# Patient Record
Sex: MALE | Race: BLACK OR AFRICAN AMERICAN | NOT HISPANIC OR LATINO | Employment: FULL TIME | URBAN - METROPOLITAN AREA
[De-identification: names, ages, dates, MRNs, and addresses within clinical notes are randomized per-mention and may not be internally consistent; named-entity substitution may affect disease eponyms.]

---

## 2017-02-09 ENCOUNTER — ALLSCRIPTS OFFICE VISIT (OUTPATIENT)
Dept: OTHER | Facility: OTHER | Age: 52
End: 2017-02-09

## 2017-02-09 DIAGNOSIS — E11.65 TYPE 2 DIABETES MELLITUS WITH HYPERGLYCEMIA (HCC): ICD-10-CM

## 2017-02-09 DIAGNOSIS — G56.03 BILATERAL CARPAL TUNNEL SYNDROME: ICD-10-CM

## 2017-02-09 DIAGNOSIS — I10 ESSENTIAL (PRIMARY) HYPERTENSION: ICD-10-CM

## 2017-02-09 DIAGNOSIS — F17.200 NICOTINE DEPENDENCE, UNCOMPLICATED: ICD-10-CM

## 2017-02-09 DIAGNOSIS — E11.42 TYPE 2 DIABETES MELLITUS WITH DIABETIC POLYNEUROPATHY (HCC): ICD-10-CM

## 2017-02-09 DIAGNOSIS — R10.11 RIGHT UPPER QUADRANT PAIN: ICD-10-CM

## 2017-02-09 DIAGNOSIS — E78.89 OTHER LIPOPROTEIN METABOLISM DISORDERS: ICD-10-CM

## 2017-02-09 DIAGNOSIS — Z11.3 ENCOUNTER FOR SCREENING FOR INFECTIONS WITH PREDOMINANTLY SEXUAL MODE OF TRANSMISSION: ICD-10-CM

## 2017-02-09 DIAGNOSIS — R10.30 LOWER ABDOMINAL PAIN: ICD-10-CM

## 2017-02-09 DIAGNOSIS — E66.9 OBESITY: ICD-10-CM

## 2017-02-09 DIAGNOSIS — Z11.4 ENCOUNTER FOR SCREENING FOR HIV: ICD-10-CM

## 2017-02-09 DIAGNOSIS — R80.9 PROTEINURIA: ICD-10-CM

## 2017-02-10 ENCOUNTER — GENERIC CONVERSION - ENCOUNTER (OUTPATIENT)
Dept: OTHER | Facility: OTHER | Age: 52
End: 2017-02-10

## 2017-02-13 ENCOUNTER — LAB CONVERSION - ENCOUNTER (OUTPATIENT)
Dept: OTHER | Facility: OTHER | Age: 52
End: 2017-02-13

## 2017-02-13 LAB
A/G RATIO (HISTORICAL): 1.2 (CALC) (ref 1–2.5)
ALBUMIN SERPL BCP-MCNC: 4 G/DL (ref 3.6–5.1)
ALP SERPL-CCNC: 85 U/L (ref 40–115)
ALT SERPL W P-5'-P-CCNC: 22 U/L (ref 9–46)
AST SERPL W P-5'-P-CCNC: 41 U/L (ref 10–35)
BASOPHILS # BLD AUTO: 0.4 %
BASOPHILS # BLD AUTO: 37 CELLS/UL (ref 0–200)
BILIRUB SERPL-MCNC: 2 MG/DL (ref 0.2–1.2)
BUN SERPL-MCNC: 8 MG/DL (ref 7–25)
BUN/CREA RATIO (HISTORICAL): ABNORMAL (CALC) (ref 6–22)
CALCIUM SERPL-MCNC: 9 MG/DL (ref 8.6–10.3)
CHLORIDE SERPL-SCNC: 96 MMOL/L (ref 98–110)
CHOLEST SERPL-MCNC: 169 MG/DL (ref 125–200)
CHOLEST/HDLC SERPL: 3.7 (CALC)
CO2 SERPL-SCNC: 31 MMOL/L (ref 20–31)
CREAT SERPL-MCNC: 1.07 MG/DL (ref 0.7–1.33)
DEPRECATED RDW RBC AUTO: 12.6 % (ref 11–15)
EGFR AFRICAN AMERICAN (HISTORICAL): 92 ML/MIN/1.73M2
EGFR-AMERICAN CALC (HISTORICAL): 79 ML/MIN/1.73M2
EOSINOPHIL # BLD AUTO: 2.6 %
EOSINOPHIL # BLD AUTO: 242 CELLS/UL (ref 15–500)
GAMMA GLOBULIN (HISTORICAL): 3.3 G/DL (CALC) (ref 1.9–3.7)
GLUCOSE (HISTORICAL): 184 MG/DL (ref 65–99)
HBA1C MFR BLD HPLC: 9.3 % OF TOTAL HGB
HCT VFR BLD AUTO: 46.6 % (ref 38.5–50)
HDLC SERPL-MCNC: 46 MG/DL
HGB BLD-MCNC: 14.9 G/DL (ref 13.2–17.1)
HIV AG/AB, 4TH GEN (HISTORICAL): NORMAL
LDL CHOLESTEROL (HISTORICAL): 107 MG/DL (CALC)
LYMPHOCYTES # BLD AUTO: 3413 CELLS/UL (ref 850–3900)
LYMPHOCYTES # BLD AUTO: 36.7 %
MCH RBC QN AUTO: 31.2 PG (ref 27–33)
MCHC RBC AUTO-ENTMCNC: 32 G/DL (ref 32–36)
MCV RBC AUTO: 97.7 FL (ref 80–100)
MONOCYTES # BLD AUTO: 642 CELLS/UL (ref 200–950)
MONOCYTES (HISTORICAL): 6.9 %
NEUTROPHILS # BLD AUTO: 4966 CELLS/UL (ref 1500–7800)
NEUTROPHILS # BLD AUTO: 53.4 %
NON-HDL-CHOL (CHOL-HDL) (HISTORICAL): 123 MG/DL (CALC)
PLATELET # BLD AUTO: 213 THOUSAND/UL (ref 140–400)
PMV BLD AUTO: 9.9 FL (ref 7.5–12.5)
POTASSIUM SERPL-SCNC: 3.6 MMOL/L (ref 3.5–5.3)
RBC # BLD AUTO: 4.76 MILLION/UL (ref 4.2–5.8)
SODIUM SERPL-SCNC: 137 MMOL/L (ref 135–146)
TOTAL PROTEIN (HISTORICAL): 7.3 G/DL (ref 6.1–8.1)
TRIGL SERPL-MCNC: 82 MG/DL
TSH SERPL DL<=0.05 MIU/L-ACNC: 2.27 MIU/L (ref 0.4–4.5)
WBC # BLD AUTO: 9.3 THOUSAND/UL (ref 3.8–10.8)

## 2017-02-16 ENCOUNTER — GENERIC CONVERSION - ENCOUNTER (OUTPATIENT)
Dept: OTHER | Facility: OTHER | Age: 52
End: 2017-02-16

## 2017-02-22 ENCOUNTER — HOSPITAL ENCOUNTER (OUTPATIENT)
Dept: ULTRASOUND IMAGING | Facility: HOSPITAL | Age: 52
Discharge: HOME | End: 2017-02-22
Payer: COMMERCIAL

## 2017-02-22 ENCOUNTER — ALLSCRIPTS OFFICE VISIT (OUTPATIENT)
Dept: OTHER | Facility: OTHER | Age: 52
End: 2017-02-22

## 2017-02-22 ENCOUNTER — TRANSCRIBE ORDERS (OUTPATIENT)
Dept: ADMINISTRATIVE | Facility: HOSPITAL | Age: 52
End: 2017-02-22

## 2017-02-22 DIAGNOSIS — R10.11 RIGHT UPPER QUADRANT PAIN: ICD-10-CM

## 2017-02-22 PROCEDURE — 76705 ECHO EXAM OF ABDOMEN: CPT

## 2017-02-23 ENCOUNTER — GENERIC CONVERSION - ENCOUNTER (OUTPATIENT)
Dept: OTHER | Facility: OTHER | Age: 52
End: 2017-02-23

## 2017-04-11 ENCOUNTER — ALLSCRIPTS OFFICE VISIT (OUTPATIENT)
Dept: OTHER | Facility: OTHER | Age: 52
End: 2017-04-11

## 2017-05-16 DIAGNOSIS — I10 ESSENTIAL (PRIMARY) HYPERTENSION: ICD-10-CM

## 2017-05-16 DIAGNOSIS — E11.65 TYPE 2 DIABETES MELLITUS WITH HYPERGLYCEMIA (HCC): ICD-10-CM

## 2017-05-16 DIAGNOSIS — E11.42 TYPE 2 DIABETES MELLITUS WITH DIABETIC POLYNEUROPATHY (HCC): ICD-10-CM

## 2017-05-16 DIAGNOSIS — R80.9 PROTEINURIA: ICD-10-CM

## 2017-05-16 DIAGNOSIS — E66.9 OBESITY: ICD-10-CM

## 2017-05-18 ENCOUNTER — LAB CONVERSION - ENCOUNTER (OUTPATIENT)
Dept: OTHER | Facility: OTHER | Age: 52
End: 2017-05-18

## 2017-05-18 ENCOUNTER — GENERIC CONVERSION - ENCOUNTER (OUTPATIENT)
Dept: OTHER | Facility: OTHER | Age: 52
End: 2017-05-18

## 2017-05-18 LAB
CLINICAL COMMENT (HISTORICAL): NORMAL
HEPATITIS A IGM ANTIBODY (HISTORICAL): NORMAL
HEPATITIS B CORE TOTAL ANTIBODY (HISTORICAL): NORMAL
HEPATITIS B SURFACE ANTIGEN (HISTORICAL): NORMAL
HEPATITIS C ANTIBODY (HISTORICAL): NORMAL
HERPES SIMPLEX VIRUS 1 IGG (HISTORICAL): 29.9 INDEX
HERPES SIMPLEX VIRUS 2 IGG (HISTORICAL): 1.6 INDEX
RPR SCREEN (HISTORICAL): NORMAL
SIGNAL TO CUT-OFF (HISTORICAL): 0.04

## 2017-05-24 ENCOUNTER — ALLSCRIPTS OFFICE VISIT (OUTPATIENT)
Dept: OTHER | Facility: OTHER | Age: 52
End: 2017-05-24

## 2017-06-06 ENCOUNTER — ALLSCRIPTS OFFICE VISIT (OUTPATIENT)
Dept: OTHER | Facility: OTHER | Age: 52
End: 2017-06-06

## 2017-06-06 ENCOUNTER — GENERIC CONVERSION - ENCOUNTER (OUTPATIENT)
Dept: OTHER | Facility: OTHER | Age: 52
End: 2017-06-06

## 2017-06-06 ENCOUNTER — LAB CONVERSION - ENCOUNTER (OUTPATIENT)
Dept: OTHER | Facility: OTHER | Age: 52
End: 2017-06-06

## 2017-06-06 LAB
A/G RATIO (HISTORICAL): 1.3 (CALC) (ref 1–2.5)
ALBUMIN SERPL BCP-MCNC: 3.9 G/DL (ref 3.6–5.1)
ALP SERPL-CCNC: 59 U/L (ref 40–115)
ALT SERPL W P-5'-P-CCNC: 9 U/L (ref 9–46)
AST SERPL W P-5'-P-CCNC: 18 U/L (ref 10–35)
BILIRUB SERPL-MCNC: 0.7 MG/DL (ref 0.2–1.2)
BUN SERPL-MCNC: 11 MG/DL (ref 7–25)
BUN/CREA RATIO (HISTORICAL): ABNORMAL (CALC) (ref 6–22)
CALCIUM SERPL-MCNC: 9.4 MG/DL (ref 8.6–10.3)
CHLORIDE SERPL-SCNC: 102 MMOL/L (ref 98–110)
CHOLEST SERPL-MCNC: 151 MG/DL (ref 125–200)
CHOLEST/HDLC SERPL: 3 (CALC)
CO2 SERPL-SCNC: 32 MMOL/L (ref 20–31)
CREAT SERPL-MCNC: 0.97 MG/DL (ref 0.7–1.33)
CREATININE, RANDOM URINE (HISTORICAL): 231 MG/DL (ref 20–370)
EGFR AFRICAN AMERICAN (HISTORICAL): 103 ML/MIN/1.73M2
EGFR-AMERICAN CALC (HISTORICAL): 89 ML/MIN/1.73M2
GAMMA GLOBULIN (HISTORICAL): 3 G/DL (CALC) (ref 1.9–3.7)
GLUCOSE (HISTORICAL): 82 MG/DL (ref 65–99)
HBA1C MFR BLD HPLC: 5.9 % OF TOTAL HGB
HDLC SERPL-MCNC: 50 MG/DL
LDL CHOLESTEROL (HISTORICAL): 86 MG/DL (CALC)
MAGNESIUM, UR (HISTORICAL): 5.6 MG/DL
MICROALBUMIN/CREATININE RATIO (HISTORICAL): 24 MCG/MG CREAT
NON-HDL-CHOL (CHOL-HDL) (HISTORICAL): 101 MG/DL (CALC)
POTASSIUM SERPL-SCNC: 3.7 MMOL/L (ref 3.5–5.3)
SODIUM SERPL-SCNC: 141 MMOL/L (ref 135–146)
TOTAL PROTEIN (HISTORICAL): 6.9 G/DL (ref 6.1–8.1)
TRIGL SERPL-MCNC: 75 MG/DL

## 2017-07-28 ENCOUNTER — HOSPITAL ENCOUNTER (EMERGENCY)
Facility: HOSPITAL | Age: 52
Discharge: HOME | End: 2017-07-28
Attending: EMERGENCY MEDICINE | Admitting: EMERGENCY MEDICINE
Payer: COMMERCIAL

## 2017-07-28 ENCOUNTER — APPOINTMENT (EMERGENCY)
Dept: RADIOLOGY | Facility: HOSPITAL | Age: 52
End: 2017-07-28
Payer: COMMERCIAL

## 2017-07-28 ENCOUNTER — APPOINTMENT (EMERGENCY)
Dept: CT IMAGING | Facility: HOSPITAL | Age: 52
End: 2017-07-28
Payer: COMMERCIAL

## 2017-07-28 VITALS
TEMPERATURE: 98.3 F | SYSTOLIC BLOOD PRESSURE: 192 MMHG | RESPIRATION RATE: 16 BRPM | HEART RATE: 93 BPM | DIASTOLIC BLOOD PRESSURE: 100 MMHG | HEIGHT: 69 IN | BODY MASS INDEX: 40.57 KG/M2 | WEIGHT: 273.9 LBS | OXYGEN SATURATION: 94 %

## 2017-07-28 DIAGNOSIS — J18.9 RIGHT LOWER LOBE PNEUMONIA: Primary | ICD-10-CM

## 2017-07-28 DIAGNOSIS — R05.9 COUGH: ICD-10-CM

## 2017-07-28 LAB
ALBUMIN SERPL BCP-MCNC: 3 G/DL (ref 3.5–5)
ALP SERPL-CCNC: 70 U/L (ref 46–116)
ALT SERPL W P-5'-P-CCNC: 19 U/L (ref 12–78)
ANION GAP SERPL CALCULATED.3IONS-SCNC: 10 MMOL/L (ref 4–13)
AST SERPL W P-5'-P-CCNC: 21 U/L (ref 5–45)
ATRIAL RATE: 101 BPM
BASOPHILS # BLD AUTO: 0.04 THOUSANDS/ΜL (ref 0–0.1)
BASOPHILS NFR BLD AUTO: 1 % (ref 0–1)
BILIRUB SERPL-MCNC: 0.4 MG/DL (ref 0.2–1)
BUN SERPL-MCNC: 7 MG/DL (ref 5–25)
CALCIUM SERPL-MCNC: 8.7 MG/DL (ref 8.3–10.1)
CHLORIDE SERPL-SCNC: 102 MMOL/L (ref 100–108)
CO2 SERPL-SCNC: 30 MMOL/L (ref 21–32)
CREAT SERPL-MCNC: 0.89 MG/DL (ref 0.6–1.3)
DEPRECATED D DIMER PPP: 533 NG/ML (FEU) (ref 0–424)
EOSINOPHIL # BLD AUTO: 0.43 THOUSAND/ΜL (ref 0–0.61)
EOSINOPHIL NFR BLD AUTO: 6 % (ref 0–6)
ERYTHROCYTE [DISTWIDTH] IN BLOOD BY AUTOMATED COUNT: 12.3 % (ref 11.6–15.1)
GFR SERPL CREATININE-BSD FRML MDRD: 113 ML/MIN/1.73SQ M
GLUCOSE SERPL-MCNC: 134 MG/DL (ref 65–140)
HCT VFR BLD AUTO: 44.2 % (ref 36.5–49.3)
HGB BLD-MCNC: 15.2 G/DL (ref 12–17)
LYMPHOCYTES # BLD AUTO: 2.64 THOUSANDS/ΜL (ref 0.6–4.47)
LYMPHOCYTES NFR BLD AUTO: 34 % (ref 14–44)
MCH RBC QN AUTO: 30 PG (ref 26.8–34.3)
MCHC RBC AUTO-ENTMCNC: 34.4 G/DL (ref 31.4–37.4)
MCV RBC AUTO: 87 FL (ref 82–98)
MONOCYTES # BLD AUTO: 0.65 THOUSAND/ΜL (ref 0.17–1.22)
MONOCYTES NFR BLD AUTO: 9 % (ref 4–12)
NEUTROPHILS # BLD AUTO: 3.92 THOUSANDS/ΜL (ref 1.85–7.62)
NEUTS SEG NFR BLD AUTO: 50 % (ref 43–75)
NT-PROBNP SERPL-MCNC: 156 PG/ML
P AXIS: 47 DEGREES
PLATELET # BLD AUTO: 314 THOUSANDS/UL (ref 149–390)
PMV BLD AUTO: 10.1 FL (ref 8.9–12.7)
POTASSIUM SERPL-SCNC: 3.2 MMOL/L (ref 3.5–5.3)
PR INTERVAL: 128 MS
PROT SERPL-MCNC: 7 G/DL (ref 6.4–8.2)
QRS AXIS: 42 DEGREES
QRSD INTERVAL: 80 MS
QT INTERVAL: 354 MS
QTC INTERVAL: 450 MS
RBC # BLD AUTO: 5.06 MILLION/UL (ref 3.88–5.62)
SODIUM SERPL-SCNC: 142 MMOL/L (ref 136–145)
T WAVE AXIS: 26 DEGREES
VENTRICULAR RATE: 97 BPM
WBC # BLD AUTO: 7.68 THOUSAND/UL (ref 4.31–10.16)

## 2017-07-28 PROCEDURE — 99284 EMERGENCY DEPT VISIT MOD MDM: CPT

## 2017-07-28 PROCEDURE — 83880 ASSAY OF NATRIURETIC PEPTIDE: CPT | Performed by: PHYSICIAN ASSISTANT

## 2017-07-28 PROCEDURE — 85379 FIBRIN DEGRADATION QUANT: CPT | Performed by: PHYSICIAN ASSISTANT

## 2017-07-28 PROCEDURE — 93005 ELECTROCARDIOGRAM TRACING: CPT

## 2017-07-28 PROCEDURE — 36415 COLL VENOUS BLD VENIPUNCTURE: CPT | Performed by: PHYSICIAN ASSISTANT

## 2017-07-28 PROCEDURE — 71275 CT ANGIOGRAPHY CHEST: CPT

## 2017-07-28 PROCEDURE — 85025 COMPLETE CBC W/AUTO DIFF WBC: CPT | Performed by: PHYSICIAN ASSISTANT

## 2017-07-28 PROCEDURE — 80053 COMPREHEN METABOLIC PANEL: CPT | Performed by: PHYSICIAN ASSISTANT

## 2017-07-28 PROCEDURE — 71020 HB CHEST X-RAY 2VW FRONTAL&LATL: CPT

## 2017-07-28 RX ORDER — GLIMEPIRIDE 4 MG/1
4 TABLET ORAL
COMMUNITY
End: 2019-10-28

## 2017-07-28 RX ORDER — METOPROLOL SUCCINATE 100 MG/1
100 TABLET, EXTENDED RELEASE ORAL DAILY
COMMUNITY
End: 2019-10-21 | Stop reason: SDUPTHER

## 2017-07-28 RX ORDER — FENOFIBRATE 145 MG/1
145 TABLET, COATED ORAL DAILY
COMMUNITY
End: 2019-10-21 | Stop reason: SDUPTHER

## 2017-07-28 RX ORDER — OLMESARTAN MEDOXOMIL, AMLODIPINE AND HYDROCHLOROTHIAZIDE TABLET 40/10/25 MG 40; 10; 25 MG/1; MG/1; MG/1
1 TABLET ORAL DAILY
COMMUNITY
End: 2019-10-21 | Stop reason: SDUPTHER

## 2017-07-28 RX ORDER — LEVOFLOXACIN 750 MG/1
750 TABLET ORAL EVERY 24 HOURS
Qty: 10 TABLET | Refills: 0 | Status: SHIPPED | OUTPATIENT
Start: 2017-07-28 | End: 2017-08-07

## 2017-07-28 RX ADMIN — IOHEXOL 85 ML: 350 INJECTION, SOLUTION INTRAVENOUS at 14:56

## 2017-10-06 DIAGNOSIS — E66.9 OBESITY: ICD-10-CM

## 2017-10-06 DIAGNOSIS — E11.65 TYPE 2 DIABETES MELLITUS WITH HYPERGLYCEMIA (HCC): ICD-10-CM

## 2017-10-06 DIAGNOSIS — R80.9 PROTEINURIA: ICD-10-CM

## 2017-10-06 DIAGNOSIS — I10 ESSENTIAL (PRIMARY) HYPERTENSION: ICD-10-CM

## 2017-10-06 DIAGNOSIS — E78.89 OTHER LIPOPROTEIN METABOLISM DISORDERS: ICD-10-CM

## 2017-12-22 ENCOUNTER — LAB CONVERSION - ENCOUNTER (OUTPATIENT)
Dept: OTHER | Facility: OTHER | Age: 52
End: 2017-12-22

## 2017-12-22 ENCOUNTER — GENERIC CONVERSION - ENCOUNTER (OUTPATIENT)
Dept: OTHER | Facility: OTHER | Age: 52
End: 2017-12-22

## 2017-12-22 LAB
A/G RATIO (HISTORICAL): 1.3 (CALC) (ref 1–2.5)
ALBUMIN SERPL BCP-MCNC: 3.8 G/DL (ref 3.6–5.1)
ALP SERPL-CCNC: 80 U/L (ref 40–115)
ALT SERPL W P-5'-P-CCNC: 13 U/L (ref 9–46)
AST SERPL W P-5'-P-CCNC: 22 U/L (ref 10–35)
BASOPHILS # BLD AUTO: 0.5 %
BASOPHILS # BLD AUTO: 37 CELLS/UL (ref 0–200)
BILIRUB SERPL-MCNC: 1.3 MG/DL (ref 0.2–1.2)
BUN SERPL-MCNC: 10 MG/DL (ref 7–25)
BUN/CREA RATIO (HISTORICAL): ABNORMAL (CALC) (ref 6–22)
CALCIUM SERPL-MCNC: 8.8 MG/DL (ref 8.6–10.3)
CHLORIDE SERPL-SCNC: 101 MMOL/L (ref 98–110)
CHOLEST SERPL-MCNC: 153 MG/DL
CHOLEST/HDLC SERPL: 3.6 (CALC)
CO2 SERPL-SCNC: 30 MMOL/L (ref 20–31)
CREAT SERPL-MCNC: 1.03 MG/DL (ref 0.7–1.33)
CREATININE, RANDOM URINE (HISTORICAL): 376 MG/DL (ref 20–370)
DEPRECATED RDW RBC AUTO: 11.7 % (ref 11–15)
EGFR AFRICAN AMERICAN (HISTORICAL): 96 ML/MIN/1.73M2
EGFR-AMERICAN CALC (HISTORICAL): 83 ML/MIN/1.73M2
EOSINOPHIL # BLD AUTO: 429 CELLS/UL (ref 15–500)
EOSINOPHIL # BLD AUTO: 5.8 %
GAMMA GLOBULIN (HISTORICAL): 3 G/DL (CALC) (ref 1.9–3.7)
GLUCOSE (HISTORICAL): 169 MG/DL (ref 65–99)
HBA1C MFR BLD HPLC: 8.7 % OF TOTAL HGB
HCT VFR BLD AUTO: 47 % (ref 38.5–50)
HDLC SERPL-MCNC: 43 MG/DL
HGB BLD-MCNC: 16 G/DL (ref 13.2–17.1)
LDL CHOLESTEROL (HISTORICAL): 92 MG/DL (CALC)
LYMPHOCYTES # BLD AUTO: 3138 CELLS/UL (ref 850–3900)
LYMPHOCYTES # BLD AUTO: 42.4 %
MAGNESIUM, UR (HISTORICAL): 29.1 MG/DL
MCH RBC QN AUTO: 30.7 PG (ref 27–33)
MCHC RBC AUTO-ENTMCNC: 34 G/DL (ref 32–36)
MCV RBC AUTO: 90.2 FL (ref 80–100)
MICROALBUMIN/CREATININE RATIO (HISTORICAL): 77 MCG/MG CREAT
MONOCYTES # BLD AUTO: 762 CELLS/UL (ref 200–950)
MONOCYTES (HISTORICAL): 10.3 %
NEUTROPHILS # BLD AUTO: 3034 CELLS/UL (ref 1500–7800)
NEUTROPHILS # BLD AUTO: 41 %
NON-HDL-CHOL (CHOL-HDL) (HISTORICAL): 110 MG/DL (CALC)
PLATELET # BLD AUTO: 176 THOUSAND/UL (ref 140–400)
PMV BLD AUTO: 11.7 FL (ref 7.5–12.5)
POTASSIUM SERPL-SCNC: 3.5 MMOL/L (ref 3.5–5.3)
RBC # BLD AUTO: 5.21 MILLION/UL (ref 4.2–5.8)
SODIUM SERPL-SCNC: 138 MMOL/L (ref 135–146)
TOTAL PROTEIN (HISTORICAL): 6.8 G/DL (ref 6.1–8.1)
TRIGL SERPL-MCNC: 88 MG/DL
TSH SERPL DL<=0.05 MIU/L-ACNC: 1.9 MIU/L (ref 0.4–4.5)
WBC # BLD AUTO: 7.4 THOUSAND/UL (ref 3.8–10.8)

## 2017-12-26 ENCOUNTER — ALLSCRIPTS OFFICE VISIT (OUTPATIENT)
Dept: OTHER | Facility: OTHER | Age: 52
End: 2017-12-26

## 2017-12-27 NOTE — PROGRESS NOTES
Assessment   1  Marital problems (V61 10) (Z63 0)   2  Diabetes mellitus type 2, uncontrolled (250 02) (E11 65)   3  Elevated lipoprotein(a) (272 8) (E78 89)   4  Microalbuminuria (791 0) (R80 9)   5  Benign essential hypertension (401 1) (I10)   6  Acute bronchitis, unspecified organism (466 0) (J20 9)    Plan   Acute bronchitis, unspecified organism    · Azithromycin 250 MG Oral Tablet; TAKE 2 TABLETS ON DAY 1 THEN TAKE 1    TABLET A DAY FOR 4 DAYS  Benign essential hypertension    · Metoprolol Succinate  MG Oral Tablet Extended Release 24 Hour; TAKE 1    TABLET BY MOUTH DAILY   · Olmesartan-Amlodipine-HCTZ 40-10-25 MG Oral Tablet (Tribenzor); Take 1    tablet daily  Benign essential hypertension, Diabetes mellitus type 2, uncontrolled, Elevated    lipoprotein(a), Microalbuminuria    · (1) CBC/PLT/DIFF; Status:Active; Requested for:26Mar2018;    · (1) COMPREHENSIVE METABOLIC PANEL; Status:Active; Requested for:26Mar2018;    · (1) HEMOGLOBIN A1C; Status:Active; Requested for:26Mar2018;    · (1) LIPID PANEL, FASTING; Status:Active; Requested for:26Mar2018;    · (1) MICROALBUMIN CREATININE RATIO, RANDOM URINE; Status:Active; Requested    for:26Mar2018;   Diabetes mellitus type 2, uncontrolled    · Glimepiride 4 MG Oral Tablet; 1 tab PO daily   · Janumet XR  MG Oral Tablet Extended Release 24 Hour; take 2 tablet    daily   · Jardiance 10 MG Oral Tablet; TAKE 1 TABLET DAILY IN THE MORNING   · Trulicity 4 14 RP/7 6ZO Subcutaneous Solution Pen-injector; INJECT 0 75 MG    SQ EVERY 7 DAYS  Elevated lipoprotein(a)    · Fenofibrate 145 MG Oral Tablet (Tricor); TAKE 1 TABLET DAILY  SocHx: Marital problems    · Psychology Referral Other Co-Management  *  Status: Hold For - Scheduling  Requested    for: [id number]  are Referring to a non- Preferred Provider : Scheduling access issues  Care Summary provided  : Yes    Discussion/Summary      - patient's weight has increased significantl   Since he is run out of medications is diabetic parameters are significantly worse  Hemoglobin A1c up to 8 7  Patient needs to be back on medications and following a diabetic diet which he had done in the past  does seem to be quite frustrated with his current marital in living situation  There does not send to be a good resolution to this  Patient would like to find a different counselor  Given recommendations for alternate psychologist for counseling services note does sound that the need to try to once again go through marital counseling  will continue on fenofibrate for hyperlipidemia which is almost at goal for diabetic with an LDL of 86 will continue on glyburide 4 mg daily, Janumet XR 50/1000mg 2 tablets daily, jardiance 10 mg daily and Trulicity for treatment of diabetes mellitus  Repeat diabetic parameters in 3 months  Patient will continue on Tribenzor, and metoprolol  Blood pressure is not at all controlled Long discussion with patient about dietary modifications and lifestyle modifications  Follow-up in 3 months with labs given a prescription for Zithromax for treatment of acute bronchitis  The patient was counseled regarding instructions for management,-- risk factor reductions,-- prognosis,-- impressions  total time of encounter was 46 minutes-- and-- 36 minutes was spent counseling  Possible side effects of new medications were reviewed with the patient/guardian today  The treatment plan was reviewed with the patient/guardian  The patient/guardian understands and agrees with the treatment plan      Chief Complaint   Med refills    Patient is here today for follow up of chronic conditions described in HPI  History of Present Illness   Patient presents for follow-up of chronic conditions including diabetes mellitus type 2, hypertension, hyperlipidemia, obesity  Patient has been very frustrated  He and his wife continue to have arguments   Multiple step children are living in his house, using his car, his wife is accusing him of cheating which she did previously  He feels that he is being taken advantage of and that he is working only to support everybody else  Nobody helps out around the house  He states that the therapist that he is going to, Dr Obinna Ramachandran, does not seem to be helping at all  He would like to find a new therapist  His hemoglobin A1c increased from 5 9% to 8 7%  He is run out of all of his medications  Patient also complains of a productive cough for the past 3-4 weeks which is not improving  Review of Systems        Constitutional: recent 18 lb weight gain, but-- as noted in HPI,-- not feeling poorly-- and-- not feeling tired  Eyes: No complaints of eye pain, no red eyes, no discharge from eyes, no itchy eyes-- and-- no eyesight problems  ENT: no complaints of earache, no hearing loss, no nosebleeds, no nasal discharge, no sore throat, no hoarseness  Cardiovascular: as noted in HPI-- and-- no extremity edema  Respiratory: No complaints of shortness of breath, no wheezing, no cough, no SOB on exertion, no orthopnea or PND  Gastrointestinal: No complaints of abdominal pain, no constipation, no nausea or vomiting, no diarrhea or bloody stools  Genitourinary: No complaints of dysuria, no incontinence, no hesitancy, no nocturia, no genital lesion, no testicular pain  Musculoskeletal: No complaints of arthralgia, no myalgias, no joint swelling or stiffness, no limb pain or swelling,-- no arthralgias,-- no joint swelling,-- no myalgias-- and-- no joint stiffness  Integumentary: No complaints of skin rash or skin lesions, no itching, no skin wound, no dry skin  Neurological: No compliants of headache, no confusion, no convulsions, no numbness or tingling, no dizziness or fainting, no limb weakness, no difficulty walking,-- no numbness,-- no tingling-- and-- no limb weakness        Psychiatric: anxiety, but-- as noted in HPI-- and-- not suicidal       Endocrine: No complaints of proptosis, no hot flashes, no muscle weakness, no erectile dysfunction, no deepening of the voice, no feelings of weakness  Hematologic/Lymphatic: No complaints of swollen glands, no swollen glands in the neck, does not bleed easily, no easy bruising  Active Problems   1  Abnormal EKG (794 31) (R94 31)   2  Benign essential hypertension (401 1) (I10)   3  Bilateral carpal tunnel syndrome (354 0) (G56 03)   4  Carpal tunnel syndrome of left wrist (354 0) (G56 02)   5  Carpal tunnel syndrome of right wrist (354 0) (G56 01)   6  Chest tightness or pressure (786 59) (R07 89)   7  Diabetes mellitus type 2, uncontrolled (250 02) (E11 65)   8  Elevated lipoprotein(a) (272 8) (E78 89)   9  Entrapment of right ulnar nerve (354 2) (G56 21)   10  History of nausea (V12 79) (Z87 898)   11  Influenza vaccination administered at current visit (V04 81) (Z23)   12  Microalbuminuria (791 0) (R80 9)   13  Obesity (278 00) (E66 9)   14  Onychomycosis (110 1) (B35 1)   15  RUQ discomfort (789 01) (R10 11)   16  Screening for HIV (human immunodeficiency virus) (V73 89) (Z11 4)   17  Sleep apnea (780 57) (G47 30)   18  Ulnar neuropathy at elbow of right upper extremity (354 2) (G56 21)    Past Medical History   1  History of _   2  History of Abnormal electrocardiogram (794 31) (R94 31)   3  History of Acute upper respiratory infection (465 9) (J06 9)   4  History of Alcohol dependence (303 90) (F10 20)   5  History of Concussion (V15 52)   6  History of Cough (786 2) (R05)   7  History of abdominal pain (V13 89) (Z87 898)   8  History of acute bronchitis (V12 69) (Z87 09)   9  History of headache (V13 89) (Z87 898)   10  History of nausea (V12 79) (Z87 898)   11  History of nausea and vomiting (V12 79) (Z87 898)   12  History of nicotine dependence (V15 82) (Z87 891)   13  History of shortness of breath (V13 89) (Z87 898)   14  History of type 2 diabetes mellitus (V12 29) (Z86 39)   15   History of Inguinal hernia (550 90) (K40 90)   16  History of Left knee pain (719 46) (M25 562)   17  Onychomycosis (110 1) (B35 1)   18  History of Pain in both hands (729 5) (M79 641,M79 642)   19  History of Screen for STD (sexually transmitted disease) (V74 5) (Z11 3)   20  History of Tingling (782 0) (R20 2)   21  History of Weak urinary stream (788 62) (R39 12)     The active problems and past medical history were reviewed and updated today  Surgical History   1  History of Knee Arthroscopy With Medial And Lateral Meniscus Repair     The surgical history was reviewed and updated today  Family History   Mother    1  Family history of Essential Hypertension  Father    2  Family history of Chronic Bronchitis   3  Family history of Prostate Cancer (V16 42)  Sister    4  Family history of Breast Cancer (V16 3)     The family history was reviewed and updated today  Social History    · Denied: History of Alcohol Use (History)   · Current Every Day Smoker (305 1)   · Denied: History of Drug Use   · Marital problems (V61 10) (Z63 0)   ·    · Occupation  The social history was reviewed and updated today  Current Meds    1  Fenofibrate 145 MG Oral Tablet; TAKE 1 TABLET DAILY; Therapy: 61CHL8576 to (03 17 74 30 53)  Requested for: 11Apr2017; Last     Rx:11Apr2017 Ordered   2  Glimepiride 4 MG Oral Tablet; 1 tab PO daily; Therapy: 05DBP9897 to (Last Rx:11Apr2017)  Requested for: 11Apr2017 Ordered   3  Janumet XR  MG Oral Tablet Extended Release 24 Hour; take 2 tablet daily; Therapy: 08PFD3405 to (Evaluate:08Oct2017)  Requested for: 11Apr2017; Last     Rx:[id number] Ordered   4  Jardiance 10 MG Oral Tablet; TAKE 1 TABLET DAILY IN THE MORNING; Therapy: 69QMV8730 to (Last Rx:11Apr2017)  Requested for: 11Apr2017 Ordered   5  Metoprolol Succinate  MG Oral Tablet Extended Release 24 Hour; TAKE 1 TABLET     BY MOUTH DAILY;      Therapy: 06HYZ4657 to (Evaluate:10Jun2017)  Requested for: 63PZR3680; Last     Rx:11Apr2017 Ordered   6  Olmesartan-Amlodipine-HCTZ 40-10-25 MG Oral Tablet; Take 1 tablet daily; Therapy: 37IML3660 to (Evaluate:08Oct2017)  Requested for: 37Pqu6562; Last     Rx:11Apr2017 Ordered   7  OneTouch Delica Lancets 12F Miscellaneous; test fasting glucose once daily; Therapy: 09Apr2015 to (Last Rx:09Apr2015)  Requested for: 09Apr2015 Ordered   8  OneTouch Ultra Blue In Vitro Strip; TEST ONCE DAILY; Therapy: 10Apr2015 to (Evaluate:27Oct2015)  Requested for: 05Rlb6265; Last     Rx:82Nxt7189 Ordered   9  OneTouch Ultra System w/Device Kit; Accucheck once daily; Therapy: 10Apr2015 to (Last Rx:10Apr2015)  Requested for: 10Apr2015 Ordered   10  OneTouch UltraSoft Lancets Miscellaneous; use daily as directed; Therapy: 10Apr2015 to (Last Rx:10Apr2015)  Requested for: 10Apr2015 Ordered   11  Trulicity 5 49 RA/1 1LC Subcutaneous Solution Pen-injector; INJECT 0 75 MG SQ EVERY      7 DAYS; Therapy: 46QSV1185 to (Last Rx:11Apr2017)  Requested for: 11Apr2017 Ordered     The medication list was reviewed and updated today  Allergies   1  No Known Drug Allergies    Vitals   Vital Signs    Recorded: 30XXJ3355 11:10AM   Heart Rate 84   Respiration 18   Systolic 783   Diastolic 84   Height 5 ft 9 in   Weight 283 lb    BMI Calculated 41 79   BSA Calculated 2 39     Physical Exam        Constitutional      General appearance: Abnormal   well developed,-- well nourished,-- obese,-- appears tired,-- not exhausted-- and-- well hydrated  Eyes      Conjunctiva and lids: No swelling, erythema, or discharge  Pupils and irises: Equal, round and reactive to light  Ears, Nose, Mouth, and Throat      External inspection of ears and nose: Normal        Otoscopic examination: Tympanic membrance translucent with normal light reflex  Canals patent without erythema  Nasal mucosa, septum, and turbinates: Normal without edema or erythema         Oropharynx: Normal with no erythema, edema, exudate or lesions  Pulmonary      Respiratory effort: No increased work of breathing or signs of respiratory distress  Auscultation of lungs: Clear to auscultation, equal breath sounds bilaterally, no wheezes, no rales, no rhonci  Cardiovascular      Palpation of heart: Normal PMI, no thrills  Auscultation of heart: Normal rate and rhythm, normal S1 and S2, without murmurs  Examination of extremities for edema and/or varicosities: Normal        Abdomen      Abdomen: Non-tender, no masses  Liver and spleen: No hepatomegaly or splenomegaly  Lymphatic      Palpation of lymph nodes in neck: No lymphadenopathy  Musculoskeletal      Gait and station: Normal        Digits and nails: Normal without clubbing or cyanosis  Neurologic      Sensation: No sensory loss  Psychiatric      Orientation to person, place and time: Normal        Mood and affect: Abnormal   Mood and Affect: agitated,-- angry,-- not anxious,-- not bewildered,-- not bizarre,-- concerned-- and-- frustrated        Diabetic Foot Screen: Normal        Signatures    Electronically signed by : Apolinar Haile DO; Dec 26 2017 12:28PM EST                       (Author)

## 2017-12-28 ENCOUNTER — HOSPITAL ENCOUNTER (EMERGENCY)
Facility: HOSPITAL | Age: 52
Discharge: HOME | End: 2017-12-28
Admitting: EMERGENCY MEDICINE
Payer: COMMERCIAL

## 2017-12-28 ENCOUNTER — APPOINTMENT (EMERGENCY)
Dept: RADIOLOGY | Facility: HOSPITAL | Age: 52
End: 2017-12-28
Payer: COMMERCIAL

## 2017-12-28 VITALS
DIASTOLIC BLOOD PRESSURE: 84 MMHG | HEART RATE: 90 BPM | WEIGHT: 283.6 LBS | BODY MASS INDEX: 41.88 KG/M2 | TEMPERATURE: 98.2 F | SYSTOLIC BLOOD PRESSURE: 164 MMHG | RESPIRATION RATE: 19 BRPM | OXYGEN SATURATION: 96 %

## 2017-12-28 DIAGNOSIS — I10 HYPERTENSION: ICD-10-CM

## 2017-12-28 DIAGNOSIS — E83.42 HYPOMAGNESEMIA: ICD-10-CM

## 2017-12-28 DIAGNOSIS — J20.9 ACUTE BRONCHITIS: Primary | ICD-10-CM

## 2017-12-28 DIAGNOSIS — E11.9 DIABETES MELLITUS (HCC): ICD-10-CM

## 2017-12-28 LAB
ALBUMIN SERPL BCP-MCNC: 3.8 G/DL (ref 3.5–5)
ALP SERPL-CCNC: 93 U/L (ref 46–116)
ALT SERPL W P-5'-P-CCNC: 22 U/L (ref 12–78)
ANION GAP SERPL CALCULATED.3IONS-SCNC: 8 MMOL/L (ref 4–13)
APTT PPP: 30 SECONDS (ref 23–35)
AST SERPL W P-5'-P-CCNC: 32 U/L (ref 5–45)
BASOPHILS # BLD AUTO: 0.03 THOUSANDS/ΜL (ref 0–0.1)
BASOPHILS NFR BLD AUTO: 0 % (ref 0–1)
BILIRUB SERPL-MCNC: 1.1 MG/DL (ref 0.2–1)
BUN SERPL-MCNC: 9 MG/DL (ref 5–25)
CALCIUM SERPL-MCNC: 8.9 MG/DL (ref 8.3–10.1)
CHLORIDE SERPL-SCNC: 100 MMOL/L (ref 100–108)
CO2 SERPL-SCNC: 30 MMOL/L (ref 21–32)
CREAT SERPL-MCNC: 1.03 MG/DL (ref 0.6–1.3)
DEPRECATED D DIMER PPP: <270 NG/ML (FEU) (ref 0–424)
EOSINOPHIL # BLD AUTO: 0.77 THOUSAND/ΜL (ref 0–0.61)
EOSINOPHIL NFR BLD AUTO: 9 % (ref 0–6)
ERYTHROCYTE [DISTWIDTH] IN BLOOD BY AUTOMATED COUNT: 11.8 % (ref 11.6–15.1)
GFR SERPL CREATININE-BSD FRML MDRD: 95 ML/MIN/1.73SQ M
GLUCOSE SERPL-MCNC: 221 MG/DL (ref 65–140)
HCT VFR BLD AUTO: 46.5 % (ref 36.5–49.3)
HGB BLD-MCNC: 15.8 G/DL (ref 12–17)
INR PPP: 0.98 (ref 0.86–1.16)
LACTATE SERPL-SCNC: 1.7 MMOL/L (ref 0.5–2)
LYMPHOCYTES # BLD AUTO: 2.87 THOUSANDS/ΜL (ref 0.6–4.47)
LYMPHOCYTES NFR BLD AUTO: 35 % (ref 14–44)
MAGNESIUM SERPL-MCNC: 1.4 MG/DL (ref 1.6–2.6)
MCH RBC QN AUTO: 30.7 PG (ref 26.8–34.3)
MCHC RBC AUTO-ENTMCNC: 34 G/DL (ref 31.4–37.4)
MCV RBC AUTO: 90 FL (ref 82–98)
MONOCYTES # BLD AUTO: 0.85 THOUSAND/ΜL (ref 0.17–1.22)
MONOCYTES NFR BLD AUTO: 10 % (ref 4–12)
NEUTROPHILS # BLD AUTO: 3.76 THOUSANDS/ΜL (ref 1.85–7.62)
NEUTS SEG NFR BLD AUTO: 46 % (ref 43–75)
NT-PROBNP SERPL-MCNC: 118 PG/ML
PLATELET # BLD AUTO: 194 THOUSANDS/UL (ref 149–390)
PMV BLD AUTO: 11 FL (ref 8.9–12.7)
POTASSIUM SERPL-SCNC: 3.5 MMOL/L (ref 3.5–5.3)
PROT SERPL-MCNC: 8 G/DL (ref 6.4–8.2)
PROTHROMBIN TIME: 13.3 SECONDS (ref 12.1–14.4)
RBC # BLD AUTO: 5.15 MILLION/UL (ref 3.88–5.62)
SODIUM SERPL-SCNC: 138 MMOL/L (ref 136–145)
TROPONIN I SERPL-MCNC: <0.02 NG/ML
TSH SERPL DL<=0.05 MIU/L-ACNC: 2.4 UIU/ML (ref 0.36–3.74)
WBC # BLD AUTO: 8.28 THOUSAND/UL (ref 4.31–10.16)

## 2017-12-28 PROCEDURE — 36415 COLL VENOUS BLD VENIPUNCTURE: CPT | Performed by: PHYSICIAN ASSISTANT

## 2017-12-28 PROCEDURE — 96361 HYDRATE IV INFUSION ADD-ON: CPT

## 2017-12-28 PROCEDURE — 85730 THROMBOPLASTIN TIME PARTIAL: CPT | Performed by: PHYSICIAN ASSISTANT

## 2017-12-28 PROCEDURE — 84484 ASSAY OF TROPONIN QUANT: CPT | Performed by: PHYSICIAN ASSISTANT

## 2017-12-28 PROCEDURE — 87040 BLOOD CULTURE FOR BACTERIA: CPT | Performed by: PHYSICIAN ASSISTANT

## 2017-12-28 PROCEDURE — 83880 ASSAY OF NATRIURETIC PEPTIDE: CPT | Performed by: PHYSICIAN ASSISTANT

## 2017-12-28 PROCEDURE — 93005 ELECTROCARDIOGRAM TRACING: CPT | Performed by: PHYSICIAN ASSISTANT

## 2017-12-28 PROCEDURE — 96365 THER/PROPH/DIAG IV INF INIT: CPT

## 2017-12-28 PROCEDURE — 85610 PROTHROMBIN TIME: CPT | Performed by: PHYSICIAN ASSISTANT

## 2017-12-28 PROCEDURE — 83605 ASSAY OF LACTIC ACID: CPT | Performed by: PHYSICIAN ASSISTANT

## 2017-12-28 PROCEDURE — 85379 FIBRIN DEGRADATION QUANT: CPT | Performed by: PHYSICIAN ASSISTANT

## 2017-12-28 PROCEDURE — 85025 COMPLETE CBC W/AUTO DIFF WBC: CPT | Performed by: PHYSICIAN ASSISTANT

## 2017-12-28 PROCEDURE — 83735 ASSAY OF MAGNESIUM: CPT | Performed by: PHYSICIAN ASSISTANT

## 2017-12-28 PROCEDURE — 80053 COMPREHEN METABOLIC PANEL: CPT | Performed by: PHYSICIAN ASSISTANT

## 2017-12-28 PROCEDURE — 71020 HB CHEST X-RAY 2VW FRONTAL&LATL: CPT

## 2017-12-28 PROCEDURE — 84443 ASSAY THYROID STIM HORMONE: CPT | Performed by: PHYSICIAN ASSISTANT

## 2017-12-28 PROCEDURE — 99284 EMERGENCY DEPT VISIT MOD MDM: CPT

## 2017-12-28 RX ORDER — ALBUTEROL SULFATE 90 UG/1
2 AEROSOL, METERED RESPIRATORY (INHALATION) ONCE
Status: COMPLETED | OUTPATIENT
Start: 2017-12-28 | End: 2017-12-28

## 2017-12-28 RX ORDER — AMLODIPINE BESYLATE 5 MG/1
10 TABLET ORAL ONCE
Status: COMPLETED | OUTPATIENT
Start: 2017-12-28 | End: 2017-12-28

## 2017-12-28 RX ORDER — MAGNESIUM SULFATE 1 G/100ML
1 INJECTION INTRAVENOUS ONCE
Status: COMPLETED | OUTPATIENT
Start: 2017-12-28 | End: 2017-12-28

## 2017-12-28 RX ORDER — OLMESARTAN MEDOXOMIL 20 MG/1
40 TABLET ORAL DAILY
Status: DISCONTINUED | OUTPATIENT
Start: 2017-12-28 | End: 2017-12-28 | Stop reason: HOSPADM

## 2017-12-28 RX ORDER — METOPROLOL SUCCINATE 100 MG/1
100 TABLET, EXTENDED RELEASE ORAL DAILY
Status: DISCONTINUED | OUTPATIENT
Start: 2017-12-28 | End: 2017-12-28 | Stop reason: HOSPADM

## 2017-12-28 RX ORDER — HYDROCHLOROTHIAZIDE 25 MG/1
25 TABLET ORAL DAILY
Status: DISCONTINUED | OUTPATIENT
Start: 2017-12-28 | End: 2017-12-28 | Stop reason: HOSPADM

## 2017-12-28 RX ADMIN — ALBUTEROL SULFATE 2 PUFF: 90 AEROSOL, METERED RESPIRATORY (INHALATION) at 12:37

## 2017-12-28 RX ADMIN — MAGNESIUM SULFATE HEPTAHYDRATE 1 G: 1 INJECTION, SOLUTION INTRAVENOUS at 13:32

## 2017-12-28 RX ADMIN — METOPROLOL SUCCINATE 100 MG: 100 TABLET, EXTENDED RELEASE ORAL at 13:35

## 2017-12-28 RX ADMIN — HYDROCHLOROTHIAZIDE 25 MG: 25 TABLET ORAL at 13:36

## 2017-12-28 RX ADMIN — OLMESARTAN MEDOXOMIL 40 MG: 20 TABLET, COATED ORAL at 13:36

## 2017-12-28 RX ADMIN — SODIUM CHLORIDE 1000 ML: 0.9 INJECTION, SOLUTION INTRAVENOUS at 12:34

## 2017-12-28 RX ADMIN — AMLODIPINE BESYLATE 10 MG: 5 TABLET ORAL at 12:34

## 2017-12-28 NOTE — SEPSIS NOTE
Sepsis Note   Tera Heimlich 47 y o  male MRN: [id number]  Unit/Bed#: ED 18 Encounter: [id number]            Initial Sepsis Screening     Row Name 12/28/17 1300                Is the patient's history suggestive of a new or worsening infection? (!)  Yes (Proceed)  -JG        Suspected source of infection pneumonia  -JG        Are two or more of the following signs & symptoms of infection both present and new to the patient? (!)  Yes (Proceed)  -JG        Indicate SIRS criteria Tachycardia > 90 bpm;Tachypnea > 20 resp per min  -JG        If the answer is yes to both questions, suspicion of sepsis is present  --        If severe sepsis is present AND tissue hypoperfusion perists in the hour after fluid resuscitation or lactate > 4, the patient meets criteria for SEPTIC SHOCK  --        Are any of the following organ dysfunction criteria present within 6 hours of suspected infection and SIRS criteria that are NOT considered to be chronic conditions?  No  -JG        Organ dysfunction  --        Date of presentation of severe sepsis  --        Time of presentation of severe sepsis  --        Tissue hypoperfusion persists in the hour after crystalloid fluid administration, evidenced, by either:  --        Was hypotension present within one hour of the conclusion of crystalloid fluid administration?  --        Date of presentation of septic shock  --        Time of presentation of septic shock  --          User Key  (r) = Recorded By, (t) = Taken By, (c) = Cosigned By    [address] Name Provider Type    [address] PA-C Physician Assistant

## 2017-12-28 NOTE — DISCHARGE INSTRUCTIONS
Acute Bronchitis   WHAT YOU SHOULD KNOW:   Acute bronchitis is swelling and irritation in the air passages of your lungs  This irritation may cause you to cough or have other breathing problems  Acute bronchitis often starts because of another viral illness, such as a cold or the flu  The illness spreads from your nose and throat to your windpipe and airways  Bronchitis is often called a chest cold  Acute bronchitis lasts about 2 weeks and is usually not a serious illness  AFTER YOU LEAVE:   Medicines:   · Ibuprofen or acetaminophen:  These medicines help lower a fever  They are available without a doctor's order  Ask your healthcare provider which medicine is right for you  Ask how much to take and how often to take it  Follow directions  These medicines can cause stomach bleeding if not taken correctly  Ibuprofen can cause kidney damage  Do not take ibuprofen if you have kidney disease, an ulcer, or allergies to aspirin  Acetaminophen can cause liver damage  Do not drink alcohol if you take acetaminophen  · Cough medicine: This medicine helps loosen mucus in your lungs and make it easier to cough up  This can help you breathe easier  · Inhalers: You may need one or more inhalers to help you breathe easier and cough less  An inhaler gives your medicine in a mist form so that you can breathe it into your lungs  Ask your healthcare provider to show you how to use your inhaler correctly  · Steroid medicine:  Steroid medicine helps open your air passages so you can breathe easier  · Take your medicine as directed  Call your healthcare provider if you think your medicine is not helping or if you have side effects  Tell him if you are allergic to any medicine  Keep a list of the medicines, vitamins, and herbs you take  Include the amounts, and when and why you take them  Bring the list or the pill bottles to follow-up visits  Carry your medicine list with you in case of an emergency    How to use an inhaler:   · Shake the inhaler well to make sure you get the correct amount of medicine per puff  Remove the cover from your inhaler's mouthpiece  If you are using a spacer, connect your inhaler to the flat end of the spacer  · Exhale as much air from your lungs as you can  Put the mouthpiece in your mouth past your front teeth and rest it on the top of your tongue  Do not block the mouthpiece opening with your tongue  · Breathe in through your mouth at a slow and steady rate  As you do this, press the inhaler to release the puff of medicine  Finish breathing in slowly and deeply as you inhale the medicine  When your lungs are full, hold your breath for 10 seconds  Then breathe out slowly through puckered lips or through your nose  · If you need to take more puffs, wait at least 1 minute between each puff  · Rinse your mouth with water after you use the inhaler  This may keep you from getting a mouth infection or irritation  · Follow the instructions that come with your inhaler to clean it  You should clean your inhaler at least once a week  Ways to care for yourself:   · Avoid alcohol:  Alcohol dulls your urge to cough and sneeze  When you have bronchitis, you need to be able to cough and sneeze to clear your air passages  Alcohol also causes your body to lose fluid  This can make the mucus in your lungs thicker and harder to cough up  · Avoid irritants in the air:  Do not smoke or allow others to smoke around you  Avoid chemicals, fumes, and dust  Wear a face mask if you must work around dust or fumes  Stay inside on days when air pollution levels are high  If you have allergies, stay inside when pollen counts are high  Avoid aerosol products  This includes spray-on deodorant, bug spray, and hair spray  · Drink more liquids:  Most people should drink at least 8 eight-ounce cups of water a day  You may need to drink more liquids when you have acute bronchitis   Liquids help keep your air passages moist and help you cough up mucus  · Get more rest:  You may feel like resting more  Slowly start to do more each day  Rest when you feel it is needed  · Eat healthy foods:  Eat a variety healthy foods every day  Your diet should include fruits, vegetables, breads, and protein (such as chicken, fish, and beans)  Dairy products (such as milk, cheese, and ice cream) can sometimes increase the amount of mucus your body makes  Ask if you should decrease your intake of dairy products  · Use a humidifier:  Use a cool mist humidifier to increase air moisture in your home  This may make it easier for you to breathe and help decrease your cough  Decrease your risk of acute bronchitis:   · Get the vaccinations you need:  Ask your healthcare provider if you should get vaccinated against the flu or pneumonia  · Avoid things that may irritate your lungs:  Stay inside or cover your mouth and nose with a scarf when you are outside during cold weather  You should also stay inside on days when air pollution levels are high  If you have allergies, stay inside when pollen counts are high  Avoid using aerosol products in your home  This includes spray-on deodorant, bug spray, and hair spray  · Avoid the spread of germs:        [facility] your hands often with soap and water  Carry germ-killing gel with you  You can use the gel to clean your hands when there is no soap and water available  ¨ Do not touch your eyes, nose, or mouth unless you have washed your hands first     ¨ Always cover your mouth when you cough  Cough into a tissue or your shirtsleeve so you do not spread germs from your hands  ¨ Try to avoid people who have a cold or the flu  If you are sick, stay away from others as much as possible  Follow up with your healthcare provider as directed:  Write down questions you have so you will remember to ask them during your follow-up visits    Contact your healthcare provider if:   · You have a fever     · Your skin becomes itchy or you have a rash after you take your medicine  · Your breathing problems do not go away or get worse  · Your cough does not get better with treatment  · You cough up blood  · You have questions or concerns about your condition or care  Seek care immediately or call 911 if:   · You faint  · Your lips or fingernails turn blue  · You feel like you are not getting enough air when you breathe  · You have swelling of your lips, tongue, or throat that makes it hard to breathe or swallow  © 2014 [employer] is for End User's use only and may not be sold, redistributed or otherwise used for commercial purposes  All illustrations and images included in CareNotes® are the copyrighted property of [employer]  or Matt [employer]  The above information is an  only  It is not intended as medical advice for individual conditions or treatments  Talk to your doctor, nurse or pharmacist before following any medical regimen to see if it is safe and effective for you

## 2017-12-28 NOTE — ED PROVIDER NOTES
History  Chief Complaint   Patient presents with    Cough     CHILLS AT TIMES, COUGH X 1 MONTH SAW PMD WAS PLACED ON ANTIBIOTIC WHICH HE COMPLETED  C/O SOB     Patient presents emergency room with a 1 month history of a dry hacking cough  For the past 2 days it has become productive with yellow sputum  He denies any nasal congestion or postnasal drip  He does complain of shortness of breath with exertion  He was seen by his primary care provider who started him on Zithromax on Monday  He has been compliant with his medication  He has not been taking his blood pressure medications  He denies any fever or chills  He complains some generalized weakness  His past medical history that is positive for non-insulin-dependent diabetes mellitus, hypertension  His differential diagnosis includes but is not limited to acute bronchitis, pneumonia, viral syndrome, influenza, CHF, acute coronary syndrome, pulmonary embolism, pericarditis, myocarditis  History provided by:  Patient  Cough   Cough characteristics:  Productive  Sputum characteristics:  Yellow  Severity:  Moderate  Onset quality:  Gradual  Duration:  4 weeks (productive 2 days )  Timing:  Intermittent  Progression:  Worsening  Smoker: no    Context: not animal exposure, not exposure to allergens, not fumes, not occupational exposure, not sick contacts, not smoke exposure, not upper respiratory infection, not weather changes and not with activity    Relieved by:  Nothing  Worsened by:   Activity, deep breathing and exposure to cold air  Ineffective treatments: Zithromax-day 5   Associated symptoms: chills and wheezing    Associated symptoms: no chest pain, no diaphoresis, no ear fullness, no ear pain, no eye discharge, no fever, no headaches, no myalgias, no rash, no rhinorrhea, no shortness of breath, no sinus congestion, no sore throat and no weight loss    Associated symptoms comment:  Shortness of breath that is worse with exertion  Risk factors: no chemical exposure, no recent infection and no recent travel        Prior to Admission Medications   Prescriptions Last Dose Informant Patient Reported? Taking? Dulaglutide (TRULICITY) 4 64 KN/6 7ZC SOPN   Yes No   Sig: Inject 0 75 mg under the skin once a week   Empagliflozin (JARDIANCE) 10 MG TABS   Yes No   Sig: Take 10 mg by mouth every morning   Olmesartan-Amlodipine-HCTZ (TRIBENZOR) 40-10-25 MG TABS   Yes No   Sig: Take 1 tablet by mouth daily   fenofibrate (TRICOR) 145 mg tablet   Yes No   Sig: Take 145 mg by mouth daily   glimepiride (AMARYL) 4 mg tablet   Yes No   Sig: Take 4 mg by mouth daily with breakfast   metoprolol succinate (TOPROL-XL) 100 mg 24 hr tablet   Yes No   Sig: Take 100 mg by mouth daily   sitaGLIPtin-metFORMIN (JANUMET)  MG per tablet   Yes No   Sig: Take 1 tablet by mouth 2 (two) times a day with meals      Facility-Administered Medications: None       Past Medical History:   Diagnosis Date    Diabetes mellitus ([address] )     Hypertension        Past Surgical History:   Procedure Laterality Date    HERNIA REPAIR      umbilical and inguinal hernia repairs (left)       History reviewed  No pertinent family history  I have reviewed and agree with the history as documented  Social History   Substance Use Topics    Smoking status: Current Some Day Smoker     Types: Cigars    Smokeless tobacco: Never Used    Alcohol use 2 4 oz/week     4 Cans of beer per week      Comment: weekend: 1 pint of christian or 2 beers        Review of Systems   Constitutional: Positive for chills and fatigue  Negative for appetite change, diaphoresis, fever and weight loss  HENT: Negative for congestion, dental problem, ear pain, mouth sores, postnasal drip, rhinorrhea and sore throat  Eyes: Negative for pain, discharge, redness and itching  Respiratory: Positive for cough and wheezing  Negative for chest tightness and shortness of breath      Cardiovascular: Negative for chest pain, palpitations and leg swelling  Gastrointestinal: Negative for abdominal pain, diarrhea, nausea and vomiting  Endocrine: Negative for cold intolerance, heat intolerance, polydipsia, polyphagia and polyuria  Genitourinary: Negative for difficulty urinating, dysuria, frequency, hematuria and urgency  Musculoskeletal: Negative for back pain, myalgias, neck pain and neck stiffness  Skin: Negative for rash  Neurological: Negative for headaches  Psychiatric/Behavioral: Negative for confusion  All other systems reviewed and are negative  Physical Exam  ED Triage Vitals [12/28/17 1143]   Temperature Pulse Respirations Blood Pressure SpO2   98 2 °F (36 8 °C) (!) 106 (!) 24 (!) 194/105 97 %      Temp Source Heart Rate Source Patient Position - Orthostatic VS BP Location FiO2 (%)   Oral Monitor Lying Right arm --      Pain Score       No Pain           Orthostatic Vital Signs  Vitals:    12/28/17 1245 12/28/17 1315 12/28/17 1400 12/28/17 1430   BP: (!) 185/84 (!) 171/83 170/98 164/84   Pulse: 102 102 100 90   Patient Position - Orthostatic VS:           Physical Exam   Constitutional: He is oriented to person, place, and time  He appears well-developed and well-nourished  No distress  HENT:   Head: Normocephalic  Right Ear: External ear normal    Left Ear: External ear normal    Nose: Nose normal    Mouth/Throat: Oropharynx is clear and moist  No oropharyngeal exudate  Eyes: Conjunctivae are normal  Right eye exhibits no discharge  Left eye exhibits no discharge  No scleral icterus  Neck: Neck supple  No JVD present  No tracheal deviation present  No thyromegaly present  Cardiovascular: Normal rate, regular rhythm and normal heart sounds  No murmur heard  Pulmonary/Chest: Effort normal  No stridor  No respiratory distress  He has wheezes  He has no rales  He exhibits no tenderness  Abdominal: Soft  There is no tenderness  There is no rebound and no guarding  Musculoskeletal: He exhibits no edema  Lymphadenopathy:     He has no cervical adenopathy  Neurological: He is alert and oriented to person, place, and time  Skin: Skin is warm  Capillary refill takes less than 2 seconds  He is not diaphoretic  Psychiatric: He has a normal mood and affect  His behavior is normal  Judgment and thought content normal    Nursing note and vitals reviewed  ED Medications  Medications   metoprolol succinate (TOPROL-XL) 24 hr tablet 100 mg (100 mg Oral Given 12/28/17 1335)   olmesartan (BENICAR) tablet 40 mg (40 mg Oral Given 12/28/17 1336)   hydrochlorothiazide (HYDRODIURIL) tablet 25 mg (25 mg Oral Given 12/28/17 1336)   sodium chloride 0 9 % bolus 1,000 mL (0 mL Intravenous Stopped 12/28/17 1400)   albuterol (PROVENTIL HFA,VENTOLIN HFA) inhaler 2 puff (2 puffs Inhalation Given 12/28/17 1237)   amLODIPine (NORVASC) tablet 10 mg (10 mg Oral Given 12/28/17 1234)   magnesium sulfate IVPB (premix) SOLN 1 g (0 g Intravenous Stopped 12/28/17 1437)       Diagnostic Studies  Results Reviewed     Procedure Component Value Units Date/Time    Comprehensive metabolic panel [[id number]]  (Abnormal) Collected:  12/28/17 1211    Lab Status:  Final result Specimen:  Blood from Arm, Right Updated:  12/28/17 1256     Sodium 138 mmol/L      Potassium 3 5 mmol/L      Chloride 100 mmol/L      CO2 30 mmol/L      Anion Gap 8 mmol/L      BUN 9 mg/dL      Creatinine 1 03 mg/dL      Glucose 221 (H) mg/dL      Calcium 8 9 mg/dL      AST 32 U/L      ALT 22 U/L      Alkaline Phosphatase 93 U/L      Total Protein 8 0 g/dL      Albumin 3 8 g/dL      Total Bilirubin 1 10 (H) mg/dL      eGFR 95 ml/min/1 73sq m     Narrative:         National Kidney Disease Education Program recommendations are as follows:  GFR calculation is accurate only with a steady state creatinine  Chronic Kidney disease less than 60 ml/min/1 73 sq  meters  Kidney failure less than 15 ml/min/1 73 sq  meters      TSH [[id number]]  (Normal) Collected:  12/28/17 1211    Lab Status: Final result Specimen:  Blood from Arm, Right Updated:  12/28/17 1256     TSH 3RD GENERATON 2 395 uIU/mL     Narrative:         Patients undergoing fluorescein dye angiography may retain small amounts of fluorescein in the body for 48-72 hours post procedure  Samples containing fluorescein can produce falsely depressed TSH values  If the patient had this procedure,a specimen should be resubmitted post fluorescein clearance  Magnesium [[id number]]  (Abnormal) Collected:  12/28/17 1211    Lab Status:  Final result Specimen:  Blood from Arm, Right Updated:  12/28/17 1256     Magnesium 1 4 (L) mg/dL     BNP [[id number]]  (Normal) Collected:  12/28/17 1211    Lab Status:  Final result Specimen:  Blood from Arm, Right Updated:  12/28/17 1251     NT-proBNP 118 pg/mL     Troponin I [[id number]]  (Normal) Collected:  12/28/17 1211    Lab Status:  Final result Specimen:  Blood from Arm, Right Updated:  12/28/17 1245     Troponin I <0 02 ng/mL     Narrative:         [employer] Chemistry analyzer 99% cutoff is > 0 04 ng/mL in network labs    o cTnI 99% cutoff is useful only when applied to patients in the clinical setting of myocardial ischemia  o cTnI 99% cutoff should be interpreted in the context of clinical history, ECG findings and possibly cardiac imaging to establish correct diagnosis  o cTnI 99% cutoff may be suggestive but clearly not indicative of a coronary event without the clinical setting of myocardial ischemia  Lactic acid, plasma [[id number]]  (Normal) Collected:  12/28/17 1211    Lab Status:  Final result Specimen:  Blood from Arm, Right Updated:  12/28/17 1244     LACTIC ACID 1 7 mmol/L     Narrative:         Result may be elevated if tourniquet was used during collection      D-Dimer [[id number]]  (Normal) Collected:  12/28/17 1211    Lab Status:  Final result Specimen:  Blood from Arm, Right Updated:  12/28/17 1240     D-Dimer, Quant <270 ng/ml (FEU)     Blood culture #1 [[id number]] Collected:  12/28/17 1233    Lab Status: In process Specimen:  Blood from Arm, Left Updated:  12/28/17 [facility] [[id number]]  (Normal) Collected:  12/28/17 1211    Lab Status:  Final result Specimen:  Blood from Arm, Right Updated:  12/28/17 1236     Protime 13 3 seconds      INR 0 98    APTT [[id number]]  (Normal) Collected:  12/28/17 1211    Lab Status:  Final result Specimen:  Blood from Arm, Right Updated:  12/28/17 1236     PTT 30 seconds     Narrative: Therapeutic Heparin Range = 60-90 seconds    CBC and differential [[id number]]  (Abnormal) Collected:  12/28/17 1211    Lab Status:  Final result Specimen:  Blood from Arm, Right Updated:  12/28/17 1222     WBC 8 28 Thousand/uL      RBC 5 15 Million/uL      Hemoglobin 15 8 g/dL      Hematocrit 46 5 %      MCV 90 fL      MCH 30 7 pg      MCHC 34 0 g/dL      RDW 11 8 %      MPV 11 0 fL      Platelets 490 Thousands/uL      Neutrophils Relative 46 %      Lymphocytes Relative 35 %      Monocytes Relative 10 %      Eosinophils Relative 9 (H) %      Basophils Relative 0 %      Neutrophils Absolute 3 76 Thousands/µL      Lymphocytes Absolute 2 87 Thousands/µL      Monocytes Absolute 0 85 Thousand/µL      Eosinophils Absolute 0 77 (H) Thousand/µL      Basophils Absolute 0 03 Thousands/µL     Blood culture #2 [[id number]] Collected:  12/28/17 1211    Lab Status: In process Specimen:  Blood from Arm, Right Updated:  12/28/17 1218                 XR chest 2 views   ED Interpretation by Martínez Cervantes PA-C (12/28 1239)   Cardiomegaly, Mild vascular congestion, no infiltrate      Final Result by Thomas Aguayo DO (12/28 1303)      No active pulmonary disease  Workstation performed: [id number]                    Procedures  Procedures       Phone Contacts  ED Phone Contact    ED Course  ED Course                          Initial Sepsis Screening     [address] Name 12/28/17 1300                Is the patient's history suggestive of a new or worsening infection?  (!)  Yes (Proceed)  -Tiff Contreras Suspected source of infection pneumonia  -JG        Are two or more of the following signs & symptoms of infection both present and new to the patient? (!)  Yes (Proceed)  -JG        Indicate SIRS criteria Tachycardia > 90 bpm;Tachypnea > 20 resp per min  -JG        If the answer is yes to both questions, suspicion of sepsis is present  --        If severe sepsis is present AND tissue hypoperfusion perists in the hour after fluid resuscitation or lactate > 4, the patient meets criteria for SEPTIC SHOCK  --        Are any of the following organ dysfunction criteria present within 6 hours of suspected infection and SIRS criteria that are NOT considered to be chronic conditions?  No  -JG        Organ dysfunction  --        Date of presentation of severe sepsis  --        Time of presentation of severe sepsis  --        Tissue hypoperfusion persists in the hour after crystalloid fluid administration, evidenced, by either:  --        Was hypotension present within one hour of the conclusion of crystalloid fluid administration?  --        Date of presentation of septic shock  --        Time of presentation of septic shock  --          User Key  (r) = Recorded By, (t) = Taken By, (c) = Cosigned By    [facility] [address] Name Provider Type    Kendal Bishop PA-C Physician Assistant                  MDM  Number of Diagnoses or Management Options  Acute bronchitis: new and requires workup  Diabetes mellitus ([facility] [address] ): new and requires workup  Hypertension: new and requires workup  Hypomagnesemia: new and requires workup     Amount and/or Complexity of Data Reviewed  Clinical lab tests: ordered and reviewed  Tests in the radiology section of CPT®: ordered and reviewed  Tests in the medicine section of CPT®: ordered and reviewed    Risk of Complications, Morbidity, and/or Mortality  Presenting problems: high  Diagnostic procedures: high  Management options: high  General comments: Patient presented to the emergency room with complaints of persistent cough on and off for the past month  He states it has been getting worse over the past week  He was seen by his physician and started on Zithromax with no improvement  He presented to the emergency room continue did complain of some shortness of breath with activity  He was seen and evaluated  He was oxygenating 96% on room air was not tachypneic  He was worked for pneumonia  Laboratory studies and chest x-ray were within normal limits  Patient is noncompliant with his hypertension medications he states that he has prescriptions that he needs to pick about the pharmacy  His hypertension was treated in the emergency room today  Upon discharge his blood pressure was down to 164/84  He was instructed to follow up with his regular doctor for further evaluation and recheck of his pressure  He was given an albuterol inhaler due to bronchospasm on exam   He was instructed to follow up with his primary care provider in the next 2-3 days  Patient Progress  Patient progress: stable    CritCare Time    Disposition  Final diagnoses:   Acute bronchitis - With bronchospasm   Hypomagnesemia   Hypertension   Diabetes mellitus ([facility] [address] )     Time reflects when diagnosis was documented in both MDM as applicable and the Disposition within this note     Time User Action Codes Description Comment    12/28/2017  1:02 PM Shelda Peaches Add [J20 9] Acute bronchitis     12/28/2017  1:02 PM Shelda Peaches Modify [J20 9] Acute bronchitis With bronchospasm    12/28/2017  1:12 PM Shelda Peaches Add [E83 42] Hypomagnesemia     12/28/2017  1:12 PM Shelda Peaches Add [I10] Hypertension     12/28/2017  1:13 PM Shelda Peaches Add [E11 9] Diabetes mellitus [facility])       ED Disposition     ED Disposition Condition Comment    Discharge  Sushant Mccormick discharge to home/self care      Condition at discharge: Good        Follow-up Information     Follow up With Specialties Details Why [address] Daren Craig, DO Family Medicine In 1 week  [address] [facility]  [phone]          Discharge Medication List as of 12/28/2017  1:15 PM      CONTINUE these medications which have NOT CHANGED    Details   Dulaglutide (TRULICITY) 1 34 XJ/9 2TW SOPN Inject 0 75 mg under the skin once a week, Historical Med      Empagliflozin (JARDIANCE) 10 MG TABS Take 10 mg by mouth every morning, Historical Med      fenofibrate (TRICOR) 145 mg tablet Take 145 mg by mouth daily, Historical Med      glimepiride (AMARYL) 4 mg tablet Take 4 mg by mouth daily with breakfast, Historical Med      metoprolol succinate (TOPROL-XL) 100 mg 24 hr tablet Take 100 mg by mouth daily, Historical Med      Olmesartan-Amlodipine-HCTZ (TRIBENZOR) 40-10-25 MG TABS Take 1 tablet by mouth daily, Historical Med      sitaGLIPtin-metFORMIN (JANUMET)  MG per tablet Take 1 tablet by mouth 2 (two) times a day with meals, Historical Med           No discharge procedures on file      ED Provider  Electronically Signed by           Suleiman Wall PA-C  12/28/17 5795

## 2017-12-29 LAB
ATRIAL RATE: 101 BPM
P AXIS: 79 DEGREES
PR INTERVAL: 148 MS
QRS AXIS: 45 DEGREES
QRSD INTERVAL: 86 MS
QT INTERVAL: 334 MS
QTC INTERVAL: 433 MS
T WAVE AXIS: 34 DEGREES
VENTRICULAR RATE: 101 BPM

## 2018-01-02 ENCOUNTER — GENERIC CONVERSION - ENCOUNTER (OUTPATIENT)
Dept: OTHER | Facility: OTHER | Age: 53
End: 2018-01-02

## 2018-01-02 LAB
BACTERIA BLD CULT: NORMAL
BACTERIA BLD CULT: NORMAL

## 2018-01-09 NOTE — RESULT NOTES
Verified Results  (1) CBC/PLT/DIFF [id number] 09:14AM [facility]     Test Name Result Flag Reference   WHITE BLOOD CELL COUNT 9 3 Thousand/uL  3 8-10 8   RED BLOOD CELL COUNT 4 76 Million/uL  4 20-5 80   HEMOGLOBIN 14 9 g/dL  13 2-17 1   HEMATOCRIT 46 6 %  38 5-50 0   MCV 97 7 fL  80 0-100 0   MCH 31 2 pg  27 0-33 0   EOSINOPHILS 2 6 %     BASOPHILS 0 4 %     ABSOLUTE MONOCYTES 642 cells/uL  200-950   ABSOLUTE EOSINOPHILS 242 cells/uL     ABSOLUTE BASOPHILS 37 cells/uL  0-200   NEUTROPHILS 53 4 %     LYMPHOCYTES 36 7 %     MONOCYTES 6 9 %     MCHC 32 0 g/dL  32 0-36 0   RDW 12 6 %  11 0-15 0   PLATELET COUNT 623 Thousand/uL  140-400   MPV 9 9 fL  7 5-12 5   ABSOLUTE NEUTROPHILS 4966 cells/uL  2450-5000   ABSOLUTE LYMPHOCYTES 3413 cells/uL  850-3900     (1) COMPREHENSIVE METABOLIC PANEL [id number] 17:06SH [facility]     Test Name Result Flag Reference   GLUCOSE 184 mg/dL H 65-99   Fasting reference interval   UREA NITROGEN (BUN) 8 mg/dL  7-25   CREATININE 1 07 mg/dL  0 70-1 33   For patients >52years of age, the reference limit  for Creatinine is approximately 13% higher for people  identified as -American  eGFR NON-AFR   AMERICAN 79 mL/min/1 73m2  > OR = 60   eGFR AFRICAN AMERICAN 92 mL/min/1 73m2  > OR = 60   BUN/CREATININE RATIO   9-74   NOT APPLICABLE (calc)   ALT 22 U/L  9-46   ALBUMIN 4 0 g/dL  3 6-5 1   GLOBULIN 3 3 g/dL (calc)  1 9-3 7   ALBUMIN/GLOBULIN RATIO 1 2 (calc)  1 0-2 5   BILIRUBIN, TOTAL 2 0 mg/dL H 0 2-1 2   ALKALINE PHOSPHATASE 85 U/L     AST 41 U/L H 10-35   SODIUM 137 mmol/L  135-146   POTASSIUM 3 6 mmol/L  3 5-5 3   CHLORIDE 96 mmol/L L    CARBON DIOXIDE 31 mmol/L  20-31   CALCIUM 9 0 mg/dL  8 6-10 3   PROTEIN, TOTAL 7 3 g/dL  6 1-8 1     (1) LIPID PANEL, FASTING [id number] 09:14AM [facility]     Test Name Result Flag Reference   CHOLESTEROL, TOTAL 169 mg/dL  125-200   HDL CHOLESTEROL 46 mg/dL  > OR = 40   TRIGLICERIDES 82 mg/dL  <356   LDL-CHOLESTEROL 107 mg/dL (calc)  <130   Desirable range <100 mg/dL for patients with CHD or  diabetes and <70 mg/dL for diabetic patients with  known heart disease  CHOL/HDLC RATIO 3 7 (calc)  < OR = 5 0   NON HDL CHOLESTEROL 123 mg/dL (calc)     Target for non-HDL cholesterol is 30 mg/dL higher than   LDL cholesterol target  (1) HIV AG/AB Ted Cunningham, 4TH GEN [id number] 09:14AM [facility]     Test Name Result Flag Reference   HIV AG/AB, 4TH GEN NON-REACTIVE  NON-REACTIVE   HIV-1 antigen and HIV-1/HIV-2 antibodies were not  detected  There is no laboratory evidence of HIV  infection  PLEASE NOTE: This information has been disclosed to  you from records whose confidentiality may be  protected by state law  If your state requires such  protection, then the state law prohibits you from  making any further disclosure of the information  without the specific written consent of the person  to whom it pertains, or as otherwise permitted by law  A general authorization for the release of medical or  other information is NOT sufficient for this purpose  For additional information please refer to  http://education  Clever/faq/TBQ253  (This link is being provided for informational/  educational purposes only )        The performance of this assay has not been clinically  validated in patients less than 3years old  (Q) MICROALBUMIN, RANDOM URINE (W/CREATININE) [id number] 09:14AM [facility]     Test Name Result Flag Reference   CREATININE, RANDOM URINE 695 mg/dL H    Verified by repeat analysis     MICROALBUMIN 14 6 mg/dL     Reference Range  Not established   MICROALBUMIN/CREATININE$RATIO, RANDOM URINE 21 mcg/mg creat  <30   The ADA defines abnormalities in albumin  excretion as follows:     Category         Result (mcg/mg creatinine)     Normal                    <30  Microalbuminuria            Clinical albuminuria   > OR = 300     The ADA recommends that at least two of three  specimens collected within a 3-6 month period be  abnormal before considering a patient to be  within a diagnostic category  See Below     We received an order for microalbumin and collected  and performed a random urine microalbumin with  creatinine assay  If this is not what you intended  to order, please contact your local client service  representative immediately so that we can adjust  our billing appropriately  You may also inquire about  alternative or additional testing      (Q) TSH, 3RD GENERATION W/REFLEX TO FT4 [id number] 09:14AM [facility]     Test Name Result Flag Reference   TSH W/REFLEX TO FT4 2 27 mIU/L  0 40-4 50     (Q) HEMOGLOBIN A1c [id number] 09:14AM [facility]   REPORT COMMENT:  FASTING:YES  AN UPDATE OR CORRECTION HAS BEEN MADE TO NAME     Test Name Result Flag Reference   HEMOGLOBIN A1c 9 3 % of total Hgb H <5 7   According to ADA guidelines, hemoglobin A1c <7 0%  represents optimal control in non-pregnant diabetic  patients  Different metrics may apply to specific  patient populations  Standards of Medical Care in    Diabetes Care  2013;36:s11-s66     For the purpose of screening for the presence of  diabetes  <5 7%       Consistent with the absence of diabetes  5 7-6 4%    Consistent with increased risk for diabetes              (prediabetes)  >or=6 5%    Consistent with diabetes     This assay result is consistent with diabetes  mellitus  Currently, no consensus exists for use of hemoglobin  A1c for diagnosis of diabetes for children  Plan  Benign essential hypertension, Diabetes mellitus type 2, uncontrolled, Diabetic  peripheral neuropathy, Microalbuminuria, Obesity    · (1) COMPREHENSIVE METABOLIC PANEL; Status:Active; Requested for:16May2017;    · (1) HEMOGLOBIN A1C; Status:Active; Requested for:16May2017;    · (1) LIPID PANEL, FASTING; Status:Active; Requested for:16May2017;    · (1) MICROALBUMIN CREATININE RATIO, RANDOM URINE; Status:Active;  Requested  for:16May2017; Discussion/Summary   HIV testing negative  Diabetes is poorly controlled  Hemoglobin A1c is significant elevated at 9 3  Hopefully you are actually taking her medication  Please repeat labs as ordered in 3 months while taking your medications

## 2018-01-10 NOTE — RESULT NOTES
Message   Schedule follow-up appointment to review labs     Verified Results  (1) LIPID PANEL, FASTING [id number] 09:25AM [employer]     Test Name Result Flag Reference   CHOLESTEROL, TOTAL 178 mg/dL  125-200   HDL CHOLESTEROL 46 mg/dL  > OR = 40   TRIGLICERIDES 103 mg/dL  <150   LDL-CHOLESTEROL 109 mg/dL (calc)  <130   Desirable range <100 mg/dL for patients with CHD or  diabetes and <70 mg/dL for diabetic patients with  known heart disease  CHOL/HDLC RATIO 3 9 (calc)  < OR = 5 0   NON HDL CHOLESTEROL 132 mg/dL (calc)     Target for non-HDL cholesterol is 30 mg/dL higher than   LDL cholesterol target  (1) COMPREHENSIVE METABOLIC PANEL [id number] 06:67VU expressor software     Test Name Result Flag Reference   GLUCOSE 202 mg/dL H 65-99   Fasting reference interval   UREA NITROGEN (BUN) 5 mg/dL L 7-25   CREATININE 0 87 mg/dL  0 70-1 33   For patients >52years of age, the reference limit  for Creatinine is approximately 13% higher for people  identified as -American  eGFR NON-AFR   AMERICAN 99 mL/min/1 73m2  > OR = 60   eGFR AFRICAN AMERICAN 115 mL/min/1 73m2  > OR = 60   BUN/CREATININE RATIO 6 (calc)  6-22   SODIUM 137 mmol/L  135-146   POTASSIUM 3 7 mmol/L  3 5-5 3   CHLORIDE 99 mmol/L     CARBON DIOXIDE 26 mmol/L  19-30   CALCIUM 8 7 mg/dL  8 6-10 3   PROTEIN, TOTAL 7 1 g/dL  6 1-8 1   ALBUMIN 3 9 g/dL  3 6-5 1   GLOBULIN 3 2 g/dL (calc)  1 9-3 7   ALBUMIN/GLOBULIN RATIO 1 2 (calc)  1 0-2 5   BILIRUBIN, TOTAL 0 8 mg/dL  0 2-1 2   ALKALINE PHOSPHATASE 70 U/L     AST 51 U/L H 10-35   ALT 24 U/L  9-46     (1) CBC/PLT/DIFF [id number] 09:25AM [employer] software     Test Name Result Flag Reference   WHITE BLOOD CELL COUNT 7 1 Thousand/uL  3 8-10 8   RED BLOOD CELL COUNT 4 74 Million/uL  4 20-5 80   HEMOGLOBIN 14 6 g/dL  13 2-17 1   HEMATOCRIT 45 7 %  38 5-50 0   MCV 96 6 fL  80 0-100 0   MCH 30 9 pg  27 0-33 0   MCHC 32 0 g/dL  32 0-36 0   RDW 14 1 %  11 0-15 0   PLATELET COUNT 881 Thousand/uL  140-400 MPV 9 1 fL  7 5-11 5   ABSOLUTE NEUTROPHILS 3202 cells/uL  3209-2660   ABSOLUTE LYMPHOCYTES 3067 cells/uL  850-3900   ABSOLUTE MONOCYTES 561 cells/uL  200-950   ABSOLUTE EOSINOPHILS 234 cells/uL     ABSOLUTE BASOPHILS 36 cells/uL  0-200   NEUTROPHILS 45 1 %     LYMPHOCYTES 43 2 %     MONOCYTES 7 9 %     EOSINOPHILS 3 3 %     BASOPHILS 0 5 %       (Q) MICROALBUMIN, RANDOM URINE (W/CREATININE) [id number] 09:25AM [facility]     Test Name Result Flag Reference   CREATININE, RANDOM URINE 136 mg/dL     MICROALBUMIN 10 7 mg/dL     Reference Range  Not established   MICROALBUMIN/CREATININE$RATIO, RANDOM URINE 79 mcg/mg creat H <30   The ADA defines abnormalities in albumin  excretion as follows:     Category         Result (mcg/mg creatinine)     Normal                    <30  Microalbuminuria            Clinical albuminuria   > OR = 300     The ADA recommends that at least two of three  specimens collected within a 3-6 month period be  abnormal before considering a patient to be  within a diagnostic category  (Q) HEMOGLOBIN A1c [id number] 09:25AM [facility]   REPORT COMMENT:  FASTING:YES     Test Name Result Flag Reference   HEMOGLOBIN A1c 9 1 % of total Hgb H <5 7   According to ADA guidelines, hemoglobin A1c <7 0%  represents optimal control in non-pregnant diabetic  patients  Different metrics may apply to specific  patient populations  Standards of Medical Care in    Diabetes Care  2013;36:s11-s66     For the purpose of screening for the presence of  diabetes  <5 7%       Consistent with the absence of diabetes  5 7-6 4%    Consistent with increased risk for diabetes              (prediabetes)  >or=6 5%    Consistent with diabetes     This assay result is consistent with diabetes  mellitus  Currently, no consensus exists for use of hemoglobin  A1c for diagnosis of diabetes for children

## 2018-01-10 NOTE — RESULT NOTES
Verified Results  (1) COMPREHENSIVE METABOLIC PANEL [id number] 67:65IO [facility]yl[facility]     Test Name Result Flag Reference   GLUCOSE 82 mg/dL  65-99   Fasting reference interval   UREA NITROGEN (BUN) 11 mg/dL  7-25   CREATININE 0 97 mg/dL  0 70-1 33   For patients >52years of age, the reference limit  for Creatinine is approximately 13% higher for people  identified as -American  eGFR NON-AFR  AMERICAN 89 mL/min/1 73m2  > OR = 60   eGFR AFRICAN AMERICAN 103 mL/min/1 73m2  > OR = 60   BUN/CREATININE RATIO   6-00   NOT APPLICABLE (calc)   SODIUM 141 mmol/L  135-146   POTASSIUM 3 7 mmol/L  3 5-5 3   CHLORIDE 102 mmol/L     CARBON DIOXIDE 32 mmol/L H 20-31   CALCIUM 9 4 mg/dL  8 6-10 3   PROTEIN, TOTAL 6 9 g/dL  6 1-8 1   ALBUMIN 3 9 g/dL  3 6-5 1   GLOBULIN 3 0 g/dL (calc)  1 9-3 7   ALBUMIN/GLOBULIN RATIO 1 3 (calc)  1 0-2 5   BILIRUBIN, TOTAL 0 7 mg/dL  0 2-1 2   ALKALINE PHOSPHATASE 59 U/L     AST 18 U/L  10-35   ALT 9 U/L  9-46     (1) LIPID PANEL, FASTING [id number] 06:51AM [facility]     Test Name Result Flag Reference   CHOLESTEROL, TOTAL 151 mg/dL  125-200   HDL CHOLESTEROL 50 mg/dL  > OR = 40   TRIGLICERIDES 75 mg/dL  <386   LDL-CHOLESTEROL 86 mg/dL (calc)  <130   Desirable range <100 mg/dL for patients with CHD or  diabetes and <70 mg/dL for diabetic patients with  known heart disease  CHOL/HDLC RATIO 3 0 (calc)  < OR = 5 0   NON HDL CHOLESTEROL 101 mg/dL (calc)     Target for non-HDL cholesterol is 30 mg/dL higher than   LDL cholesterol target       (Q) MICROALBUMIN, RANDOM URINE (W/CREATININE) [id number] 06:51AM [facility]     Test Name Result Flag Reference   CREATININE, RANDOM URINE 231 mg/dL     MICROALBUMIN 5 6 mg/dL     Reference Range  Not established   MICROALBUMIN/CREATININE$RATIO, RANDOM URINE 24 mcg/mg creat  <30   The ADA defines abnormalities in albumin  excretion as follows:     Category         Result (mcg/mg creatinine)     Normal <30  Microalbuminuria            Clinical albuminuria   > OR = 300     The ADA recommends that at least two of three  specimens collected within a 3-6 month period be  abnormal before considering a patient to be  within a diagnostic category  (Q) HEMOGLOBIN A1c [id number] 06:51AM [facility]   REPORT COMMENT:  FASTING:YES     Test Name Result Flag Reference   HEMOGLOBIN A1c 5 9 % of total Hgb H <5 7   For someone without known diabetes, a hemoglobin   A1c value between 5 7% and 6 4% is consistent with  prediabetes and should be confirmed with a   follow-up test      For someone with known diabetes, a value <7%  indicates that their diabetes is well controlled  A1c  targets should be individualized based on duration of  diabetes, age, comorbid conditions, and other  considerations  This assay result is consistent with an increased risk  of diabetes  Currently, no consensus exists regarding use of  hemoglobin A1c for diagnosis of diabetes for children

## 2018-01-12 ENCOUNTER — GENERIC CONVERSION - ENCOUNTER (OUTPATIENT)
Dept: OTHER | Facility: OTHER | Age: 53
End: 2018-01-12

## 2018-01-12 VITALS
RESPIRATION RATE: 16 BRPM | DIASTOLIC BLOOD PRESSURE: 80 MMHG | HEIGHT: 69 IN | SYSTOLIC BLOOD PRESSURE: 134 MMHG | WEIGHT: 259 LBS | BODY MASS INDEX: 38.36 KG/M2 | HEART RATE: 103 BPM

## 2018-01-12 NOTE — RESULT NOTES
Verified Results  (Q) HSV 1/2 IGG, HERPESELECT TYPE SPECIFIC AB [id number] 09:25AM [facility]     Test Name Result Flag Reference   HSV 1 IGG TYPE SPECIFIC$AB 29 90 index H    HSV 2 IGG TYPE SPECIFIC$AB 1 60 index H    Index          Interpretation                            -----          --------------                            <0 90          Negative                            0  90-1 09      Equivocal                            >1 09          Positive     This assay utilizes recombinant type-specific antigens  to differentiate HSV-1 from HSV-2 infections  A  positive result cannot distinguish between recent and  past infection  If recent HSV infection is suspected  but the results are negative or equivocal, the assay  should be repeated in 4-6 weeks  The performance  characteristics of the assay have not been established  for pediatric populations, immunocompromised patients,  or  screening      (Q) HEPATITIS PANEL, ACUTE W/REFLEX TO CONFIRMATION [id number] 09:25AM [facility]     Test Name Result Flag Reference   HEPATITIS A IGM NON-REACTIVE  NON-REACTIVE   HEPATITIS B SURFACE$ANTIGEN NON-REACTIVE  NON-REACTIVE   HEPATITIS B CORE$ANTIBODY (IGM) NON-REACTIVE  NON-REACTIVE   HEPATITIS C ANTIBODY NON-REACTIVE  NON-REACTIVE   SIGNAL TO CUT-OFF 0 04  <1 00     SURESWAB(R) CHLAMYDIA/ N  GONORRHOEAE RNA, TMA [id number] 09:25AM [facility]     Test Name Result Flag Reference   CHLAMYDIA TRACHOMATIS$RNA, TMA NOT DETECTED  NOT DETECTED   NEISSERIA [address], TMA NOT DETECTED  NOT DETECTED   This test was performed using the [address] [place] [address]  ([place] [address] )  The analytical performance characteristics of this   assay, when used to test SurePath specimens have  been determined by [facility]             (Q) RPR (DX) W/REFL TITER AND CONFIRMATORY TESTING [id number] 09:25AM [facility]   REPORT COMMENT:  ON HOLD-3 REQS  FASTING:YES     Test Name Result Flag Reference   RPR (DX) W/REFL TITER AND$CONFIRMATORY TESTING NON-REACTIVE  NON-REACTIVE

## 2018-01-13 VITALS
HEIGHT: 69 IN | SYSTOLIC BLOOD PRESSURE: 164 MMHG | BODY MASS INDEX: 40.43 KG/M2 | HEART RATE: 92 BPM | DIASTOLIC BLOOD PRESSURE: 88 MMHG | WEIGHT: 273 LBS | RESPIRATION RATE: 16 BRPM

## 2018-01-14 VITALS
SYSTOLIC BLOOD PRESSURE: 162 MMHG | HEIGHT: 69 IN | BODY MASS INDEX: 38.95 KG/M2 | HEART RATE: 109 BPM | WEIGHT: 263 LBS | DIASTOLIC BLOOD PRESSURE: 91 MMHG

## 2018-01-14 VITALS
SYSTOLIC BLOOD PRESSURE: 126 MMHG | HEART RATE: 84 BPM | DIASTOLIC BLOOD PRESSURE: 88 MMHG | HEIGHT: 69 IN | WEIGHT: 256 LBS | RESPIRATION RATE: 16 BRPM | BODY MASS INDEX: 37.92 KG/M2

## 2018-01-14 VITALS
WEIGHT: 265 LBS | HEART RATE: 82 BPM | RESPIRATION RATE: 18 BRPM | SYSTOLIC BLOOD PRESSURE: 132 MMHG | HEIGHT: 69 IN | DIASTOLIC BLOOD PRESSURE: 74 MMHG | BODY MASS INDEX: 39.25 KG/M2

## 2018-01-14 NOTE — MISCELLANEOUS
Message  Return to work or school:        Please excuse patient from work on the following days: 12/16/16, 12/18/16, 12/29/16, 1/4/17, 1/17/17 and 1/23/17  Lucas Owens DO/elina om        Signatures   Electronically signed by : Hazel Salamanca OM; Feb 16 2017  4:11PM EST                       (Author)

## 2018-01-15 NOTE — RESULT NOTES
Verified Results  US GALLBLADDER [id number] 10:03AM [facility] Order Number: [id number]    - Patient Instructions: To schedule this appointment, please contact Central Scheduling at [phone]  Test Name Result Flag Reference   US GALLBLADDER (Report)     RIGHT UPPER QUADRANT ULTRASOUND     INDICATION: Intermittent right upper quadrant pain for 2 weeks     COMPARISON: None  TECHNIQUE:  Real-time ultrasound of the right upper quadrant was performed with a curvilinear transducer with both volumetric sweeps and still imaging techniques  FINDINGS:     PANCREAS: Portions of the pancreas are obscured by bowel gas  Visualized portions of the pancreas are unremarkable  AORTA AND IVC: Visualized portions are normal for patient age  LIVER:   Size: Mildly enlarged  The liver measures 19 6 cm in the midclavicular line  Contour: Surface contour is smooth  Parenchyma: There is marked diffuse increased echogenicity with smooth echotexture and significant beam attenuation with loss of periportal echogenicity  Most consistent with severe hepatic steatosis  No evidence of suspicious mass though evaluation is limited in the setting of severely increased echotexture  Limited imaging of the main portal vein shows it to be patent and hepatopedal       BILIARY:   The gallbladder is normal in caliber  No wall thickening or pericholecystic fluid  No stones or sludge identified  No sonographic Robbins's sign  No intrahepatic biliary dilatation  CBD measures 3 mm  No choledocholithiasis  KIDNEY:    Right kidney measures 10 3 x 6 3 cm  Within normal limits  ASCITES:  None  IMPRESSION:     Hepatomegaly with severely increased echotexture, likely hepatic steatosis  Workstation performed: [id number]     Signed by:   Toby Henriquez MD   2/23/17       Discussion/Summary   No evidence of gallbladder disease  Significantly fatty liver

## 2018-01-19 ENCOUNTER — ALLSCRIPTS OFFICE VISIT (OUTPATIENT)
Dept: OTHER | Facility: OTHER | Age: 53
End: 2018-01-19

## 2018-01-22 VITALS
BODY MASS INDEX: 41.92 KG/M2 | SYSTOLIC BLOOD PRESSURE: 162 MMHG | HEIGHT: 69 IN | DIASTOLIC BLOOD PRESSURE: 84 MMHG | RESPIRATION RATE: 18 BRPM | HEART RATE: 84 BPM | WEIGHT: 283 LBS

## 2018-01-22 VITALS
HEIGHT: 69 IN | SYSTOLIC BLOOD PRESSURE: 144 MMHG | DIASTOLIC BLOOD PRESSURE: 84 MMHG | HEART RATE: 78 BPM | RESPIRATION RATE: 18 BRPM | WEIGHT: 288 LBS | BODY MASS INDEX: 42.65 KG/M2

## 2018-01-23 NOTE — MISCELLANEOUS
Message  Return to work or school:   Kurt Gambino is under my professional care  He was seen in my office on 01/19/2018       PT IS CLEARED TO RETURN TO WORK 1/19/2018     DR Merle Pickett DO/ [place]       Signatures   Electronically signed by : Mikhail Hdz, ; Jan 19 2018 10:56AM EST                       (Author)

## 2018-01-23 NOTE — RESULT NOTES
Verified Results  (1) CBC/PLT/DIFF [id number] 09:02AM [facility]     Test Name Result Flag Reference   WHITE BLOOD CELL COUNT 7 4 Thousand/uL  3 8-10 8   RED BLOOD CELL COUNT 5 21 Million/uL  4 20-5 80   HEMOGLOBIN 16 0 g/dL  13 2-17 1   HEMATOCRIT 47 0 %  38 5-50 0   MCV 90 2 fL  80 0-100 0   MCH 30 7 pg  27 0-33 0   MCHC 34 0 g/dL  32 0-36 0   RDW 11 7 %  11 0-15 0   PLATELET COUNT 254 Thousand/uL  140-400   ABSOLUTE NEUTROPHILS 3034 cells/uL  8974-2083   ABSOLUTE LYMPHOCYTES 3138 cells/uL  850-3900   ABSOLUTE MONOCYTES 762 cells/uL  200-950   ABSOLUTE EOSINOPHILS 429 cells/uL     ABSOLUTE BASOPHILS 37 cells/uL  0-200   NEUTROPHILS 41 %     LYMPHOCYTES 42 4 %     MONOCYTES 10 3 %     EOSINOPHILS 5 8 %     BASOPHILS 0 5 %     MPV 11 7 fL  7 5-12 5     (1) COMPREHENSIVE METABOLIC PANEL [id number] 31:57PW [facility]     Test Name Result Flag Reference   GLUCOSE 169 mg/dL H 65-99   Fasting reference interval     For someone without known diabetes, a glucose  value >125 mg/dL indicates that they may have  diabetes and this should be confirmed with a  follow-up test    UREA NITROGEN (BUN) 10 mg/dL  7-25   CREATININE 1 03 mg/dL  0 70-1 33   For patients >52years of age, the reference limit  for Creatinine is approximately 13% higher for people  identified as -American  eGFR NON-AFR   AMERICAN 83 mL/min/1 73m2  > OR = 60   eGFR AFRICAN AMERICAN 96 mL/min/1 73m2  > OR = 60   BUN/CREATININE RATIO   9-44   NOT APPLICABLE (calc)   SODIUM 138 mmol/L  135-146   POTASSIUM 3 5 mmol/L  3 5-5 3   CHLORIDE 101 mmol/L     CARBON DIOXIDE 30 mmol/L  20-31   CALCIUM 8 8 mg/dL  8 6-10 3   PROTEIN, TOTAL 6 8 g/dL  6 1-8 1   ALBUMIN 3 8 g/dL  3 6-5 1   GLOBULIN 3 0 g/dL (calc)  1 9-3 7   ALBUMIN/GLOBULIN RATIO 1 3 (calc)  1 0-2 5   BILIRUBIN, TOTAL 1 3 mg/dL H 0 2-1 2   ALKALINE PHOSPHATASE 80 U/L     AST 22 U/L  10-35   ALT 13 U/L  9-46     (1) LIPID PANEL, FASTING [id number] 09:02AM [facility]     Test Name Result Flag Reference   CHOLESTEROL, TOTAL 153 mg/dL  <200   HDL CHOLESTEROL 43 mg/dL  >84   TRIGLICERIDES 88 mg/dL  <512   LDL-CHOLESTEROL 92 mg/dL (calc)     Reference range: <100     Desirable range <100 mg/dL for patients with CHD or  diabetes and <70 mg/dL for diabetic patients with  known heart disease  LDL-C is now calculated using the Fausto-Rowland   calculation, which is a validated novel method providing   better accuracy than the Friedewald equation in the   estimation of LDL-C  Kristal Slaughter  [facility]  3697;756(81): 8590-3983   (http://[employer]inger/faq/QBW021)   CHOL/HDLC RATIO 3 6 (calc)  <5 0   NON HDL CHOLESTEROL 110 mg/dL (calc)  <130   For patients with diabetes plus 1 major ASCVD risk   factor, treating to a non-HDL-C goal of <100 mg/dL   (LDL-C of <70 mg/dL) is considered a therapeutic   option  (Q) MICROALBUMIN, RANDOM URINE (W/CREATININE) [id number] 09:02AM [facility]     Test Name Result Flag Reference   CREATININE, RANDOM URINE 376 mg/dL H    Verified by repeat analysis  MICROALBUMIN 29 1 mg/dL     Verified by repeat analysis  Reference Range  Not established   MICROALBUMIN/CREATININE$RATIO, RANDOM URINE 77 mcg/mg creat H <30   The ADA defines abnormalities in albumin  excretion as follows:     Category         Result (mcg/mg creatinine)     Normal                    <30  Microalbuminuria            Clinical albuminuria   > OR = 300     The ADA recommends that at least two of three  specimens collected within a 3-6 month period be  abnormal before considering a patient to be  within a diagnostic category       (Q) TSH, 3RD GENERATION W/REFLEX TO FT4 [id number] 09:02AM [facility]     Test Name Result Flag Reference   TSH W/REFLEX TO FT4 1 90 mIU/L  0 40-4 50     (Q) HEMOGLOBIN A1c [id number] 09:02AM [facility]   REPORT COMMENT:  FASTING:YES     Test Name Result Flag Reference   HEMOGLOBIN A1c 8 7 % of total Hgb H <5 7   For someone without known diabetes, a hemoglobin A1c  value of 6 5% or greater indicates that they may have   diabetes and this should be confirmed with a follow-up   test      For someone with known diabetes, a value <7% indicates   that their diabetes is well controlled and a value   greater than or equal to 7% indicates suboptimal   control  A1c targets should be individualized based on   duration of diabetes, age, comorbid conditions, and   other considerations  Currently, no consensus exists regarding use of  hemoglobin A1c for diagnosis of diabetes for children

## 2018-01-24 VITALS
DIASTOLIC BLOOD PRESSURE: 84 MMHG | HEIGHT: 69 IN | SYSTOLIC BLOOD PRESSURE: 142 MMHG | HEART RATE: 82 BPM | RESPIRATION RATE: 18 BRPM | BODY MASS INDEX: 43.55 KG/M2 | WEIGHT: 294 LBS

## 2018-01-24 VITALS
DIASTOLIC BLOOD PRESSURE: 88 MMHG | HEART RATE: 98 BPM | SYSTOLIC BLOOD PRESSURE: 135 MMHG | WEIGHT: 288 LBS | BODY MASS INDEX: 42.65 KG/M2 | HEIGHT: 69 IN

## 2018-03-26 DIAGNOSIS — E78.89 OTHER LIPOPROTEIN METABOLISM DISORDERS: ICD-10-CM

## 2018-03-26 DIAGNOSIS — R80.9 PROTEINURIA: ICD-10-CM

## 2018-03-26 DIAGNOSIS — I10 ESSENTIAL (PRIMARY) HYPERTENSION: ICD-10-CM

## 2018-03-26 DIAGNOSIS — E11.65 TYPE 2 DIABETES MELLITUS WITH HYPERGLYCEMIA (HCC): ICD-10-CM

## 2018-04-04 RX ORDER — FENOFIBRATE 145 MG/1
1 TABLET, COATED ORAL DAILY
COMMUNITY
Start: 2013-01-22 | End: 2019-10-28

## 2018-04-04 RX ORDER — ALBUTEROL SULFATE 90 UG/1
2 AEROSOL, METERED RESPIRATORY (INHALATION)
COMMUNITY
Start: 2018-01-02 | End: 2019-10-21

## 2018-04-04 RX ORDER — LANCETS
EACH MISCELLANEOUS
COMMUNITY
Start: 2017-12-28

## 2018-04-04 RX ORDER — BUDESONIDE AND FORMOTEROL FUMARATE DIHYDRATE 160; 4.5 UG/1; UG/1
2 AEROSOL RESPIRATORY (INHALATION) 2 TIMES DAILY
COMMUNITY
Start: 2018-01-12 | End: 2019-10-21

## 2018-06-05 ENCOUNTER — HOSPITAL ENCOUNTER (EMERGENCY)
Facility: HOSPITAL | Age: 53
Discharge: HOME | End: 2018-06-05
Attending: EMERGENCY MEDICINE | Admitting: EMERGENCY MEDICINE
Payer: COMMERCIAL

## 2018-06-05 ENCOUNTER — APPOINTMENT (EMERGENCY)
Dept: RADIOLOGY | Facility: HOSPITAL | Age: 53
End: 2018-06-05
Payer: COMMERCIAL

## 2018-06-05 VITALS
HEART RATE: 98 BPM | BODY MASS INDEX: 41.83 KG/M2 | RESPIRATION RATE: 18 BRPM | OXYGEN SATURATION: 96 % | WEIGHT: 283.29 LBS | SYSTOLIC BLOOD PRESSURE: 178 MMHG | DIASTOLIC BLOOD PRESSURE: 86 MMHG

## 2018-06-05 DIAGNOSIS — J20.9 BRONCHITIS WITH BRONCHOSPASM: Primary | ICD-10-CM

## 2018-06-05 LAB
ALBUMIN SERPL BCP-MCNC: 3.3 G/DL (ref 3.5–5)
ALP SERPL-CCNC: 85 U/L (ref 46–116)
ALT SERPL W P-5'-P-CCNC: 30 U/L (ref 12–78)
ANION GAP SERPL CALCULATED.3IONS-SCNC: 6 MMOL/L (ref 4–13)
AST SERPL W P-5'-P-CCNC: 28 U/L (ref 5–45)
BASOPHILS # BLD AUTO: 0.03 THOUSANDS/ΜL (ref 0–0.1)
BASOPHILS NFR BLD AUTO: 0 % (ref 0–1)
BILIRUB SERPL-MCNC: 1 MG/DL (ref 0.2–1)
BUN SERPL-MCNC: 7 MG/DL (ref 5–25)
CALCIUM SERPL-MCNC: 8.6 MG/DL (ref 8.3–10.1)
CHLORIDE SERPL-SCNC: 101 MMOL/L (ref 100–108)
CO2 SERPL-SCNC: 31 MMOL/L (ref 21–32)
CREAT SERPL-MCNC: 0.93 MG/DL (ref 0.6–1.3)
EOSINOPHIL # BLD AUTO: 0.7 THOUSAND/ΜL (ref 0–0.61)
EOSINOPHIL NFR BLD AUTO: 10 % (ref 0–6)
ERYTHROCYTE [DISTWIDTH] IN BLOOD BY AUTOMATED COUNT: 12 % (ref 11.6–15.1)
GFR SERPL CREATININE-BSD FRML MDRD: 107 ML/MIN/1.73SQ M
GLUCOSE SERPL-MCNC: 167 MG/DL (ref 65–140)
HCT VFR BLD AUTO: 43 % (ref 36.5–49.3)
HGB BLD-MCNC: 14.6 G/DL (ref 12–17)
LYMPHOCYTES # BLD AUTO: 2.18 THOUSANDS/ΜL (ref 0.6–4.47)
LYMPHOCYTES NFR BLD AUTO: 31 % (ref 14–44)
MCH RBC QN AUTO: 31.4 PG (ref 26.8–34.3)
MCHC RBC AUTO-ENTMCNC: 34 G/DL (ref 31.4–37.4)
MCV RBC AUTO: 93 FL (ref 82–98)
MONOCYTES # BLD AUTO: 0.73 THOUSAND/ΜL (ref 0.17–1.22)
MONOCYTES NFR BLD AUTO: 10 % (ref 4–12)
NEUTROPHILS # BLD AUTO: 3.36 THOUSANDS/ΜL (ref 1.85–7.62)
NEUTS SEG NFR BLD AUTO: 48 % (ref 43–75)
PLATELET # BLD AUTO: 159 THOUSANDS/UL (ref 149–390)
PMV BLD AUTO: 10.6 FL (ref 8.9–12.7)
POTASSIUM SERPL-SCNC: 3.3 MMOL/L (ref 3.5–5.3)
PROT SERPL-MCNC: 7.2 G/DL (ref 6.4–8.2)
RBC # BLD AUTO: 4.65 MILLION/UL (ref 3.88–5.62)
SODIUM SERPL-SCNC: 138 MMOL/L (ref 136–145)
WBC # BLD AUTO: 7 THOUSAND/UL (ref 4.31–10.16)

## 2018-06-05 PROCEDURE — 96361 HYDRATE IV INFUSION ADD-ON: CPT

## 2018-06-05 PROCEDURE — 93005 ELECTROCARDIOGRAM TRACING: CPT

## 2018-06-05 PROCEDURE — 36415 COLL VENOUS BLD VENIPUNCTURE: CPT | Performed by: EMERGENCY MEDICINE

## 2018-06-05 PROCEDURE — 99285 EMERGENCY DEPT VISIT HI MDM: CPT

## 2018-06-05 PROCEDURE — 94640 AIRWAY INHALATION TREATMENT: CPT

## 2018-06-05 PROCEDURE — 96375 TX/PRO/DX INJ NEW DRUG ADDON: CPT

## 2018-06-05 PROCEDURE — 71046 X-RAY EXAM CHEST 2 VIEWS: CPT

## 2018-06-05 PROCEDURE — 85025 COMPLETE CBC W/AUTO DIFF WBC: CPT | Performed by: EMERGENCY MEDICINE

## 2018-06-05 PROCEDURE — 96374 THER/PROPH/DIAG INJ IV PUSH: CPT

## 2018-06-05 PROCEDURE — 80053 COMPREHEN METABOLIC PANEL: CPT | Performed by: EMERGENCY MEDICINE

## 2018-06-05 RX ORDER — SODIUM CHLORIDE 9 MG/ML
125 INJECTION, SOLUTION INTRAVENOUS CONTINUOUS
Status: DISCONTINUED | OUTPATIENT
Start: 2018-06-05 | End: 2018-06-05 | Stop reason: HOSPADM

## 2018-06-05 RX ORDER — METHYLPREDNISOLONE SODIUM SUCCINATE 125 MG/2ML
125 INJECTION, POWDER, LYOPHILIZED, FOR SOLUTION INTRAMUSCULAR; INTRAVENOUS ONCE
Status: COMPLETED | OUTPATIENT
Start: 2018-06-05 | End: 2018-06-05

## 2018-06-05 RX ORDER — AZITHROMYCIN 250 MG/1
TABLET, FILM COATED ORAL
Qty: 6 TABLET | Refills: 0 | Status: SHIPPED | OUTPATIENT
Start: 2018-06-05 | End: 2018-06-09

## 2018-06-05 RX ORDER — METOPROLOL TARTRATE 5 MG/5ML
5 INJECTION INTRAVENOUS ONCE
Status: COMPLETED | OUTPATIENT
Start: 2018-06-05 | End: 2018-06-05

## 2018-06-05 RX ORDER — PREDNISONE 20 MG/1
60 TABLET ORAL DAILY
Qty: 15 TABLET | Refills: 0 | Status: SHIPPED | OUTPATIENT
Start: 2018-06-05 | End: 2018-06-10

## 2018-06-05 RX ORDER — ALBUTEROL SULFATE 2.5 MG/3ML
5 SOLUTION RESPIRATORY (INHALATION) ONCE
Status: COMPLETED | OUTPATIENT
Start: 2018-06-05 | End: 2018-06-05

## 2018-06-05 RX ADMIN — IPRATROPIUM BROMIDE 0.5 MG: 0.5 SOLUTION RESPIRATORY (INHALATION) at 18:48

## 2018-06-05 RX ADMIN — METOROPROLOL TARTRATE 5 MG: 5 INJECTION, SOLUTION INTRAVENOUS at 19:31

## 2018-06-05 RX ADMIN — METHYLPREDNISOLONE SODIUM SUCCINATE 125 MG: 125 INJECTION, POWDER, FOR SOLUTION INTRAMUSCULAR; INTRAVENOUS at 18:59

## 2018-06-05 RX ADMIN — ALBUTEROL SULFATE 5 MG: 2.5 SOLUTION RESPIRATORY (INHALATION) at 18:48

## 2018-06-05 RX ADMIN — SODIUM CHLORIDE 125 ML/HR: 0.9 INJECTION, SOLUTION INTRAVENOUS at 19:01

## 2018-06-05 NOTE — DISCHARGE INSTRUCTIONS
Acute Bronchitis   WHAT YOU SHOULD KNOW:   Acute bronchitis is swelling and irritation in the air passages of your lungs  This irritation may cause you to cough or have other breathing problems  Acute bronchitis often starts because of another viral illness, such as a cold or the flu  The illness spreads from your nose and throat to your windpipe and airways  Bronchitis is often called a chest cold  Acute bronchitis lasts about 2 weeks and is usually not a serious illness  AFTER YOU LEAVE:   Medicines:   · Ibuprofen or acetaminophen:  These medicines help lower a fever  They are available without a doctor's order  Ask your healthcare provider which medicine is right for you  Ask how much to take and how often to take it  Follow directions  These medicines can cause stomach bleeding if not taken correctly  Ibuprofen can cause kidney damage  Do not take ibuprofen if you have kidney disease, an ulcer, or allergies to aspirin  Acetaminophen can cause liver damage  Do not drink alcohol if you take acetaminophen  · Cough medicine: This medicine helps loosen mucus in your lungs and make it easier to cough up  This can help you breathe easier  · Inhalers: You may need one or more inhalers to help you breathe easier and cough less  An inhaler gives your medicine in a mist form so that you can breathe it into your lungs  Ask your healthcare provider to show you how to use your inhaler correctly  · Steroid medicine:  Steroid medicine helps open your air passages so you can breathe easier  · Take your medicine as directed  Call your healthcare provider if you think your medicine is not helping or if you have side effects  Tell him if you are allergic to any medicine  Keep a list of the medicines, vitamins, and herbs you take  Include the amounts, and when and why you take them  Bring the list or the pill bottles to follow-up visits  Carry your medicine list with you in case of an emergency    How to use an inhaler:   · Shake the inhaler well to make sure you get the correct amount of medicine per puff  Remove the cover from your inhaler's mouthpiece  If you are using a spacer, connect your inhaler to the flat end of the spacer  · Exhale as much air from your lungs as you can  Put the mouthpiece in your mouth past your front teeth and rest it on the top of your tongue  Do not block the mouthpiece opening with your tongue  · Breathe in through your mouth at a slow and steady rate  As you do this, press the inhaler to release the puff of medicine  Finish breathing in slowly and deeply as you inhale the medicine  When your lungs are full, hold your breath for 10 seconds  Then breathe out slowly through puckered lips or through your nose  · If you need to take more puffs, wait at least 1 minute between each puff  · Rinse your mouth with water after you use the inhaler  This may keep you from getting a mouth infection or irritation  · Follow the instructions that come with your inhaler to clean it  You should clean your inhaler at least once a week  Ways to care for yourself:   · Avoid alcohol:  Alcohol dulls your urge to cough and sneeze  When you have bronchitis, you need to be able to cough and sneeze to clear your air passages  Alcohol also causes your body to lose fluid  This can make the mucus in your lungs thicker and harder to cough up  · Avoid irritants in the air:  Do not smoke or allow others to smoke around you  Avoid chemicals, fumes, and dust  Wear a face mask if you must work around dust or fumes  Stay inside on days when air pollution levels are high  If you have allergies, stay inside when pollen counts are high  Avoid aerosol products  This includes spray-on deodorant, bug spray, and hair spray  · Drink more liquids:  Most people should drink at least 8 eight-ounce cups of water a day  You may need to drink more liquids when you have acute bronchitis   Liquids help keep your air passages moist and help you cough up mucus  · Get more rest:  You may feel like resting more  Slowly start to do more each day  Rest when you feel it is needed  · Eat healthy foods:  Eat a variety healthy foods every day  Your diet should include fruits, vegetables, breads, and protein (such as chicken, fish, and beans)  Dairy products (such as milk, cheese, and ice cream) can sometimes increase the amount of mucus your body makes  Ask if you should decrease your intake of dairy products  · Use a humidifier:  Use a cool mist humidifier to increase air moisture in your home  This may make it easier for you to breathe and help decrease your cough  Decrease your risk of acute bronchitis:   · Get the vaccinations you need:  Ask your healthcare provider if you should get vaccinated against the flu or pneumonia  · Avoid things that may irritate your lungs:  Stay inside or cover your mouth and nose with a scarf when you are outside during cold weather  You should also stay inside on days when air pollution levels are high  If you have allergies, stay inside when pollen counts are high  Avoid using aerosol products in your home  This includes spray-on deodorant, bug spray, and hair spray  · Avoid the spread of germs:        [facility] your hands often with soap and water  Carry germ-killing gel with you  You can use the gel to clean your hands when there is no soap and water available  ¨ Do not touch your eyes, nose, or mouth unless you have washed your hands first     ¨ Always cover your mouth when you cough  Cough into a tissue or your shirtsleeve so you do not spread germs from your hands  ¨ Try to avoid people who have a cold or the flu  If you are sick, stay away from others as much as possible  Follow up with your healthcare provider as directed:  Write down questions you have so you will remember to ask them during your follow-up visits    Contact your healthcare provider if:   · You have a fever     · Your skin becomes itchy or you have a rash after you take your medicine  · Your breathing problems do not go away or get worse  · Your cough does not get better with treatment  · You cough up blood  · You have questions or concerns about your condition or care  Seek care immediately or call 911 if:   · You faint  · Your lips or fingernails turn blue  · You feel like you are not getting enough air when you breathe  · You have swelling of your lips, tongue, or throat that makes it hard to breathe or swallow  © 2014 [address] [employer] is for End User's use only and may not be sold, redistributed or otherwise used for commercial purposes  All illustrations and images included in CareNotes® are the copyrighted property of [employer]  or Matt Slona  The above information is an  only  It is not intended as medical advice for individual conditions or treatments  Talk to your doctor, nurse or pharmacist before following any medical regimen to see if it is safe and effective for you

## 2018-06-05 NOTE — ED PROVIDER NOTES
History  Chief Complaint   Patient presents with    Cough     PT reports "having a cough and shortness of breathe for over 3 weeks" PT has hx of pneumonia and "wants to get that checked out"    Shortness of Breath       History provided by:  Patient   used: No    Cough   Cough characteristics:  Productive  Sputum characteristics:  Nondescript  Severity:  Moderate  Onset quality:  Gradual  Duration:  2 weeks  Timing:  Constant  Progression:  Worsening  Chronicity:  Recurrent  Context: upper respiratory infection    Relieved by:  Nothing  Worsened by:  Nothing  Ineffective treatments:  None tried  Associated symptoms: shortness of breath and wheezing    Associated symptoms: no chest pain, no chills, no diaphoresis, no fever, no headaches and no rash    Shortness of Breath   Associated symptoms: cough and wheezing    Associated symptoms: no chest pain, no diaphoresis, no fever, no headaches, no rash and no vomiting        Prior to Admission Medications   Prescriptions Last Dose Informant Patient Reported? Taking?    Blood Glucose Monitoring Suppl (ONE TOUCH ULTRA SYSTEM KIT) w/Device KIT   Yes Yes   Sig: by Does not apply route   Dulaglutide (TRULICITY) 8 11 UN/7 1JO SOPN   Yes Yes   Sig: Inject 0 75 mg under the skin once a week   Empagliflozin (JARDIANCE) 10 MG TABS   Yes Yes   Sig: Take 10 mg by mouth every morning   Lancets (ONETOUCH ULTRASOFT) lancets   Yes Yes   Sig: by Does not apply route   Olmesartan-Amlodipine-HCTZ (TRIBENZOR) 40-10-25 MG TABS   Yes Yes   Sig: Take 1 tablet by mouth daily   albuterol (PROAIR HFA) 90 mcg/act inhaler   Yes Yes   Sig: Inhale 2 puffs   budesonide-formoterol (SYMBICORT) 160-4 5 mcg/act inhaler   Yes Yes   Sig: Inhale 2 puffs 2 (two) times a day   fenofibrate (TRICOR) 145 mg tablet   Yes Yes   Sig: Take 145 mg by mouth daily   fenofibrate (TRICOR) 145 mg tablet   Yes Yes   Sig: Take 1 tablet by mouth daily   glimepiride (AMARYL) 4 mg tablet   Yes Yes   Sig: Take 4 mg by mouth daily with breakfast   glucose blood test strip   Yes Yes   Sig: by In Vitro route daily   metoprolol succinate (TOPROL-XL) 100 mg 24 hr tablet   Yes Yes   Sig: Take 100 mg by mouth daily   sitaGLIPtin-metFORMIN (JANUMET)  MG per tablet   Yes Yes   Sig: Take 1 tablet by mouth 2 (two) times a day with meals      Facility-Administered Medications: None       Past Medical History:   Diagnosis Date    Diabetes mellitus ([place] [address] )     Hypertension     Inguinal hernia     3/25/15    Onychomycosis     5/24/17    Type 2 diabetes mellitus ([facility] ) 05/01/2012       Past Surgical History:   Procedure Laterality Date    ARTHROSCOPY KNEE      with Medial and Lateral Meniscus Repair    HERNIA REPAIR      umbilical and inguinal hernia repairs (left)       Family History   Problem Relation Age of Onset    Hypertension Mother      essential    Chronic bronchitis Father     Prostate cancer Father     Breast cancer Sister      I have reviewed and agree with the history as documented  Social History   Substance Use Topics    Smoking status: Current Some Day Smoker     Types: Cigars    Smokeless tobacco: Never Used      Comment: current every day smoker, per [employer]    Alcohol use 2 4 oz/week     4 Cans of beer per week      Comment: weekend: 1 pint of christian or 2 beers        Review of Systems   Constitutional: Positive for fatigue  Negative for chills, diaphoresis and fever  Respiratory: Positive for cough, shortness of breath and wheezing  Cardiovascular: Negative for chest pain and palpitations  Gastrointestinal: Negative for diarrhea, nausea and vomiting  Genitourinary: Negative for dysuria and frequency  Skin: Negative for rash  Neurological: Negative for speech difficulty and headaches  All other systems reviewed and are negative  Physical Exam  Physical Exam   Constitutional: He is oriented to person, place, and time  He appears well-developed and well-nourished   He appears distressed  HENT:   Head: Normocephalic and atraumatic  Eyes: EOM are normal  Pupils are equal, round, and reactive to light  Neck: Normal range of motion  No JVD present  Cardiovascular: Regular rhythm, normal heart sounds and intact distal pulses  Tachycardia present  Exam reveals no gallop and no friction rub  No murmur heard  Pulmonary/Chest: He is in respiratory distress (mild)  He has wheezes  He has no rales  He exhibits no tenderness  Musculoskeletal: Normal range of motion  He exhibits no tenderness  Neurological: He is alert and oriented to person, place, and time  Skin: Skin is warm and dry  Psychiatric: He has a normal mood and affect  His behavior is normal  Judgment and thought content normal    Nursing note and vitals reviewed        Vital Signs  ED Triage Vitals   Temp Pulse Respirations Blood Pressure SpO2   -- 06/05/18 1800 06/05/18 1933 06/05/18 1800 06/05/18 1800    104 18 (!) 225/123 95 %      Temp src Heart Rate Source Patient Position - Orthostatic VS BP Location FiO2 (%)   -- 06/05/18 1933 06/05/18 1933 06/05/18 1933 --    Monitor Lying Left arm       Pain Score       06/05/18 1933       No Pain           Vitals:    06/05/18 1815 06/05/18 1930 06/05/18 1933 06/05/18 1956   BP: (!) 201/111  (!) 178/86    Pulse: 100 (!) 108 (!) 113 98   Patient Position - Orthostatic VS:   Lying        Visual Acuity      ED Medications  Medications   sodium chloride 0 9 % infusion (125 mL/hr Intravenous New Bag 6/5/18 1901)   albuterol inhalation solution 5 mg (5 mg Nebulization Given 6/5/18 1848)   ipratropium (ATROVENT) 0 02 % inhalation solution 0 5 mg (0 5 mg Nebulization Given 6/5/18 1848)   methylPREDNISolone sodium succinate (Solu-MEDROL) injection 125 mg (125 mg Intravenous Given 6/5/18 1859)   metoprolol (LOPRESSOR) injection 5 mg (5 mg Intravenous Given 6/5/18 1931)       Diagnostic Studies  Results Reviewed     Procedure Component Value Units Date/Time    Comprehensive metabolic panel [[id number]]  (Abnormal) Collected:  06/05/18 1857    Lab Status:  Final result Specimen:  Blood from Arm, Right Updated:  06/05/18 1926     Sodium 138 mmol/L      Potassium 3 3 (L) mmol/L      Chloride 101 mmol/L      CO2 31 mmol/L      Anion Gap 6 mmol/L      BUN 7 mg/dL      Creatinine 0 93 mg/dL      Glucose 167 (H) mg/dL      Calcium 8 6 mg/dL      AST 28 U/L      ALT 30 U/L      Alkaline Phosphatase 85 U/L      Total Protein 7 2 g/dL      Albumin 3 3 (L) g/dL      Total Bilirubin 1 00 mg/dL      eGFR 107 ml/min/1 73sq m     Narrative:         National Kidney Disease Education Program recommendations are as follows:  GFR calculation is accurate only with a steady state creatinine  Chronic Kidney disease less than 60 ml/min/1 73 sq  meters  Kidney failure less than 15 ml/min/1 73 sq  meters  CBC and differential [[id number]]  (Abnormal) Collected:  06/05/18 1857    Lab Status:  Final result Specimen:  Blood from Arm, Right Updated:  06/05/18 1908     WBC 7 00 Thousand/uL      RBC 4 65 Million/uL      Hemoglobin 14 6 g/dL      Hematocrit 43 0 %      MCV 93 fL      MCH 31 4 pg      MCHC 34 0 g/dL      RDW 12 0 %      MPV 10 6 fL      Platelets 859 Thousands/uL      Neutrophils Relative 48 %      Lymphocytes Relative 31 %      Monocytes Relative 10 %      Eosinophils Relative 10 (H) %      Basophils Relative 0 %      Neutrophils Absolute 3 36 Thousands/µL      Lymphocytes Absolute 2 18 Thousands/µL      Monocytes Absolute 0 73 Thousand/µL      Eosinophils Absolute 0 70 (H) Thousand/µL      Basophils Absolute 0 03 Thousands/µL                  XR chest 2 views   ED Interpretation by Rubio Garcia MD (06/05 1910)   This film was interpreted independently by me  No infiltrate, cardiac silhouette normal, no pleural effusions or pulmonary edema  Final Result by Destiny Cm MD (06/05 1855)      No acute cardiopulmonary disease              Workstation performed: [id number] Procedures  ECG 12 Lead Documentation  Date/Time: 6/5/2018 7:14 PM  Performed by: Low Beaulieu by: Gustavo Massey     Indications / Diagnosis:  Shortness of breath  ECG reviewed by me, the ED Provider: yes    Patient location:  ED  Previous ECG:     Previous ECG:  Compared to current    Comparison ECG info:  12/28/17    Similarity:  No change  Interpretation:     Interpretation: abnormal    Rate:     ECG rate:  105    ECG rate assessment: tachycardic    Rhythm:     Rhythm: sinus tachycardia    Ectopy:     Ectopy: none    QRS:     QRS axis:  Normal    QRS intervals:  Normal  Conduction:     Conduction: normal    ST segments:     ST segments:  Normal  T waves:     T waves: normal             Phone Contacts  ED Phone Contact    ED Course                               MDM  Number of Diagnoses or Management Options  Bronchitis with bronchospasm: new and requires workup  Diagnosis management comments: Background: 46 y o  male presents with cough, wheeze history of bronchitis and pneumonia    Differential DX includes but is not limited to: pneumonia, bronchitis, chf    Plan: xray, cbc, metabolic panel, ekg, duoneb, steroids  Patient is markedly hypertensive, will treat blood pressure after workup for shortness of breath/wheezing            Amount and/or Complexity of Data Reviewed  Clinical lab tests: ordered and reviewed  Tests in the radiology section of CPT®: ordered and reviewed  Independent visualization of images, tracings, or specimens: yes    Risk of Complications, Morbidity, and/or Mortality  Presenting problems: high  Diagnostic procedures: high  Management options: high    Patient Progress  Patient progress: stable    CritCare Time    Disposition  Final diagnoses:   Bronchitis with bronchospasm     Time reflects when diagnosis was documented in both MDM as applicable and the Disposition within this note     Time User Action Codes Description Comment    6/5/2018  7:57 PM Krishna Hernandez Add [J20 9] Bronchitis with bronchospasm       ED Disposition     ED Disposition Condition Comment    Discharge  Elly Chong discharge to home/self care  Condition at discharge: Good        Follow-up Information     Follow up With Specialties Details Why Contact Info    Sky Newman DO Family Medicine Schedule an appointment as soon as possible for a visit in 1 week  [address] [facility]  [phone]            Patient's Medications   Discharge Prescriptions    AZITHROMYCIN (ZITHROMAX) 250 MG TABLET    Take first 2 tablets together on day one, then 1 tablet every day thereafter until finished  Start Date: 6/5/2018  End Date: 6/9/2018       Order Dose: --       Quantity: 6 tablet    Refills: 0    PREDNISONE 20 MG TABLET    Take 3 tablets (60 mg total) by mouth daily for 5 days       Start Date: 6/5/2018  End Date: 6/10/2018       Order Dose: 60 mg       Quantity: 15 tablet    Refills: 0     No discharge procedures on file      ED Provider  Electronically Signed by           Victor Manuel Julio MD  06/05/18 7736

## 2018-06-06 LAB
ATRIAL RATE: 110 BPM
P AXIS: 79 DEGREES
PR INTERVAL: 144 MS
QRS AXIS: 46 DEGREES
QRSD INTERVAL: 80 MS
QT INTERVAL: 366 MS
QTC INTERVAL: 484 MS
T WAVE AXIS: 26 DEGREES
VENTRICULAR RATE: 105 BPM

## 2018-06-06 PROCEDURE — 93010 ELECTROCARDIOGRAM REPORT: CPT | Performed by: INTERNAL MEDICINE

## 2019-10-21 ENCOUNTER — OFFICE VISIT (OUTPATIENT)
Dept: FAMILY MEDICINE CLINIC | Facility: CLINIC | Age: 54
End: 2019-10-21
Payer: COMMERCIAL

## 2019-10-21 VITALS
DIASTOLIC BLOOD PRESSURE: 100 MMHG | OXYGEN SATURATION: 98 % | WEIGHT: 266.6 LBS | HEART RATE: 101 BPM | SYSTOLIC BLOOD PRESSURE: 150 MMHG | BODY MASS INDEX: 39.49 KG/M2 | HEIGHT: 69 IN | RESPIRATION RATE: 18 BRPM

## 2019-10-21 DIAGNOSIS — Z91.19 PATIENT NONCOMPLIANCE: ICD-10-CM

## 2019-10-21 DIAGNOSIS — R07.9 CHEST PAIN IN ADULT: Primary | ICD-10-CM

## 2019-10-21 DIAGNOSIS — E66.09 CLASS 2 OBESITY DUE TO EXCESS CALORIES WITHOUT SERIOUS COMORBIDITY WITH BODY MASS INDEX (BMI) OF 39.0 TO 39.9 IN ADULT: ICD-10-CM

## 2019-10-21 DIAGNOSIS — Z23 FLU VACCINE NEED: ICD-10-CM

## 2019-10-21 DIAGNOSIS — E11.65 UNCONTROLLED TYPE 2 DIABETES MELLITUS WITH HYPERGLYCEMIA (HCC): ICD-10-CM

## 2019-10-21 DIAGNOSIS — E78.41 ELEVATED LIPOPROTEIN(A): ICD-10-CM

## 2019-10-21 DIAGNOSIS — Z23 NEED FOR PROPHYLACTIC VACCINATION AGAINST DIPHTHERIA AND TETANUS: ICD-10-CM

## 2019-10-21 DIAGNOSIS — R60.9 PERIPHERAL EDEMA: ICD-10-CM

## 2019-10-21 DIAGNOSIS — I10 BENIGN ESSENTIAL HYPERTENSION: ICD-10-CM

## 2019-10-21 DIAGNOSIS — R80.9 MICROALBUMINURIA: ICD-10-CM

## 2019-10-21 DIAGNOSIS — Z12.5 PROSTATE CANCER SCREENING: ICD-10-CM

## 2019-10-21 PROBLEM — Z91.199 PATIENT NONCOMPLIANCE: Status: ACTIVE | Noted: 2019-10-21

## 2019-10-21 PROBLEM — R60.0 PERIPHERAL EDEMA: Status: ACTIVE | Noted: 2019-10-21

## 2019-10-21 PROCEDURE — 99215 OFFICE O/P EST HI 40 MIN: CPT | Performed by: FAMILY MEDICINE

## 2019-10-21 PROCEDURE — 90715 TDAP VACCINE 7 YRS/> IM: CPT | Performed by: FAMILY MEDICINE

## 2019-10-21 PROCEDURE — 90472 IMMUNIZATION ADMIN EACH ADD: CPT | Performed by: FAMILY MEDICINE

## 2019-10-21 PROCEDURE — 90471 IMMUNIZATION ADMIN: CPT | Performed by: FAMILY MEDICINE

## 2019-10-21 PROCEDURE — 3008F BODY MASS INDEX DOCD: CPT | Performed by: FAMILY MEDICINE

## 2019-10-21 PROCEDURE — 90682 RIV4 VACC RECOMBINANT DNA IM: CPT | Performed by: FAMILY MEDICINE

## 2019-10-21 PROCEDURE — 93000 ELECTROCARDIOGRAM COMPLETE: CPT | Performed by: FAMILY MEDICINE

## 2019-10-21 RX ORDER — METOPROLOL SUCCINATE 100 MG/1
100 TABLET, EXTENDED RELEASE ORAL DAILY
Qty: 30 TABLET | Refills: 1 | Status: SHIPPED | OUTPATIENT
Start: 2019-10-21 | End: 2020-07-10 | Stop reason: HOSPADM

## 2019-10-21 RX ORDER — OLMESARTAN MEDOXOMIL, AMLODIPINE AND HYDROCHLOROTHIAZIDE TABLET 40/10/25 MG 40; 10; 25 MG/1; MG/1; MG/1
1 TABLET ORAL DAILY
Qty: 30 TABLET | Refills: 1 | Status: SHIPPED | OUTPATIENT
Start: 2019-10-21 | End: 2020-07-10 | Stop reason: HOSPADM

## 2019-10-21 NOTE — ASSESSMENT & PLAN NOTE
Stressed the need for medical compliance  He does have multiple medical conditions that do need to be treated    Without treatment he does run the risk of suffering significant morbidity

## 2019-10-21 NOTE — ASSESSMENT & PLAN NOTE
-patient's blood pressure is not at all controlled  Patient will be started back on to Toprol XL as well as Tribenzor which of the same dosages that he was on previously    He will need to follow up in the next 2 weeks to re-evaluate his blood pressure

## 2019-10-21 NOTE — ASSESSMENT & PLAN NOTE
Lab Results   Component Value Date    HGBA1C 8 7 (H) 12/21/2017     -stressed the need for medical compliance and medication compliance  Patient will be started back on to [place]  Will refrain from placing the patient back on to [facility] and [facility] until I get a baseline for his blood work    Order provided for hemoglobin A1c as well as urine for microalbumin

## 2019-10-21 NOTE — ASSESSMENT & PLAN NOTE
EKG performed in the office  Patient does have nonspecific T-wave abnormality  No significant changes to indicate acute ischemia  EKG actually does not look any different than prior EKGs from the last 5 years  Patient does have multiple risk factors for heart disease including hypertension, hyperlipidemia, obesity, diabetes    Will eventually need cardiac workup

## 2019-10-21 NOTE — PROGRESS NOTES
Subjective:      Patient ID: Jolynn Jimenez is a 53 y o  male  50-year-old [facility] American male who has a longstanding history of diabetes mellitus type 2 that has been poorly controlled, hypertension, hyperlipidemia, obesity presents for follow-up in basically to reestablish  Patient has not had diabetic parameters tested since December of 2017  He has not been seen in the office since January of 2018  Patient and his wife had become   They did lose the family home  Patient has been without medications for over 1 year  He does complain of exertional chest pains, lower extremity edema, right upper extremity edema  Patient has been trying to follow a mostly vegetarian diet  Patient knows that he should be taking better care of himself and is ready to resume undergoing medical care  Patient was previously on multiple medications including Janumet, Amaryl, Trulicity, metoprolol, fenofibrate  Overdue for labs  He would like to receive flu vaccination and tetanus booster  Past Medical History:   Diagnosis Date    [facility] mellitus ([facility] )     Hypertension     Inguinal hernia     3/25/15    Onychomycosis     5/24/17    Type 2 diabetes mellitus ([facility] ) 05/01/2012       Family History   Problem Relation Age of Onset    Hypertension Mother         essential    Chronic bronchitis Father     Prostate cancer Father     Breast cancer Sister        Past Surgical History:   Procedure Laterality Date    ARTHROSCOPY KNEE      with Medial and Lateral Meniscus Repair    HERNIA REPAIR      umbilical and inguinal hernia repairs (left)        reports that he has been smoking cigars  He has never used smokeless tobacco  He reports that he drinks about 4 0 standard drinks of alcohol per week  He reports that he does not use drugs        Current Outpatient Medications:     Blood Glucose Monitoring Suppl (ONE TOUCH ULTRA SYSTEM KIT) w/Device KIT, by Does not apply route, Disp: , Rfl:     Dulaglutide (TRULICITY) 5 53 CA/9 9PG SOPN, Inject 0 75 mg under the skin once a week, Disp: , Rfl:     Empagliflozin (JARDIANCE) 10 MG TABS, Take 10 mg by mouth every morning, Disp: , Rfl:     fenofibrate (TRICOR) 145 mg tablet, Take 1 tablet by mouth daily, Disp: , Rfl:     glimepiride (AMARYL) 4 mg tablet, Take 4 mg by mouth daily with breakfast, Disp: , Rfl:     glucose blood test strip, by In Vitro route daily, Disp: , Rfl:     Lancets (ONETOUCH ULTRASOFT) lancets, by Does not apply route, Disp: , Rfl:     metoprolol succinate (TOPROL-XL) 100 mg 24 hr tablet, Take 1 tablet (100 mg total) by mouth daily, Disp: 30 tablet, Rfl: 1    Olmesartan-amLODIPine-HCTZ (TRIBENZOR) 40-10-25 MG TABS, Take 1 tablet by mouth daily, Disp: 30 tablet, Rfl: 1    sitaGLIPtin-metFORMIN (JANUMET)  MG per tablet, Take 1 tablet by mouth 2 (two) times a day with meals, Disp: 60 tablet, Rfl: 1    The following portions of the patient's history were reviewed and updated as appropriate: allergies, current medications, past family history, past medical history, past social history, past surgical history and problem list     Review of Systems   Constitutional: Negative  HENT: Negative  Eyes: Negative  Respiratory: Negative  Negative for cough, shortness of breath and wheezing  Cardiovascular: Positive for chest pain and leg swelling  Negative for palpitations  Gastrointestinal: Negative  Endocrine: Negative  Genitourinary: Negative  Musculoskeletal: Negative  Skin: Negative  Allergic/Immunologic: Negative  Neurological: Negative  Hematological: Negative  Psychiatric/Behavioral: Negative  All other systems reviewed and are negative  Objective:    /100   Pulse 101   Resp 18   Ht 5' 9" (1 753 m)   Wt 121 kg (266 lb 9 6 oz)   SpO2 98%   BMI 39 37 kg/m²      Physical Exam   Constitutional: He is oriented to person, place, and time  He appears well-developed and well-nourished  Obese, no acute distress   HENT:   Head: Normocephalic  Eyes: Pupils are equal, round, and reactive to light  Conjunctivae and EOM are normal    Neck: Normal range of motion  Neck supple  Cardiovascular: Normal rate, regular rhythm and normal heart sounds  No murmur heard  Pulmonary/Chest: Effort normal and breath sounds normal    Abdominal: Soft  Bowel sounds are normal    Musculoskeletal: Normal range of motion  He exhibits edema (3+ pitting edema bilateral lower extremities as well as 2+ pitting edema in the right upper extremity)  Neurological: He is alert and oriented to person, place, and time  Psychiatric: He has a normal mood and affect  His behavior is normal  Judgment and thought content normal    Nursing note and vitals reviewed  No results found for this or any previous visit (from the past 1008 hour(s))  Assessment/Plan:    Diabetes mellitus type 2, uncontrolled ([facility] )    Lab Results   Component Value Date    HGBA1C 8 7 (H) 12/21/2017     -stressed the need for medical compliance and medication compliance  Patient will be started back on to [place]  Will refrain from placing the patient back on to [facility] and [facility] until I get a baseline for his blood work  Order provided for hemoglobin A1c as well as urine for microalbumin    Benign essential hypertension  -patient's blood pressure is not at all controlled  Patient will be started back on to Toprol XL as well as Tribenzor which of the same dosages that he was on previously  He will need to follow up in the next 2 weeks to re-evaluate his blood pressure    Elevated lipoprotein(a)  Check lipid profile  Patient has been trying to cut down his dietary intake of fats and cholesterol  Microalbuminuria  Patient has been without medication for treatment of his diabetes    Check urine for microalbumin    Obesity  Patient is trying dietary modification to help lose weight    Chest pain in adult  EKG performed in the office  Patient does have nonspecific T-wave abnormality  No significant changes to indicate acute ischemia  EKG actually does not look any different than prior EKGs from the last 5 years  Patient does have multiple risk factors for heart disease including hypertension, hyperlipidemia, obesity, diabetes  Will eventually need cardiac workup    Peripheral edema  Moderate peripheral edema  Patient will be started back on to 25 mg of hydrochlorothiazide  No respiratory distress  Will need to re-evaluate back on medications    Patient noncompliance  Stressed the need for medical compliance  He does have multiple medical conditions that do need to be treated  Without treatment he does run the risk of suffering significant morbidity          Problem List Items Addressed This Visit        Endocrine    [facility], [facility]       Lab Results   Component Value Date    HGBA1C 8 7 (H) 12/21/2017     -stressed the need for medical compliance and medication compliance  Patient will be started back on to [place]  Will refrain from placing the patient back on to [facility] and [facility] until I get a baseline for his blood work  Order provided for hemoglobin A1c as well as urine for microalbumin         Relevant Medications    sitaGLIPtin-metFORMIN (JANUMET)  MG per tablet    Other Relevant Orders    Hemoglobin A1C    Lipid panel    Microalbumin / creatinine urine ratio       Cardiovascular and Mediastinum    Benign essential hypertension     -patient's blood pressure is not at all controlled  Patient will be started back on to Toprol XL as well as Tribenzor which of the same dosages that he was on previously    He will need to follow up in the next 2 weeks to re-evaluate his blood pressure         Relevant Medications    Olmesartan-amLODIPine-HCTZ (TRIBENZOR) 40-10-25 MG TABS    metoprolol succinate (TOPROL-XL) 100 mg 24 hr tablet    Other Relevant Orders    CBC and differential    Comprehensive metabolic panel    TSH, 3rd generation with Free T4 reflex       Other    Chest pain in adult - Primary     EKG performed in the office  Patient does have nonspecific T-wave abnormality  No significant changes to indicate acute ischemia  EKG actually does not look any different than prior EKGs from the last 5 years  Patient does have multiple risk factors for heart disease including hypertension, hyperlipidemia, obesity, diabetes  Will eventually need cardiac workup         Relevant Orders    POCT ECG (Completed)    TSH, 3rd generation with Free T4 reflex    POCT ECG (Completed)    Elevated lipoprotein(a)     Check lipid profile  Patient has been trying to cut down his dietary intake of fats and cholesterol  Relevant Orders    Lipid panel    Microalbuminuria     Patient has been without medication for treatment of his diabetes  Check urine for microalbumin         Obesity     Patient is trying dietary modification to help lose weight         Patient noncompliance     Stressed the need for medical compliance  He does have multiple medical conditions that do need to be treated  Without treatment he does run the risk of suffering significant morbidity         Peripheral edema     Moderate peripheral edema  Patient will be started back on to 25 mg of hydrochlorothiazide  No respiratory distress  Will need to re-evaluate back on medications         Relevant Orders    TSH, 3rd generation with Free T4 reflex      Other Visit Diagnoses     Need for prophylactic vaccination against diphtheria and tetanus        Relevant Orders    TDAP VACCINE GREATER THAN OR EQUAL TO 6YO IM    Flu vaccine need        Relevant Orders    influenza vaccine, 0822-4095, quadrivalent, recombinant, PF, 0 5 mL, for patients 18 yr+ ([id number])    Prostate cancer screening        Relevant Orders    PSA, Total Screen            BMI Counseling: Body mass index is 39 37 kg/m²   The BMI is above normal  Nutrition recommendations include reducing portion sizes, decreasing overall calorie intake, 3-5 servings of fruits/vegetables daily, reducing fast food intake, consuming healthier snacks, decreasing soda and/or juice intake, moderation in carbohydrate intake, increasing intake of lean protein, reducing intake of saturated fat and trans fat and reducing intake of cholesterol  Exercise recommendations include moderate aerobic physical activity for 150 minutes/week  I have spent 44 now the the you should the minutes with Patient  today in which greater than 50% of this time was spent in counseling/coordination of care regarding Prognosis, Risks and benefits of tx options, Intructions for management, Patient and family education, Importance of tx compliance, Risk factor reductions and Impressions

## 2019-10-21 NOTE — ASSESSMENT & PLAN NOTE
Moderate peripheral edema  Patient will be started back on to 25 mg of hydrochlorothiazide  No respiratory distress    Will need to re-evaluate back on medications

## 2019-10-21 NOTE — ASSESSMENT & PLAN NOTE
Check lipid profile  Patient has been trying to cut down his dietary intake of fats and cholesterol

## 2019-10-28 ENCOUNTER — TELEPHONE (OUTPATIENT)
Dept: FAMILY MEDICINE CLINIC | Facility: CLINIC | Age: 54
End: 2019-10-28

## 2019-10-28 DIAGNOSIS — R60.9 PERIPHERAL EDEMA: ICD-10-CM

## 2019-10-28 DIAGNOSIS — E78.41 ELEVATED LIPOPROTEIN(A): ICD-10-CM

## 2019-10-28 DIAGNOSIS — I10 BENIGN ESSENTIAL HYPERTENSION: ICD-10-CM

## 2019-10-28 DIAGNOSIS — E11.65 UNCONTROLLED TYPE 2 DIABETES MELLITUS WITH HYPERGLYCEMIA (HCC): Primary | ICD-10-CM

## 2019-10-28 LAB
ALBUMIN SERPL-MCNC: 3.6 G/DL (ref 3.6–5.1)
ALBUMIN/CREAT UR: 230 MCG/MG CREAT
ALBUMIN/GLOB SERPL: 1.2 (CALC) (ref 1–2.5)
ALP SERPL-CCNC: 91 U/L (ref 40–115)
ALT SERPL-CCNC: 21 U/L (ref 9–46)
AST SERPL-CCNC: 45 U/L (ref 10–35)
BASOPHILS # BLD AUTO: 38 CELLS/UL (ref 0–200)
BASOPHILS NFR BLD AUTO: 0.7 %
BILIRUB SERPL-MCNC: 0.7 MG/DL (ref 0.2–1.2)
BUN SERPL-MCNC: 13 MG/DL (ref 7–25)
BUN/CREAT SERPL: ABNORMAL (CALC) (ref 6–22)
CALCIUM SERPL-MCNC: 8.5 MG/DL (ref 8.6–10.3)
CHLORIDE SERPL-SCNC: 104 MMOL/L (ref 98–110)
CHOLEST SERPL-MCNC: 123 MG/DL
CHOLEST/HDLC SERPL: 2.4 (CALC)
CO2 SERPL-SCNC: 28 MMOL/L (ref 20–32)
CREAT SERPL-MCNC: 0.96 MG/DL (ref 0.7–1.33)
CREAT UR-MCNC: 204 MG/DL (ref 20–320)
EOSINOPHIL # BLD AUTO: 200 CELLS/UL (ref 15–500)
EOSINOPHIL NFR BLD AUTO: 3.7 %
ERYTHROCYTE [DISTWIDTH] IN BLOOD BY AUTOMATED COUNT: 13.5 % (ref 11–15)
GLOBULIN SER CALC-MCNC: 2.9 G/DL (CALC) (ref 1.9–3.7)
GLUCOSE SERPL-MCNC: 147 MG/DL (ref 65–99)
HBA1C MFR BLD: 8.1 % OF TOTAL HGB
HCT VFR BLD AUTO: 39.5 % (ref 38.5–50)
HDLC SERPL-MCNC: 52 MG/DL
HGB BLD-MCNC: 13.3 G/DL (ref 13.2–17.1)
LDLC SERPL CALC-MCNC: 49 MG/DL (CALC)
LYMPHOCYTES # BLD AUTO: 2641 CELLS/UL (ref 850–3900)
LYMPHOCYTES NFR BLD AUTO: 48.9 %
MCH RBC QN AUTO: 31.7 PG (ref 27–33)
MCHC RBC AUTO-ENTMCNC: 33.7 G/DL (ref 32–36)
MCV RBC AUTO: 94 FL (ref 80–100)
MICROALBUMIN UR-MCNC: 46.9 MG/DL
MONOCYTES # BLD AUTO: 464 CELLS/UL (ref 200–950)
MONOCYTES NFR BLD AUTO: 8.6 %
NEUTROPHILS # BLD AUTO: 2057 CELLS/UL (ref 1500–7800)
NEUTROPHILS NFR BLD AUTO: 38.1 %
NONHDLC SERPL-MCNC: 71 MG/DL (CALC)
PLATELET # BLD AUTO: 220 THOUSAND/UL (ref 140–400)
PMV BLD REES-ECKER: 10.2 FL (ref 7.5–12.5)
POTASSIUM SERPL-SCNC: 4.2 MMOL/L (ref 3.5–5.3)
PROT SERPL-MCNC: 6.5 G/DL (ref 6.1–8.1)
PSA SERPL-MCNC: 0.3 NG/ML
RBC # BLD AUTO: 4.2 MILLION/UL (ref 4.2–5.8)
SL AMB EGFR AFRICAN AMERICAN: 103 ML/MIN/1.73M2
SL AMB EGFR NON AFRICAN AMERICAN: 89 ML/MIN/1.73M2
SODIUM SERPL-SCNC: 139 MMOL/L (ref 135–146)
TRIGL SERPL-MCNC: 132 MG/DL
TSH SERPL-ACNC: 2.1 MIU/L (ref 0.4–4.5)
WBC # BLD AUTO: 5.4 THOUSAND/UL (ref 3.8–10.8)

## 2019-10-28 NOTE — TELEPHONE ENCOUNTER
It does not look like the message went through about his labs  Please inform the patient that his hemoglobin A1c is 8 1%, cholesterol is better that in his ever been  Suppressing [place] his numbers do not look that bad for not being on medications  Patient will need to complete labs in 3 months and have follow-up appointment at that time  He does not need to be seen any sooner    Continue on medications that were provided at last office visit

## 2020-02-10 ENCOUNTER — TELEPHONE (OUTPATIENT)
Dept: FAMILY MEDICINE CLINIC | Facility: CLINIC | Age: 55
End: 2020-02-10

## 2020-02-10 NOTE — TELEPHONE ENCOUNTER
Patient is overdue for follow-up appointment  This is important as he is a diabetic    Please find out if he is still coming to this practice

## 2020-07-06 ENCOUNTER — TRANSITIONAL CARE MANAGEMENT (OUTPATIENT)
Dept: FAMILY MEDICINE CLINIC | Facility: CLINIC | Age: 55
End: 2020-07-06

## 2020-07-06 ENCOUNTER — APPOINTMENT (EMERGENCY)
Dept: RADIOLOGY | Facility: HOSPITAL | Age: 55
DRG: 291 | End: 2020-07-06
Payer: COMMERCIAL

## 2020-07-06 ENCOUNTER — APPOINTMENT (EMERGENCY)
Dept: CT IMAGING | Facility: HOSPITAL | Age: 55
DRG: 291 | End: 2020-07-06
Payer: COMMERCIAL

## 2020-07-06 ENCOUNTER — HOSPITAL ENCOUNTER (INPATIENT)
Facility: HOSPITAL | Age: 55
LOS: 4 days | Discharge: HOME | DRG: 291 | End: 2020-07-10
Attending: EMERGENCY MEDICINE | Admitting: INTERNAL MEDICINE
Payer: COMMERCIAL

## 2020-07-06 DIAGNOSIS — I16.0 HYPERTENSIVE URGENCY: ICD-10-CM

## 2020-07-06 DIAGNOSIS — E83.42 HYPOMAGNESEMIA: ICD-10-CM

## 2020-07-06 DIAGNOSIS — R06.00 DYSPNEA: ICD-10-CM

## 2020-07-06 DIAGNOSIS — I50.9 CHF (CONGESTIVE HEART FAILURE) (HCC): ICD-10-CM

## 2020-07-06 DIAGNOSIS — E11.65 UNCONTROLLED TYPE 2 DIABETES MELLITUS WITH HYPERGLYCEMIA (HCC): ICD-10-CM

## 2020-07-06 DIAGNOSIS — R06.02 SHORTNESS OF BREATH: ICD-10-CM

## 2020-07-06 DIAGNOSIS — J98.01 BRONCHOSPASM: ICD-10-CM

## 2020-07-06 DIAGNOSIS — I10 MALIGNANT HYPERTENSION: Primary | ICD-10-CM

## 2020-07-06 DIAGNOSIS — E87.6 HYPOKALEMIA: ICD-10-CM

## 2020-07-06 DIAGNOSIS — F10.10 ALCOHOL ABUSE: ICD-10-CM

## 2020-07-06 DIAGNOSIS — R79.89 RAISED TSH LEVEL: ICD-10-CM

## 2020-07-06 LAB
ALBUMIN SERPL BCP-MCNC: 3.8 G/DL (ref 3.5–5)
ALP SERPL-CCNC: 105 U/L (ref 46–116)
ALT SERPL W P-5'-P-CCNC: 31 U/L (ref 12–78)
ANION GAP SERPL CALCULATED.3IONS-SCNC: 16 MMOL/L (ref 4–13)
ANION GAP SERPL CALCULATED.3IONS-SCNC: 6 MMOL/L (ref 4–13)
APTT PPP: 30 SECONDS (ref 23–37)
AST SERPL W P-5'-P-CCNC: 56 U/L (ref 5–45)
ATRIAL RATE: 109 BPM
BACTERIA UR QL AUTO: ABNORMAL /HPF
BASE EX.OXY STD BLDV CALC-SCNC: 64.1 % (ref 60–80)
BASE EXCESS BLDV CALC-SCNC: 3.8 MMOL/L
BASOPHILS # BLD AUTO: 0.07 THOUSANDS/ΜL (ref 0–0.1)
BASOPHILS NFR BLD AUTO: 1 % (ref 0–1)
BETA-HYDROXYBUTYRATE: 0.2 MMOL/L
BILIRUB SERPL-MCNC: 0.92 MG/DL (ref 0.2–1)
BILIRUB UR QL STRIP: NEGATIVE
BUN SERPL-MCNC: 6 MG/DL (ref 5–25)
BUN SERPL-MCNC: 7 MG/DL (ref 5–25)
CALCIUM SERPL-MCNC: 7.7 MG/DL (ref 8.3–10.1)
CALCIUM SERPL-MCNC: 8.2 MG/DL (ref 8.3–10.1)
CHLORIDE SERPL-SCNC: 97 MMOL/L (ref 100–108)
CHLORIDE SERPL-SCNC: 97 MMOL/L (ref 100–108)
CHOLEST SERPL-MCNC: 137 MG/DL (ref 50–200)
CLARITY UR: CLEAR
CO2 SERPL-SCNC: 19 MMOL/L (ref 21–32)
CO2 SERPL-SCNC: 33 MMOL/L (ref 21–32)
COLOR UR: YELLOW
CREAT SERPL-MCNC: 0.69 MG/DL (ref 0.6–1.3)
CREAT SERPL-MCNC: 0.97 MG/DL (ref 0.6–1.3)
D DIMER PPP FEU-MCNC: 0.61 UG/ML FEU
EOSINOPHIL # BLD AUTO: 1.2 THOUSAND/ΜL (ref 0–0.61)
EOSINOPHIL NFR BLD AUTO: 14 % (ref 0–6)
ERYTHROCYTE [DISTWIDTH] IN BLOOD BY AUTOMATED COUNT: 12.5 % (ref 11.6–15.1)
ERYTHROCYTE [DISTWIDTH] IN BLOOD BY AUTOMATED COUNT: 12.6 % (ref 11.6–15.1)
EST. AVERAGE GLUCOSE BLD GHB EST-MCNC: 154 MG/DL
GFR SERPL CREATININE-BSD FRML MDRD: 100 ML/MIN/1.73SQ M
GFR SERPL CREATININE-BSD FRML MDRD: 123 ML/MIN/1.73SQ M
GLUCOSE SERPL-MCNC: 149 MG/DL (ref 65–140)
GLUCOSE SERPL-MCNC: 151 MG/DL (ref 65–140)
GLUCOSE SERPL-MCNC: 196 MG/DL (ref 65–140)
GLUCOSE SERPL-MCNC: 198 MG/DL (ref 65–140)
GLUCOSE SERPL-MCNC: 208 MG/DL (ref 65–140)
GLUCOSE SERPL-MCNC: 218 MG/DL (ref 65–140)
GLUCOSE SERPL-MCNC: 244 MG/DL (ref 65–140)
GLUCOSE UR STRIP-MCNC: NEGATIVE MG/DL
HBA1C MFR BLD: 7 %
HCO3 BLDV-SCNC: 31.4 MMOL/L (ref 24–30)
HCT VFR BLD AUTO: 38.6 % (ref 36.5–49.3)
HCT VFR BLD AUTO: 44.9 % (ref 36.5–49.3)
HDLC SERPL-MCNC: 47 MG/DL
HGB BLD-MCNC: 13.2 G/DL (ref 12–17)
HGB BLD-MCNC: 15.2 G/DL (ref 12–17)
HGB UR QL STRIP.AUTO: ABNORMAL
IMM GRANULOCYTES # BLD AUTO: 0.03 THOUSAND/UL (ref 0–0.2)
IMM GRANULOCYTES NFR BLD AUTO: 0 % (ref 0–2)
INR PPP: 1.12 (ref 0.84–1.19)
KETONES UR STRIP-MCNC: NEGATIVE MG/DL
L PNEUMO1 AG UR QL IA.RAPID: NEGATIVE
LACTATE SERPL-SCNC: 1 MMOL/L (ref 0.5–2)
LDLC SERPL CALC-MCNC: 77 MG/DL (ref 0–100)
LEUKOCYTE ESTERASE UR QL STRIP: NEGATIVE
LYMPHOCYTES # BLD AUTO: 4.23 THOUSANDS/ΜL (ref 0.6–4.47)
LYMPHOCYTES NFR BLD AUTO: 48 % (ref 14–44)
MAGNESIUM SERPL-MCNC: 1.1 MG/DL (ref 1.6–2.6)
MAGNESIUM SERPL-MCNC: 1.8 MG/DL (ref 1.6–2.6)
MCH RBC QN AUTO: 32.5 PG (ref 26.8–34.3)
MCH RBC QN AUTO: 32.9 PG (ref 26.8–34.3)
MCHC RBC AUTO-ENTMCNC: 33.9 G/DL (ref 31.4–37.4)
MCHC RBC AUTO-ENTMCNC: 34.2 G/DL (ref 31.4–37.4)
MCV RBC AUTO: 96 FL (ref 82–98)
MCV RBC AUTO: 96 FL (ref 82–98)
MONOCYTES # BLD AUTO: 0.73 THOUSAND/ΜL (ref 0.17–1.22)
MONOCYTES NFR BLD AUTO: 8 % (ref 4–12)
NEUTROPHILS # BLD AUTO: 2.56 THOUSANDS/ΜL (ref 1.85–7.62)
NEUTS SEG NFR BLD AUTO: 29 % (ref 43–75)
NITRITE UR QL STRIP: NEGATIVE
NON-SQ EPI CELLS URNS QL MICRO: ABNORMAL /HPF
NONHDLC SERPL-MCNC: 90 MG/DL
NRBC BLD AUTO-RTO: 0 /100 WBCS
NT-PROBNP SERPL-MCNC: 430 PG/ML
O2 CT BLDV-SCNC: 14.3 ML/DL
P AXIS: 77 DEGREES
PCO2 BLDV: 59.3 MM HG (ref 42–50)
PH BLDV: 7.34 [PH] (ref 7.3–7.4)
PH UR STRIP.AUTO: 7.5 [PH] (ref 4.5–8)
PLATELET # BLD AUTO: 213 THOUSANDS/UL (ref 149–390)
PLATELET # BLD AUTO: 245 THOUSANDS/UL (ref 149–390)
PMV BLD AUTO: 9.8 FL (ref 8.9–12.7)
PMV BLD AUTO: 9.9 FL (ref 8.9–12.7)
PO2 BLDV: 37.1 MM HG (ref 35–45)
POTASSIUM SERPL-SCNC: 3 MMOL/L (ref 3.5–5.3)
POTASSIUM SERPL-SCNC: 4.5 MMOL/L (ref 3.5–5.3)
PR INTERVAL: 144 MS
PROCALCITONIN SERPL-MCNC: 0.16 NG/ML
PROT SERPL-MCNC: 8.9 G/DL (ref 6.4–8.2)
PROT UR STRIP-MCNC: ABNORMAL MG/DL
PROTHROMBIN TIME: 13.7 SECONDS (ref 11.6–14.5)
QRS AXIS: 43 DEGREES
QRSD INTERVAL: 82 MS
QT INTERVAL: 340 MS
QTC INTERVAL: 450 MS
RBC # BLD AUTO: 4.01 MILLION/UL (ref 3.88–5.62)
RBC # BLD AUTO: 4.67 MILLION/UL (ref 3.88–5.62)
RBC #/AREA URNS AUTO: ABNORMAL /HPF
S PNEUM AG UR QL: NEGATIVE
SARS-COV-2 RNA RESP QL NAA+PROBE: NEGATIVE
SODIUM SERPL-SCNC: 132 MMOL/L (ref 136–145)
SODIUM SERPL-SCNC: 136 MMOL/L (ref 136–145)
SP GR UR STRIP.AUTO: 1.02 (ref 1–1.03)
T WAVE AXIS: 75 DEGREES
T4 FREE SERPL-MCNC: 1.17 NG/DL (ref 0.76–1.46)
TRIGL SERPL-MCNC: 65 MG/DL
TROPONIN I SERPL-MCNC: 0.03 NG/ML
TROPONIN I SERPL-MCNC: <0.02 NG/ML
TSH SERPL DL<=0.05 MIU/L-ACNC: 7.19 UIU/ML (ref 0.36–3.74)
UROBILINOGEN UR QL STRIP.AUTO: 0.2 E.U./DL
VENTRICULAR RATE: 105 BPM
WBC # BLD AUTO: 7.68 THOUSAND/UL (ref 4.31–10.16)
WBC # BLD AUTO: 8.82 THOUSAND/UL (ref 4.31–10.16)
WBC #/AREA URNS AUTO: ABNORMAL /HPF

## 2020-07-06 PROCEDURE — 84439 ASSAY OF FREE THYROXINE: CPT | Performed by: EMERGENCY MEDICINE

## 2020-07-06 PROCEDURE — 84484 ASSAY OF TROPONIN QUANT: CPT | Performed by: PHYSICIAN ASSISTANT

## 2020-07-06 PROCEDURE — 85027 COMPLETE CBC AUTOMATED: CPT | Performed by: PHYSICIAN ASSISTANT

## 2020-07-06 PROCEDURE — 84145 PROCALCITONIN (PCT): CPT | Performed by: PHYSICIAN ASSISTANT

## 2020-07-06 PROCEDURE — 71045 X-RAY EXAM CHEST 1 VIEW: CPT

## 2020-07-06 PROCEDURE — 96366 THER/PROPH/DIAG IV INF ADDON: CPT

## 2020-07-06 PROCEDURE — 85025 COMPLETE CBC W/AUTO DIFF WBC: CPT | Performed by: EMERGENCY MEDICINE

## 2020-07-06 PROCEDURE — 83735 ASSAY OF MAGNESIUM: CPT | Performed by: EMERGENCY MEDICINE

## 2020-07-06 PROCEDURE — 87449 NOS EACH ORGANISM AG IA: CPT | Performed by: PHYSICIAN ASSISTANT

## 2020-07-06 PROCEDURE — 83735 ASSAY OF MAGNESIUM: CPT | Performed by: PHYSICIAN ASSISTANT

## 2020-07-06 PROCEDURE — 83605 ASSAY OF LACTIC ACID: CPT | Performed by: EMERGENCY MEDICINE

## 2020-07-06 PROCEDURE — 93010 ELECTROCARDIOGRAM REPORT: CPT | Performed by: INTERNAL MEDICINE

## 2020-07-06 PROCEDURE — 99223 1ST HOSP IP/OBS HIGH 75: CPT | Performed by: INTERNAL MEDICINE

## 2020-07-06 PROCEDURE — 96375 TX/PRO/DX INJ NEW DRUG ADDON: CPT

## 2020-07-06 PROCEDURE — 71275 CT ANGIOGRAPHY CHEST: CPT

## 2020-07-06 PROCEDURE — 80048 BASIC METABOLIC PNL TOTAL CA: CPT | Performed by: PHYSICIAN ASSISTANT

## 2020-07-06 PROCEDURE — 80061 LIPID PANEL: CPT | Performed by: PHYSICIAN ASSISTANT

## 2020-07-06 PROCEDURE — 81001 URINALYSIS AUTO W/SCOPE: CPT

## 2020-07-06 PROCEDURE — 96365 THER/PROPH/DIAG IV INF INIT: CPT

## 2020-07-06 PROCEDURE — 84484 ASSAY OF TROPONIN QUANT: CPT | Performed by: EMERGENCY MEDICINE

## 2020-07-06 PROCEDURE — 93005 ELECTROCARDIOGRAM TRACING: CPT

## 2020-07-06 PROCEDURE — 82805 BLOOD GASES W/O2 SATURATION: CPT | Performed by: EMERGENCY MEDICINE

## 2020-07-06 PROCEDURE — 82948 REAGENT STRIP/BLOOD GLUCOSE: CPT

## 2020-07-06 PROCEDURE — 83880 ASSAY OF NATRIURETIC PEPTIDE: CPT | Performed by: EMERGENCY MEDICINE

## 2020-07-06 PROCEDURE — 80053 COMPREHEN METABOLIC PANEL: CPT | Performed by: EMERGENCY MEDICINE

## 2020-07-06 PROCEDURE — 85730 THROMBOPLASTIN TIME PARTIAL: CPT | Performed by: EMERGENCY MEDICINE

## 2020-07-06 PROCEDURE — 87040 BLOOD CULTURE FOR BACTERIA: CPT | Performed by: EMERGENCY MEDICINE

## 2020-07-06 PROCEDURE — 83036 HEMOGLOBIN GLYCOSYLATED A1C: CPT | Performed by: PHYSICIAN ASSISTANT

## 2020-07-06 PROCEDURE — 85379 FIBRIN DEGRADATION QUANT: CPT | Performed by: EMERGENCY MEDICINE

## 2020-07-06 PROCEDURE — 82010 KETONE BODYS QUAN: CPT | Performed by: EMERGENCY MEDICINE

## 2020-07-06 PROCEDURE — 84443 ASSAY THYROID STIM HORMONE: CPT | Performed by: EMERGENCY MEDICINE

## 2020-07-06 PROCEDURE — 99285 EMERGENCY DEPT VISIT HI MDM: CPT | Performed by: EMERGENCY MEDICINE

## 2020-07-06 PROCEDURE — 3051F HG A1C>EQUAL 7.0%<8.0%: CPT | Performed by: FAMILY MEDICINE

## 2020-07-06 PROCEDURE — 87635 SARS-COV-2 COVID-19 AMP PRB: CPT | Performed by: EMERGENCY MEDICINE

## 2020-07-06 PROCEDURE — 99285 EMERGENCY DEPT VISIT HI MDM: CPT

## 2020-07-06 PROCEDURE — 85610 PROTHROMBIN TIME: CPT | Performed by: EMERGENCY MEDICINE

## 2020-07-06 PROCEDURE — 36415 COLL VENOUS BLD VENIPUNCTURE: CPT | Performed by: EMERGENCY MEDICINE

## 2020-07-06 RX ORDER — FUROSEMIDE 10 MG/ML
40 INJECTION INTRAMUSCULAR; INTRAVENOUS
Status: DISCONTINUED | OUTPATIENT
Start: 2020-07-06 | End: 2020-07-06

## 2020-07-06 RX ORDER — POTASSIUM CHLORIDE 20 MEQ/1
40 TABLET, EXTENDED RELEASE ORAL ONCE
Status: COMPLETED | OUTPATIENT
Start: 2020-07-06 | End: 2020-07-06

## 2020-07-06 RX ORDER — MAGNESIUM SULFATE 1 G/100ML
1 INJECTION INTRAVENOUS ONCE
Status: COMPLETED | OUTPATIENT
Start: 2020-07-06 | End: 2020-07-06

## 2020-07-06 RX ORDER — FUROSEMIDE 10 MG/ML
20 INJECTION INTRAMUSCULAR; INTRAVENOUS ONCE
Status: COMPLETED | OUTPATIENT
Start: 2020-07-06 | End: 2020-07-06

## 2020-07-06 RX ORDER — LABETALOL 20 MG/4 ML (5 MG/ML) INTRAVENOUS SYRINGE
10 EVERY 4 HOURS PRN
Status: DISCONTINUED | OUTPATIENT
Start: 2020-07-06 | End: 2020-07-10 | Stop reason: HOSPADM

## 2020-07-06 RX ORDER — FUROSEMIDE 10 MG/ML
40 INJECTION INTRAMUSCULAR; INTRAVENOUS
Status: DISCONTINUED | OUTPATIENT
Start: 2020-07-06 | End: 2020-07-09

## 2020-07-06 RX ORDER — METOPROLOL SUCCINATE 50 MG/1
100 TABLET, EXTENDED RELEASE ORAL DAILY
Status: CANCELLED | OUTPATIENT
Start: 2020-07-06

## 2020-07-06 RX ORDER — METOPROLOL SUCCINATE 25 MG/1
25 TABLET, EXTENDED RELEASE ORAL DAILY
Status: DISCONTINUED | OUTPATIENT
Start: 2020-07-06 | End: 2020-07-10 | Stop reason: HOSPADM

## 2020-07-06 RX ORDER — ALBUTEROL SULFATE 90 UG/1
2 AEROSOL, METERED RESPIRATORY (INHALATION) ONCE
Status: COMPLETED | OUTPATIENT
Start: 2020-07-06 | End: 2020-07-06

## 2020-07-06 RX ORDER — ACETAMINOPHEN 325 MG/1
650 TABLET ORAL EVERY 6 HOURS PRN
Status: DISCONTINUED | OUTPATIENT
Start: 2020-07-06 | End: 2020-07-10 | Stop reason: HOSPADM

## 2020-07-06 RX ORDER — ALBUTEROL SULFATE 2.5 MG/3ML
2.5 SOLUTION RESPIRATORY (INHALATION) EVERY 6 HOURS PRN
Status: DISCONTINUED | OUTPATIENT
Start: 2020-07-06 | End: 2020-07-10 | Stop reason: HOSPADM

## 2020-07-06 RX ORDER — MAGNESIUM SULFATE HEPTAHYDRATE 40 MG/ML
2 INJECTION, SOLUTION INTRAVENOUS ONCE
Status: COMPLETED | OUTPATIENT
Start: 2020-07-06 | End: 2020-07-06

## 2020-07-06 RX ORDER — HEPARIN SODIUM 5000 [USP'U]/ML
5000 INJECTION, SOLUTION INTRAVENOUS; SUBCUTANEOUS EVERY 8 HOURS SCHEDULED
Status: DISCONTINUED | OUTPATIENT
Start: 2020-07-06 | End: 2020-07-10 | Stop reason: HOSPADM

## 2020-07-06 RX ADMIN — FUROSEMIDE 40 MG: 10 INJECTION, SOLUTION INTRAMUSCULAR; INTRAVENOUS at 08:52

## 2020-07-06 RX ADMIN — HEPARIN SODIUM 5000 UNITS: 5000 INJECTION INTRAVENOUS; SUBCUTANEOUS at 05:44

## 2020-07-06 RX ADMIN — NITROGLYCERIN 1 INCH: 20 OINTMENT TOPICAL at 02:20

## 2020-07-06 RX ADMIN — HEPARIN SODIUM 5000 UNITS: 5000 INJECTION INTRAVENOUS; SUBCUTANEOUS at 13:13

## 2020-07-06 RX ADMIN — POTASSIUM CHLORIDE 40 MEQ: 1500 TABLET, EXTENDED RELEASE ORAL at 05:43

## 2020-07-06 RX ADMIN — FUROSEMIDE 40 MG: 10 INJECTION, SOLUTION INTRAMUSCULAR; INTRAVENOUS at 16:32

## 2020-07-06 RX ADMIN — INSULIN LISPRO 1 UNITS: 100 INJECTION, SOLUTION INTRAVENOUS; SUBCUTANEOUS at 21:22

## 2020-07-06 RX ADMIN — POTASSIUM CHLORIDE 40 MEQ: 1500 TABLET, EXTENDED RELEASE ORAL at 02:14

## 2020-07-06 RX ADMIN — HEPARIN SODIUM 5000 UNITS: 5000 INJECTION INTRAVENOUS; SUBCUTANEOUS at 21:22

## 2020-07-06 RX ADMIN — NITROGLYCERIN 0.5 INCH: 20 OINTMENT TOPICAL at 01:43

## 2020-07-06 RX ADMIN — MAGNESIUM SULFATE HEPTAHYDRATE 1 G: 1 INJECTION, SOLUTION INTRAVENOUS at 05:44

## 2020-07-06 RX ADMIN — INSULIN LISPRO 2 UNITS: 100 INJECTION, SOLUTION INTRAVENOUS; SUBCUTANEOUS at 13:13

## 2020-07-06 RX ADMIN — MAGNESIUM SULFATE 2 G: 2 INJECTION INTRAVENOUS at 02:16

## 2020-07-06 RX ADMIN — METOPROLOL SUCCINATE 25 MG: 25 TABLET, EXTENDED RELEASE ORAL at 10:43

## 2020-07-06 RX ADMIN — INSULIN LISPRO 1 UNITS: 100 INJECTION, SOLUTION INTRAVENOUS; SUBCUTANEOUS at 16:32

## 2020-07-06 RX ADMIN — ALBUTEROL SULFATE 2 PUFF: 90 AEROSOL, METERED RESPIRATORY (INHALATION) at 01:42

## 2020-07-06 RX ADMIN — INSULIN LISPRO 1 UNITS: 100 INJECTION, SOLUTION INTRAVENOUS; SUBCUTANEOUS at 10:44

## 2020-07-06 RX ADMIN — IOHEXOL 85 ML: 350 INJECTION, SOLUTION INTRAVENOUS at 03:20

## 2020-07-06 RX ADMIN — FUROSEMIDE 20 MG: 10 INJECTION, SOLUTION INTRAMUSCULAR; INTRAVENOUS at 01:47

## 2020-07-06 NOTE — ASSESSMENT & PLAN NOTE
Lab Results   Component Value Date    HGBA1C 8 1 (H) 10/25/2019     · Patient with uncontrolled diabetes as evidence by 8 1 on last hemoglobin A1c   · Recheck no hemoglobin A1c   · Hold oral medications  · Start insulin sliding scale  · Accu-Cheks q i d  Kolton Lozano

## 2020-07-06 NOTE — ASSESSMENT & PLAN NOTE
· Patient was significantly elevated pressures on admission at greater than 173 systolic  · He did receive nitro paste in the ED which decreases blood pressure to around 635 systolic   · No signs of end-organ damage  · With classify this is hypertensive urgency  · Given his rapid drop in blood pressure more than 395 systolic points, would hold off on any additional blood pressure medications for now  · If you remains persistently elevated after this can give p r n  Hydralazine  · Continue to monitor pressures carefully  · He has not been taking medications for many months now according to him     · Hold bp meds and allow permissive htn   · Watch bp carefully

## 2020-07-06 NOTE — ED PROVIDER NOTES
History  Chief Complaint   Patient presents with    Nasal Congestion     coughing for 1 month hx of bronchitis  Patient is a 63year old male with about 1 month of worsening shortness of breath and cough and wheezing  No travel  (+) smokes  No N/V  No fever  States he has not seen a doctor in about 1 year and has not taken his medications for diabetes and HTN in 1 year as well  (+) increased leg swelling  Was last seen in this ED on 6/5/18 for bronchitis with bronchospasm  [facility] website checked on this patient and no Rx found  Patient needed my urgent attention  History provided by:  Patient and spouse   used: No        Prior to Admission Medications   Prescriptions Last Dose Informant Patient Reported? Taking?    Blood Glucose Monitoring Suppl (ONE TOUCH ULTRA SYSTEM KIT) w/Device KIT Not Taking at Unknown time  Yes No   Sig: by Does not apply route   Lancets (ONETOUCH ULTRASOFT) lancets Not Taking at Unknown time  Yes No   Sig: by Does not apply route   Olmesartan-amLODIPine-HCTZ (TRIBENZOR) 40-10-25 MG TABS Not Taking at Unknown time  No No   Sig: Take 1 tablet by mouth daily   Patient not taking: Reported on 7/6/2020   glucose blood test strip Not Taking at Unknown time  Yes No   Sig: by In Vitro route daily   metoprolol succinate (TOPROL-XL) 100 mg 24 hr tablet Not Taking at Unknown time  No No   Sig: Take 1 tablet (100 mg total) by mouth daily   Patient not taking: Reported on 7/6/2020   sitaGLIPtin-metFORMIN (JANUMET)  MG per tablet Not Taking at Unknown time  No No   Sig: Take 1 tablet by mouth 2 (two) times a day with meals   Patient not taking: Reported on 7/6/2020      Facility-Administered Medications: None       Past Medical History:   Diagnosis Date    Diabetes mellitus ([facility] [address] )     Hypertension     Inguinal hernia     3/25/15    Onychomycosis     5/24/17    Type 2 diabetes mellitus ([facility] [address] ) 05/01/2012       Past Surgical History:   Procedure Laterality Date  ARTHROSCOPY KNEE      with Medial and Lateral Meniscus Repair    HERNIA REPAIR      umbilical and inguinal hernia repairs (left)       Family History   Problem Relation Age of Onset    Hypertension Mother         essential    Chronic bronchitis Father     Prostate cancer Father     Breast cancer Sister      I have reviewed and agree with the history as documented  E-Cigarette/Vaping    E-Cigarette Use Never User      E-Cigarette/Vaping Substances     Social History     Tobacco Use    Smoking status: Current Some Day Smoker     Types: Cigars    Smokeless tobacco: Never Used    Tobacco comment: current every day smoker, per [employer]   Substance Use Topics    Alcohol use: Yes     Alcohol/week: 4 0 standard drinks     Types: 4 Cans of beer per week     Comment: weekend: 1 pint of christian or 2 beers    Drug use: No       Review of Systems   Constitutional: Negative for fever  Respiratory: Positive for cough, shortness of breath and wheezing  Gastrointestinal: Negative for nausea and vomiting  All other systems reviewed and are negative  Physical Exam  Physical Exam   Constitutional: He is oriented to person, place, and time  He appears well-developed and well-nourished  He appears distressed (moderate)  HENT:   Head: Normocephalic and atraumatic  Mouth/Throat: Oropharynx is clear and moist    Eyes: Pupils are equal, round, and reactive to light  No scleral icterus  Neck: Normal range of motion  Neck supple  No JVD present  No tracheal deviation present  Cardiovascular: Regular rhythm and normal heart sounds  No murmur heard  Tachycardia  Pulmonary/Chest: No stridor  No respiratory distress  He has wheezes (occasional bilateral)  He has no rales  Abdominal: Soft  Bowel sounds are normal  There is no tenderness  Musculoskeletal: He exhibits edema (bilateral LE edema)  He exhibits no tenderness (No calf tenderness) or deformity     Neurological: He is alert and oriented to person, place, and time  No focal deficits  Skin: Skin is warm and dry  No rash noted  Psychiatric: He has a normal mood and affect  Nursing note and vitals reviewed  Vital Signs  ED Triage Vitals   Temperature Pulse Respirations Blood Pressure SpO2   07/06/20 0108 07/06/20 0108 07/06/20 0108 07/06/20 0108 07/06/20 0108   98 4 °F (36 9 °C) (!) 109 18 (!) 269/126 97 %      Temp Source Heart Rate Source Patient Position - Orthostatic VS BP Location FiO2 (%)   07/06/20 0108 07/06/20 0108 07/06/20 0108 07/06/20 0108 --   Oral Monitor Sitting Right arm       Pain Score       07/06/20 0403       No Pain           Vitals:    07/06/20 0108 07/06/20 0200 07/06/20 0245 07/06/20 0403   BP: (!) 269/126 (!) 210/103 (!) 216/105 165/90   Pulse: (!) 109 100 104 104   Patient Position - Orthostatic VS: Sitting Lying Lying Lying         Visual Acuity      ED Medications  Medications   nitroglycerin (NITRO-BID) 2 % TD ointment 0 5 inch (0 5 inches Topical Given 7/6/20 0143)   furosemide (LASIX) injection 20 mg (20 mg Intravenous Given 7/6/20 0147)   albuterol (PROVENTIL HFA,VENTOLIN HFA) inhaler 2 puff (2 puffs Inhalation Given 7/6/20 0142)   potassium chloride (K-DUR,KLOR-CON) CR tablet 40 mEq (40 mEq Oral Given 7/6/20 0214)   nitroglycerin (NITRO-BID) 2 % TD ointment 1 inch (1 inch Topical Given 7/6/20 0220)   magnesium sulfate 2 g/50 mL IVPB (premix) 2 g (2 g Intravenous New Bag 7/6/20 0216)   iohexol (OMNIPAQUE) 350 MG/ML injection (MULTI-DOSE) 85 mL (85 mL Intravenous Given 7/6/20 0320)       Diagnostic Studies  Results Reviewed     Procedure Component Value Units Date/Time    Novel Coronavirus [facility] [[id number]]  (Normal) Collected:  07/06/20 0125    Lab Status:  Final result Specimen:  Throat from Nose Updated:  07/06/20 0248     SARS-CoV-2 Negative    Narrative:        The specimen collection materials, transport medium, and/or testing methodology utilized in the production of these test results have been proven to be reliable in a limited validation with an abbreviated program under the Emergency Utilization Authorization provided by the FDA  Testing reported as "Presumptive positive" will be confirmed with secondary testing with a reference laboratory to ensure result accuracy  Clinical caution and judgement should be used with the interpretation of these results with consideration of the clinical impression and other laboratory testing  Testing reported as "Positive" or "Negative" has been proven to be accurate according to standard laboratory validation requirements  All testing is performed with control materials showing appropriate reactivity at standard intervals  Urine Microscopic [[id number]]  (Abnormal) Collected:  07/06/20 0237    Lab Status:  Final result Specimen:  Urine Updated:  07/06/20 0242     RBC, UA 1-2 /hpf      WBC, UA None Seen /hpf      Epithelial Cells None Seen /hpf      Bacteria, UA None Seen /hpf     Blood culture #1 [[id number]] Collected:  07/06/20 0218    Lab Status:   In process Specimen:  Blood from Arm, Left Updated:  07/06/20 0223    Urine Macroscopic, POC [[id number]]  (Abnormal) Collected:  07/06/20 0237    Lab Status:  Final result Specimen:  Urine Updated:  07/06/20 0222     Color, UA Yellow     Clarity, UA Clear     pH, UA 7 5     Leukocytes, UA Negative     Nitrite, UA Negative     Protein, UA 30 (1+) mg/dl      Glucose, UA Negative mg/dl      Ketones, UA Negative mg/dl      Urobilinogen, UA 0 2 E U /dl      Bilirubin, UA Negative     Blood, UA Trace     Specific Alexis, UA 1 020    Narrative:       CLINITEK RESULT    TSH, 3rd generation with Free T4 reflex [[id number]]  (Abnormal) Collected:  07/06/20 0125    Lab Status:  Final result Specimen:  Blood from Arm, Right Updated:  07/06/20 0205     TSH 3RD GENERATON 7 191 uIU/mL     Narrative:       Patients undergoing fluorescein dye angiography may retain small amounts of fluorescein in the body for 48-72 hours post procedure  Samples containing fluorescein can produce falsely depressed TSH values  If the patient had this procedure,a specimen should be resubmitted post fluorescein clearance  Magnesium [[id number]]  (Abnormal) Collected:  07/06/20 0125    Lab Status:  Final result Specimen:  Blood from Arm, Right Updated:  07/06/20 0205     Magnesium 1 1 mg/dL     NT-BNP PRO [[id number]]  (Abnormal) Collected:  07/06/20 0125    Lab Status:  Final result Specimen:  Blood from Arm, Right Updated:  07/06/20 0205     NT-proBNP 430 pg/mL     T4, free [[id number]] Collected:  07/06/20 0125    Lab Status: In process Specimen:  Blood from Arm, Right Updated:  07/06/20 0205    Lactic acid [[id number]]  (Normal) Collected:  07/06/20 0125    Lab Status:  Final result Specimen:  Blood from Arm, Right Updated:  07/06/20 0158     LACTIC ACID 1 0 mmol/L     Narrative:       Result may be elevated if tourniquet was used during collection      D-Dimer [[id number]]  (Abnormal) Collected:  07/06/20 0125    Lab Status:  Final result Specimen:  Blood from Arm, Right Updated:  07/06/20 0156     D-Dimer, Quant 0 61 ug/ml FEU     Comprehensive metabolic panel [[id number]]  (Abnormal) Collected:  07/06/20 0125    Lab Status:  Final result Specimen:  Blood from Arm, Right Updated:  07/06/20 0155     Sodium 136 mmol/L      Potassium 3 0 mmol/L      Chloride 97 mmol/L      CO2 33 mmol/L      ANION GAP 6 mmol/L      BUN 7 mg/dL      Creatinine 0 97 mg/dL      Glucose 149 mg/dL      Calcium 8 2 mg/dL      AST 56 U/L      ALT 31 U/L      Alkaline Phosphatase 105 U/L      Total Protein 8 9 g/dL      Albumin 3 8 g/dL      Total Bilirubin 0 92 mg/dL      eGFR 100 ml/min/1 73sq m     Narrative:       Neela guidelines for Chronic Kidney Disease (CKD):     Stage 1 with normal or high GFR (GFR > 90 mL/min/1 73 square meters)    Stage 2 Mild CKD (GFR = 60-89 mL/min/1 73 square meters)    Stage 3A Moderate CKD (GFR = 45-59 mL/min/1 73 square meters)    Stage 3B Moderate CKD (GFR = 30-44 mL/min/1 73 square meters)    Stage 4 Severe CKD (GFR = 15-29 mL/min/1 73 square meters)    Stage 5 End Stage CKD (GFR <15 mL/min/1 73 square meters)  Note: GFR calculation is accurate only with a steady state creatinine    Troponin I [[id number]]  (Normal) Collected:  07/06/20 0125    Lab Status:  Final result Specimen:  Blood from Arm, Right Updated:  07/06/20 0155     Troponin I <0 02 ng/mL     Blood gas, venous [[id number]]  (Abnormal) Collected:  07/06/20 0125    Lab Status:  Final result Specimen:  Blood from Arm, Right Updated:  07/06/20 0155     pH, Mark 7 342     pCO2, Mark 59 3 mm Hg      pO2, Mark 37 1 mm Hg      HCO3, Mark 31 4 mmol/L      Base Excess, Mark 3 8 mmol/L      O2 Content, Mark 14 3 ml/dL      O2 HGB, VENOUS 64 1 %     Protime-INR [[id number]]  (Normal) Collected:  07/06/20 0125    Lab Status:  Final result Specimen:  Blood from Arm, Right Updated:  07/06/20 0148     Protime 13 7 seconds      INR 1 12    APTT [[id number]]  (Normal) Collected:  07/06/20 0125    Lab Status:  Final result Specimen:  Blood from Arm, Right Updated:  07/06/20 0148     PTT 30 seconds     Beta Hydroxybutyrate [[id number]]  (Normal) Collected:  07/06/20 0125    Lab Status:  Final result Specimen:  Blood from Arm, Right Updated:  07/06/20 0141     BETA-HYDROXYBUTYRATE 0 2 mmol/L     CBC and differential [[id number]]  (Abnormal) Collected:  07/06/20 0125    Lab Status:  Final result Specimen:  Blood from Arm, Right Updated:  07/06/20 0139     WBC 8 82 Thousand/uL      RBC 4 67 Million/uL      Hemoglobin 15 2 g/dL      Hematocrit 44 9 %      MCV 96 fL      MCH 32 5 pg      MCHC 33 9 g/dL      RDW 12 6 %      MPV 9 8 fL      Platelets 365 Thousands/uL      nRBC 0 /100 WBCs      Neutrophils Relative 29 %      Immat GRANS % 0 %      Lymphocytes Relative 48 %      Monocytes Relative 8 %      Eosinophils Relative 14 %      Basophils Relative 1 %      Neutrophils Absolute 2 56 Thousands/µL      Immature Grans Absolute 0 03 Thousand/uL      Lymphocytes Absolute 4 23 Thousands/µL      Monocytes Absolute 0 73 Thousand/µL      Eosinophils Absolute 1 20 Thousand/µL      Basophils Absolute 0 07 Thousands/µL     Blood culture #2 [[id number]] Collected:  07/06/20 0125    Lab Status: In process Specimen:  Blood from Arm, Right Updated:  07/06/20 0133    Fingerstick Glucose (POCT) [[id number]]  (Abnormal) Collected:  07/06/20 0111    Lab Status:  Final result Updated:  07/06/20 0114     POC Glucose 151 mg/dl                  CTA ED chest PE study   ED Interpretation by Ludin Villaseñor MD (07/06 0411)   FINDINGS:      PULMONARY ARTERIAL TREE:  No pulmonary embolus is seen  LUNGS:  Lungs are clear   There is no tracheal or endobronchial lesion  PLEURA:  Unremarkable  HEART/GREAT VESSELS:  Unremarkable for patient's age  MEDIASTINUM AND PHUC:  Unremarkable  CHEST WALL AND LOWER NECK:   Unremarkable  VISUALIZED STRUCTURES IN THE UPPER ABDOMEN:  Diffuse hepatic steatosis is seen  OSSEOUS STRUCTURES:  Spinal degenerative changes are noted   No acute fracture or destructive osseous lesion  Impression:        No evidence of pulmonary embolus                  Workstation performed: [id number]         Final Result by Joe Singh DO (07/06 0405)      No evidence of pulmonary embolus                  Workstation performed: [id number]         XR chest 1 view portable   ED Interpretation by Ludin Villaseñor MD (07/06 0142)   Elevated R hemidiaphragm read by me                    Procedures  ECG 12 Lead Documentation Only  Date/Time: 7/6/2020 1:17 AM  Performed by: Ludin Villaseñor MD  Authorized by: Ludin Villaseñor MD     Indications / Diagnosis:  Sob  ECG reviewed by me, the ED Provider: yes    Patient location:  ED  Previous ECG:     Previous ECG:  Compared to current    Comparison ECG info:  6/5/18    Similarity:  No change  Quality:     Tracing quality:  Limited by artifact  Rate:     ECG rate:  105    ECG rate assessment: tachycardic    Rhythm:     Rhythm: sinus tachycardia    Ectopy:     Ectopy: none    QRS:     QRS axis:  Normal    QRS intervals:  Normal  Conduction:     Conduction: normal    ST segments:     ST segments:  Normal  T waves:     T waves: non-specific    Comments:      Possible LAE    CriticalCare Time  Performed by: Luis Fernando Moerno MD  Authorized by: Luis Fernando Moreno MD     Critical care provider statement:     Critical care time (minutes):  35    Critical care time was exclusive of:  Separately billable procedures and treating other patients    Critical care was necessary to treat or prevent imminent or life-threatening deterioration of the following conditions: malignant HTN  Critical care was time spent personally by me on the following activities:  Obtaining history from patient or surrogate, development of treatment plan with patient or surrogate, evaluation of patient's response to treatment, examination of patient, ordering and performing treatments and interventions, ordering and review of laboratory studies, ordering and review of radiographic studies and re-evaluation of patient's condition    I assumed direction of critical care for this patient from another provider in my specialty: no               ED Course  ED Course as of Jul 06 0426   Mon Jul 06, 2020   0125 EKG d/w patient and wife with patient's permission  0201 K-dur po ordered  Potassium(!): 3 0   0202 CT PE study ordered  D-Dimer, Quant(!): 0 61   [id number] Labs and CXR d/w patient and wife  More NTG paste ordered since BP still elevated  0208 IV magnesium ordered     Magnesium(!): 1 1   0250 D/w patient and wife  EXT SARS-COV-2: Negative   0412 CT chest d/w patient and wife  BP improving on NTG paste  US AUDIT      Most Recent Value   Initial Alcohol Screen: US AUDIT-C    1  How often do you have a drink containing alcohol?   6 Filed at: 07/06/2020 0222   2  How many drinks containing alcohol do you have on a typical day you are drinking? 6 Filed at: 07/06/2020 0222   3a  Male UNDER 65: How often do you have five or more drinks on one occasion? 6 Filed at: 07/06/2020 0222   Audit-C Score  (!) 18 Filed at: 07/06/2020 0222   Full Alcohol Screen: US AUDIT   4  How often during the last year have you found that you were not able to stop drinking once you had started? 4 Filed at: 07/06/2020 0222   5  How often during past year have you failed to do what was normally expected of you because of drinking? 0 Filed at: 07/06/2020 0222   6  How often in past year have you needed a first drink in the morning to get yourself going after a heavy drinking session? 4 Filed at: 07/06/2020 0222   7  How often in past year have you had feeling of guilt or remorse after drinking? 1 Filed at: 07/06/2020 0222   8  How often in past year have you been unable to remember what happened night before because you had been drinking? 1 Filed at: 07/06/2020 0222   9  Have you or someone else been injured as a result of your drinking? 0 Filed at: 07/06/2020 0222   10  Has a relative, friend, doctor or other health worker been concerned about your drinking and suggested you cut down? 4 Filed at: 07/06/2020 0222   AUDIT Total Score  (!) 32 Filed at: 07/06/2020 0222            HEART Risk Score      Most Recent Value   Heart Score Risk Calculator   History  0 Filed at: 07/06/2020 0202   ECG  0 Filed at: 07/06/2020 0202   Age  1 Filed at: 07/06/2020 0202   Risk Factors  2 Filed at: 07/06/2020 0202   Troponin  0 Filed at: 07/06/2020 0202   HEART Score  3 Filed at: 07/06/2020 0202            JUMA/DAST-10      Most Recent Value   How many times in the past year have you    Used an illegal drug or used a prescription medication for non-medical reasons?   Never Filed at: 07/06/2020 0108          PERC Rule for PE      Most Recent Value   PERC Rule for PE   Age >=50  1 Filed at: 07/06/2020 0202   HR >=100  1 Filed at: 07/06/2020 0202   O2 Sat on room air < 95%     History of PE or DVT     Recent trauma or surgery     Hemoptysis     Exogenous estrogen     Unilateral leg swelling     PERC Rule for PE Results  2 Filed at: 07/06/2020 0202                Wells' Criteria for PE      Most Recent Value   Wells' Criteria for PE   Clinical signs and symptoms of DVT  0 Filed at: 07/06/2020 0202   PE is primary diagnosis or equally likely  3 Filed at: 07/06/2020 0202   HR >100  1 5 Filed at: 07/06/2020 0202   Immobilization at least 3 days or Surgery in the previous 4 weeks  0 Filed at: 07/06/2020 0202   Previous, objectively diagnosed PE or DVT  0 Filed at: 07/06/2020 0202   Hemoptysis  0 Filed at: 07/06/2020 0202   Malignancy with treatment within 6 months or palliative  0 Filed at: 07/06/2020 0202   Wells' Criteria Total  4 5 Filed at: 07/06/2020 0202                  MDM  Number of Diagnoses or Management Options  Diagnosis management comments: DDX including but not limited to: pneumonia, pleural effusion, CHF, PE, PTX, ACS, MI, asthma exacerbation, COPD exacerbation, anemia, metabolic abnormality, renal failure, HTN urgency, malignant HTN, thyroid disease, bronchitis, COVID 19          Amount and/or Complexity of Data Reviewed  Clinical lab tests: ordered and reviewed  Tests in the radiology section of CPT®: ordered and reviewed  Decide to obtain previous medical records or to obtain history from someone other than the patient: yes  Obtain history from someone other than the patient: yes  Review and summarize past medical records: yes  Independent visualization of images, tracings, or specimens: yes          Disposition  Final diagnoses:   Raised TSH level   Malignant hypertension   Hypokalemia   Hypomagnesemia   Dyspnea   Bronchospasm     Time reflects when diagnosis was documented in both MDM as applicable and the Disposition within this note     Time User Action Codes Description Comment 7/6/2020  2:09 AM Juan C Sam Add [R79 89] Raised TSH level     7/6/2020  2:09 AM Juan C Sam Add [I10] Malignant hypertension     7/6/2020  2:09 AM Juan C Sam Modify [R79 89] Raised TSH level     7/6/2020  2:09 AM Juan C Sam Modify [I10] Malignant hypertension     7/6/2020  3:53 AM Juan C Sam Add [E87 6] Hypokalemia     7/6/2020  3:53 AM Juan C Sam Add [E83 42] Hypomagnesemia     7/6/2020  4:12 AM Juan C Sam Add [R06 00] Dyspnea     7/6/2020  4:12 AM Juan C Sam Add [J98 01] Bronchospasm       ED Disposition     ED Disposition Condition Date/Time Comment    Admit Stable Mon Jul 6, 2020  4:24 AM Case was discussed with GIOVANNI Cifuentes  and the patient's admission status was agreed to be Admission Status: inpatient status to the service of Dr Rogelio Mai   Follow-up Information    None         Patient's Medications   Discharge Prescriptions    No medications on file     No discharge procedures on file      PDMP Review       Value Time User    PDMP Reviewed  Yes 7/6/2020  1:08 AM Alexei Bennett MD          ED Provider  Electronically Signed by           Alexei Bennett MD  07/06/20 9307

## 2020-07-06 NOTE — ED NOTES
Patient has hx of hypertension and diabetes and has decided to not take any medications         Caron Farr RN  07/06/20 1022

## 2020-07-06 NOTE — H&P
H&P- Breanna Smoke 1965, 63 y o  male MRN: [id number]    Unit/Bed#: DALIA Encounter: [id number]    Primary Care Provider: Jose Tello DO   Date and time admitted to hospital: 7/6/2020  1:06 AM        * Hypertensive urgency  Assessment & Plan  · Patient was significantly elevated pressures on admission at greater than 476 systolic  · He did receive nitro paste in the ED which decreases blood pressure to around 491 systolic   · No signs of end-organ damage  · With classify this is hypertensive urgency  · Given his rapid drop in blood pressure more than 766 systolic points, would hold off on any additional blood pressure medications for now  · If you remains persistently elevated after this can give p r n  Hydralazine  · Continue to monitor pressures carefully  · He has not been taking medications for many months now according to him  · Hold bp meds and allow permissive htn   · Watch bp carefully     Shortness of breath  Assessment & Plan  · presentede with shortness of breath of unknown etiology   · Currently satting well on room air   · He was given lasix in the ED   · Current smoker   · Differentials include COPD versus asthma, however no formal diagnosis, CHF, pneumonia, however doubt, obesity hypoventilation  · No signs of infection   · Given neb treatment in the ED   · Will check Echo to rule out chf as he does have an mildly elevated BNP   · CTA showed no PE   · Check procal to rule out infection, low suspicion   · Daily weights , I/O's   · Reports near 10 pound weight gain in a month with increased lower extremity swelling  · Stress from 2013 showedEF 57% at that time  Diabetes mellitus type 2, uncontrolled ([facility] [address] )  Assessment & Plan    Lab Results   Component Value Date    HGBA1C 8 1 (H) 10/25/2019     · Patient with uncontrolled diabetes as evidence by 8 1 on last hemoglobin A1c   · Recheck no hemoglobin A1c   · Hold oral medications  · Start insulin sliding scale    · Accu-Cheks q  i  d  Thelma Fournier Hypokalemia  Assessment & Plan  · Hypokalemia present on admission with potassium 3 0   · Will replete with oral potassium  · Monitor electrolytes  Hypomagnesemia  Assessment & Plan  · Mag 1 1 on admission   · given mag in the ED   · Recheck mag   · Replete as needed         VTE Prophylaxis: Heparin  / sequential compression device   Code Status: full   POLST: There is no POLST form on file for this patient (pre-hospital)  Discussion with family: patient     Anticipated Length of Stay:  Patient will be admitted on an Inpatient basis with an anticipated length of stay of  > 2 midnights  Justification for Hospital Stay:  Hypertensive urgency, shortness of breath    Total Time for Visit, including Counseling / Coordination of Care: 1 hour  Greater than 50% of this total time spent on direct patient counseling and coordination of care  Chief Complaint:   Shortness of breath    History of Present Illness:    Deborah Sommer is a 63 y o  male who presents with hypertensive urgency, and shortness of breath  He has past medical history significant for hypertension, diabetes  He presents to the emergency department for 1 month history of worsening shortness of breath, and cough  Patient states that he has had a 10 lb weight gain over approximately the last month  He does have some increased lower extremity edema but states that this is chronic, however his wife was in the room with him states that is worse than usual   He states that he has been sleeping in a recliner at night due to increased shortness of breath while sleeping  He states that he is also having a productive cough with sputum  He does state that he was slightly wheezing over the past couple of days as well, however he does not have a history of asthma, or COPD  He is a current smoker and states that he smokes approximately 2 cigars a day    Patient has been very noncompliant with his medications and states that he just does not take them   He came in extremely hypertensive with systolic pressure of 426  This was subsequently dropped after being given nitropaste to the 160s  Stress test done in 2013 does show any EF of 57%  He does state that he has multiple cardiac issues in the family including his sister who has an ICD in place my and mother who had other heart condition which he is unsure of at this time  Currently he denies any other symptoms       Review of Systems:    Review of Systems   Constitutional: Negative for chills, fatigue, fever and unexpected weight change  Respiratory: Positive for cough, shortness of breath and wheezing  Negative for chest tightness  Cardiovascular: Positive for leg swelling  Negative for chest pain and palpitations  Gastrointestinal: Negative for abdominal pain, diarrhea, nausea and vomiting  Genitourinary: Negative for decreased urine volume, dysuria, frequency and urgency  Musculoskeletal: Negative for arthralgias  Neurological: Negative for dizziness, syncope, light-headedness and headaches  All other systems reviewed and are negative  Past Medical and Surgical History:     Past Medical History:   Diagnosis Date    Diabetes mellitus ([facility] )     Hypertension     Inguinal hernia     3/25/15    Onychomycosis     5/24/17    Type 2 diabetes mellitus ([facility] ) 05/01/2012       Past Surgical History:   Procedure Laterality Date    ARTHROSCOPY KNEE      with Medial and Lateral Meniscus Repair    HERNIA REPAIR      umbilical and inguinal hernia repairs (left)       Meds/Allergies:    Prior to Admission medications    Medication Sig Start Date End Date Taking?  Authorizing Provider   Blood Glucose Monitoring Suppl (ONE TOUCH ULTRA SYSTEM KIT) w/Device KIT by Does not apply route 4/10/15   Historical Provider, MD   glucose blood test strip by In Vitro route daily 4/10/15   Historical Provider, MD   Lancets Mort Brooms ULTRASOFT) lancets by Does not apply route 12/28/17   Historical Provider, MD metoprolol succinate (TOPROL-XL) 100 mg 24 hr tablet Take 1 tablet (100 mg total) by mouth daily  Patient not taking: Reported on 7/6/2020 10/21/19   Erik Woodruff DO   Olmesartan-amLODIPine-HCTZ [facility]) 40-10-25 MG TABS Take 1 tablet by mouth daily  Patient not taking: Reported on 7/6/2020 10/21/19   Erik Woodruff DO   sitaGLIPtin-metFORMIN (JANUMET)  MG per tablet Take 1 tablet by mouth 2 (two) times a day with meals  Patient not taking: Reported on 7/6/2020 10/21/19   Erik Woodruff DO     I have reviewed home medications with patient personally  Allergies: No Known Allergies    Social History:     Marital Status: /Civil Union   Occupation: unknown   Patient Pre-hospital Living Situation: lives at home   Patient Pre-hospital Level of Mobility: ambulates   Patient Pre-hospital Diet Restrictions: low salt and fluid restriction   Substance Use History:   Social History     Substance and Sexual Activity   Alcohol Use Yes    Alcohol/week: 4 0 standard drinks    Types: 4 Cans of beer per week    Comment: weekend: 1 pint of christian or 2 beers     Social History     Tobacco Use   Smoking Status Current Some Day Smoker    Types: Cigars   Smokeless Tobacco Never Used   Tobacco Comment    current every day smoker, per [employer]     Social History     Substance and Sexual Activity   Drug Use No       Family History:    Family History   Problem Relation Age of Onset    Hypertension Mother         essential    Chronic bronchitis Father     Prostate cancer Father     Breast cancer Sister        Physical Exam:     Vitals:   Blood Pressure: (!) 180/101 (07/06/20 0451)  Pulse: 104 (07/06/20 0451)  Temperature: 98 4 °F (36 9 °C) (07/06/20 0108)  Temp Source: Oral (07/06/20 0108)  Respirations: 18 (07/06/20 0451)  Weight - Scale: 121 kg (267 lb 13 7 oz) (07/06/20 0112)  SpO2: 96 % (07/06/20 0451)    Physical Exam   Constitutional: He is oriented to person, place, and time   He appears well-developed and well-nourished  No distress  Obese    HENT:   Head: Normocephalic and atraumatic  Mouth/Throat: Mucous membranes are not pale, not dry and not cyanotic  Eyes: Pupils are equal, round, and reactive to light  Conjunctivae and EOM are normal  Right eye exhibits no discharge  Left eye exhibits no discharge  Neck: Normal range of motion  Neck supple  No JVD present  Cardiovascular: Regular rhythm and normal heart sounds  Tachycardia present  No murmur heard  Pulmonary/Chest: Effort normal  He has no wheezes  He has rales  Abdominal: Soft  Bowel sounds are normal  He exhibits no distension  There is no tenderness  Musculoskeletal: He exhibits edema  + 3 lower extremity edema   Neurological: He is alert and oriented to person, place, and time  No cranial nerve deficit or sensory deficit  Skin: Skin is warm and dry  Capillary refill takes less than 2 seconds  He is not diaphoretic  No erythema  No pallor  Psychiatric: He has a normal mood and affect  Nursing note and vitals reviewed  Additional Data:     Lab Results: I have personally reviewed pertinent reports        Results from last 7 days   Lab Units 07/06/20  0125   WBC Thousand/uL 8 82   HEMOGLOBIN g/dL 15 2   HEMATOCRIT % 44 9   PLATELETS Thousands/uL 245   NEUTROS PCT % 29*   LYMPHS PCT % 48*   MONOS PCT % 8   EOS PCT % 14*     Results from last 7 days   Lab Units 07/06/20  0125   SODIUM mmol/L 136   POTASSIUM mmol/L 3 0*   CHLORIDE mmol/L 97*   CO2 mmol/L 33*   BUN mg/dL 7   CREATININE mg/dL 0 97   ANION GAP mmol/L 6   CALCIUM mg/dL 8 2*   ALBUMIN g/dL 3 8   TOTAL BILIRUBIN mg/dL 0 92   ALK PHOS U/L 105   ALT U/L 31   AST U/L 56*   GLUCOSE RANDOM mg/dL 149*     Results from last 7 days   Lab Units 07/06/20  0125   INR  1 12     Results from last 7 days   Lab Units 07/06/20  0111   POC GLUCOSE mg/dl 151*         Results from last 7 days   Lab Units 07/06/20  0125   LACTIC ACID mmol/L 1 0       Imaging: I have personally reviewed pertinent reports  CTA ED chest PE study   ED Interpretation by Delia Eugene MD (07/06 0411)   FINDINGS:      PULMONARY ARTERIAL TREE:  No pulmonary embolus is seen  LUNGS:  Lungs are clear   There is no tracheal or endobronchial lesion  PLEURA:  Unremarkable  HEART/GREAT VESSELS:  Unremarkable for patient's age  MEDIASTINUM AND PHUC:  Unremarkable  CHEST WALL AND LOWER NECK:   Unremarkable  VISUALIZED STRUCTURES IN THE UPPER ABDOMEN:  Diffuse hepatic steatosis is seen  OSSEOUS STRUCTURES:  Spinal degenerative changes are noted   No acute fracture or destructive osseous lesion  Impression:        No evidence of pulmonary embolus                  Workstation performed: [id number]         Final Result by Malia Chu DO (07/06 0405)      No evidence of pulmonary embolus                  Workstation performed: [id number]         XR chest 1 view portable   ED Interpretation by Delia Eugene MD (07/06 0142)   Elevated R hemidiaphragm read by me  EKG, Pathology, and Other Studies Reviewed on Admission:   · EKG: sinus tachycardia with no ischemic changes   · Chest x ray: no acute disease on my read     [employer]scripts / Epic Records Reviewed: Yes     ** Please Note: This note has been constructed using a voice recognition system   **

## 2020-07-06 NOTE — CONSULTS
Consultation - Cardiology Team One  Maurilio Willard 63 y o  male MRN: [id number]  Unit/Bed#: S -01 Encounter: [id number]    Inpatient consult to Cardiology  Consult performed by: Eric Edwards PA-C  Consult ordered by: Aline Martinez PA-C          Physician Requesting Consult: Coby Hanson MD  Reason for Consult / Principal Problem:  Hypertension, possible CHF    Assessment:    Volume overload:  Presented with cough, shortness of breath and lower extremity edema likely secondary to volume overload in the setting of uncontrolled hypertension and high sodium diet  Chest x-ray no acute cardiopulmonary disease  CTA chest negative for PE    COVID-19 negative  · Was given 20 mg IV Lasix in the ED and subsequently started on 40 IV  BID  · Output -1 1 L thus far  · Echocardiogram pending  · Volume overloaded on exam with bibasilar crackles/scattered wheezing, +JVD, and lower extremity edema    Hypertensive urgency:  Blood pressure 269/126 on presentation that decreased to 752/68 with application of nitro paste  · Home medication regimen includes Toprol- mg daily and olmesartan-amlodipine-HCTZ 10/25/2040 daily which he has not taken in >1 year  · Last /78    Hypokalemia:  Potassium 3 0 on for admission likely in the setting of alcohol abuse  Potassium was repleted  Repeat BMP pending  Hypomagnesemia:  Mag 1 1 on admission likely in the setting of alcohol abuse  Magnesium was repleted  Repeat BMP pending    Type 2 diabetes:  Hemoglobin A1c 8 1 in 10/2019  Has not taken diabetic medications in >1 year  Management per primary team     Alcohol abuse:  Reports drinking 2 beers and bourbon every night with increased intake on days off  Tobacco abuse:  2 cigars daily x1 year  Quit 3 days ago    AMANDO:  Noncompliant with CPAP  Has not used it in 2 years  Medication noncompliance:  Stop taking his antihypertensive and diabetic medications as above        Plan/Recommendations:  · Continue 40 mg IV Lasix BID  · Monitor I&Os, renal function, electrolytes and standing weight  · Maintain potassium >4 and magnesium >2  · Start Toprol XL 25 mg daily and slowly titrate for BP control  · Echocardiogram to evaluate heart function and valvular status  · Complete smoking cessation advised  · Adherence to medications is imperative  · Follow-up on BMP    _________________________________________________________    CC:  Nasal congestion      History of Present Illness   HPI: Yasmany Muñiz is a 63y o  year old male who has type 2 diabetes, hypertension presented to the emergency room at [facility] on 07/06/2020 with complaints of nasal congestion and cough, increased shortness of breath and wheezing  He was seen in the ED 06/05/2020 for bronchitis  In the emergency room EKG revealed sinus tachycardia with no acute ischemic change  Chest x-ray revealed no acute cardiopulmonary disease  Labs revealed potassium 3 0, CO2 33, AST 56, magnesium 1 1, troponin negative x2, , stable CBC, TSH 7 1, COVID-19 negative, D-dimer 0 61  CTA the chest was negative for PE  Patient was given 20 mg IV Lasix and then started on 40 IV BID  Mag and K+ repleted  Pressure on admission was 269/126 and nitro paste was applied with rapid drop in blood pressure to 003 systolic  Cardiology has been consulted for hypertension and possible CHF  Home medications include Toprol- mg daily, olmesartan-amlodipine-HCTZ 10/25/2040 daily which he has not taken in over a year  Patient resting in bed during consultation and states that he has not seen his PCP or taken any of his medications in over a year  Patient states he just got very busy with work and was not taking his medications  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 and denies any chest pain or significant shortness of breath without level of exertion    Over the last 2 weeks he has noticed worsening lower extremity edema, abdominal bloating, coughing followed by shortness of breath  He has also noted approximate 10 lb weight gain over the last 2 weeks  Diet wise he eats a lot of lunch meats, ham, canned soups  He has never seen a cardiologist or undergone any cardiac testing  He denies any orthopnea or PND  He has sleep apnea but has not used a CPAP in 2 years  Family history:  He denies any family history of CAD or sudden cardiac death  His mother has a pacemaker  Social history:  Drinks 2 beers and bourbon every night with increased intake on days off  Prior history of cocaine use 10 years ago  Smokes 2 cigars daily x1 year quitting 3 days ago  EKG reviewed personally:  07/06/2020-sinus tachycardia 105 beats per minute with nonspecific ST T-wave abnormality with baseline artifact  When compared to the EKG from 06/2018 no significant change  Telemetry reviewed personally:  Sinus rhythm with rates 95-100s        Review of Systems   Constitution: Negative  Negative for chills  Cardiovascular: Positive for leg swelling  Negative for chest pain, dyspnea on exertion, near-syncope, orthopnea, palpitations, paroxysmal nocturnal dyspnea and syncope  Respiratory: Positive for cough and shortness of breath  Gastrointestinal: Negative for diarrhea, nausea and vomiting  Neurological: Negative for dizziness, light-headedness and weakness  Psychiatric/Behavioral: Negative for altered mental status  All other systems reviewed and are negative      Historical Information   Past Medical History:   Diagnosis Date    Diabetes mellitus ([facility] [address] )     Hypertension     Inguinal hernia     3/25/15    Onychomycosis     5/24/17    Type 2 diabetes mellitus ([facility] [address] ) 05/01/2012     Past Surgical History:   Procedure Laterality Date    ARTHROSCOPY KNEE      with Medial and Lateral Meniscus Repair    HERNIA REPAIR      umbilical and inguinal hernia repairs (left)     Social History     Substance and Sexual Activity Alcohol Use Yes    Alcohol/week: 4 0 standard drinks    Types: 4 Cans of beer per week    Comment: weekend: 1 pint of christian or 2 beers     Social History     Substance and Sexual Activity   Drug Use No     Social History     Tobacco Use   Smoking Status Current Some Day Smoker    Types: Cigars   Smokeless Tobacco Never Used   Tobacco Comment    current every day smoker, per [employer]     Family History:   Family History   Problem Relation Age of Onset    Hypertension Mother         essential    Chronic bronchitis Father     Prostate cancer Father     Breast cancer Sister        Meds/Allergies   all current active meds have been reviewed, current meds:   Current Facility-Administered Medications   Medication Dose Route Frequency    acetaminophen (TYLENOL) tablet 650 mg  650 mg Oral Q6H PRN    albuterol inhalation solution 2 5 mg  2 5 mg Nebulization Q6H PRN    furosemide (LASIX) injection 40 mg  40 mg Intravenous BID (diuretic)    heparin (porcine) subcutaneous injection 5,000 Units  5,000 Units Subcutaneous Q8H [address]    insulin lispro (HumaLOG) 100 units/mL subcutaneous injection 1-5 Units  1-5 Units Subcutaneous TID AC    insulin lispro (HumaLOG) 100 units/mL subcutaneous injection 1-5 Units  1-5 Units Subcutaneous HS    and PTA meds:   Prior to Admission Medications   Prescriptions Last Dose Informant Patient Reported? Taking?    Blood Glucose Monitoring Suppl (ONE TOUCH ULTRA SYSTEM KIT) w/Device KIT Not Taking at Unknown time  Yes No   Sig: by Does not apply route   Lancets (ONETOUCH ULTRASOFT) lancets Not Taking at Unknown time  Yes No   Sig: by Does not apply route   Olmesartan-amLODIPine-HCTZ (TRIBENZOR) 40-10-25 MG TABS Not Taking at Unknown time  No No   Sig: Take 1 tablet by mouth daily   Patient not taking: Reported on 7/6/2020   glucose blood test strip Not Taking at Unknown time  Yes No   Sig: by In Vitro route daily   metoprolol succinate (TOPROL-XL) 100 mg 24 hr tablet Not Taking at Unknown time  No No   Sig: Take 1 tablet (100 mg total) by mouth daily   Patient not taking: Reported on 7/6/2020   sitaGLIPtin-metFORMIN ([address])  MG per tablet Not Taking at Unknown time  No No   Sig: Take 1 tablet by mouth 2 (two) times a day with meals   Patient not taking: Reported on 7/6/2020      Facility-Administered Medications: None          No Known Allergies    Objective   Vitals: Blood pressure (!) 172/80, pulse 100, temperature 98 5 °F (36 9 °C), temperature source Oral, resp  rate 18, weight 119 kg (262 lb 5 6 oz), SpO2 98 %  ,     Body mass index is 38 74 kg/m²  ,     Systolic (22WWC), NMA:036 , Min:165 , FXE:598     Diastolic (59PUC), HOP:098, Min:80, Max:126    Wt Readings from Last 3 Encounters:   07/06/20 119 kg (262 lb 5 6 oz)   10/21/19 121 kg (266 lb 9 6 oz)   06/05/18 129 kg (283 lb 4 7 oz)      Lab Results   Component Value Date    CREATININE 0 97 07/06/2020    CREATININE 0 96 10/25/2019    CREATININE 0 93 06/05/2018             Intake/Output Summary (Last 24 hours) at 7/6/2020 0838  Last data filed at 7/6/2020 0554  Gross per 24 hour   Intake 50 ml   Output 1160 ml   Net -1110 ml     Weight (last 2 days)     Date/Time   Weight    07/06/20 0554   119 (262 35)    07/06/20 0112   121 (267 86)            Invasive Devices     Peripheral Intravenous Line            Peripheral IV 07/06/20 Right Antecubital less than 1 day                  Physical Exam   Constitutional: He is oriented to person, place, and time  He appears well-developed and well-nourished  No distress  On RA in NAD   HENT:   Head: Normocephalic and atraumatic  Neck: Normal range of motion  Neck supple  JVD present  Cardiovascular: Normal rate, regular rhythm and normal heart sounds  No murmur heard  Pulmonary/Chest: Effort normal  No respiratory distress  He has wheezes  He has no rales  Bibasilar crackles and scattered wheezing   Abdominal: Soft   Bowel sounds are normal    Obese   Musculoskeletal: Normal range of motion  He exhibits edema  Bilateral 1+ edema to the knees   Neurological: He is alert and oriented to person, place, and time  Skin: Skin is warm and dry  Psychiatric: He has a normal mood and affect  His behavior is normal  Judgment and thought content normal    Nursing note and vitals reviewed          LABORATORY RESULTS:  Results from last 7 days   Lab Units 07/06/20  0541 07/06/20  0125   TROPONIN I ng/mL 0 03 <0 02     CBC with diff:   Results from last 7 days   Lab Units 07/06/20  0551 07/06/20  0125   WBC Thousand/uL 7 68 8 82   HEMOGLOBIN g/dL 13 2 15 2   HEMATOCRIT % 38 6 44 9   MCV fL 96 96   PLATELETS Thousands/uL 213 245   MCH pg 32 9 32 5   MCHC g/dL 34 2 33 9   RDW % 12 5 12 6   MPV fL 9 9 9 8   NRBC AUTO /100 WBCs  --  0       CMP:  Results from last 7 days   Lab Units 07/06/20  0125   POTASSIUM mmol/L 3 0*   CHLORIDE mmol/L 97*   CO2 mmol/L 33*   BUN mg/dL 7   CREATININE mg/dL 0 97   CALCIUM mg/dL 8 2*   AST U/L 56*   ALT U/L 31   ALK PHOS U/L 105   EGFR ml/min/1 73sq m 100       BMP:  Results from last 7 days   Lab Units 07/06/20  0125   POTASSIUM mmol/L 3 0*   CHLORIDE mmol/L 97*   CO2 mmol/L 33*   BUN mg/dL 7   CREATININE mg/dL 0 97   CALCIUM mg/dL 8 2*          Lab Results   Component Value Date    NTBNP 430 (H) 07/06/2020    NTBNP 118 12/28/2017    NTBNP 156 (H) 07/28/2017            Results from last 7 days   Lab Units 07/06/20  0125   MAGNESIUM mg/dL 1 1*                 Results from last 7 days   Lab Units 07/06/20  0125   TSH 3RD GENERATON uIU/mL 7 191*   FREE T4 ng/dL 1 17       Results from last 7 days   Lab Units 07/06/20  0125   INR  1 12     Lipid Profile:   Lab Results   Component Value Date    CHOL 153 12/21/2017    CHOL 151 06/05/2017    CHOL 169 02/10/2017     Lab Results   Component Value Date    HDL 52 10/25/2019    HDL 43 12/21/2017    HDL 50 06/05/2017     Lab Results   Component Value Date    LDLCALC 49 10/25/2019     Lab Results   Component Value Date    TRIG 132 10/25/2019    TRIG 88 12/21/2017    TRIG 75 06/05/2017         Cardiac testing:   No results found for this or any previous visit  No results found for this or any previous visit  No procedure found  No results found for this or any previous visit  Imaging: I have personally reviewed pertinent reports  Xr Chest 1 View Portable    Result Date: 7/6/2020  Narrative: CHEST INDICATION:   cough, sob  COMPARISON:  Chest radiograph from 6/5/2018 and chest CT from 7/6/2020  EXAM PERFORMED/VIEWS:  XR CHEST PORTABLE FINDINGS: Cardiomediastinal silhouette appears unremarkable  The lungs are clear  No pneumothorax or pleural effusion  Osseous structures appear within normal limits for patient age  Impression: No acute cardiopulmonary disease  Workstation performed: [id number]     Cta Ed Chest Pe Study    Result Date: 7/6/2020  Narrative: CTA - CHEST WITH IV CONTRAST - PULMONARY ANGIOGRAM INDICATION:   sob, elevated D-dimer  COMPARISON: July 28, 2017 TECHNIQUE: CTA examination of the chest was performed using angiographic technique according to a protocol specifically tailored to evaluate for pulmonary embolism  Axial, sagittal, and coronal 2D reformatted images were created from the source data and  submitted for interpretation  In addition, coronal 3D MIP postprocessing was performed on the acquisition scanner  Radiation dose length product (DLP) for this visit:  742 mGy-cm   This examination, like all CT scans performed in the [facility], was performed utilizing techniques to minimize radiation dose exposure, including the use of iterative reconstruction and automated exposure control  IV Contrast:  85 mL of iohexol (OMNIPAQUE)  FINDINGS: PULMONARY ARTERIAL TREE:  No pulmonary embolus is seen  LUNGS:  Lungs are clear  There is no tracheal or endobronchial lesion  PLEURA:  Unremarkable  HEART/GREAT VESSELS:  Unremarkable for patient's age  MEDIASTINUM AND PHUC:  Unremarkable   CHEST WALL AND LOWER NECK:   Unremarkable  VISUALIZED STRUCTURES IN THE UPPER ABDOMEN:  Diffuse hepatic steatosis is seen  OSSEOUS STRUCTURES:  Spinal degenerative changes are noted  No acute fracture or destructive osseous lesion  Impression: No evidence of pulmonary embolus Workstation performed: [id number]           Counseling / Coordination of Care  Total floor / unit time spent today 45 minutes  Greater than 50% of total time was spent with the patient and / or family counseling and / or coordination of care  A description of the counseling / coordination of care: Review of history, current assessment, development of a plan  Code Status: Level 1 - Full Code    ** Please Note: Dragon 360 Dictation voice to text software may have been used in the creation of this document   **

## 2020-07-06 NOTE — NUTRITION
07/06/20 1801   Assessment   Timepoint Initial  (MD order for CHF teaching)   Labs   List Completed Labs   (reviewed meds, labs 7/5 , 7/6 POC glu 196/244/218, Na132, noted A1c 7 0)   Feeding Route   PO Independent   Adequacy of Intake   Nutrition Modality PO  (Cardiac Fluid restricted 1500mL)   Intake Meals 50-75%;%  (wife states pt is eating well)   Estimated Calorie Intake %   Estimated Protein Intake  %   Estimated Fluid Intake %   Nutrition Prognosis   Nutrition Concerns Blood sugar control  (Reviewed RN skin flow sheet  +2 LLE/RLE edema/Volume overload, hx of noncompliance, etoh intake)   Comorbid Concerns   (PMhx: DM, AMANDO, HTN, CHF, drinks 2 beers/bourbon q night, has not taken meds > 1year)   Nutrition Precautions None   Nutrition Considerations   (Provided low Na/CHF diet ed/wife  Wife is very familiar and willing to make changes, asking several appropriate questions, Will mail out information to pt and wife, encouraged to contact us if questions arise  Wife did not have concerns w/DM guidelines)   PES Statement   Problem Behavioral-Environmental   Knowledge and Beliefs (1) Food- and nutrition-related knowledge deficit NB-1 1   Related to   (disease/disorder)   As evidenced by: Abnormal lab (specify); Per patient interview   Patient Nutrition Goals   Goal comprehend education;improve to a healthful diet   Goal Status initiated   Timeframe to complete goal by next f/u   Recommendations/Interventions   Summary   (Provided low Na diet education, will mail written information to pt and wife)   Interventions Diet: order adjustment  (Suggest diet change to CCD-1 Cardiac Fluid Restriction 1500mL)   Nutrition Recommendations   (Suggest diet change to CCD-1 Cardiac Fluid Restriction 1500mL)   Nutrition Complexity Risk   Nutrition complexity level Low risk   Follow up date 07/16/20

## 2020-07-06 NOTE — ASSESSMENT & PLAN NOTE
· presentede with shortness of breath of unknown etiology   · Currently satting well on room air   · He was given lasix in the ED   · Current smoker   · Differentials include COPD versus asthma, however no formal diagnosis, CHF, pneumonia, however doubt  · No signs of infection   · Given neb treatment in the ED   · Will check Echo to rule out chf as he does have an mildly elevated BNP   · CTA showed no PE   · Check procal to rule out infection, low suspicion   · Daily weights , I/O's   · Reports near 10 pound weight gain in a month with increased lower extremity swelling  · Stress from 2013 showedEF 57% at that time

## 2020-07-06 NOTE — PLAN OF CARE
Problem: CARDIOVASCULAR - ADULT  Goal: Maintains optimal cardiac output and hemodynamic stability  Description  INTERVENTIONS:  - Monitor I/O, vital signs and rhythm  - Monitor for S/S and trends of decreased cardiac output  - Administer and titrate ordered vasoactive medications to optimize hemodynamic stability  - Assess quality of pulses, skin color and temperature  - Assess for signs of decreased coronary artery perfusion  - Instruct patient to report change in severity of symptoms  Outcome: Not Progressing     Problem: GASTROINTESTINAL - ADULT  Goal: Maintains or returns to baseline bowel function  Description  INTERVENTIONS:  - Assess bowel function  - Encourage oral fluids to ensure adequate hydration  - Administer IV fluids if ordered to ensure adequate hydration  - Administer ordered medications as needed  - Encourage mobilization and activity  - Consider nutritional services referral to assist patient with adequate nutrition and appropriate food choices  Outcome: Not Progressing     Problem: PAIN - ADULT  Goal: Verbalizes/displays adequate comfort level or baseline comfort level  Description  Interventions:  - Encourage patient to monitor pain and request assistance  - Assess pain using appropriate pain scale  - Administer analgesics based on type and severity of pain and evaluate response  - Implement non-pharmacological measures as appropriate and evaluate response  - Consider cultural and social influences on pain and pain management  - Notify physician/advanced practitioner if interventions unsuccessful or patient reports new pain  Outcome: Progressing     Problem: INFECTION - ADULT  Goal: Absence or prevention of progression during hospitalization  Description  INTERVENTIONS:  - Assess and monitor for signs and symptoms of infection  - Monitor lab/diagnostic results  - Monitor all insertion sites, i e  indwelling lines, tubes, and drains  - Monitor endotracheal if appropriate and nasal secretions for changes in amount and color  - Bruner appropriate cooling/warming therapies per order  - Administer medications as ordered  - Instruct and encourage patient and family to use good hand hygiene technique  - Identify and instruct in appropriate isolation precautions for identified infection/condition  Outcome: Progressing     Problem: SAFETY ADULT  Goal: Patient will remain free of falls  Description  INTERVENTIONS:  - Assess patient frequently for physical needs  -  Identify cognitive and physical deficits and behaviors that affect risk of falls    -  Bruner fall precautions as indicated by assessment   - Educate patient/family on patient safety including physical limitations  - Instruct patient to call for assistance with activity based on assessment  - Modify environment to reduce risk of injury  - Consider OT/PT consult to assist with strengthening/mobility  Outcome: Progressing  Goal: Maintain or return to baseline ADL function  Description  INTERVENTIONS:  -  Assess patient's ability to carry out ADLs; assess patient's baseline for ADL function and identify physical deficits which impact ability to perform ADLs (bathing, care of mouth/teeth, toileting, grooming, dressing, etc )  - Assess/evaluate cause of self-care deficits   - Assess range of motion  - Assess patient's mobility; develop plan if impaired  - Assess patient's need for assistive devices and provide as appropriate  - Encourage maximum independence but intervene and supervise when necessary  - Involve family in performance of ADLs  - Assess for home care needs following discharge   - Consider OT consult to assist with ADL evaluation and planning for discharge  - Provide patient education as appropriate  Outcome: Progressing     Problem: RESPIRATORY - ADULT  Goal: Achieves optimal ventilation and oxygenation  Description  INTERVENTIONS:  - Assess for changes in respiratory status  - Assess for changes in mentation and behavior  - Position to facilitate oxygenation and minimize respiratory effort  - Oxygen administered by appropriate delivery if ordered  - Initiate smoking cessation education as indicated  - Encourage broncho-pulmonary hygiene including cough, deep breathe, Incentive Spirometry  - Assess the need for suctioning and aspirate as needed  - Assess and instruct to report SOB or any respiratory difficulty  - Respiratory Therapy support as indicated  Outcome: Progressing     Problem: GASTROINTESTINAL - ADULT  Goal: Minimal or absence of nausea and/or vomiting  Description  INTERVENTIONS:  - Administer IV fluids if ordered to ensure adequate hydration  - Maintain NPO status until nausea and vomiting are resolved  - Nasogastric tube if ordered  - Administer ordered antiemetic medications as needed  - Provide nonpharmacologic comfort measures as appropriate  - Advance diet as tolerated, if ordered  - Consider nutrition services referral to assist patient with adequate nutrition and appropriate food choices  Outcome: Progressing  Goal: Maintains adequate nutritional intake  Description  INTERVENTIONS:  - Monitor percentage of each meal consumed  - Identify factors contributing to decreased intake, treat as appropriate  - Assist with meals as needed  - Monitor I&O, weight, and lab values if indicated  - Obtain nutrition services referral as needed  Outcome: Progressing

## 2020-07-06 NOTE — ASSESSMENT & PLAN NOTE
· Hypokalemia present on admission with potassium 3 0   · Will replete with oral potassium  · Monitor electrolytes

## 2020-07-07 ENCOUNTER — APPOINTMENT (INPATIENT)
Dept: NON INVASIVE DIAGNOSTICS | Facility: HOSPITAL | Age: 55
DRG: 291 | End: 2020-07-07
Payer: COMMERCIAL

## 2020-07-07 PROBLEM — I50.9 CHF (CONGESTIVE HEART FAILURE) (HCC): Status: ACTIVE | Noted: 2020-07-06

## 2020-07-07 LAB
ANION GAP SERPL CALCULATED.3IONS-SCNC: 10 MMOL/L (ref 4–13)
BASOPHILS # BLD AUTO: 0.06 THOUSANDS/ΜL (ref 0–0.1)
BASOPHILS NFR BLD AUTO: 1 % (ref 0–1)
BUN SERPL-MCNC: 8 MG/DL (ref 5–25)
CALCIUM SERPL-MCNC: 8 MG/DL (ref 8.3–10.1)
CHLORIDE SERPL-SCNC: 97 MMOL/L (ref 100–108)
CO2 SERPL-SCNC: 27 MMOL/L (ref 21–32)
CREAT SERPL-MCNC: 0.96 MG/DL (ref 0.6–1.3)
EOSINOPHIL # BLD AUTO: 1.18 THOUSAND/ΜL (ref 0–0.61)
EOSINOPHIL NFR BLD AUTO: 15 % (ref 0–6)
ERYTHROCYTE [DISTWIDTH] IN BLOOD BY AUTOMATED COUNT: 12.6 % (ref 11.6–15.1)
GFR SERPL CREATININE-BSD FRML MDRD: 102 ML/MIN/1.73SQ M
GLUCOSE SERPL-MCNC: 170 MG/DL (ref 65–140)
GLUCOSE SERPL-MCNC: 173 MG/DL (ref 65–140)
GLUCOSE SERPL-MCNC: 251 MG/DL (ref 65–140)
GLUCOSE SERPL-MCNC: 252 MG/DL (ref 65–140)
GLUCOSE SERPL-MCNC: 340 MG/DL (ref 65–140)
HCT VFR BLD AUTO: 40.8 % (ref 36.5–49.3)
HGB BLD-MCNC: 14.2 G/DL (ref 12–17)
IMM GRANULOCYTES # BLD AUTO: 0.01 THOUSAND/UL (ref 0–0.2)
IMM GRANULOCYTES NFR BLD AUTO: 0 % (ref 0–2)
LYMPHOCYTES # BLD AUTO: 3.11 THOUSANDS/ΜL (ref 0.6–4.47)
LYMPHOCYTES NFR BLD AUTO: 39 % (ref 14–44)
MAGNESIUM SERPL-MCNC: 1.2 MG/DL (ref 1.6–2.6)
MCH RBC QN AUTO: 33.5 PG (ref 26.8–34.3)
MCHC RBC AUTO-ENTMCNC: 34.8 G/DL (ref 31.4–37.4)
MCV RBC AUTO: 96 FL (ref 82–98)
MONOCYTES # BLD AUTO: 0.68 THOUSAND/ΜL (ref 0.17–1.22)
MONOCYTES NFR BLD AUTO: 9 % (ref 4–12)
NEUTROPHILS # BLD AUTO: 2.85 THOUSANDS/ΜL (ref 1.85–7.62)
NEUTS SEG NFR BLD AUTO: 36 % (ref 43–75)
NRBC BLD AUTO-RTO: 0 /100 WBCS
PLATELET # BLD AUTO: 204 THOUSANDS/UL (ref 149–390)
PMV BLD AUTO: 10 FL (ref 8.9–12.7)
POTASSIUM SERPL-SCNC: 3.3 MMOL/L (ref 3.5–5.3)
RBC # BLD AUTO: 4.24 MILLION/UL (ref 3.88–5.62)
SODIUM SERPL-SCNC: 134 MMOL/L (ref 136–145)
WBC # BLD AUTO: 7.89 THOUSAND/UL (ref 4.31–10.16)

## 2020-07-07 PROCEDURE — 94640 AIRWAY INHALATION TREATMENT: CPT

## 2020-07-07 PROCEDURE — 93306 TTE W/DOPPLER COMPLETE: CPT | Performed by: INTERNAL MEDICINE

## 2020-07-07 PROCEDURE — 80048 BASIC METABOLIC PNL TOTAL CA: CPT | Performed by: INTERNAL MEDICINE

## 2020-07-07 PROCEDURE — 99232 SBSQ HOSP IP/OBS MODERATE 35: CPT | Performed by: INTERNAL MEDICINE

## 2020-07-07 PROCEDURE — 94760 N-INVAS EAR/PLS OXIMETRY 1: CPT

## 2020-07-07 PROCEDURE — 85025 COMPLETE CBC W/AUTO DIFF WBC: CPT | Performed by: INTERNAL MEDICINE

## 2020-07-07 PROCEDURE — 83735 ASSAY OF MAGNESIUM: CPT | Performed by: INTERNAL MEDICINE

## 2020-07-07 PROCEDURE — 93306 TTE W/DOPPLER COMPLETE: CPT

## 2020-07-07 PROCEDURE — 82948 REAGENT STRIP/BLOOD GLUCOSE: CPT

## 2020-07-07 RX ORDER — POTASSIUM CHLORIDE 20 MEQ/1
40 TABLET, EXTENDED RELEASE ORAL ONCE
Status: COMPLETED | OUTPATIENT
Start: 2020-07-07 | End: 2020-07-07

## 2020-07-07 RX ORDER — ALBUTEROL SULFATE 90 UG/1
2 AEROSOL, METERED RESPIRATORY (INHALATION) EVERY 4 HOURS PRN
Status: DISCONTINUED | OUTPATIENT
Start: 2020-07-07 | End: 2020-07-10 | Stop reason: HOSPADM

## 2020-07-07 RX ORDER — POLYETHYLENE GLYCOL 3350 17 G/17G
17 POWDER, FOR SOLUTION ORAL DAILY PRN
Status: DISCONTINUED | OUTPATIENT
Start: 2020-07-07 | End: 2020-07-10 | Stop reason: HOSPADM

## 2020-07-07 RX ORDER — LISINOPRIL 10 MG/1
10 TABLET ORAL DAILY
Status: DISCONTINUED | OUTPATIENT
Start: 2020-07-07 | End: 2020-07-08

## 2020-07-07 RX ORDER — MAGNESIUM SULFATE HEPTAHYDRATE 40 MG/ML
2 INJECTION, SOLUTION INTRAVENOUS ONCE
Status: COMPLETED | OUTPATIENT
Start: 2020-07-07 | End: 2020-07-07

## 2020-07-07 RX ORDER — AMLODIPINE BESYLATE 10 MG/1
10 TABLET ORAL DAILY
Status: DISCONTINUED | OUTPATIENT
Start: 2020-07-07 | End: 2020-07-10 | Stop reason: HOSPADM

## 2020-07-07 RX ORDER — AMOXICILLIN 250 MG
1 CAPSULE ORAL 2 TIMES DAILY
Status: DISCONTINUED | OUTPATIENT
Start: 2020-07-07 | End: 2020-07-10 | Stop reason: HOSPADM

## 2020-07-07 RX ADMIN — LABETALOL 20 MG/4 ML (5 MG/ML) INTRAVENOUS SYRINGE 10 MG: at 08:31

## 2020-07-07 RX ADMIN — HEPARIN SODIUM 5000 UNITS: 5000 INJECTION INTRAVENOUS; SUBCUTANEOUS at 21:18

## 2020-07-07 RX ADMIN — LISINOPRIL 10 MG: 10 TABLET ORAL at 09:56

## 2020-07-07 RX ADMIN — INSULIN LISPRO 2 UNITS: 100 INJECTION, SOLUTION INTRAVENOUS; SUBCUTANEOUS at 16:47

## 2020-07-07 RX ADMIN — HEPARIN SODIUM 5000 UNITS: 5000 INJECTION INTRAVENOUS; SUBCUTANEOUS at 06:26

## 2020-07-07 RX ADMIN — MAGNESIUM SULFATE HEPTAHYDRATE 2 G: 40 INJECTION, SOLUTION INTRAVENOUS at 09:56

## 2020-07-07 RX ADMIN — INSULIN LISPRO 3 UNITS: 100 INJECTION, SOLUTION INTRAVENOUS; SUBCUTANEOUS at 12:42

## 2020-07-07 RX ADMIN — SENNOSIDES AND DOCUSATE SODIUM 1 TABLET: 8.6; 5 TABLET ORAL at 16:46

## 2020-07-07 RX ADMIN — HEPARIN SODIUM 5000 UNITS: 5000 INJECTION INTRAVENOUS; SUBCUTANEOUS at 15:27

## 2020-07-07 RX ADMIN — METOPROLOL SUCCINATE 25 MG: 25 TABLET, EXTENDED RELEASE ORAL at 08:31

## 2020-07-07 RX ADMIN — INSULIN LISPRO 2 UNITS: 100 INJECTION, SOLUTION INTRAVENOUS; SUBCUTANEOUS at 21:18

## 2020-07-07 RX ADMIN — SENNOSIDES AND DOCUSATE SODIUM 1 TABLET: 8.6; 5 TABLET ORAL at 08:31

## 2020-07-07 RX ADMIN — INSULIN LISPRO 1 UNITS: 100 INJECTION, SOLUTION INTRAVENOUS; SUBCUTANEOUS at 08:34

## 2020-07-07 RX ADMIN — ALBUTEROL SULFATE 2.5 MG: 2.5 SOLUTION RESPIRATORY (INHALATION) at 22:23

## 2020-07-07 RX ADMIN — FUROSEMIDE 40 MG: 10 INJECTION, SOLUTION INTRAMUSCULAR; INTRAVENOUS at 16:47

## 2020-07-07 RX ADMIN — POTASSIUM CHLORIDE 40 MEQ: 1500 TABLET, EXTENDED RELEASE ORAL at 16:47

## 2020-07-07 RX ADMIN — AMLODIPINE BESYLATE 10 MG: 10 TABLET ORAL at 09:56

## 2020-07-07 RX ADMIN — POTASSIUM CHLORIDE 40 MEQ: 1500 TABLET, EXTENDED RELEASE ORAL at 12:41

## 2020-07-07 RX ADMIN — FUROSEMIDE 40 MG: 10 INJECTION, SOLUTION INTRAMUSCULAR; INTRAVENOUS at 08:31

## 2020-07-07 NOTE — PROGRESS NOTES
Cardiology Progress Note - Swathi Rajput 63 y o  male MRN: [id number]    Unit/Bed#: S -01 Encounter: [id number]      Assessment/Recommendations:  1  Accelerated HTN: remains with significantly elevated BPs overnight  Will add ACE-I today for improved control  2   Volume overload: will assess systolic/diastolic function with echo today  Continue diuresis with current dose of IV lasix  Strict I/Os and daily weights  3   Hypokalemia: replete to keep >4   4   Hypomagnesemia: replete to keep >2 to avoid dysrhythmias in this setting  5   Type 2 DM: would benefit from being on ACE-I, which we will start today  6   Alcohol abuse  7  Tobacco abuse    Subjective:   Patient seen and examined  No significant events overnight   ; pertinent negatives - chest pain, chest pressure/discomfort, irregular heart beat and palpitations  Continues with LE edema and ESTRADA  Objective:     Vitals: Blood pressure (!) 194/140, pulse 93, temperature 98 2 °F (36 8 °C), temperature source Oral, resp  rate 18, weight 116 kg (256 lb 3 2 oz), SpO2 95 %  , Body mass index is 37 83 kg/m² ,   Orthostatic Blood Pressures      Most Recent Value   Blood Pressure  (!) 194/140 [RN notified] filed at 07/07/2020 0700   Patient Position - Orthostatic VS  Lying filed at 07/07/2020 0700            Intake/Output Summary (Last 24 hours) at 7/7/2020 0920  Last data filed at 7/6/2020 2040  Gross per 24 hour   Intake 840 ml   Output 600 ml   Net 240 ml       TELE: No significant arrhythmias seen      Physical Exam:    GEN: Swathi Rajput appears well, alert and oriented x 3, pleasant and cooperative   HEENT: pupils equal, round, and reactive to light; extraocular muscles intact  NECK: supple, no carotid bruits, +JVD   HEART: regular rhythm, normal S1 and S2, no murmurs, clicks, gallops or rubs   LUNGS: +bibasilar rales  ABDOMEN: normal bowel sounds, soft, no tenderness, no distention  EXTREMITIES: peripheral pulses normal; no clubbing, cyanosis, + edema  NEURO: no focal findings   SKIN: normal without suspicious lesions on exposed skin    Medications:      Current Facility-Administered Medications:     acetaminophen (TYLENOL) tablet 650 mg, 650 mg, Oral, Q6H PRN, Tian Patel PA-C    albuterol (PROVENTIL HFA,VENTOLIN HFA) inhaler 2 puff, 2 puff, Inhalation, Q4H PRN, Elijah Mendieta MD    albuterol inhalation solution 2 5 mg, 2 5 mg, Nebulization, Q6H PRN, Tian Patel PA-C    amLODIPine (NORVASC) tablet 10 mg, 10 mg, Oral, Daily, Elijah Mendieta MD    furosemide (LASIX) injection 40 mg, 40 mg, Intravenous, BID (diuretic), Elijah Mendieta MD, 40 mg at 07/07/20 0831    heparin (porcine) subcutaneous injection 5,000 Units, 5,000 Units, Subcutaneous, Q8H [facility], 5,000 Units at 07/07/20 0626 **AND** [CANCELED] Platelet count, , , Once, Tian Patel PA-C    insulin lispro (HumaLOG) 100 units/mL subcutaneous injection 1-5 Units, 1-5 Units, Subcutaneous, TID AC, 1 Units at 07/07/20 0834 **AND** Fingerstick Glucose (POCT), , , TID AC, Tian Patel PA-C    insulin lispro (HumaLOG) 100 units/mL subcutaneous injection 1-5 Units, 1-5 Units, Subcutaneous, HS, Tian Patel PA-C, 1 Units at 07/06/20 2122    Labetalol HCl (NORMODYNE) injection 10 mg, 10 mg, Intravenous, Q4H PRN, Elijah Mendieta MD, 10 mg at 07/07/20 0831    metoprolol succinate (TOPROL-XL) 24 hr tablet 25 mg, 25 mg, Oral, Daily, Moris Hill PA-C, 25 mg at 07/07/20 0831    polyethylene glycol (MIRALAX) packet 17 g, 17 g, Oral, Daily PRN, Elijah Mendieta MD    senna-docusate sodium (SENOKOT S) 8 6-50 mg per tablet 1 tablet, 1 tablet, Oral, BID, Elijah Mendieta MD, 1 tablet at 07/07/20 0831     Labs & Results:    Results from last 7 days   Lab Units 07/06/20  0541 07/06/20  0125   TROPONIN I ng/mL 0 03 <0 02     Results from last 7 days   Lab Units 07/07/20  0735 07/06/20  0551 07/06/20  0125   WBC Thousand/uL 7 89 7 68 8 82   HEMOGLOBIN g/dL 14 2 13 2 15 2   HEMATOCRIT % 40 8 38 6 44 9   PLATELETS Thousands/uL 204 213 245     Results from last 7 days   Lab Units 07/06/20  0710   TRIGLYCERIDES mg/dL 65   HDL mg/dL 47     Results from last 7 days   Lab Units 07/07/20  0735 07/06/20  0710 07/06/20  0125   POTASSIUM mmol/L 3 3* 4 5 3 0*   CHLORIDE mmol/L 97* 97* 97*   CO2 mmol/L 27 19* 33*   BUN mg/dL 8 6 7   CREATININE mg/dL 0 96 0 69 0 97   CALCIUM mg/dL 8 0* 7 7* 8 2*   ALK PHOS U/L  --   --  105   ALT U/L  --   --  31   AST U/L  --   --  56*     Results from last 7 days   Lab Units 07/06/20  0125   INR  1 12   PTT seconds 30     Results from last 7 days   Lab Units 07/07/20  0735 07/06/20  0710 07/06/20  0125   MAGNESIUM mg/dL 1 2* 1 8 1 1*       Echo: pending    EKG personally reviewed by Zerita Boxer, MD

## 2020-07-07 NOTE — ASSESSMENT & PLAN NOTE
Lab Results   Component Value Date    HGBA1C 7 0 (H) 07/06/2020     · Patient with uncontrolled diabetes as evidence by 8 1 on last hemoglobin A1c   · Recheck no hemoglobin A1c   · Hold oral medications  · Start insulin sliding scale  · Accu-Cheks q i d  Jv Jackson

## 2020-07-07 NOTE — ASSESSMENT & PLAN NOTE
· Patient was significantly elevated pressures on admission at greater than 706 systolic  · Blood pressure improved  · No signs of end-organ damage  · With classify this is hypertensive urgency    · Secondary to noncompliance with his 3 oral blood pressure medications  · Will restart home oral metoprolol, amlodipine, ACE-inhibitor

## 2020-07-07 NOTE — ASSESSMENT & PLAN NOTE
· Hypokalemia present on admission with potassium 3 0   · Secondary to significant hypo magnesemia  · Will replete magnesium IV  · Will replete with oral potassium  · Monitor electrolytes

## 2020-07-07 NOTE — PROGRESS NOTES
Progress Note - Richi Gannon 1965, 63 y o  male MRN: [id number]    Unit/Bed#: S -01 Encounter: [id number]    Primary Care Provider: Gil Gould DO   Date and time admitted to hospital: 7/6/2020  1:06 AM        CHF (congestive heart failure) ([place] [id number] )  Assessment & Plan  Secondary to congestive heart failure  Presented with dyspnea on exertion, orthopnea, JVD, with peripheral edema  Will check echocardiogram  Salt restricted diet, fluid restricted diet  Daily weights, strict I's and O's  Continue IV Lasix 40 b i d  Replace electrolytes as needed    Hypomagnesemia  Assessment & Plan  · Mag 1 1 on admission   · Replete as needed     Hypokalemia  Assessment & Plan  · Hypokalemia present on admission with potassium 3 0   · Secondary to significant hypo magnesemia  · Will replete magnesium IV  · Will replete with oral potassium  · Monitor electrolytes  Diabetes mellitus type 2, uncontrolled ([place] [address] )  Assessment & Plan    Lab Results   Component Value Date    HGBA1C 7 0 (H) 07/06/2020     · Patient with uncontrolled diabetes as evidence by 8 1 on last hemoglobin A1c   · Recheck no hemoglobin A1c   · Hold oral medications  · Start insulin sliding scale  · Accu-Cheks q i d       * Hypertensive urgency  Assessment & Plan  · Patient was significantly elevated pressures on admission at greater than 780 systolic  · Blood pressure improved  · No signs of end-organ damage  · With classify this is hypertensive urgency  · Secondary to noncompliance with his 3 oral blood pressure medications  · Will restart home oral metoprolol, amlodipine, ACE-inhibitor        VTE Pharmacologic Prophylaxis:   Pharmacologic: Heparin  Mechanical VTE Prophylaxis in Place: Yes    Patient Centered Rounds: I have performed bedside rounds with nursing staff today      Discussions with Specialists or Other Care Team Provider: Yes CM, Nursing    Education and Discussions with Family / Patient: Wife/Patient    Time Spent for Care: 30 minutes  More than 50% of total time spent on counseling and coordination of care as described above  Current Length of Stay: 1 day(s)    Current Patient Status: Inpatient   Certification Statement: The patient will continue to require additional inpatient hospital stay due to Acute congestive heart failure still requiring IV diuretics    Discharge Plan:  When acute congestive heart failure resolves will be medically stable for discharge    Code Status: Level 1 - Full Code      Subjective:   Patient seen examined  No acute overnight events  Still reports some difficulty breathing worsen when he lays down  The reports improving denies any other symptoms at this time    Objective:     Vitals:   Temp (24hrs), Av 5 °F (36 9 °C), Min:98 2 °F (36 8 °C), Max:98 8 °F (37 1 °C)    Temp:  [98 2 °F (36 8 °C)-98 8 °F (37 1 °C)] 98 2 °F (36 8 °C)  HR:  [93-97] 93  Resp:  [18] 18  BP: (142-194)/() 194/140  SpO2:  [95 %-98 %] 95 %  Body mass index is 37 83 kg/m²  Input and Output Summary (last 24 hours): Intake/Output Summary (Last 24 hours) at 2020 0934  Last data filed at 2020 2040  Gross per 24 hour   Intake 840 ml   Output 600 ml   Net 240 ml       Physical Exam:     Physical Exam   Constitutional: He is oriented to person, place, and time  He appears well-developed and well-nourished  No distress  HENT:   Head: Normocephalic and atraumatic  Nose: Nose normal    Mouth/Throat: Oropharynx is clear and moist  No oropharyngeal exudate  Eyes: Pupils are equal, round, and reactive to light  EOM are normal  Right eye exhibits no discharge  Left eye exhibits no discharge  No scleral icterus  Neck: Normal range of motion  Neck supple  No JVD present  Cardiovascular: Normal rate, regular rhythm, normal heart sounds and intact distal pulses  Exam reveals no gallop and no friction rub  No murmur heard  Pulmonary/Chest: Effort normal and breath sounds normal  No respiratory distress   He has no wheezes  He has no rales  He exhibits no tenderness  Abdominal: Soft  Bowel sounds are normal  He exhibits no distension and no mass  There is no tenderness  There is no rebound and no guarding  Musculoskeletal: Normal range of motion  He exhibits edema  He exhibits no tenderness or deformity  Lymphadenopathy:     He has no cervical adenopathy  Neurological: He is alert and oriented to person, place, and time  He has normal reflexes  Skin: Skin is warm and dry  No rash noted  He is not diaphoretic  No erythema  No pallor  Psychiatric: He has a normal mood and affect  His behavior is normal          Additional Data:     Labs:    Results from last 7 days   Lab Units 07/07/20  0735   WBC Thousand/uL 7 89   HEMOGLOBIN g/dL 14 2   HEMATOCRIT % 40 8   PLATELETS Thousands/uL 204   NEUTROS PCT % 36*   LYMPHS PCT % 39   MONOS PCT % 9   EOS PCT % 15*     Results from last 7 days   Lab Units 07/07/20  0735  07/06/20  0125   SODIUM mmol/L 134*   < > 136   POTASSIUM mmol/L 3 3*   < > 3 0*   CHLORIDE mmol/L 97*   < > 97*   CO2 mmol/L 27   < > 33*   BUN mg/dL 8   < > 7   CREATININE mg/dL 0 96   < > 0 97   ANION GAP mmol/L 10   < > 6   CALCIUM mg/dL 8 0*   < > 8 2*   ALBUMIN g/dL  --   --  3 8   TOTAL BILIRUBIN mg/dL  --   --  0 92   ALK PHOS U/L  --   --  105   ALT U/L  --   --  31   AST U/L  --   --  56*   GLUCOSE RANDOM mg/dL 170*   < > 149*    < > = values in this interval not displayed  Results from last 7 days   Lab Units 07/06/20  0125   INR  1 12     Results from last 7 days   Lab Units 07/07/20  0731 07/06/20  2054 07/06/20  1617 07/06/20  1108 07/06/20  0710 07/06/20  0111   POC GLUCOSE mg/dl 173* 208* 218* 244* 196* 151*     Results from last 7 days   Lab Units 07/06/20  0541   HEMOGLOBIN A1C % 7 0*     Results from last 7 days   Lab Units 07/06/20  0541 07/06/20  0125   LACTIC ACID mmol/L  --  1 0   PROCALCITONIN ng/ml 0 16  --            * I Have Reviewed All Lab Data Listed Above    * Additional Pertinent Lab Tests Reviewed: All Labs Within Last 24 Hours Reviewed    Imaging:    Imaging Reports Reviewed Today Include: NA  Imaging Personally Reviewed by Myself Includes:  NA    Recent Cultures (last 7 days):     Results from last 7 days   Lab Units 07/06/20  0541 07/06/20  0218 07/06/20  0125   BLOOD CULTURE   --  No Growth at 24 hrs  No Growth at 24 hrs  LEGIONELLA URINARY ANTIGEN  Negative  --   --        Last 24 Hours Medication List:     Current Facility-Administered Medications:  acetaminophen 650 mg Oral Q6H PRN Tian Patel PA-C   albuterol 2 puff Inhalation Q4H PRN Elijah Mendieta MD   albuterol 2 5 mg Nebulization Q6H PRN Tian Patel PA-C   amLODIPine 10 mg Oral Daily Elijah Mendieta MD   furosemide 40 mg Intravenous BID (diuretic) Elijah Mendieta MD   heparin (porcine) 5,000 Units Subcutaneous Q8H [facility] Tian Patel PA-C   insulin lispro 1-5 Units Subcutaneous TID AC Tian Patel PA-C   insulin lispro 1-5 Units Subcutaneous HS Tian Patel PA-C   Labetalol HCl 10 mg Intravenous Q4H PRN Elijah Mendieta MD   lisinopril 10 mg Oral Daily Jaida Tovar MD   magnesium sulfate 2 g Intravenous Once Elijah Mendieta MD   metoprolol succinate 25 mg Oral Daily Mohan Sampson PA-C   polyethylene glycol 17 g Oral Daily PRN Elijah Mendieta MD   potassium chloride 40 mEq Oral Once Elijah Mendieta MD   potassium chloride 40 mEq Oral Once Morgan Reyes MD   senna-docusate sodium 1 tablet Oral BID Morgan Reyes MD        Today, Patient Was Seen By: Morgan Reyes MD    ** Please Note: Dictation voice to text software may have been used in the creation of this document   **

## 2020-07-07 NOTE — UTILIZATION REVIEW
Notification of Inpatient Admission/Inpatient Authorization Request   This is a Notification of Inpatient Admission for [address]  Be advised that this patient was admitted to our facility under Inpatient Status  Contact Billy Otoole at [phone] for additional admission information  [address] [facility] DEPT  DEDICATED [phone]  Patient Name:   Gerda Ramirez   YOB: 1965       [address]   [address]:   [address]  Tax ID: [id number]  NPI: [id number] Attending Provider/NPI:  Address:  Phone: Leo De Los Santos Md [[id number]]  Same as the facility  [phone]   Place of Service Code: 24 Place of Service Name:  [address] [place] [address]   Start Date: 7/6/20 0425     Discharge Date & Time: No discharge date for patient encounter  Type of Admission: Inpatient Status Discharge Disposition   (if discharged): Home/Self Care   Patient Diagnoses: Shortness of breath [R06 02]  Malignant hypertension [I10]  Hypokalemia [E87 6]  Hypomagnesemia [E83 42]  Bronchospasm [J98 01]  Nasal congestion [R09 81]  Dyspnea [R06 00]  Raised TSH level [R79 89]  Uncontrolled type 2 diabetes mellitus with hyperglycemia ([facility] [address] ) [E11 65]     Orders: Admission Orders (From admission, onward)     Ordered        07/06/20 0425  Inpatient Admission  Once                    Assigned Utilization Review Contact: Billy Otoole  Utilization   Network Utilization Review Department  Phone: [phone]; Fax [phone]  Email: [email or web address]   ATTENTION PAYERS: Please call the assigned Utilization  directly with any questions or concerns ALL voicemails in the department are confidential  Send all requests for admission clinical reviews, approved or denied determinations and any other requests to dedicated fax number belonging to the campus where the patient is receiving treatment

## 2020-07-07 NOTE — ASSESSMENT & PLAN NOTE
Secondary to congestive heart failure  Presented with dyspnea on exertion, orthopnea, JVD, with peripheral edema  Will check echocardiogram  Salt restricted diet, fluid restricted diet  Daily weights, strict I's and O's  Continue IV Lasix 40 b i d    Replace electrolytes as needed

## 2020-07-08 PROBLEM — F10.10 ALCOHOL ABUSE: Status: ACTIVE | Noted: 2020-07-08

## 2020-07-08 LAB
ANION GAP SERPL CALCULATED.3IONS-SCNC: 6 MMOL/L (ref 4–13)
BUN SERPL-MCNC: 9 MG/DL (ref 5–25)
CALCIUM SERPL-MCNC: 7.9 MG/DL (ref 8.3–10.1)
CHLORIDE SERPL-SCNC: 98 MMOL/L (ref 100–108)
CO2 SERPL-SCNC: 32 MMOL/L (ref 21–32)
CREAT SERPL-MCNC: 1.01 MG/DL (ref 0.6–1.3)
GFR SERPL CREATININE-BSD FRML MDRD: 96 ML/MIN/1.73SQ M
GLUCOSE SERPL-MCNC: 164 MG/DL (ref 65–140)
GLUCOSE SERPL-MCNC: 182 MG/DL (ref 65–140)
GLUCOSE SERPL-MCNC: 225 MG/DL (ref 65–140)
GLUCOSE SERPL-MCNC: 274 MG/DL (ref 65–140)
GLUCOSE SERPL-MCNC: 287 MG/DL (ref 65–140)
MAGNESIUM SERPL-MCNC: 1.2 MG/DL (ref 1.6–2.6)
POTASSIUM SERPL-SCNC: 3.3 MMOL/L (ref 3.5–5.3)
SODIUM SERPL-SCNC: 136 MMOL/L (ref 136–145)

## 2020-07-08 PROCEDURE — 99232 SBSQ HOSP IP/OBS MODERATE 35: CPT | Performed by: PHYSICIAN ASSISTANT

## 2020-07-08 PROCEDURE — 80048 BASIC METABOLIC PNL TOTAL CA: CPT | Performed by: INTERNAL MEDICINE

## 2020-07-08 PROCEDURE — 83735 ASSAY OF MAGNESIUM: CPT | Performed by: INTERNAL MEDICINE

## 2020-07-08 PROCEDURE — 99232 SBSQ HOSP IP/OBS MODERATE 35: CPT | Performed by: INTERNAL MEDICINE

## 2020-07-08 PROCEDURE — 82948 REAGENT STRIP/BLOOD GLUCOSE: CPT

## 2020-07-08 RX ORDER — THIAMINE MONONITRATE (VIT B1) 100 MG
100 TABLET ORAL DAILY
Status: DISCONTINUED | OUTPATIENT
Start: 2020-07-08 | End: 2020-07-10 | Stop reason: HOSPADM

## 2020-07-08 RX ORDER — POTASSIUM CHLORIDE 20 MEQ/1
40 TABLET, EXTENDED RELEASE ORAL DAILY
Status: DISCONTINUED | OUTPATIENT
Start: 2020-07-08 | End: 2020-07-10 | Stop reason: HOSPADM

## 2020-07-08 RX ORDER — FOLIC ACID 1 MG/1
1 TABLET ORAL DAILY
Status: DISCONTINUED | OUTPATIENT
Start: 2020-07-08 | End: 2020-07-10 | Stop reason: HOSPADM

## 2020-07-08 RX ORDER — INSULIN GLARGINE 100 [IU]/ML
10 INJECTION, SOLUTION SUBCUTANEOUS
Status: DISCONTINUED | OUTPATIENT
Start: 2020-07-08 | End: 2020-07-10 | Stop reason: HOSPADM

## 2020-07-08 RX ORDER — MAGNESIUM SULFATE HEPTAHYDRATE 40 MG/ML
2 INJECTION, SOLUTION INTRAVENOUS ONCE
Status: COMPLETED | OUTPATIENT
Start: 2020-07-08 | End: 2020-07-08

## 2020-07-08 RX ORDER — LISINOPRIL 20 MG/1
20 TABLET ORAL DAILY
Status: DISCONTINUED | OUTPATIENT
Start: 2020-07-08 | End: 2020-07-10 | Stop reason: HOSPADM

## 2020-07-08 RX ADMIN — SENNOSIDES AND DOCUSATE SODIUM 1 TABLET: 8.6; 5 TABLET ORAL at 17:29

## 2020-07-08 RX ADMIN — POTASSIUM CHLORIDE 40 MEQ: 1500 TABLET, EXTENDED RELEASE ORAL at 08:53

## 2020-07-08 RX ADMIN — HEPARIN SODIUM 5000 UNITS: 5000 INJECTION INTRAVENOUS; SUBCUTANEOUS at 05:13

## 2020-07-08 RX ADMIN — FUROSEMIDE 40 MG: 10 INJECTION, SOLUTION INTRAMUSCULAR; INTRAVENOUS at 08:51

## 2020-07-08 RX ADMIN — HEPARIN SODIUM 5000 UNITS: 5000 INJECTION INTRAVENOUS; SUBCUTANEOUS at 21:41

## 2020-07-08 RX ADMIN — SENNOSIDES AND DOCUSATE SODIUM 1 TABLET: 8.6; 5 TABLET ORAL at 08:53

## 2020-07-08 RX ADMIN — LISINOPRIL 20 MG: 20 TABLET ORAL at 08:53

## 2020-07-08 RX ADMIN — Medication 100 MG: at 09:35

## 2020-07-08 RX ADMIN — Medication 1 TABLET: at 09:35

## 2020-07-08 RX ADMIN — FOLIC ACID 1 MG: 1 TABLET ORAL at 09:35

## 2020-07-08 RX ADMIN — FUROSEMIDE 40 MG: 10 INJECTION, SOLUTION INTRAMUSCULAR; INTRAVENOUS at 17:29

## 2020-07-08 RX ADMIN — AMLODIPINE BESYLATE 10 MG: 10 TABLET ORAL at 08:51

## 2020-07-08 RX ADMIN — METOPROLOL SUCCINATE 25 MG: 25 TABLET, EXTENDED RELEASE ORAL at 08:51

## 2020-07-08 RX ADMIN — HEPARIN SODIUM 5000 UNITS: 5000 INJECTION INTRAVENOUS; SUBCUTANEOUS at 13:10

## 2020-07-08 RX ADMIN — INSULIN LISPRO 6 UNITS: 100 INJECTION, SOLUTION INTRAVENOUS; SUBCUTANEOUS at 13:43

## 2020-07-08 RX ADMIN — INSULIN LISPRO 3 UNITS: 100 INJECTION, SOLUTION INTRAVENOUS; SUBCUTANEOUS at 21:41

## 2020-07-08 RX ADMIN — INSULIN GLARGINE 10 UNITS: 100 INJECTION, SOLUTION SUBCUTANEOUS at 21:40

## 2020-07-08 RX ADMIN — MAGNESIUM SULFATE HEPTAHYDRATE 2 G: 40 INJECTION, SOLUTION INTRAVENOUS at 08:53

## 2020-07-08 RX ADMIN — INSULIN LISPRO 4 UNITS: 100 INJECTION, SOLUTION INTRAVENOUS; SUBCUTANEOUS at 17:32

## 2020-07-08 RX ADMIN — INSULIN LISPRO 2 UNITS: 100 INJECTION, SOLUTION INTRAVENOUS; SUBCUTANEOUS at 09:32

## 2020-07-08 NOTE — ASSESSMENT & PLAN NOTE
· Hypokalemia present on admission with potassium 3 0   · Secondary to significant hypo magnesemia  · Will replete magnesium IV  · Will replete with oral potassium again today   · Monitor electrolytes

## 2020-07-08 NOTE — ASSESSMENT & PLAN NOTE
· Secondary to congestive heart failure  Presented with dyspnea on exertion, orthopnea, JVD, with peripheral edema  · Echo: EF 60%, no RWMA, G1DD  · Salt restricted diet, fluid restricted diet  · Daily weights, strict I's and O's  · Continue IV Lasix 40 b i d    · Replace electrolytes as needed  · Appreciate cardiology input

## 2020-07-08 NOTE — ASSESSMENT & PLAN NOTE
· Pt reports he drinks half of a fifth daily   · CIWA   · Encouraged cessation/counseling  Declined HOST    Has been to rehab in the past

## 2020-07-08 NOTE — ASSESSMENT & PLAN NOTE
Lab Results   Component Value Date    HGBA1C 7 0 (H) 07/06/2020     · Patient with controlled diabetes based on A1c  · Hold oral medications  · Start insulin sliding scale - increase algorithm given hyperglycemia   · Accu-Cheks q i d  Gerhardt Sep   · Glucose not at goal, add Lantus 10 units qhs

## 2020-07-08 NOTE — PROGRESS NOTES
Cardiology Progress Note - Roscoe Ricardo 63 y o  male MRN: [id number]    Unit/Bed#: S -01 Encounter: [id number]      Assessment/Recommendations:  1  Accelerated HTN: remains with significantly elevated BPs overnight  Will increase ACE-I today for improved control  2   Volume overload: mild diastolic function on echo  Continue diuresis with current dose of IV lasix  Strict I/Os and daily weights  3   Hypokalemia: replete to keep >4   4   Hypomagnesemia: replete to keep >2 to avoid dysrhythmias in this setting  5   Type 2 DM: would benefit from being on ACE-I, which was started yesterday  6   Alcohol abuse  7  Tobacco abuse    Subjective:   Patient seen and examined  No significant events overnight   ; pertinent negatives - chest pain, chest pressure/discomfort, irregular heart beat and palpitations  Continues with LE edema and ESTRADA  Objective:     Vitals: Blood pressure 170/81, pulse 80, temperature 98 7 °F (37 1 °C), temperature source Oral, resp  rate 18, weight 116 kg (254 lb 12 8 oz), SpO2 98 %  , Body mass index is 37 63 kg/m² ,   Orthostatic Blood Pressures      Most Recent Value   Blood Pressure  170/81 filed at 07/08/2020 0742   Patient Position - Orthostatic VS  Lying filed at 07/08/2020 0742            Intake/Output Summary (Last 24 hours) at 7/8/2020 0847  Last data filed at 7/8/2020 0514  Gross per 24 hour   Intake 1160 ml   Output 2250 ml   Net -1090 ml       TELE: No significant arrhythmias seen      Physical Exam:    GEN: Roscoe Ricardo appears well, alert and oriented x 3, pleasant and cooperative   HEENT: pupils equal, round, and reactive to light; extraocular muscles intact  NECK: supple, no carotid bruits   HEART: regular rhythm, normal S1 and S2, no murmurs, clicks, gallops or rubs   LUNGS: clear to auscultation bilaterally; no wheezes, rales, or rhonchi   ABDOMEN: normal bowel sounds, soft, no tenderness, no distention  EXTREMITIES: peripheral pulses normal; no clubbing, cyanosis, +mild edema  NEURO: no focal findings   SKIN: normal without suspicious lesions on exposed skin    Medications:      Current Facility-Administered Medications:     acetaminophen (TYLENOL) tablet 650 mg, 650 mg, Oral, Q6H PRN, Tian Patel PA-C    albuterol (PROVENTIL HFA,VENTOLIN HFA) inhaler 2 puff, 2 puff, Inhalation, Q4H PRN, Elijah Mendieta MD    albuterol inhalation solution 2 5 mg, 2 5 mg, Nebulization, Q6H PRN, Tian Patel PA-C, 2 5 mg at 07/07/20 2223    amLODIPine (NORVASC) tablet 10 mg, 10 mg, Oral, Daily, Elijah Mendieta MD, 10 mg at 07/07/20 0956    furosemide (LASIX) injection 40 mg, 40 mg, Intravenous, BID (diuretic), Elijah Mendieta MD, 40 mg at 07/07/20 1647    heparin (porcine) subcutaneous injection 5,000 Units, 5,000 Units, Subcutaneous, Q8H [address], 5,000 Units at 07/08/20 0513 **AND** [CANCELED] Platelet count, , , Once, Tian Patel PA-C    insulin glargine (LANTUS) subcutaneous injection 10 Units 0 1 mL, 10 Units, Subcutaneous, HS, Eva Pelayo PA-C    insulin lispro (HumaLOG) 100 units/mL subcutaneous injection 1-5 Units, 1-5 Units, Subcutaneous, HS, Eva Pelayo PA-C    insulin lispro (HumaLOG) 100 units/mL subcutaneous injection 2-12 Units, 2-12 Units, Subcutaneous, TID AC **AND** Fingerstick Glucose (POCT), , , TID AC, Eva Pelayo PA-C    Labetalol HCl (NORMODYNE) injection 10 mg, 10 mg, Intravenous, Q4H PRN, Elijah Mendieta MD, 10 mg at 07/07/20 0831    lisinopril (ZESTRIL) tablet 10 mg, 10 mg, Oral, Daily, Denton Lesches, MD, 10 mg at 07/07/20 0956    magnesium sulfate 2 g/50 mL IVPB (premix) 2 g, 2 g, Intravenous, Once, Anson Grady PA-C    metoprolol succinate (TOPROL-XL) 24 hr tablet 25 mg, 25 mg, Oral, Daily, Cheri Hector PA-C, 25 mg at 07/07/20 0831    polyethylene glycol (MIRALAX) packet 17 g, 17 g, Oral, Daily PRN, Elijah Mendieta MD    potassium chloride (K-DUR,KLOR-CON) CR tablet 40 mEq, 40 mEq, Oral, Daily, Eva Pelayo PA-C    senna-docusate sodium (SENOKOT S) 8 6-50 mg per tablet 1 tablet, 1 tablet, Oral, BID, Elijah Mendieta MD, 1 tablet at 07/07/20 1646     Labs & Results:    Results from last 7 days   Lab Units 07/06/20  0541 07/06/20  0125   TROPONIN I ng/mL 0 03 <0 02     Results from last 7 days   Lab Units 07/07/20  0735 07/06/20  0551 07/06/20  0125   WBC Thousand/uL 7 89 7 68 8 82   HEMOGLOBIN g/dL 14 2 13 2 15 2   HEMATOCRIT % 40 8 38 6 44 9   PLATELETS Thousands/uL 204 213 245     Results from last 7 days   Lab Units 07/06/20  0710   TRIGLYCERIDES mg/dL 65   HDL mg/dL 47     Results from last 7 days   Lab Units 07/08/20  0508 07/07/20  0735 07/06/20  0710 07/06/20  0125   POTASSIUM mmol/L 3 3* 3 3* 4 5 3 0*   CHLORIDE mmol/L 98* 97* 97* 97*   CO2 mmol/L 32 27 19* 33*   BUN mg/dL 9 8 6 7   CREATININE mg/dL 1 01 0 96 0 69 0 97   CALCIUM mg/dL 7 9* 8 0* 7 7* 8 2*   ALK PHOS U/L  --   --   --  105   ALT U/L  --   --   --  31   AST U/L  --   --   --  56*     Results from last 7 days   Lab Units 07/06/20  0125   INR  1 12   PTT seconds 30     Results from last 7 days   Lab Units 07/08/20  0508 07/07/20  0735 07/06/20  0710   MAGNESIUM mg/dL 1 2* 1 2* 1 8       Echo: personally reviewed - EF normal, mild LVH, G1DD    EKG personally reviewed by Elaine Horton MD

## 2020-07-08 NOTE — PROGRESS NOTES
[facility]  Progress Note - Lisa Carrion 1965, 63 y o  male MRN: [id number]    Unit/Bed#: S -01 Encounter: [id number]    Primary Care Provider: Neo England DO   Date and time admitted to hospital: 7/6/2020  1:06 AM      * Hypertensive urgency  Assessment & Plan  · Patient was significantly elevated pressures on admission at greater than 455 systolic  · Blood pressure improved  · No signs of end-organ damage  · With classify this as hypertensive urgency  · Secondary to noncompliance with his 3 oral blood pressure medications  · Will restart home oral metoprolol, amlodipine, ACE-inhibitor    CHF (congestive heart failure) ([place] [id number] )  Assessment & Plan  · Secondary to congestive heart failure  Presented with dyspnea on exertion, orthopnea, JVD, with peripheral edema  · Echo: EF 60%, no RWMA, G1DD  · Salt restricted diet, fluid restricted diet  · Daily weights, strict I's and O's  · Continue IV Lasix 40 b i d  · Replace electrolytes as needed  · Appreciate cardiology input     Hypomagnesemia  Assessment & Plan  · 1 2 this AM, replete again today  · Repeat mag level in AM     Hypokalemia  Assessment & Plan  · Hypokalemia present on admission with potassium 3 0   · Secondary to significant hypo magnesemia  · Will replete magnesium IV  · Will replete with oral potassium again today   · Monitor electrolytes  Diabetes mellitus type 2, uncontrolled ([place] [id number] )  Assessment & Plan    Lab Results   Component Value Date    HGBA1C 7 0 (H) 07/06/2020     · Patient with controlled diabetes based on A1c  · Hold oral medications  · Start insulin sliding scale - increase algorithm given hyperglycemia   · Accu-Cheks q i d  Austin Heckler · Glucose not at goal, add Lantus 10 units qhs       VTE Pharmacologic Prophylaxis:   Pharmacologic: Heparin  Mechanical VTE Prophylaxis in Place: Yes    Patient Centered Rounds: I have performed bedside rounds with nursing staff today      Discussions with Specialists or Other Care Team Provider: RN    Education and Discussions with Family / Patient: patient, wife at bedside    Time Spent for Care: 30 minutes  More than 50% of total time spent on counseling and coordination of care as described above  Current Length of Stay: 2 day(s)    Current Patient Status: Inpatient   Certification Statement: The patient will continue to require additional inpatient hospital stay due to volume overload on IV lasix    Discharge Plan: once off IV lasix    Code Status: Level 1 - Full Code      Subjective: The patient has no acute complaints, he reports his SOB and LE edema are improved  He endorses significant alcohol consumption daily  No signs of withdrawal presently     Objective:     Vitals:   Temp (24hrs), Av 2 °F (36 8 °C), Min:97 5 °F (36 4 °C), Max:98 7 °F (37 1 °C)    Temp:  [97 5 °F (36 4 °C)-98 7 °F (37 1 °C)] 98 7 °F (37 1 °C)  HR:  [80-94] 94  Resp:  [17-18] 18  BP: (123-170)/(72-98) 123/98  SpO2:  [95 %-98 %] 98 %  Body mass index is 37 63 kg/m²  Input and Output Summary (last 24 hours): Intake/Output Summary (Last 24 hours) at 2020 1007  Last data filed at 2020 0901  Gross per 24 hour   Intake 980 ml   Output 1850 ml   Net -870 ml       Physical Exam:     Physical Exam   Constitutional: He is oriented to person, place, and time  No distress  HENT:   Head: Normocephalic and atraumatic  Eyes: EOM are normal  No scleral icterus  Neck: Normal range of motion  Neck supple  Pulmonary/Chest: Effort normal  No respiratory distress  He has wheezes (mild)  Abdominal: Soft  Bowel sounds are normal  He exhibits no distension  There is no tenderness  Musculoskeletal: Normal range of motion  He exhibits edema  Neurological: He is alert and oriented to person, place, and time  No cranial nerve deficit  Skin: Skin is warm and dry  He is not diaphoretic  Psychiatric: He has a normal mood and affect  His behavior is normal    Vitals reviewed        Additional Data: Labs:    Results from last 7 days   Lab Units 07/07/20  0735   WBC Thousand/uL 7 89   HEMOGLOBIN g/dL 14 2   HEMATOCRIT % 40 8   PLATELETS Thousands/uL 204   NEUTROS PCT % 36*   LYMPHS PCT % 39   MONOS PCT % 9   EOS PCT % 15*     Results from last 7 days   Lab Units 07/08/20  0508  07/06/20  0125   SODIUM mmol/L 136   < > 136   POTASSIUM mmol/L 3 3*   < > 3 0*   CHLORIDE mmol/L 98*   < > 97*   CO2 mmol/L 32   < > 33*   BUN mg/dL 9   < > 7   CREATININE mg/dL 1 01   < > 0 97   ANION GAP mmol/L 6   < > 6   CALCIUM mg/dL 7 9*   < > 8 2*   ALBUMIN g/dL  --   --  3 8   TOTAL BILIRUBIN mg/dL  --   --  0 92   ALK PHOS U/L  --   --  105   ALT U/L  --   --  31   AST U/L  --   --  56*   GLUCOSE RANDOM mg/dL 164*   < > 149*    < > = values in this interval not displayed  Results from last 7 days   Lab Units 07/06/20  0125   INR  1 12     Results from last 7 days   Lab Units 07/08/20  0742 07/07/20 2055 07/07/20  1604 07/07/20  1126 07/07/20  0731 07/06/20  2054 07/06/20  1617 07/06/20  1108 07/06/20  0710 07/06/20  0111   POC GLUCOSE mg/dl 182* 251* 252* 340* 173* 208* 218* 244* 196* 151*     Results from last 7 days   Lab Units 07/06/20  0541   HEMOGLOBIN A1C % 7 0*     Results from last 7 days   Lab Units 07/06/20  0541 07/06/20  0125   LACTIC ACID mmol/L  --  1 0   PROCALCITONIN ng/ml 0 16  --            * I Have Reviewed All Lab Data Listed Above  * Additional Pertinent Lab Tests Reviewed: All Labs Within Last 24 Hours Reviewed    Imaging:    Imaging Reports Reviewed Today Include: none  Imaging Personally Reviewed by Myself Includes:  none    Recent Cultures (last 7 days):     Results from last 7 days   Lab Units 07/06/20  0541 07/06/20  0218 07/06/20  0125   BLOOD CULTURE   --  No Growth at 48 hrs  No Growth at 48 hrs     LEGIONELLA URINARY ANTIGEN  Negative  --   --        Last 24 Hours Medication List:     Current Facility-Administered Medications:  acetaminophen 650 mg Oral Q6H PRN Olga Martin PA-C albuterol 2 puff Inhalation Q4H PRN Elijah Mendieta MD   albuterol 2 5 mg Nebulization Q6H PRN Tian Patel PA-C   amLODIPine 10 mg Oral Daily Elijah Mendieta MD   folic acid 1 mg Oral Daily Eva Pelayo PA-C   furosemide 40 mg Intravenous BID (diuretic) Elijah Mendieta MD   heparin (porcine) 5,000 Units Subcutaneous Q8H [address] Tian Patel PA-C   insulin glargine 10 Units Subcutaneous HS Eva Pelayo PA-C   insulin lispro 1-5 Units Subcutaneous HS Eva Pelayo PA-C   insulin lispro 2-12 Units Subcutaneous TID AC Eva Pelayo PA-C   Labetalol HCl 10 mg Intravenous Q4H PRN Elijah Mendieta MD   lisinopril 20 mg Oral Daily Zachary Jimenez MD   magnesium sulfate 2 g Intravenous Once Eva Pelayo PA-C   metoprolol succinate 25 mg Oral Daily Benedetto Litten, PA-C   multivitamin-minerals 1 tablet Oral Daily Eva Pelayo PA-C   polyethylene glycol 17 g Oral Daily PRN Elijah Mendieta MD   potassium chloride 40 mEq Oral Daily Eva Pelayo PA-C   senna-docusate sodium 1 tablet Oral BID Elijah Mendieta MD   thiamine 100 mg Oral Daily Eva Pelayo PA-C        Today, Patient Was Seen By: Beth Nieves PA-C    ** Please Note: Dictation voice to text software may have been used in the creation of this document   **

## 2020-07-08 NOTE — PLAN OF CARE
Problem: PAIN - ADULT  Goal: Verbalizes/displays adequate comfort level or baseline comfort level  Description  Interventions:  - Encourage patient to monitor pain and request assistance  - Assess pain using appropriate pain scale  - Administer analgesics based on type and severity of pain and evaluate response  - Implement non-pharmacological measures as appropriate and evaluate response  - Consider cultural and social influences on pain and pain management  - Notify physician/advanced practitioner if interventions unsuccessful or patient reports new pain  Outcome: Progressing     Problem: SAFETY ADULT  Goal: Patient will remain free of falls  Description  INTERVENTIONS:  - Assess patient frequently for physical needs  -  Identify cognitive and physical deficits and behaviors that affect risk of falls    -  [place] fall precautions as indicated by assessment   - Educate patient/family on patient safety including physical limitations  - Instruct patient to call for assistance with activity based on assessment  - Modify environment to reduce risk of injury  - Consider OT/PT consult to assist with strengthening/mobility  Outcome: Progressing  Goal: Maintain or return to baseline ADL function  Description  INTERVENTIONS:  -  Assess patient's ability to carry out ADLs; assess patient's baseline for ADL function and identify physical deficits which impact ability to perform ADLs (bathing, care of mouth/teeth, toileting, grooming, dressing, etc )  - Assess/evaluate cause of self-care deficits   - Assess range of motion  - Assess patient's mobility; develop plan if impaired  - Assess patient's need for assistive devices and provide as appropriate  - Encourage maximum independence but intervene and supervise when necessary  - Involve family in performance of ADLs  - Assess for home care needs following discharge   - Consider OT consult to assist with ADL evaluation and planning for discharge  - Provide patient education as appropriate  Outcome: Progressing  Goal: Maintain or return mobility status to optimal level  Description  INTERVENTIONS:  - Assess patient's baseline mobility status (ambulation, transfers, stairs, etc )    - Identify cognitive and physical deficits and behaviors that affect mobility  - Identify mobility aids required to assist with transfers and/or ambulation (gait belt, sit-to-stand, lift, walker, cane, etc )  - [place] fall precautions as indicated by assessment  - Record patient progress and toleration of activity level on Mobility SBAR; progress patient to next Phase/Stage  - Instruct patient to call for assistance with activity based on assessment  - Consider rehabilitation consult to assist with strengthening/weightbearing, etc   Outcome: Progressing     Problem: CARDIOVASCULAR - ADULT  Goal: Maintains optimal cardiac output and hemodynamic stability  Description  INTERVENTIONS:  - Monitor I/O, vital signs and rhythm  - Monitor for S/S and trends of decreased cardiac output  - Administer and titrate ordered vasoactive medications to optimize hemodynamic stability  - Assess quality of pulses, skin color and temperature  - Assess for signs of decreased coronary artery perfusion  - Instruct patient to report change in severity of symptoms  Outcome: Progressing  Goal: Absence of cardiac dysrhythmias or at baseline rhythm  Description  INTERVENTIONS:  - Continuous cardiac monitoring, vital signs, obtain 12 lead EKG if ordered  - Administer antiarrhythmic and heart rate control medications as ordered  - Monitor electrolytes and administer replacement therapy as ordered  Outcome: Progressing     Problem: RESPIRATORY - ADULT  Goal: Achieves optimal ventilation and oxygenation  Description  INTERVENTIONS:  - Assess for changes in respiratory status  - Assess for changes in mentation and behavior  - Position to facilitate oxygenation and minimize respiratory effort  - Oxygen administered by appropriate delivery if ordered  - Initiate smoking cessation education as indicated  - Encourage broncho-pulmonary hygiene including cough, deep breathe, Incentive Spirometry  - Assess the need for suctioning and aspirate as needed  - Assess and instruct to report SOB or any respiratory difficulty  - Respiratory Therapy support as indicated  Outcome: Progressing     Problem: INFECTION - ADULT  Goal: Absence or prevention of progression during hospitalization  Description  INTERVENTIONS:  - Assess and monitor for signs and symptoms of infection  - Monitor lab/diagnostic results  - Monitor all insertion sites, i e  indwelling lines, tubes, and drains  - Monitor endotracheal if appropriate and nasal secretions for changes in amount and color  - [place] appropriate cooling/warming therapies per order  - Administer medications as ordered  - Instruct and encourage patient and family to use good hand hygiene technique  - Identify and instruct in appropriate isolation precautions for identified infection/condition  Outcome: Progressing     Problem: GASTROINTESTINAL - ADULT  Goal: Minimal or absence of nausea and/or vomiting  Description  INTERVENTIONS:  - Administer IV fluids if ordered to ensure adequate hydration  - Maintain NPO status until nausea and vomiting are resolved  - Nasogastric tube if ordered  - Administer ordered antiemetic medications as needed  - Provide nonpharmacologic comfort measures as appropriate  - Advance diet as tolerated, if ordered  - Consider nutrition services referral to assist patient with adequate nutrition and appropriate food choices  Outcome: Progressing  Goal: Maintains or returns to baseline bowel function  Description  INTERVENTIONS:  - Assess bowel function  - Encourage oral fluids to ensure adequate hydration  - Administer IV fluids if ordered to ensure adequate hydration  - Administer ordered medications as needed  - Encourage mobilization and activity  - Consider nutritional services referral to assist patient with adequate nutrition and appropriate food choices  Outcome: Progressing  Goal: Maintains adequate nutritional intake  Description  INTERVENTIONS:  - Monitor percentage of each meal consumed  - Identify factors contributing to decreased intake, treat as appropriate  - Assist with meals as needed  - Monitor I&O, weight, and lab values if indicated  - Obtain nutrition services referral as needed  Outcome: Progressing

## 2020-07-08 NOTE — ASSESSMENT & PLAN NOTE
· Patient was significantly elevated pressures on admission at greater than 564 systolic  · Blood pressure improved  · No signs of end-organ damage  · With classify this as hypertensive urgency    · Secondary to noncompliance with his 3 oral blood pressure medications  · Will restart home oral metoprolol, amlodipine, ACE-inhibitor

## 2020-07-09 LAB
ANION GAP SERPL CALCULATED.3IONS-SCNC: 7 MMOL/L (ref 4–13)
BUN SERPL-MCNC: 14 MG/DL (ref 5–25)
CALCIUM SERPL-MCNC: 8.3 MG/DL (ref 8.3–10.1)
CHLORIDE SERPL-SCNC: 98 MMOL/L (ref 100–108)
CO2 SERPL-SCNC: 30 MMOL/L (ref 21–32)
CREAT SERPL-MCNC: 1.11 MG/DL (ref 0.6–1.3)
GFR SERPL CREATININE-BSD FRML MDRD: 85 ML/MIN/1.73SQ M
GLUCOSE SERPL-MCNC: 137 MG/DL (ref 65–140)
GLUCOSE SERPL-MCNC: 207 MG/DL (ref 65–140)
GLUCOSE SERPL-MCNC: 210 MG/DL (ref 65–140)
GLUCOSE SERPL-MCNC: 250 MG/DL (ref 65–140)
GLUCOSE SERPL-MCNC: 262 MG/DL (ref 65–140)
MAGNESIUM SERPL-MCNC: 1.5 MG/DL (ref 1.6–2.6)
POTASSIUM SERPL-SCNC: 3.7 MMOL/L (ref 3.5–5.3)
SODIUM SERPL-SCNC: 135 MMOL/L (ref 136–145)

## 2020-07-09 PROCEDURE — 80048 BASIC METABOLIC PNL TOTAL CA: CPT | Performed by: PHYSICIAN ASSISTANT

## 2020-07-09 PROCEDURE — 99232 SBSQ HOSP IP/OBS MODERATE 35: CPT | Performed by: INTERNAL MEDICINE

## 2020-07-09 PROCEDURE — 94640 AIRWAY INHALATION TREATMENT: CPT

## 2020-07-09 PROCEDURE — 82948 REAGENT STRIP/BLOOD GLUCOSE: CPT

## 2020-07-09 PROCEDURE — 94760 N-INVAS EAR/PLS OXIMETRY 1: CPT

## 2020-07-09 PROCEDURE — 83735 ASSAY OF MAGNESIUM: CPT | Performed by: PHYSICIAN ASSISTANT

## 2020-07-09 PROCEDURE — 99232 SBSQ HOSP IP/OBS MODERATE 35: CPT | Performed by: PHYSICIAN ASSISTANT

## 2020-07-09 RX ORDER — FUROSEMIDE 40 MG/1
40 TABLET ORAL
Status: DISCONTINUED | OUTPATIENT
Start: 2020-07-09 | End: 2020-07-10 | Stop reason: HOSPADM

## 2020-07-09 RX ORDER — MAGNESIUM SULFATE 1 G/100ML
1 INJECTION INTRAVENOUS ONCE
Status: COMPLETED | OUTPATIENT
Start: 2020-07-09 | End: 2020-07-10

## 2020-07-09 RX ADMIN — HEPARIN SODIUM 5000 UNITS: 5000 INJECTION INTRAVENOUS; SUBCUTANEOUS at 15:05

## 2020-07-09 RX ADMIN — INSULIN LISPRO 6 UNITS: 100 INJECTION, SOLUTION INTRAVENOUS; SUBCUTANEOUS at 12:33

## 2020-07-09 RX ADMIN — INSULIN LISPRO 1 UNITS: 100 INJECTION, SOLUTION INTRAVENOUS; SUBCUTANEOUS at 22:19

## 2020-07-09 RX ADMIN — SENNOSIDES AND DOCUSATE SODIUM 1 TABLET: 8.6; 5 TABLET ORAL at 16:46

## 2020-07-09 RX ADMIN — HEPARIN SODIUM 5000 UNITS: 5000 INJECTION INTRAVENOUS; SUBCUTANEOUS at 05:58

## 2020-07-09 RX ADMIN — FUROSEMIDE 40 MG: 10 INJECTION, SOLUTION INTRAMUSCULAR; INTRAVENOUS at 09:38

## 2020-07-09 RX ADMIN — Medication 1 TABLET: at 09:37

## 2020-07-09 RX ADMIN — METOPROLOL SUCCINATE 25 MG: 25 TABLET, EXTENDED RELEASE ORAL at 09:37

## 2020-07-09 RX ADMIN — AMLODIPINE BESYLATE 10 MG: 10 TABLET ORAL at 09:37

## 2020-07-09 RX ADMIN — FUROSEMIDE 40 MG: 40 TABLET ORAL at 16:40

## 2020-07-09 RX ADMIN — HEPARIN SODIUM 5000 UNITS: 5000 INJECTION INTRAVENOUS; SUBCUTANEOUS at 22:18

## 2020-07-09 RX ADMIN — LISINOPRIL 20 MG: 20 TABLET ORAL at 09:37

## 2020-07-09 RX ADMIN — INSULIN GLARGINE 10 UNITS: 100 INJECTION, SOLUTION SUBCUTANEOUS at 22:17

## 2020-07-09 RX ADMIN — Medication 100 MG: at 09:37

## 2020-07-09 RX ADMIN — ALBUTEROL SULFATE 2.5 MG: 2.5 SOLUTION RESPIRATORY (INHALATION) at 10:13

## 2020-07-09 RX ADMIN — ACETAMINOPHEN 650 MG: 325 TABLET, FILM COATED ORAL at 00:46

## 2020-07-09 RX ADMIN — INSULIN LISPRO 4 UNITS: 100 INJECTION, SOLUTION INTRAVENOUS; SUBCUTANEOUS at 16:46

## 2020-07-09 RX ADMIN — SENNOSIDES AND DOCUSATE SODIUM 1 TABLET: 8.6; 5 TABLET ORAL at 09:37

## 2020-07-09 RX ADMIN — FOLIC ACID 1 MG: 1 TABLET ORAL at 09:37

## 2020-07-09 RX ADMIN — POTASSIUM CHLORIDE 40 MEQ: 1500 TABLET, EXTENDED RELEASE ORAL at 09:37

## 2020-07-09 RX ADMIN — MAGNESIUM SULFATE HEPTAHYDRATE 1 G: 1 INJECTION, SOLUTION INTRAVENOUS at 16:41

## 2020-07-09 RX ADMIN — ACETAMINOPHEN 650 MG: 325 TABLET, FILM COATED ORAL at 09:47

## 2020-07-09 NOTE — ASSESSMENT & PLAN NOTE
Lab Results   Component Value Date    HGBA1C 7 0 (H) 07/06/2020     · Patient with controlled diabetes based on A1c  · Hold oral medications  · Continue SSI and Lantus 10 units qhs   · Accu-Cheks q i d  [place]

## 2020-07-09 NOTE — ASSESSMENT & PLAN NOTE
· Pt reported that he drinks half of a fifth daily   · CIWA; most recent score of 1 per nursing  · Encouraged cessation/counseling  Declined HOST    Has been to rehab in the past

## 2020-07-09 NOTE — ASSESSMENT & PLAN NOTE
· Patient with significantly elevated pressures on admission at greater than 156 systolic  · Blood pressure has improved  · No signs of end-organ damage  · Secondary to noncompliance with his oral blood pressure medications  · Resumed home doses of oral metoprolol and amlodipine  · Patient was started on lisinopril 20 mg daily as well on 7/8  · Continue to monitor BP

## 2020-07-09 NOTE — ASSESSMENT & PLAN NOTE
· Hypokalemia present on admission with potassium 3 0   · Secondary to significant hypo magnesemia  · Repeat K 3 3 yesterday  · Continue daily potassium chloride  · Repeat BMP now

## 2020-07-09 NOTE — PLAN OF CARE
Problem: PAIN - ADULT  Goal: Verbalizes/displays adequate comfort level or baseline comfort level  Description  Interventions:  - Encourage patient to monitor pain and request assistance  - Assess pain using appropriate pain scale  - Administer analgesics based on type and severity of pain and evaluate response  - Implement non-pharmacological measures as appropriate and evaluate response  - Consider cultural and social influences on pain and pain management  - Notify physician/advanced practitioner if interventions unsuccessful or patient reports new pain  Outcome: Progressing     Problem: SAFETY ADULT  Goal: Patient will remain free of falls  Description  INTERVENTIONS:  - Assess patient frequently for physical needs  -  Identify cognitive and physical deficits and behaviors that affect risk of falls    -  [place] fall precautions as indicated by assessment   - Educate patient/family on patient safety including physical limitations  - Instruct patient to call for assistance with activity based on assessment  - Modify environment to reduce risk of injury  - Consider OT/PT consult to assist with strengthening/mobility  Outcome: Progressing  Goal: Maintain or return to baseline ADL function  Description  INTERVENTIONS:  -  Assess patient's ability to carry out ADLs; assess patient's baseline for ADL function and identify physical deficits which impact ability to perform ADLs (bathing, care of mouth/teeth, toileting, grooming, dressing, etc )  - Assess/evaluate cause of self-care deficits   - Assess range of motion  - Assess patient's mobility; develop plan if impaired  - Assess patient's need for assistive devices and provide as appropriate  - Encourage maximum independence but intervene and supervise when necessary  - Involve family in performance of ADLs  - Assess for home care needs following discharge   - Consider OT consult to assist with ADL evaluation and planning for discharge  - Provide patient education as appropriate  Outcome: Progressing  Goal: Maintain or return mobility status to optimal level  Description  INTERVENTIONS:  - Assess patient's baseline mobility status (ambulation, transfers, stairs, etc )    - Identify cognitive and physical deficits and behaviors that affect mobility  - Identify mobility aids required to assist with transfers and/or ambulation (gait belt, sit-to-stand, lift, walker, cane, etc )  - [place] fall precautions as indicated by assessment  - Record patient progress and toleration of activity level on Mobility SBAR; progress patient to next Phase/Stage  - Instruct patient to call for assistance with activity based on assessment  - Consider rehabilitation consult to assist with strengthening/weightbearing, etc   Outcome: Progressing     Problem: CARDIOVASCULAR - ADULT  Goal: Maintains optimal cardiac output and hemodynamic stability  Description  INTERVENTIONS:  - Monitor I/O, vital signs and rhythm  - Monitor for S/S and trends of decreased cardiac output  - Administer and titrate ordered vasoactive medications to optimize hemodynamic stability  - Assess quality of pulses, skin color and temperature  - Assess for signs of decreased coronary artery perfusion  - Instruct patient to report change in severity of symptoms  Outcome: Progressing  Goal: Absence of cardiac dysrhythmias or at baseline rhythm  Description  INTERVENTIONS:  - Continuous cardiac monitoring, vital signs, obtain 12 lead EKG if ordered  - Administer antiarrhythmic and heart rate control medications as ordered  - Monitor electrolytes and administer replacement therapy as ordered  Outcome: Progressing     Problem: RESPIRATORY - ADULT  Goal: Achieves optimal ventilation and oxygenation  Description  INTERVENTIONS:  - Assess for changes in respiratory status  - Assess for changes in mentation and behavior  - Position to facilitate oxygenation and minimize respiratory effort  - Oxygen administered by appropriate delivery if ordered  - Initiate smoking cessation education as indicated  - Encourage broncho-pulmonary hygiene including cough, deep breathe, Incentive Spirometry  - Assess the need for suctioning and aspirate as needed  - Assess and instruct to report SOB or any respiratory difficulty  - Respiratory Therapy support as indicated  Outcome: Progressing     Problem: INFECTION - ADULT  Goal: Absence or prevention of progression during hospitalization  Description  INTERVENTIONS:  - Assess and monitor for signs and symptoms of infection  - Monitor lab/diagnostic results  - Monitor all insertion sites, i e  indwelling lines, tubes, and drains  - Monitor endotracheal if appropriate and nasal secretions for changes in amount and color  - [place] appropriate cooling/warming therapies per order  - Administer medications as ordered  - Instruct and encourage patient and family to use good hand hygiene technique  - Identify and instruct in appropriate isolation precautions for identified infection/condition  Outcome: Progressing     Problem: GASTROINTESTINAL - ADULT  Goal: Minimal or absence of nausea and/or vomiting  Description  INTERVENTIONS:  - Administer IV fluids if ordered to ensure adequate hydration  - Maintain NPO status until nausea and vomiting are resolved  - Nasogastric tube if ordered  - Administer ordered antiemetic medications as needed  - Provide nonpharmacologic comfort measures as appropriate  - Advance diet as tolerated, if ordered  - Consider nutrition services referral to assist patient with adequate nutrition and appropriate food choices  Outcome: Progressing  Goal: Maintains or returns to baseline bowel function  Description  INTERVENTIONS:  - Assess bowel function  - Encourage oral fluids to ensure adequate hydration  - Administer IV fluids if ordered to ensure adequate hydration  - Administer ordered medications as needed  - Encourage mobilization and activity  - Consider nutritional services referral to assist patient with adequate nutrition and appropriate food choices  Outcome: Progressing  Goal: Maintains adequate nutritional intake  Description  INTERVENTIONS:  - Monitor percentage of each meal consumed  - Identify factors contributing to decreased intake, treat as appropriate  - Assist with meals as needed  - Monitor I&O, weight, and lab values if indicated  - Obtain nutrition services referral as needed  Outcome: Progressing

## 2020-07-09 NOTE — ASSESSMENT & PLAN NOTE
· Secondary to congestive heart failure  Presented with dyspnea on exertion, orthopnea, JVD, with peripheral edema  · Echo: EF 60%, no RWMA, G1DD  · Appreciate cardiology input   · Received IV Lasix 40 b i d    · Transitioned to PO Lasix 40 mg BID today per cardiology     · Replace electrolytes as needed  · Salt restricted diet, fluid restricted diet  · Daily weights, strict I's and O's

## 2020-07-09 NOTE — PROGRESS NOTES
Progress Note - Bedelia Screen 1965, 63 y o  male MRN: [id number]    Unit/Bed#: S -01 Encounter: [id number]    Primary Care Provider: Cathy Fowler DO   Date and time admitted to hospital: 7/6/2020  1:06 AM        * Hypertensive urgency  Assessment & Plan  · Patient with significantly elevated pressures on admission at greater than 270 systolic  · Blood pressure has improved  · No signs of end-organ damage  · Secondary to noncompliance with his oral blood pressure medications  · Resumed home doses of oral metoprolol and amlodipine  · Patient was started on lisinopril 20 mg daily as well on 7/8  · Continue to monitor BP  CHF (congestive heart failure) ([facility])  Assessment & Plan  · Secondary to congestive heart failure  Presented with dyspnea on exertion, orthopnea, JVD, with peripheral edema  · Echo: EF 60%, no RWMA, G1DD  · Appreciate cardiology input   · Received IV Lasix 40 b i d    · Transitioned to PO Lasix 40 mg BID today per cardiology  · Replace electrolytes as needed  · Salt restricted diet, fluid restricted diet  · Daily weights, strict I's and O's    Alcohol abuse  Assessment & Plan  · Pt reported that he drinks half of a fifth daily   · CIWA; most recent score of 1 per nursing  · Encouraged cessation/counseling  Declined HOST  Has been to rehab in the past    Hypomagnesemia  Assessment & Plan  · 1 2 yesterday a m  and he received 2 grams of IV magnesium  · Repeat mag level now    Diabetes mellitus type 2, uncontrolled ([facility] [address] )  Assessment & Plan    Lab Results   Component Value Date    HGBA1C 7 0 (H) 07/06/2020     · Patient with controlled diabetes based on A1c  · Hold oral medications  · Continue SSI and Lantus 10 units qhs   · Accu-Cheks q i d  Hypokalemia  Assessment & Plan  · Hypokalemia present on admission with potassium 3 0   · Secondary to significant hypo magnesemia  · Repeat K 3 3 yesterday  · Continue daily potassium chloride  · Repeat BMP now       VTE Pharmacologic Prophylaxis:   Pharmacologic: Heparin  Mechanical VTE Prophylaxis in Place: Yes    Patient Centered Rounds: I have performed bedside rounds with nursing staff today  Discussions with Specialists or Other Care Team Provider: nursing and CM    Education and Discussions with Family / Patient: patient and pt's wife via phone    Time Spent for Care: 20 minutes  More than 50% of total time spent on counseling and coordination of care as described above  Current Length of Stay: 3 day(s)    Current Patient Status: Inpatient   Certification Statement: The patient will continue to require additional inpatient hospital stay due to need to monitor BP and volume status with transition to PO diuretics    Discharge Plan:  Likely in the next 24 hours    Code Status: Level 1 - Full Code      Subjective:   Patient sitting on side of bed, talking with wife on the phone  He reports having had some chest discomfort this morning which has since resolved  He denies SOB  Notes improvement in his b/l LE edema  Reports a mild frontal headache at this time  No bowel or bladder complaints  Objective:     Vitals:   Temp (24hrs), Av 4 °F (36 9 °C), Min:98 3 °F (36 8 °C), Max:98 6 °F (37 °C)    Temp:  [98 3 °F (36 8 °C)-98 6 °F (37 °C)] 98 5 °F (36 9 °C)  HR:  [80-94] 92  Resp:  [16-18] 18  BP: (119-157)/(59-84) 119/73  SpO2:  [95 %-98 %] 97 %  Body mass index is 37 51 kg/m²  Input and Output Summary (last 24 hours): Intake/Output Summary (Last 24 hours) at 2020 1211  Last data filed at 2020 0901  Gross per 24 hour   Intake 780 ml   Output 1600 ml   Net -820 ml       Physical Exam:     Physical Exam   Constitutional: He is oriented to person, place, and time  He appears well-developed and well-nourished  HENT:   Head: Normocephalic and atraumatic  Eyes: Conjunctivae are normal    Neck: Neck supple  Cardiovascular: Normal rate and regular rhythm     Pulmonary/Chest: Effort normal and breath sounds normal  No respiratory distress  Abdominal: Soft  Bowel sounds are normal  There is no tenderness  Musculoskeletal: He exhibits edema (b/l lower extremities)  Neurological: He is alert and oriented to person, place, and time  Skin: Skin is warm and dry  No erythema  Psychiatric: He has a normal mood and affect  Nursing note and vitals reviewed  Additional Data:     Labs:    Results from last 7 days   Lab Units 07/07/20  0735   WBC Thousand/uL 7 89   HEMOGLOBIN g/dL 14 2   HEMATOCRIT % 40 8   PLATELETS Thousands/uL 204   NEUTROS PCT % 36*   LYMPHS PCT % 39   MONOS PCT % 9   EOS PCT % 15*     Results from last 7 days   Lab Units 07/08/20  0508  07/06/20  0125   POTASSIUM mmol/L 3 3*   < > 3 0*   CHLORIDE mmol/L 98*   < > 97*   CO2 mmol/L 32   < > 33*   BUN mg/dL 9   < > 7   CREATININE mg/dL 1 01   < > 0 97   CALCIUM mg/dL 7 9*   < > 8 2*   ALK PHOS U/L  --   --  105   ALT U/L  --   --  31   AST U/L  --   --  56*    < > = values in this interval not displayed  Results from last 7 days   Lab Units 07/06/20  0125   INR  1 12       * I Have Reviewed All Lab Data Listed Above  * Additional Pertinent Lab Tests Reviewed: All Labs Within Last 24 Hours Reviewed    Imaging:    Imaging Reports Reviewed Today Include: none  Imaging Personally Reviewed by Myself Includes:  none    Recent Cultures (last 7 days):     Results from last 7 days   Lab Units 07/06/20  0541 07/06/20  0218 07/06/20  0125   BLOOD CULTURE   --  No Growth at 72 hrs  No Growth at 72 hrs     LEGIONELLA URINARY ANTIGEN  Negative  --   --        Last 24 Hours Medication List:     Current Facility-Administered Medications:  acetaminophen 650 mg Oral Q6H PRN Tian Patel PA-C   albuterol 2 puff Inhalation Q4H PRN Elijah Mendieta MD   albuterol 2 5 mg Nebulization Q6H PRN Tian Patel PA-C   amLODIPine 10 mg Oral Daily Elijah Mendieta MD   folic acid 1 mg Oral Daily Eva Pelayo PA-C   furosemide 40 mg Oral BID (diuretic) Syeda Phani Hernandez MD   heparin (porcine) 5,000 Units Subcutaneous [facility] Tian Patel PA-C   insulin glargine 10 Units Subcutaneous HS Eva Pelayo PA-C   insulin lispro 1-5 Units Subcutaneous HS Eva Pelayo PA-C   insulin lispro 2-12 Units Subcutaneous TID AC Eva Pelayo PA-C   Labetalol HCl 10 mg Intravenous Q4H PRN Elijah Mendieta MD   lisinopril 20 mg Oral Daily Sanket Chiang MD   metoprolol succinate 25 mg Oral Daily Linda Luna PA-C   multivitamin-minerals 1 tablet Oral Daily Eva Pelayo PA-C   polyethylene glycol 17 g Oral Daily PRN Elijah Mendieta MD   potassium chloride 40 mEq Oral Daily Eva Pelayo PA-C   senna-docusate sodium 1 tablet Oral BID Elijah Mendieta MD   thiamine 100 mg Oral Daily Eva Pelayo PA-C        Today, Patient Was Seen By: Jordana Charles PA-C    ** Please Note: Dictation voice to text software may have been used in the creation of this document   **

## 2020-07-09 NOTE — PROGRESS NOTES
Cardiology Progress Note - Radha Tripathi 63 y o  male MRN: [id number]    Unit/Bed#: S -01 Encounter: [id number]      Assessment/Recommendations:  1  Accelerated HTN: much improved BPs overnight  Continue current regimen  2   Volume overload: mild diastolic function on echo  Continues diuresis with current dose of IV lasix - will transition to PO today, which he can go home on in AM   Strict I/Os and daily weights  3   Hypokalemia: replete to keep >4   4   Hypomagnesemia: replete to keep >2 to avoid dysrhythmias in this setting  5   Type 2 DM: would benefit from being on ACE-I, which was started yesterday  6   Alcohol abuse  7  Tobacco abuse    Subjective:   Patient seen and examined  No significant events overnight   ; pertinent negatives - chest pain, chest pressure/discomfort, irregular heart beat and palpitations  Continues with LE edema and ESTRADA  Objective:     Vitals: Blood pressure 119/73, pulse 92, temperature 98 5 °F (36 9 °C), temperature source Temporal, resp  rate 18, weight 115 kg (254 lb), SpO2 97 %  , Body mass index is 37 51 kg/m² ,   Orthostatic Blood Pressures      Most Recent Value   Blood Pressure  119/73 filed at 07/09/2020 0830   Patient Position - Orthostatic VS  Sitting filed at 07/09/2020 0830            Intake/Output Summary (Last 24 hours) at 7/9/2020 1032  Last data filed at 7/9/2020 0901  Gross per 24 hour   Intake 780 ml   Output 1600 ml   Net -820 ml       TELE: No significant arrhythmias seen      Physical Exam:    GEN: Radha Tripathi appears well, alert and oriented x 3, pleasant and cooperative   HEENT: pupils equal, round, and reactive to light; extraocular muscles intact  NECK: supple, no carotid bruits   HEART: regular rhythm, normal S1 and S2, no murmurs, clicks, gallops or rubs   LUNGS: clear to auscultation bilaterally; no wheezes, rales, or rhonchi   ABDOMEN: normal bowel sounds, soft, no tenderness, no distention  EXTREMITIES: peripheral pulses normal; no clubbing, cyanosis, + edema  NEURO: no focal findings   SKIN: normal without suspicious lesions on exposed skin     Medications:      Current Facility-Administered Medications:     acetaminophen (TYLENOL) tablet 650 mg, 650 mg, Oral, Q6H PRN, Tian Patel PA-C, 650 mg at 07/09/20 0947    albuterol (PROVENTIL HFA,VENTOLIN HFA) inhaler 2 puff, 2 puff, Inhalation, Q4H PRN, Elijah Mendieta MD    albuterol inhalation solution 2 5 mg, 2 5 mg, Nebulization, Q6H PRN, Tian Patel PA-C, 2 5 mg at 07/09/20 1013    amLODIPine (NORVASC) tablet 10 mg, 10 mg, Oral, Daily, Elijah Mendieta MD, 10 mg at 40/07/78 3195    folic acid (FOLVITE) tablet 1 mg, 1 mg, Oral, Daily, Eva Pelayo PA-C, 1 mg at 07/09/20 4533    furosemide (LASIX) injection 40 mg, 40 mg, Intravenous, BID (diuretic), Elijah Mendieta MD, 40 mg at 07/09/20 0938    heparin (porcine) subcutaneous injection 5,000 Units, 5,000 Units, Subcutaneous, Q8H [address], 5,000 Units at 07/09/20 0558 **AND** [CANCELED] Platelet count, , , Once, Tian Patel PA-C    insulin glargine (LANTUS) subcutaneous injection 10 Units 0 1 mL, 10 Units, Subcutaneous, HS, Eva Pelayo PA-C, 10 Units at 07/08/20 2140    insulin lispro (HumaLOG) 100 units/mL subcutaneous injection 1-5 Units, 1-5 Units, Subcutaneous, HS, Eva Pelayo PA-C, 3 Units at 07/08/20 2141    insulin lispro (HumaLOG) 100 units/mL subcutaneous injection 2-12 Units, 2-12 Units, Subcutaneous, TID AC, 4 Units at 07/08/20 1732 **AND** Fingerstick Glucose (POCT), , , TID AC, Eva Pelayo PA-C    Labetalol HCl (NORMODYNE) injection 10 mg, 10 mg, Intravenous, Q4H PRN, Elijah Mendieta MD, 10 mg at 07/07/20 0831    lisinopril (ZESTRIL) tablet 20 mg, 20 mg, Oral, Daily, Ce Brannon MD, 20 mg at 07/09/20 6544    metoprolol succinate (TOPROL-XL) 24 hr tablet 25 mg, 25 mg, Oral, Daily, Diana Hicks PA-C, 25 mg at 07/09/20 0937    multivitamin-minerals (CENTRUM) tablet 1 tablet, 1 tablet, Oral, Daily, Eva Pelayo, DUNCAN, 1 tablet at 07/09/20 0937    polyethylene glycol (MIRALAX) packet 17 g, 17 g, Oral, Daily PRN, Elijah Mendieta MD    potassium chloride (K-DUR,KLOR-CON) CR tablet 40 mEq, 40 mEq, Oral, Daily, Eva Pelayo PA-C, 40 mEq at 07/09/20 0937    senna-docusate sodium (SENOKOT S) 8 6-50 mg per tablet 1 tablet, 1 tablet, Oral, BID, Elijah Mendieta MD, 1 tablet at 07/09/20 3393    thiamine (VITAMIN B1) tablet 100 mg, 100 mg, Oral, Daily, Eva Pelayo PA-C, 100 mg at 07/09/20 3787     Labs & Results:    Results from last 7 days   Lab Units 07/06/20  0541 07/06/20  0125   TROPONIN I ng/mL 0 03 <0 02     Results from last 7 days   Lab Units 07/07/20  0735 07/06/20  0551 07/06/20  0125   WBC Thousand/uL 7 89 7 68 8 82   HEMOGLOBIN g/dL 14 2 13 2 15 2   HEMATOCRIT % 40 8 38 6 44 9   PLATELETS Thousands/uL 204 213 245     Results from last 7 days   Lab Units 07/06/20  0710   TRIGLYCERIDES mg/dL 65   HDL mg/dL 47     Results from last 7 days   Lab Units 07/08/20  0508 07/07/20  0735 07/06/20  0710 07/06/20  0125   POTASSIUM mmol/L 3 3* 3 3* 4 5 3 0*   CHLORIDE mmol/L 98* 97* 97* 97*   CO2 mmol/L 32 27 19* 33*   BUN mg/dL 9 8 6 7   CREATININE mg/dL 1 01 0 96 0 69 0 97   CALCIUM mg/dL 7 9* 8 0* 7 7* 8 2*   ALK PHOS U/L  --   --   --  105   ALT U/L  --   --   --  31   AST U/L  --   --   --  56*     Results from last 7 days   Lab Units 07/06/20  0125   INR  1 12   PTT seconds 30     Results from last 7 days   Lab Units 07/08/20  0508 07/07/20  0735 07/06/20  0710   MAGNESIUM mg/dL 1 2* 1 2* 1 8       Echo: personally reviewed - EF normal, mild LVH, G1DD    EKG personally reviewed by Brittni Johnson MD

## 2020-07-10 VITALS
DIASTOLIC BLOOD PRESSURE: 73 MMHG | RESPIRATION RATE: 18 BRPM | TEMPERATURE: 97.7 F | SYSTOLIC BLOOD PRESSURE: 128 MMHG | BODY MASS INDEX: 37.59 KG/M2 | WEIGHT: 253.8 LBS | OXYGEN SATURATION: 96 % | HEIGHT: 69 IN | HEART RATE: 91 BPM

## 2020-07-10 LAB
ANION GAP SERPL CALCULATED.3IONS-SCNC: 9 MMOL/L (ref 4–13)
BUN SERPL-MCNC: 13 MG/DL (ref 5–25)
CALCIUM SERPL-MCNC: 8.5 MG/DL (ref 8.3–10.1)
CHLORIDE SERPL-SCNC: 100 MMOL/L (ref 100–108)
CO2 SERPL-SCNC: 29 MMOL/L (ref 21–32)
CREAT SERPL-MCNC: 0.94 MG/DL (ref 0.6–1.3)
GFR SERPL CREATININE-BSD FRML MDRD: 104 ML/MIN/1.73SQ M
GLUCOSE SERPL-MCNC: 122 MG/DL (ref 65–140)
GLUCOSE SERPL-MCNC: 124 MG/DL (ref 65–140)
GLUCOSE SERPL-MCNC: 154 MG/DL (ref 65–140)
MAGNESIUM SERPL-MCNC: 1.5 MG/DL (ref 1.6–2.6)
POTASSIUM SERPL-SCNC: 3.8 MMOL/L (ref 3.5–5.3)
SODIUM SERPL-SCNC: 138 MMOL/L (ref 136–145)

## 2020-07-10 PROCEDURE — 99232 SBSQ HOSP IP/OBS MODERATE 35: CPT | Performed by: INTERNAL MEDICINE

## 2020-07-10 PROCEDURE — 83735 ASSAY OF MAGNESIUM: CPT | Performed by: PHYSICIAN ASSISTANT

## 2020-07-10 PROCEDURE — 82948 REAGENT STRIP/BLOOD GLUCOSE: CPT

## 2020-07-10 PROCEDURE — 80048 BASIC METABOLIC PNL TOTAL CA: CPT | Performed by: PHYSICIAN ASSISTANT

## 2020-07-10 PROCEDURE — 99239 HOSP IP/OBS DSCHRG MGMT >30: CPT | Performed by: PHYSICIAN ASSISTANT

## 2020-07-10 RX ORDER — AMLODIPINE BESYLATE 10 MG/1
10 TABLET ORAL DAILY
Qty: 30 TABLET | Refills: 0 | Status: SHIPPED | OUTPATIENT
Start: 2020-07-11 | End: 2020-08-11

## 2020-07-10 RX ORDER — LISINOPRIL 20 MG/1
20 TABLET ORAL DAILY
Qty: 30 TABLET | Refills: 0 | Status: SHIPPED | OUTPATIENT
Start: 2020-07-11 | End: 2020-07-10 | Stop reason: HOSPADM

## 2020-07-10 RX ORDER — POTASSIUM CHLORIDE 20 MEQ/1
40 TABLET, EXTENDED RELEASE ORAL DAILY
Qty: 60 TABLET | Refills: 0 | Status: SHIPPED | OUTPATIENT
Start: 2020-07-11 | End: 2020-08-19 | Stop reason: SDUPTHER

## 2020-07-10 RX ORDER — LANOLIN ALCOHOL/MO/W.PET/CERES
100 CREAM (GRAM) TOPICAL DAILY
Refills: 0
Start: 2020-07-11 | End: 2020-07-10 | Stop reason: HOSPADM

## 2020-07-10 RX ORDER — METOPROLOL SUCCINATE 25 MG/1
25 TABLET, EXTENDED RELEASE ORAL DAILY
Qty: 30 TABLET | Refills: 0 | Status: SHIPPED | OUTPATIENT
Start: 2020-07-11 | End: 2020-08-11

## 2020-07-10 RX ORDER — OLMESARTAN MEDOXOMIL 40 MG/1
40 TABLET ORAL DAILY
Qty: 30 TABLET | Refills: 0 | Status: SHIPPED | OUTPATIENT
Start: 2020-07-10 | End: 2020-08-11

## 2020-07-10 RX ORDER — FOLIC ACID 1 MG/1
1 TABLET ORAL DAILY
Refills: 0
Start: 2020-07-11 | End: 2020-07-10 | Stop reason: HOSPADM

## 2020-07-10 RX ORDER — FUROSEMIDE 40 MG/1
40 TABLET ORAL 2 TIMES DAILY
Qty: 60 TABLET | Refills: 0 | Status: SHIPPED | OUTPATIENT
Start: 2020-07-10 | End: 2020-08-11

## 2020-07-10 RX ORDER — MAGNESIUM SULFATE HEPTAHYDRATE 40 MG/ML
2 INJECTION, SOLUTION INTRAVENOUS ONCE
Status: COMPLETED | OUTPATIENT
Start: 2020-07-10 | End: 2020-07-10

## 2020-07-10 RX ADMIN — AMLODIPINE BESYLATE 10 MG: 10 TABLET ORAL at 09:02

## 2020-07-10 RX ADMIN — HEPARIN SODIUM 5000 UNITS: 5000 INJECTION INTRAVENOUS; SUBCUTANEOUS at 14:24

## 2020-07-10 RX ADMIN — INSULIN LISPRO 2 UNITS: 100 INJECTION, SOLUTION INTRAVENOUS; SUBCUTANEOUS at 11:53

## 2020-07-10 RX ADMIN — Medication 100 MG: at 09:02

## 2020-07-10 RX ADMIN — LISINOPRIL 20 MG: 20 TABLET ORAL at 09:02

## 2020-07-10 RX ADMIN — Medication 1 TABLET: at 09:02

## 2020-07-10 RX ADMIN — POTASSIUM CHLORIDE 40 MEQ: 1500 TABLET, EXTENDED RELEASE ORAL at 09:02

## 2020-07-10 RX ADMIN — HEPARIN SODIUM 5000 UNITS: 5000 INJECTION INTRAVENOUS; SUBCUTANEOUS at 05:02

## 2020-07-10 RX ADMIN — FOLIC ACID 1 MG: 1 TABLET ORAL at 09:02

## 2020-07-10 RX ADMIN — SENNOSIDES AND DOCUSATE SODIUM 1 TABLET: 8.6; 5 TABLET ORAL at 09:02

## 2020-07-10 RX ADMIN — MAGNESIUM SULFATE IN WATER 2 G: 40 INJECTION, SOLUTION INTRAVENOUS at 11:50

## 2020-07-10 RX ADMIN — FUROSEMIDE 40 MG: 40 TABLET ORAL at 16:25

## 2020-07-10 RX ADMIN — METOPROLOL SUCCINATE 25 MG: 25 TABLET, EXTENDED RELEASE ORAL at 09:02

## 2020-07-10 RX ADMIN — FUROSEMIDE 40 MG: 40 TABLET ORAL at 09:02

## 2020-07-10 NOTE — ASSESSMENT & PLAN NOTE
· Patient with significantly elevated pressures on admission at greater than 044 systolic  · Blood pressure has improved during admission with most recent /73  · No signs of end-organ damage  Cr stable at 0 94    · Secondary to noncompliance with his oral blood pressure medications  · Resumed home dose of amlodipine 10 mg daily  Continue Toprol-XL at 25 mg daily  · Patient was started on lisinopril 20 mg daily as well as Lasix 40 mg BID which will be continued on discharge  · Continue to monitor BP as an outpatient

## 2020-07-10 NOTE — DISCHARGE SUMMARY
Discharge- Mike Garner 1965, 63 y o  male MRN: [id number]    Unit/Bed#: S -01 Encounter: [id number]    Primary Care Provider: Kim Chavira MD   Date and time admitted to hospital: 7/6/2020  1:06 AM        * Hypertensive urgency  Assessment & Plan  · Patient with significantly elevated pressures on admission at greater than 596 systolic  · Blood pressure has improved during admission with most recent /73  · No signs of end-organ damage  Cr stable at 0 94    · Secondary to noncompliance with his oral blood pressure medications  · Resumed home dose of amlodipine 10 mg daily  Continue Toprol-XL at 25 mg daily  · Patient was started on lisinopril 20 mg daily as well as Lasix 40 mg BID which will be continued on discharge  · Continue to monitor BP as an outpatient  CHF (congestive heart failure) (HCC)  Assessment & Plan  · In the setting of hypertensive urgency  · Presented with dyspnea on exertion, orthopnea, JVD, with peripheral edema  · Echo: EF 60%, no RWMA, G1DD  · Cardiology consulted  · Received IV Lasix 40 b i d    · Transitioned to PO Lasix 40 mg BID which will be continued on discharge per cardiology  · Salt restricted diet, fluid restricted diet  · Continue monitor daily weights at home  · Outpatient follow-up with cardiology  Alcohol abuse  Assessment & Plan  · Pt reported that he drinks half of a fifth daily   · Monitored on CIWA protocol  No evidence of withdrawal on day of discharge  · Encouraged cessation/counseling  · Declined HOST  Has been to rehab in the past   · Outpatient follow-up with PCP    Hypomagnesemia  Assessment & Plan  · Magnesium level persistently low during hospitalization despite IV repletion  · Magnesium 1 5 this karan Bond · Received 2 g of IV magnesium prior to discharge  · Continue magnesium supplements at 400 mg b i d  on discharge    · Outpatient follow-up with PCP for repeat magnesium level    Hypokalemia  Assessment & Plan  · Hypokalemia, POA, with potassium 3 0   · Suspected secondary to significant hypomagnesemia  · Repeat K 3 8 today  · Continue daily potassium chloride 40 mEq  · Outpatient follow-up with PCP for repeat BMP  Diabetes mellitus type 2, uncontrolled ([facility] [address] )  Assessment & Plan    Lab Results   Component Value Date    HGBA1C 7 0 (H) 07/06/2020     · Patient with controlled diabetes based on A1c  · Resume Janumet on discharge  Discharging Physician / Practitioner: Anny Kumar PA-C  PCP: Howie Tillman MD  Admission Date:   Admission Orders (From admission, onward)     Ordered        07/06/20 0425  Inpatient Admission  Once                   Discharge Date: 07/10/20    Resolved Problems  Date Reviewed: 7/10/2020    None          Consultations During Hospital Stay:  · Cardiology    Procedures Performed:   · None    Significant Findings / Test Results:   · Echocardiogram:  EF 60%, no regional wall motion abnormalities, mild concentric hypertrophy and grade 1 diastolic dysfunction  · Chest x-ray: "No acute cardiopulmonary disease "  · CTA chest: "No evidence of pulmonary embolus "  · COVID-19 negative  · Blood cultures x2 no growth after 4 days  · Procalcitonin negative  · Urine Legionella antigen and strep pneumoniae negative  · Hemoglobin A1c 7%  ·   · Troponin x2 negative  · TSH 7 191, free T4 1 17  · AST 56  · Hypomagnesemia  · Hypokalemia  · Lipid panel:  LDL 77, HDL 47, TG 65    Incidental Findings:   · None    Test Results Pending at Discharge (will require follow up): · Final result of blood cultures     Outpatient Tests Requested:  · Outpatient follow-up with PCP for repeat magnesium level and CMP    · Outpatient follow-up cardiologist     Complications:  None    Reason for Admission:  Hypertensive urgency and volume overload    Hospital Course:     Opal Elmore is a 63 y o  male patient with a history of hypertension, diabetes, tobacco abuse and alcohol abuse who originally presented to the hospital on 7/6/2020 due to shortness of breath  Patient presented to the hospital with worsening shortness of breath and a cough for the past 1 month  He reported a weight gain of about 10 lbs over the last 1 month as well some increased lower extremity edema  Patient admitted on presentation that he had been noncompliant with his medications over the last 1 year  On presentation to the hospital, patient was noted to be extremely hypertensive with systolic blood pressures of 269  Chest x-ray showed no acute findings and CTA chest was obtained given elevated D-dimer and revealed no evidence of PE  He received nitro paste in the emergency department and his blood pressure improved into the 366X systolic  He also received IV Lasix while in the emergency department  He was admitted for suspected volume overload and hypertensive urgency  Cardiology consult was obtained  Echocardiogram was obtained, EF 60%, G1DD  He was continued on IV Lasix until 7/9 when he was transitioned to p o  Lasix 40 mg b i d  which will be continued on discharge per Cardiology recommendations  Patient's blood pressure improved during his hospitalization  His blood pressure medication regimen at time of discharge includes amlodipine 10 mg daily, lisinopril 20 mg daily, Toprol XL 25 mg daily and Lasix 40 mg b i d   Patient was noted to have hypokalemia and hypomagnesemia during his hospitalization  He is being discharged on potassium chloride 40 mEq daily and magnesium oxide 400 mg b i d  Patient is to follow-up with his PCP as an outpatient for repeat BMP and magnesium level  Patient has a history of alcohol abuse and reportedly declined HOST referral   Alcohol cessation was advised  Patient's AST was noted to be mildly elevated on admission at 56, suspect due to alcohol abuse  Patient should follow-up with his PCP for repeat LFTs  Please see above list of diagnoses and related plan for additional information       Condition at Discharge: stable     Discharge Day Visit / Exam:     Subjective:  Patient is sitting up on side of bed, offers no new complaints today  He denies any chest pain or shortness of breath today  He states that he has been ambulating without difficulty  Denies any lightheadedness or dizziness  No bowel or bladder complaints  Vitals: Blood Pressure: 128/73 (07/10/20 1230)  Pulse: 91 (07/10/20 1230)  Temperature: 97 7 °F (36 5 °C) (07/10/20 1230)  Temp Source: Temporal (07/10/20 1230)  Respirations: 18 (07/10/20 1230)  Height: 5' 9" (175 3 cm)(Per previous charting ) (07/10/20 1230)  Weight - Scale: 115 kg (253 lb 12 8 oz) (07/10/20 0600)  SpO2: 96 % (07/10/20 1230)  Exam:   Physical Exam   Constitutional: He is oriented to person, place, and time  He appears well-developed and well-nourished  No distress  HENT:   Head: Normocephalic and atraumatic  Eyes: Conjunctivae are normal    Neck: Neck supple  Cardiovascular: Normal rate and regular rhythm  Pulmonary/Chest: Effort normal and breath sounds normal  No respiratory distress  Abdominal: Soft  Bowel sounds are normal  There is no tenderness  Musculoskeletal: Normal range of motion  He exhibits edema (mild b/l LE edema)  Neurological: He is alert and oriented to person, place, and time  Skin: Skin is warm and dry  He is not diaphoretic  No erythema  Psychiatric: He has a normal mood and affect  His behavior is normal    Nursing note and vitals reviewed  Discharge instructions/Information to patient and family:   See after visit summary for information provided to patient and family  Provisions for Follow-Up Care:  See after visit summary for information related to follow-up care and any pertinent home health orders  Disposition:     Home    Planned Readmission: None     Discharge Statement:  I spent 40 minutes discharging the patient  This time was spent on the day of discharge   I had direct contact with the patient on the day of discharge  Greater than 50% of the total time was spent examining patient, answering all patient questions, arranging and discussing plan of care with patient as well as directly providing post-discharge instructions  Additional time then spent on discharge activities  Discharge Medications:  See after visit summary for reconciled discharge medications provided to patient and family        ** Please Note: This note has been constructed using a voice recognition system **

## 2020-07-10 NOTE — ASSESSMENT & PLAN NOTE
· In the setting of hypertensive urgency  · Presented with dyspnea on exertion, orthopnea, JVD, with peripheral edema  · Echo: EF 60%, no RWMA, G1DD  · Cardiology consulted  · Received IV Lasix 40 b i d    · Transitioned to PO Lasix 40 mg BID which will be continued on discharge per cardiology  · Salt restricted diet, fluid restricted diet  · Continue monitor daily weights at home  · Outpatient follow-up with cardiology

## 2020-07-10 NOTE — ASSESSMENT & PLAN NOTE
· Hypokalemia, POA, with potassium 3 0   · Suspected secondary to significant hypomagnesemia  · Repeat K 3 8 today  · Continue daily potassium chloride 40 mEq  · Outpatient follow-up with PCP for repeat BMP

## 2020-07-10 NOTE — ASSESSMENT & PLAN NOTE
Lab Results   Component Value Date    HGBA1C 7 0 (H) 07/06/2020     · Patient with controlled diabetes based on A1c  · Resume Janumet on discharge

## 2020-07-10 NOTE — QUICK NOTE
Notified by nursing that patient's wife requested an update  Patient's wife at bedside, updated on medication prescribed on discharge and instructed that patient needs outpatient follow-up with PCP next week and will need repeat blood work  Patient notes having an increased cough and headache since being started on lisinopril  Discussed with cardiology and will change patient to ARB  Confirmed conversion dosing from lisinopril to olmesartan with pharmacy and prescribed olmesartan 40 mg daily which patient has reportedly tolerated in the past  Patient's pharmacy updated with changes

## 2020-07-10 NOTE — ASSESSMENT & PLAN NOTE
· Magnesium level persistently low during hospitalization despite IV repletion  · Magnesium 1 5 this a angelina Lozano · Received 2 g of IV magnesium prior to discharge  · Continue magnesium supplements at 400 mg b i d  on discharge    · Outpatient follow-up with PCP for repeat magnesium level

## 2020-07-10 NOTE — ASSESSMENT & PLAN NOTE
· Pt reported that he drinks half of a fifth daily   · Monitored on CIWA protocol  No evidence of withdrawal on day of discharge  · Encouraged cessation/counseling  · Declined HOST    Has been to rehab in the past   · Outpatient follow-up with PCP

## 2020-07-10 NOTE — PROGRESS NOTES
Cardiology Progress Note - Breanna Smoke 63 y o  male MRN: [id number]    Unit/Bed#: S -01 Encounter: [id number]      Assessment/Recommendations:  1  Accelerated HTN: much improved BPs overnight  Continue current regimen  2   Volume overload: mild diastolic function on echo  Good response to PO diuretic  Strict I/Os and daily weights  3   Hypokalemia: replete to keep >4   4   Hypomagnesemia: replete to keep >2 to avoid dysrhythmias in this setting  5   Type 2 DM: would benefit from being on ACE-I, which was started and he is tolerating  6   Alcohol abuse  7  Tobacco abuse  8  Stable from cardiac standpoint for discharge home today  Outpatient follow up recommended  Please call with questions  Subjective:   Patient seen and examined  No significant events overnight   ; pertinent negatives - chest pain, chest pressure/discomfort, irregular heart beat and palpitations  Improving with LE edema and ESTRADA  Objective:     Vitals: Blood pressure 153/84, pulse 93, temperature (!) 97 4 °F (36 3 °C), temperature source Temporal, resp  rate 18, weight 115 kg (253 lb 12 8 oz), SpO2 96 %  , Body mass index is 37 48 kg/m² ,   Orthostatic Blood Pressures      Most Recent Value   Blood Pressure  153/84 filed at 07/10/2020 0830   Patient Position - Orthostatic VS  Lying filed at 07/10/2020 0830            Intake/Output Summary (Last 24 hours) at 7/10/2020 0905  Last data filed at 7/10/2020 0109  Gross per 24 hour   Intake 240 ml   Output 700 ml   Net -460 ml         Physical Exam:    GEN: Breanna Smoke appears well, alert and oriented x 3, pleasant and cooperative   HEENT: pupils equal, round, and reactive to light; extraocular muscles intact  NECK: supple, no carotid bruits   HEART: regular rhythm, normal S1 and S2, no murmurs, clicks, gallops or rubs   LUNGS: clear to auscultation bilaterally; no wheezes, rales, or rhonchi   ABDOMEN: normal bowel sounds, soft, no tenderness, no distention  EXTREMITIES: peripheral pulses normal; no clubbing, cyanosis, +mild edema  NEURO: no focal findings   SKIN: normal without suspicious lesions on exposed skin       Medications:      Current Facility-Administered Medications:     acetaminophen (TYLENOL) tablet 650 mg, 650 mg, Oral, Q6H PRN, Tian Patel PA-C, 650 mg at 07/09/20 0947    albuterol (PROVENTIL HFA,VENTOLIN HFA) inhaler 2 puff, 2 puff, Inhalation, Q4H PRN, Elijah Mendieta MD    albuterol inhalation solution 2 5 mg, 2 5 mg, Nebulization, Q6H PRN, Tian Patel PA-C, 2 5 mg at 07/09/20 1013    amLODIPine (NORVASC) tablet 10 mg, 10 mg, Oral, Daily, Elijah Mendieta MD, 10 mg at 90/47/57 9834    folic acid (FOLVITE) tablet 1 mg, 1 mg, Oral, Daily, Eva Pelayo PA-C, 1 mg at 07/10/20 0902    furosemide (LASIX) tablet 40 mg, 40 mg, Oral, BID (diuretic), Wagner Don MD, 40 mg at 07/10/20 0902    heparin (porcine) subcutaneous injection 5,000 Units, 5,000 Units, Subcutaneous, Q8H [facility] & intermediate, 5,000 Units at 07/10/20 0502 **AND** [CANCELED] Platelet count, , , Once, Tian Patel PA-C    insulin glargine (LANTUS) subcutaneous injection 10 Units 0 1 mL, 10 Units, Subcutaneous, HS, Eva Pelayo PA-C, 10 Units at 07/09/20 2217    insulin lispro (HumaLOG) 100 units/mL subcutaneous injection 1-5 Units, 1-5 Units, Subcutaneous, HS, Eva Pelayo PA-C, 1 Units at 07/09/20 2219    insulin lispro (HumaLOG) 100 units/mL subcutaneous injection 2-12 Units, 2-12 Units, Subcutaneous, TID AC, 4 Units at 07/09/20 1646 **AND** Fingerstick Glucose (POCT), , , TID AC, Eva Pelayo PA-C    Labetalol HCl (NORMODYNE) injection 10 mg, 10 mg, Intravenous, Q4H PRN, Elijah Mendieta MD, 10 mg at 07/07/20 0831    lisinopril (ZESTRIL) tablet 20 mg, 20 mg, Oral, Daily, Wagner Don MD, 20 mg at 07/10/20 0902    metoprolol succinate (TOPROL-XL) 24 hr tablet 25 mg, 25 mg, Oral, Daily, Ken Dumont PA-C, 25 mg at 07/10/20 0902    multivitamin-minerals (CENTRUM) tablet 1 tablet, 1 tablet, Oral, Daily, Eva Pelayo PA-C, 1 tablet at 07/10/20 0902    polyethylene glycol (MIRALAX) packet 17 g, 17 g, Oral, Daily PRN, Elijah Mendieta MD    potassium chloride (K-DUR,KLOR-CON) CR tablet 40 mEq, 40 mEq, Oral, Daily, Eva Pelayo PA-C, 40 mEq at 07/10/20 0902    senna-docusate sodium (SENOKOT S) 8 6-50 mg per tablet 1 tablet, 1 tablet, Oral, BID, Elijah Mendieta MD, 1 tablet at 07/10/20 0902    thiamine (VITAMIN B1) tablet 100 mg, 100 mg, Oral, Daily, Eva Pelayo PA-C, 100 mg at 07/10/20 0902     Labs & Results:    Results from last 7 days   Lab Units 07/06/20  0541 07/06/20  0125   TROPONIN I ng/mL 0 03 <0 02     Results from last 7 days   Lab Units 07/07/20  0735 07/06/20  0551 07/06/20  0125   WBC Thousand/uL 7 89 7 68 8 82   HEMOGLOBIN g/dL 14 2 13 2 15 2   HEMATOCRIT % 40 8 38 6 44 9   PLATELETS Thousands/uL 204 213 245     Results from last 7 days   Lab Units 07/06/20  0710   TRIGLYCERIDES mg/dL 65   HDL mg/dL 47     Results from last 7 days   Lab Units 07/10/20  0509 07/09/20  1241 07/08/20  0508  07/06/20  0125   POTASSIUM mmol/L 3 8 3 7 3 3*   < > 3 0*   CHLORIDE mmol/L 100 98* 98*   < > 97*   CO2 mmol/L 29 30 32   < > 33*   BUN mg/dL 13 14 9   < > 7   CREATININE mg/dL 0 94 1 11 1 01   < > 0 97   CALCIUM mg/dL 8 5 8 3 7 9*   < > 8 2*   ALK PHOS U/L  --   --   --   --  105   ALT U/L  --   --   --   --  31   AST U/L  --   --   --   --  56*    < > = values in this interval not displayed       Results from last 7 days   Lab Units 07/06/20  0125   INR  1 12   PTT seconds 30     Results from last 7 days   Lab Units 07/10/20  0509 07/09/20  1241 07/08/20  0508   MAGNESIUM mg/dL 1 5* 1 5* 1 2*       Echo: personally reviewed - EF normal, mild LVH, G1DD    EKG personally reviewed by Wagner Don MD

## 2020-07-10 NOTE — PROGRESS NOTES
PT MOVED TO TOWN OVER / FOUND PCP CLOSER TO NEW LOCATION     CANCELLED TCM APPT AND REMOVED [facility] AS PCP

## 2020-07-10 NOTE — SOCIAL WORK
CM was notified by nursing that patient and spouse at bedside would like 5 Wishes Documentation  CM provided patient with 5 Wishes Documentation and Living Will documentation

## 2020-07-10 NOTE — DISCHARGE INSTR - AVS FIRST PAGE
Dear Perico Cheema,     It was our pleasure to care for you here at [facility] [place]  It is our hope that we were always able to exceed the expected standards for your care during your stay  You were hospitalized due to hypertensive urgency and volume overload  You were cared for on the 2nd floor by Vandana Toribio PA-C under the service of Wild Waller MD with the [facility] who covers for your primary care physician (PCP), Kaylee Eckert DO, while you were hospitalized  If you have any questions or concerns related to this hospitalization, you may contact us at [phone]  For follow up as well as any medication refills, we recommend that you follow up with your primary care physician  A registered nurse will reach out to you by phone within a few days after your discharge to answer any additional questions that you may have after going home  However, at this time we provide for you here, the most important instructions / recommendations at discharge:     · Notable Medication Adjustments -   · Lasix 40 mg twice daily  · Lisinopril 20 mg daily  · Magnesium oxide 400 mg twice daily  · Potassium chloride 40 mEq once daily  · Amlodipine 10 mg daily  · Multivitamin 1 tablet daily  · Testing Required after Discharge -   · Outpatient follow-up with PCP for repeat magnesium level  · Important follow up information -   · Outpatient follow-up with your PCP  · Outpatient follow-up with cardiology  · Other Instructions -   · Return to the emergency department or contact your primary care physician if you develop recurrent symptoms, including chest pain or shortness of breath  · Please review this entire after visit summary as additional general instructions including medication list, appointments, activity, diet, any pertinent wound care, and other additional recommendations from your care team that may be provided for you        Sincerely, Jeanette Shape, PA-C

## 2020-07-11 LAB
BACTERIA BLD CULT: NORMAL
BACTERIA BLD CULT: NORMAL

## 2020-07-13 ENCOUNTER — TRANSITIONAL CARE MANAGEMENT (OUTPATIENT)
Dept: FAMILY MEDICINE CLINIC | Facility: CLINIC | Age: 55
End: 2020-07-13

## 2020-07-14 ENCOUNTER — TELEPHONE (OUTPATIENT)
Dept: FAMILY MEDICINE CLINIC | Facility: CLINIC | Age: 55
End: 2020-07-14

## 2020-07-14 ENCOUNTER — OFFICE VISIT (OUTPATIENT)
Dept: FAMILY MEDICINE CLINIC | Facility: CLINIC | Age: 55
End: 2020-07-14
Payer: COMMERCIAL

## 2020-07-14 VITALS
TEMPERATURE: 98.4 F | HEIGHT: 69 IN | DIASTOLIC BLOOD PRESSURE: 76 MMHG | RESPIRATION RATE: 15 BRPM | HEART RATE: 90 BPM | BODY MASS INDEX: 37.33 KG/M2 | OXYGEN SATURATION: 94 % | SYSTOLIC BLOOD PRESSURE: 112 MMHG | WEIGHT: 252 LBS

## 2020-07-14 DIAGNOSIS — F10.10 ALCOHOL ABUSE: ICD-10-CM

## 2020-07-14 DIAGNOSIS — J45.20 MILD INTERMITTENT REACTIVE AIRWAY DISEASE WITHOUT COMPLICATION: Primary | ICD-10-CM

## 2020-07-14 DIAGNOSIS — R05.9 COUGH: ICD-10-CM

## 2020-07-14 DIAGNOSIS — E11.65 UNCONTROLLED TYPE 2 DIABETES MELLITUS WITH HYPERGLYCEMIA (HCC): ICD-10-CM

## 2020-07-14 DIAGNOSIS — I10 BENIGN ESSENTIAL HYPERTENSION: ICD-10-CM

## 2020-07-14 DIAGNOSIS — I50.31 ACUTE DIASTOLIC CONGESTIVE HEART FAILURE (HCC): ICD-10-CM

## 2020-07-14 PROCEDURE — 99215 OFFICE O/P EST HI 40 MIN: CPT | Performed by: FAMILY MEDICINE

## 2020-07-14 RX ORDER — FLUTICASONE PROPIONATE 220 UG/1
1 AEROSOL, METERED RESPIRATORY (INHALATION) 2 TIMES DAILY
Qty: 1 INHALER | Refills: 3 | Status: SHIPPED | OUTPATIENT
Start: 2020-07-14 | End: 2021-05-21 | Stop reason: SDUPTHER

## 2020-07-14 RX ORDER — BUDESONIDE AND FORMOTEROL FUMARATE DIHYDRATE 160; 4.5 UG/1; UG/1
2 AEROSOL RESPIRATORY (INHALATION) 2 TIMES DAILY
Qty: 1 INHALER | Refills: 3 | Status: SHIPPED | OUTPATIENT
Start: 2020-07-14 | End: 2020-07-31 | Stop reason: ALTCHOICE

## 2020-07-14 NOTE — UTILIZATION REVIEW
Notification of Discharge  This is a Notification of Discharge from our facility [address]  Please be advised that this patient has been discharge from our facility  Below you will find the admission and discharge date and time including the patients disposition  PRESENTATION DATE: 7/6/2020  1:06 AM  OBS ADMISSION DATE:   IP ADMISSION DATE: 7/6/20 0425   DISCHARGE DATE: 7/10/2020  4:30 PM  DISPOSITION: Home/Self Care Home/Self Care   Admission Orders listed below:  Admission Orders (From admission, onward)     Ordered        07/06/20 0425  Inpatient Admission  Once                   Please contact the UR Department if additional information is required to close this patient's authorization/case  [address] [facility] Utilization Review Department  Main: [phone] x carefully listen to the prompts  All voicemails are confidential   [email or web address]  Send all requests for admission clinical reviews, approved or denied determinations and any other requests to dedicated fax number below belonging to the campus where the patient is receiving treatment   List of dedicated fax numbers:  [address] DENIALS (Administrative/Medical Necessity) [phone]   [address] (Maternity/NICU/Pediatrics) [phone]   Calista Pires [phone]   Correa Delay [phone]   [facility] [phone]   [facility] [address] [place] [address] [phone]   [facility]  [phone]   [address] [facility] [address]  [address] [facility] [address] [facility] [phone]

## 2020-07-14 NOTE — PROGRESS NOTES
FAMILY PRACTICE OFFICE VISIT       NAME: Guilherme Hirsch  AGE: 63 y o  SEX: male       : 1965        MRN: [id number]    DATE: 7/15/2020  TIME: 7:23 AM    Assessment and Plan     Problem List Items Addressed This Visit        Endocrine    Diabetes mellitus type 2, uncontrolled ([address] )     Diabetes  A1c was stable at 7 0  He will continue current regimen of Janumet  mg twice daily  Lab Results   Component Value Date    HGBA1C 7 0 (H) 2020               Cardiovascular and Mediastinum    Benign essential hypertension     Hypertension  Patient blood pressure is stable at this time he will continue with current regimen of medications  He is scheduled to see his cardiologist next week  CHF (congestive heart failure) ([facility])     Wt Readings from Last 3 Encounters:   20 114 kg (252 lb)   07/10/20 115 kg (253 lb 12 8 oz)   10/21/19 121 kg (266 lb 9 6 oz)       Congestive heart failure? Although chest x-ray was unremarkable patient was diuresed and lost several lb due to fluid overload  He does feel much better at this time with his weight  He was advised to abstain from smoking and alcohol  He will follow up with cardiologist              Other    Alcohol abuse    Cough     Cough  We discussed the etiology of his cough possibly being related to recent viral infection or his history of smoking  He was given prescription for Flovent 220 mcg inhaler to use 1 inhalation twice daily    Patient will call if symptoms persist without improvement over the next 7-10 days           Other Visit Diagnoses     Mild intermittent reactive airway disease without complication    -  Primary    Relevant Medications    budesonide-formoterol (SYMBICORT) 160-4 5 mcg/act inhaler        TCM Call (since 2020)     Date and time call was made  2020  9:38 AM    Hospital care reviewed  Records reviewed    Patient was hospitialized at  [address] [place] [address]    Date of Admission  20    Date of discharge  07/10/20    Diagnosis   Hypertensive Urgency, CHF    Disposition  Home    Were the patients medications reviewed and updated  Yes    Current Symptoms  Cough      TCM Call (since 6/14/2020)     Post hospital issues  Reduced activity    Should patient be enrolled in anticoag monitoring? No    Scheduled for follow up? Yes    Did you obtain your prescribed medications  Yes    Do you need help managing your prescriptions or medications  No    Is transportation to your appointment needed  No    I have advised the patient to call PCP with any new or worsening symptoms  Sole Sampson, [address] or [facility]iant other    Are you recieving any outpatient services  No    Are you recieving home care services  No    Are you using any community resources  No    Current waiver services  No    Have you fallen in the last 12 months  No    Interperter language line needed  No    Counseling  Patient    Counseling topics  Importance of RX compliance    Comments  Apt scheduled for 07/14/20 at [facility] is a 63 y o  male patient with a history of hypertension, diabetes, tobacco abuse and alcohol abuse who originally presented to the hospital on 7/6/2020 due to shortness of breath  Patient presented to the hospital with worsening shortness of breath and a cough for the past 1 month  He reported a weight gain of about 10 lbs over the last 1 month as well some increased lower extremity edema  Patient admitted on presentation that he had been noncompliant with his medications over the last 1 year      On presentation to the hospital, patient was noted to be extremely hypertensive with systolic blood pressures of 269  Chest x-ray showed no acute findings and CTA chest was obtained given elevated D-dimer and revealed no evidence of PE  He received nitro paste in the emergency department and his blood pressure improved into the 960F systolic    He also received IV Lasix while in the emergency department  He was admitted for suspected volume overload and hypertensive urgency  Cardiology consult was obtained  Echocardiogram was obtained, EF 60%, G1DD  He was continued on IV Lasix until 7/9 when he was transitioned to p o  Lasix 40 mg b i d  which will be continued on discharge per Cardiology recommendations  Patient's blood pressure improved during his hospitalization  His blood pressure medication regimen at time of discharge includes amlodipine 10 mg daily, lisinopril 20 mg daily, Toprol XL 25 mg daily and Lasix 40 mg b i d   Patient was noted to have hypokalemia and hypomagnesemia during his hospitalization  He is being discharged on potassium chloride 40 mEq daily and magnesium oxide 400 mg b i d  Patient is to follow-up with his PCP as an outpatient for repeat BMP and magnesium level      Patient has a history of alcohol abuse and reportedly declined HOST referral   Alcohol cessation was advised  Patient's AST was noted to be mildly elevated on admission at 56, suspect due to alcohol abuse  Patient should follow-up with his PCP for repeat LFTs          Chief Complaint     Chief Complaint   Patient presents with   [address] [place] [address] Transition of Care Management       History of Present Illness     This is the 1st office visit for patient to become established with new PCP  I reviewed his discharge summary from his latest hospitalization  Patient was diuresed for fluid overload  Patient was also noted to have significant elevation in blood pressure  Patient has a history of not always been compliant with taking medications  He works as a [employer] at times 80 hours a week  He has a history of significant at alcohol use and smoking  At this time he states he is feeling much better and close to his baseline  He still has a lingering dry cough  He denies any documented fevers    He states currently he is not having any alcohol or cigarettes since his hospitalization  Review of Systems   Review of Systems   Constitutional: Negative  HENT: Negative  Respiratory: Positive for cough and wheezing  Cardiovascular: Negative  Gastrointestinal: Negative  Genitourinary: Negative  Musculoskeletal: Negative  Psychiatric/Behavioral: Negative          Active Problem List     Patient Active Problem List   Diagnosis    Abnormal EKG    Benign essential hypertension    Diabetes mellitus type 2, uncontrolled (HCC)    Elevated lipoprotein(a)    Microalbuminuria    Obesity    Chest pain in adult    Peripheral edema    Patient noncompliance    Hypertensive urgency    Hypokalemia    Hypomagnesemia    CH[facility]congestive heart failure) ([facility]    Alcohol abuse    Cough       Past Medical History:  Past Medical History:   Diagnosis Date    Diabetes mellitus ([facility] )     Hypertension     Inguinal hernia     3/25/15    Onychomycosis     5/24/17    Type 2 diabetes mellitus ([facility] ) 05/01/2012       Past Surgical History:  Past Surgical History:   Procedure Laterality Date    ARTHROSCOPY KNEE      with Medial and Lateral Meniscus Repair    HERNIA REPAIR      umbilical and inguinal hernia repairs (left)       Family History:  Family History   Problem Relation Age of Onset    Hypertension Mother         essential    Chronic bronchitis Father     Prostate cancer Father     Breast cancer Sister        Social History:  Social History     Socioeconomic History    Marital status: /Civil Union     Spouse name: Not on file    Number of children: Not on file    Years of education: Not on file    Highest education level: Not on file   Occupational History    Occupation: [employer]    Social Needs    Financial resource strain: Not on file    Food insecurity:     Worry: Not on file     Inability: Not on file   [facility] needs:     Medical: Not on file     Non-medical: Not on file   Tobacco Use    Smoking status: Current Some Day Smoker     Types: Cigars    Smokeless tobacco: Never Used    Tobacco comment: current every day smoker, per [employer]   Substance and Sexual Activity    Alcohol use: Yes     Alcohol/week: 4 0 standard drinks     Types: 4 Cans of beer per week     Comment: weekend: 1 pint of christian or 2 beers    Drug use: No    Sexual activity: Not on file   Lifestyle    Physical activity:     Days per week: Not on file     Minutes per session: Not on file    Stress: Not on file   Relationships    Social connections:     Talks on phone: Not on file     Gets together: Not on file     Attends Advent service: Not on file     Active member of club or organization: Not on file     Attends meetings of clubs or organizations: Not on file     Relationship status: Not on file    Intimate partner violence:     Fear of current or ex partner: Not on file     Emotionally abused: Not on file     Physically abused: Not on file     Forced sexual activity: Not on file   Other Topics Concern    Not on file   Social History Narrative    Alcohol dependence    Nicotine dependence    Denied, alcohol Use    Marital problems       Objective     Vitals:    07/14/20 1402   BP: 112/76   Pulse: 90   Resp: 15   Temp: 98 4 °F (36 9 °C)   SpO2: 94%     Wt Readings from Last 3 Encounters:   07/14/20 114 kg (252 lb)   07/10/20 115 kg (253 lb 12 8 oz)   10/21/19 121 kg (266 lb 9 6 oz)       Physical Exam   Constitutional: He is oriented to person, place, and time  He appears well-developed and well-nourished  No distress  HENT:   Right Ear: External ear normal    Left Ear: External ear normal    Eyes: Pupils are equal, round, and reactive to light  Conjunctivae and EOM are normal  Right eye exhibits no discharge  Left eye exhibits no discharge  Neck:   No carotid bruit   Cardiovascular: Normal rate, regular rhythm and normal heart sounds  No murmur heard  Pulmonary/Chest: Effort normal  No respiratory distress  He has wheezes  He has no rales  Patient with slight inspiratory wheezing   Abdominal:   Abdomen is soft, nontender with positive bowel sounds  There is no rebound or guarding  No masses palpated   Musculoskeletal: He exhibits no edema  Lymphadenopathy:     He has no cervical adenopathy  Neurological: He is alert and oriented to person, place, and time  No cranial nerve deficit  Coordination normal    Skin: He is not diaphoretic  Psychiatric: He has a normal mood and affect  His behavior is normal  Judgment and thought content normal        Pertinent Laboratory/Diagnostic Studies:  Lab Results   Component Value Date    GLUCOSE 188 (H) 01/16/2014    BUN 13 07/10/2020    CREATININE 0 94 07/10/2020    CALCIUM 8 5 07/10/2020     12/21/2017    K 3 8 07/10/2020    CO2 29 07/10/2020     07/10/2020     Lab Results   Component Value Date    ALT 31 07/06/2020    AST 56 (H) 07/06/2020    ALKPHOS 105 07/06/2020    BILITOT 1 3 (H) 12/21/2017       Lab Results   Component Value Date    WBC 7 89 07/07/2020    HGB 14 2 07/07/2020    HCT 40 8 07/07/2020    MCV 96 07/07/2020     07/07/2020       No results found for: TSH    Lab Results   Component Value Date    CHOL 153 12/21/2017     Lab Results   Component Value Date    TRIG 65 07/06/2020     Lab Results   Component Value Date    HDL 47 07/06/2020     Lab Results   Component Value Date    LDLCALC 77 07/06/2020     Lab Results   Component Value Date    HGBA1C 7 0 (H) 07/06/2020       Results for orders placed or performed during the hospital encounter of 07/06/20   Novel Coronavirus (Covid-19),PCR [facility]   Result Value Ref Range    SARS-CoV-2 Negative Negative   Blood culture #1   Result Value Ref Range    Blood Culture No Growth After 5 Days  Blood culture #2   Result Value Ref Range    Blood Culture No Growth After 5 Days      Legionella antigen, urine   Result Value Ref Range    Legionella Urinary Antigen Negative Negative   Strep Pneumoniae, Urine   Result Value Ref Range    Strep pneumoniae antigen, urine Negative Negative   CBC and differential   Result Value Ref Range    WBC 8 82 4 31 - 10 16 Thousand/uL    RBC 4 67 3 88 - 5 62 Million/uL    Hemoglobin 15 2 12 0 - 17 0 g/dL    Hematocrit 44 9 36 5 - 49 3 %    MCV 96 82 - 98 fL    MCH 32 5 26 8 - 34 3 pg    MCHC 33 9 31 4 - 37 4 g/dL    RDW 12 6 11 6 - 15 1 %    MPV 9 8 8 9 - 12 7 fL    Platelets 671 248 - 048 Thousands/uL    nRBC 0 /100 WBCs    Neutrophils Relative 29 (L) 43 - 75 %    Immat GRANS % 0 0 - 2 %    Lymphocytes Relative 48 (H) 14 - 44 %    Monocytes Relative 8 4 - 12 %    Eosinophils Relative 14 (H) 0 - 6 %    Basophils Relative 1 0 - 1 %    Neutrophils Absolute 2 56 1 85 - 7 62 Thousands/µL    Immature Grans Absolute 0 03 0 00 - 0 20 Thousand/uL    Lymphocytes Absolute 4 23 0 60 - 4 47 Thousands/µL    Monocytes Absolute 0 73 0 17 - 1 22 Thousand/µL    Eosinophils Absolute 1 20 (H) 0 00 - 0 61 Thousand/µL    Basophils Absolute 0 07 0 00 - 0 10 Thousands/µL   Protime-INR   Result Value Ref Range    Protime 13 7 11 6 - 14 5 seconds    INR 1 12 0 84 - 1 19   APTT   Result Value Ref Range    PTT 30 23 - 37 seconds   Comprehensive metabolic panel   Result Value Ref Range    Sodium 136 136 - 145 mmol/L    Potassium 3 0 (L) 3 5 - 5 3 mmol/L    Chloride 97 (L) 100 - 108 mmol/L    CO2 33 (H) 21 - 32 mmol/L    ANION GAP 6 4 - 13 mmol/L    BUN 7 5 - 25 mg/dL    Creatinine 0 97 0 60 - 1 30 mg/dL    Glucose 149 (H) 65 - 140 mg/dL    Calcium 8 2 (L) 8 3 - 10 1 mg/dL    AST 56 (H) 5 - 45 U/L    ALT 31 12 - 78 U/L    Alkaline Phosphatase 105 46 - 116 U/L    Total Protein 8 9 (H) 6 4 - 8 2 g/dL    Albumin 3 8 3 5 - 5 0 g/dL    Total Bilirubin 0 92 0 20 - 1 00 mg/dL    eGFR 100 ml/min/1 73sq m   TSH, 3rd generation with Free T4 reflex   Result Value Ref Range    TSH 3RD GENERATON 7 191 (H) 0 358 - 3 740 uIU/mL   Magnesium   Result Value Ref Range    Magnesium 1 1 (L) 1 6 - 2 6 mg/dL   Lactic acid   Result Value Ref Range    LACTIC ACID 1 0 0 5 - 2 0 mmol/L   D-Dimer   Result Value Ref Range    D-Dimer, Quant 0 61 (H) <0 50 ug/ml FEU   Troponin I   Result Value Ref Range    Troponin I <0 02 <=0 04 ng/mL   NT-BNP PRO   Result Value Ref Range    NT-proBNP 430 (H) <125 pg/mL   Blood gas, venous   Result Value Ref Range    pH, Mark 7 342 7 300 - 7 400    pCO2, Mark 59 3 (H) 42 0 - 50 0 mm Hg    pO2, Mark 37 1 35 0 - 45 0 mm Hg    HCO3, Mark 31 4 (H) 24 - 30 mmol/L    Base Excess, Mark 3 8 mmol/L    O2 Content, Mark 14 3 ml/dL    O2 HGB, VENOUS 64 1 60 0 - 80 0 %   Beta Hydroxybutyrate   Result Value Ref Range    BETA-HYDROXYBUTYRATE 0 2 <0 6 mmol/L   T4, free   Result Value Ref Range    Free T4 1 17 0 76 - 1 46 ng/dL   Urine Microscopic   Result Value Ref Range    RBC, UA 1-2 (A) None Seen, 0-5 /hpf    WBC, UA None Seen None Seen, 0-5, 5-55, 5-65 /hpf    Epithelial Cells None Seen None Seen, Occasional /hpf    Bacteria, UA None Seen None Seen, Occasional /hpf   Hemoglobin A1c w/EAG Estimation (Orders if not completed within the last 90 days)   Result Value Ref Range    Hemoglobin A1C 7 0 (H) Normal 3 8-5 6%; PreDiabetic 5 7-6 4%;  Diabetic >=6 5%; Glycemic control for adults with diabetes <7 0% %     mg/dl   Troponin I   Result Value Ref Range    Troponin I 0 03 <=0 04 ng/mL   CBC (With Platelets)   Result Value Ref Range    WBC 7 68 4 31 - 10 16 Thousand/uL    RBC 4 01 3 88 - 5 62 Million/uL    Hemoglobin 13 2 12 0 - 17 0 g/dL    Hematocrit 38 6 36 5 - 49 3 %    MCV 96 82 - 98 fL    MCH 32 9 26 8 - 34 3 pg    MCHC 34 2 31 4 - 37 4 g/dL    RDW 12 5 11 6 - 15 1 %    Platelets 715 031 - 041 Thousands/uL    MPV 9 9 8 9 - 12 7 fL   Basic metabolic panel   Result Value Ref Range    Sodium 132 (L) 136 - 145 mmol/L    Potassium 4 5 3 5 - 5 3 mmol/L    Chloride 97 (L) 100 - 108 mmol/L    CO2 19 (L) 21 - 32 mmol/L    ANION GAP 16 (H) 4 - 13 mmol/L    BUN 6 5 - 25 mg/dL    Creatinine 0 69 0 60 - 1 30 mg/dL    Glucose 198 (H) 65 - 140 mg/dL    Calcium 7 7 (L) 8 3 - 10 1 mg/dL    eGFR 123 ml/min/1 73sq m   Lipid panel   Result Value Ref Range    Cholesterol 137 50 - 200 mg/dL    Triglycerides 65 <=150 mg/dL    HDL, Direct 47 >=40 mg/dL    LDL Calculated 77 0 - 100 mg/dL    Non-HDL-Chol (CHOL-HDL) 90 mg/dl   Magnesium   Result Value Ref Range    Magnesium 1 8 1 6 - 2 6 mg/dL   Procalcitonin   Result Value Ref Range    Procalcitonin 0 16 <=0 25 ng/ml   CBC and differential   Result Value Ref Range    WBC 7 89 4 31 - 10 16 Thousand/uL    RBC 4 24 3 88 - 5 62 Million/uL    Hemoglobin 14 2 12 0 - 17 0 g/dL    Hematocrit 40 8 36 5 - 49 3 %    MCV 96 82 - 98 fL    MCH 33 5 26 8 - 34 3 pg    MCHC 34 8 31 4 - 37 4 g/dL    RDW 12 6 11 6 - 15 1 %    MPV 10 0 8 9 - 12 7 fL    Platelets 232 753 - 002 Thousands/uL    nRBC 0 /100 WBCs    Neutrophils Relative 36 (L) 43 - 75 %    Immat GRANS % 0 0 - 2 %    Lymphocytes Relative 39 14 - 44 %    Monocytes Relative 9 4 - 12 %    Eosinophils Relative 15 (H) 0 - 6 %    Basophils Relative 1 0 - 1 %    Neutrophils Absolute 2 85 1 85 - 7 62 Thousands/µL    Immature Grans Absolute 0 01 0 00 - 0 20 Thousand/uL    Lymphocytes Absolute 3 11 0 60 - 4 47 Thousands/µL    Monocytes Absolute 0 68 0 17 - 1 22 Thousand/µL    Eosinophils Absolute 1 18 (H) 0 00 - 0 61 Thousand/µL    Basophils Absolute 0 06 0 00 - 0 10 Thousands/µL   Basic metabolic panel   Result Value Ref Range    Sodium 134 (L) 136 - 145 mmol/L    Potassium 3 3 (L) 3 5 - 5 3 mmol/L    Chloride 97 (L) 100 - 108 mmol/L    CO2 27 21 - 32 mmol/L    ANION GAP 10 4 - 13 mmol/L    BUN 8 5 - 25 mg/dL    Creatinine 0 96 0 60 - 1 30 mg/dL    Glucose 170 (H) 65 - 140 mg/dL    Calcium 8 0 (L) 8 3 - 10 1 mg/dL    eGFR 102 ml/min/1 73sq m   Magnesium   Result Value Ref Range    Magnesium 1 2 (L) 1 6 - 2 6 mg/dL   Basic metabolic panel   Result Value Ref Range    Sodium 136 136 - 145 mmol/L    Potassium 3 3 (L) 3 5 - 5 3 mmol/L    Chloride 98 (L) 100 - 108 mmol/L    CO2 32 21 - 32 mmol/L    ANION GAP 6 4 - 13 mmol/L BUN 9 5 - 25 mg/dL    Creatinine 1 01 0 60 - 1 30 mg/dL    Glucose 164 (H) 65 - 140 mg/dL    Calcium 7 9 (L) 8 3 - 10 1 mg/dL    eGFR 96 ml/min/1 73sq m   Magnesium   Result Value Ref Range    Magnesium 1 2 (L) 1 6 - 2 6 mg/dL   Basic metabolic panel   Result Value Ref Range    Sodium 135 (L) 136 - 145 mmol/L    Potassium 3 7 3 5 - 5 3 mmol/L    Chloride 98 (L) 100 - 108 mmol/L    CO2 30 21 - 32 mmol/L    ANION GAP 7 4 - 13 mmol/L    BUN 14 5 - 25 mg/dL    Creatinine 1 11 0 60 - 1 30 mg/dL    Glucose 262 (H) 65 - 140 mg/dL    Calcium 8 3 8 3 - 10 1 mg/dL    eGFR 85 ml/min/1 73sq m   Magnesium   Result Value Ref Range    Magnesium 1 5 (L) 1 6 - 2 6 mg/dL   Basic metabolic panel   Result Value Ref Range    Sodium 138 136 - 145 mmol/L    Potassium 3 8 3 5 - 5 3 mmol/L    Chloride 100 100 - 108 mmol/L    CO2 29 21 - 32 mmol/L    ANION GAP 9 4 - 13 mmol/L    BUN 13 5 - 25 mg/dL    Creatinine 0 94 0 60 - 1 30 mg/dL    Glucose 124 65 - 140 mg/dL    Calcium 8 5 8 3 - 10 1 mg/dL    eGFR 104 ml/min/1 73sq m   Magnesium   Result Value Ref Range    Magnesium 1 5 (L) 1 6 - 2 6 mg/dL   ECG 12 lead   Result Value Ref Range    Ventricular Rate 105 BPM    Atrial Rate 109 BPM    KS Interval 144 ms    QRSD Interval 82 ms    QT Interval 340 ms    QTC Interval 450 ms    P Axis 77 degrees    QRS Axis 43 degrees    T Wave Axis 75 degrees   Fingerstick Glucose (POCT)   Result Value Ref Range    POC Glucose 151 (H) 65 - 140 mg/dl   Urine Macroscopic, POC   Result Value Ref Range    Color, UA Yellow     Clarity, UA Clear     pH, UA 7 5 4 5 - 8 0    Leukocytes, UA Negative Negative    Nitrite, UA Negative Negative    Protein, UA 30 (1+) (A) Negative mg/dl    Glucose, UA Negative Negative mg/dl    Ketones, UA Negative Negative mg/dl    Urobilinogen, UA 0 2 0 2, 1 0 E U /dl E U /dl    Bilirubin, UA Negative Negative    Blood, UA Trace (A) Negative    Specific Gravity, UA 1 020 1 003 - 1 030   Fingerstick Glucose (POCT)   Result Value Ref Range    POC Glucose 196 (H) 65 - 140 mg/dl   Fingerstick Glucose (POCT)   Result Value Ref Range    POC Glucose 244 (H) 65 - 140 mg/dl   Fingerstick Glucose (POCT)   Result Value Ref Range    POC Glucose 218 (H) 65 - 140 mg/dl   Fingerstick Glucose (POCT)   Result Value Ref Range    POC Glucose 208 (H) 65 - 140 mg/dl   Fingerstick Glucose (POCT)   Result Value Ref Range    POC Glucose 173 (H) 65 - 140 mg/dl   Fingerstick Glucose (POCT)   Result Value Ref Range    POC Glucose 340 (H) 65 - 140 mg/dl   Fingerstick Glucose (POCT)   Result Value Ref Range    POC Glucose 252 (H) 65 - 140 mg/dl   Fingerstick Glucose (POCT)   Result Value Ref Range    POC Glucose 251 (H) 65 - 140 mg/dl   Fingerstick Glucose (POCT)   Result Value Ref Range    POC Glucose 182 (H) 65 - 140 mg/dl   Fingerstick Glucose (POCT)   Result Value Ref Range    POC Glucose 287 (H) 65 - 140 mg/dl   Fingerstick Glucose (POCT)   Result Value Ref Range    POC Glucose 225 (H) 65 - 140 mg/dl   Fingerstick Glucose (POCT)   Result Value Ref Range    POC Glucose 274 (H) 65 - 140 mg/dl   Fingerstick Glucose (POCT)   Result Value Ref Range    POC Glucose 137 65 - 140 mg/dl   Fingerstick Glucose (POCT)   Result Value Ref Range    POC Glucose 250 (H) 65 - 140 mg/dl   Fingerstick Glucose (POCT)   Result Value Ref Range    POC Glucose 210 (H) 65 - 140 mg/dl   Fingerstick Glucose (POCT)   Result Value Ref Range    POC Glucose 207 (H) 65 - 140 mg/dl   Fingerstick Glucose (POCT)   Result Value Ref Range    POC Glucose 122 65 - 140 mg/dl   Fingerstick Glucose (POCT)   Result Value Ref Range    POC Glucose 154 (H) 65 - 140 mg/dl       No orders of the defined types were placed in this encounter        ALLERGIES:  No Known Allergies    Current Medications     Current Outpatient Medications   Medication Sig Dispense Refill    amLODIPine (NORVASC) 10 mg tablet Take 1 tablet (10 mg total) by mouth daily 30 tablet 0    furosemide (LASIX) 40 mg tablet Take 1 tablet (40 mg total) by mouth 2 (two) times a day 60 tablet 0    glucose blood test strip by In Vitro route daily      Lancets (ONETOUCH ULTRASOFT) lancets by Does not apply route      magnesium oxide (MAG-OX) 400 mg Take 1 tablet (400 mg total) by mouth 2 (two) times a day 60 tablet 0    metoprolol succinate (TOPROL-XL) 25 mg 24 hr tablet Take 1 tablet (25 mg total) by mouth daily 30 tablet 0    olmesartan (BENICAR) 40 mg tablet Take 1 tablet (40 mg total) by mouth daily 30 tablet 0    potassium chloride (K-DUR,KLOR-CON) 20 mEq tablet Take 2 tablets (40 mEq total) by mouth daily 60 tablet 0    budesonide-formoterol (SYMBICORT) 160-4 5 mcg/act inhaler Inhale 2 puffs 2 (two) times a day Rinse mouth after use  1 Inhaler 3    fluticasone (FLOVENT HFA) 220 mcg/act inhaler Inhale 1 puff 2 (two) times a day Rinse mouth after use  1 Inhaler 3     No current facility-administered medications for this visit            Health Maintenance     Health Maintenance   Topic Date Due    Pneumococcal Vaccine: Pediatrics (0 to 5 Years) and At-Risk Patients (6 to 59 Years) (1 of 1 - PPSV23) 03/05/1970    DM Eye Exam  03/05/1974    Annual Physical  03/05/1982    Diabetic Foot Exam  06/14/2017    Influenza Vaccine  07/01/2020    Depression Screening PHQ  10/21/2020    BMI: Followup Plan  10/21/2020    HEMOGLOBIN A1C  01/06/2021    BMI: Adult  07/14/2021    CRC Screening: Colonoscopy  05/13/2025    Pneumococcal Vaccine: 65+ Years (1 of 2 - PCV13) 03/05/2029    DTaP,Tdap,and Td Vaccines (2 - Td) 10/21/2029    HIV Screening  Completed    Hepatitis C Screening  Completed    HIB Vaccine  Aged Out    Hepatitis B Vaccine  Aged Out    IPV Vaccine  Aged Out    Hepatitis A Vaccine  Aged Out    Meningococcal ACWY Vaccine  Aged Out    HPV Vaccine  Aged Out     Immunization History   Administered Date(s) Administered    Influenza TIV (IM) 09/23/2015    Influenza, recombinant, quadrivalent,injectable, preservative free 10/21/2019    Tdap 59/46/6077       Yonatan Herr MD

## 2020-07-14 NOTE — TELEPHONE ENCOUNTER
Wife called and stated that the patients symbicort is 300 dollars with their insurance but they will cover the fluticasone (Flovent)  Could you please send a new script to the pharmacy?   Call patient to advise

## 2020-07-15 DIAGNOSIS — E11.65 UNCONTROLLED TYPE 2 DIABETES MELLITUS WITH HYPERGLYCEMIA (HCC): Primary | ICD-10-CM

## 2020-07-15 PROBLEM — R05.9 COUGH: Status: ACTIVE | Noted: 2020-07-15

## 2020-07-15 NOTE — ASSESSMENT & PLAN NOTE
Diabetes  A1c was stable at 7 0  He will continue current regimen of Janumet 50/1000 mg twice daily    Lab Results   Component Value Date    HGBA1C 7 0 (H) 07/06/2020

## 2020-07-15 NOTE — ASSESSMENT & PLAN NOTE
Hypertension  Patient blood pressure is stable at this time he will continue with current regimen of medications  He is scheduled to see his cardiologist next week  No

## 2020-07-15 NOTE — ASSESSMENT & PLAN NOTE
Cough  We discussed the etiology of his cough possibly being related to recent viral infection or his history of smoking  He was given prescription for Flovent 220 mcg inhaler to use 1 inhalation twice daily    Patient will call if symptoms persist without improvement over the next 7-10 days

## 2020-07-15 NOTE — ASSESSMENT & PLAN NOTE
Wt Readings from Last 3 Encounters:   07/14/20 114 kg (252 lb)   07/10/20 115 kg (253 lb 12 8 oz)   10/21/19 121 kg (266 lb 9 6 oz)       Congestive heart failure? Although chest x-ray was unremarkable patient was diuresed and lost several lb due to fluid overload  He does feel much better at this time with his weight  He was advised to abstain from smoking and alcohol    He will follow up with cardiologist

## 2020-07-17 NOTE — PROGRESS NOTES
Heart Failure   Follow Up Office Visit Note -     Christophe Jimenez   63 y o    male   MRN: [id number]  [address]  [address]  [address] [place], [place] [id number] [phone]    PCP: Juancarlos Manley MD  Cardiologist :   Will be Dr Jeronimo Presume          Summary of Recommendations  Low Na diet/ Hf education  Met with a dietician in the hosptial  NM myocardial stress test  Nonfasting BMP 1-2 weeks  Follow up will be scheduled with Dr Jeronimo Presume 1 month      Impression/plan  Dyspnea-he has multiple risk factors  Agreeable to nuclear stress test  Chronic diastolic heart failure, adm 7/6-7/10/20  --Ov wt 249lb  Home wt: 246lb  Wt Readings from Last 3 Encounters:   07/20/20 113 kg (249 lb)   07/14/20 114 kg (252 lb)   07/10/20 115 kg (253 lb 12 8 oz)      Discharge creatinine: 7/10/20   Last labs: o 94   Beta-Blocker: metoprolol succinate 25 mg daily  He has been on this in the past   ACEi, ARB or ARNi:   olmesartan 40 mg/d  Reported a cough while on lisinopril   Diuretic: Lasix 40 mg BID   Educated regarding adherence to a 1 5g -2 gram sodium diet/ 2000 ml fluid restriction, daily weights and to call us if he gains  2-3 lbs in 1 day or 5 lbs/ 1 week  Hypertension, essential  /70,  On olmesartan, metoprolol succinate, amlodipine  Hyperlipidemia  On atorvastatin 40 mg/d   7/6: LDL 77, non HDL 90  --CT chest:  7 /6:  Diffuse hepatic steatosis  Diabetes mellitus, type 2  On metformin  AMANDO, noncompliant with CPAP  Tobacco abuse- recently quit a few weeks ago  began at 47 yrs old  Was a light smoker  alcohol abuse  drinking 2 beers and bourbon every night with increased intake on days off  Hx medical noncompliance  Cardiac testing   TTE 7/7/20  EF 60%  No RWMA  MIld LVH  RV normal  Atria normal   No significant valve disease           HPI:   Christophe Jimenez is a 64 yo male with diabetes, hypertension, tobacco and alcohol abuse    He presented to the hospital July 6 with shortness of breath, cough, 10 lb weight gain  and lower extremity edema  He admitted to noncompliance with his medications over the last year  On admission he had an accelerated hypertension  He underwent CT the chest given elevated D-dimer  This demonstrated no evidence of pulmonary emboli  He was treated for acute diastolic heart failure and hypertensive urgency  Diuresed with IV Lasix  He was followed by Cardiology  An echocardiogram demonstrated preserved LV function and grade 1 diastolic dysfunction  Cardiology recommended Lasix 40 b i d at discharge  Additional Discharge medications included amlodipine 10 mg daily , olmesartan 40mg,daily, metoprolol succinate 25 mg daily  He was also discharged on supplemental potassium and magnesium  He admitted to drinking alcohol daily  He declined a host referral   Complete abstinence was advised  AST mildly elevated 56 suspected to be due to alcohol abuse  Advised follow-up with PCP for ongoing care    7/20/2020  He presents for hospital follow-up, accompanied by his wife  c/o cough on lisinopril when he was in the hospital  Switched to olmesartan  Had a chest x-ray and CT chest , PE study:  No PE  Heart and great vessels were described as unremarkable for his age  Wt is below DC wt  Complaint of dyspnea on exertion  He denies exertional chest pain  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  Compliant with his medications  I recommended periodic home blood pressure monitoring  I wrote a prescription for a home monitor  He reports he was educated about a low-salt diet  He did speak with a dietitian  I reviewed the the importance of reading food labels to facilitate adherence  Agreeable to a nuclear stress test to evaluate his dyspnea  EKGs, CXR, labs reviewed independently by myself       Assessment  Diagnoses and all orders for this visit:    Hospital discharge follow-up    Benign essential hypertension  -     Blood Pressure KIT; Twice a day periodically  -     NM myocardial perfusion spect (rx stress and/or rest); Future    Uncontrolled type 2 diabetes mellitus with hyperglycemia ([facility])  -     NM myocardial perfusion spect (rx stress and/or rest); Future    Elevated lipoprotein(a)    Hyperlipidemia, unspecified hyperlipidemia type  -     NM myocardial perfusion spect (rx stress and/or rest); Future    Alcohol abuse    Chronic diastolic HF (heart failure) ([facility])  -     Basic metabolic panel; Future  -     Magnesium; Future    Tobacco abuse  -     NM myocardial perfusion spect (rx stress and/or rest); Future        Past Medical History:   Diagnosis Date    Diabetes mellitus ([place] [address] )     Hypertension     Inguinal hernia     3/25/15    Onychomycosis     5/24/17    Type 2 diabetes mellitus ([place] [address] ) 05/01/2012       Review of Systems   Constitution: Negative for chills  Cardiovascular: Positive for dyspnea on exertion  Negative for chest pain, claudication, cyanosis, irregular heartbeat, leg swelling, near-syncope, orthopnea, palpitations, paroxysmal nocturnal dyspnea and syncope  Respiratory: Positive for cough and shortness of breath  Gastrointestinal: Negative for heartburn and nausea  Neurological: Negative for dizziness, focal weakness, headaches, light-headedness and weakness  All other systems reviewed and are negative  No Known Allergies        Current Outpatient Medications:     amLODIPine (NORVASC) 10 mg tablet, Take 1 tablet (10 mg total) by mouth daily, Disp: 30 tablet, Rfl: 0    fluticasone (FLOVENT HFA) 220 mcg/act inhaler, Inhale 1 puff 2 (two) times a day Rinse mouth after use , Disp: 1 Inhaler, Rfl: 3    furosemide (LASIX) 40 mg tablet, Take 1 tablet (40 mg total) by mouth 2 (two) times a day, Disp: 60 tablet, Rfl: 0    glucose blood test strip, by In Vitro route daily, Disp: , Rfl:     Lancets (ONETOUCH ULTRASOFT) lancets, by Does not apply route, Disp: , Rfl:     magnesium oxide (MAG-OX) 400 mg, Take 1 tablet (400 mg total) by mouth 2 (two) times a day, Disp: 60 tablet, Rfl: 0    metoprolol succinate (TOPROL-XL) 25 mg 24 hr tablet, Take 1 tablet (25 mg total) by mouth daily, Disp: 30 tablet, Rfl: 0    olmesartan (BENICAR) 40 mg tablet, Take 1 tablet (40 mg total) by mouth daily, Disp: 30 tablet, Rfl: 0    potassium chloride (K-DUR,KLOR-CON) 20 mEq tablet, Take 2 tablets (40 mEq total) by mouth daily, Disp: 60 tablet, Rfl: 0    sitaGLIPtin-metFORMIN (JANUMET)  MG per tablet, Take 1 tablet by mouth 2 (two) times a day with meals, Disp: 180 tablet, Rfl: 1    Blood Pressure KIT, Twice a day periodically, Disp: 1 each, Rfl: 0    budesonide-formoterol (SYMBICORT) 160-4 5 mcg/act inhaler, Inhale 2 puffs 2 (two) times a day Rinse mouth after use  (Patient not taking: Reported on 7/20/2020), Disp: 1 Inhaler, Rfl: 3    Social History     Socioeconomic History    Marital status: /Civil Union     Spouse name: Not on file    Number of children: Not on file    Years of education: Not on file    Highest education level: Not on file   Occupational History    Occupation: [employer]    Social Needs    Financial resource strain: Not on file    Food insecurity:     Worry: Not on file     Inability: Not on file   Swarm64 needs:     Medical: Not on file     Non-medical: Not on file   Tobacco Use    Smoking status: Former Smoker     Types: Cigars     Start date: 6/22/2020    Smokeless tobacco: Never Used    Tobacco comment: current every day smoker, per [employer]   Substance and Sexual Activity    Alcohol use:  Yes     Alcohol/week: 4 0 standard drinks     Types: 4 Cans of beer per week     Comment: weekend: 1 pint of christian or 2 beers    Drug use: No    Sexual activity: Not on file   Lifestyle    Physical activity:     Days per week: Not on file     Minutes per session: Not on file    Stress: Not on file   Relationships    Social connections:     Talks on phone: Not on file     Gets together: Not on file     Attends Gnosticist service: Not on file     Active member of club or organization: Not on file     Attends meetings of clubs or organizations: Not on file     Relationship status: Not on file    Intimate partner violence:     Fear of current or ex partner: Not on file     Emotionally abused: Not on file     Physically abused: Not on file     Forced sexual activity: Not on file   Other Topics Concern    Not on file   Social History Narrative    Alcohol dependence    Nicotine dependence    Denied, alcohol Use    Marital problems       Family History   Problem Relation Age of Onset    Hypertension Mother         essential    Chronic bronchitis Father     Prostate cancer Father     Breast cancer Sister        Physical Exam   Constitutional: He is oriented to person, place, and time  No distress  HENT:   Head: Normocephalic and atraumatic  Eyes: Conjunctivae and EOM are normal    Neck: Normal range of motion  Neck supple  Cardiovascular: Normal rate, regular rhythm, normal heart sounds and intact distal pulses  Pulmonary/Chest: Effort normal  He has decreased breath sounds  Abdominal: Soft  Bowel sounds are normal    Musculoskeletal: Normal range of motion  Trace to +1 bilateral lower extremity edema   Neurological: He is alert and oriented to person, place, and time  Skin: Skin is warm and dry  Psychiatric: He has a normal mood and affect  Nursing note and vitals reviewed  Vitals: Blood pressure 118/70, pulse 87, height 5' 9" (1 753 m), weight 113 kg (249 lb), SpO2 95 %     Wt Readings from Last 3 Encounters:   07/20/20 113 kg (249 lb)   07/14/20 114 kg (252 lb)   07/10/20 115 kg (253 lb 12 8 oz)         Labs & Results:  Lab Results   Component Value Date    WBC 7 89 07/07/2020    HGB 14 2 07/07/2020    HCT 40 8 07/07/2020    MCV 96 07/07/2020     07/07/2020     No results found for: BNP  No components found for: CHEM    Results for orders placed during the hospital encounter of 20   Echo complete with contrast if indicated    Narrative [address], [facility]  [phone]    Transthoracic Echocardiogram  2D, M-mode, Doppler, and Color Doppler    Study date:  2020    Patient: Sherley Antunez  MR number: [id number]  Account number: [de-identified]  : 1965  Age: 63 years  Gender: Male  Status: Inpatient  Location: Bedside  Height: 69 in  Weight: 256 lb  BP: 194/ 140 mmHg    Indications: Assess left ventricular function  Diagnoses: I50 9 - Heart failure, unspecified    Sonographer:  Aleyda Staton RDCS  Referring Physician:  Himanshu Sewell PA-C  Group:  [facility]  Interpreting Physician:  Jaida Tovar MD    SUMMARY    LEFT VENTRICLE:  Systolic function was normal  Ejection fraction was estimated to be 60 %  There were no regional wall motion abnormalities  There was mild concentric hypertrophy  Doppler parameters were consistent with abnormal left ventricular relaxation (grade 1 diastolic dysfunction)  RIGHT VENTRICLE:  The size was normal   Systolic function was normal     HISTORY: PRIOR HISTORY: hypertension, DM    PROCEDURE: The procedure was performed at the bedside  This was a routine study  The transthoracic approach was used  The study included complete 2D imaging, M-mode, complete spectral Doppler, and color Doppler  The heart rate was 92 bpm,  at the start of the study  Images were obtained from the parasternal, apical, subcostal, and suprasternal notch acoustic windows  Image quality was adequate  LEFT VENTRICLE: Size was normal  Systolic function was normal  Ejection fraction was estimated to be 60 %  There were no regional wall motion abnormalities  Wall thickness was mildly increased  There was mild concentric hypertrophy  DOPPLER: There was an increased relative contribution of atrial contraction to ventricular filling   Doppler parameters were consistent with abnormal left ventricular relaxation (grade 1 diastolic dysfunction)  RIGHT VENTRICLE: The size was normal  Systolic function was normal  Wall thickness was normal     LEFT ATRIUM: Size was normal     RIGHT ATRIUM: Size was normal     MITRAL VALVE: Valve structure was normal  There was normal leaflet separation  DOPPLER: The transmitral velocity was within the normal range  There was no evidence for stenosis  There was trace regurgitation  AORTIC VALVE: The valve was trileaflet  Leaflets exhibited normal thickness and normal cuspal separation  DOPPLER: Transaortic velocity was within the normal range  There was no evidence for stenosis  There was no significant  regurgitation  TRICUSPID VALVE: The valve structure was normal  There was normal leaflet separation  DOPPLER: The transtricuspid velocity was within the normal range  There was no evidence for stenosis  There was trace regurgitation  PULMONIC VALVE: Leaflets exhibited normal thickness, no calcification, and normal cuspal separation  DOPPLER: The transpulmonic velocity was within the normal range  There was trace regurgitation  PERICARDIUM: There was no pericardial effusion  The pericardium was normal in appearance  AORTA: The root exhibited normal size  SYSTEMIC VEINS: IVC: The inferior vena cava was not well visualized      SYSTEM MEASUREMENT TABLES    2D  %FS: 26 56 %  Ao Diam: 3 36 cm  EDV(Teich): 81 1 ml  EF(Teich): 52 29 %  ESV(Teich): 38 69 ml  IVSd: 1 11 cm  LA Area: 16 43 cm2  LA Diam: 3 38 cm  LVEDV MOD A4C: 177 26 ml  LVEF MOD A4C: 63 43 %  LVESV MOD A4C: 64 83 ml  LVIDd: 4 26 cm  LVIDs: 3 13 cm  LVLd A4C: 8 47 cm  LVLs A4C: 6 48 cm  LVPWd: 1 4 cm  RA Area: 13 59 cm2  RVIDd: 3 95 cm  SV MOD A4C: 112 43 ml  SV(Teich): 42 41 ml    PW  E': 0 06 m/s  E/E': 7 32  MV A Carson: 0 71 m/s  MV Dec Free[place]: 3 5 m/s2  MV DecT: 133 7 ms  MV E Carson: 0 47 m/s  MV E/A Ratio: 0 66  MV PHT: 38 77 ms  MVA By PHT: 5 67 cm2    [facility]    Prepared and electronically signed by    Nabil Goodman MD  Signed 07-Jul-2020 15:53:23       No results found for this or any previous visit  This note was completed in part utilizing [employer] fluency direct voice recognition software  Grammatical errors, random word insertion, spelling mistakes, and incomplete sentences may be an occasional consequence of the system secondary to software limitations, ambient noise and hardware issues  At the time of dictation, efforts were made to edit, clarify and /or correct errors  Please read the chart carefully and recognize, using context, where substitutions have occurred    If you have any questions or concerns about the context, text or information contained within the body of this dictation, please contact myself, the provider, for further clarification

## 2020-07-20 ENCOUNTER — OFFICE VISIT (OUTPATIENT)
Dept: CARDIOLOGY CLINIC | Facility: CLINIC | Age: 55
End: 2020-07-20
Payer: COMMERCIAL

## 2020-07-20 VITALS
WEIGHT: 249 LBS | SYSTOLIC BLOOD PRESSURE: 118 MMHG | DIASTOLIC BLOOD PRESSURE: 70 MMHG | HEIGHT: 69 IN | HEART RATE: 87 BPM | OXYGEN SATURATION: 95 % | BODY MASS INDEX: 36.88 KG/M2

## 2020-07-20 DIAGNOSIS — F10.10 ALCOHOL ABUSE: ICD-10-CM

## 2020-07-20 DIAGNOSIS — E11.65 UNCONTROLLED TYPE 2 DIABETES MELLITUS WITH HYPERGLYCEMIA (HCC): ICD-10-CM

## 2020-07-20 DIAGNOSIS — Z09 HOSPITAL DISCHARGE FOLLOW-UP: Primary | ICD-10-CM

## 2020-07-20 DIAGNOSIS — E78.5 HYPERLIPIDEMIA, UNSPECIFIED HYPERLIPIDEMIA TYPE: ICD-10-CM

## 2020-07-20 DIAGNOSIS — I10 BENIGN ESSENTIAL HYPERTENSION: ICD-10-CM

## 2020-07-20 DIAGNOSIS — I50.32 CHRONIC DIASTOLIC HF (HEART FAILURE) (HCC): ICD-10-CM

## 2020-07-20 DIAGNOSIS — E78.41 ELEVATED LIPOPROTEIN(A): ICD-10-CM

## 2020-07-20 DIAGNOSIS — Z72.0 TOBACCO ABUSE: ICD-10-CM

## 2020-07-20 PROCEDURE — 1036F TOBACCO NON-USER: CPT | Performed by: NURSE PRACTITIONER

## 2020-07-20 PROCEDURE — 3008F BODY MASS INDEX DOCD: CPT | Performed by: NURSE PRACTITIONER

## 2020-07-20 PROCEDURE — 99215 OFFICE O/P EST HI 40 MIN: CPT | Performed by: NURSE PRACTITIONER

## 2020-07-20 PROCEDURE — 3074F SYST BP LT 130 MM HG: CPT | Performed by: NURSE PRACTITIONER

## 2020-07-20 PROCEDURE — 3078F DIAST BP <80 MM HG: CPT | Performed by: NURSE PRACTITIONER

## 2020-07-20 PROCEDURE — 3051F HG A1C>EQUAL 7.0%<8.0%: CPT | Performed by: NURSE PRACTITIONER

## 2020-07-20 PROCEDURE — 1111F DSCHRG MED/CURRENT MED MERGE: CPT | Performed by: NURSE PRACTITIONER

## 2020-07-20 PROCEDURE — 3066F NEPHROPATHY DOC TX: CPT | Performed by: NURSE PRACTITIONER

## 2020-07-20 RX ORDER — BLOOD PRESSURE TEST KIT
KIT MISCELLANEOUS
Qty: 1 EACH | Refills: 0 | Status: SHIPPED | OUTPATIENT
Start: 2020-07-20

## 2020-07-20 NOTE — LETTER
July 20, 2322     Coleen Chavez, [address]    Patient: Breanna Marlow   YOB: 1965   Date of Visit: 7/20/2020       Dear Dr Jarrod Linares: Thank you for referring Breanna Marlow to me for evaluation  Below are my notes for this consultation  If you have questions, please do not hesitate to call me  I look forward to following your patient along with you  Sincerely,        SHAY Crabtree        CC: MD Pratibha Benítez, [facility]  7/20/2020  2:53 PM  Sign at close encounter  Heart Failure   Follow Up Office Visit Note -     Breanna Marlow   63 y o    male   MRN: [id number]  [address]  [address]  [address] [place] [id number] [phone]    PCP: Coleen Chavez MD  Cardiologist :   Will be Dr Fabiola Gonzalez          Summary of Recommendations  Low Na diet/ Hf education  Met with a dietician in the hosptial  NM myocardial stress test  Nonfasting BMP 1-2 weeks  Follow up will be scheduled with Dr Fabiola Gonzalez 1 month      Impression/plan  Dyspnea-he has multiple risk factors  Agreeable to nuclear stress test  Chronic diastolic heart failure, adm 7/6-7/10/20  --Ov wt 249lb  Home wt: 246lb  Wt Readings from Last 3 Encounters:   07/20/20 113 kg (249 lb)   07/14/20 114 kg (252 lb)   07/10/20 115 kg (253 lb 12 8 oz)      Discharge creatinine: 7/10/20   Last labs: o 94   Beta-Blocker: metoprolol succinate 25 mg daily  He has been on this in the past   ACEi, ARB or ARNi:   olmesartan 40 mg/d  Reported a cough while on lisinopril   Diuretic: Lasix 40 mg BID   Educated regarding adherence to a 1 5g -2 gram sodium diet/ 2000 ml fluid restriction, daily weights and to call us if he gains  2-3 lbs in 1 day or 5 lbs/ 1 week  Hypertension, essential  /70,  On olmesartan, metoprolol succinate, amlodipine  Hyperlipidemia   On atorvastatin 40 mg/d   7/6: LDL 77, non HDL 90  --CT chest:  7 /6:  Diffuse hepatic steatosis  Diabetes mellitus, type 2  On metformin  AMANDO, noncompliant with CPAP  Tobacco abuse- recently quit a few weeks ago  began at 47 yrs old  Was a light smoker  alcohol abuse  drinking 2 beers and bourbon every night with increased intake on days off  Hx medical noncompliance  Cardiac testing   TTE 7/7/20  EF 60%  No RWMA  MIld LVH  RV normal  Atria normal   No significant valve disease           HPI:   Carlos Wu is a 64 yo male with diabetes, hypertension, tobacco and alcohol abuse  He presented to the hospital July 6 with shortness of breath, cough, 10 lb weight gain  and lower extremity edema  He admitted to noncompliance with his medications over the last year  On admission he had an accelerated hypertension  He underwent CT the chest given elevated D-dimer  This demonstrated no evidence of pulmonary emboli  He was treated for acute diastolic heart failure and hypertensive urgency  Diuresed with IV Lasix  He was followed by Cardiology  An echocardiogram demonstrated preserved LV function and grade 1 diastolic dysfunction  Cardiology recommended Lasix 40 b i d at discharge  Additional Discharge medications included amlodipine 10 mg daily , olmesartan 40mg,daily, metoprolol succinate 25 mg daily  He was also discharged on supplemental potassium and magnesium  He admitted to drinking alcohol daily  He declined a host referral   Complete abstinence was advised  AST mildly elevated 56 suspected to be due to alcohol abuse  Advised follow-up with PCP for ongoing care    7/20/2020  He presents for hospital follow-up, accompanied by his wife  c/o cough on lisinopril when he was in the hospital  Switched to olmesartan  Had a chest x-ray and CT chest , PE study:  No PE  Heart and great vessels were described as unremarkable for his age  Wt is below DC wt  Complaint of dyspnea on exertion  He denies exertional chest pain  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    Compliant with his medications  I recommended periodic home blood pressure monitoring  I wrote a prescription for a home monitor  He reports he was educated about a low-salt diet  He did speak with a dietitian  I reviewed the the importance of reading food labels to facilitate adherence  Agreeable to a nuclear stress test to evaluate his dyspnea  EKGs, CXR, labs reviewed independently by myself  Assessment  Diagnoses and all orders for this visit:    Hospital discharge follow-up    Benign essential hypertension  -     Blood Pressure KIT; Twice a day periodically  -     NM myocardial perfusion spect (rx stress and/or rest); Future    Uncontrolled type 2 diabetes mellitus with hyperglycemia ([facility])  -     NM myocardial perfusion spect (rx stress and/or rest); Future    Elevated lipoprotein(a)    Hyperlipidemia, unspecified hyperlipidemia type  -     NM myocardial perfusion spect (rx stress and/or rest); Future    Alcohol abuse    Chronic diastolic HF (heart failure) ([facility])  -     Basic metabolic panel; Future  -     Magnesium; Future    Tobacco abuse  -     NM myocardial perfusion spect (rx stress and/or rest); Future        Past Medical History:   Diagnosis Date    Diabetes mellitus ([place] [address] )     Hypertension     Inguinal hernia     3/25/15    Onychomycosis     5/24/17    Type 2 diabetes mellitus ([place] [address] ) 05/01/2012       Review of Systems   Constitution: Negative for chills  Cardiovascular: Positive for dyspnea on exertion  Negative for chest pain, claudication, cyanosis, irregular heartbeat, leg swelling, near-syncope, orthopnea, palpitations, paroxysmal nocturnal dyspnea and syncope  Respiratory: Positive for cough and shortness of breath  Gastrointestinal: Negative for heartburn and nausea  Neurological: Negative for dizziness, focal weakness, headaches, light-headedness and weakness  All other systems reviewed and are negative  No Known Allergies        Current Outpatient Medications:     amLODIPine (NORVASC) 10 mg tablet, Take 1 tablet (10 mg total) by mouth daily, Disp: 30 tablet, Rfl: 0    fluticasone (FLOVENT HFA) 220 mcg/act inhaler, Inhale 1 puff 2 (two) times a day Rinse mouth after use , Disp: 1 Inhaler, Rfl: 3    furosemide (LASIX) 40 mg tablet, Take 1 tablet (40 mg total) by mouth 2 (two) times a day, Disp: 60 tablet, Rfl: 0    glucose blood test strip, by In Vitro route daily, Disp: , Rfl:     Lancets (ONETOUCH ULTRASOFT) lancets, by Does not apply route, Disp: , Rfl:     magnesium oxide (MAG-OX) 400 mg, Take 1 tablet (400 mg total) by mouth 2 (two) times a day, Disp: 60 tablet, Rfl: 0    metoprolol succinate (TOPROL-XL) 25 mg 24 hr tablet, Take 1 tablet (25 mg total) by mouth daily, Disp: 30 tablet, Rfl: 0    olmesartan (BENICAR) 40 mg tablet, Take 1 tablet (40 mg total) by mouth daily, Disp: 30 tablet, Rfl: 0    potassium chloride (K-DUR,KLOR-CON) 20 mEq tablet, Take 2 tablets (40 mEq total) by mouth daily, Disp: 60 tablet, Rfl: 0    sitaGLIPtin-metFORMIN (JANUMET)  MG per tablet, Take 1 tablet by mouth 2 (two) times a day with meals, Disp: 180 tablet, Rfl: 1    Blood Pressure KIT, Twice a day periodically, Disp: 1 each, Rfl: 0    budesonide-formoterol (SYMBICORT) 160-4 5 mcg/act inhaler, Inhale 2 puffs 2 (two) times a day Rinse mouth after use   (Patient not taking: Reported on 7/20/2020), Disp: 1 Inhaler, Rfl: 3    Social History     Socioeconomic History    Marital status: /Civil Union     Spouse name: Not on file    Number of children: Not on file    Years of education: Not on file    Highest education level: Not on file   Occupational History    Occupation: [employer]    Social Needs    Financial resource strain: Not on file    Food insecurity:     Worry: Not on file     Inability: Not on file   Doujiao needs:     Medical: Not on file     Non-medical: Not on file   Tobacco Use    Smoking status: Former Smoker     Types: Cigars     Start date: 6/22/2020    Smokeless tobacco: Never Used    Tobacco comment: current every day smoker, per [employer]   Substance and Sexual Activity    Alcohol use: Yes     Alcohol/week: 4 0 standard drinks     Types: 4 Cans of beer per week     Comment: weekend: 1 pint of christian or 2 beers    Drug use: No    Sexual activity: Not on file   Lifestyle    Physical activity:     Days per week: Not on file     Minutes per session: Not on file    Stress: Not on file   Relationships    Social connections:     Talks on phone: Not on file     Gets together: Not on file     Attends Holiness service: Not on file     Active member of club or organization: Not on file     Attends meetings of clubs or organizations: Not on file     Relationship status: Not on file    Intimate partner violence:     Fear of current or ex partner: Not on file     Emotionally abused: Not on file     Physically abused: Not on file     Forced sexual activity: Not on file   Other Topics Concern    Not on file   Social History Narrative    Alcohol dependence    Nicotine dependence    Denied, alcohol Use    Marital problems       Family History   Problem Relation Age of Onset    Hypertension Mother         essential    Chronic bronchitis Father     Prostate cancer Father     Breast cancer Sister        Physical Exam   Constitutional: He is oriented to person, place, and time  No distress  HENT:   Head: Normocephalic and atraumatic  Eyes: Conjunctivae and EOM are normal    Neck: Normal range of motion  Neck supple  Cardiovascular: Normal rate, regular rhythm, normal heart sounds and intact distal pulses  Pulmonary/Chest: Effort normal  He has decreased breath sounds  Abdominal: Soft  Bowel sounds are normal    Musculoskeletal: Normal range of motion  Trace to +1 bilateral lower extremity edema   Neurological: He is alert and oriented to person, place, and time  Skin: Skin is warm and dry  Psychiatric: He has a normal mood and affect  Nursing note and vitals reviewed  Vitals: Blood pressure 118/70, pulse 87, height 5' 9" (1 753 m), weight 113 kg (249 lb), SpO2 95 %  Wt Readings from Last 3 Encounters:   20 113 kg (249 lb)   20 114 kg (252 lb)   07/10/20 115 kg (253 lb 12 8 oz)         Labs & Results:  Lab Results   Component Value Date    WBC 7 89 2020    HGB 14 2 2020    HCT 40 8 2020    MCV 96 2020     2020     No results found for: BNP  No components found for: CHEM    Results for orders placed during the hospital encounter of 20   Echo complete with contrast if indicated    Narrative [place] [address], [address] [facility]  [phone]    Transthoracic Echocardiogram  2D, M-mode, Doppler, and Color Doppler    Study date:  2020    Patient: Yoana Gallagher  MR number: [id number]  Account number: [de-identified]  : 1965  Age: 63 years  Gender: Male  Status: Inpatient  Location: Bedside  Height: 69 in  Weight: 256 lb  BP: 194/ 140 mmHg    Indications: Assess left ventricular function  Diagnoses: I50 9 - Heart failure, unspecified    Sonographer:  Mely Tim RDCS  Referring Physician:  Lauren Rinaldi PA-C  Group:  [facility]  Interpreting Physician:  Lucetta Bosworth, MD    SUMMARY    LEFT VENTRICLE:  Systolic function was normal  Ejection fraction was estimated to be 60 %  There were no regional wall motion abnormalities  There was mild concentric hypertrophy  Doppler parameters were consistent with abnormal left ventricular relaxation (grade 1 diastolic dysfunction)  RIGHT VENTRICLE:  The size was normal   Systolic function was normal     HISTORY: PRIOR HISTORY: hypertension, DM    PROCEDURE: The procedure was performed at the bedside  This was a routine study  The transthoracic approach was used  The study included complete 2D imaging, M-mode, complete spectral Doppler, and color Doppler   The heart rate was 92 bpm,  at the start of the study  Images were obtained from the parasternal, apical, subcostal, and suprasternal notch acoustic windows  Image quality was adequate  LEFT VENTRICLE: Size was normal  Systolic function was normal  Ejection fraction was estimated to be 60 %  There were no regional wall motion abnormalities  Wall thickness was mildly increased  There was mild concentric hypertrophy  DOPPLER: There was an increased relative contribution of atrial contraction to ventricular filling  Doppler parameters were consistent with abnormal left ventricular relaxation (grade 1 diastolic dysfunction)  RIGHT VENTRICLE: The size was normal  Systolic function was normal  Wall thickness was normal     LEFT ATRIUM: Size was normal     RIGHT ATRIUM: Size was normal     MITRAL VALVE: Valve structure was normal  There was normal leaflet separation  DOPPLER: The transmitral velocity was within the normal range  There was no evidence for stenosis  There was trace regurgitation  AORTIC VALVE: The valve was trileaflet  Leaflets exhibited normal thickness and normal cuspal separation  DOPPLER: Transaortic velocity was within the normal range  There was no evidence for stenosis  There was no significant  regurgitation  TRICUSPID VALVE: The valve structure was normal  There was normal leaflet separation  DOPPLER: The transtricuspid velocity was within the normal range  There was no evidence for stenosis  There was trace regurgitation  PULMONIC VALVE: Leaflets exhibited normal thickness, no calcification, and normal cuspal separation  DOPPLER: The transpulmonic velocity was within the normal range  There was trace regurgitation  PERICARDIUM: There was no pericardial effusion  The pericardium was normal in appearance  AORTA: The root exhibited normal size  SYSTEMIC VEINS: IVC: The inferior vena cava was not well visualized      SYSTEM MEASUREMENT TABLES    2D  %FS: 26 56 %  Ao Diam: 3 36 cm  EDV(Teich): 81 1 ml  EF(Teich): 52 29 %  ESV(Teich): 38 69 ml  IVSd: 1 11 cm  LA Area: 16 43 cm2  LA Diam: 3 38 cm  LVEDV MOD A4C: 177 26 ml  LVEF MOD A4C: 63 43 %  LVESV MOD A4C: 64 83 ml  LVIDd: 4 26 cm  LVIDs: 3 13 cm  LVLd A4C: 8 47 cm  LVLs A4C: 6 48 cm  LVPWd: 1 4 cm  RA Area: 13 59 cm2  RVIDd: 3 95 cm  SV MOD A4C: 112 43 ml  SV(Teich): 42 41 ml    PW  E': 0 06 m/s  E/E': 7 32  MV A Carson: 0 71 m/s  MV Dec [place]: 3 5 m/s2  MV DecT: 133 7 ms  MV E Carson: 0 47 m/s  MV E/A Ratio: 0 66  MV PHT: 38 77 ms  MVA By PHT: 5 67 cm2    [facility] Accredited [facility]chocardi[facility]    Prepared and electronically signed by    Nabil Goodman MD  Signed 07-Jul-2020 15:53:23       No results found for this or any previous visit  This note was completed in part utilizing m[employer]av[employer] direct voice recognition software  Grammatical errors, random word insertion, spelling mistakes, and incomplete sentences may be an occasional consequence of the system secondary to software limitations, ambient noise and hardware issues  At the time of dictation, efforts were made to edit, clarify and /or correct errors  Please read the chart carefully and recognize, using context, where substitutions have occurred    If you have any questions or concerns about the context, text or information contained within the body of this dictation, please contact myself, the provider, for further clarification

## 2020-07-31 ENCOUNTER — OFFICE VISIT (OUTPATIENT)
Dept: FAMILY MEDICINE CLINIC | Facility: CLINIC | Age: 55
End: 2020-07-31
Payer: COMMERCIAL

## 2020-07-31 VITALS
BODY MASS INDEX: 37.26 KG/M2 | HEIGHT: 69 IN | OXYGEN SATURATION: 96 % | TEMPERATURE: 98 F | RESPIRATION RATE: 16 BRPM | SYSTOLIC BLOOD PRESSURE: 120 MMHG | HEART RATE: 95 BPM | WEIGHT: 251.6 LBS | DIASTOLIC BLOOD PRESSURE: 80 MMHG

## 2020-07-31 DIAGNOSIS — I16.0 HYPERTENSIVE URGENCY: ICD-10-CM

## 2020-07-31 DIAGNOSIS — I10 BENIGN ESSENTIAL HYPERTENSION: Primary | ICD-10-CM

## 2020-07-31 DIAGNOSIS — R05.9 COUGH: ICD-10-CM

## 2020-07-31 DIAGNOSIS — I50.9 CHF (CONGESTIVE HEART FAILURE) (HCC): ICD-10-CM

## 2020-07-31 DIAGNOSIS — E83.42 HYPOMAGNESEMIA: ICD-10-CM

## 2020-07-31 DIAGNOSIS — I50.31 ACUTE DIASTOLIC CONGESTIVE HEART FAILURE (HCC): ICD-10-CM

## 2020-07-31 DIAGNOSIS — E87.6 HYPOKALEMIA: ICD-10-CM

## 2020-07-31 PROCEDURE — 3079F DIAST BP 80-89 MM HG: CPT | Performed by: FAMILY MEDICINE

## 2020-07-31 PROCEDURE — 1111F DSCHRG MED/CURRENT MED MERGE: CPT | Performed by: FAMILY MEDICINE

## 2020-07-31 PROCEDURE — 3008F BODY MASS INDEX DOCD: CPT | Performed by: FAMILY MEDICINE

## 2020-07-31 PROCEDURE — 99214 OFFICE O/P EST MOD 30 MIN: CPT | Performed by: FAMILY MEDICINE

## 2020-07-31 PROCEDURE — 3051F HG A1C>EQUAL 7.0%<8.0%: CPT | Performed by: FAMILY MEDICINE

## 2020-07-31 PROCEDURE — 3074F SYST BP LT 130 MM HG: CPT | Performed by: FAMILY MEDICINE

## 2020-07-31 PROCEDURE — 1036F TOBACCO NON-USER: CPT | Performed by: FAMILY MEDICINE

## 2020-07-31 PROCEDURE — 3066F NEPHROPATHY DOC TX: CPT | Performed by: FAMILY MEDICINE

## 2020-07-31 RX ORDER — MAGNESIUM OXIDE 400 MG/1
1 TABLET ORAL 2 TIMES DAILY
COMMUNITY
Start: 2020-07-10 | End: 2020-07-31 | Stop reason: SDUPTHER

## 2020-07-31 NOTE — PROGRESS NOTES
FAMILY PRACTICE OFFICE VISIT       NAME: Ethan Crespo  AGE: 63 y o  SEX: male       : 1965        MRN: [id number]    DATE: 8/3/2020  TIME: 7:24 AM    Assessment and Plan     Problem List Items Addressed This Visit        Cardiovascular and Mediastinum    Benign essential hypertension - Primary     Hypertension  Patient blood pressure is stable at this time he will continue with current regimen of medications         CHF (congestive heart failure) (HCC)     Wt Readings from Last 3 Encounters:   20 114 kg (251 lb 9 6 oz)   20 113 kg (249 lb)   20 114 kg (252 lb)     Congestive heart failure  Patient appears to be euvolemic with no evidence of CHF at this time  He will continue with current diuretic therapy and follow up with cardiologist after stress test                Other    Cough        The patient was accompanied by his wife and we all agreed that he may try to return to work as of Monday August 10, 2020  I will fill out paperwork for his employer  He is aware that he if he should develop any increase in shortness of breath or chest discomfort he should stop his job immediately and call the office or go to nearest emergency room  Chief Complaint     Chief Complaint   Patient presents with    Follow-up     Pt is here forf/u SOB and work note       History of Present Illness     Patient in the office for follow-up of chronic medical condition  States he has been improving with his overall health  He continues to stay off of alcohol  I reviewed correspondence letter from his cardiologist   He states he is compliant with taking his current regimen of medications  His only complaint is some continued mild shortness of breath  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  He is scheduled to have a nuclear stress test on 2020  Feels he is able to perform his job duties at this time          Review of Systems   Review of Systems Constitutional: Positive for fatigue  Respiratory: Positive for cough and shortness of breath  Cardiovascular: Negative  Gastrointestinal: Negative  Genitourinary: Negative  Musculoskeletal: Negative  Neurological: Negative  Psychiatric/Behavioral: Negative          Active Problem List     Patient Active Problem List   Diagnosis    Abnormal EKG    Benign essential hypertension    Diabetes mellitus type 2, uncontrolled (HCC)    Elevated lipoprotein(a)    Microalbuminuria    Obesity    Chest pain in adult    Peripheral edema    Patient noncompliance    Hypertensive urgency    Hypokalemia    Hypomagnesemia    CHF (congestive heart failure) (formerly [facility])    Alcohol abuse    Cough       Past Medical History:  Past Medical History:   Diagnosis Date    Diabetes mellitus ([facility] )     Hypertension     Inguinal hernia     3/25/15    Onychomycosis     5/24/17    Type 2 diabetes mellitus ([facility] ) 05/01/2012       Past Surgical History:  Past Surgical History:   Procedure Laterality Date    ARTHROSCOPY KNEE      with Medial and Lateral Meniscus Repair    HERNIA REPAIR      umbilical and inguinal hernia repairs (left)       Family History:  Family History   Problem Relation Age of Onset    Hypertension Mother         essential    Chronic bronchitis Father     Prostate cancer Father     Breast cancer Sister        Social History:  Social History     Socioeconomic History    Marital status: /Civil Union     Spouse name: Not on file    Number of children: Not on file    Years of education: Not on file    Highest education level: Not on file   Occupational History    Occupation: [employer]    Social Needs    Financial resource strain: Not on file    Food insecurity     Worry: Not on file     Inability: Not on file   [employer] needs     Medical: Not on file     Non-medical: Not on file   Tobacco Use    Smoking status: Former Smoker     Types: Cigars     Start date: 6/22/2020    Smokeless tobacco: Never Used    Tobacco comment: current every day smoker, per [employer]   Substance and Sexual Activity    Alcohol use: Not Currently     Alcohol/week: 4 0 standard drinks     Types: 4 Cans of beer per week     Comment: weekend: 1 pint of christian or 2 beers    Drug use: No    Sexual activity: Not on file   Lifestyle    Physical activity     Days per week: Not on file     Minutes per session: Not on file    Stress: Not on file   Relationships    Social connections     Talks on phone: Not on file     Gets together: Not on file     Attends Sabianist service: Not on file     Active member of club or organization: Not on file     Attends meetings of clubs or organizations: Not on file     Relationship status: Not on file    Intimate partner violence     Fear of current or ex partner: Not on file     Emotionally abused: Not on file     Physically abused: Not on file     Forced sexual activity: Not on file   Other Topics Concern    Not on file   Social History Narrative    Alcohol dependence    Nicotine dependence    Denied, alcohol Use    Marital problems       Objective     Vitals:    07/31/20 1637   BP: 120/80   Pulse: 95   Resp: 16   Temp: 98 °F (36 7 °C)   SpO2: 96%     Wt Readings from Last 3 Encounters:   07/31/20 114 kg (251 lb 9 6 oz)   07/20/20 113 kg (249 lb)   07/14/20 114 kg (252 lb)       Physical Exam   Constitutional: He is oriented to person, place, and time  He appears well-developed and well-nourished  No distress  HENT:   Right Ear: External ear normal    Left Ear: External ear normal    Cardiovascular: Normal rate, regular rhythm and normal heart sounds  No murmur heard  Pulmonary/Chest: Effort normal and breath sounds normal  No respiratory distress  He has no wheezes  He has no rales  Musculoskeletal: He exhibits no edema  Lymphadenopathy:     He has no cervical adenopathy  Neurological: He is alert and oriented to person, place, and time   No cranial nerve deficit  Coordination normal    Skin: He is not diaphoretic  Psychiatric: He has a normal mood and affect  His behavior is normal  Judgment and thought content normal        Pertinent Laboratory/Diagnostic Studies:  Lab Results   Component Value Date    GLUCOSE 188 (H) 01/16/2014    BUN 13 07/10/2020    CREATININE 0 94 07/10/2020    CALCIUM 8 5 07/10/2020     12/21/2017    K 3 8 07/10/2020    CO2 29 07/10/2020     07/10/2020     Lab Results   Component Value Date    ALT 31 07/06/2020    AST 56 (H) 07/06/2020    ALKPHOS 105 07/06/2020    BILITOT 1 3 (H) 12/21/2017       Lab Results   Component Value Date    WBC 7 89 07/07/2020    HGB 14 2 07/07/2020    HCT 40 8 07/07/2020    MCV 96 07/07/2020     07/07/2020       No results found for: TSH    Lab Results   Component Value Date    CHOL 153 12/21/2017     Lab Results   Component Value Date    TRIG 65 07/06/2020     Lab Results   Component Value Date    HDL 47 07/06/2020     Lab Results   Component Value Date    LDLCALC 77 07/06/2020     Lab Results   Component Value Date    HGBA1C 7 0 (H) 07/06/2020       Results for orders placed or performed during the hospital encounter of 07/06/20   Novel Coronavirus (Covid-19),PCR [facility]    Specimen: Nose; Throat   Result Value Ref Range    SARS-CoV-2 Negative Negative   Blood culture #1    Specimen: Arm, Left; Blood   Result Value Ref Range    Blood Culture No Growth After 5 Days  Blood culture #2    Specimen: Arm, Right; Blood   Result Value Ref Range    Blood Culture No Growth After 5 Days      Legionella antigen, urine    Specimen: Urine, Catheter   Result Value Ref Range    Legionella Urinary Antigen Negative Negative   Strep Pneumoniae, Urine    Specimen: Urine, Catheter   Result Value Ref Range    Strep pneumoniae antigen, urine Negative Negative   CBC and differential   Result Value Ref Range    WBC 8 82 4 31 - 10 16 Thousand/uL    RBC 4 67 3 88 - 5 62 Million/uL    Hemoglobin 15 2 12 0 - 17 0 g/dL    Hematocrit 44 9 36 5 - 49 3 %    MCV 96 82 - 98 fL    MCH 32 5 26 8 - 34 3 pg    MCHC 33 9 31 4 - 37 4 g/dL    RDW 12 6 11 6 - 15 1 %    MPV 9 8 8 9 - 12 7 fL    Platelets 848 310 - 907 Thousands/uL    nRBC 0 /100 WBCs    Neutrophils Relative 29 (L) 43 - 75 %    Immat GRANS % 0 0 - 2 %    Lymphocytes Relative 48 (H) 14 - 44 %    Monocytes Relative 8 4 - 12 %    Eosinophils Relative 14 (H) 0 - 6 %    Basophils Relative 1 0 - 1 %    Neutrophils Absolute 2 56 1 85 - 7 62 Thousands/µL    Immature Grans Absolute 0 03 0 00 - 0 20 Thousand/uL    Lymphocytes Absolute 4 23 0 60 - 4 47 Thousands/µL    Monocytes Absolute 0 73 0 17 - 1 22 Thousand/µL    Eosinophils Absolute 1 20 (H) 0 00 - 0 61 Thousand/µL    Basophils Absolute 0 07 0 00 - 0 10 Thousands/µL   Protime-INR   Result Value Ref Range    Protime 13 7 11 6 - 14 5 seconds    INR 1 12 0 84 - 1 19   APTT   Result Value Ref Range    PTT 30 23 - 37 seconds   Comprehensive metabolic panel   Result Value Ref Range    Sodium 136 136 - 145 mmol/L    Potassium 3 0 (L) 3 5 - 5 3 mmol/L    Chloride 97 (L) 100 - 108 mmol/L    CO2 33 (H) 21 - 32 mmol/L    ANION GAP 6 4 - 13 mmol/L    BUN 7 5 - 25 mg/dL    Creatinine 0 97 0 60 - 1 30 mg/dL    Glucose 149 (H) 65 - 140 mg/dL    Calcium 8 2 (L) 8 3 - 10 1 mg/dL    AST 56 (H) 5 - 45 U/L    ALT 31 12 - 78 U/L    Alkaline Phosphatase 105 46 - 116 U/L    Total Protein 8 9 (H) 6 4 - 8 2 g/dL    Albumin 3 8 3 5 - 5 0 g/dL    Total Bilirubin 0 92 0 20 - 1 00 mg/dL    eGFR 100 ml/min/1 73sq m   TSH, 3rd generation with Free T4 reflex   Result Value Ref Range    TSH 3RD GENERATON 7 191 (H) 0 358 - 3 740 uIU/mL   Magnesium   Result Value Ref Range    Magnesium 1 1 (L) 1 6 - 2 6 mg/dL   Lactic acid   Result Value Ref Range    LACTIC ACID 1 0 0 5 - 2 0 mmol/L   D-Dimer   Result Value Ref Range    D-Dimer, Quant 0 61 (H) <0 50 ug/ml FEU   Troponin I   Result Value Ref Range    Troponin I <0 02 <=0 04 ng/mL   NT-BNP PRO   Result Value Ref Range    NT-proBNP 430 (H) <125 pg/mL   Blood gas, venous   Result Value Ref Range    pH, Mark 7 342 7 300 - 7 400    pCO2, Mark 59 3 (H) 42 0 - 50 0 mm Hg    pO2, Mark 37 1 35 0 - 45 0 mm Hg    HCO3, Mark 31 4 (H) 24 - 30 mmol/L    Base Excess, Mark 3 8 mmol/L    O2 Content, Mark 14 3 ml/dL    O2 HGB, VENOUS 64 1 60 0 - 80 0 %   Beta Hydroxybutyrate   Result Value Ref Range    BETA-HYDROXYBUTYRATE 0 2 <0 6 mmol/L   T4, free   Result Value Ref Range    Free T4 1 17 0 76 - 1 46 ng/dL   Urine Microscopic   Result Value Ref Range    RBC, UA 1-2 (A) None Seen, 0-5 /hpf    WBC, UA None Seen None Seen, 0-5, 5-55, 5-65 /hpf    Epithelial Cells None Seen None Seen, Occasional /hpf    Bacteria, UA None Seen None Seen, Occasional /hpf   Hemoglobin A1c w/EAG Estimation (Orders if not completed within the last 90 days)   Result Value Ref Range    Hemoglobin A1C 7 0 (H) Normal 3 8-5 6%; PreDiabetic 5 7-6 4%;  Diabetic >=6 5%; Glycemic control for adults with diabetes <7 0% %     mg/dl   Troponin I   Result Value Ref Range    Troponin I 0 03 <=0 04 ng/mL   CBC (With Platelets)   Result Value Ref Range    WBC 7 68 4 31 - 10 16 Thousand/uL    RBC 4 01 3 88 - 5 62 Million/uL    Hemoglobin 13 2 12 0 - 17 0 g/dL    Hematocrit 38 6 36 5 - 49 3 %    MCV 96 82 - 98 fL    MCH 32 9 26 8 - 34 3 pg    MCHC 34 2 31 4 - 37 4 g/dL    RDW 12 5 11 6 - 15 1 %    Platelets 701 777 - 160 Thousands/uL    MPV 9 9 8 9 - 12 7 fL   Basic metabolic panel   Result Value Ref Range    Sodium 132 (L) 136 - 145 mmol/L    Potassium 4 5 3 5 - 5 3 mmol/L    Chloride 97 (L) 100 - 108 mmol/L    CO2 19 (L) 21 - 32 mmol/L    ANION GAP 16 (H) 4 - 13 mmol/L    BUN 6 5 - 25 mg/dL    Creatinine 0 69 0 60 - 1 30 mg/dL    Glucose 198 (H) 65 - 140 mg/dL    Calcium 7 7 (L) 8 3 - 10 1 mg/dL    eGFR 123 ml/min/1 73sq m   Lipid panel   Result Value Ref Range    Cholesterol 137 50 - 200 mg/dL    Triglycerides 65 <=150 mg/dL    HDL, Direct 47 >=40 mg/dL    LDL Calculated 77 0 - 100 mg/dL    Non-HDL-Chol (CHOL-HDL) 90 mg/dl   Magnesium   Result Value Ref Range    Magnesium 1 8 1 6 - 2 6 mg/dL   Procalcitonin   Result Value Ref Range    Procalcitonin 0 16 <=0 25 ng/ml   CBC and differential   Result Value Ref Range    WBC 7 89 4 31 - 10 16 Thousand/uL    RBC 4 24 3 88 - 5 62 Million/uL    Hemoglobin 14 2 12 0 - 17 0 g/dL    Hematocrit 40 8 36 5 - 49 3 %    MCV 96 82 - 98 fL    MCH 33 5 26 8 - 34 3 pg    MCHC 34 8 31 4 - 37 4 g/dL    RDW 12 6 11 6 - 15 1 %    MPV 10 0 8 9 - 12 7 fL    Platelets 984 456 - 191 Thousands/uL    nRBC 0 /100 WBCs    Neutrophils Relative 36 (L) 43 - 75 %    Immat GRANS % 0 0 - 2 %    Lymphocytes Relative 39 14 - 44 %    Monocytes Relative 9 4 - 12 %    Eosinophils Relative 15 (H) 0 - 6 %    Basophils Relative 1 0 - 1 %    Neutrophils Absolute 2 85 1 85 - 7 62 Thousands/µL    Immature Grans Absolute 0 01 0 00 - 0 20 Thousand/uL    Lymphocytes Absolute 3 11 0 60 - 4 47 Thousands/µL    Monocytes Absolute 0 68 0 17 - 1 22 Thousand/µL    Eosinophils Absolute 1 18 (H) 0 00 - 0 61 Thousand/µL    Basophils Absolute 0 06 0 00 - 0 10 Thousands/µL   Basic metabolic panel   Result Value Ref Range    Sodium 134 (L) 136 - 145 mmol/L    Potassium 3 3 (L) 3 5 - 5 3 mmol/L    Chloride 97 (L) 100 - 108 mmol/L    CO2 27 21 - 32 mmol/L    ANION GAP 10 4 - 13 mmol/L    BUN 8 5 - 25 mg/dL    Creatinine 0 96 0 60 - 1 30 mg/dL    Glucose 170 (H) 65 - 140 mg/dL    Calcium 8 0 (L) 8 3 - 10 1 mg/dL    eGFR 102 ml/min/1 73sq m   Magnesium   Result Value Ref Range    Magnesium 1 2 (L) 1 6 - 2 6 mg/dL   Basic metabolic panel   Result Value Ref Range    Sodium 136 136 - 145 mmol/L    Potassium 3 3 (L) 3 5 - 5 3 mmol/L    Chloride 98 (L) 100 - 108 mmol/L    CO2 32 21 - 32 mmol/L    ANION GAP 6 4 - 13 mmol/L    BUN 9 5 - 25 mg/dL    Creatinine 1 01 0 60 - 1 30 mg/dL    Glucose 164 (H) 65 - 140 mg/dL    Calcium 7 9 (L) 8 3 - 10 1 mg/dL    eGFR 96 ml/min/1 73sq m   Magnesium   Result Value Ref Range    Magnesium 1 2 (L) 1 6 - 2 6 mg/dL   Basic metabolic panel   Result Value Ref Range    Sodium 135 (L) 136 - 145 mmol/L    Potassium 3 7 3 5 - 5 3 mmol/L    Chloride 98 (L) 100 - 108 mmol/L    CO2 30 21 - 32 mmol/L    ANION GAP 7 4 - 13 mmol/L    BUN 14 5 - 25 mg/dL    Creatinine 1 11 0 60 - 1 30 mg/dL    Glucose 262 (H) 65 - 140 mg/dL    Calcium 8 3 8 3 - 10 1 mg/dL    eGFR 85 ml/min/1 73sq m   Magnesium   Result Value Ref Range    Magnesium 1 5 (L) 1 6 - 2 6 mg/dL   Basic metabolic panel   Result Value Ref Range    Sodium 138 136 - 145 mmol/L    Potassium 3 8 3 5 - 5 3 mmol/L    Chloride 100 100 - 108 mmol/L    CO2 29 21 - 32 mmol/L    ANION GAP 9 4 - 13 mmol/L    BUN 13 5 - 25 mg/dL    Creatinine 0 94 0 60 - 1 30 mg/dL    Glucose 124 65 - 140 mg/dL    Calcium 8 5 8 3 - 10 1 mg/dL    eGFR 104 ml/min/1 73sq m   Magnesium   Result Value Ref Range    Magnesium 1 5 (L) 1 6 - 2 6 mg/dL   ECG 12 lead   Result Value Ref Range    Ventricular Rate 105 BPM    Atrial Rate 109 BPM    VT Interval 144 ms    QRSD Interval 82 ms    QT Interval 340 ms    QTC Interval 450 ms    P Axis 77 degrees    QRS Axis 43 degrees    T Wave Axis 75 degrees   Fingerstick Glucose (POCT)   Result Value Ref Range    POC Glucose 151 (H) 65 - 140 mg/dl   Urine Macroscopic, POC   Result Value Ref Range    Color, UA Yellow     Clarity, UA Clear     pH, UA 7 5 4 5 - 8 0    Leukocytes, UA Negative Negative    Nitrite, UA Negative Negative    Protein, UA 30 (1+) (A) Negative mg/dl    Glucose, UA Negative Negative mg/dl    Ketones, UA Negative Negative mg/dl    Urobilinogen, UA 0 2 0 2, 1 0 E U /dl E U /dl    Bilirubin, UA Negative Negative    Blood, UA Trace (A) Negative    Specific Gravity, UA 1 020 1 003 - 1 030   Fingerstick Glucose (POCT)   Result Value Ref Range    POC Glucose 196 (H) 65 - 140 mg/dl   Fingerstick Glucose (POCT)   Result Value Ref Range    POC Glucose 244 (H) 65 - 140 mg/dl   Fingerstick Glucose (POCT)   Result Value Ref Range POC Glucose 218 (H) 65 - 140 mg/dl   Fingerstick Glucose (POCT)   Result Value Ref Range    POC Glucose 208 (H) 65 - 140 mg/dl   Fingerstick Glucose (POCT)   Result Value Ref Range    POC Glucose 173 (H) 65 - 140 mg/dl   Fingerstick Glucose (POCT)   Result Value Ref Range    POC Glucose 340 (H) 65 - 140 mg/dl   Fingerstick Glucose (POCT)   Result Value Ref Range    POC Glucose 252 (H) 65 - 140 mg/dl   Fingerstick Glucose (POCT)   Result Value Ref Range    POC Glucose 251 (H) 65 - 140 mg/dl   Fingerstick Glucose (POCT)   Result Value Ref Range    POC Glucose 182 (H) 65 - 140 mg/dl   Fingerstick Glucose (POCT)   Result Value Ref Range    POC Glucose 287 (H) 65 - 140 mg/dl   Fingerstick Glucose (POCT)   Result Value Ref Range    POC Glucose 225 (H) 65 - 140 mg/dl   Fingerstick Glucose (POCT)   Result Value Ref Range    POC Glucose 274 (H) 65 - 140 mg/dl   Fingerstick Glucose (POCT)   Result Value Ref Range    POC Glucose 137 65 - 140 mg/dl   Fingerstick Glucose (POCT)   Result Value Ref Range    POC Glucose 250 (H) 65 - 140 mg/dl   Fingerstick Glucose (POCT)   Result Value Ref Range    POC Glucose 210 (H) 65 - 140 mg/dl   Fingerstick Glucose (POCT)   Result Value Ref Range    POC Glucose 207 (H) 65 - 140 mg/dl   Fingerstick Glucose (POCT)   Result Value Ref Range    POC Glucose 122 65 - 140 mg/dl   Fingerstick Glucose (POCT)   Result Value Ref Range    POC Glucose 154 (H) 65 - 140 mg/dl       No orders of the defined types were placed in this encounter  ALLERGIES:  No Known Allergies    Current Medications     Current Outpatient Medications   Medication Sig Dispense Refill    amLODIPine (NORVASC) 10 mg tablet Take 1 tablet (10 mg total) by mouth daily 30 tablet 0    Blood Pressure KIT Twice a day periodically 1 each 0    fluticasone (FLOVENT HFA) 220 mcg/act inhaler Inhale 1 puff 2 (two) times a day Rinse mouth after use   1 Inhaler 3    furosemide (LASIX) 40 mg tablet Take 1 tablet (40 mg total) by mouth 2 (two) times a day 60 tablet 0    glucose blood test strip by In Vitro route daily      Lancets (ONETOUCH ULTRASOFT) lancets by Does not apply route      magnesium oxide (MAG-OX) 400 mg Take 1 tablet (400 mg total) by mouth 2 (two) times a day 60 tablet 0    metoprolol succinate (TOPROL-XL) 25 mg 24 hr tablet Take 1 tablet (25 mg total) by mouth daily 30 tablet 0    olmesartan (BENICAR) 40 mg tablet Take 1 tablet (40 mg total) by mouth daily 30 tablet 0    potassium chloride (K-DUR,KLOR-CON) 20 mEq tablet Take 2 tablets (40 mEq total) by mouth daily 60 tablet 0    sitaGLIPtin-metFORMIN (JANUMET)  MG per tablet Take 1 tablet by mouth 2 (two) times a day with meals 180 tablet 1     No current facility-administered medications for this visit            Health Maintenance     Health Maintenance   Topic Date Due    Pneumococcal Vaccine: Pediatrics (0 to 5 Years) and At-Risk Patients (6 to 59 Years) (1 of 1 - PPSV23) 03/05/1970    DM Eye Exam  03/05/1974    Annual Physical  03/05/1982    Diabetic Foot Exam  06/14/2017    Influenza Vaccine  07/01/2020    Depression Screening PHQ  10/21/2020    BMI: Followup Plan  10/21/2020    HEMOGLOBIN A1C  01/06/2021    BMI: Adult  07/31/2021    Colorectal Cancer Screening  05/13/2025    DTaP,Tdap,and Td Vaccines (2 - Td) 10/21/2029    HIV Screening  Completed    Hepatitis C Screening  Completed    HIB Vaccine  Aged Out    Hepatitis B Vaccine  Aged Out    IPV Vaccine  Aged Out    Hepatitis A Vaccine  Aged Out    Meningococcal ACWY Vaccine  Aged Out    HPV Vaccine  Aged Out     Immunization History   Administered Date(s) Administered    Influenza TIV (IM) 09/23/2015    Influenza, recombinant, quadrivalent,injectable, preservative free 10/21/2019    Tdap 31/59/1595       Martha Astorga MD  I spent 25 minutes with this patient of which greater than 50% was spent counseling

## 2020-08-01 RX ORDER — MAGNESIUM OXIDE 400 MG/1
TABLET ORAL
Qty: 60 TABLET | Refills: 0 | OUTPATIENT
Start: 2020-08-01

## 2020-08-01 RX ORDER — AMLODIPINE BESYLATE 10 MG/1
TABLET ORAL
Qty: 30 TABLET | Refills: 0 | OUTPATIENT
Start: 2020-08-01

## 2020-08-01 RX ORDER — FUROSEMIDE 40 MG/1
TABLET ORAL
Qty: 60 TABLET | Refills: 0 | OUTPATIENT
Start: 2020-08-01

## 2020-08-01 RX ORDER — OLMESARTAN MEDOXOMIL 40 MG/1
TABLET ORAL
Qty: 30 TABLET | Refills: 0 | OUTPATIENT
Start: 2020-08-01

## 2020-08-01 RX ORDER — POTASSIUM CHLORIDE 1500 MG/1
TABLET, EXTENDED RELEASE ORAL
Qty: 60 TABLET | Refills: 0 | OUTPATIENT
Start: 2020-08-01

## 2020-08-01 RX ORDER — METOPROLOL SUCCINATE 25 MG/1
TABLET, EXTENDED RELEASE ORAL
Qty: 30 TABLET | Refills: 0 | OUTPATIENT
Start: 2020-08-01

## 2020-08-03 NOTE — ASSESSMENT & PLAN NOTE
Wt Readings from Last 3 Encounters:   07/31/20 114 kg (251 lb 9 6 oz)   07/20/20 113 kg (249 lb)   07/14/20 114 kg (252 lb)     Congestive heart failure  Patient appears to be euvolemic with no evidence of CHF at this time    He will continue with current diuretic therapy and follow up with cardiologist after stress test

## 2020-08-11 DIAGNOSIS — I16.0 HYPERTENSIVE URGENCY: ICD-10-CM

## 2020-08-11 DIAGNOSIS — I50.9 CHF (CONGESTIVE HEART FAILURE) (HCC): ICD-10-CM

## 2020-08-11 DIAGNOSIS — E83.42 HYPOMAGNESEMIA: ICD-10-CM

## 2020-08-11 PROCEDURE — 4010F ACE/ARB THERAPY RXD/TAKEN: CPT | Performed by: FAMILY MEDICINE

## 2020-08-11 RX ORDER — MAGNESIUM OXIDE 400 MG/1
TABLET ORAL
Qty: 60 TABLET | Refills: 0 | Status: SHIPPED | OUTPATIENT
Start: 2020-08-11 | End: 2020-09-08

## 2020-08-11 RX ORDER — AMLODIPINE BESYLATE 10 MG/1
TABLET ORAL
Qty: 90 TABLET | Refills: 1 | Status: SHIPPED | OUTPATIENT
Start: 2020-08-11 | End: 2021-02-16

## 2020-08-11 RX ORDER — OLMESARTAN MEDOXOMIL 40 MG/1
TABLET ORAL
Qty: 30 TABLET | Refills: 0 | Status: SHIPPED | OUTPATIENT
Start: 2020-08-11 | End: 2020-09-08

## 2020-08-11 RX ORDER — FUROSEMIDE 40 MG/1
TABLET ORAL
Qty: 60 TABLET | Refills: 0 | Status: SHIPPED | OUTPATIENT
Start: 2020-08-11 | End: 2020-09-08

## 2020-08-11 RX ORDER — METOPROLOL SUCCINATE 25 MG/1
TABLET, EXTENDED RELEASE ORAL
Qty: 30 TABLET | Refills: 0 | Status: SHIPPED | OUTPATIENT
Start: 2020-08-11 | End: 2020-09-08

## 2020-08-14 ENCOUNTER — HOSPITAL ENCOUNTER (OUTPATIENT)
Dept: NON INVASIVE DIAGNOSTICS | Facility: CLINIC | Age: 55
Discharge: HOME | End: 2020-08-14
Payer: COMMERCIAL

## 2020-08-14 DIAGNOSIS — I10 BENIGN ESSENTIAL HYPERTENSION: ICD-10-CM

## 2020-08-14 DIAGNOSIS — Z72.0 TOBACCO ABUSE: ICD-10-CM

## 2020-08-14 DIAGNOSIS — E78.5 HYPERLIPIDEMIA, UNSPECIFIED HYPERLIPIDEMIA TYPE: ICD-10-CM

## 2020-08-14 DIAGNOSIS — E11.65 UNCONTROLLED TYPE 2 DIABETES MELLITUS WITH HYPERGLYCEMIA (HCC): ICD-10-CM

## 2020-08-14 PROCEDURE — 78452 HT MUSCLE IMAGE SPECT MULT: CPT | Performed by: INTERNAL MEDICINE

## 2020-08-14 PROCEDURE — 78452 HT MUSCLE IMAGE SPECT MULT: CPT

## 2020-08-14 PROCEDURE — 93017 CV STRESS TEST TRACING ONLY: CPT

## 2020-08-14 PROCEDURE — 93018 CV STRESS TEST I&R ONLY: CPT | Performed by: INTERNAL MEDICINE

## 2020-08-14 PROCEDURE — A9502 TC99M TETROFOSMIN: HCPCS

## 2020-08-14 PROCEDURE — 93016 CV STRESS TEST SUPVJ ONLY: CPT | Performed by: INTERNAL MEDICINE

## 2020-08-14 RX ADMIN — REGADENOSON 0.4 MG: 0.08 INJECTION, SOLUTION INTRAVENOUS at 13:48

## 2020-08-17 ENCOUNTER — TELEPHONE (OUTPATIENT)
Dept: CARDIOLOGY CLINIC | Facility: CLINIC | Age: 55
End: 2020-08-17

## 2020-08-17 NOTE — TELEPHONE ENCOUNTER
----- Message from [id number] sent at 8/17/2020  8:31 AM EDT -----  Please call the patient regarding his Stress test: normal

## 2020-08-19 DIAGNOSIS — E87.6 HYPOKALEMIA: ICD-10-CM

## 2020-08-19 RX ORDER — POTASSIUM CHLORIDE 20 MEQ/1
40 TABLET, EXTENDED RELEASE ORAL DAILY
Qty: 60 TABLET | Refills: 3 | Status: SHIPPED | OUTPATIENT
Start: 2020-08-19 | End: 2020-09-05 | Stop reason: HOSPADM

## 2020-08-25 LAB
CHEST PAIN STATEMENT: NORMAL
MAX DIASTOLIC BP: 88 MMHG
MAX HEART RATE: 118 BPM
MAX PREDICTED HEART RATE: 164 BPM
MAX. SYSTOLIC BP: 170 MMHG
PROTOCOL NAME: NORMAL
REASON FOR TERMINATION: NORMAL
TARGET HR FORMULA: NORMAL
TEST INDICATION: NORMAL
TIME IN EXERCISE PHASE: NORMAL

## 2020-09-04 ENCOUNTER — APPOINTMENT (EMERGENCY)
Dept: RADIOLOGY | Facility: HOSPITAL | Age: 55
End: 2020-09-04
Payer: COMMERCIAL

## 2020-09-04 ENCOUNTER — HOSPITAL ENCOUNTER (OUTPATIENT)
Facility: HOSPITAL | Age: 55
Setting detail: OBSERVATION
Discharge: HOME | End: 2020-09-05
Attending: EMERGENCY MEDICINE | Admitting: SURGERY
Payer: COMMERCIAL
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
## 2020-09-04 DIAGNOSIS — E87.5 HYPERKALEMIA: Primary | ICD-10-CM

## 2020-09-04 DIAGNOSIS — M25.512 PAIN AND SWELLING OF LEFT SHOULDER: ICD-10-CM

## 2020-09-04 DIAGNOSIS — M25.412 PAIN AND SWELLING OF LEFT SHOULDER: ICD-10-CM

## 2020-09-04 LAB
ABO GROUP BLD: NORMAL
ANION GAP SERPL CALCULATED.3IONS-SCNC: 6 MMOL/L (ref 4–13)
APTT PPP: 19 SECONDS (ref 23–37)
BASE EXCESS BLDA CALC-SCNC: -4 MMOL/L (ref -2–3)
BASOPHILS # BLD AUTO: 0.07 THOUSANDS/ΜL (ref 0–0.1)
BASOPHILS NFR BLD AUTO: 1 % (ref 0–1)
BLD GP AB SCN SERPL QL: NEGATIVE
BUN SERPL-MCNC: 18 MG/DL (ref 5–25)
CA-I BLD-SCNC: 1.13 MMOL/L (ref 1.12–1.32)
CALCIUM SERPL-MCNC: 8.6 MG/DL (ref 8.3–10.1)
CHLORIDE SERPL-SCNC: 107 MMOL/L (ref 100–108)
CO2 SERPL-SCNC: 25 MMOL/L (ref 21–32)
CREAT SERPL-MCNC: 1.67 MG/DL (ref 0.6–1.3)
EOSINOPHIL # BLD AUTO: 0.42 THOUSAND/ΜL (ref 0–0.61)
EOSINOPHIL NFR BLD AUTO: 3 % (ref 0–6)
ERYTHROCYTE [DISTWIDTH] IN BLOOD BY AUTOMATED COUNT: 13.7 % (ref 11.6–15.1)
GFR SERPL CREATININE-BSD FRML MDRD: 52 ML/MIN/1.73SQ M
GLUCOSE SERPL-MCNC: 176 MG/DL (ref 65–140)
GLUCOSE SERPL-MCNC: 179 MG/DL (ref 65–140)
HCO3 BLDA-SCNC: 22.1 MMOL/L (ref 24–30)
HCT VFR BLD AUTO: 35.6 % (ref 36.5–49.3)
HCT VFR BLD CALC: 38 % (ref 36.5–49.3)
HGB BLD-MCNC: 12.1 G/DL (ref 12–17)
HGB BLDA-MCNC: 12.9 G/DL (ref 12–17)
IMM GRANULOCYTES # BLD AUTO: 0.07 THOUSAND/UL (ref 0–0.2)
IMM GRANULOCYTES NFR BLD AUTO: 1 % (ref 0–2)
INR PPP: 1.04 (ref 0.84–1.19)
LYMPHOCYTES # BLD AUTO: 3.64 THOUSANDS/ΜL (ref 0.6–4.47)
LYMPHOCYTES NFR BLD AUTO: 25 % (ref 14–44)
MCH RBC QN AUTO: 32.3 PG (ref 26.8–34.3)
MCHC RBC AUTO-ENTMCNC: 34 G/DL (ref 31.4–37.4)
MCV RBC AUTO: 95 FL (ref 82–98)
MONOCYTES # BLD AUTO: 1.29 THOUSAND/ΜL (ref 0.17–1.22)
MONOCYTES NFR BLD AUTO: 9 % (ref 4–12)
NEUTROPHILS # BLD AUTO: 9.11 THOUSANDS/ΜL (ref 1.85–7.62)
NEUTS SEG NFR BLD AUTO: 61 % (ref 43–75)
NRBC BLD AUTO-RTO: 0 /100 WBCS
PCO2 BLD: 23 MMOL/L (ref 21–32)
PCO2 BLD: 41.6 MM HG (ref 42–50)
PH BLD: 7.33 [PH] (ref 7.3–7.4)
PLATELET # BLD AUTO: 267 THOUSANDS/UL (ref 149–390)
PMV BLD AUTO: 10 FL (ref 8.9–12.7)
PO2 BLD: 60 MM HG (ref 35–45)
POTASSIUM BLD-SCNC: 5.1 MMOL/L (ref 3.5–5.3)
POTASSIUM SERPL-SCNC: 5 MMOL/L (ref 3.5–5.3)
PROTHROMBIN TIME: 13.6 SECONDS (ref 11.6–14.5)
RBC # BLD AUTO: 3.75 MILLION/UL (ref 3.88–5.62)
RH BLD: POSITIVE
SAO2 % BLD FROM PO2: 89 % (ref 60–85)
SODIUM BLD-SCNC: 139 MMOL/L (ref 136–145)
SODIUM SERPL-SCNC: 138 MMOL/L (ref 136–145)
SPECIMEN EXPIRATION DATE: NORMAL
SPECIMEN SOURCE: ABNORMAL
TROPONIN I SERPL-MCNC: <0.02 NG/ML
WBC # BLD AUTO: 14.6 THOUSAND/UL (ref 4.31–10.16)

## 2020-09-04 PROCEDURE — 70450 CT HEAD/BRAIN W/O DYE: CPT

## 2020-09-04 PROCEDURE — 85730 THROMBOPLASTIN TIME PARTIAL: CPT | Performed by: EMERGENCY MEDICINE

## 2020-09-04 PROCEDURE — 84132 ASSAY OF SERUM POTASSIUM: CPT

## 2020-09-04 PROCEDURE — 82947 ASSAY GLUCOSE BLOOD QUANT: CPT

## 2020-09-04 PROCEDURE — 82330 ASSAY OF CALCIUM: CPT

## 2020-09-04 PROCEDURE — 96374 THER/PROPH/DIAG INJ IV PUSH: CPT

## 2020-09-04 PROCEDURE — 99285 EMERGENCY DEPT VISIT HI MDM: CPT

## 2020-09-04 PROCEDURE — 86901 BLOOD TYPING SEROLOGIC RH(D): CPT | Performed by: EMERGENCY MEDICINE

## 2020-09-04 PROCEDURE — 36415 COLL VENOUS BLD VENIPUNCTURE: CPT | Performed by: EMERGENCY MEDICINE

## 2020-09-04 PROCEDURE — 85610 PROTHROMBIN TIME: CPT | Performed by: EMERGENCY MEDICINE

## 2020-09-04 PROCEDURE — 99219 PR INITIAL OBSERVATION CARE/DAY 50 MINUTES: CPT | Performed by: SURGERY

## 2020-09-04 PROCEDURE — 86850 RBC ANTIBODY SCREEN: CPT | Performed by: EMERGENCY MEDICINE

## 2020-09-04 PROCEDURE — 74177 CT ABD & PELVIS W/CONTRAST: CPT

## 2020-09-04 PROCEDURE — 93005 ELECTROCARDIOGRAM TRACING: CPT

## 2020-09-04 PROCEDURE — 82803 BLOOD GASES ANY COMBINATION: CPT

## 2020-09-04 PROCEDURE — 85014 HEMATOCRIT: CPT

## 2020-09-04 PROCEDURE — 85025 COMPLETE CBC W/AUTO DIFF WBC: CPT | Performed by: EMERGENCY MEDICINE

## 2020-09-04 PROCEDURE — G1004 CDSM NDSC: HCPCS

## 2020-09-04 PROCEDURE — 86900 BLOOD TYPING SEROLOGIC ABO: CPT | Performed by: EMERGENCY MEDICINE

## 2020-09-04 PROCEDURE — 80048 BASIC METABOLIC PNL TOTAL CA: CPT | Performed by: EMERGENCY MEDICINE

## 2020-09-04 PROCEDURE — 84484 ASSAY OF TROPONIN QUANT: CPT | Performed by: EMERGENCY MEDICINE

## 2020-09-04 PROCEDURE — 72125 CT NECK SPINE W/O DYE: CPT

## 2020-09-04 PROCEDURE — 71250 CT THORAX DX C-: CPT

## 2020-09-04 PROCEDURE — 99284 EMERGENCY DEPT VISIT MOD MDM: CPT | Performed by: EMERGENCY MEDICINE

## 2020-09-04 PROCEDURE — 84295 ASSAY OF SERUM SODIUM: CPT

## 2020-09-04 RX ORDER — FENTANYL CITRATE 50 UG/ML
75 INJECTION, SOLUTION INTRAMUSCULAR; INTRAVENOUS ONCE
Status: COMPLETED | OUTPATIENT
Start: 2020-09-04 | End: 2020-09-04

## 2020-09-04 RX ADMIN — IOHEXOL 100 ML: 350 INJECTION, SOLUTION INTRAVENOUS at 22:44

## 2020-09-04 RX ADMIN — FENTANYL CITRATE 75 MCG: 50 INJECTION INTRAMUSCULAR; INTRAVENOUS at 22:27

## 2020-09-05 ENCOUNTER — APPOINTMENT (EMERGENCY)
Dept: RADIOLOGY | Facility: HOSPITAL | Age: 55
End: 2020-09-05
Payer: COMMERCIAL

## 2020-09-05 VITALS
WEIGHT: 265.21 LBS | DIASTOLIC BLOOD PRESSURE: 60 MMHG | HEART RATE: 92 BPM | RESPIRATION RATE: 19 BRPM | BODY MASS INDEX: 39.28 KG/M2 | TEMPERATURE: 98.9 F | SYSTOLIC BLOOD PRESSURE: 127 MMHG | OXYGEN SATURATION: 96 % | HEIGHT: 69 IN

## 2020-09-05 PROBLEM — M25.412 PAIN AND SWELLING OF LEFT SHOULDER: Status: ACTIVE | Noted: 2020-09-05

## 2020-09-05 PROBLEM — N17.9 ACUTE KIDNEY INJURY (HCC): Status: ACTIVE | Noted: 2020-09-05

## 2020-09-05 PROBLEM — M25.512 PAIN AND SWELLING OF LEFT SHOULDER: Status: ACTIVE | Noted: 2020-09-05
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
## 2020-09-05 LAB
ANION GAP SERPL CALCULATED.3IONS-SCNC: 6 MMOL/L (ref 4–13)
ANION GAP SERPL CALCULATED.3IONS-SCNC: 6 MMOL/L (ref 4–13)
ATRIAL RATE: 86 BPM
ATRIAL RATE: 90 BPM
ATRIAL RATE: 93 BPM
BASOPHILS # BLD AUTO: 0.04 THOUSANDS/ΜL (ref 0–0.1)
BASOPHILS NFR BLD AUTO: 0 % (ref 0–1)
BUN SERPL-MCNC: 21 MG/DL (ref 5–25)
BUN SERPL-MCNC: 21 MG/DL (ref 5–25)
CALCIUM SERPL-MCNC: 8.7 MG/DL (ref 8.3–10.1)
CALCIUM SERPL-MCNC: 8.8 MG/DL (ref 8.3–10.1)
CHLORIDE SERPL-SCNC: 103 MMOL/L (ref 100–108)
CHLORIDE SERPL-SCNC: 104 MMOL/L (ref 100–108)
CO2 SERPL-SCNC: 25 MMOL/L (ref 21–32)
CO2 SERPL-SCNC: 25 MMOL/L (ref 21–32)
CREAT SERPL-MCNC: 1.52 MG/DL (ref 0.6–1.3)
CREAT SERPL-MCNC: 1.54 MG/DL (ref 0.6–1.3)
EOSINOPHIL # BLD AUTO: 0 THOUSAND/ΜL (ref 0–0.61)
EOSINOPHIL NFR BLD AUTO: 0 % (ref 0–6)
ERYTHROCYTE [DISTWIDTH] IN BLOOD BY AUTOMATED COUNT: 13.9 % (ref 11.6–15.1)
GFR SERPL CREATININE-BSD FRML MDRD: 57 ML/MIN/1.73SQ M
GFR SERPL CREATININE-BSD FRML MDRD: 58 ML/MIN/1.73SQ M
GLUCOSE SERPL-MCNC: 212 MG/DL (ref 65–140)
GLUCOSE SERPL-MCNC: 214 MG/DL (ref 65–140)
GLUCOSE SERPL-MCNC: 224 MG/DL (ref 65–140)
HCT VFR BLD AUTO: 36.4 % (ref 36.5–49.3)
HGB BLD-MCNC: 12 G/DL (ref 12–17)
IMM GRANULOCYTES # BLD AUTO: 0.04 THOUSAND/UL (ref 0–0.2)
IMM GRANULOCYTES NFR BLD AUTO: 0 % (ref 0–2)
LYMPHOCYTES # BLD AUTO: 1.4 THOUSANDS/ΜL (ref 0.6–4.47)
LYMPHOCYTES NFR BLD AUTO: 12 % (ref 14–44)
MCH RBC QN AUTO: 31.8 PG (ref 26.8–34.3)
MCHC RBC AUTO-ENTMCNC: 33 G/DL (ref 31.4–37.4)
MCV RBC AUTO: 97 FL (ref 82–98)
MONOCYTES # BLD AUTO: 1.13 THOUSAND/ΜL (ref 0.17–1.22)
MONOCYTES NFR BLD AUTO: 10 % (ref 4–12)
NEUTROPHILS # BLD AUTO: 8.98 THOUSANDS/ΜL (ref 1.85–7.62)
NEUTS SEG NFR BLD AUTO: 78 % (ref 43–75)
NRBC BLD AUTO-RTO: 0 /100 WBCS
P AXIS: 43 DEGREES
P AXIS: 67 DEGREES
P AXIS: 71 DEGREES
PLATELET # BLD AUTO: 224 THOUSANDS/UL (ref 149–390)
PMV BLD AUTO: 9.8 FL (ref 8.9–12.7)
POTASSIUM SERPL-SCNC: 5.8 MMOL/L (ref 3.5–5.3)
POTASSIUM SERPL-SCNC: 6.7 MMOL/L (ref 3.5–5.3)
PR INTERVAL: 148 MS
PR INTERVAL: 158 MS
PR INTERVAL: 164 MS
QRS AXIS: 25 DEGREES
QRS AXIS: 50 DEGREES
QRS AXIS: 53 DEGREES
QRSD INTERVAL: 88 MS
QRSD INTERVAL: 90 MS
QRSD INTERVAL: 94 MS
QT INTERVAL: 346 MS
QT INTERVAL: 346 MS
QT INTERVAL: 360 MS
QTC INTERVAL: 423 MS
QTC INTERVAL: 430 MS
QTC INTERVAL: 430 MS
RBC # BLD AUTO: 3.77 MILLION/UL (ref 3.88–5.62)
SODIUM SERPL-SCNC: 134 MMOL/L (ref 136–145)
SODIUM SERPL-SCNC: 135 MMOL/L (ref 136–145)
T WAVE AXIS: 36 DEGREES
T WAVE AXIS: 74 DEGREES
T WAVE AXIS: 92 DEGREES
TROPONIN I SERPL-MCNC: <0.02 NG/ML
VENTRICULAR RATE: 86 BPM
VENTRICULAR RATE: 90 BPM
VENTRICULAR RATE: 93 BPM
WBC # BLD AUTO: 11.59 THOUSAND/UL (ref 4.31–10.16)

## 2020-09-05 PROCEDURE — 84484 ASSAY OF TROPONIN QUANT: CPT | Performed by: SURGERY

## 2020-09-05 PROCEDURE — 85025 COMPLETE CBC W/AUTO DIFF WBC: CPT | Performed by: PHYSICIAN ASSISTANT

## 2020-09-05 PROCEDURE — 90471 IMMUNIZATION ADMIN: CPT

## 2020-09-05 PROCEDURE — 36415 COLL VENOUS BLD VENIPUNCTURE: CPT | Performed by: SURGERY

## 2020-09-05 PROCEDURE — 90715 TDAP VACCINE 7 YRS/> IM: CPT | Performed by: EMERGENCY MEDICINE

## 2020-09-05 PROCEDURE — 93010 ELECTROCARDIOGRAM REPORT: CPT | Performed by: INTERNAL MEDICINE

## 2020-09-05 PROCEDURE — 82948 REAGENT STRIP/BLOOD GLUCOSE: CPT

## 2020-09-05 PROCEDURE — 96375 TX/PRO/DX INJ NEW DRUG ADDON: CPT

## 2020-09-05 PROCEDURE — NC001 PR NO CHARGE: Performed by: PHYSICIAN ASSISTANT

## 2020-09-05 PROCEDURE — 93005 ELECTROCARDIOGRAM TRACING: CPT

## 2020-09-05 PROCEDURE — 97129 THER IVNTJ 1ST 15 MIN: CPT

## 2020-09-05 PROCEDURE — 97167 OT EVAL HIGH COMPLEX 60 MIN: CPT

## 2020-09-05 PROCEDURE — 97163 PT EVAL HIGH COMPLEX 45 MIN: CPT

## 2020-09-05 PROCEDURE — 73030 X-RAY EXAM OF SHOULDER: CPT

## 2020-09-05 PROCEDURE — 80048 BASIC METABOLIC PNL TOTAL CA: CPT | Performed by: PHYSICIAN ASSISTANT

## 2020-09-05 PROCEDURE — 99217 PR OBSERVATION CARE DISCHARGE MANAGEMENT: CPT | Performed by: PHYSICIAN ASSISTANT

## 2020-09-05 RX ORDER — FLUTICASONE PROPIONATE 220 UG/1
1 AEROSOL, METERED RESPIRATORY (INHALATION) 2 TIMES DAILY
Status: DISCONTINUED | OUTPATIENT
Start: 2020-09-05 | End: 2020-09-05 | Stop reason: HOSPADM

## 2020-09-05 RX ORDER — ACETAMINOPHEN 325 MG/1
650 TABLET ORAL EVERY 4 HOURS PRN
Qty: 30 TABLET | Refills: 0
Start: 2020-09-05 | End: 2020-10-05

## 2020-09-05 RX ORDER — AMLODIPINE BESYLATE 10 MG/1
10 TABLET ORAL DAILY
Status: DISCONTINUED | OUTPATIENT
Start: 2020-09-05 | End: 2020-09-05 | Stop reason: HOSPADM

## 2020-09-05 RX ORDER — HEPARIN SODIUM 5000 [USP'U]/ML
5000 INJECTION, SOLUTION INTRAVENOUS; SUBCUTANEOUS EVERY 8 HOURS SCHEDULED
Status: DISCONTINUED | OUTPATIENT
Start: 2020-09-05 | End: 2020-09-05 | Stop reason: HOSPADM

## 2020-09-05 RX ORDER — METOPROLOL SUCCINATE 25 MG/1
25 TABLET, EXTENDED RELEASE ORAL DAILY
Status: DISCONTINUED | OUTPATIENT
Start: 2020-09-05 | End: 2020-09-05

## 2020-09-05 RX ORDER — METOPROLOL SUCCINATE 25 MG/1
25 TABLET, EXTENDED RELEASE ORAL DAILY
Status: DISCONTINUED | OUTPATIENT
Start: 2020-09-05 | End: 2020-09-05 | Stop reason: HOSPADM

## 2020-09-05 RX ORDER — OXYCODONE HYDROCHLORIDE 10 MG/1
10 TABLET ORAL EVERY 4 HOURS PRN
Status: DISCONTINUED | OUTPATIENT
Start: 2020-09-05 | End: 2020-09-05 | Stop reason: HOSPADM

## 2020-09-05 RX ORDER — HYDROMORPHONE HCL/PF 1 MG/ML
0.2 SYRINGE (ML) INJECTION
Status: DISCONTINUED | OUTPATIENT
Start: 2020-09-05 | End: 2020-09-05 | Stop reason: HOSPADM

## 2020-09-05 RX ORDER — OXYCODONE HYDROCHLORIDE 5 MG/1
5 TABLET ORAL EVERY 4 HOURS PRN
Qty: 20 TABLET | Refills: 0 | Status: SHIPPED | OUTPATIENT
Start: 2020-09-05 | End: 2020-09-15

## 2020-09-05 RX ORDER — HYDROMORPHONE HCL/PF 1 MG/ML
0.2 SYRINGE (ML) INJECTION
Status: DISCONTINUED | OUTPATIENT
Start: 2020-09-05 | End: 2020-09-05

## 2020-09-05 RX ORDER — FUROSEMIDE 40 MG/1
40 TABLET ORAL 2 TIMES DAILY
Status: DISCONTINUED | OUTPATIENT
Start: 2020-09-05 | End: 2020-09-05

## 2020-09-05 RX ORDER — POTASSIUM CHLORIDE 20 MEQ/1
40 TABLET, EXTENDED RELEASE ORAL DAILY
Status: DISCONTINUED | OUTPATIENT
Start: 2020-09-05 | End: 2020-09-05

## 2020-09-05 RX ORDER — OXYCODONE HYDROCHLORIDE 5 MG/1
5 TABLET ORAL EVERY 4 HOURS PRN
Status: DISCONTINUED | OUTPATIENT
Start: 2020-09-05 | End: 2020-09-05 | Stop reason: HOSPADM

## 2020-09-05 RX ADMIN — POTASSIUM CHLORIDE 40 MEQ: 1500 TABLET, EXTENDED RELEASE ORAL at 08:57

## 2020-09-05 RX ADMIN — OXYCODONE HYDROCHLORIDE 10 MG: 10 TABLET ORAL at 00:16

## 2020-09-05 RX ADMIN — HEPARIN SODIUM 5000 UNITS: 5000 INJECTION INTRAVENOUS; SUBCUTANEOUS at 08:02

## 2020-09-05 RX ADMIN — METOPROLOL SUCCINATE 25 MG: 25 TABLET, EXTENDED RELEASE ORAL at 12:08

## 2020-09-05 RX ADMIN — TETANUS TOXOID, REDUCED DIPHTHERIA TOXOID AND ACELLULAR PERTUSSIS VACCINE, ADSORBED 0.5 ML: 5; 2.5; 8; 8; 2.5 SUSPENSION INTRAMUSCULAR at 00:17

## 2020-09-05 RX ADMIN — MAGNESIUM GLUCONATE 500 MG ORAL TABLET 400 MG: 500 TABLET ORAL at 08:57

## 2020-09-05 RX ADMIN — OXYCODONE HYDROCHLORIDE 10 MG: 10 TABLET ORAL at 08:58

## 2020-09-05 RX ADMIN — FLUTICASONE PROPIONATE 1 PUFF: 220 AEROSOL, METERED RESPIRATORY (INHALATION) at 08:57

## 2020-09-05 RX ADMIN — AMLODIPINE BESYLATE 10 MG: 10 TABLET ORAL at 12:09

## 2020-09-05 RX ADMIN — HYDROMORPHONE HYDROCHLORIDE 0.2 MG: 1 INJECTION, SOLUTION INTRAMUSCULAR; INTRAVENOUS; SUBCUTANEOUS at 01:10

## 2020-09-05 NOTE — ASSESSMENT & PLAN NOTE
Wt Readings from Last 3 Encounters:   09/04/20 120 kg (265 lb 3 4 oz)   07/31/20 114 kg (251 lb 9 6 oz)   07/20/20 113 kg (249 lb)     - resume Lasix if kidney function continues to improve  - confirm dosage of 40 mg twice a day  - patient also on Benicar 40 mg daily generic  - outpatient follow-up with cardiology  - did resume metoprolol on amlodipine  - pressures were soft on arrival  - review of scans reveals no acute injuries

## 2020-09-05 NOTE — QUICK NOTE
Patient noted have elevated potassium at 6 7 on BMP  Will repeat stat BMP to ensure no hemolysis and then if continues to be elevated will treat accordingly  Patient currently in no acute distress resting comfortably out of bed to chair

## 2020-09-05 NOTE — PLAN OF CARE
Problem: Potential for Falls  Goal: Patient will remain free of falls  Description: INTERVENTIONS:  - Assess patient frequently for physical needs  -  Identify cognitive and physical deficits and behaviors that affect risk of falls    -  [place] fall precautions as indicated by assessment   - Educate patient/family on patient safety including physical limitations  - Instruct patient to call for assistance with activity based on assessment  - Modify environment to reduce risk of injury  - Consider OT/PT consult to assist with strengthening/mobility  9/5/2020 0324 by Yahaira Corrales RN  Outcome: Progressing  9/5/2020 0323 by Yahaira Corrales RN  Outcome: Progressing     Problem: PAIN - ADULT  Goal: Verbalizes/displays adequate comfort level or baseline comfort level  Description: Interventions:  - Encourage patient to monitor pain and request assistance  - Assess pain using appropriate pain scale  - Administer analgesics based on type and severity of pain and evaluate response  - Implement non-pharmacological measures as appropriate and evaluate response  - Consider cultural and social influences on pain and pain management  - Notify physician/advanced practitioner if interventions unsuccessful or patient reports new pain  Outcome: Progressing     Problem: INFECTION - ADULT  Goal: Absence or prevention of progression during hospitalization  Description: INTERVENTIONS:  - Assess and monitor for signs and symptoms of infection  - Monitor lab/diagnostic results  - Monitor all insertion sites, i e  indwelling lines, tubes, and drains  - Monitor endotracheal if appropriate and nasal secretions for changes in amount and color  - [place] appropriate cooling/warming therapies per order  - Administer medications as ordered  - Instruct and encourage patient and family to use good hand hygiene technique  - Identify and instruct in appropriate isolation precautions for identified infection/condition  Outcome: Progressing  Goal: Absence of fever/infection during neutropenic period  Description: INTERVENTIONS:  - Monitor WBC    Outcome: Progressing     Problem: SAFETY ADULT  Goal: Patient will remain free of falls  Description: INTERVENTIONS:  - Assess patient frequently for physical needs  -  Identify cognitive and physical deficits and behaviors that affect risk of falls    -  [place] fall precautions as indicated by assessment   - Educate patient/family on patient safety including physical limitations  - Instruct patient to call for assistance with activity based on assessment  - Modify environment to reduce risk of injury  - Consider OT/PT consult to assist with strengthening/mobility  9/5/2020 0324 by Jean-Claude Sun RN  Outcome: Progressing  9/5/2020 0323 by Jean-Claude Sun RN  Outcome: Progressing  Goal: Maintain or return to baseline ADL function  Description: INTERVENTIONS:  -  Assess patient's ability to carry out ADLs; assess patient's baseline for ADL function and identify physical deficits which impact ability to perform ADLs (bathing, care of mouth/teeth, toileting, grooming, dressing, etc )  - Assess/evaluate cause of self-care deficits   - Assess range of motion  - Assess patient's mobility; develop plan if impaired  - Assess patient's need for assistive devices and provide as appropriate  - Encourage maximum independence but intervene and supervise when necessary  - Involve family in performance of ADLs  - Assess for home care needs following discharge   - Consider OT consult to assist with ADL evaluation and planning for discharge  - Provide patient education as appropriate  Outcome: Progressing  Goal: Maintain or return mobility status to optimal level  Description: INTERVENTIONS:  - Assess patient's baseline mobility status (ambulation, transfers, stairs, etc )    - Identify cognitive and physical deficits and behaviors that affect mobility  - Identify mobility aids required to assist with transfers and/or ambulation (gait belt, sit-to-stand, lift, walker, cane, etc )  - [place] fall precautions as indicated by assessment  - Record patient progress and toleration of activity level on Mobility SBAR; progress patient to next Phase/Stage  - Instruct patient to call for assistance with activity based on assessment  - Consider rehabilitation consult to assist with strengthening/weightbearing, etc   Outcome: Progressing

## 2020-09-05 NOTE — INCIDENTAL FINDINGS
The following findings require follow up:  Radiographic finding   Finding: 3 cm soft tissue nodule at the internal aspect of the inguinal canal of unknown etiology for which ultrasound or MRI are recommended for further evaluation  Follow up required: Yes   Follow up should be done within 2-4 week(s)    Please notify the following clinician to assist with the follow up:   Discussed with wife prior to discharge  He will have outpatient follow-up with primary care provider

## 2020-09-05 NOTE — ASSESSMENT & PLAN NOTE
- left shoulder film completed awaiting results  - neurovascularly intact  - range of motion slightly limited secondary to pain  - consider orthopedic consultation if any acute injury
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
## 2020-09-05 NOTE — DISCHARGE INSTRUCTIONS
Please Repeat a Basic Metabolic Panel on Tuesday, 9/8/2020 with results to your primary care provider (PCP)  Do not take your potassium supplement until after your repeat blood work and your speak with your PCP  Acute Kidney Injury, Ambulatory Care   GENERAL INFORMATION:   Acute kidney injury  happens when your kidneys suddenly stop working correctly  Normally, the kidneys turn fluid, chemicals, and waste from your blood into urine  In acute kidney injury, your kidneys can no longer do this  In most cases, it is temporary, but it may become a chronic kidney condition  Common symptoms include the following:   · Decreased urination or dark-colored urine    · Swelling in your arms, legs, or feet     · Abdominal or low back pain    · Vomiting, diarrhea, or loss of appetite    · Fatigue     · Skin rash  Seek immediate care for the following symptoms:   · Heart beating faster than normal for you    · Sudden chest pain or trouble breathing    · Seizure  Treatment for acute kidney injury:  Treatment depends upon the cause of your acute kidney injury and how severe it is  Medicines may be given to increase blood flow to your kidneys and protect your kidneys  You may also need medicine to decrease inflammation in your kidneys  You may be given IV fluids to replenish fluids and help your heart pump blood  Dialysis may be needed to remove chemicals and waste from your blood when your kidneys cannot  Manage acute kidney injury:   · Manage other health conditions  Care for your diabetes, high blood pressure, or heart disease  These conditions increase your risk for acute kidney injury  · Talk to your healthcare provider before you take over-the-counter-medicine  NSAIDs, stomach medicine, or laxatives may harm your kidneys and increase your risk for acute kidney injury  Follow up with your healthcare provider as directed:  Write down your questions so you remember to ask them during your visits     CARE AGREEMENT: You have the right to help plan your care  Learn about your health condition and how it may be treated  Discuss treatment options with your caregivers to decide what care you want to receive  You always have the right to refuse treatment  The above information is an  only  It is not intended as medical advice for individual conditions or treatments  Talk to your doctor, nurse or pharmacist before following any medical regimen to see if it is safe and effective for you  © 2014 [address] is for End User's use only and may not be sold, redistributed or otherwise used for commercial purposes  All illustrations and images included in CareNotes® are the copyrighted property of [employer]  or Matt Sloan
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
## 2020-09-05 NOTE — ASSESSMENT & PLAN NOTE
Lab Results   Component Value Date    HGBA1C 7 0 (H) 07/06/2020       No results for input(s): POCGLU in the last 72 hours      Blood Sugar Average: Last 72 hrs:     - start on sliding scale insulin  - outpatient follow-up with primary care provider  - monitor blood sugars
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
## 2020-09-05 NOTE — ASSESSMENT & PLAN NOTE
- history of hypertension  - follow up outpatient with Cardiology  - resumed amlodipine 10 mg  - resumed metoprolol succinate 25 mg  - confirmed both dosages with pharmacy
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
## 2020-09-05 NOTE — ASSESSMENT & PLAN NOTE
- review of CT scans reveals no acute traumatic injuries  - no other workup at this time  - awaiting plain film of left shoulder
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
## 2020-09-08 ENCOUNTER — TRANSITIONAL CARE MANAGEMENT (OUTPATIENT)
Dept: FAMILY MEDICINE CLINIC | Facility: CLINIC | Age: 55
End: 2020-09-08

## 2020-09-08 DIAGNOSIS — I16.0 HYPERTENSIVE URGENCY: ICD-10-CM

## 2020-09-08 DIAGNOSIS — E83.42 HYPOMAGNESEMIA: ICD-10-CM

## 2020-09-08 DIAGNOSIS — I50.9 CHF (CONGESTIVE HEART FAILURE) (HCC): ICD-10-CM

## 2020-09-08 RX ORDER — MAGNESIUM OXIDE 400 MG/1
TABLET ORAL
Qty: 60 TABLET | Refills: 3 | Status: SHIPPED | OUTPATIENT
Start: 2020-09-08 | End: 2020-11-03 | Stop reason: SDUPTHER

## 2020-09-08 RX ORDER — METOPROLOL SUCCINATE 25 MG/1
TABLET, EXTENDED RELEASE ORAL
Qty: 30 TABLET | Refills: 3 | Status: SHIPPED | OUTPATIENT
Start: 2020-09-08 | End: 2020-11-03 | Stop reason: SDUPTHER

## 2020-09-08 RX ORDER — FUROSEMIDE 40 MG/1
TABLET ORAL
Qty: 60 TABLET | Refills: 3 | Status: SHIPPED | OUTPATIENT
Start: 2020-09-08 | End: 2020-11-03 | Stop reason: SDUPTHER

## 2020-09-08 RX ORDER — OLMESARTAN MEDOXOMIL 40 MG/1
TABLET ORAL
Qty: 30 TABLET | Refills: 3 | Status: SHIPPED | OUTPATIENT
Start: 2020-09-08 | End: 2020-11-03 | Stop reason: SDUPTHER

## 2020-09-09 LAB
BUN SERPL-MCNC: 16 MG/DL (ref 7–25)
BUN/CREAT SERPL: ABNORMAL (CALC) (ref 6–22)
CALCIUM SERPL-MCNC: 9.1 MG/DL (ref 8.6–10.3)
CHLORIDE SERPL-SCNC: 98 MMOL/L (ref 98–110)
CO2 SERPL-SCNC: 30 MMOL/L (ref 20–32)
CREAT SERPL-MCNC: 1.13 MG/DL (ref 0.7–1.33)
GLUCOSE SERPL-MCNC: 126 MG/DL (ref 65–99)
POTASSIUM SERPL-SCNC: 4.1 MMOL/L (ref 3.5–5.3)
SL AMB EGFR AFRICAN AMERICAN: 84 ML/MIN/1.73M2
SL AMB EGFR NON AFRICAN AMERICAN: 72 ML/MIN/1.73M2
SODIUM SERPL-SCNC: 135 MMOL/L (ref 135–146)

## 2020-09-11 ENCOUNTER — OFFICE VISIT (OUTPATIENT)
Dept: FAMILY MEDICINE CLINIC | Facility: CLINIC | Age: 55
End: 2020-09-11
Payer: COMMERCIAL

## 2020-09-11 VITALS
BODY MASS INDEX: 37.26 KG/M2 | TEMPERATURE: 98 F | WEIGHT: 251.6 LBS | RESPIRATION RATE: 16 BRPM | OXYGEN SATURATION: 98 % | SYSTOLIC BLOOD PRESSURE: 118 MMHG | DIASTOLIC BLOOD PRESSURE: 78 MMHG | HEIGHT: 69 IN | HEART RATE: 91 BPM
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
## 2020-09-11 DIAGNOSIS — R19.09 INGUINAL NODULE: Primary | ICD-10-CM

## 2020-09-11 DIAGNOSIS — I10 BENIGN ESSENTIAL HYPERTENSION: ICD-10-CM

## 2020-09-11 PROCEDURE — 99495 TRANSJ CARE MGMT MOD F2F 14D: CPT | Performed by: FAMILY MEDICINE
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
## 2020-09-15 ENCOUNTER — TELEPHONE (OUTPATIENT)
Dept: FAMILY MEDICINE CLINIC | Facility: CLINIC | Age: 55
End: 2020-09-15

## 2020-09-15 DIAGNOSIS — IMO0002 DIABETES MELLITUS TYPE 2, UNCONTROLLED: Primary | ICD-10-CM

## 2020-09-23 ENCOUNTER — HOSPITAL ENCOUNTER (OUTPATIENT)
Dept: ULTRASOUND IMAGING | Facility: HOSPITAL | Age: 55
Discharge: HOME | End: 2020-09-23
Payer: COMMERCIAL

## 2020-09-23 DIAGNOSIS — R19.09 INGUINAL NODULE: ICD-10-CM

## 2020-09-23 PROCEDURE — 76705 ECHO EXAM OF ABDOMEN: CPT

## 2020-09-24 ENCOUNTER — TELEPHONE (OUTPATIENT)
Dept: FAMILY MEDICINE CLINIC | Facility: CLINIC | Age: 55
End: 2020-09-24

## 2020-09-24 NOTE — TELEPHONE ENCOUNTER
----- Message from Howie Tillman MD sent at 1/47/1207  7:31 AM EDT -----  Please call patient's wife  Patient's ultrasound was unremarkable for nodule seen in the groin area    Findings were related to his prior hernia repair but no further studies are recommended at this time

## 2020-10-26 ENCOUNTER — TELEPHONE (OUTPATIENT)
Dept: FAMILY MEDICINE CLINIC | Facility: CLINIC | Age: 55
End: 2020-10-26

## 2020-10-26 DIAGNOSIS — IMO0002 DIABETES MELLITUS TYPE 2, UNCONTROLLED: Primary | ICD-10-CM

## 2020-11-03 DIAGNOSIS — E11.65 UNCONTROLLED TYPE 2 DIABETES MELLITUS WITH HYPERGLYCEMIA (HCC): ICD-10-CM

## 2020-11-03 DIAGNOSIS — I50.9 CHF (CONGESTIVE HEART FAILURE) (HCC): ICD-10-CM

## 2020-11-03 DIAGNOSIS — I16.0 HYPERTENSIVE URGENCY: ICD-10-CM

## 2020-11-03 DIAGNOSIS — E83.42 HYPOMAGNESEMIA: ICD-10-CM

## 2020-11-03 RX ORDER — FUROSEMIDE 40 MG/1
40 TABLET ORAL 2 TIMES DAILY
Qty: 180 TABLET | Refills: 3 | Status: ON HOLD | OUTPATIENT
Start: 2020-11-03 | End: 2021-09-04 | Stop reason: SDUPTHER

## 2020-11-03 RX ORDER — METOPROLOL SUCCINATE 25 MG/1
25 TABLET, EXTENDED RELEASE ORAL DAILY
Qty: 90 TABLET | Refills: 3 | Status: ON HOLD | OUTPATIENT
Start: 2020-11-03 | End: 2021-09-04 | Stop reason: SDUPTHER

## 2020-11-03 RX ORDER — MAGNESIUM OXIDE 400 MG/1
1 TABLET ORAL 2 TIMES DAILY
Qty: 180 TABLET | Refills: 3 | Status: ON HOLD | OUTPATIENT
Start: 2020-11-03 | End: 2021-09-04 | Stop reason: SDUPTHER

## 2020-11-03 RX ORDER — OLMESARTAN MEDOXOMIL 40 MG/1
40 TABLET ORAL DAILY
Qty: 90 TABLET | Refills: 3 | Status: ON HOLD | OUTPATIENT
Start: 2020-11-03 | End: 2021-09-04 | Stop reason: SDUPTHER

## 2020-11-19 DIAGNOSIS — E11.65 UNCONTROLLED TYPE 2 DIABETES MELLITUS WITH HYPERGLYCEMIA (HCC): ICD-10-CM

## 2020-12-11 ENCOUNTER — TELEMEDICINE (OUTPATIENT)
Dept: FAMILY MEDICINE CLINIC | Facility: CLINIC | Age: 55
End: 2020-12-11
Payer: COMMERCIAL

## 2020-12-11 ENCOUNTER — TELEPHONE (OUTPATIENT)
Dept: FAMILY MEDICINE CLINIC | Facility: CLINIC | Age: 55
End: 2020-12-11

## 2020-12-11 DIAGNOSIS — B35.1 ONYCHOMYCOSIS: Primary | ICD-10-CM

## 2020-12-11 DIAGNOSIS — R19.4 RECENT CHANGE IN FREQUENCY OF BOWEL MOVEMENTS: ICD-10-CM

## 2020-12-11 DIAGNOSIS — B34.9 VIRAL SYNDROME: Primary | ICD-10-CM

## 2020-12-11 DIAGNOSIS — B34.9 VIRAL SYNDROME: ICD-10-CM

## 2020-12-11 DIAGNOSIS — B35.1 ONYCHOMYCOSIS: ICD-10-CM

## 2020-12-11 PROCEDURE — 99214 OFFICE O/P EST MOD 30 MIN: CPT | Performed by: FAMILY MEDICINE

## 2020-12-23 DIAGNOSIS — B34.9 VIRAL SYNDROME: ICD-10-CM

## 2020-12-23 PROCEDURE — U0003 INFECTIOUS AGENT DETECTION BY NUCLEIC ACID (DNA OR RNA); SEVERE ACUTE RESPIRATORY SYNDROME CORONAVIRUS 2 (SARS-COV-2) (CORONAVIRUS DISEASE [COVID-19]), AMPLIFIED PROBE TECHNIQUE, MAKING USE OF HIGH THROUGHPUT TECHNOLOGIES AS DESCRIBED BY CMS-2020-01-R: HCPCS | Performed by: FAMILY MEDICINE

## 2020-12-24 LAB — SARS-COV-2 RNA SPEC QL NAA+PROBE: NOT DETECTED

## 2020-12-28 ENCOUNTER — TELEPHONE (OUTPATIENT)
Dept: FAMILY MEDICINE CLINIC | Facility: CLINIC | Age: 55
End: 2020-12-28

## 2021-01-10 ENCOUNTER — APPOINTMENT (EMERGENCY)
Dept: RADIOLOGY | Facility: HOSPITAL | Age: 56
End: 2021-01-10
Payer: COMMERCIAL

## 2021-01-10 ENCOUNTER — HOSPITAL ENCOUNTER (EMERGENCY)
Facility: HOSPITAL | Age: 56
Discharge: HOME | End: 2021-01-10
Attending: EMERGENCY MEDICINE
Payer: COMMERCIAL

## 2021-01-10 VITALS
TEMPERATURE: 99 F | WEIGHT: 254 LBS | DIASTOLIC BLOOD PRESSURE: 63 MMHG | BODY MASS INDEX: 37.62 KG/M2 | OXYGEN SATURATION: 98 % | HEIGHT: 69 IN | SYSTOLIC BLOOD PRESSURE: 137 MMHG | HEART RATE: 104 BPM | RESPIRATION RATE: 18 BRPM

## 2021-01-10 DIAGNOSIS — M25.539 WRIST PAIN: Primary | ICD-10-CM

## 2021-01-10 PROCEDURE — 99283 EMERGENCY DEPT VISIT LOW MDM: CPT

## 2021-01-10 PROCEDURE — 29125 APPL SHORT ARM SPLINT STATIC: CPT | Performed by: PHYSICIAN ASSISTANT

## 2021-01-10 PROCEDURE — 99284 EMERGENCY DEPT VISIT MOD MDM: CPT | Performed by: PHYSICIAN ASSISTANT

## 2021-01-10 PROCEDURE — 73110 X-RAY EXAM OF WRIST: CPT

## 2021-01-10 RX ORDER — NAPROXEN 250 MG/1
500 TABLET ORAL ONCE
Status: COMPLETED | OUTPATIENT
Start: 2021-01-10 | End: 2021-01-10

## 2021-01-10 RX ORDER — NAPROXEN 500 MG/1
500 TABLET ORAL 2 TIMES DAILY WITH MEALS
Qty: 30 TABLET | Refills: 0 | Status: SHIPPED | OUTPATIENT
Start: 2021-01-10 | End: 2021-09-04 | Stop reason: HOSPADM

## 2021-01-10 RX ADMIN — NAPROXEN 500 MG: 250 TABLET ORAL at 09:47

## 2021-01-10 NOTE — ED PROVIDER NOTES
History  Chief Complaint   Patient presents with    Wrist Swelling     pt states at work this past Fri felt a pop in wrist has swollen more since     The patient is a 14-year-old male who presents to the emergency department for evaluation of right wrist pain and swelling  The patient states that on Friday he was going up a ladder at work when he felt something pop in his right wrist   He states there was initially no pain, so he continued on with his work  He states that as the day progressed, he began having pain  He states yesterday, he had worsening pain and noted a significant amount of swelling on his wrist   He now notes reduced range of motion of his wrist, any states he is not even able to make a full fist   He states he has tried taking Tylenol for his symptoms, however it is not really help  He states he does not feel he is able to do his job because of the pain and swelling in his wrist at this time  He denies numbness or tingling to pain  He denies any further injury at this time  History provided by:  Patient   used: No    Wrist Swelling  Associated symptoms: no fever        Prior to Admission Medications   Prescriptions Last Dose Informant Patient Reported? Taking? Blood Pressure KIT   No No   Sig: Twice a day periodically   Lancets (ONETOUCH ULTRASOFT) lancets  Spouse/Significant Other Yes No   Sig: by Does not apply route   amLODIPine (NORVASC) 10 mg tablet   No No   Sig: TAKE 1 TABLET BY MOUTH EVERY DAY   fluticasone (FLOVENT HFA) 220 mcg/act inhaler  Spouse/Significant Other No No   Sig: Inhale 1 puff 2 (two) times a day Rinse mouth after use     furosemide (LASIX) 40 mg tablet   No No   Sig: Take 1 tablet (40 mg total) by mouth 2 (two) times a day   glucose blood test strip   No No   Si each by Other route daily   magnesium oxide (MAG-OX) 400 mg tablet   No No   Sig: Take 1 tablet (400 mg total) by mouth 2 (two) times a day   metoprolol succinate (TOPROL-XL) 25 mg 24 hr tablet   No No   Sig: Take 1 tablet (25 mg total) by mouth daily   olmesartan (BENICAR) 40 mg tablet   No No   Sig: Take 1 tablet (40 mg total) by mouth daily   sitaGLIPtin-metFORMIN (JANUMET)  MG per tablet   No No   Sig: Take 1 tablet by mouth 2 (two) times a day with meals      Facility-Administered Medications: None       Past Medical History:   Diagnosis Date    Diabetes mellitus ([place] [address] )     Hypertension     Inguinal hernia     3/25/15    Onychomycosis     5/24/17    Type 2 diabetes mellitus ([facility] ) 05/01/2012       Past Surgical History:   Procedure Laterality Date    ARTHROSCOPY KNEE      with Medial and Lateral Meniscus Repair    HERNIA REPAIR      umbilical and inguinal hernia repairs (left)       Family History   Problem Relation Age of Onset    Hypertension Mother         essential    Chronic bronchitis Father     Prostate cancer Father     Breast cancer Sister      I have reviewed and agree with the history as documented  E-Cigarette/Vaping    E-Cigarette Use Never User      E-Cigarette/Vaping Substances     Social History     Tobacco Use    Smoking status: Former Smoker     Types: Cigars     Start date: 6/22/2020    Smokeless tobacco: Never Used    Tobacco comment: current every day smoker, per [employer]   Substance Use Topics    Alcohol use: Yes     Alcohol/week: 3 0 standard drinks     Types: 2 Cans of beer, 1 Standard drinks or equivalent per week     Binge frequency: Daily or almost daily     Comment: weekend: 1 glass of christian or 2 beers    Drug use: No       Review of Systems   Constitutional: Negative for chills and fever  Musculoskeletal: Positive for arthralgias (Right wrist pain) and joint swelling ( right wrist swelling)  Skin: Negative for color change and wound  Neurological: Negative for numbness  All other systems reviewed and are negative  Physical Exam  Physical Exam  Constitutional:       Appearance: Normal appearance     HENT:      Head: Normocephalic and atraumatic  Nose: Nose normal    Eyes:      Extraocular Movements: Extraocular movements intact  Conjunctiva/sclera: Conjunctivae normal    Neck:      Musculoskeletal: Normal range of motion and neck supple  Musculoskeletal:      Right elbow: Normal      Right wrist: He exhibits decreased range of motion, tenderness, bony tenderness and swelling  He exhibits no deformity and no laceration  Right hand: Normal    Skin:     General: Skin is warm and dry  Neurological:      General: No focal deficit present  Mental Status: He is alert and oriented to person, place, and time  Vital Signs  ED Triage Vitals   Temperature Pulse Respirations Blood Pressure SpO2   01/10/21 0922 01/10/21 0922 01/10/21 0922 01/10/21 0922 01/10/21 0922   99 °F (37 2 °C) (!) 108 18 137/63 98 %      Temp Source Heart Rate Source Patient Position - Orthostatic VS BP Location FiO2 (%)   01/10/21 0922 01/10/21 1047 -- -- --   Oral Monitor         Pain Score       01/10/21 0922       9           Vitals:    01/10/21 0922 01/10/21 1047   BP: 137/63    Pulse: (!) 108 104         Visual Acuity      ED Medications  Medications   naproxen (NAPROSYN) tablet 500 mg (500 mg Oral Given 1/10/21 0947)       Diagnostic Studies  Results Reviewed     None                 XR wrist 3+ views RIGHT   ED Interpretation by Nickolas Bhandari PA-C (01/10 9986)   No acute osseous abnormality  Procedures  Orthopedic injury treatment    Date/Time: 1/10/2021 10:46 AM  Performed by: Nickolas Bhandari PA-C  Authorized by: Nickolas Bhandari PA-C     Patient Location:  ED  Verbal consent obtained?: Yes    Risks and benefits: Risks, benefits and alternatives were discussed    Consent given by:  Patient  Patient states understanding of procedure being performed: Yes    Patient identity confirmed:  Verbally with patient  Injury location:  Wrist  Location details:  Right wrist  Injury type:   Soft tissue  Neurovascular status: Neurovascularly intact    Splint type:  Volar short arm  Supplies used:  Ortho-Glass  Neurovascular status: Neurovascularly intact    Patient tolerance:  Patient tolerated the procedure well with no immediate complications             ED Course  ED Course as of Yasir 10 1050   Sun Yasir 10, 2021   1004 X-ray negative for bony abnormality  However, splint patient comfort and him follow-up with hand  MDM  Number of Diagnoses or Management Options  Wrist pain: new and requires workup  Diagnosis management comments: Patient presents for evaluation of right wrist pain after sustaining an injury 2 days ago  Differential includes but is not limited to fracture versus sprain versus tendonitis  X-ray of right wrist ordered  Naproxen ordered for pain  X-ray reviewed  No acute bony abnormality noted  Patient was placed in a volar short-arm splint for comfort  Patient was given information for follow-up with Orthopedics  I advised continuing Tylenol and naproxen as needed for pain  I discussed treatment options with the patient today  I offered an Ace wrap versus a splint  The patient ultimately requested a splint  Patient is stable for discharge         Amount and/or Complexity of Data Reviewed  Tests in the radiology section of CPT®: ordered and reviewed  Decide to obtain previous medical records or to obtain history from someone other than the patient: yes  Review and summarize past medical records: yes    Risk of Complications, Morbidity, and/or Mortality  Presenting problems: low  Diagnostic procedures: low  Management options: low    Patient Progress  Patient progress: stable      Disposition  Final diagnoses:   Wrist pain     Time reflects when diagnosis was documented in both MDM as applicable and the Disposition within this note     Time User Action Codes Description Comment    1/10/2021 10:28 AM Yasir Osborne Wrist pain       ED Disposition     ED Disposition Condition Date/Time Comment    Discharge Stable Sun Yasir 10, 2021 10:28 AM Jerry Fuel discharge to home/self care              Follow-up Information     Follow up With Specialties Details Why Contact Info Additional Information    Varun Ivey MD Orthopedic Surgery, Hand Surgery Schedule an appointment as soon as possible for a visit in 1 week  [address] [facility] [address] [place] [phone]       [place] [address] Emergency Department Emergency Medicine  If symptoms worsen [address] [facility] [address], [address], [place], [place], [address]          Discharge Medication List as of 1/10/2021 10:29 AM      START taking these medications    Details   naproxen (NAPROSYN) 500 mg tablet Take 1 tablet (500 mg total) by mouth 2 (two) times a day with meals, Starting Sun 1/10/2021, Normal         CONTINUE these medications which have NOT CHANGED    Details   amLODIPine (NORVASC) 10 mg tablet TAKE 1 TABLET BY MOUTH EVERY DAY, Normal      Blood Pressure KIT Twice a day periodically, Normal      fluticasone (FLOVENT HFA) 220 mcg/act inhaler Inhale 1 puff 2 (two) times a day Rinse mouth after use , Starting Tue 7/14/2020, Normal      furosemide (LASIX) 40 mg tablet Take 1 tablet (40 mg total) by mouth 2 (two) times a day, Starting Tue 11/3/2020, Normal      glucose blood test strip 1 each by Other route daily, Starting Tue 11/3/2020, Normal      Lancets (ONETOUCH ULTRASOFT) lancets by Does not apply route, Starting Thu 12/28/2017, Historical Med      magnesium oxide (MAG-OX) 400 mg tablet Take 1 tablet (400 mg total) by mouth 2 (two) times a day, Starting Tue 11/3/2020, Normal      metoprolol succinate (TOPROL-XL) 25 mg 24 hr tablet Take 1 tablet (25 mg total) by mouth daily, Starting Tue 11/3/2020, Normal      olmesartan (BENICAR) 40 mg tablet Take 1 tablet (40 mg total) by mouth daily, Starting Tue 11/3/2020, Normal      sitaGLIPtin-metFORMIN (JANUMET)  MG per tablet Take 1 tablet by mouth 2 (two) times a day with meals, Starting Fri 11/20/2020, No Print           No discharge procedures on file      PDMP Review       Value Time User    PDMP Reviewed  Yes 9/5/2020  4:16 PM Bradly Fair PA-C          ED Provider  Electronically Signed by           Xochitl Tse PA-C  01/10/21 5591

## 2021-01-15 ENCOUNTER — OFFICE VISIT (OUTPATIENT)
Dept: OBGYN CLINIC | Facility: CLINIC | Age: 56
End: 2021-01-15
Payer: COMMERCIAL

## 2021-01-15 VITALS
HEIGHT: 69 IN | DIASTOLIC BLOOD PRESSURE: 98 MMHG | HEART RATE: 105 BPM | WEIGHT: 256 LBS | BODY MASS INDEX: 37.92 KG/M2 | SYSTOLIC BLOOD PRESSURE: 175 MMHG

## 2021-01-15 DIAGNOSIS — M19.031 OSTEOARTHRITIS OF RIGHT WRIST, UNSPECIFIED OSTEOARTHRITIS TYPE: Primary | ICD-10-CM

## 2021-01-15 PROCEDURE — 3008F BODY MASS INDEX DOCD: CPT | Performed by: ORTHOPAEDIC SURGERY

## 2021-01-15 PROCEDURE — 3077F SYST BP >= 140 MM HG: CPT | Performed by: ORTHOPAEDIC SURGERY

## 2021-01-15 PROCEDURE — 99203 OFFICE O/P NEW LOW 30 MIN: CPT | Performed by: ORTHOPAEDIC SURGERY

## 2021-01-15 PROCEDURE — 1036F TOBACCO NON-USER: CPT | Performed by: ORTHOPAEDIC SURGERY

## 2021-01-15 PROCEDURE — 3080F DIAST BP >= 90 MM HG: CPT | Performed by: ORTHOPAEDIC SURGERY

## 2021-01-15 RX ORDER — METHYLPREDNISOLONE 4 MG/1
TABLET ORAL
Qty: 1 EACH | Refills: 0 | Status: SHIPPED | OUTPATIENT
Start: 2021-01-15 | End: 2021-03-05 | Stop reason: SDUPTHER

## 2021-01-15 NOTE — PROGRESS NOTES
Assessment:  1  Osteoarthritis of right wrist, unspecified osteoarthritis type  methylPREDNISolone 4 MG tablet therapy pack    Ambulatory referral to Occupational Therapy     Patient is most likely suffering from an inflammatory process exacerbation  While it is most likely an osteoarthritis flare, this could be symptoms of rheumatoid arthritis  Plan      The decision was made to pursue non-operative treatment for this patient  We will start with using a removable brace for comfort as well as ice, elevation, and NSAIDs for symptom control  He should also start occupational therapy as well as take a Medrol Dose Memo for decrease in inflammation  He should follow up in 3 weeks for repeat evaluation of his symptoms  If they do not improve we will consider a rheumatoid workup  Subjective:    Chief Complaint:Elly Whyte 63 y o  male who presents with right wrist pain      HPI    Patient comes in today with regards to right wrist pain  The patient reports that the pain is in the right radiocarpal joint and has been going on for 1 week  He notes coming down a ladder and feels like he might have grabbed it the wrong way  He felt some pain then  A few days later, he started to note some swelling in his hand that has improved  The pain is rated at5 at its best and0 at its worst   The pain is described as achy and sore  It is worsened with activity, and is made better with rest   The patient has taken NSAIDs and used a splint and elevation for treatment  He is LHD who has never hurt his wrist previously        The following portions of the patient's history were reviewed and updated as appropriate: allergies, current medications, past family history, past social history, past surgical history and problem list         Social History     Socioeconomic History    Marital status: /Civil Union     Spouse name: Not on file    Number of children: Not on file    Years of education: Not on file    Highest education level: Not on file   Occupational History    Occupation: [employer]    Social Needs    Financial resource strain: Not on file    Food insecurity     Worry: Not on file     Inability: Not on file   [employer] needs     Medical: Not on file     Non-medical: Not on file   Tobacco Use    Smoking status: Former Smoker     Types: Cigars     Start date: 6/22/2020    Smokeless tobacco: Never Used    Tobacco comment: current every day smoker, per [employer]   Substance and Sexual Activity    Alcohol use:  Yes     Alcohol/week: 3 0 standard drinks     Types: 2 Cans of beer, 1 Standard drinks or equivalent per week     Binge frequency: Daily or almost daily     Comment: weekend: 1 glass of christian or 2 beers    Drug use: No    Sexual activity: Not on file   Lifestyle    Physical activity     Days per week: Not on file     Minutes per session: Not on file    Stress: Not on file   Relationships    Social connections     Talks on phone: Not on file     Gets together: Not on file     Attends Druze service: Not on file     Active member of club or organization: Not on file     Attends meetings of clubs or organizations: Not on file     Relationship status: Not on file    Intimate partner violence     Fear of current or ex partner: Not on file     Emotionally abused: Not on file     Physically abused: Not on file     Forced sexual activity: Not on file   Other Topics Concern    Not on file   Social History Narrative    Alcohol dependence    Nicotine dependence    Denied, alcohol Use    Marital problems     Past Medical History:   Diagnosis Date    [facility]     Hypertension     Inguinal hernia     3/25/15    Onychomycosis     5/24/17    Type 2 diabetes mellitus ([facility] ) 05/01/2012     Past Surgical History:   Procedure Laterality Date    ARTHROSCOPY KNEE      with Medial and Lateral Meniscus Repair    HERNIA REPAIR      umbilical and inguinal hernia repairs (left) Allergies   Allergen Reactions    Peanut Oil      Current Outpatient Medications on File Prior to Visit   Medication Sig Dispense Refill    amLODIPine (NORVASC) 10 mg tablet TAKE 1 TABLET BY MOUTH EVERY DAY 90 tablet 1    Blood Pressure KIT Twice a day periodically 1 each 0    fluticasone (FLOVENT HFA) 220 mcg/act inhaler Inhale 1 puff 2 (two) times a day Rinse mouth after use  1 Inhaler 3    furosemide (LASIX) 40 mg tablet Take 1 tablet (40 mg total) by mouth 2 (two) times a day 180 tablet 3    glucose blood test strip 1 each by Other route daily 180 each 5    Lancets (ONETOUCH ULTRASOFT) lancets by Does not apply route      magnesium oxide (MAG-OX) 400 mg tablet Take 1 tablet (400 mg total) by mouth 2 (two) times a day 180 tablet 3    metoprolol succinate (TOPROL-XL) 25 mg 24 hr tablet Take 1 tablet (25 mg total) by mouth daily 90 tablet 3    naproxen (NAPROSYN) 500 mg tablet Take 1 tablet (500 mg total) by mouth 2 (two) times a day with meals 30 tablet 0    olmesartan (BENICAR) 40 mg tablet Take 1 tablet (40 mg total) by mouth daily 90 tablet 3    sitaGLIPtin-metFORMIN (JANUMET)  MG per tablet Take 1 tablet by mouth 2 (two) times a day with meals 180 tablet 1     No current facility-administered medications on file prior to visit  Objective:    Review of Systems   Constitutional: Positive for activity change  HENT: Negative  Eyes: Negative  Respiratory: Negative  Cardiovascular: Negative  Gastrointestinal: Negative  Endocrine: Negative  Genitourinary: Negative  Musculoskeletal: Positive for arthralgias, joint swelling and myalgias  Allergic/Immunologic: Negative  Neurological: Negative  Hematological: Negative  Psychiatric/Behavioral: Negative          Right Wrist:  TTP Radiocarpal joint and DRUJ  Moderate swelling in dorsal palm and fingers  Able to make full composite fist  Negative Ballottment DRUJ test  Negative Burgos Shift Test  No Tenderness over Fovea or Ulnar Styloid  Motor and sensory exam is intact to median, radial, and ulnar nerve distributions distally  5/5 strength with flexion and extension at MCP, PIP, and DIP joints in all fingers  Fingers Warm and well perfused  Physical Exam  Constitutional:       Appearance: Normal appearance  HENT:      Head: Normocephalic  Nose: Nose normal    Eyes:      Conjunctiva/sclera: Conjunctivae normal    Neck:      Musculoskeletal: Normal range of motion  Cardiovascular:      Rate and Rhythm: Normal rate and regular rhythm  Pulses: Normal pulses  Pulmonary:      Effort: Pulmonary effort is normal    Abdominal:      Palpations: Abdomen is soft  Musculoskeletal:         General: Swelling present  Skin:     General: Skin is warm  Neurological:      General: No focal deficit present  Mental Status: He is alert and oriented to person, place, and time  Psychiatric:         Mood and Affect: Mood normal          Behavior: Behavior normal          No Procedures performed today    I have personally reviewed pertinent films in PACS and my interpretation is X-rays of the right wrist show minimal degeneration changes of 1st CMC and radiocarpal joints with no acute fractures or dislocations         Portions of the record may have been created with voice recognition software   Occasional wrong word or "sound a like" substitutions may have occurred due to the inherent limitations of voice recognition software   Read the chart carefully and recognize, using context, where substitutions have occurred

## 2021-01-15 NOTE — LETTER
January 15, 2021     Patient: Vasu Pires   YOB: 1965   Date of Visit: 1/15/2021       To Whom it May Concern:    Vasu Pires is under my professional care  He was seen in my office on 1/15/2021  He is not allowed to climb a ladder or use the right hand or wrist at this time as he recovers from his injury  He will be re-evaluated in 3 weeks to follow up with his injury  If you have any questions or concerns, please don't hesitate to call           Sincerely,          Xavier Ames DO        CC: No Recipients

## 2021-02-05 ENCOUNTER — TELEPHONE (OUTPATIENT)
Dept: OBGYN CLINIC | Facility: CLINIC | Age: 56
End: 2021-02-05

## 2021-02-05 NOTE — TELEPHONE ENCOUNTER
PATIENT WAS A NO SHOW OF OV TODAY WITH DR DAVIES  HE STATES THAT HE WILL HAVE HIS WIFE RETURN OUR CALL FOR RESCHEDULING   [facility] & [address]

## 2021-02-16 DIAGNOSIS — I16.0 HYPERTENSIVE URGENCY: ICD-10-CM

## 2021-02-16 RX ORDER — AMLODIPINE BESYLATE 10 MG/1
TABLET ORAL
Qty: 90 TABLET | Refills: 1 | Status: ON HOLD | OUTPATIENT
Start: 2021-02-16 | End: 2021-09-04 | Stop reason: SDUPTHER

## 2021-03-05 ENCOUNTER — OFFICE VISIT (OUTPATIENT)
Dept: OBGYN CLINIC | Facility: CLINIC | Age: 56
End: 2021-03-05
Payer: COMMERCIAL

## 2021-03-05 VITALS
DIASTOLIC BLOOD PRESSURE: 110 MMHG | HEART RATE: 101 BPM | BODY MASS INDEX: 37.92 KG/M2 | HEIGHT: 69 IN | WEIGHT: 256 LBS | SYSTOLIC BLOOD PRESSURE: 170 MMHG

## 2021-03-05 DIAGNOSIS — M19.031 OSTEOARTHRITIS OF RIGHT WRIST, UNSPECIFIED OSTEOARTHRITIS TYPE: ICD-10-CM

## 2021-03-05 DIAGNOSIS — M19.031 ARTHRITIS OF RIGHT WRIST: Primary | ICD-10-CM

## 2021-03-05 PROCEDURE — 99214 OFFICE O/P EST MOD 30 MIN: CPT | Performed by: ORTHOPAEDIC SURGERY

## 2021-03-05 PROCEDURE — 3077F SYST BP >= 140 MM HG: CPT | Performed by: ORTHOPAEDIC SURGERY

## 2021-03-05 PROCEDURE — 3080F DIAST BP >= 90 MM HG: CPT | Performed by: ORTHOPAEDIC SURGERY

## 2021-03-05 PROCEDURE — 1036F TOBACCO NON-USER: CPT | Performed by: ORTHOPAEDIC SURGERY

## 2021-03-05 PROCEDURE — 3008F BODY MASS INDEX DOCD: CPT | Performed by: ORTHOPAEDIC SURGERY

## 2021-03-05 RX ORDER — METHYLPREDNISOLONE 4 MG/1
TABLET ORAL
Qty: 1 EACH | Refills: 0 | Status: SHIPPED | OUTPATIENT
Start: 2021-03-05 | End: 2021-09-04 | Stop reason: HOSPADM
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
## 2021-03-08 LAB
ANA SER QL IF: NEGATIVE
CCP IGG SERPL-ACNC: <16 UNITS
CRP SERPL-MCNC: 17 MG/L
ERYTHROCYTE [SEDIMENTATION RATE] IN BLOOD BY WESTERGREN METHOD: 36 MM/H
HLA-B27 QL FC: NEGATIVE
RHEUMATOID FACT SERPL-ACNC: <14 IU/ML

## 2021-03-30 DIAGNOSIS — Z23 ENCOUNTER FOR IMMUNIZATION: ICD-10-CM

## 2021-04-12 ENCOUNTER — IMMUNIZATIONS (OUTPATIENT)
Dept: FAMILY MEDICINE CLINIC | Facility: HOSPITAL | Age: 56
End: 2021-04-12

## 2021-04-12 DIAGNOSIS — Z23 ENCOUNTER FOR IMMUNIZATION: Primary | ICD-10-CM

## 2021-04-12 PROCEDURE — 91300 SARS-COV-2 / COVID-19 MRNA VACCINE (PFIZER-BIONTECH) 30 MCG: CPT

## 2021-04-12 PROCEDURE — 0001A SARS-COV-2 / COVID-19 MRNA VACCINE (PFIZER-BIONTECH) 30 MCG: CPT

## 2021-04-15 ENCOUNTER — TELEPHONE (OUTPATIENT)
Dept: FAMILY MEDICINE CLINIC | Facility: CLINIC | Age: 56
End: 2021-04-15

## 2021-05-03 ENCOUNTER — IMMUNIZATIONS (OUTPATIENT)
Dept: FAMILY MEDICINE CLINIC | Facility: HOSPITAL | Age: 56
End: 2021-05-03

## 2021-05-03 DIAGNOSIS — Z23 ENCOUNTER FOR IMMUNIZATION: Primary | ICD-10-CM

## 2021-05-03 PROCEDURE — 91300 SARS-COV-2 / COVID-19 MRNA VACCINE (PFIZER-BIONTECH) 30 MCG: CPT

## 2021-05-03 PROCEDURE — 0002A SARS-COV-2 / COVID-19 MRNA VACCINE (PFIZER-BIONTECH) 30 MCG: CPT

## 2021-05-21 DIAGNOSIS — J45.20 MILD INTERMITTENT REACTIVE AIRWAY DISEASE WITHOUT COMPLICATION: ICD-10-CM

## 2021-05-21 RX ORDER — FLUTICASONE PROPIONATE 220 UG/1
1 AEROSOL, METERED RESPIRATORY (INHALATION) 2 TIMES DAILY
Qty: 12 G | Refills: 5 | Status: SHIPPED | OUTPATIENT
Start: 2021-05-21 | End: 2021-08-12

## 2021-08-12 DIAGNOSIS — J45.20 MILD INTERMITTENT REACTIVE AIRWAY DISEASE WITHOUT COMPLICATION: ICD-10-CM

## 2021-08-12 RX ORDER — FLUTICASONE PROPIONATE 220 UG/1
AEROSOL, METERED RESPIRATORY (INHALATION)
Qty: 12 G | Refills: 3 | Status: SHIPPED | OUTPATIENT
Start: 2021-08-12

## 2021-08-23 NOTE — PROGRESS NOTES
Assessment and Plan:   Mr Breanna Meraz is a 58-year-old  male with history significant for osteoarthritis affecting multiple joints who presents for further evaluation of right wrist pain  He is referred by Dr Jed Saenz for a rheumatology consult  Leeann Amato presents today for further evaluation of prominent right wrist pain and swelling he has been experiencing over the past year with his symptoms and physical examination which demonstrates soft tissue swelling overall concerning for an inflammatory arthritis  He appears to be most symptomatic at this joint but also describes chronic diffuse arthralgias and possible inflammatory symptoms affecting his right knee  His specific serological workup has been unrevealing so far, but with his presentation and the elevated inflammatory markers I would like to pursue an MRI of his right wrist to hopefully assist in determining an etiology for the inflammatory arthritis  My differentials would include rheumatoid arthritis (although appears unusual with monoarticular involvement and lack of significant response to steroids) versus a crystal induced arthropathy (also seems unusual given the prolonged duration of symptoms)  - In the meanwhile he may continue with over-the-counter Tylenol and NSAIDs as needed and once the below results are reviewed we can determine if he needs to be started on DMARDs (a repeat course of oral steroids or an intra-articular cortisone injection can also be considered)  Plan:  Diagnoses and all orders for this visit:    Inflammatory arthritis  -     MRI wrist right w wo contrast; Future    Right wrist pain  -     Sjogren's Antibodies; Future  -     Uric acid; Future  -     MRI wrist right w wo contrast; Future    Primary osteoarthritis of first carpometacarpal joint of right hand    Chronic pain of right knee  -     XR knee 3 vw right non injury;  Future    Primary osteoarthritis of left shoulder    Osteoarthritis of cervical spine, unspecified spinal osteoarthritis complication status      I have personally reviewed pertinent films in PACS of the right wrist XR which shows soft tissue swelling  Activities as tolerated  Exercise: try to maintain a low impact exercise regimen as much as possible  Continue other medications as prescribed by PCP and other specialists  RTC in 6 weeks with any provider  HPI  Mr Alaina Schmid is a 43-year-old  male with history significant for osteoarthritis affecting multiple joints who presents for further evaluation of right wrist pain  He is referred by Dr Annemarie Miller for a rheumatology consult  Patient reports for approximately 1 year now he has experienced pain and swelling affecting his right wrist which has been occurring intermittently per his report  His symptoms have overall progressed over time  He states activities will usually worsen the pain  He mentions he will experience flare-ups about 2 times per week but each episode lasts about 3-4 days, so overall it appears like his symptoms are fairly constant  He has seen his primary care physician and Orthopedics for these complaints and had an x-ray of his right wrist done which showed soft tissue swelling as well as CMC osteoarthritis  There was no erosive disease identified  Blood work showed an elevated ESR and CRP of 36 and 17, respectively  Additional labs including an FLORA screen, rheumatoid factor, anti CCP antibody and HLA B27 antigen were normal   In view of the elevated inflammatory markers he was advised establish with Rheumatology  He has not received any intra-articular cortisone injections into the right wrist or done physical therapy  Other than the right wrist he reports on occasion he will experience pain as a generalized sensation affecting his bilateral fingers and on the dorsum of his hands    If the right wrist flares up he states that this can travel to his right elbow and right shoulder  With activities he notices pain on the outer aspects of his hip as well as in his right knee  He may experience mild symptoms also affecting his ankles and feet  He denies any significant joint pains at his left wrist, left elbow, left shoulder or left knee  He has noticed swelling to affect the right wrist as well as the right knee and occasionally in his feet  He does experience morning stiffness of his right wrist which can take 1-2 hours to improve  He has tried over-the-counter Tylenol without much relief  He was also prescribed naproxen 500 mg twice daily for a 2 week course and a methylprednisolone Dosepak, but he states neither of these medications helped significantly and the effect was no more than taking over-the-counter Tylenol  He denies fevers, unintentional weight loss, inflammatory eye disease, dry eyes, dry mouth, skin rash, psoriasis or inflammatory bowel disease  He reports some type of arthritis in his mother  The following portions of the patient's history were reviewed and updated as appropriate: allergies, current medications, past family history, past medical history, past social history, past surgical history and problem list       Review of Systems  Constitutional: Negative for weight change, fevers, chills, night sweats, fatigue  ENT/Mouth: Negative for hearing changes, ear pain, nasal congestion, sinus pain, hoarseness, sore throat, rhinorrhea, swallowing difficulty  Eyes: Negative for pain, redness, discharge, vision changes  Cardiovascular: Negative for chest pain, SOB, palpitations  Respiratory: Negative for cough, sputum, wheezing, dyspnea  Gastrointestinal: Negative for nausea, vomiting, diarrhea, constipation, pain, heartburn  Genitourinary: Negative for dysuria, urinary frequency, hematuria  Musculoskeletal: As per HPI  Skin: Negative for skin rash, color changes  Neuro: Negative for weakness, numbness, tingling, loss of consciousness  Psych: Negative for anxiety, depression  Heme/Lymph: Negative for easy bruising, bleeding, lymphadenopathy  Past Medical History:   Diagnosis Date    Diabetes mellitus ([place] [address] )     Hypertension     Inguinal hernia     3/25/15    Onychomycosis     5/24/17    Type 2 diabetes mellitus ([facility] ) 05/01/2012       Past Surgical History:   Procedure Laterality Date    ARTHROSCOPY KNEE      with Medial and Lateral Meniscus Repair    HERNIA REPAIR      umbilical and inguinal hernia repairs (left)       Social History     Socioeconomic History    Marital status: /Civil Union     Spouse name: Not on file    Number of children: Not on file    Years of education: Not on file    Highest education level: Not on file   Occupational History    Occupation: [employer]    Tobacco Use    Smoking status: Former Smoker     Types: Cigars     Start date: 6/22/2020    Smokeless tobacco: Never Used    Tobacco comment: current every day smoker, per [employer]   Vaping Use    Vaping Use: Never used   Substance and Sexual Activity    Alcohol use: Yes     Alcohol/week: 3 0 standard drinks     Types: 2 Cans of beer, 1 Standard drinks or equivalent per week     Comment: weekend: 1 glass of christian or 2 beers    Drug use: No    Sexual activity: Not on file   Other Topics Concern    Not on file   Social History Narrative    Alcohol dependence    Nicotine dependence    Denied, alcohol Use    Marital problems     Social Determinants of Health     Financial Resource Strain:     Difficulty of Paying Living Expenses:    Food Insecurity:     Worried About Running Out of Food in the Last Year:     Ran Out of Food in the Last Year:    Transportation Needs:     Lack of Transportation (Medical):      Lack of Transportation (Non-Medical):    Physical Activity:     Days of Exercise per Week:     Minutes of Exercise per Session:    Stress:     Feeling of Stress :    Social Connections:     Frequency of Communication with Friends and Family:     Frequency of Social Gatherings with Friends and Family:     Attends Nondenominational Services:     Active Member of Clubs or Organizations:     Attends Club or Organization Meetings:     Marital Status:    Intimate Partner Violence:     Fear of Current or Ex-Partner:     Emotionally Abused:     Physically Abused:     Sexually Abused:        Family History   Problem Relation Age of Onset    Hypertension Mother         essential    Chronic bronchitis Father     Prostate cancer Father     Breast cancer Sister        No Known Allergies      Current Outpatient Medications:     amLODIPine (NORVASC) 10 mg tablet, TAKE 1 TABLET BY MOUTH EVERY DAY, Disp: 90 tablet, Rfl: 1    Blood Pressure KIT, Twice a day periodically, Disp: 1 each, Rfl: 0    Flovent  MCG/ACT inhaler, INHALE 1 PUFF 2 (TWO) TIMES A DAY RINSE MOUTH AFTER USE , Disp: 12 g, Rfl: 3    furosemide (LASIX) 40 mg tablet, Take 1 tablet (40 mg total) by mouth 2 (two) times a day, Disp: 180 tablet, Rfl: 3    glucose blood test strip, 1 each by Other route daily, Disp: 180 each, Rfl: 5    Lancets (ONETOUCH ULTRASOFT) lancets, by Does not apply route, Disp: , Rfl:     magnesium oxide (MAG-OX) 400 mg tablet, Take 1 tablet (400 mg total) by mouth 2 (two) times a day, Disp: 180 tablet, Rfl: 3    methylPREDNISolone 4 MG tablet therapy pack, Use as directed on package, Disp: 1 each, Rfl: 0    metoprolol succinate (TOPROL-XL) 25 mg 24 hr tablet, Take 1 tablet (25 mg total) by mouth daily, Disp: 90 tablet, Rfl: 3    naproxen (NAPROSYN) 500 mg tablet, Take 1 tablet (500 mg total) by mouth 2 (two) times a day with meals, Disp: 30 tablet, Rfl: 0    olmesartan (BENICAR) 40 mg tablet, Take 1 tablet (40 mg total) by mouth daily, Disp: 90 tablet, Rfl: 3    sitaGLIPtin-metFORMIN (JANUMET)  MG per tablet, Take 1 tablet by mouth 2 (two) times a day with meals, Disp: 180 tablet, Rfl: 1      Objective:    Vitals: 08/25/21 0951   BP: 162/84   Pulse: 96   Weight: 119 kg (262 lb 4 8 oz)       Physical Exam  General: Well appearing, well nourished, in no distress  Oriented x 3, normal mood and affect  Ambulating without difficulty  Skin: Good turgor, no rash, unusual bruising or prominent lesions  Hair: Normal texture and distribution  Nails: Normal color, no deformities  HEENT:  Head: Normocephalic, atraumatic  Eyes: Conjunctiva clear, sclera non-icteric, EOM intact  Extremities: No amputations or deformities, cyanosis, edema  Musculoskeletal:   Hands - there is no soft tissue swelling or tenderness of his bilateral DIP, PIP or MCP joints  Wrists - at the right wrist there is prominent soft tissue swelling noted but without tenderness, warmth or erythema  He does have pain on extension  The left wrist is unremarkable  Elbows and shoulders - unremarkable  Knees - there is soft tissue swelling present at the right knee without tenderness or restriction in range of motion  The left knee is unremarkable  Ankles and feet - unremarkable with negative MTP squeeze test bilaterally  There is no enthesitis or dactylitis  Neurologic: Alert and oriented  No focal neurological deficits appreciated  Psychiatric: Normal mood and affect  JOSE Sr    Rheumatology

## 2021-08-25 ENCOUNTER — TELEPHONE (OUTPATIENT)
Dept: RHEUMATOLOGY | Facility: CLINIC | Age: 56
End: 2021-08-25

## 2021-08-25 ENCOUNTER — OFFICE VISIT (OUTPATIENT)
Dept: RHEUMATOLOGY | Facility: CLINIC | Age: 56
End: 2021-08-25
Payer: COMMERCIAL

## 2021-08-25 ENCOUNTER — HOSPITAL ENCOUNTER (OUTPATIENT)
Dept: RADIOLOGY | Facility: HOSPITAL | Age: 56
Discharge: HOME | End: 2021-08-25
Payer: COMMERCIAL

## 2021-08-25 VITALS
DIASTOLIC BLOOD PRESSURE: 84 MMHG | BODY MASS INDEX: 38.73 KG/M2 | WEIGHT: 262.3 LBS | HEART RATE: 96 BPM | SYSTOLIC BLOOD PRESSURE: 162 MMHG

## 2021-08-25 DIAGNOSIS — M18.11 PRIMARY OSTEOARTHRITIS OF FIRST CARPOMETACARPAL JOINT OF RIGHT HAND: ICD-10-CM

## 2021-08-25 DIAGNOSIS — M19.012 PRIMARY OSTEOARTHRITIS OF LEFT SHOULDER: ICD-10-CM

## 2021-08-25 DIAGNOSIS — M25.561 CHRONIC PAIN OF RIGHT KNEE: ICD-10-CM

## 2021-08-25 DIAGNOSIS — G89.29 CHRONIC PAIN OF RIGHT KNEE: ICD-10-CM

## 2021-08-25 DIAGNOSIS — M47.812 OSTEOARTHRITIS OF CERVICAL SPINE, UNSPECIFIED SPINAL OSTEOARTHRITIS COMPLICATION STATUS: ICD-10-CM

## 2021-08-25 DIAGNOSIS — M19.90 INFLAMMATORY ARTHRITIS: Primary | ICD-10-CM

## 2021-08-25 DIAGNOSIS — M25.531 RIGHT WRIST PAIN: ICD-10-CM

## 2021-08-25 PROCEDURE — 99205 OFFICE O/P NEW HI 60 MIN: CPT | Performed by: INTERNAL MEDICINE

## 2021-08-25 PROCEDURE — 1036F TOBACCO NON-USER: CPT | Performed by: INTERNAL MEDICINE

## 2021-08-25 PROCEDURE — 73562 X-RAY EXAM OF KNEE 3: CPT

## 2021-08-26 LAB
ENA SS-A AB SER IA-ACNC: NORMAL AI
ENA SS-B AB SER IA-ACNC: NORMAL AI
URATE SERPL-MCNC: 7.9 MG/DL (ref 4–8)

## 2021-08-31 ENCOUNTER — HOSPITAL ENCOUNTER (INPATIENT)
Facility: HOSPITAL | Age: 56
LOS: 4 days | Discharge: HOME | DRG: 292 | End: 2021-09-04
Attending: EMERGENCY MEDICINE | Admitting: INTERNAL MEDICINE
Payer: COMMERCIAL

## 2021-08-31 ENCOUNTER — APPOINTMENT (EMERGENCY)
Dept: RADIOLOGY | Facility: HOSPITAL | Age: 56
DRG: 292 | End: 2021-08-31
Payer: COMMERCIAL

## 2021-08-31 DIAGNOSIS — I50.9 CHF (CONGESTIVE HEART FAILURE) (HCC): ICD-10-CM

## 2021-08-31 DIAGNOSIS — E11.65 UNCONTROLLED TYPE 2 DIABETES MELLITUS WITH HYPERGLYCEMIA (HCC): ICD-10-CM

## 2021-08-31 DIAGNOSIS — M25.561 RIGHT KNEE PAIN: ICD-10-CM

## 2021-08-31 DIAGNOSIS — M19.031 ARTHRITIS OF RIGHT WRIST: ICD-10-CM

## 2021-08-31 DIAGNOSIS — E83.42 HYPOMAGNESEMIA: ICD-10-CM

## 2021-08-31 DIAGNOSIS — I50.9 ACUTE EXACERBATION OF CHF (CONGESTIVE HEART FAILURE) (HCC): Primary | ICD-10-CM

## 2021-08-31 DIAGNOSIS — N17.9 ACUTE KIDNEY INJURY (HCC): ICD-10-CM

## 2021-08-31 DIAGNOSIS — I16.0 HYPERTENSIVE URGENCY: ICD-10-CM

## 2021-08-31 PROBLEM — I16.1 HYPERTENSIVE EMERGENCY: Status: ACTIVE | Noted: 2021-08-31

## 2021-08-31 PROBLEM — I50.33 ACUTE ON CHRONIC DIASTOLIC CONGESTIVE HEART FAILURE (HCC): Status: ACTIVE | Noted: 2021-08-31

## 2021-08-31 LAB
ALBUMIN SERPL BCP-MCNC: 3.3 G/DL (ref 3.5–5)
ALP SERPL-CCNC: 84 U/L (ref 46–116)
ALT SERPL W P-5'-P-CCNC: 20 U/L (ref 12–78)
ANION GAP SERPL CALCULATED.3IONS-SCNC: 9 MMOL/L (ref 4–13)
AST SERPL W P-5'-P-CCNC: 37 U/L (ref 5–45)
BASOPHILS # BLD AUTO: 0.05 THOUSANDS/ΜL (ref 0–0.1)
BASOPHILS NFR BLD AUTO: 1 % (ref 0–1)
BILIRUB SERPL-MCNC: 2.12 MG/DL (ref 0.2–1)
BUN SERPL-MCNC: 4 MG/DL (ref 5–25)
CALCIUM ALBUM COR SERPL-MCNC: 8.6 MG/DL (ref 8.3–10.1)
CALCIUM SERPL-MCNC: 8 MG/DL (ref 8.3–10.1)
CHLORIDE SERPL-SCNC: 99 MMOL/L (ref 100–108)
CO2 SERPL-SCNC: 29 MMOL/L (ref 21–32)
CREAT SERPL-MCNC: 1.17 MG/DL (ref 0.6–1.3)
D DIMER PPP FEU-MCNC: 0.38 UG/ML FEU
EOSINOPHIL # BLD AUTO: 0.29 THOUSAND/ΜL (ref 0–0.61)
EOSINOPHIL NFR BLD AUTO: 4 % (ref 0–6)
ERYTHROCYTE [DISTWIDTH] IN BLOOD BY AUTOMATED COUNT: 13 % (ref 11.6–15.1)
GFR SERPL CREATININE-BSD FRML MDRD: 80 ML/MIN/1.73SQ M
GLUCOSE SERPL-MCNC: 137 MG/DL (ref 65–140)
GLUCOSE SERPL-MCNC: 162 MG/DL (ref 65–140)
HCT VFR BLD AUTO: 39.2 % (ref 36.5–49.3)
HGB BLD-MCNC: 13.4 G/DL (ref 12–17)
IMM GRANULOCYTES # BLD AUTO: 0.02 THOUSAND/UL (ref 0–0.2)
IMM GRANULOCYTES NFR BLD AUTO: 0 % (ref 0–2)
LYMPHOCYTES # BLD AUTO: 1.95 THOUSANDS/ΜL (ref 0.6–4.47)
LYMPHOCYTES NFR BLD AUTO: 30 % (ref 14–44)
MCH RBC QN AUTO: 34.8 PG (ref 26.8–34.3)
MCHC RBC AUTO-ENTMCNC: 34.2 G/DL (ref 31.4–37.4)
MCV RBC AUTO: 102 FL (ref 82–98)
MONOCYTES # BLD AUTO: 0.65 THOUSAND/ΜL (ref 0.17–1.22)
MONOCYTES NFR BLD AUTO: 10 % (ref 4–12)
NEUTROPHILS # BLD AUTO: 3.57 THOUSANDS/ΜL (ref 1.85–7.62)
NEUTS SEG NFR BLD AUTO: 55 % (ref 43–75)
NRBC BLD AUTO-RTO: 0 /100 WBCS
NT-PROBNP SERPL-MCNC: 772 PG/ML
PLATELET # BLD AUTO: 126 THOUSANDS/UL (ref 149–390)
PLATELET # BLD AUTO: 127 THOUSANDS/UL (ref 149–390)
PMV BLD AUTO: 10.6 FL (ref 8.9–12.7)
PMV BLD AUTO: 11.3 FL (ref 8.9–12.7)
POTASSIUM SERPL-SCNC: 3 MMOL/L (ref 3.5–5.3)
PROT SERPL-MCNC: 7.3 G/DL (ref 6.4–8.2)
RBC # BLD AUTO: 3.85 MILLION/UL (ref 3.88–5.62)
SARS-COV-2 RNA RESP QL NAA+PROBE: NEGATIVE
SODIUM SERPL-SCNC: 137 MMOL/L (ref 136–145)
TROPONIN I SERPL-MCNC: <0.02 NG/ML
TROPONIN I SERPL-MCNC: <0.02 NG/ML
WBC # BLD AUTO: 6.53 THOUSAND/UL (ref 4.31–10.16)

## 2021-08-31 PROCEDURE — 96374 THER/PROPH/DIAG INJ IV PUSH: CPT

## 2021-08-31 PROCEDURE — U0005 INFEC AGEN DETEC AMPLI PROBE: HCPCS

## 2021-08-31 PROCEDURE — 85049 AUTOMATED PLATELET COUNT: CPT | Performed by: INTERNAL MEDICINE

## 2021-08-31 PROCEDURE — 99285 EMERGENCY DEPT VISIT HI MDM: CPT | Performed by: EMERGENCY MEDICINE

## 2021-08-31 PROCEDURE — 84484 ASSAY OF TROPONIN QUANT: CPT | Performed by: EMERGENCY MEDICINE

## 2021-08-31 PROCEDURE — 83036 HEMOGLOBIN GLYCOSYLATED A1C: CPT | Performed by: INTERNAL MEDICINE

## 2021-08-31 PROCEDURE — 84484 ASSAY OF TROPONIN QUANT: CPT | Performed by: INTERNAL MEDICINE

## 2021-08-31 PROCEDURE — 85025 COMPLETE CBC W/AUTO DIFF WBC: CPT | Performed by: EMERGENCY MEDICINE

## 2021-08-31 PROCEDURE — 82948 REAGENT STRIP/BLOOD GLUCOSE: CPT

## 2021-08-31 PROCEDURE — 3066F NEPHROPATHY DOC TX: CPT | Performed by: INTERNAL MEDICINE

## 2021-08-31 PROCEDURE — 71045 X-RAY EXAM CHEST 1 VIEW: CPT

## 2021-08-31 PROCEDURE — 83880 ASSAY OF NATRIURETIC PEPTIDE: CPT

## 2021-08-31 PROCEDURE — 99284 EMERGENCY DEPT VISIT MOD MDM: CPT

## 2021-08-31 PROCEDURE — 36415 COLL VENOUS BLD VENIPUNCTURE: CPT

## 2021-08-31 PROCEDURE — 80053 COMPREHEN METABOLIC PANEL: CPT | Performed by: EMERGENCY MEDICINE

## 2021-08-31 PROCEDURE — U0003 INFECTIOUS AGENT DETECTION BY NUCLEIC ACID (DNA OR RNA); SEVERE ACUTE RESPIRATORY SYNDROME CORONAVIRUS 2 (SARS-COV-2) (CORONAVIRUS DISEASE [COVID-19]), AMPLIFIED PROBE TECHNIQUE, MAKING USE OF HIGH THROUGHPUT TECHNOLOGIES AS DESCRIBED BY CMS-2020-01-R: HCPCS

## 2021-08-31 PROCEDURE — 93005 ELECTROCARDIOGRAM TRACING: CPT

## 2021-08-31 PROCEDURE — 85379 FIBRIN DEGRADATION QUANT: CPT

## 2021-08-31 RX ORDER — LABETALOL 20 MG/4 ML (5 MG/ML) INTRAVENOUS SYRINGE
10 EVERY 6 HOURS PRN
Status: DISCONTINUED | OUTPATIENT
Start: 2021-08-31 | End: 2021-09-04 | Stop reason: HOSPADM

## 2021-08-31 RX ORDER — HEPARIN SODIUM 5000 [USP'U]/ML
5000 INJECTION, SOLUTION INTRAVENOUS; SUBCUTANEOUS EVERY 8 HOURS SCHEDULED
Status: DISCONTINUED | OUTPATIENT
Start: 2021-08-31 | End: 2021-09-04 | Stop reason: HOSPADM

## 2021-08-31 RX ORDER — FUROSEMIDE 10 MG/ML
40 INJECTION INTRAMUSCULAR; INTRAVENOUS DAILY
Status: DISCONTINUED | OUTPATIENT
Start: 2021-09-01 | End: 2021-09-02

## 2021-08-31 RX ORDER — AMLODIPINE BESYLATE 10 MG/1
10 TABLET ORAL DAILY
Status: DISCONTINUED | OUTPATIENT
Start: 2021-09-01 | End: 2021-09-03

## 2021-08-31 RX ORDER — POTASSIUM CHLORIDE 20 MEQ/1
40 TABLET, EXTENDED RELEASE ORAL DAILY
Status: DISCONTINUED | OUTPATIENT
Start: 2021-09-01 | End: 2021-09-04 | Stop reason: HOSPADM

## 2021-08-31 RX ORDER — POTASSIUM CHLORIDE 20 MEQ/1
40 TABLET, EXTENDED RELEASE ORAL ONCE
Status: COMPLETED | OUTPATIENT
Start: 2021-08-31 | End: 2021-08-31

## 2021-08-31 RX ORDER — LABETALOL 20 MG/4 ML (5 MG/ML) INTRAVENOUS SYRINGE
10 ONCE
Status: COMPLETED | OUTPATIENT
Start: 2021-08-31 | End: 2021-08-31

## 2021-08-31 RX ORDER — ASPIRIN 325 MG
325 TABLET ORAL ONCE
Status: COMPLETED | OUTPATIENT
Start: 2021-08-31 | End: 2021-08-31

## 2021-08-31 RX ORDER — POTASSIUM CHLORIDE 20 MEQ/1
40 TABLET, EXTENDED RELEASE ORAL ONCE
Status: DISCONTINUED | OUTPATIENT
Start: 2021-08-31 | End: 2021-09-04 | Stop reason: HOSPADM

## 2021-08-31 RX ORDER — METOPROLOL SUCCINATE 25 MG/1
25 TABLET, EXTENDED RELEASE ORAL DAILY
Status: DISCONTINUED | OUTPATIENT
Start: 2021-09-01 | End: 2021-09-03

## 2021-08-31 RX ORDER — FUROSEMIDE 10 MG/ML
40 INJECTION INTRAMUSCULAR; INTRAVENOUS
Status: DISCONTINUED | OUTPATIENT
Start: 2021-09-01 | End: 2021-08-31

## 2021-08-31 RX ORDER — LOSARTAN POTASSIUM 50 MG/1
100 TABLET ORAL DAILY
Status: DISCONTINUED | OUTPATIENT
Start: 2021-09-01 | End: 2021-09-03

## 2021-08-31 RX ORDER — FUROSEMIDE 10 MG/ML
40 INJECTION INTRAMUSCULAR; INTRAVENOUS ONCE
Status: COMPLETED | OUTPATIENT
Start: 2021-08-31 | End: 2021-08-31

## 2021-08-31 RX ADMIN — HEPARIN SODIUM 5000 UNITS: 5000 INJECTION INTRAVENOUS; SUBCUTANEOUS at 22:09

## 2021-08-31 RX ADMIN — POTASSIUM CHLORIDE 40 MEQ: 1500 TABLET, EXTENDED RELEASE ORAL at 17:53

## 2021-08-31 RX ADMIN — LABETALOL 20 MG/4 ML (5 MG/ML) INTRAVENOUS SYRINGE 10 MG: at 18:14

## 2021-08-31 RX ADMIN — LABETALOL 20 MG/4 ML (5 MG/ML) INTRAVENOUS SYRINGE 10 MG: at 15:52

## 2021-08-31 RX ADMIN — ASPIRIN 325 MG ORAL TABLET 325 MG: 325 PILL ORAL at 15:53

## 2021-08-31 RX ADMIN — FUROSEMIDE 40 MG: 10 INJECTION, SOLUTION INTRAMUSCULAR; INTRAVENOUS at 18:09

## 2021-08-31 NOTE — ASSESSMENT & PLAN NOTE
Wt Readings from Last 3 Encounters:   08/25/21 119 kg (262 lb 4 8 oz)   03/05/21 116 kg (256 lb)   01/15/21 116 kg (256 lb)     · 2D echocardiogram in July 2020 shows preserved ejection fraction of 37%, grade 1 diastolic dysfunction  · Presenting with bilateral lower extremity swelling, dyspnea on exertion, chest tightness occasional  · Has ongoing orthopnea, paroxysmal nocturnal dyspnea  · NT proBNP 770 (440 in July 2020)  · Patient admits to be noncompliant with p o  Lasix (home dose 40 mg b i d )  · Compliant with fluid restriction and salt restriction  · Prior hospital admission with congestive heart failure exacerbation in 2020  · Mild CHF exacerbation likely secondary to medication noncompliance versus uncontrolled hypertension  · EKG:  Nonspecific T-wave inversions in V3 to V6, and frequent PVCs, new since prior EKG    Plan  1  Will give IV Lasix 40 mg once now and 40 daily starting tomorrow morning  2  Monitor I/O, daily weights  3  Monitor renal functions  4  Maintain potassium above 4, magnesium above 2  5  2 L fluid restriction, 2 g salt restriction  6  Given new EKG changes, will trend troponins with serial EKGs

## 2021-08-31 NOTE — ASSESSMENT & PLAN NOTE
· Blood pressure noted to be >180/100  · Received 1 dose of IV labetalol 10 mg in the ED with initial response, now above 180/100  · Denies headaches, visual disturbances  · Ongoing pulmonary edema  · Normal renal functions, troponin negative x1    Plan  1  IV labetalol 10 mg q 6h p r n  for SBP above 170  2  Continue Toprol XL 25 daily, amlodipine 10 mg daily  3  olmesartan non formulary, will order losartan g daily  4   Monitor blood pressure

## 2021-08-31 NOTE — ASSESSMENT & PLAN NOTE
Lab Results   Component Value Date    HGBA1C 7 0 (H) 07/06/2020       Recent Labs     08/31/21  1900   POCGLU 137       Blood Sugar Average: Last 72 hrs:  (P) 137   · Poor compliance  · On Janumet at home, will hold  · Will place on insulin sliding scale, Accu-Cheks hypoglycemia protocol  · Can consider starting basal bolus if glucose is uncontrolled on sliding scale

## 2021-08-31 NOTE — ASSESSMENT & PLAN NOTE
· Potassium 3 0 on admission currently being repeated  · Will order maintenance p o  Potassium 20 daily while being diuresed  · Repeat BMP a m

## 2021-08-31 NOTE — ASSESSMENT & PLAN NOTE
· Recently seen by Rheumatology  · Mild elevation in CRP  · Rest of the rheumatological workup is negative so far  · Awaiting outpatient right wrist MRI  · Patient states that his right arm, right face swells up, currently resolved  · Denies pain in the arm/face  · Pulses palpable  · Symptomatic management outpatient follow-up with Rheumatology

## 2021-08-31 NOTE — ED PROVIDER NOTES
History  Chief Complaint   Patient presents with    Leg Swelling     Pt reports right facial, right arm, and right leg swelling x 3 days  Pt also reports intermittent chest pain  Patient is a 62-year-old male with a past medical history of hypertension and congestive heart failure who presents to the ED with a complaint of intermittent chest pain and right-sided extremity swelling in his arm and leg as well as his face for the past 3 days  Patient reports that he first noted the swelling 3-4 days ago and the chest pain has been intermittent during that time  He describes it as a chest pressure that is centrally located and does not radiate  He rates his pain 6/10 and says that it only occurs with exertion, when he rests it resolves  He also reports that his last episode of chest pain occurred approximately 4-5 hours ago  He says that his wife mentioned that the right side of his face appeared to swell on occasion  He also mentioned his right arm was swelling, especially around his wrist   He says that his wrist often causes him pain and he treated the pain with ibuprofen and it appears to have helped with the swelling  He also reports that he started smoking recently because of his long commute to work and he reports that the smoking helps ease his abdominal pain  The patient reports that he takes antihypertensive medication but he cannot recall the name  He says that he takes that medication once in the morning and once at night and he did take his medication this morning  He also reports nausea and dry heaving over the last 3 days as well as intermittent headache but he denies blurry vision, lightheadedness, hematochezia, hematuria, syncope or fever  Prior to Admission Medications   Prescriptions Last Dose Informant Patient Reported? Taking?    Blood Pressure KIT   No Yes   Sig: Twice a day periodically   Flovent  MCG/ACT inhaler   No Yes   Sig: INHALE 1 PUFF 2 (TWO) TIMES A DAY RINSE MOUTH AFTER USE  Lancets (ONETOUCH ULTRASOFT) lancets  Spouse/Significant Other Yes Yes   Sig: by Does not apply route   amLODIPine (NORVASC) 10 mg tablet   No Yes   Sig: TAKE 1 TABLET BY MOUTH EVERY DAY   furosemide (LASIX) 40 mg tablet   No Yes   Sig: Take 1 tablet (40 mg total) by mouth 2 (two) times a day   glucose blood test strip   No Yes   Si each by Other route daily   magnesium oxide (MAG-OX) 400 mg tablet   No Yes   Sig: Take 1 tablet (400 mg total) by mouth 2 (two) times a day   methylPREDNISolone 4 MG tablet therapy pack   No Yes   Sig: Use as directed on package   metoprolol succinate (TOPROL-XL) 25 mg 24 hr tablet   No Yes   Sig: Take 1 tablet (25 mg total) by mouth daily   naproxen (NAPROSYN) 500 mg tablet   No No   Sig: Take 1 tablet (500 mg total) by mouth 2 (two) times a day with meals   olmesartan (BENICAR) 40 mg tablet   No Yes   Sig: Take 1 tablet (40 mg total) by mouth daily   sitaGLIPtin-metFORMIN (JANUMET)  MG per tablet   No Yes   Sig: Take 1 tablet by mouth 2 (two) times a day with meals      Facility-Administered Medications: None       Past Medical History:   Diagnosis Date    Diabetes mellitus ([facility] )     Hypertension     Inguinal hernia     3/25/15    Onychomycosis     17    Type 2 diabetes mellitus ([facility] ) 2012       Past Surgical History:   Procedure Laterality Date    ARTHROSCOPY KNEE      with Medial and Lateral Meniscus Repair    HERNIA REPAIR      umbilical and inguinal hernia repairs (left)       Family History   Problem Relation Age of Onset    Hypertension Mother         essential    Chronic bronchitis Father     Prostate cancer Father     Breast cancer Sister      I have reviewed and agree with the history as documented      E-Cigarette/Vaping    E-Cigarette Use Never User      E-Cigarette/Vaping Substances     Social History     Tobacco Use    Smoking status: Former Smoker     Types: Cigars     Start date: 2020    Smokeless tobacco: Never Used    Tobacco comment: current every day smoker, per [employer]   Vaping Use    Vaping Use: Never used   Substance Use Topics    Alcohol use: Yes     Alcohol/week: 3 0 standard drinks     Types: 2 Cans of beer, 1 Standard drinks or equivalent per week     Comment: weekend: 1 glass of christian or 2 beers    Drug use: No        Review of Systems   Constitutional: Negative for diaphoresis, fatigue and fever  HENT: Negative for congestion, postnasal drip, rhinorrhea and sore throat  Eyes: Negative  Respiratory: Positive for shortness of breath  Negative for cough and wheezing  Cardiovascular: Positive for chest pain and leg swelling  Negative for palpitations  Gastrointestinal: Positive for abdominal pain, constipation, nausea and vomiting  Endocrine: Negative  Genitourinary: Negative for dysuria, frequency, hematuria and urgency  Musculoskeletal: Positive for joint swelling  Negative for arthralgias, neck pain and neck stiffness  Skin: Negative for color change, rash and wound  Allergic/Immunologic: Negative  Neurological: Positive for facial asymmetry, weakness and headaches  Negative for dizziness, seizures, syncope, light-headedness and numbness  Right-sided facial swelling  Hematological: Negative  Psychiatric/Behavioral: Negative  All other systems reviewed and are negative        Physical Exam  ED Triage Vitals   Temperature Pulse Respirations Blood Pressure SpO2   08/31/21 1352 08/31/21 1352 08/31/21 1352 08/31/21 1352 08/31/21 1352   98 °F (36 7 °C) 97 20 153/94 98 %      Temp Source Heart Rate Source Patient Position - Orthostatic VS BP Location FiO2 (%)   08/31/21 1352 08/31/21 1352 08/31/21 1600 08/31/21 1600 --   Oral Monitor Lying Right arm       Pain Score       08/31/21 1352       8             Orthostatic Vital Signs  Vitals:    08/31/21 1830 08/31/21 2000 08/31/21 2340 09/01/21 0023   BP: 153/97 161/94 140/93 132/77   Pulse: 92 98 98 100   Patient Position - Orthostatic VS:   Lying Sitting       Physical Exam  Vitals and nursing note reviewed  Constitutional:       Appearance: He is normal weight  He is ill-appearing  HENT:      Head: Normocephalic and atraumatic  Right Ear: Tympanic membrane normal       Left Ear: Tympanic membrane normal       Nose: Nose normal       Mouth/Throat:      Mouth: Mucous membranes are moist       Pharynx: Oropharynx is clear  Eyes:      General: No scleral icterus  Right eye: No discharge  Left eye: No discharge  Conjunctiva/sclera: Conjunctivae normal       Pupils: Pupils are equal, round, and reactive to light  Cardiovascular:      Rate and Rhythm: Regular rhythm  Tachycardia present  Pulses: Normal pulses  Heart sounds: Normal heart sounds  No murmur heard  No friction rub  No gallop  Pulmonary:      Effort: Pulmonary effort is normal  No respiratory distress  Breath sounds: Normal breath sounds  No wheezing  Chest:      Chest wall: No tenderness  Abdominal:      General: Abdomen is flat  Bowel sounds are normal  There is no distension  Palpations: Abdomen is soft  Tenderness: There is no abdominal tenderness  Hernia: No hernia is present  Musculoskeletal:      Cervical back: Normal range of motion and neck supple  No rigidity or tenderness  Right lower leg: Edema present  Left lower leg: Edema present  Lymphadenopathy:      Cervical: No cervical adenopathy  Skin:     General: Skin is dry  Capillary Refill: Capillary refill takes 2 to 3 seconds  Findings: No bruising, erythema or rash  Neurological:      General: No focal deficit present  Mental Status: He is alert and oriented to person, place, and time  Mental status is at baseline  Cranial Nerves: No cranial nerve deficit  Sensory: No sensory deficit  Motor: No weakness     Psychiatric:         Mood and Affect: Mood normal          Behavior: Behavior normal          Thought Content:  Thought content normal          Judgment: Judgment normal          ED Medications  Medications   amLODIPine (NORVASC) tablet 10 mg (has no administration in time range)   metoprolol succinate (TOPROL-XL) 24 hr tablet 25 mg (has no administration in time range)   losartan (COZAAR) tablet 100 mg (has no administration in time range)   heparin (porcine) subcutaneous injection 5,000 Units (5,000 Units Subcutaneous Given 8/31/21 2209)   Labetalol HCl (NORMODYNE) injection 10 mg (has no administration in time range)   insulin lispro (HumaLOG) 100 units/mL subcutaneous injection 2-12 Units (2 Units Subcutaneous Not Given 8/31/21 1925)   insulin lispro (HumaLOG) 100 units/mL subcutaneous injection 1-5 Units (1 Units Subcutaneous Not Given 8/31/21 2123)   potassium chloride (K-DUR,KLOR-CON) CR tablet 40 mEq (40 mEq Oral Not Given 8/31/21 1809)   furosemide (LASIX) injection 40 mg (has no administration in time range)   potassium chloride (K-DUR,KLOR-CON) CR tablet 40 mEq (has no administration in time range)   Labetalol HCl (NORMODYNE) injection 10 mg (10 mg Intravenous Given 8/31/21 1552)   aspirin tablet 325 mg (325 mg Oral Given 8/31/21 1553)   potassium chloride (K-DUR,KLOR-CON) CR tablet 40 mEq (40 mEq Oral Given 8/31/21 1753)   furosemide (LASIX) injection 40 mg (40 mg Intravenous Given 8/31/21 1809)   Labetalol HCl (NORMODYNE) injection 10 mg (10 mg Intravenous Given 8/31/21 1814)       Diagnostic Studies  Results Reviewed     Procedure Component Value Units Date/Time    Troponin I [[id number]]  (Normal) Collected: 08/31/21 2341    Lab Status: Final result Specimen: Blood from Arm, Left Updated: 09/01/21 0016     Troponin I <0 02 ng/mL     Troponin I [[id number]]  (Normal) Collected: 08/31/21 2055    Lab Status: Final result Specimen: Blood from Arm, Right Updated: 08/31/21 2129     Troponin I <0 02 ng/mL     Fingerstick Glucose (POCT) [[id number]]  (Normal) Collected: 08/31/21 1900 Lab Status: Final result Updated: 08/31/21 1925     POC Glucose 137 mg/dl     Platelet count [[id number]]  (Abnormal) Collected: 08/31/21 1754    Lab Status: Final result Specimen: Blood from Arm, Right Updated: 08/31/21 1823     Platelets 995 Thousands/uL      MPV 11 3 fL     Hemoglobin A1c w/EAG Estimation (Orders if not completed within the last 90 days) [[id number]] Collected: 08/31/21 1754    Lab Status: In process Specimen: Blood from Arm, Right Updated: 08/31/21 1802    Novel Coronavirus (Covid-19),PCR [facility] - 2 Hour Stat [[id number]]  (Normal) Collected: 08/31/21 1536    Lab Status: Final result Specimen: Nares from Nose Updated: 08/31/21 1701     SARS-CoV-2 Negative    Narrative: The specimen collection materials, transport medium, and/or testing methodology utilized in the production of these test results have been proven to be reliable in a limited validation with an abbreviated program under the Emergency Utilization Authorization provided by the FDA  Testing reported as "Presumptive positive" will be confirmed with secondary testing to ensure result accuracy  Clinical caution and judgement should be used with the interpretation of these results with consideration of the clinical impression and other laboratory testing  Testing reported as "Positive" or "Negative" has been proven to be accurate according to standard laboratory validation requirements  All testing is performed with control materials showing appropriate reactivity at standard intervals        D-Dimer [[id number]]  (Normal) Collected: 08/31/21 1600    Lab Status: Final result Specimen: Blood from Arm, Right Updated: 08/31/21 1627     D-Dimer, Quant 0 38 ug/ml FEU     NT-BNP PRO [[id number]]  (Abnormal) Collected: 08/31/21 1529    Lab Status: Final result Specimen: Blood from Arm, Right Updated: 08/31/21 1616     NT-proBNP 772 pg/mL     [place] draw [[id number]] Collected: 08/31/21 1405    Lab Status: Final result Specimen: Blood from Arm, Right Updated: 08/31/21 1601    Narrative: The following orders were created for panel order Egan draw  Procedure                               Abnormality         Status                     ---------                               -----------         ------                     Yin Fill top on XLSM[[id number]]                                 Final result                 Please view results for these tests on the individual orders      Comprehensive metabolic panel [[id number]]  (Abnormal) Collected: 08/31/21 1405    Lab Status: Final result Specimen: Blood from Arm, Right Updated: 08/31/21 1513     Sodium 137 mmol/L      Potassium 3 0 mmol/L      Chloride 99 mmol/L      CO2 29 mmol/L      ANION GAP 9 mmol/L      BUN 4 mg/dL      Creatinine 1 17 mg/dL      Glucose 162 mg/dL      Calcium 8 0 mg/dL      Corrected Calcium 8 6 mg/dL      AST 37 U/L      ALT 20 U/L      Alkaline Phosphatase 84 U/L      Total Protein 7 3 g/dL      Albumin 3 3 g/dL      Total Bilirubin 2 12 mg/dL      eGFR 80 ml/min/1 73sq m     Narrative:      [place] guidelines for Chronic Kidney Disease (CKD):     Stage 1 with normal or high GFR (GFR > 90 mL/min/1 73 square meters)    Stage 2 Mild CKD (GFR = 60-89 mL/min/1 73 square meters)    Stage 3A Moderate CKD (GFR = 45-59 mL/min/1 73 square meters)    Stage 3B Moderate CKD (GFR = 30-44 mL/min/1 73 square meters)    Stage 4 Severe CKD (GFR = 15-29 mL/min/1 73 square meters)    Stage 5 End Stage CKD (GFR <15 mL/min/1 73 square meters)  Note: GFR calculation is accurate only with a steady state creatinine    Troponin I [[id number]]  (Normal) Collected: 08/31/21 1405    Lab Status: Final result Specimen: Blood from Arm, Right Updated: 08/31/21 1501     Troponin I <0 02 ng/mL     CBC and differential [[id number]]  (Abnormal) Collected: 08/31/21 1405    Lab Status: Final result Specimen: Blood from Arm, Right Updated: 08/31/21 1417     WBC 6 53 Thousand/uL      RBC 3 85 Million/uL      Hemoglobin 13 4 g/dL      Hematocrit 39 2 %       fL      MCH 34 8 pg      MCHC 34 2 g/dL      RDW 13 0 %      MPV 10 6 fL      Platelets 768 Thousands/uL      nRBC 0 /100 WBCs      Neutrophils Relative 55 %      Immat GRANS % 0 %      Lymphocytes Relative 30 %      Monocytes Relative 10 %      Eosinophils Relative 4 %      Basophils Relative 1 %      Neutrophils Absolute 3 57 Thousands/µL      Immature Grans Absolute 0 02 Thousand/uL      Lymphocytes Absolute 1 95 Thousands/µL      Monocytes Absolute 0 65 Thousand/µL      Eosinophils Absolute 0 29 Thousand/µL      Basophils Absolute 0 05 Thousands/µL                  XR chest 1 view portable    (Results Pending)         Procedures  ECG 12 Lead Documentation Only    Date/Time: 8/31/2021 2:05 PM  Performed by: Maricarmen Carpenter DO  Authorized by: Maricarmen Carpenter DO     Indications / Diagnosis:  Chest pain  ECG reviewed by me, the ED Provider: yes    Patient location:  ED  Previous ECG:     Previous ECG:  Compared to current    Comparison ECG info:  Non-specific T wave abnormality improved in anterior leads and the current ECG indicates new PVC's    Similarity:  Changes noted    Comparison to cardiac monitor: No    Interpretation:     Interpretation: non-specific    Rate:     ECG rate:  94    ECG rate assessment: normal    Rhythm:     Rhythm: sinus rhythm    Ectopy:     Ectopy: PVCs      PVCs:  Infrequent  QRS:     QRS axis:  Normal    QRS intervals:  Normal  Conduction:     Conduction: normal    ST segments:     ST segments:  Normal  T waves:     T waves: normal            ED Course  ED Course as of Sep 01 0030   Tue Aug 31, 2021   1640 XR chest 1 view portable             HEART Risk Score      Most Recent Value   Heart Score Risk Calculator   History  2 Filed at: 08/31/2021 1607   ECG  0 Filed at: 08/31/2021 1607   Age  1 Filed at: 08/31/2021 1607   Risk Factors  2 Filed at: 08/31/2021 1607   Troponin  0 Filed at: 08/31/2021 1607 HEART Score  5 Filed at: 08/31/2021 1607                          Devon' Criteria for PE      Most Recent Value   Devon' Criteria for PE   Clinical signs and symptoms of DVT  0 Filed at: 08/31/2021 1608   PE is primary diagnosis or equally likely  0 Filed at: 08/31/2021 1608   HR >100  1 5 Filed at: 08/31/2021 1608   Immobilization at least 3 days or Surgery in the previous 4 weeks  0 Filed at: 08/31/2021 1608   Previous, objectively diagnosed PE or DVT  0 Filed at: 08/31/2021 1608   Hemoptysis  0 Filed at: 08/31/2021 1608   Malignancy with treatment within 6 months or palliative  0 Filed at: 08/31/2021 1608   Devon' Criteria Total  1 5 Filed at: 08/31/2021 1608        Authur Eva' Criteria for DVT      Most Recent Value   Devon' Criteria for DVT   Active cancer Treatment or palliation within 6 months  0 Filed at: 08/31/2021 1533   Bedridden recently >3 days or major surgery within 12 weeks  0 Filed at: 08/31/2021 1533   Calf swelling >3 cm compared to the other leg  1 Filed at: 08/31/2021 1533   Entire leg swollen  0 Filed at: 08/31/2021 1533   Collateral (nonvaricose) superficial veins present  0 Filed at: 08/31/2021 1533   Localized tenderness along the deep venous system  0 Filed at: 08/31/2021 1533   Pitting edema, confined to symptomatic leg  0 Filed at: 08/31/2021 1533   Paralysis, paresis, or recent plaster immobilization of the lower extremity  0 Filed at: 08/31/2021 1533   Previously documented DVT  0 Filed at: 08/31/2021 1533   Alternative diagnosis to DVT as likely or more likely  2 Filed at: 08/31/2021 1533   Devon DVT Critera Score  -1 Filed at: 08/31/2021 1533          MDM  Number of Diagnoses or Management Options  Acute exacerbation of CHF (congestive heart failure) (HCC)  Diagnosis management comments: Patient is a 58-year-old male with a past medical history of hypertension and CHF presented to the emergency department with a complaint of intermittent chest pain and right extremity edema involving the upper and lower extremity as well as the right side of the face  Upon physical exam patient was A&O x3 and in no apparent distress  Cranial nerves 2-12 were grossly intact, there were no focal or sensation deficits, the patient had 5/5 strength in all 4 extremities with +2 pulses  His lungs were clear to auscultation bilaterally with normal effort and no wheezing  On cardiac exam he had an regularly regular rate and rhythm with noted PVCs on his ECG  No rubs, murmurs or gallops were noted  His abdomen was nontender and nondistended  There was normal bowel sounds without guarding or rash  The patient did appear to have +2 pitting edema bilaterally in his lower extremities, no edema was noted in his right upper extremity or right face  The patient received an ACS workup including ECG, chest x-ray, troponin, CBC, CMP and I also added a BNP  His Wells score was 1 5 for PE and -1 for DVT  I ordered a D-dimer  Patient's heart score is 5  He will likely be admitted for further workup  In the room the patient's blood pressure was reassessed and found to be 200/105 so I ordered 10 mg of labetalol and 325 mg of aspirin  16:15  ordered a COVID screen due to impending admission  16:38  the patient's D-dimer is unremarkable and his blood pressure has improved  16:43  patient is hypokalemic, ordered potassium tablet 40 mEq at this time  17:01  Consulted [facility]  They agreed to accept the patient inpatient telemetry under the care Dr Jessy Real          Disposition  Final diagnoses:   Acute exacerbation of CHF (congestive heart failure) ([address] )     Time reflects when diagnosis was documented in both MDM as applicable and the Disposition within this note     Time User Action Codes Description Comment    8/31/2021  5:04 PM Liza Cabrera Add [I50 9] Acute exacerbation of CHF (congestive heart failure) [facility])       ED Disposition     ED Disposition Condition Date/Time Comment    Admit Stable Tue Aug 31, 2021  5:03 PM Case was discussed with GIOVANNI and the patient's admission status was agreed to be Inpatient telemetry to the service of Dr Jean Marie Schultz  Follow-up Information    None         Patient's Medications   Discharge Prescriptions    No medications on file     No discharge procedures on file  PDMP Review       Value Time User    PDMP Reviewed  Yes 9/5/2020  4:16 PM Tino Carpenter PA-C           ED Provider  Attending physically available and evaluated Bobby Garcia  ALIZE managed the patient along with the ED Attending      Electronically Signed by         Arnie Lundberg DO  09/01/21 0030

## 2021-08-31 NOTE — H&P
[facility]  H&P- [facility] 1965, 61 y o  male MRN: [id number]  Unit/Bed#: ED 07 Encounter: [id number]  Primary Care Provider: Chasity Schmitz MD   Date and time admitted to hospital: 8/31/2021  2:44 PM    * Acute on chronic diastolic congestive heart failure [facility])  Assessment & Plan  Wt Readings from Last 3 Encounters:   08/25/21 119 kg (262 lb 4 8 oz)   03/05/21 116 kg (256 lb)   01/15/21 116 kg (256 lb)     · 2D echocardiogram in July 2020 shows preserved ejection fraction of 89%, grade 1 diastolic dysfunction  · Presenting with bilateral lower extremity swelling, dyspnea on exertion, chest tightness occasional  · Has ongoing orthopnea, paroxysmal nocturnal dyspnea  · NT proBNP 770 (440 in July 2020)  · Patient admits to be noncompliant with p o  Lasix (home dose 40 mg b i d )  · Compliant with fluid restriction and salt restriction  · Prior hospital admission with congestive heart failure exacerbation in 2020  · Mild CHF exacerbation likely secondary to medication noncompliance versus uncontrolled hypertension  · EKG:  Nonspecific T-wave inversions in V3 to V6, and frequent PVCs, new since prior EKG    Plan  1  Will give IV Lasix 40 mg once now and 40 daily starting tomorrow morning  2  Monitor I/O, daily weights  3  Monitor renal functions  4  Maintain potassium above 4, magnesium above 2  5  2 L fluid restriction, 2 g salt restriction  6  Given new EKG changes, will trend troponins with serial EKGs  Hypertensive emergency  Assessment & Plan  · Blood pressure noted to be >180/100  · Received 1 dose of IV labetalol 10 mg in the ED with initial response, now above 180/100  · Denies headaches, visual disturbances  · Ongoing pulmonary edema  · Normal renal functions, troponin negative x1    Plan  7  IV labetalol 10 mg q 6h p r n  for SBP above 170  8  Continue Toprol XL 25 daily, amlodipine 10 mg daily  9  olmesartan non formulary, will order losartan g daily  10   Monitor blood pressure    Hypokalemia  Assessment & Plan  · Potassium 3 0 on admission currently being repeated  · Will order maintenance p o  Potassium 20 daily while being diuresed  · Repeat BMP a m  Arthritis of right wrist  Assessment & Plan  · Recently seen by Rheumatology  · Mild elevation in CRP  · Rest of the rheumatological workup is negative so far  · Awaiting outpatient right wrist MRI  · Patient states that his right arm, right face swells up, currently resolved  · Denies pain in the arm/face  · Pulses palpable  · Symptomatic management outpatient follow-up with Rheumatology    Diabetes mellitus type 2, uncontrolled ([facility] [address] )  Assessment & Plan  Lab Results   Component Value Date    HGBA1C 7 0 (H) 07/06/2020       Recent Labs     08/31/21  1900   POCGLU 137       Blood Sugar Average: Last 72 hrs:  (P) 137   · Poor compliance  · On Janumet at home, will hold  · Will place on insulin sliding scale, Accu-Cheks hypoglycemia protocol  · Can consider starting basal bolus if glucose is uncontrolled on sliding scale    VTE Pharmacologic Prophylaxis: VTE Score: 3 Moderate Risk (Score 3-4) - Pharmacological DVT Prophylaxis Ordered: heparin  Code Status: Level 1 - Full Code   Discussion with family: Updated  (wife) at bedside  Anticipated Length of Stay: Patient will be admitted on an inpatient basis with an anticipated length of stay of greater than 2 midnights secondary to Management of acute on chronic CHF exacerbation  Chief Complaint:  Lower extremity swelling, shortness of breath    History of Present Illness:  Bridgette Guzman is a 61 y o  male with a PMH of diastolic heart failure with an echo evidence of grade 1 diastolic dysfunction in July 2020, type 2 diabetes mellitus, hypertension, right wrist arthritis, who presents with lower extremity swelling  He states that for about a week, he has been experiencing shortness of breath with exertion but mostly notices at rest with mild chest discomfort    Has been experiencing orthopnea, paroxysmal nocturnal dyspnea  Denies palpitations, nausea, vomiting, diaphoresis  Fevers or chills, cough, sputum production  No wheezing  Takes p o  Lasix 40 mg b i d , however admits to being noncompliant with his medications  He is however compliant with fluid restriction and salt restrictions  If further mentions that he has noticed right-sided upper arm swelling along with right facial swelling for about a week which has now resolved  He was seen by Rheumatology 1 week ago for right wrist pain and is currently awaiting on outpatient MRI  On admission to the emergency department, he was noted to have hypertension with an SBP above 180, rest of his vital signs were within normal limits including oxygen saturations  BNP was noted to be 700, increased from 400 in July 2020  Troponins were negative  Chest x-ray shows no significant pulmonary edema  Hemoglobin 13 4  He was also noted to have a potassium of 3 0  He had normal renal functions  D-dimer was within normal limits at 0 37  COVID negative  He received 1 dose of IV labetalol with initial good response, however he became hypertensive again SBP above 180  He has been admitted for further management of possible CHF exacerbation and hypertensive emergency  Review of Systems:  Review of Systems   Constitutional: Positive for fatigue  Negative for activity change, appetite change, chills, diaphoresis and fever  HENT: Negative  Eyes: Negative  Negative for photophobia and visual disturbance  Respiratory: Positive for shortness of breath  Negative for cough and wheezing  Cardiovascular: Positive for leg swelling (Bilateral)  Negative for chest pain and palpitations  Gastrointestinal: Negative  Negative for nausea and vomiting  Endocrine: Negative  Genitourinary: Negative  Musculoskeletal: Positive for arthralgias and back pain  Skin: Negative      Neurological: Negative for dizziness, seizures, light-headedness and headaches  Psychiatric/Behavioral: Negative  All other systems reviewed and are negative  Past Medical and Surgical History:   Past Medical History:   Diagnosis Date    Diabetes mellitus ([facility] )     Hypertension     Inguinal hernia     3/25/15    Onychomycosis     5/24/17    Type 2 diabetes mellitus ([facility] ) 05/01/2012       Past Surgical History:   Procedure Laterality Date    ARTHROSCOPY KNEE      with Medial and Lateral Meniscus Repair    HERNIA REPAIR      umbilical and inguinal hernia repairs (left)       Meds/Allergies:  Prior to Admission medications    Medication Sig Start Date End Date Taking?  Authorizing Provider   amLODIPine (NORVASC) 10 mg tablet TAKE 1 TABLET BY MOUTH EVERY DAY 5/94/69  Yes Roxanne Parmar MD   Blood Pressure KIT Twice a day periodically 7/20/20  Yes SHAY Moya   Flovent  MCG/ACT inhaler INHALE 1 PUFF 2 (TWO) TIMES A DAY RINSE MOUTH AFTER USE  5/06/78  Yes Roxanne Parmar MD   furosemide (LASIX) 40 mg tablet Take 1 tablet (40 mg total) by mouth 2 (two) times a day 84/1/32  Yes Roxanne Parmar MD   glucose blood test strip 1 each by Other route daily 20/4/79  Yes Roxanne Parmar MD   Lancets (ONETOUCH ULTRASOFT) lancets by Does not apply route 12/28/17  Yes Historical Provider, MD   magnesium oxide (MAG-OX) 400 mg tablet Take 1 tablet (400 mg total) by mouth 2 (two) times a day 49/2/10  Yes Roxanne Parmar MD   methylPREDNISolone 4 MG tablet therapy pack Use as directed on package 3/5/21  Yes Arcadio Quinones PA-C   metoprolol succinate (TOPROL-XL) 25 mg 24 hr tablet Take 1 tablet (25 mg total) by mouth daily 20/9/84  Yes Roxanne Pramar MD   olmesartan (BENICAR) 40 mg tablet Take 1 tablet (40 mg total) by mouth daily 46/7/81  Yes Roxanne Parmar MD   sitaGLIPtin-metFORMIN (JANUMET)  MG per tablet Take 1 tablet by mouth 2 (two) times a day with meals 12/46/87  Yes Roxanne Parmar MD   naproxen (NAPROSYN) 500 mg tablet Take 1 tablet (500 mg total) by mouth 2 (two) times a day with meals 1/10/21   Jasmina Sumner PA-C     I have reviewed home medications with patient personally  Allergies: No Known Allergies    Social History:  Marital Status: /Civil Union   Occupation:  Patient Pre-hospital Living Situation: Home  Patient Pre-hospital Level of Mobility: walks  Patient Pre-hospital Diet Restrictions:  None  Substance Use History:   Social History     Substance and Sexual Activity   Alcohol Use Yes    Alcohol/week: 3 0 standard drinks    Types: 2 Cans of beer, 1 Standard drinks or equivalent per week    Comment: weekend: 1 glass of christian or 2 beers     Social History     Tobacco Use   Smoking Status Former Smoker    Types: Cigars    Start date: 6/22/2020   Smokeless Tobacco Never Used   Tobacco Comment    current every day smoker, per [employer]     Social History     Substance and Sexual Activity   Drug Use No       Family History:  Family History   Problem Relation Age of Onset    Hypertension Mother         essential    Chronic bronchitis Father     Prostate cancer Father     Breast cancer Sister        Physical Exam:     Vitals:   Blood Pressure: 153/97 (08/31/21 1830)  Pulse: 92 (08/31/21 1830)  Temperature: 98 °F (36 7 °C) (08/31/21 1352)  Temp Source: Oral (08/31/21 1352)  Respirations: 18 (08/31/21 1830)  SpO2: 99 % (08/31/21 1830)    Physical Exam  Nursing note reviewed  Constitutional:       Comments: BMI 38 73   HENT:      Head: Normocephalic  Nose: Nose normal    Eyes:      Pupils: Pupils are equal, round, and reactive to light  Cardiovascular:      Rate and Rhythm: Normal rate and regular rhythm  Pulses: Normal pulses  Heart sounds: No murmur heard  Comments: Negative JVD  Pulmonary:      Effort: Pulmonary effort is normal       Breath sounds: Rales (Mild scattered bilateral) present  No wheezing  Abdominal:      General: Bowel sounds are normal       Palpations: Abdomen is soft  Tenderness: There is no abdominal tenderness  Musculoskeletal:      Right lower leg: Edema ( 2+) present  Left lower leg: Edema ( 2+) present  Skin:     General: Skin is warm  Capillary Refill: Capillary refill takes less than 2 seconds  Neurological:      Mental Status: He is alert and oriented to person, place, and time  Psychiatric:         Mood and Affect: Mood normal           Additional Data:     Lab Results:  Results from last 7 days   Lab Units 08/31/21  1754 08/31/21  1405   WBC Thousand/uL  --  6 53   HEMOGLOBIN g/dL  --  13 4   HEMATOCRIT %  --  39 2   PLATELETS Thousands/uL 127* 126*   NEUTROS PCT %  --  55   LYMPHS PCT %  --  30   MONOS PCT %  --  10   EOS PCT %  --  4     Results from last 7 days   Lab Units 08/31/21  1405   SODIUM mmol/L 137   POTASSIUM mmol/L 3 0*   CHLORIDE mmol/L 99*   CO2 mmol/L 29   BUN mg/dL 4*   CREATININE mg/dL 1 17   ANION GAP mmol/L 9   CALCIUM mg/dL 8 0*   ALBUMIN g/dL 3 3*   TOTAL BILIRUBIN mg/dL 2 12*   ALK PHOS U/L 84   ALT U/L 20   AST U/L 37   GLUCOSE RANDOM mg/dL 162*         Results from last 7 days   Lab Units 08/31/21  1900   POC GLUCOSE mg/dl 137               Imaging: Reviewed radiology reports from this admission including: chest xray  XR chest 1 view portable    (Results Pending)       EKG and Other Studies Reviewed on Admission:   · EKG: NSR  HR Ninety  Nonspecific T-wave inversions in V 3, 4, 5, 6, and PVCs  ** Please Note: This note has been constructed using a voice recognition system   **

## 2021-09-01 ENCOUNTER — APPOINTMENT (INPATIENT)
Dept: NON INVASIVE DIAGNOSTICS | Facility: HOSPITAL | Age: 56
DRG: 292 | End: 2021-09-01
Payer: COMMERCIAL

## 2021-09-01 LAB
ANION GAP SERPL CALCULATED.3IONS-SCNC: 10 MMOL/L (ref 4–13)
BASOPHILS # BLD AUTO: 0.02 THOUSANDS/ΜL (ref 0–0.1)
BASOPHILS NFR BLD AUTO: 0 % (ref 0–1)
BUN SERPL-MCNC: 7 MG/DL (ref 5–25)
CALCIUM SERPL-MCNC: 7.8 MG/DL (ref 8.3–10.1)
CHLORIDE SERPL-SCNC: 100 MMOL/L (ref 100–108)
CO2 SERPL-SCNC: 30 MMOL/L (ref 21–32)
CREAT SERPL-MCNC: 1.34 MG/DL (ref 0.6–1.3)
EOSINOPHIL # BLD AUTO: 0.31 THOUSAND/ΜL (ref 0–0.61)
EOSINOPHIL NFR BLD AUTO: 4 % (ref 0–6)
ERYTHROCYTE [DISTWIDTH] IN BLOOD BY AUTOMATED COUNT: 13 % (ref 11.6–15.1)
EST. AVERAGE GLUCOSE BLD GHB EST-MCNC: 134 MG/DL
GFR SERPL CREATININE-BSD FRML MDRD: 68 ML/MIN/1.73SQ M
GLUCOSE SERPL-MCNC: 121 MG/DL (ref 65–140)
GLUCOSE SERPL-MCNC: 127 MG/DL (ref 65–140)
GLUCOSE SERPL-MCNC: 158 MG/DL (ref 65–140)
GLUCOSE SERPL-MCNC: 169 MG/DL (ref 65–140)
GLUCOSE SERPL-MCNC: 211 MG/DL (ref 65–140)
HBA1C MFR BLD: 6.3 %
HCT VFR BLD AUTO: 37.5 % (ref 36.5–49.3)
HGB BLD-MCNC: 12.8 G/DL (ref 12–17)
IMM GRANULOCYTES # BLD AUTO: 0.02 THOUSAND/UL (ref 0–0.2)
IMM GRANULOCYTES NFR BLD AUTO: 0 % (ref 0–2)
LYMPHOCYTES # BLD AUTO: 2.31 THOUSANDS/ΜL (ref 0.6–4.47)
LYMPHOCYTES NFR BLD AUTO: 30 % (ref 14–44)
MAGNESIUM SERPL-MCNC: 0.8 MG/DL (ref 1.6–2.6)
MAGNESIUM SERPL-MCNC: 1.4 MG/DL (ref 1.6–2.6)
MCH RBC QN AUTO: 34.6 PG (ref 26.8–34.3)
MCHC RBC AUTO-ENTMCNC: 34.1 G/DL (ref 31.4–37.4)
MCV RBC AUTO: 101 FL (ref 82–98)
MONOCYTES # BLD AUTO: 0.75 THOUSAND/ΜL (ref 0.17–1.22)
MONOCYTES NFR BLD AUTO: 10 % (ref 4–12)
NEUTROPHILS # BLD AUTO: 4.32 THOUSANDS/ΜL (ref 1.85–7.62)
NEUTS SEG NFR BLD AUTO: 56 % (ref 43–75)
NRBC BLD AUTO-RTO: 0 /100 WBCS
PLATELET # BLD AUTO: 116 THOUSANDS/UL (ref 149–390)
PMV BLD AUTO: 11.4 FL (ref 8.9–12.7)
POTASSIUM SERPL-SCNC: 3 MMOL/L (ref 3.5–5.3)
POTASSIUM SERPL-SCNC: 3.5 MMOL/L (ref 3.5–5.3)
RBC # BLD AUTO: 3.7 MILLION/UL (ref 3.88–5.62)
SODIUM SERPL-SCNC: 140 MMOL/L (ref 136–145)
TROPONIN I SERPL-MCNC: <0.02 NG/ML
WBC # BLD AUTO: 7.73 THOUSAND/UL (ref 4.31–10.16)

## 2021-09-01 PROCEDURE — 85025 COMPLETE CBC W/AUTO DIFF WBC: CPT | Performed by: INTERNAL MEDICINE

## 2021-09-01 PROCEDURE — 3044F HG A1C LEVEL LT 7.0%: CPT | Performed by: INTERNAL MEDICINE

## 2021-09-01 PROCEDURE — 99223 1ST HOSP IP/OBS HIGH 75: CPT | Performed by: INTERNAL MEDICINE

## 2021-09-01 PROCEDURE — 83735 ASSAY OF MAGNESIUM: CPT | Performed by: INTERNAL MEDICINE

## 2021-09-01 PROCEDURE — 93306 TTE W/DOPPLER COMPLETE: CPT | Performed by: INTERNAL MEDICINE

## 2021-09-01 PROCEDURE — 82948 REAGENT STRIP/BLOOD GLUCOSE: CPT

## 2021-09-01 PROCEDURE — 84132 ASSAY OF SERUM POTASSIUM: CPT | Performed by: INTERNAL MEDICINE

## 2021-09-01 PROCEDURE — 80048 BASIC METABOLIC PNL TOTAL CA: CPT | Performed by: INTERNAL MEDICINE

## 2021-09-01 PROCEDURE — 93306 TTE W/DOPPLER COMPLETE: CPT

## 2021-09-01 RX ORDER — MAGNESIUM SULFATE 1 G/100ML
1 INJECTION INTRAVENOUS ONCE
Status: COMPLETED | OUTPATIENT
Start: 2021-09-01 | End: 2021-09-02

## 2021-09-01 RX ORDER — POTASSIUM CHLORIDE 20 MEQ/1
40 TABLET, EXTENDED RELEASE ORAL 2 TIMES DAILY
Status: COMPLETED | OUTPATIENT
Start: 2021-09-01 | End: 2021-09-02

## 2021-09-01 RX ORDER — POTASSIUM CHLORIDE 14.9 MG/ML
20 INJECTION INTRAVENOUS ONCE
Status: COMPLETED | OUTPATIENT
Start: 2021-09-01 | End: 2021-09-01

## 2021-09-01 RX ORDER — MAGNESIUM SULFATE HEPTAHYDRATE 40 MG/ML
2 INJECTION, SOLUTION INTRAVENOUS ONCE
Status: COMPLETED | OUTPATIENT
Start: 2021-09-01 | End: 2021-09-01

## 2021-09-01 RX ADMIN — HEPARIN SODIUM 5000 UNITS: 5000 INJECTION INTRAVENOUS; SUBCUTANEOUS at 05:31

## 2021-09-01 RX ADMIN — HEPARIN SODIUM 5000 UNITS: 5000 INJECTION INTRAVENOUS; SUBCUTANEOUS at 22:04

## 2021-09-01 RX ADMIN — INSULIN LISPRO 2 UNITS: 100 INJECTION, SOLUTION INTRAVENOUS; SUBCUTANEOUS at 18:31

## 2021-09-01 RX ADMIN — MAGNESIUM SULFATE HEPTAHYDRATE 1 G: 1 INJECTION, SOLUTION INTRAVENOUS at 14:41

## 2021-09-01 RX ADMIN — MAGNESIUM SULFATE HEPTAHYDRATE 2 G: 40 INJECTION, SOLUTION INTRAVENOUS at 07:15

## 2021-09-01 RX ADMIN — POTASSIUM CHLORIDE 20 MEQ: 14.9 INJECTION, SOLUTION INTRAVENOUS at 09:23

## 2021-09-01 RX ADMIN — FUROSEMIDE 40 MG: 10 INJECTION, SOLUTION INTRAMUSCULAR; INTRAVENOUS at 09:22

## 2021-09-01 RX ADMIN — HEPARIN SODIUM 5000 UNITS: 5000 INJECTION INTRAVENOUS; SUBCUTANEOUS at 12:56

## 2021-09-01 RX ADMIN — AMLODIPINE BESYLATE 10 MG: 10 TABLET ORAL at 09:22

## 2021-09-01 RX ADMIN — METOPROLOL SUCCINATE 25 MG: 25 TABLET, EXTENDED RELEASE ORAL at 09:22

## 2021-09-01 RX ADMIN — INSULIN LISPRO 1 UNITS: 100 INJECTION, SOLUTION INTRAVENOUS; SUBCUTANEOUS at 22:04

## 2021-09-01 RX ADMIN — POTASSIUM CHLORIDE 40 MEQ: 1500 TABLET, EXTENDED RELEASE ORAL at 09:22

## 2021-09-01 RX ADMIN — INSULIN LISPRO 4 UNITS: 100 INJECTION, SOLUTION INTRAVENOUS; SUBCUTANEOUS at 12:54

## 2021-09-01 RX ADMIN — LOSARTAN POTASSIUM 100 MG: 50 TABLET, FILM COATED ORAL at 09:22

## 2021-09-01 RX ADMIN — POTASSIUM CHLORIDE 40 MEQ: 1500 TABLET, EXTENDED RELEASE ORAL at 17:49

## 2021-09-01 NOTE — UTILIZATION REVIEW
Initial Clinical Review    Admission: Date/Time/Statement:   Admission Orders (From admission, onward)     Ordered        08/31/21 1705  Inpatient Admission  Once                   Orders Placed This Encounter   Procedures    Inpatient Admission     Standing Status:   Standing     Number of Occurrences:   1     Order Specific Question:   Level of Care     Answer:   Med Surg [16]     Order Specific Question:   Estimated length of stay     Answer:   More than 2 Midnights     Order Specific Question:   Certification     Answer:   I certify that inpatient services are medically necessary for this patient for a duration of greater than two midnights  See H&P and MD Progress Notes for additional information about the patient's course of treatment  ED Arrival Information     Expected Arrival Acuity    - 8/31/2021 13:29 Urgent         Means of arrival Escorted by Service Admission type    [facility] Urgent         Arrival complaint    right side swelling        Chief Complaint   Patient presents with    Leg Swelling     Pt reports right facial, right arm, and right leg swelling x 3 days  Pt also reports intermittent chest pain  Initial Presentation: 61year old male to the ED from home with complaints of right facial, leg swelling 3 days ago with intermittent chest pain  Admitted to inpatient for CHF, hypertensive emergency  Arrives with tachycardia, appearing will with bilateral lower extremity edema  GCS 15   Lungs clear  BP elevated in the ED  Given IV labetalol and asa  Hypokalemic and given po supplement  MOnitor tele  EKG:  Nonspecific T-wave inversions in V3 to V6, and frequent PVCs, new since prior EKG  Admits to noncompliance with Lasix at home  Give IV lasix, fluid restriction  Scattered rales heard in lungs  Troponins negative  Has elevated CRP with known wrist pain/arthritis  Date: 9/1   Day 2:   Continue with IV Lasix and fluid restriction  Monitor tele   +2 Bilateral pitting edema  BP improved  Continue with toprol, amlodipine, losartan  ED Triage Vitals   Temperature Pulse Respirations Blood Pressure SpO2   08/31/21 1352 08/31/21 1352 08/31/21 1352 08/31/21 1352 08/31/21 1352   98 °F (36 7 °C) 97 20 153/94 98 %      Temp Source Heart Rate Source Patient Position - Orthostatic VS BP Location FiO2 (%)   08/31/21 1352 08/31/21 1352 08/31/21 1600 08/31/21 1600 --   Oral Monitor Lying Right arm       Pain Score       08/31/21 1352       8          Wt Readings from Last 1 Encounters:   09/01/21 114 kg (252 lb 3 3 oz)     Additional Vital Signs:   Date/Time Temp Pulse Resp BP  MAP (mmHg)  SpO2  O2 Device Patient Position - Orthostatic VS   09/01/21 0106 98 8 °F (37 1 °C) 85 18 141/75  102  --  None (Room air) Lying   09/01/21 0023 -- 100 -- 132/77  98  98 %  None (Room air) Sitting   08/31/21 2340 -- 98 18 140/93  --  100 %  None (Room air) Lying   08/31/21 2000 -- 98 18 161/94  122  98 %  -- --   08/31/21 1830 -- 92 18 153/97  119  99 %  -- --   08/31/21 1700 -- 92 18 177/101Abnormal   133  99 %  -- --   08/31/21 1600 -- 96 16 162/93  --  98 %  None (Room air) Lying   08/31/21 1352 98 °F (36 7 °C) 97 20 153/94              Pertinent Labs/Diagnostic Test Results:   9/1 CXR: No acute cardiopulmonary disease  8/31 Xray right knee: No acute osseous abnormality  Mild tricompartmental osteoarthritis  8/31 EKG:  Interpretation:     Interpretation: non-specific     Rate:     ECG rate:  94     ECG rate assessment: normal     Rhythm:     Rhythm: sinus rhythm     Ectopy:     Ectopy: PVCs       PVCs:  Infrequent   QRS:     QRS axis:  Normal     QRS intervals:  Normal   Conduction:     Conduction: normal     ST segments:     ST segments:  Normal   T waves:     T waves: normal      Results from last 7 days   Lab Units 08/31/21  1536   SARS-COV-2  Negative     Results from last 7 days   Lab Units 09/01/21  0452 08/31/21  1754 08/31/21  1405   WBC Thousand/uL 7 73  --  6 53   HEMOGLOBIN g/dL 12 8  --  13 4   HEMATOCRIT % 37 5  --  39 2   PLATELETS Thousands/uL 116* 127* 126*   NEUTROS ABS Thousands/µL 4 32  --  3 57         Results from last 7 days   Lab Units 09/01/21  0452 08/31/21  1405   SODIUM mmol/L 140 137   POTASSIUM mmol/L 3 0* 3 0*   CHLORIDE mmol/L 100 99*   CO2 mmol/L 30 29   ANION GAP mmol/L 10 9   BUN mg/dL 7 4*   CREATININE mg/dL 1 34* 1 17   EGFR ml/min/1 73sq m 68 80   CALCIUM mg/dL 7 8* 8 0*   MAGNESIUM mg/dL 0 8*  --      Results from last 7 days   Lab Units 08/31/21  1405   AST U/L 37   ALT U/L 20   ALK PHOS U/L 84   TOTAL PROTEIN g/dL 7 3   ALBUMIN g/dL 3 3*   TOTAL BILIRUBIN mg/dL 2 12*     Results from last 7 days   Lab Units 09/01/21  1132 09/01/21  0640 08/31/21  1900   POC GLUCOSE mg/dl 211* 121 137     Results from last 7 days   Lab Units 09/01/21  0452 08/31/21  1405   GLUCOSE RANDOM mg/dL 127 162*         Results from last 7 days   Lab Units 08/31/21  1754   HEMOGLOBIN A1C % 6 3*   EAG mg/dl 134     BETA-HYDROXYBUTYRATE   Date Value Ref Range Status   07/06/2020 0 2 <0 6 mmol/L Final        Results from last 7 days   Lab Units 08/31/21  2341 08/31/21  2055 08/31/21  1405   TROPONIN I ng/mL <0 02 <0 02 <0 02     Results from last 7 days   Lab Units 08/31/21  1600   D-DIMER QUANTITATIVE ug/ml FEU 0 38         Results from last 7 days   Lab Units 08/31/21  1529   NT-PRO BNP pg/mL 772*     ED Treatment:   Medication Administration from 08/31/2021 1328 to 09/01/2021 0047       Date/Time Order Dose Route Action     08/31/2021 1552 Labetalol HCl (NORMODYNE) injection 10 mg 10 mg Intravenous Given     08/31/2021 1553 aspirin tablet 325 mg 325 mg Oral Given     08/31/2021 1753 potassium chloride (K-DUR,KLOR-CON) CR tablet 40 mEq 40 mEq Oral Given     08/31/2021 2209 heparin (porcine) subcutaneous injection 5,000 Units 5,000 Units Subcutaneous Given     08/31/2021 1809 furosemide (LASIX) injection 40 mg 40 mg Intravenous Given     08/31/2021 1814 Labetalol HCl (Eva Samuel) injection 10 mg 10 mg Intravenous Given        Past Medical History:   Diagnosis Date    [facility] mellitus ([facility] )     Hypertension     Inguinal hernia     3/25/15    Onychomycosis     5/24/17    Type 2 diabetes mellitus ([place] [address] ) 05/01/2012     Present on Admission:   Diabetes mellitus type 2, uncontrolled ([place] [address] )   Hypokalemia   Arthritis of right wrist      Admitting Diagnosis: Acute exacerbation of CHF (congestive heart failure) (HCC) [I50 9]  Swelling of right side of face [R22 0]  Age/Sex: 61 y o  male  Admission Orders:  2L fluid restriction  Pro BNp  TEle  BMP, POtassium, mag      Scheduled Medications:  amLODIPine, 10 mg, Oral, Daily  furosemide, 40 mg, Intravenous, Daily  heparin (porcine), 5,000 Units, Subcutaneous, Q8H [facility]  insulin lispro, 1-5 Units, Subcutaneous, HS  insulin lispro, 2-12 Units, Subcutaneous, TID AC  losartan, 100 mg, Oral, Daily  metoprolol succinate, 25 mg, Oral, Daily  potassium chloride, 40 mEq, Oral, Once  potassium chloride, 40 mEq, Oral, Daily      Continuous IV Infusions:     PRN Meds:  Labetalol HCl, 10 mg, Intravenous, Q6H PRN        None    Network Utilization Review Department  ATTENTION: Please call with any questions or concerns to [phone] and carefully listen to the prompts so that you are directed to the right person  All voicemails are confidential   Wero Barron all requests for admission clinical reviews, approved or denied determinations and any other requests to dedicated fax number below belonging to the campus where the patient is receiving treatment   List of dedicated fax numbers for the Facilities:  [address] DENIALS (Administrative/Medical Necessity) [phone]   [address] (Maternity/NICU/Pediatrics) 261 [facility],[address] [address] [phone] [address] [place] [address] Elisa Elmore 1481 [address] [phone]

## 2021-09-01 NOTE — ASSESSMENT & PLAN NOTE
Wt Readings from Last 3 Encounters:   09/01/21 114 kg (252 lb 3 3 oz)   08/25/21 119 kg (262 lb 4 8 oz)   03/05/21 116 kg (256 lb)     · 2D echocardiogram in July 2020 shows preserved ejection fraction of 04%, grade 1 diastolic dysfunction  · Presenting with bilateral lower extremity swelling, dyspnea on exertion, chest tightness occasional  · Has ongoing orthopnea, paroxysmal nocturnal dyspnea  · NT proBNP 770 (440 in July 2020)  · Patient admits to be noncompliant with p o   Lasix (home dose 40 mg b i d )  · Compliant with fluid restriction and salt restriction  · Prior hospital admission with congestive heart failure exacerbation in 2020  · Mild CHF exacerbation likely secondary to medication noncompliance versus uncontrolled hypertension  · EKG:  Nonspecific T-wave inversions in V3 to V6, and frequent PVCs, new since prior EKG    Plan  - IV Lasix 40 mg  -Monitor I/O, daily weights  -Monitor renal function  -Maintain potassium above 4, magnesium above 2  -2 L fluid restriction, 2 g salt restriction  -Given new EKG changes, monitor on telemetry   -Echo ordered-follow up

## 2021-09-01 NOTE — ASSESSMENT & PLAN NOTE
· In ED, blood pressure noted to be >180/100  · Received 1 dose of IV labetalol 10 mg in the ED   · Denies headaches, visual disturbances  · Ongoing pulmonary edema  · Normal renal functions, troponin negative x3    Most recent blood pressure 141/75    Plan  -IV labetalol 10 mg q 6h p r n  for SBP above 170  -Continue Toprol XL 25 daily, amlodipine 10 mg daily  -Losartan 100 mg   -Monitor blood pressure

## 2021-09-01 NOTE — ED ATTENDING ATTESTATION
8/31/2021  INakul MD, saw and evaluated the patient  I have discussed the patient with the resident/non-physician practitioner and agree with the resident's/non-physician practitioner's findings, Plan of Care, and MDM as documented in the resident's/non-physician practitioner's note, except where noted  All available labs and Radiology studies were reviewed  I was present for key portions of any procedure(s) performed by the resident/non-physician practitioner and I was immediately available to provide assistance  At this point I agree with the current assessment done in the Emergency Department  I have conducted an independent evaluation of this patient a history and physical is as follows:    61year old male presents to the emergency department having not felt right over the last 3 to 4 days  He has appreciated swelling in the right side of his face, throughout his entire right arm and his right leg  He has additionally experienced chest pressure and dyspnea with small amounts of exertion-walking 1 room length which is new  Records been worse over the last few hours with central pressure rated between 5 and 6/10  He denies any unusual positioning which would have led to the face arm and leg swelling  He notes that the arm swelling has dramatically improved today 1st time  He continues to appreciate swelling in the his leg as does his wife  He does often have some degree of swelling in the legs  He read feels the swelling in the right leg is worse than typical   Yesterday he did have a slight headache  He has additionally had some nausea and dry heaves over the past few days  He has had mild abdominal discomfort and constipation  Use of ibuprofen did help discomfort in the right wrist and knee  Symptoms overall are reminiscent to that with CHF exacerbation experienced a little under a year ago  He denies any recent change in diet or activity    He reports compliance with all medications  Uncertain what blood pressure typically runs at at home  Past medical history significant for hypertension, diabetes, CHF and arthritis affecting multiple joints  He has recently been under the care of orthopedics and rheumatology for further evaluation of polyarticular arthritis  The right wrist has been most troublesome  He has not had fevers, known sick nasal congestion, sore throat or cough  On exam patient is alert and oriented  He is pleasant and with clear speech  His mucous membranes are moist   He does not have any facial tenderness or rash  He and wife appreciate swelling in the region of the right maxilla  I do not distinctly discern this  No asymmetry appreciated by me with facial movements or sensation  Heart sounds mostly regular with occasional PVC as noted on monitor  Lungs are somewhat diffusely diminished  Abdomen is soft and nontender  Upper extremities nontender to palpation  Right knee is tender without increased warmth  Both calves have +2 edema and very mild tenderness  Plus one PT pulses appreciated bilaterally  Blood pressure quite elevated at 200/116 and with repeat systolic of 467  Differential diagnosis includes but is not limited to uncontrolled hypertension, CHF exacerbation, ACS  DVT/PE less likely  Administering aspirin in consideration of ACS  He does not actively have chest pain  Will aim to lower pressure slightly in consideration that this may be responsible for his symptoms as workup ensues  Given risk factors for ACS anticipate further in-hospital evaluation  Patient & wife in agreement      ED Course         Critical Care Time  Procedures

## 2021-09-01 NOTE — ASSESSMENT & PLAN NOTE
Lab Results   Component Value Date    HGBA1C 6 3 (H) 08/31/2021       Recent Labs     08/31/21  1900 09/01/21  0640 09/01/21  1132   POCGLU 137 121 211*       Blood Sugar Average: Last 72 hrs:  (P) 024 [id number]   · Poor compliance  · On Janumet at home, will hold  · Will place on insulin sliding scale, Accu-Cheks hypoglycemia protocol

## 2021-09-01 NOTE — PLAN OF CARE
Problem: PAIN - ADULT  Goal: Verbalizes/displays adequate comfort level or baseline comfort level  Description: Interventions:  - Encourage patient to monitor pain and request assistance  - Assess pain using appropriate pain scale  - Administer analgesics based on type and severity of pain and evaluate response  - Implement non-pharmacological measures as appropriate and evaluate response  - Consider cultural and social influences on pain and pain management  - Notify physician/advanced practitioner if interventions unsuccessful or patient reports new pain  Outcome: Progressing     Problem: CARDIOVASCULAR - ADULT  Goal: Maintains optimal cardiac output and hemodynamic stability  Description: INTERVENTIONS:  - Monitor I/O, vital signs and rhythm  - Monitor for S/S and trends of decreased cardiac output  - Administer and titrate ordered vasoactive medications to optimize hemodynamic stability  - Assess quality of pulses, skin color and temperature  - Assess for signs of decreased coronary artery perfusion  - Instruct patient to report change in severity of symptoms  Outcome: Progressing  Goal: Absence of cardiac dysrhythmias or at baseline rhythm  Description: INTERVENTIONS:  - Continuous cardiac monitoring, vital signs, obtain 12 lead EKG if ordered  - Administer antiarrhythmic and heart rate control medications as ordered  - Monitor electrolytes and administer replacement therapy as ordered  Outcome: Progressing     Problem: RESPIRATORY - ADULT  Goal: Achieves optimal ventilation and oxygenation  Description: INTERVENTIONS:  - Assess for changes in respiratory status  - Assess for changes in mentation and behavior  - Position to facilitate oxygenation and minimize respiratory effort  - Oxygen administered by appropriate delivery if ordered  - Initiate smoking cessation education as indicated  - Encourage broncho-pulmonary hygiene including cough, deep breathe, Incentive Spirometry  - Assess the need for suctioning and aspirate as needed  - Assess and instruct to report SOB or any respiratory difficulty  - Respiratory Therapy support as indicated  Outcome: Progressing     Problem: METABOLIC, FLUID AND ELECTROLYTES - ADULT  Goal: Electrolytes maintained within normal limits  Description: INTERVENTIONS:  - Monitor labs and assess patient for signs and symptoms of electrolyte imbalances  - Administer electrolyte replacement as ordered  - Monitor response to electrolyte replacements, including repeat lab results as appropriate  - Instruct patient on fluid and nutrition as appropriate  Outcome: Progressing  Goal: Fluid balance maintained  Description: INTERVENTIONS:  - Monitor labs   - Monitor I/O and WT  - Instruct patient on fluid and nutrition as appropriate  - Assess for signs & symptoms of volume excess or deficit  Outcome: Progressing  Goal: Glucose maintained within target range  Description: INTERVENTIONS:  - Monitor Blood Glucose as ordered  - Assess for signs and symptoms of hyperglycemia and hypoglycemia  - Administer ordered medications to maintain glucose within target range  - Assess nutritional intake and initiate nutrition service referral as needed  Outcome: Progressing

## 2021-09-01 NOTE — UTILIZATION REVIEW
Inpatient Admission Authorization Request   NOTIFICATION OF INPATIENT ADMISSION/INPATIENT AUTHORIZATION REQUEST   SERVICING FACILITY:   [facility]  [place] [address]  Tax ID: [id number]  NPI: [id number]  Place of Service: Inpatient [address] [facility]  [address]  Place of Service Code: 24     ATTENDING PROVIDER:  Attending Name and NPI#: Mallika Perkins, Kelsi Eugene [[id number]]  Address: Zari Dukes  [place], [address] [place] [address]  Phone: [phone]     UTILIZATION REVIEW CONTACT:  [facility], Utilization   Network Utilization Review Department  Phone: [phone]  Fax: [phone]  Email: [email or web address]     PHYSICIAN ADVISORY SERVICES:  FOR TVEW-[id number]-CVQM REVIEW - MEDICAL NECESSITY DENIAL  Phone: [phone]  Fax: [phone]  Email: [email or web address]     TYPE OF REQUEST:  Inpatient Status     ADMISSION INFORMATION:  ADMISSION DATE/TIME: 8/31/21  5:05 PM  PATIENT DIAGNOSIS CODE/DESCRIPTION:  Acute exacerbation of CHF (congestive heart failure) (HCC) [I50 9]  Swelling of right side of face [R22 0]  DISCHARGE DATE/TIME: No discharge date for patient encounter  DISCHARGE DISPOSITION (IF DISCHARGED): Home/Self Care     IMPORTANT INFORMATION:  Please contact the [facility] directly with any questions or concerns regarding this request  Department voicemails are confidential     Send requests for admission clinical reviews, concurrent reviews, approvals, and administrative denials due to lack of clinical to fax [phone]

## 2021-09-01 NOTE — PROGRESS NOTES
[facility]  Progress Note - Logan Sánchez 1965, 61 y o  male MRN: [id number]  Unit/Bed#: S -01 Encounter: [id number]  Primary Care Provider: Amaya Victoria MD   Date and time admitted to hospital: 8/31/2021  2:44 PM    * Acute on chronic diastolic congestive heart failure [facility])  Assessment & Plan  Wt Readings from Last 3 Encounters:   09/01/21 114 kg (252 lb 3 3 oz)   08/25/21 119 kg (262 lb 4 8 oz)   03/05/21 116 kg (256 lb)     · 2D echocardiogram in July 2020 shows preserved ejection fraction of 19%, grade 1 diastolic dysfunction  · Presenting with bilateral lower extremity swelling, dyspnea on exertion, chest tightness occasional  · Has ongoing orthopnea, paroxysmal nocturnal dyspnea  · NT proBNP 770 (440 in July 2020)  · Patient admits to be noncompliant with p o   Lasix (home dose 40 mg b i d )  · Compliant with fluid restriction and salt restriction  · Prior hospital admission with congestive heart failure exacerbation in 2020  · Mild CHF exacerbation likely secondary to medication noncompliance versus uncontrolled hypertension  · EKG:  Nonspecific T-wave inversions in V3 to V6, and frequent PVCs, new since prior EKG    Plan  - IV Lasix 40 mg  -Monitor I/O, daily weights  -Monitor renal function  -Maintain potassium above 4, magnesium above 2  -2 L fluid restriction, 2 g salt restriction  -Given new EKG changes, monitor on telemetry   -Echo ordered-follow up    Hypertensive emergency  Assessment & Plan  · In ED, blood pressure noted to be >180/100  · Received 1 dose of IV labetalol 10 mg in the ED   · Denies headaches, visual disturbances  · Ongoing pulmonary edema  · Normal renal functions, troponin negative x3    Most recent blood pressure 141/75    Plan  -IV labetalol 10 mg q 6h p r n  for SBP above 170  -Continue Toprol XL 25 daily, amlodipine 10 mg daily  -Losartan 100 mg   -Monitor blood pressure    Diabetes mellitus type 2, uncontrolled ([facility] [address] )  Assessment & Plan  Lab Results   Component Value Date    HGBA1C 6 3 (H) 2021       Recent Labs     21  1900 21  0640 21  1132   POCGLU 137 121 211*       Blood Sugar Average: Last 72 hrs:  (P) 061 [id number]   · Poor compliance  · On Janumet at home, will hold  · Will place on insulin sliding scale, Accu-Cheks hypoglycemia protocol    Arthritis of right wrist  Assessment & Plan  · Recently seen by Rheumatology  · Mild elevation in CRP  · Rest of the rheumatological workup is negative so far  · Awaiting outpatient right wrist MRI  · Patient states that his right arm, right face swells up, currently resolved  · Denies pain in the arm/face  · Pulses palpable  · Symptomatic management outpatient follow-up with Rheumatology    Hypokalemia  Assessment & Plan  · Potassium 3 0 currently being repeated  · Repeat BMP        VTE Pharmacologic Prophylaxis: VTE Score: 3 Moderate Risk (Score 3-4) - Pharmacological DVT Prophylaxis Ordered: heparin  Patient Centered Rounds: I performed bedside rounds with nursing staff today  Discussions with Specialists or Other Care Team Provider: None    Education and Discussions with Family / Patient: Updated  (wife) at bedside  Current Length of Stay: 1 day(s)  Current Patient Status: Inpatient   Discharge Plan: Anticipate discharge in 48-72 hrs to home  Code Status: Level 1 - Full Code    Subjective:   Patient was seen and examined  He reports feeling better today and that the swelling in his bilateral lower extremities and right arm  He reports becky occasional chest discomfort  He denies shortness of breath, palpitations, nausea and vomiting  Objective:     Vitals:   Temp (24hrs), Av 3 °F (36 8 °C), Min:98 °F (36 7 °C), Max:98 8 °F (37 1 °C)    Temp:  [98 °F (36 7 °C)-98 8 °F (37 1 °C)] 98 2 °F (36 8 °C)  HR:  [] 101  Resp:  [16-20] 19  BP: (132-177)/() 163/79  SpO2:  [94 %-100 %] 94 %  Body mass index is 37 24 kg/m²       Input and Output Summary (last 24 hours): Intake/Output Summary (Last 24 hours) at 9/1/2021 1212  Last data filed at 9/1/2021 0641  Gross per 24 hour   Intake 350 ml   Output 400 ml   Net -50 ml       Physical Exam:   Physical Exam  Constitutional:       Appearance: Normal appearance  HENT:      Head: Normocephalic and atraumatic  Eyes:      Conjunctiva/sclera: Conjunctivae normal    Cardiovascular:      Rate and Rhythm: Normal rate and regular rhythm  Pulses: Normal pulses  Heart sounds: Normal heart sounds  Comments: Without JVD  Pulmonary:      Breath sounds: Rales present  Comments: mild bilateral rales present   Abdominal:      General: Abdomen is flat  Bowel sounds are normal       Palpations: Abdomen is soft  Musculoskeletal:      Right lower leg: Edema present  Left lower leg: Edema present  Skin:     General: Skin is warm and dry  Neurological:      General: No focal deficit present  Mental Status: He is alert and oriented to person, place, and time     Psychiatric:         Mood and Affect: Mood normal          Behavior: Behavior normal          Additional Data:     Labs:  Results from last 7 days   Lab Units 09/01/21  0452   WBC Thousand/uL 7 73   HEMOGLOBIN g/dL 12 8   HEMATOCRIT % 37 5   PLATELETS Thousands/uL 116*   NEUTROS PCT % 56   LYMPHS PCT % 30   MONOS PCT % 10   EOS PCT % 4     Results from last 7 days   Lab Units 09/01/21  0452 08/31/21  1405   SODIUM mmol/L 140 137   POTASSIUM mmol/L 3 0* 3 0*   CHLORIDE mmol/L 100 99*   CO2 mmol/L 30 29   BUN mg/dL 7 4*   CREATININE mg/dL 1 34* 1 17   ANION GAP mmol/L 10 9   CALCIUM mg/dL 7 8* 8 0*   ALBUMIN g/dL  --  3 3*   TOTAL BILIRUBIN mg/dL  --  2 12*   ALK PHOS U/L  --  84   ALT U/L  --  20   AST U/L  --  37   GLUCOSE RANDOM mg/dL 127 162*         Results from last 7 days   Lab Units 09/01/21  1132 09/01/21  0640 08/31/21  1900   POC GLUCOSE mg/dl 211* 121 137     Results from last 7 days   Lab Units 08/31/21  1754   HEMOGLOBIN A1C % 6 3*           Lines/Drains:  Invasive Devices     Peripheral Intravenous Line            Peripheral IV 08/31/21 Right Antecubital <1 day                  Telemetry:    Telemetry Reviewed: PVCs  Indication for Continued Telemetry Use: Metabolic/electrolyte disturbance with high probability of dysrhythmia           Imaging: Reviewed radiology reports from this admission including: chest xray    Recent Cultures (last 7 days):         Last 24 Hours Medication List:   Current Facility-Administered Medications   Medication Dose Route Frequency Provider Last Rate    amLODIPine  10 mg Oral Daily Joey Rosa MD      furosemide  40 mg Intravenous Daily Joey Rosa MD      heparin (porcine)  5,000 Units Subcutaneous Q8H [address] Joey Rosa MD      insulin lispro  1-5 Units Subcutaneous HS Joey Rosa MD      insulin lispro  2-12 Units Subcutaneous TID AC Joey Rosa MD      Labetalol HCl  10 mg Intravenous Q6H PRN Joey Rosa MD      losartan  100 mg Oral Daily Joey Rosa MD      metoprolol succinate  25 mg Oral Daily Joey Rosa MD      potassium chloride  40 mEq Oral Once Joey Rosa MD      potassium chloride  40 mEq Oral Daily Joey Rosa MD          Today, Patient Was Seen By: Angela Barton    **Please Note: This note may have been constructed using a voice recognition system  **

## 2021-09-02 LAB
ANION GAP SERPL CALCULATED.3IONS-SCNC: 8 MMOL/L (ref 4–13)
ATRIAL RATE: 108 BPM
ATRIAL RATE: 98 BPM
BUN SERPL-MCNC: 10 MG/DL (ref 5–25)
CALCIUM SERPL-MCNC: 8.3 MG/DL (ref 8.3–10.1)
CHLORIDE SERPL-SCNC: 102 MMOL/L (ref 100–108)
CO2 SERPL-SCNC: 30 MMOL/L (ref 21–32)
CREAT SERPL-MCNC: 1.31 MG/DL (ref 0.6–1.3)
GFR SERPL CREATININE-BSD FRML MDRD: 69 ML/MIN/1.73SQ M
GLUCOSE SERPL-MCNC: 111 MG/DL (ref 65–140)
GLUCOSE SERPL-MCNC: 115 MG/DL (ref 65–140)
GLUCOSE SERPL-MCNC: 131 MG/DL (ref 65–140)
GLUCOSE SERPL-MCNC: 181 MG/DL (ref 65–140)
GLUCOSE SERPL-MCNC: 208 MG/DL (ref 65–140)
MAGNESIUM SERPL-MCNC: 1.3 MG/DL (ref 1.6–2.6)
MAGNESIUM SERPL-MCNC: 2.1 MG/DL (ref 1.6–2.6)
P AXIS: 52 DEGREES
P AXIS: 73 DEGREES
POTASSIUM SERPL-SCNC: 3.7 MMOL/L (ref 3.5–5.3)
PR INTERVAL: 102 MS
PR INTERVAL: 136 MS
QRS AXIS: 49 DEGREES
QRS AXIS: 55 DEGREES
QRSD INTERVAL: 84 MS
QRSD INTERVAL: 88 MS
QT INTERVAL: 316 MS
QT INTERVAL: 344 MS
QTC INTERVAL: 416 MS
QTC INTERVAL: 431 MS
SARS-COV-2 RNA RESP QL NAA+PROBE: NEGATIVE
SODIUM SERPL-SCNC: 140 MMOL/L (ref 136–145)
T WAVE AXIS: -66 DEGREES
T WAVE AXIS: 229 DEGREES
VENTRICULAR RATE: 104 BPM
VENTRICULAR RATE: 94 BPM

## 2021-09-02 PROCEDURE — 80048 BASIC METABOLIC PNL TOTAL CA: CPT | Performed by: INTERNAL MEDICINE

## 2021-09-02 PROCEDURE — 82948 REAGENT STRIP/BLOOD GLUCOSE: CPT

## 2021-09-02 PROCEDURE — U0003 INFECTIOUS AGENT DETECTION BY NUCLEIC ACID (DNA OR RNA); SEVERE ACUTE RESPIRATORY SYNDROME CORONAVIRUS 2 (SARS-COV-2) (CORONAVIRUS DISEASE [COVID-19]), AMPLIFIED PROBE TECHNIQUE, MAKING USE OF HIGH THROUGHPUT TECHNOLOGIES AS DESCRIBED BY CMS-2020-01-R: HCPCS

## 2021-09-02 PROCEDURE — 99232 SBSQ HOSP IP/OBS MODERATE 35: CPT | Performed by: INTERNAL MEDICINE

## 2021-09-02 PROCEDURE — 83735 ASSAY OF MAGNESIUM: CPT

## 2021-09-02 PROCEDURE — 93010 ELECTROCARDIOGRAM REPORT: CPT | Performed by: INTERNAL MEDICINE

## 2021-09-02 PROCEDURE — U0005 INFEC AGEN DETEC AMPLI PROBE: HCPCS

## 2021-09-02 RX ORDER — ACETAMINOPHEN 325 MG/1
650 TABLET ORAL EVERY 6 HOURS PRN
Status: DISCONTINUED | OUTPATIENT
Start: 2021-09-02 | End: 2021-09-04 | Stop reason: HOSPADM

## 2021-09-02 RX ORDER — MAGNESIUM SULFATE HEPTAHYDRATE 40 MG/ML
2 INJECTION, SOLUTION INTRAVENOUS ONCE
Status: COMPLETED | OUTPATIENT
Start: 2021-09-02 | End: 2021-09-02

## 2021-09-02 RX ORDER — METHOCARBAMOL 500 MG/1
500 TABLET, FILM COATED ORAL EVERY 6 HOURS PRN
Status: DISCONTINUED | OUTPATIENT
Start: 2021-09-02 | End: 2021-09-04 | Stop reason: HOSPADM

## 2021-09-02 RX ORDER — FUROSEMIDE 40 MG/1
40 TABLET ORAL
Status: DISCONTINUED | OUTPATIENT
Start: 2021-09-03 | End: 2021-09-03

## 2021-09-02 RX ADMIN — AMLODIPINE BESYLATE 10 MG: 10 TABLET ORAL at 08:42

## 2021-09-02 RX ADMIN — HEPARIN SODIUM 5000 UNITS: 5000 INJECTION INTRAVENOUS; SUBCUTANEOUS at 21:58

## 2021-09-02 RX ADMIN — METHOCARBAMOL 500 MG: 500 TABLET ORAL at 22:11

## 2021-09-02 RX ADMIN — LOSARTAN POTASSIUM 100 MG: 50 TABLET, FILM COATED ORAL at 08:42

## 2021-09-02 RX ADMIN — POTASSIUM CHLORIDE 40 MEQ: 1500 TABLET, EXTENDED RELEASE ORAL at 08:42

## 2021-09-02 RX ADMIN — METOPROLOL SUCCINATE 25 MG: 25 TABLET, EXTENDED RELEASE ORAL at 08:42

## 2021-09-02 RX ADMIN — HEPARIN SODIUM 5000 UNITS: 5000 INJECTION INTRAVENOUS; SUBCUTANEOUS at 05:46

## 2021-09-02 RX ADMIN — INSULIN LISPRO 4 UNITS: 100 INJECTION, SOLUTION INTRAVENOUS; SUBCUTANEOUS at 12:44

## 2021-09-02 RX ADMIN — ACETAMINOPHEN 650 MG: 325 TABLET, FILM COATED ORAL at 22:11

## 2021-09-02 RX ADMIN — HEPARIN SODIUM 5000 UNITS: 5000 INJECTION INTRAVENOUS; SUBCUTANEOUS at 13:17

## 2021-09-02 RX ADMIN — INSULIN LISPRO 1 UNITS: 100 INJECTION, SOLUTION INTRAVENOUS; SUBCUTANEOUS at 21:58

## 2021-09-02 RX ADMIN — MAGNESIUM SULFATE HEPTAHYDRATE 2 G: 40 INJECTION, SOLUTION INTRAVENOUS at 10:36

## 2021-09-02 RX ADMIN — FUROSEMIDE 40 MG: 10 INJECTION, SOLUTION INTRAMUSCULAR; INTRAVENOUS at 08:41

## 2021-09-02 NOTE — PROGRESS NOTES
[facility]  Progress Note - [facility] 1965, 61 y o  male MRN: [id number]  Unit/Bed#: S -01 Encounter: [id number]  Primary Care Provider: Anny Mosher MD   Date and time admitted to hospital: 8/31/2021  2:44 PM    * Acute on chronic diastolic congestive heart failure [facility])  Assessment & Plan  Wt Readings from Last 3 Encounters:   09/01/21 114 kg (252 lb 3 3 oz)   08/25/21 119 kg (262 lb 4 8 oz)   03/05/21 116 kg (256 lb)     · 2D echocardiogram in July 2020 shows preserved ejection fraction of 30%, grade 1 diastolic dysfunction  · Presenting with bilateral lower extremity swelling, dyspnea on exertion, chest tightness occasional  · Has ongoing orthopnea, paroxysmal nocturnal dyspnea  · NT proBNP 770 (440 in July 2020)  · Patient admits to be noncompliant with p o   Lasix (home dose 40 mg b i d )  · Compliant with fluid restriction and salt restriction  · Prior hospital admission with congestive heart failure exacerbation in 2020  · Mild CHF exacerbation likely secondary to medication noncompliance versus uncontrolled hypertension  · EKG:  Nonspecific T-wave inversions in V3 to V6, and frequent PVCs, new since prior EKG  · ECHO (9/1)- systolic function normal  Ejection fraction was estimated to be 55%    Plan  - PO Lasix 40 mg  -Monitor I/O, daily weights  -Monitor renal function  -Maintain potassium above 4, magnesium above 2  -2 L fluid restriction, 2 g salt restriction        Diabetes mellitus type 2, uncontrolled (HCC)  Assessment & Plan  Lab Results   Component Value Date    HGBA1C 6 3 (H) 08/31/2021       Recent Labs     09/01/21  1601 09/01/21  2057 09/02/21  0630 09/02/21  1109   POCGLU 169* 158* 111 208*       Blood Sugar Average: Last 72 hrs:  (P) 159 [id number]   · Poor compliance  · On Janumet at home, will hold  · Will place on insulin sliding scale, Accu-Cheks hypoglycemia protocol    Hypokalemia  Assessment & Plan  · Potassium 3 7  · Repeat BMP          VTE Pharmacologic Prophylaxis: VTE Score: 3 Moderate Risk (Score 3-4) - Pharmacological DVT Prophylaxis Ordered: heparin  Patient Centered Rounds: I performed bedside rounds with nursing staff today  Discussions with Specialists or Other Care Team Provider: None    Education and Discussions with Family / Patient: Updated  (wife) at bedside  Current Length of Stay: 2 day(s)  Current Patient Status: Inpatient   Discharge Plan: Anticipate discharge tomorrow to home  Code Status: Level 1 - Full Code    Subjective:   Patient was seen and examined  No overnight events  He reports shortness of breath when moving into bed, as well as some occasional chest discomfort  He states that he is feeling better today but not at his baseline  Patient continue to have PVCs on telemetry  Patient requested COVID-19 test due to recent exposure to brother's children last week who have since tested positive for COVID  Objective:     Vitals:   Temp (24hrs), Av 4 °F (36 9 °C), Min:98 2 °F (36 8 °C), Max:98 5 °F (36 9 °C)    Temp:  [98 2 °F (36 8 °C)-98 5 °F (36 9 °C)] 98 2 °F (36 8 °C)  HR:  [87-95] 89  Resp:  [18-19] 19  BP: (123-140)/(71-78) 138/78  SpO2:  [95 %-100 %] 99 %  Body mass index is 37 24 kg/m²  Input and Output Summary (last 24 hours): Intake/Output Summary (Last 24 hours) at 2021 1143  Last data filed at 2021 1108  Gross per 24 hour   Intake 1890 ml   Output 850 ml   Net 1040 ml       Physical Exam:   Physical Exam  Vitals reviewed  Constitutional:       Appearance: Normal appearance  HENT:      Head: Normocephalic and atraumatic  Eyes:      Conjunctiva/sclera: Conjunctivae normal    Cardiovascular:      Rate and Rhythm: Normal rate and regular rhythm  Pulses: Normal pulses  Heart sounds: Normal heart sounds  Pulmonary:      Effort: Pulmonary effort is normal       Breath sounds: Normal breath sounds  No rales  Abdominal:      General: Abdomen is flat   Bowel sounds are normal       Palpations: Abdomen is soft  Musculoskeletal:      Right lower leg: Edema present  Left lower leg: Edema present  Skin:     General: Skin is warm and dry  Neurological:      General: No focal deficit present  Mental Status: He is alert and oriented to person, place, and time  Psychiatric:         Mood and Affect: Mood normal          Behavior: Behavior normal          Additional Data:     Labs:  Results from last 7 days   Lab Units 09/01/21  0452   WBC Thousand/uL 7 73   HEMOGLOBIN g/dL 12 8   HEMATOCRIT % 37 5   PLATELETS Thousands/uL 116*   NEUTROS PCT % 56   LYMPHS PCT % 30   MONOS PCT % 10   EOS PCT % 4     Results from last 7 days   Lab Units 09/02/21  0447 08/31/21  1405   SODIUM mmol/L 140 137   POTASSIUM mmol/L 3 7 3 0*   CHLORIDE mmol/L 102 99*   CO2 mmol/L 30 29   BUN mg/dL 10 4*   CREATININE mg/dL 1 31* 1 17   ANION GAP mmol/L 8 9   CALCIUM mg/dL 8 3 8 0*   ALBUMIN g/dL  --  3 3*   TOTAL BILIRUBIN mg/dL  --  2 12*   ALK PHOS U/L  --  84   ALT U/L  --  20   AST U/L  --  37   GLUCOSE RANDOM mg/dL 115 162*         Results from last 7 days   Lab Units 09/02/21  1109 09/02/21  0630 09/01/21  2057 09/01/21  1601 09/01/21  1132 09/01/21  0640 08/31/21  1900   POC GLUCOSE mg/dl 208* 111 158* 169* 211* 121 137     Results from last 7 days   Lab Units 08/31/21  1754   HEMOGLOBIN A1C % 6 3*           Lines/Drains:  Invasive Devices     Peripheral Intravenous Line            Peripheral IV 08/31/21 Right Antecubital 1 day                  Telemetry:    Telemetry Reviewed: PVCs  Indication for Continued Telemetry Use: No indication for continued use  Will discontinue              Imaging: Reviewed radiology reports from this admission including: chest xray and ECHO    Recent Cultures (last 7 days):         Last 24 Hours Medication List:   Current Facility-Administered Medications   Medication Dose Route Frequency Provider Last Rate    amLODIPine  10 mg Oral Daily Katelyn Darell Francisco MD      Cesar Primes ON 9/3/2021] furosemide  40 mg Oral BID (diuretic) Natalia Krueger DO      heparin (porcine)  5,000 Units Subcutaneous Q8H [address] Louann Puri MD      insulin lispro  1-5 Units Subcutaneous HS Louann Puri MD      insulin lispro  2-12 Units Subcutaneous TID AC Louann Puri MD      Labetalol HCl  10 mg Intravenous Q6H PRN Louann Puri MD      losartan  100 mg Oral Daily Louann Puri MD      magnesium sulfate  2 g Intravenous Once Natalia Krueger DO      metoprolol succinate  25 mg Oral Daily Louann Puri MD      potassium chloride  40 mEq Oral Once Louann Puri MD      potassium chloride  40 mEq Oral Daily Louann Puri MD          Today, Patient Was Seen By: Mayelin Wray    **Please Note: This note may have been constructed using a voice recognition system  **

## 2021-09-02 NOTE — ASSESSMENT & PLAN NOTE
Wt Readings from Last 3 Encounters:   09/01/21 114 kg (252 lb 3 3 oz)   08/25/21 119 kg (262 lb 4 8 oz)   03/05/21 116 kg (256 lb)     · 2D echocardiogram in July 2020 shows preserved ejection fraction of 36%, grade 1 diastolic dysfunction  · Presenting with bilateral lower extremity swelling, dyspnea on exertion, chest tightness occasional  · Has ongoing orthopnea, paroxysmal nocturnal dyspnea  · NT proBNP 770 (440 in July 2020)  · Patient admits to be noncompliant with p o   Lasix (home dose 40 mg b i d )  · Compliant with fluid restriction and salt restriction  · Prior hospital admission with congestive heart failure exacerbation in 2020  · Mild CHF exacerbation likely secondary to medication noncompliance versus uncontrolled hypertension  · EKG:  Nonspecific T-wave inversions in V3 to V6, and frequent PVCs, new since prior EKG  · ECHO (9/1)- systolic function normal  Ejection fraction was estimated to be 55%    Plan  - PO Lasix 40 mg  -Monitor I/O, daily weights  -Monitor renal function  -Maintain potassium above 4, magnesium above 2  -2 L fluid restriction, 2 g salt restriction

## 2021-09-02 NOTE — PLAN OF CARE
Problem: PAIN - ADULT  Goal: Verbalizes/displays adequate comfort level or baseline comfort level  Description: Interventions:  - Encourage patient to monitor pain and request assistance  - Assess pain using appropriate pain scale  - Administer analgesics based on type and severity of pain and evaluate response  - Implement non-pharmacological measures as appropriate and evaluate response  - Consider cultural and social influences on pain and pain management  - Notify physician/advanced practitioner if interventions unsuccessful or patient reports new pain  Outcome: Progressing     Problem: CARDIOVASCULAR - ADULT  Goal: Maintains optimal cardiac output and hemodynamic stability  Description: INTERVENTIONS:  - Monitor I/O, vital signs and rhythm  - Monitor for S/S and trends of decreased cardiac output  - Administer and titrate ordered vasoactive medications to optimize hemodynamic stability  - Assess quality of pulses, skin color and temperature  - Assess for signs of decreased coronary artery perfusion  - Instruct patient to report change in severity of symptoms  Outcome: Progressing  Goal: Absence of cardiac dysrhythmias or at baseline rhythm  Description: INTERVENTIONS:  - Continuous cardiac monitoring, vital signs, obtain 12 lead EKG if ordered  - Administer antiarrhythmic and heart rate control medications as ordered  - Monitor electrolytes and administer replacement therapy as ordered  Outcome: Progressing     Problem: RESPIRATORY - ADULT  Goal: Achieves optimal ventilation and oxygenation  Description: INTERVENTIONS:  - Assess for changes in respiratory status  - Assess for changes in mentation and behavior  - Position to facilitate oxygenation and minimize respiratory effort  - Oxygen administered by appropriate delivery if ordered  - Initiate smoking cessation education as indicated  - Encourage broncho-pulmonary hygiene including cough, deep breathe, Incentive Spirometry  - Assess the need for suctioning

## 2021-09-03 LAB
ANION GAP SERPL CALCULATED.3IONS-SCNC: 7 MMOL/L (ref 4–13)
BACTERIA UR QL AUTO: ABNORMAL /HPF
BILIRUB UR QL STRIP: NEGATIVE
BUN SERPL-MCNC: 18 MG/DL (ref 5–25)
CALCIUM SERPL-MCNC: 9 MG/DL (ref 8.3–10.1)
CHLORIDE SERPL-SCNC: 100 MMOL/L (ref 100–108)
CLARITY UR: CLEAR
CO2 SERPL-SCNC: 31 MMOL/L (ref 21–32)
COLOR UR: YELLOW
CREAT SERPL-MCNC: 2.46 MG/DL (ref 0.6–1.3)
GFR SERPL CREATININE-BSD FRML MDRD: 32 ML/MIN/1.73SQ M
GLUCOSE SERPL-MCNC: 146 MG/DL (ref 65–140)
GLUCOSE SERPL-MCNC: 154 MG/DL (ref 65–140)
GLUCOSE SERPL-MCNC: 160 MG/DL (ref 65–140)
GLUCOSE SERPL-MCNC: 170 MG/DL (ref 65–140)
GLUCOSE SERPL-MCNC: 198 MG/DL (ref 65–140)
GLUCOSE UR STRIP-MCNC: NEGATIVE MG/DL
HGB UR QL STRIP.AUTO: NEGATIVE
KETONES UR STRIP-MCNC: NEGATIVE MG/DL
LEUKOCYTE ESTERASE UR QL STRIP: NEGATIVE
MAGNESIUM SERPL-MCNC: 1.8 MG/DL (ref 1.6–2.6)
NITRITE UR QL STRIP: NEGATIVE
NON-SQ EPI CELLS URNS QL MICRO: ABNORMAL /HPF
PH UR STRIP.AUTO: 6 [PH]
POTASSIUM SERPL-SCNC: 4.4 MMOL/L (ref 3.5–5.3)
PROT UR STRIP-MCNC: NEGATIVE MG/DL
RBC #/AREA URNS AUTO: ABNORMAL /HPF
SODIUM SERPL-SCNC: 138 MMOL/L (ref 136–145)
SP GR UR STRIP.AUTO: 1.01 (ref 1–1.03)
UROBILINOGEN UR QL STRIP.AUTO: 0.2 E.U./DL
WBC #/AREA URNS AUTO: ABNORMAL /HPF

## 2021-09-03 PROCEDURE — 81001 URINALYSIS AUTO W/SCOPE: CPT | Performed by: INTERNAL MEDICINE

## 2021-09-03 PROCEDURE — 99232 SBSQ HOSP IP/OBS MODERATE 35: CPT | Performed by: INTERNAL MEDICINE

## 2021-09-03 PROCEDURE — 83735 ASSAY OF MAGNESIUM: CPT

## 2021-09-03 PROCEDURE — 82948 REAGENT STRIP/BLOOD GLUCOSE: CPT

## 2021-09-03 PROCEDURE — 80048 BASIC METABOLIC PNL TOTAL CA: CPT

## 2021-09-03 RX ORDER — METOPROLOL SUCCINATE 25 MG/1
25 TABLET, EXTENDED RELEASE ORAL DAILY
Status: DISCONTINUED | OUTPATIENT
Start: 2021-09-03 | End: 2021-09-04 | Stop reason: HOSPADM

## 2021-09-03 RX ORDER — AMLODIPINE BESYLATE 10 MG/1
10 TABLET ORAL DAILY
Status: DISCONTINUED | OUTPATIENT
Start: 2021-09-03 | End: 2021-09-04 | Stop reason: HOSPADM

## 2021-09-03 RX ADMIN — METOPROLOL SUCCINATE 25 MG: 25 TABLET, EXTENDED RELEASE ORAL at 08:46

## 2021-09-03 RX ADMIN — INSULIN LISPRO 2 UNITS: 100 INJECTION, SOLUTION INTRAVENOUS; SUBCUTANEOUS at 17:07

## 2021-09-03 RX ADMIN — INSULIN LISPRO 1 UNITS: 100 INJECTION, SOLUTION INTRAVENOUS; SUBCUTANEOUS at 21:45

## 2021-09-03 RX ADMIN — POTASSIUM CHLORIDE 40 MEQ: 1500 TABLET, EXTENDED RELEASE ORAL at 08:46

## 2021-09-03 RX ADMIN — HEPARIN SODIUM 5000 UNITS: 5000 INJECTION INTRAVENOUS; SUBCUTANEOUS at 21:44

## 2021-09-03 RX ADMIN — HEPARIN SODIUM 5000 UNITS: 5000 INJECTION INTRAVENOUS; SUBCUTANEOUS at 05:40

## 2021-09-03 RX ADMIN — AMLODIPINE BESYLATE 10 MG: 10 TABLET ORAL at 08:46

## 2021-09-03 RX ADMIN — Medication 400 MG: at 14:00

## 2021-09-03 RX ADMIN — HEPARIN SODIUM 5000 UNITS: 5000 INJECTION INTRAVENOUS; SUBCUTANEOUS at 14:30

## 2021-09-03 NOTE — ASSESSMENT & PLAN NOTE
9/3- creatinine is elevated to 2 4  -Hold lasix and losartan   -Urinalysis-follow up   -measure post void residual

## 2021-09-03 NOTE — ASSESSMENT & PLAN NOTE
· In ED, blood pressure noted to be >180/100 mmHg  · Received 1 dose of IV labetalol 10 mg in the ED   · Denies headaches, visual disturbances  · Ongoing pulmonary edema  · Normal renal functions, troponin negative x3    Most recent blood pressure 143/84 mmHg    Plan  -IV labetalol 10 mg q 6h p r n  for SBP above 170  -Continue Toprol XL 25 daily, amlodipine 10 mg daily  -Losartan 100 mg daily- hold due to kidney function   -Monitor blood pressure

## 2021-09-03 NOTE — ASSESSMENT & PLAN NOTE
Patient's magnesium 0 8 on 09/01  Repleted as needed with 2-3g of magnesium sulfate    · Magnesium 1 8 on 09/03  · Monitor magnesium

## 2021-09-03 NOTE — DISCHARGE SUMMARY
[facility]  Discharge- Vijaya Sexton 1965, 61 y o  male MRN: [id number]  Unit/Bed#: S -01 Encounter: [id number]  Primary Care Provider: Milla Cooley MD   Date and time admitted to hospital: 8/31/2021  2:44 PM    * Acute on chronic diastolic congestive heart failure [facility])  Assessment & Plan  Wt Readings from Last 3 Encounters:   09/04/21 115 kg (253 lb 6 4 oz)   08/25/21 119 kg (262 lb 4 8 oz)   03/05/21 116 kg (256 lb)     · 2D echocardiogram in July 2020 shows preserved ejection fraction of 42%, grade 1 diastolic dysfunction  · Presenting with bilateral lower extremity swelling, dyspnea on exertion, chest tightness occasional  · Has ongoing orthopnea, paroxysmal nocturnal dyspnea  · NT proBNP 770 (440 in July 2020)  · Patient admits to be noncompliant with p o  Lasix (home dose 40 mg b i d )  · Compliant with fluid restriction and salt restriction  · Prior hospital admission with congestive heart failure exacerbation in 2020  · Mild CHF exacerbation likely secondary to medication noncompliance versus uncontrolled hypertension  · EKG:  Nonspecific T-wave inversions in V3 to V6, and frequent PVCs, new since prior EKG  · ECHO (09/34)- systolic function normal  Ejection fraction was estimated to be 55%    Plan  -Monitor I/O, daily weights  -Monitor renal function  -Maintain potassium above 4, magnesium above 2  -2 L fluid restriction, 2 g salt restriction  -PO lasix 40mg-hold due to kidney function     Hypokalemia  Assessment & Plan  Patient's potassium 3 0 on admission  Repleted as needed with 40 mEq k-dur PO      · Potassium 4 7 on 09/04  · Monitor BMP    Hypomagnesemia  Assessment & Plan  Patient's magnesium 0 8 on 09/01  Repleted as needed with 2-3g of magnesium sulfate    · Magnesium 1 8 on 09/03  · Monitor magnesium    Hypertensive emergency  Assessment & Plan  · In ED, blood pressure noted to be >180/100 mmHg  · Received 1 dose of IV labetalol 10 mg in the ED   · Denies headaches, visual disturbances  · Ongoing pulmonary edema  · Normal renal functions, troponin negative x3    Most recent blood pressure 138/80 mmHg    Plan  -IV labetalol 10 mg q 6h p r n  for SBP above 170  -Continue Toprol XL 25 daily, amlodipine 10 mg daily  -Losartan 100 mg daily- hold due to kidney function   -Monitor blood pressure    Diabetes mellitus type 2, uncontrolled (HCC)  Assessment & Plan  Lab Results   Component Value Date    HGBA1C 6 3 (H) 08/31/2021       Recent Labs     09/03/21  1108 09/03/21  1522 09/03/21  2108 09/04/21  0630   POCGLU 198* 160* 170* 127       Blood Sugar Average: Last 72 hrs:  (P) 980 [id number]   · Poor compliance  · On Janumet at home, will hold  · Will place on insulin sliding scale, Accu-Cheks hypoglycemia protocol    Arthritis of right wrist  Assessment & Plan  · Recently seen by Rheumatology  · Mild elevation in CRP  · Rest of the rheumatological workup is negative so far  · Awaiting outpatient right wrist MRI  · Patient states that his right arm, right face swells up, currently resolved  · Denies pain in the arm/face  · Pulses palpable  · Symptomatic management outpatient follow-up with Rheumatology    Acute kidney injury [facility])  Assessment & Plan  09/03- creatinine is elevated to 2 46  Trending down - 09/04 Cr 1 8  UA negative     -Hold lasix and losartan   -measure post void residual       Medical Problems     Resolved Problems  Date Reviewed: 9/4/2021    None              Discharging Resident: Payam Pike DO  Discharging Attending: Cheri Simon MD  PCP: Ronaldo Mckenzie MD  Admission Date:   Admission Orders (From admission, onward)     Ordered        08/31/21 1705  Inpatient Admission  Once                   Discharge Date: 09/04/21    Consultations During Hospital Stay:  · None    Procedures Performed:   · None    Significant Findings / Test Results:   · Echocardiogram: Systolic function was normal  Ejection fraction was estimated to be 55 %   GLS reduced at -12%  There were no regional wall motion abnormalities  Doppler parameters were consistent with abnormal left ventricular relaxation (grade 1 diastolic dysfunction)  · Patient's magnesium was low (0 8) on admission and was repleted as needed  · Patient's potassium was low (3 0) on admission and was repleted as needed    Incidental Findings:   · None    Test Results Pending at Discharge (will require follow up): · None     Outpatient Tests Requested:  · BMP 1 week after discharge; patient discharged with prescription for BMP and instructions to follow up with PCP to monitor kidney function and potassium level    Complications:  N/A    Reason for Admission:     Hospital Course:   Jose Bustos is a 61 y o  male patient with a past medical history significant for heart failure with preserved ejection fraction, Type 2 diabetes mellitus, hypertension, and arthritis who originally presented to the hospital on 8/31/2021 due to increasing shortness of breath, lower extremity face, right side upper arm, and right side face swelling, and chest discomfort  Patient reports orthopnea, paroxysmal nocturnal dyspnea, and recent weight gain  Patient was prescribed Lasix 40 mg BID, however admitted to being noncompliant with his antihypertensive and diabetic medications  On admission to the emergency department, he was noted to have hypertension with an SBP above 180, the rest of his vital signs were within normal limits including oxygen saturations  BNP was noted to be 700, increased from 400 in July 2020  Troponins were negative  Chest x-ray shows no significant pulmonary edema  Hemoglobin 13 4  He was also noted to have a potassium of 3 0  He had normal renal functions on admission  D-dimer was within normal limits at 0 37  COVID negative  He received 1 dose of IV labetalol with initial good response, however, he became hypertensive again SBP above 180   He was admitted for further management of likely CHF exacerbation and hypertensive emergency  2D echocardiogram in July 2020 showed preserved ejection fraction of 24%, grade 1 diastolic dysfunction  Repeat Echo during this admission showed ejection fraction of 55% and grade 1 diastolic dysfunction  Patient was placed on telemetry which showed nonspecific T-wave inversions in V3 to V6, and frequent PVCs, new since prior EKGs  Patient was started on IV Lasix 40 mg BID, I/Os monitored, daily standing weights, 2 L fluid restriction, 2 g salt restriction, and electrolytes were repleted as needed  Patient was also given home medications of amlodipine 10 mg daily, metoprolol 25 mg daily, and losartan 100 mg  Patient's blood pressures improved  On morning of discharge BP was, 138/80 mmHg  Patient's edema improved and he reported improved shortness of breath and orthopnea  The patient denied chest pain throughout the duration of his hospital stay  Patient's blood sugars were monitored and he was placed on an insulin sliding scale regiment during this hospital stay  Electrolytes normalized to within normal limits  Patient was planned for switch to PO Lasix on on 09/03 of hospital stay, however, patient developed an DELFINO with a Cr of 2 46 from 1 31  KDIGO DELFINO guidelines were followed and patient's ARB and diuretics were held  UA was ordered and found to be negative  Post void residual volume was ordered and showed within normal limits at 0 mL  Hold parameters were adjusted for hypertensive medications to avoid hypotension  Nephrotoxic agents were avoided and blood pressure monitored for hypotension  On day of discharge 09/04, Cr downtrending to 1 87  Patient received PRN Tylenol 650 mg and Robaxin 500 mg as needed for pain  Of note, he is currently being worked up outpatient by Rheumatology for right wrist pain and arthritis and is currently awaiting on outpatient MRI  On the day of discharge, the patient was hemodynamically stable   He reported improved shortness of breath  He reported improved sleep and minimal shortness of breath on exertion  The edema in his lower extremities bilaterally improved  On physical exam, only trace edema was present  A discussion was had emphasizing the importance of medication adherence  The patient was discharged with instructions to make appointments for follow up with his cardiologist, primary care provider, and to establish care with a nephrologist  It was recommended the patient discuss possibly urology follow up with his PCP  Changes to medications were outlined and discussed, the patient and his wife (via phone) expressed understanding  Please see above list of diagnoses and related plan for additional information  Condition at Discharge: good    Discharge Day Visit / Exam:   Subjective:  Patient was seen and evaluated at bedside  Patient reports no shortness of breath or chest pain  He reports no palpitations  The patient states he was up multiple times at night to use the bathroom to urinate  Patient asked if he was given his diuretic pill yesterday and this writer informed patient his diuretics and ARB anti-hypertensive medications were held yesterday due to his worsening kidney function  The patient denied urinary symptoms of dysuria, hematuria, or odor  Treatment team discussed with patient possibility to follow up outpatient for his nocturia symptoms  Discussed with patient importance of compliance with medication and proper diet  Discussed follow up with primary care provider, cardiology, and nephrology  Reviewed medication changes and doses with the patient and his wife (via phone)      Vitals: Blood Pressure: 138/80 (09/04/21 0631)  Pulse: 89 (09/04/21 0631)  Temperature: 98 7 °F (37 1 °C) (09/04/21 0631)  Temp Source: Oral (09/04/21 0631)  Respirations: 19 (09/04/21 0631)  Height: 5' 9" (175 3 cm) (09/01/21 0106)  Weight - Scale: 115 kg (253 lb 6 4 oz) (09/04/21 0600)  SpO2: 99 % (09/04/21 0631)  Exam:   Physical Exam  Vitals reviewed  Constitutional:       General: He is not in acute distress  Appearance: Normal appearance  He is not ill-appearing or diaphoretic  HENT:      Head: Normocephalic and atraumatic  Right Ear: External ear normal       Left Ear: External ear normal       Nose: Nose normal       Mouth/Throat:      Mouth: Mucous membranes are moist       Pharynx: Oropharynx is clear  Eyes:      General: No scleral icterus  Conjunctiva/sclera: Conjunctivae normal       Pupils: Pupils are equal, round, and reactive to light  Cardiovascular:      Rate and Rhythm: Normal rate and regular rhythm  Pulses: Normal pulses  Radial pulses are 2+ on the right side and 2+ on the left side  Dorsalis pedis pulses are 2+ on the right side and 2+ on the left side  Heart sounds: Normal heart sounds  No friction rub  Pulmonary:      Effort: No tachypnea or accessory muscle usage  Breath sounds: Normal breath sounds and air entry  No decreased breath sounds, wheezing, rhonchi or rales  Abdominal:      General: Bowel sounds are normal       Palpations: Abdomen is soft  Tenderness: There is no abdominal tenderness  There is no guarding or rebound  Musculoskeletal:      Right lower leg: Edema (trace) present  Left lower leg: Edema (trace) present  Skin:     General: Skin is warm and dry  Capillary Refill: Capillary refill takes less than 2 seconds  Neurological:      Mental Status: He is alert and oriented to person, place, and time  Psychiatric:         Mood and Affect: Mood normal          Behavior: Behavior normal          Discussion with Family: Updated  (wife) at bedside with patient via phone  Discharge instructions/Information to patient and family:   See after visit summary for information provided to patient and family        Provisions for Follow-Up Care:  See after visit summary for information related to follow-up care and any pertinent home health orders  Disposition:   Home    Planned Readmission: N/A    Discharge Medications:  See after visit summary for reconciled discharge medications provided to patient and/or family        **Please Note: This note may have been constructed using a voice recognition system**

## 2021-09-03 NOTE — PLAN OF CARE
Problem: PAIN - ADULT  Goal: Verbalizes/displays adequate comfort level or baseline comfort level  Description: Interventions:  - Encourage patient to monitor pain and request assistance  - Assess pain using appropriate pain scale  - Administer analgesics based on type and severity of pain and evaluate response  - Implement non-pharmacological measures as appropriate and evaluate response  - Consider cultural and social influences on pain and pain management  - Notify physician/advanced practitioner if interventions unsuccessful or patient reports new pain  Outcome: Progressing     Problem: CARDIOVASCULAR - ADULT  Goal: Maintains optimal cardiac output and hemodynamic stability  Description: INTERVENTIONS:  - Monitor I/O, vital signs and rhythm  - Monitor for S/S and trends of decreased cardiac output  - Administer and titrate ordered vasoactive medications to optimize hemodynamic stability  - Assess quality of pulses, skin color and temperature  - Assess for signs of decreased coronary artery perfusion  - Instruct patient to report change in severity of symptoms  Outcome: Progressing  Goal: Absence of cardiac dysrhythmias or at baseline rhythm  Description: INTERVENTIONS:  - Continuous cardiac monitoring, vital signs, obtain 12 lead EKG if ordered  - Administer antiarrhythmic and heart rate control medications as ordered  - Monitor electrolytes and administer replacement therapy as ordered  Outcome: Progressing     Problem: RESPIRATORY - ADULT  Goal: Achieves optimal ventilation and oxygenation  Description: INTERVENTIONS:  - Assess for changes in respiratory status  - Assess for changes in mentation and behavior  - Position to facilitate oxygenation and minimize respiratory effort  - Oxygen administered by appropriate delivery if ordered  - Initiate smoking cessation education as indicated  - Encourage broncho-pulmonary hygiene including cough, deep breathe, Incentive Spirometry  - Assess the need for suctioning and aspirate as needed  - Assess and instruct to report SOB or any respiratory difficulty  - Respiratory Therapy support as indicated  Outcome: Progressing     Problem: METABOLIC, FLUID AND ELECTROLYTES - ADULT  Goal: Electrolytes maintained within normal limits  Description: INTERVENTIONS:  - Monitor labs and assess patient for signs and symptoms of electrolyte imbalances  - Administer electrolyte replacement as ordered  - Monitor response to electrolyte replacements, including repeat lab results as appropriate  - Instruct patient on fluid and nutrition as appropriate  Outcome: Progressing  Goal: Fluid balance maintained  Description: INTERVENTIONS:  - Monitor labs   - Monitor I/O and WT  - Instruct patient on fluid and nutrition as appropriate  - Assess for signs & symptoms of volume excess or deficit  Outcome: Progressing  Goal: Glucose maintained within target range  Description: INTERVENTIONS:  - Monitor Blood Glucose as ordered  - Assess for signs and symptoms of hyperglycemia and hypoglycemia  - Administer ordered medications to maintain glucose within target range  - Assess nutritional intake and initiate nutrition service referral as needed  Outcome: Progressing     Problem: Potential for Falls  Goal: Patient will remain free of falls  Description: INTERVENTIONS:  - Educate patient/family on patient safety including physical limitations  - Instruct patient to call for assistance with activity   - Consult OT/PT to assist with strengthening/mobility   - Keep Call bell within reach  - Keep bed low and locked with side rails adjusted as appropriate  - Keep care items and personal belongings within reach  - Initiate and maintain comfort rounds  - Make Fall Risk Sign visible to staff  - Offer Toileting every  Hours, in advance of need  - Initiate/Maintain alarm  - Obtain necessary fall risk management equipment:   - Apply yellow socks and bracelet for high fall risk patients  - Consider moving patient to room near nurses station  Outcome: Progressing

## 2021-09-03 NOTE — PROGRESS NOTES
[facility]  Progress Note - José Miguel Clemons 1965, 61 y o  male MRN: [id number]  Unit/Bed#: S -01 Encounter: [id number]  Primary Care Provider: Debora Cantu MD   Date and time admitted to hospital: 8/31/2021  2:44 PM    Acute kidney injury [facility])  Assessment & Plan  9/3- creatinine is elevated to 2 4  -Hold lasix and losartan   -Urinalysis-follow up   -measure post void residual     * Acute on chronic diastolic congestive heart failure ([address] )  Assessment & Plan  Wt Readings from Last 3 Encounters:   09/03/21 115 kg (254 lb)   08/25/21 119 kg (262 lb 4 8 oz)   03/05/21 116 kg (256 lb)     · 2D echocardiogram in July 2020 shows preserved ejection fraction of 14%, grade 1 diastolic dysfunction  · Presenting with bilateral lower extremity swelling, dyspnea on exertion, chest tightness occasional  · Has ongoing orthopnea, paroxysmal nocturnal dyspnea  · NT proBNP 770 (440 in July 2020)  · Patient admits to be noncompliant with p o   Lasix (home dose 40 mg b i d )  · Compliant with fluid restriction and salt restriction  · Prior hospital admission with congestive heart failure exacerbation in 2020  · Mild CHF exacerbation likely secondary to medication noncompliance versus uncontrolled hypertension  · EKG:  Nonspecific T-wave inversions in V3 to V6, and frequent PVCs, new since prior EKG  · ECHO (21/85)- systolic function normal  Ejection fraction was estimated to be 55%    Plan  -Monitor I/O, daily weights  -Monitor renal function  -Maintain potassium above 4, magnesium above 2  -2 L fluid restriction, 2 g salt restriction  -PO lasix 40mg-hold due to kidney function     Hypertensive emergency  Assessment & Plan  · In ED, blood pressure noted to be >180/100 mmHg  · Received 1 dose of IV labetalol 10 mg in the ED   · Denies headaches, visual disturbances  · Ongoing pulmonary edema  · Normal renal functions, troponin negative x3    Most recent blood pressure 143/84 mmHg    Plan  -IV labetalol 10 mg q 6h p r n  for SBP above 170  -Continue Toprol XL 25 daily, amlodipine 10 mg daily  -Losartan 100 mg daily- hold due to kidney function   -Monitor blood pressure    Diabetes mellitus type 2, uncontrolled ([facility] [address] )  Assessment & Plan  Lab Results   Component Value Date    HGBA1C 6 3 (H) 08/31/2021       Recent Labs     09/02/21  1619 09/02/21  2109 09/03/21  0735 09/03/21  1108   POCGLU 131 181* 146* 198*       Blood Sugar Average: Last 72 hrs:  (P) 398 [id number]   · Poor compliance  · On Janumet at home, will hold  · Will place on insulin sliding scale, Accu-Cheks hypoglycemia protocol    Arthritis of right wrist  Assessment & Plan  · Recently seen by Rheumatology  · Mild elevation in CRP  · Rest of the rheumatological workup is negative so far  · Awaiting outpatient right wrist MRI  · Patient states that his right arm, right face swells up, currently resolved  · Denies pain in the arm/face  · Pulses palpable  · Symptomatic management outpatient follow-up with Rheumatology    Hypomagnesemia  Assessment & Plan  Patient's magnesium 0 8 on 09/01  Repleted as needed with 2-3g of magnesium sulfate  · Magnesium 1 8 on 09/03  · Monitor magnesium    Hypokalemia  Assessment & Plan  Patient's potassium 3 0 on admission  Repleted as needed with 40 mEq k-dur PO      · Potassium 4 4 on 09/03  · Monitor BMP          VTE Pharmacologic Prophylaxis: VTE Score: 3 Moderate Risk (Score 3-4) - Pharmacological DVT Prophylaxis Ordered: heparin  Patient Centered Rounds: I performed bedside rounds with nursing staff today  Discussions with Specialists or Other Care Team Provider: None    Education and Discussions with Family / Patient: Updated  (wife) at bedside  Current Length of Stay: 3 day(s)  Current Patient Status: Inpatient   Discharge Plan: Anticipate discharge tomorrow to home  Code Status: Level 1 - Full Code    Subjective:   Patient seen and examined  He reports feeling significantly better   He noticed some palpitations that he felt on the right side of his chest last night, which have since resolved  He denies shortness of breath  Objective:     Vitals:   Temp (24hrs), Av 8 °F (36 6 °C), Min:97 8 °F (36 6 °C), Max:97 8 °F (36 6 °C)    Temp:  [97 8 °F (36 6 °C)] 97 8 °F (36 6 °C)  HR:  [97-98] 98  Resp:  [18] 18  BP: (143-162)/(83-84) 162/83  SpO2:  [95 %-97 %] 95 %  Body mass index is 37 51 kg/m²  Input and Output Summary (last 24 hours): Intake/Output Summary (Last 24 hours) at 9/3/2021 1354  Last data filed at 9/3/2021 0900  Gross per 24 hour   Intake 1380 ml   Output 175 ml   Net 1205 ml       Physical Exam:   Physical Exam  Constitutional:       Appearance: Normal appearance  HENT:      Head: Normocephalic and atraumatic  Eyes:      Conjunctiva/sclera: Conjunctivae normal    Cardiovascular:      Rate and Rhythm: Normal rate and regular rhythm  Pulses: Normal pulses  Heart sounds: Normal heart sounds  Pulmonary:      Effort: Pulmonary effort is normal       Breath sounds: Normal breath sounds  Abdominal:      General: Abdomen is flat  Bowel sounds are normal       Palpations: Abdomen is soft  Musculoskeletal:      Right lower leg: Edema present  Left lower le+ Edema present  Skin:     General: Skin is warm and dry  Neurological:      Mental Status: He is alert and oriented to person, place, and time     Psychiatric:         Mood and Affect: Mood normal          Behavior: Behavior normal          Additional Data:     Labs:  Results from last 7 days   Lab Units 21  0452   WBC Thousand/uL 7 73   HEMOGLOBIN g/dL 12 8   HEMATOCRIT % 37 5   PLATELETS Thousands/uL 116*   NEUTROS PCT % 56   LYMPHS PCT % 30   MONOS PCT % 10   EOS PCT % 4     Results from last 7 days   Lab Units 21  0509 21  1405   SODIUM mmol/L 138 137   POTASSIUM mmol/L 4 4 3 0*   CHLORIDE mmol/L 100 99*   CO2 mmol/L 31 29   BUN mg/dL 18 4*   CREATININE mg/dL 2 46* 1 17   ANION GAP mmol/L 7 9   CALCIUM mg/dL 9 0 8 0*   ALBUMIN g/dL  --  3 3*   TOTAL BILIRUBIN mg/dL  --  2 12*   ALK PHOS U/L  --  84   ALT U/L  --  20   AST U/L  --  37   GLUCOSE RANDOM mg/dL 154* 162*         Results from last 7 days   Lab Units 09/03/21  1108 09/03/21  0735 09/02/21  2109 09/02/21  1619 09/02/21  1109 09/02/21  0630 09/01/21  2057 09/01/21  1601 09/01/21  1132 09/01/21  0640 08/31/21  1900   POC GLUCOSE mg/dl 198* 146* 181* 131 208* 111 158* 169* 211* 121 137     Results from last 7 days   Lab Units 08/31/21  1754   HEMOGLOBIN A1C % 6 3*           Lines/Drains:  Invasive Devices     Peripheral Intravenous Line            Peripheral IV 08/31/21 Right Antecubital 2 days                      Imaging: Reviewed radiology reports from this admission including: ECHO       Recent Cultures (last 7 days):         Last 24 Hours Medication List:   Current Facility-Administered Medications   Medication Dose Route Frequency Provider Last Rate    acetaminophen  650 mg Oral Q6H PRN Marcelle Jimenez MD      amLODIPine  10 mg Oral Daily Neo Orellana MD      heparin (porcine)  5,000 Units Subcutaneous Q8H [address] Harrison Toussaint MD      insulin lispro  1-5 Units Subcutaneous HS Harrison Toussaint MD      insulin lispro  2-12 Units Subcutaneous TID AC Harrison Toussaint MD      Labetalol HCl  10 mg Intravenous Q6H PRN Harrison Toussaint MD      magnesium oxide  400 mg Oral Once [place], DO      methocarbamol  500 mg Oral Q6H PRN Marcelle Jimenez MD      metoprolol succinate  25 mg Oral Daily Neo Orellana MD      potassium chloride  40 mEq Oral Once Harrison Toussaint MD      potassium chloride  40 mEq Oral Daily Harrison Toussaint MD          Today, Patient Was Seen By: Lavinia Egan    **Please Note: This note may have been constructed using a voice recognition system  **

## 2021-09-03 NOTE — ASSESSMENT & PLAN NOTE
Wt Readings from Last 3 Encounters:   09/03/21 115 kg (254 lb)   08/25/21 119 kg (262 lb 4 8 oz)   03/05/21 116 kg (256 lb)     · 2D echocardiogram in July 2020 shows preserved ejection fraction of 74%, grade 1 diastolic dysfunction  · Presenting with bilateral lower extremity swelling, dyspnea on exertion, chest tightness occasional  · Has ongoing orthopnea, paroxysmal nocturnal dyspnea  · NT proBNP 770 (440 in July 2020)  · Patient admits to be noncompliant with p o   Lasix (home dose 40 mg b i d )  · Compliant with fluid restriction and salt restriction  · Prior hospital admission with congestive heart failure exacerbation in 2020  · Mild CHF exacerbation likely secondary to medication noncompliance versus uncontrolled hypertension  · EKG:  Nonspecific T-wave inversions in V3 to V6, and frequent PVCs, new since prior EKG  · ECHO (95/96)- systolic function normal  Ejection fraction was estimated to be 55%    Plan  -Monitor I/O, daily weights  -Monitor renal function  -Maintain potassium above 4, magnesium above 2  -2 L fluid restriction, 2 g salt restriction  -PO lasix 40mg-hold due to kidney function

## 2021-09-03 NOTE — ASSESSMENT & PLAN NOTE
Patient's potassium 3 0 on admission   Repleted as needed with 40 mEq k-dur PO      · Potassium 4 4 on 09/03  · Monitor BMP

## 2021-09-03 NOTE — ASSESSMENT & PLAN NOTE
Lab Results   Component Value Date    HGBA1C 6 3 (H) 08/31/2021       Recent Labs     09/02/21  1619 09/02/21  2109 09/03/21  0735 09/03/21  1108   POCGLU 131 181* 146* 198*       Blood Sugar Average: Last 72 hrs:  (P) 623 [id number]   · Poor compliance  · On Janumet at home, will hold  · Will place on insulin sliding scale, Accu-Cheks hypoglycemia protocol

## 2021-09-04 VITALS
HEIGHT: 69 IN | DIASTOLIC BLOOD PRESSURE: 80 MMHG | WEIGHT: 253.4 LBS | HEART RATE: 89 BPM | SYSTOLIC BLOOD PRESSURE: 138 MMHG | OXYGEN SATURATION: 99 % | BODY MASS INDEX: 37.53 KG/M2 | TEMPERATURE: 98.7 F | RESPIRATION RATE: 19 BRPM

## 2021-09-04 PROBLEM — E87.6 HYPOKALEMIA: Status: RESOLVED | Noted: 2020-07-06 | Resolved: 2021-09-04

## 2021-09-04 PROBLEM — I16.1 HYPERTENSIVE EMERGENCY: Status: RESOLVED | Noted: 2021-08-31 | Resolved: 2021-09-04

## 2021-09-04 LAB
ANION GAP SERPL CALCULATED.3IONS-SCNC: 4 MMOL/L (ref 4–13)
BUN SERPL-MCNC: 21 MG/DL (ref 5–25)
CALCIUM SERPL-MCNC: 9.1 MG/DL (ref 8.3–10.1)
CHLORIDE SERPL-SCNC: 104 MMOL/L (ref 100–108)
CO2 SERPL-SCNC: 31 MMOL/L (ref 21–32)
CREAT SERPL-MCNC: 1.87 MG/DL (ref 0.6–1.3)
GFR SERPL CREATININE-BSD FRML MDRD: 45 ML/MIN/1.73SQ M
GLUCOSE SERPL-MCNC: 117 MG/DL (ref 65–140)
GLUCOSE SERPL-MCNC: 127 MG/DL (ref 65–140)
GLUCOSE SERPL-MCNC: 199 MG/DL (ref 65–140)
MAGNESIUM SERPL-MCNC: 1.7 MG/DL (ref 1.6–2.6)
POTASSIUM SERPL-SCNC: 4.7 MMOL/L (ref 3.5–5.3)
SODIUM SERPL-SCNC: 139 MMOL/L (ref 136–145)

## 2021-09-04 PROCEDURE — 80048 BASIC METABOLIC PNL TOTAL CA: CPT

## 2021-09-04 PROCEDURE — 4010F ACE/ARB THERAPY RXD/TAKEN: CPT | Performed by: INTERNAL MEDICINE

## 2021-09-04 PROCEDURE — 99239 HOSP IP/OBS DSCHRG MGMT >30: CPT | Performed by: INTERNAL MEDICINE

## 2021-09-04 PROCEDURE — 83735 ASSAY OF MAGNESIUM: CPT

## 2021-09-04 PROCEDURE — 82948 REAGENT STRIP/BLOOD GLUCOSE: CPT

## 2021-09-04 RX ORDER — AMLODIPINE BESYLATE 10 MG/1
10 TABLET ORAL DAILY
Qty: 30 TABLET | Refills: 0 | Status: SHIPPED | OUTPATIENT
Start: 2021-09-04 | End: 2022-03-14 | Stop reason: SDUPTHER

## 2021-09-04 RX ORDER — OLMESARTAN MEDOXOMIL 40 MG/1
40 TABLET ORAL DAILY
Qty: 30 TABLET | Refills: 0 | Status: SHIPPED | OUTPATIENT
Start: 2021-09-04 | End: 2022-03-14 | Stop reason: SDUPTHER

## 2021-09-04 RX ORDER — METOPROLOL SUCCINATE 25 MG/1
25 TABLET, EXTENDED RELEASE ORAL DAILY
Qty: 30 TABLET | Refills: 0 | Status: SHIPPED | OUTPATIENT
Start: 2021-09-04 | End: 2022-03-14 | Stop reason: SDUPTHER

## 2021-09-04 RX ORDER — METHOCARBAMOL 500 MG/1
500 TABLET, FILM COATED ORAL EVERY 6 HOURS PRN
Qty: 40 TABLET | Refills: 0 | Status: SHIPPED | OUTPATIENT
Start: 2021-09-04 | End: 2022-03-14 | Stop reason: ALTCHOICE

## 2021-09-04 RX ORDER — MAGNESIUM OXIDE 400 MG/1
1 TABLET ORAL 2 TIMES DAILY
Qty: 60 TABLET | Refills: 0 | Status: SHIPPED | OUTPATIENT
Start: 2021-09-04 | End: 2021-10-04

## 2021-09-04 RX ORDER — FUROSEMIDE 40 MG/1
40 TABLET ORAL 2 TIMES DAILY
Qty: 60 TABLET | Refills: 0 | Status: SHIPPED | OUTPATIENT
Start: 2021-09-04 | End: 2022-03-14 | Stop reason: SDUPTHER

## 2021-09-04 RX ADMIN — METOPROLOL SUCCINATE 25 MG: 25 TABLET, EXTENDED RELEASE ORAL at 08:44

## 2021-09-04 RX ADMIN — POTASSIUM CHLORIDE 40 MEQ: 1500 TABLET, EXTENDED RELEASE ORAL at 08:44

## 2021-09-04 RX ADMIN — HEPARIN SODIUM 5000 UNITS: 5000 INJECTION INTRAVENOUS; SUBCUTANEOUS at 05:35

## 2021-09-04 RX ADMIN — AMLODIPINE BESYLATE 10 MG: 10 TABLET ORAL at 08:44

## 2021-09-04 NOTE — ASSESSMENT & PLAN NOTE
Lab Results   Component Value Date    HGBA1C 6 3 (H) 08/31/2021       Recent Labs     09/03/21  1108 09/03/21  1522 09/03/21  2108 09/04/21  0630   POCGLU 198* 160* 170* 127       Blood Sugar Average: Last 72 hrs:  (P) 844 [id number]   · Poor compliance  · On Janumet at home, will hold  · Will place on insulin sliding scale, Accu-Cheks hypoglycemia protocol

## 2021-09-04 NOTE — ASSESSMENT & PLAN NOTE
09/03- creatinine is elevated to 2 46    Trending down - 09/04 Cr 1 8  UA negative     -Hold lasix and losartan   -measure post void residual

## 2021-09-04 NOTE — ASSESSMENT & PLAN NOTE
Wt Readings from Last 3 Encounters:   09/04/21 115 kg (253 lb 6 4 oz)   08/25/21 119 kg (262 lb 4 8 oz)   03/05/21 116 kg (256 lb)     · 2D echocardiogram in July 2020 shows preserved ejection fraction of 51%, grade 1 diastolic dysfunction  · Presenting with bilateral lower extremity swelling, dyspnea on exertion, chest tightness occasional  · Has ongoing orthopnea, paroxysmal nocturnal dyspnea  · NT proBNP 770 (440 in July 2020)  · Patient admits to be noncompliant with p o   Lasix (home dose 40 mg b i d )  · Compliant with fluid restriction and salt restriction  · Prior hospital admission with congestive heart failure exacerbation in 2020  · Mild CHF exacerbation likely secondary to medication noncompliance versus uncontrolled hypertension  · EKG:  Nonspecific T-wave inversions in V3 to V6, and frequent PVCs, new since prior EKG  · ECHO (16/93)- systolic function normal  Ejection fraction was estimated to be 55%    Plan  -Monitor I/O, daily weights  -Monitor renal function  -Maintain potassium above 4, magnesium above 2  -2 L fluid restriction, 2 g salt restriction  -PO lasix 40mg-hold due to kidney function

## 2021-09-04 NOTE — ASSESSMENT & PLAN NOTE
Patient's potassium 3 0 on admission   Repleted as needed with 40 mEq k-dur PO      · Potassium 4 7 on 09/04  · Monitor BMP

## 2021-09-04 NOTE — PLAN OF CARE
Problem: PAIN - ADULT  Goal: Verbalizes/displays adequate comfort level or baseline comfort level  Description: Interventions:  - Encourage patient to monitor pain and request assistance  - Assess pain using appropriate pain scale  - Administer analgesics based on type and severity of pain and evaluate response  - Implement non-pharmacological measures as appropriate and evaluate response  - Consider cultural and social influences on pain and pain management  - Notify physician/advanced practitioner if interventions unsuccessful or patient reports new pain  9/4/2021 1210 by Jorge Alberto Amador RN  Outcome: Adequate for Discharge  9/4/2021 1012 by Jorge Alberto Amador RN  Outcome: Progressing     Problem: CARDIOVASCULAR - ADULT  Goal: Maintains optimal cardiac output and hemodynamic stability  Description: INTERVENTIONS:  - Monitor I/O, vital signs and rhythm  - Monitor for S/S and trends of decreased cardiac output  - Administer and titrate ordered vasoactive medications to optimize hemodynamic stability  - Assess quality of pulses, skin color and temperature  - Assess for signs of decreased coronary artery perfusion  - Instruct patient to report change in severity of symptoms  9/4/2021 1210 by Jorge Alberto Amador RN  Outcome: Adequate for Discharge  9/4/2021 1012 by Jorge Alberto Amador RN  Outcome: Progressing  Goal: Absence of cardiac dysrhythmias or at baseline rhythm  Description: INTERVENTIONS:  - Continuous cardiac monitoring, vital signs, obtain 12 lead EKG if ordered  - Administer antiarrhythmic and heart rate control medications as ordered  - Monitor electrolytes and administer replacement therapy as ordered  9/4/2021 1210 by Jorge Alberto Amador RN  Outcome: Adequate for Discharge  9/4/2021 1012 by Jorge Alberto Amador RN  Outcome: Progressing     Problem: RESPIRATORY - ADULT  Goal: Achieves optimal ventilation and oxygenation  Description: INTERVENTIONS:  - Assess for changes in respiratory status  - Assess for changes in mentation and behavior  - Position to facilitate oxygenation and minimize respiratory effort  - Oxygen administered by appropriate delivery if ordered  - Initiate smoking cessation education as indicated  - Encourage broncho-pulmonary hygiene including cough, deep breathe, Incentive Spirometry  - Assess the need for suctioning and aspirate as needed  - Assess and instruct to report SOB or any respiratory difficulty  - Respiratory Therapy support as indicated  9/4/2021 1210 by Jolly Bermeo RN  Outcome: Adequate for Discharge  9/4/2021 1012 by Jolly Bermeo RN  Outcome: Progressing     Problem: METABOLIC, FLUID AND ELECTROLYTES - ADULT  Goal: Electrolytes maintained within normal limits  Description: INTERVENTIONS:  - Monitor labs and assess patient for signs and symptoms of electrolyte imbalances  - Administer electrolyte replacement as ordered  - Monitor response to electrolyte replacements, including repeat lab results as appropriate  - Instruct patient on fluid and nutrition as appropriate  9/4/2021 1210 by Jolly Bermeo RN  Outcome: Adequate for Discharge  9/4/2021 1012 by Jolly Bermeo RN  Outcome: Progressing  Goal: Fluid balance maintained  Description: INTERVENTIONS:  - Monitor labs   - Monitor I/O and WT  - Instruct patient on fluid and nutrition as appropriate  - Assess for signs & symptoms of volume excess or deficit  9/4/2021 1210 by Jolly Bermeo RN  Outcome: Adequate for Discharge  9/4/2021 1012 by Jolly Bermeo RN  Outcome: Progressing  Goal: Glucose maintained within target range  Description: INTERVENTIONS:  - Monitor Blood Glucose as ordered  - Assess for signs and symptoms of hyperglycemia and hypoglycemia  - Administer ordered medications to maintain glucose within target range  - Assess nutritional intake and initiate nutrition service referral as needed  9/4/2021 1210 by Jolly Bermeo RN  Outcome: Adequate for Discharge  9/4/2021 1012 by Roxanne Mills RN  Outcome: Progressing

## 2021-09-04 NOTE — ASSESSMENT & PLAN NOTE
· In ED, blood pressure noted to be >180/100 mmHg  · Received 1 dose of IV labetalol 10 mg in the ED   · Denies headaches, visual disturbances  · Ongoing pulmonary edema  · Normal renal functions, troponin negative x3    Most recent blood pressure 138/80 mmHg    Plan  -IV labetalol 10 mg q 6h p r n  for SBP above 170  -Continue Toprol XL 25 daily, amlodipine 10 mg daily  -Losartan 100 mg daily- hold due to kidney function   -Monitor blood pressure

## 2021-09-04 NOTE — DISCHARGE INSTR - AVS FIRST PAGE
Dear Neil Samuel,     It was our pleasure to care for you here at [facility]  It is our hope that we were always able to exceed the expected standards for your care during your stay  You were hospitalized due to acute exacerbation of congestive heart failure  You were cared for on the [facility] 3rd floor by Alexandro Landrum DO under the service of Yessi Philip MD with the [facility] who covers for your primary care physician (PCP), Maryjo Gregg MD, while you were hospitalized  If you have any questions or concerns related to this hospitalization, you may contact us at [phone]  For follow up as well as any medication refills, we recommend that you follow up with your primary care physician  A registered nurse will reach out to you by phone within a few days after your discharge to answer any additional questions that you may have after going home  However, at this time we provide for you here, the most important instructions / recommendations at discharge:     · Notable Medication Adjustments -   · Stop taking your Janumet  · Start taking Januvia 100 mg daily  · Continue Lasix 40 mg 2x/day  · Continue Amlodipine 10 mg daily   · Continue Toprol XL 25 mg daily  · Restart Benicar 40 mg on Monday 09/06/21  · Continue Mag-Ox 400 mg 2x/day  · Testing Required after Discharge -   · BMP 1 week from discharge  · Important follow up information -   · Please call your cardiologist and schedule their next available appointment for follow up  · You received a referral to nephrology  Please call nephrologist office and schedule their next available appointment to establish care  · Please follow up with your primary care provider within 1 week of discharge  Bring results of BMP/blood work for review  · Other Instructions -   · Continue to take medications as prescribed and remain on a cardiac/diabetic diet    · Please review this entire after visit summary as additional general instructions including medication list, appointments, activity, diet, any pertinent wound care, and other additional recommendations from your care team that may be provided for you        Sincerely,     [place], DO

## 2021-09-04 NOTE — DISCHARGE INSTRUCTIONS
10% - bad control"> 10% - bad control,Hemoglobin A1c (HbA1c) greater than 10% indicating poor diabetic control,Haemoglobin A1c greater than 10% indicating poor diabetic control">   Diabetes Mellitus Type 2 in Adults, Ambulatory Care   GENERAL INFORMATION:   Diabetes mellitus type 2  is a disease that affects how your body uses glucose (sugar)  Insulin helps move sugar out of the blood so it can be used for energy  Normally, when the blood sugar level increases, the pancreas makes more insulin  Type 2 diabetes develops because either the body cannot make enough insulin, or it cannot use the insulin correctly  After many years, your pancreas may stop making insulin  Common symptoms include the following:   · More hunger or thirst than usual     · Frequent urination     · Weight loss without trying     · Blurred vision  Seek immediate care for the following symptoms:   · Severe abdominal pain, or pain that spreads to your back  You may also be vomiting  · Trouble staying awake or focusing    · Shaking or sweating    · Blurred or double vision    · Breath has a fruity, sweet smell    · Breathing is deep and labored, or rapid and shallow    · Heartbeat is fast and weak  Treatment for diabetes mellitus type 2  includes keeping your blood sugar at a normal level  You must eat the right foods, and exercise regularly  You may also need medicine if you cannot control your blood sugar level with nutrition and exercise  Manage diabetes mellitus type 2:   · Check your blood sugar level  You will be taught how to check a small drop of blood in a glucose monitor  Ask your healthcare provider when and how often to check during the day  Ask your healthcare provider what your blood sugar levels should be when you check them  · Keep track of carbohydrates (sugar and starchy foods)  Your blood sugar level can get too high if you eat too many carbohydrates   Your dietitian will help you plan meals and snacks that have the right amount of carbohydrates  · Eat low-fat foods  Some examples are skinless chicken and low-fat milk  · Eat less sodium (salt)  Some examples of high-sodium foods to limit are soy sauce, potato chips, and soup  Do not add salt to food you cook  Limit your use of table salt  · Eat high-fiber foods  Foods that are a good source of fiber include vegetables, whole grain bread, and beans  · Limit alcohol  Alcohol affects your blood sugar level and can make it harder to manage your diabetes  Women should limit alcohol to 1 drink a day  Men should limit alcohol to 2 drinks a day  A drink of alcohol is 12 ounces of beer, 5 ounces of wine, or 1½ ounces of liquor  · Get regular exercise  Exercise can help keep your blood sugar level steady, decrease your risk of heart disease, and help you lose weight  Exercise for at least 30 minutes, 5 days a week  Include muscle strengthening activities 2 days each week  Work with your healthcare provider to create an exercise plan  · Check your feet each day  for injuries or open sores  Ask your healthcare provider for activities you can do if you have an open sore  · Quit smoking  If you smoke, it is never too late to quit  Smoking can worsen the problems that may occur with diabetes  Ask your healthcare provider for information about how to stop smoking if you are having trouble quitting  · Ask about your weight:  Ask healthcare providers if you need to lose weight, and how much to lose  Ask them to help you with a weight loss program  Even a 10 to 15 pound weight loss can help you manage your blood sugar level  · Carry medical alert identification  Wear medical alert jewelry or carry a card that says you have diabetes  Ask your healthcare provider where to get these items  · Ask about vaccines  Diabetes puts you at risk of serious illness if you get the flu, pneumonia, or hepatitis   Ask your healthcare provider if you should get a flu, pneumonia, or hepatitis B vaccine, and when to get the vaccine  Follow up with your healthcare provider as directed:  Write down your questions so you remember to ask them during your visits  CARE AGREEMENT:   You have the right to help plan your care  Learn about your health condition and how it may be treated  Discuss treatment options with your caregivers to decide what care you want to receive  You always have the right to refuse treatment  The above information is an  only  It is not intended as medical advice for individual conditions or treatments  Talk to your doctor, nurse or pharmacist before following any medical regimen to see if it is safe and effective for you  © 2014 [address] is for End User's use only and may not be sold, redistributed or otherwise used for commercial purposes  All illustrations and images included in CareNotes® are the copyrighted property of [employer] , [employer]  or Matt Sloan  Heart Failure   WHAT YOU NEED TO KNOW:   Heart failure is a condition that does not allow your heart to fill or pump properly  Not enough oxygen in your blood gets to your organs and tissues  Fluid may not move through your body properly  Fluid builds up and causes swelling and trouble breathing  This is known as congestive heart failure  Heart failure may start in the left or right ventricle  Heart failure is often caused by damage or injury to your heart  The damage may be caused by other heart problems, diabetes, or high blood pressure  The damage may have also been caused by an infection  Heart failure is a long-term condition that tends to get worse over time  It is important to manage your health to improve your quality of life  DISCHARGE INSTRUCTIONS:   Call your local emergency number (911 in the [address] [place] [address],[address]) if:   · You have any of the following signs of a heart attack:      ?  Squeezing, pressure, or pain in your chest    ? You may  also have any of the following:     § Discomfort or pain in your back, neck, jaw, stomach, or arm    § Shortness of breath    § Nausea or vomiting    § Lightheadedness or a sudden cold sweat      Call your doctor if:   · Your heartbeat is fast, slow, or uneven all the time  · You have symptoms of worsening heart failure:      ? Shortness of breath at rest, at night, or that is getting worse in any way    ? Weight gain of 3 or more pounds (1 4 kg) in a day, or more than your healthcare provider says is okay    ? More swelling in your legs or ankles    ? Abdominal pain or swelling    ? More coughing    ? Loss of appetite    ? Feeling tired all the time    · You feel hopeless or depressed, or you have lost interest in things you used to enjoy  · You often feel worried or afraid  · You have questions or concerns about your condition or care  Medicines:   · Medicines  may be given to help regulate your heart rhythm and lower your blood pressure  You may also need medicines to help decrease extra fluids  Medicines, such as NSAIDs, may be stopped if they are causing your heart failure to become worse  Do not stop any of your medicines on your own  · Take your medicine as directed  Contact your healthcare provider if you think your medicine is not helping or if you have side effects  Tell him or her if you are allergic to any medicine  Keep a list of the medicines, vitamins, and herbs you take  Include the amounts, and when and why you take them  Bring the list or the pill bottles to follow-up visits  Carry your medicine list with you in case of an emergency  Go to cardiac rehab if directed:  Cardiac rehab is a program run by specialists who will help you safely strengthen your heart  In the program you will learn about exercise, relaxation, stress management, and heart-healthy nutrition  Manage swelling from extra fluid:   · Elevate (raise) your legs above the level of your heart    This will help with fluid that builds up in your legs or ankles  Elevate your legs as often as possible during the day  Prop your legs on pillows or blankets to keep them elevated comfortably  Try not to stand for long periods of time during the day  Move around to keep your blood circulating  · Limit sodium (salt)  Ask how much sodium you can have each day  Your healthcare provider may give you a limit, such as 2,300 milligrams (mg) a day  Your provider or a dietitian can teach you how to read food labels for the number of mg in a food  He or she can also help you find ways to have less salt  For example, if you add salt to food as you cook, do not add more at the table  · Drink liquids as directed  You may need to limit the amount of liquid you drink within 24 hours  Your healthcare provider will tell you how much liquid to have and which liquids are best for you  He or she may tell you to limit liquid to 1 5 to 2 liters in a day  He or she will also tell you how often to drink liquid throughout the day  · Weigh yourself every morning  Use the same scale, in the same spot  Do this after you use the bathroom, but before you eat or drink  Wear the same type of clothing each time  Write down your weight and call your healthcare provider if you have a sudden weight gain  Swelling and weight gain are signs of fluid buildup  Manage heart failure: Your quality of life may improve with treatment and the following:  · Do not smoke  Nicotine and other chemicals in cigarettes and cigars can cause lung and heart damage  Ask your healthcare provider for information if you currently smoke and need help to quit  E-cigarettes or smokeless tobacco still contain nicotine  Talk to your healthcare provider before you use these products  · Do not drink alcohol or use illegal drugs  Alcohol and drugs can increase your risk for high blood pressure, diabetes, and coronary artery disease  · Eat heart-healthy foods  Heart-healthy foods include fruits, vegetables, lean meat (such as beef, chicken, or pork), and low-fat dairy products  Fatty fish such as salmon and tuna are also heart healthy  Other heart-healthy foods include walnuts, whole-grain breads, beans, and cooked beans  Replace butter and margarine with heart-healthy oils such as olive oil or canola oil  Your provider or a dietitian can help you create heart-healthy meal plans  · Manage any chronic health conditions you have  These include high blood pressure, diabetes, obesity, high cholesterol, metabolic syndrome, and COPD  You will have fewer symptoms if you manage these health conditions  Follow your healthcare provider's recommendations and follow up with him or her regularly  · Maintain a healthy weight  Being overweight can increase your risk for high blood pressure, diabetes, and coronary artery disease  These conditions can make your symptoms worse  Ask your healthcare provider how much you should weigh  Ask him or her to help you create a weight loss plan if you are overweight  · Stay active  Activity can help keep your symptoms from getting worse  Walking is a type of physical activity that helps maintain your strength and improve your mood  Physical activity also helps you manage your weight  Work with your healthcare provider to create an exercise plan that is right for you  · Get vaccines as directed  The flu and pneumonia can be severe for a person who has heart failure  Vaccines protect you from these infections  Get a flu shot every year as soon as it is recommended, usually in September or October  You may also need the pneumonia vaccine  Your healthcare provider can tell you if you need other vaccines, and when to get them  Follow up with your doctor within 7 days and as directed: You may need to return for other tests  You may need home health care   A healthcare provider will monitor your vital signs, weight, and make sure your medicines are working  Write down your questions so you remember to ask them during your visits  Join a support group:  Heart failure can be difficult to manage  It may be helpful to talk with others who have heart failure  You may learn how to better manage your condition or get emotional support  For more information:  · [address]  [place] , [place]   Phone: [phone]  Web Address: https://[employer]/ [facility] [facility] 2021 Information is for End User's use only and may not be sold, redistributed or otherwise used for commercial purposes  All illustrations and images included in CareNotes® are the copyrighted property of A D A M [employer] Inc  or [facility] [place] Zelaya   The above information is an  only  It is not intended as medical advice for individual conditions or treatments  Talk to your doctor, nurse or pharmacist before following any medical regimen to see if it is safe and effective for you  Acute Kidney Injury   WHAT YOU NEED TO KNOW:   What is acute kidney injury? Acute kidney injury (DELFINO) is also called acute kidney failure, or acute renal failure  DELFINO happens when your kidneys suddenly stop working correctly  Normally, the kidneys remove fluid, chemicals, and waste from your blood  These wastes are turned into urine by your kidneys  DELFINO usually happens over hours or days  When you have DELFINO, your kidneys do not remove the waste, chemicals, or extra fluid from your body  A normal amount of urine is not produced  DELFINO is usually temporary, but it may become a chronic kidney condition  What causes DELFINO?    · Decreased blood flow to the kidney, such as from hypercalcemia (high blood calcium level) or severe heart disease     · A disease or condition that affects the kidneys, such as hypertension (high blood pressure) or diabetes     · A blockage in the kidney or ureter, such as a kidney or bladder stone, enlarged prostate, or tumor    What increases my risk for DELFINO? · Being hospitalized with a serious illness, such as sepsis or severe burns    · Peripheral artery disease    · Older age in adults    · Kidney or liver diseases    · Medical conditions such as dehydration, hypertension, diabetes, or heart failure    · Certain medicines such as NSAIDs    What are the signs and symptoms of DELFINO? You may not have any symptoms with early or mild DELFINO  As DELFINO progresses, you may have any of the following:  · Decrease in the amount of urine or no urination    · Swelling in your arms, legs, or feet     · Weakness, drowsiness, or no appetite    · Nausea, flank pain, muscle twitching or muscle cramps    · Itchy skin, or your breath or body smells like urine    · Behavior changes, confusion, disorientation, or seizures    How is DELFINO diagnosed? There are many causes of DELFINO  To find the cause and to treat your DELFINO correctly, your healthcare provider may do any of the following:  · Blood and urine tests  show how well your kidneys are working  They may also show the cause of your DELFINO  · An x-ray or ultrasound  may show problems with your kidneys  Your healthcare provider may see a blockage in your kidneys  He or she may see narrowing of the artery that sends blood to your kidneys  You may be given contrast liquid to help your kidneys show up better in the pictures  Tell the healthcare provider if you have ever had an allergic reaction to contrast liquid  How is DELFINO treated? Treatment depends upon the cause of your acute kidney injury and how severe it is  Usually, DELFINO will be monitored in the hospital  If you have mild DELFINO, you may be able to go home to recover  Your healthcare providers will treat the cause of your DELFINO  You may need IV fluids if your DELFINO was caused by little or no fluid in your body  You may need dialysis to remove waste and extra fluid from your body   Your healthcare provider may tell you to eat food low in sodium (salt), potassium, phosphorus, or protein  You may need to see a dietitian before you are discharged to get help with planning your meals  How can I prevent DELFINO? · Manage other health conditions  such as diabetes, high blood pressure, or heart disease  These conditions increase your risk for acute kidney injury  Take your medicines for these conditions as directed  Also, monitor your blood sugar and blood pressure levels as directed  Contact your healthcare provider if your levels are not in the range he or she says it should be  · Talk to your healthcare provider before you take over-the-counter-medicine  NSAIDs, stomach medicine, or laxatives may harm your kidneys and increase your risk for acute kidney injury  If it is okay to take the medicine, follow the directions on the package  Do not take more than directed  · Tell healthcare providers if you have had DELFINO  before you get contrast liquid for an x-ray or CT scan  Your healthcare provider may give you medicine to prevent kidney problems caused by the liquid  CARE AGREEMENT:   You have the right to help plan your care  Learn about your health condition and how it may be treated  Discuss treatment options with your healthcare providers to decide what care you want to receive  You always have the right to refuse treatment  The above information is an  only  It is not intended as medical advice for individual conditions or treatments  Talk to your doctor, nurse or pharmacist before following any medical regimen to see if it is safe and effective for you  © Copyright [employer] 2021 Information is for End User's use only and may not be sold, redistributed or otherwise used for commercial purposes   All illustrations and images included in CareNotes® are the copyrighted property of [employer]  or [address] [place] [address]

## 2021-09-04 NOTE — PLAN OF CARE
Problem: PAIN - ADULT  Goal: Verbalizes/displays adequate comfort level or baseline comfort level  Description: Interventions:  - Encourage patient to monitor pain and request assistance  - Assess pain using appropriate pain scale  - Administer analgesics based on type and severity of pain and evaluate response  - Implement non-pharmacological measures as appropriate and evaluate response  - Consider cultural and social influences on pain and pain management  - Notify physician/advanced practitioner if interventions unsuccessful or patient reports new pain  Outcome: Progressing     Problem: CARDIOVASCULAR - ADULT  Goal: Maintains optimal cardiac output and hemodynamic stability  Description: INTERVENTIONS:  - Monitor I/O, vital signs and rhythm  - Monitor for S/S and trends of decreased cardiac output  - Administer and titrate ordered vasoactive medications to optimize hemodynamic stability  - Assess quality of pulses, skin color and temperature  - Assess for signs of decreased coronary artery perfusion  - Instruct patient to report change in severity of symptoms  Outcome: Progressing  Goal: Absence of cardiac dysrhythmias or at baseline rhythm  Description: INTERVENTIONS:  - Continuous cardiac monitoring, vital signs, obtain 12 lead EKG if ordered  - Administer antiarrhythmic and heart rate control medications as ordered  - Monitor electrolytes and administer replacement therapy as ordered  Outcome: Progressing     Problem: RESPIRATORY - ADULT  Goal: Achieves optimal ventilation and oxygenation  Description: INTERVENTIONS:  - Assess for changes in respiratory status  - Assess for changes in mentation and behavior  - Position to facilitate oxygenation and minimize respiratory effort  - Oxygen administered by appropriate delivery if ordered  - Initiate smoking cessation education as indicated  - Encourage broncho-pulmonary hygiene including cough, deep breathe, Incentive Spirometry  - Assess the need for suctioning and aspirate as needed  - Assess and instruct to report SOB or any respiratory difficulty  - Respiratory Therapy support as indicated  Outcome: Progressing     Problem: METABOLIC, FLUID AND ELECTROLYTES - ADULT  Goal: Electrolytes maintained within normal limits  Description: INTERVENTIONS:  - Monitor labs and assess patient for signs and symptoms of electrolyte imbalances  - Administer electrolyte replacement as ordered  - Monitor response to electrolyte replacements, including repeat lab results as appropriate  - Instruct patient on fluid and nutrition as appropriate  Outcome: Progressing  Goal: Fluid balance maintained  Description: INTERVENTIONS:  - Monitor labs   - Monitor I/O and WT  - Instruct patient on fluid and nutrition as appropriate  - Assess for signs & symptoms of volume excess or deficit  Outcome: Progressing  Goal: Glucose maintained within target range  Description: INTERVENTIONS:  - Monitor Blood Glucose as ordered  - Assess for signs and symptoms of hyperglycemia and hypoglycemia  - Administer ordered medications to maintain glucose within target range  - Assess nutritional intake and initiate nutrition service referral as needed  Outcome: Progressing continue antibiotic therapy look out for orders

## 2021-09-07 ENCOUNTER — TRANSITIONAL CARE MANAGEMENT (OUTPATIENT)
Dept: FAMILY MEDICINE CLINIC | Facility: CLINIC | Age: 56
End: 2021-09-07

## 2021-09-08 ENCOUNTER — OFFICE VISIT (OUTPATIENT)
Dept: FAMILY MEDICINE CLINIC | Facility: CLINIC | Age: 56
End: 2021-09-08
Payer: COMMERCIAL

## 2021-09-08 VITALS
WEIGHT: 256.38 LBS | SYSTOLIC BLOOD PRESSURE: 146 MMHG | OXYGEN SATURATION: 98 % | RESPIRATION RATE: 16 BRPM | BODY MASS INDEX: 37.97 KG/M2 | DIASTOLIC BLOOD PRESSURE: 84 MMHG | TEMPERATURE: 97.6 F | HEART RATE: 97 BPM | HEIGHT: 69 IN

## 2021-09-08 DIAGNOSIS — E11.65 UNCONTROLLED TYPE 2 DIABETES MELLITUS WITH HYPERGLYCEMIA (HCC): Primary | ICD-10-CM

## 2021-09-08 DIAGNOSIS — I10 ESSENTIAL HYPERTENSION: ICD-10-CM

## 2021-09-08 DIAGNOSIS — I50.31 ACUTE DIASTOLIC CONGESTIVE HEART FAILURE (HCC): ICD-10-CM

## 2021-09-08 PROCEDURE — 1111F DSCHRG MED/CURRENT MED MERGE: CPT | Performed by: FAMILY MEDICINE

## 2021-09-08 PROCEDURE — 3008F BODY MASS INDEX DOCD: CPT | Performed by: INTERNAL MEDICINE

## 2021-09-08 PROCEDURE — 99495 TRANSJ CARE MGMT MOD F2F 14D: CPT | Performed by: FAMILY MEDICINE

## 2021-09-08 NOTE — ASSESSMENT & PLAN NOTE
Hypertension  Patient blood pressure is stable at this time he will continue with current regimen of medications I stressed the importance of being compliant with all his medications

## 2021-09-08 NOTE — UTILIZATION REVIEW
Notification of Discharge   This is a Notification of Discharge from our facility [address]  Please be advised that this patient has been discharge from our facility  Below you will find the admission and discharge date and time including the patients disposition  UTILIZATION REVIEW CONTACT:  Lilliam Humphrey  Utilization   Network Utilization Review Department  Phone: [phone] x carefully listen to the prompts  All voicemails are confidential   Email: [email or web address]     PHYSICIAN ADVISORY SERVICES:  FOR [id number] REVIEW - MEDICAL NECESSITY DENIAL  Phone: [phone]  Fax: [phone]  Email: [email or web address]     PRESENTATION DATE: 8/31/2021  2:44 PM    INPATIENT ADMISSION DATE: 8/31/21  5:05 PM   DISCHARGE DATE: 9/4/2021  2:05 PM  DISPOSITION: Home/Self Care Home/Self Care      IMPORTANT INFORMATION:  Send all requests for admission clinical reviews, approved or denied determinations and any other requests to dedicated fax number below belonging to the campus where the patient is receiving treatment   List of dedicated fax numbers:  [address] DENIALS (Administrative/Medical Necessity) [phone]   [address] (Maternity/NICU/Pediatrics) [phone]   Rinda Purple [phone]   Andres Gouge [phone]   [facility] [phone]   [address] [phone]   [facility]  [phone]   [address] [facility]  [address] [facility] [address] [facility] [phone]

## 2021-09-08 NOTE — ASSESSMENT & PLAN NOTE
Diabetes  Patient's latest A1c was stable at 6 3  He will continue with current regimen of medications    His foot exam is up-to-date  Lab Results   Component Value Date    HGBA1C 6 3 (H) 08/31/2021
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
## 2021-09-15 NOTE — UTILIZATION REVIEW
Notification of Discharge   This is a Notification of Discharge from our facility [address]  Please be advised that this patient has been discharge from our facility  Below you will find the admission and discharge date and time including the patients disposition  UTILIZATION REVIEW CONTACT:  Zeyad Irizarry  Utilization   Network Utilization Review Department  Phone: [phone] x carefully listen to the prompts  All voicemails are confidential   Email: [email or web address]     PHYSICIAN ADVISORY SERVICES:  FOR [id number] REVIEW - MEDICAL NECESSITY DENIAL  Phone: [phone]  Fax: [phone]  Email: [email or web address]     PRESENTATION DATE: 8/31/2021  2:44 PM  OBERVATION ADMISSION DATE:   INPATIENT ADMISSION DATE: 8/31/21  5:05 PM   DISCHARGE DATE: 9/4/2021  2:05 PM  DISPOSITION: Home/Self Care Home/Self Care      IMPORTANT INFORMATION:  Send all requests for admission clinical reviews, approved or denied determinations and any other requests to dedicated fax number below belonging to the campus where the patient is receiving treatment   List of dedicated fax numbers:  [address] DENIALS (Administrative/Medical Necessity) [phone]   [address] (Maternity/NICU/Pediatrics) [phone]   [facility] [phone]   [facility] [place] [phone]   [facility] [place] [phone]   [facility] [address] [facility] [phone]   [facility]  [phone]   [address] [facility] [address]  [address] [facility] [address] [phone]

## 2021-09-23 ENCOUNTER — HOSPITAL ENCOUNTER (OUTPATIENT)
Dept: MRI IMAGING | Facility: HOSPITAL | Age: 56
Discharge: HOME | End: 2021-09-23
Payer: COMMERCIAL

## 2021-09-23 DIAGNOSIS — M25.531 RIGHT WRIST PAIN: ICD-10-CM

## 2021-09-23 DIAGNOSIS — M19.90 INFLAMMATORY ARTHRITIS: ICD-10-CM

## 2021-09-23 PROCEDURE — 73223 MRI JOINT UPR EXTR W/O&W/DYE: CPT

## 2021-09-23 PROCEDURE — A9585 GADOBUTROL INJECTION: HCPCS | Performed by: INTERNAL MEDICINE

## 2021-09-23 PROCEDURE — G1004 CDSM NDSC: HCPCS

## 2021-09-23 RX ADMIN — GADOBUTROL 11 ML: 604.72 INJECTION INTRAVENOUS at 19:03

## 2021-10-05 ENCOUNTER — TELEPHONE (OUTPATIENT)
Dept: RHEUMATOLOGY | Facility: CLINIC | Age: 56
End: 2021-10-05

## 2021-11-02 NOTE — UTILIZATION REVIEW
Initial Clinical Review    Admission: Date/Time/Statement: Admission Orders (From admission, onward)     Ordered        07/06/20 0425  Inpatient Admission  Once                   Orders Placed This Encounter   Procedures    Inpatient Admission     Telemetry     Standing Status:   Standing     Number of Occurrences:   1     Order Specific Question:   Admitting Physician     Answer:   Nathan Roach [[id number]]     Order Specific Question:   Level of Care     Answer:   Med Surg [[id number]]     Order Specific Question:   Bed request comments     Answer:   telemetry     Order Specific Question:   Estimated length of stay     Answer:   More than 2 Midnights     Order Specific Question:   Certification     Answer:   I certify that inpatient services are medically necessary for this patient for a duration of greater than two midnights  See H&P and MD Progress Notes for additional information about the patient's course of treatment  ED Arrival Information     Expected Arrival Acuity Means of Arrival Escorted By Service Admission Type    - 7/6/2020 01:01 Emergent Walk-In Self General Medicine Emergency    Arrival Complaint    SOB, cough        Chief Complaint   Patient presents with    Nasal Congestion     coughing for 1 month hx of bronchitis  Assessment/Plan: 62 yo male to ED from home w/ HTN urgency and SOB the patient reports 10 lb weight gain over the last month   Inc LE edema   Prod cough w/ sputum   Slight wheezing over th elast couple of days   Pt non compliant w/ medications   In ED found to have HTN systolic , dropped to 160s after NTG paste  Admitted IP status w/ HTN urgency , prn hydralazine , and monitor , hold BP meds and allow permissive HTN   SOB check echo to r/o CHF , daily weight , I&O    DM start SSI , accu checks QID , recheck hgb a1c hypokalemia and hypomagnesemia both repleted and recheck in am        PE : rales, + 3 LE edema     7/6 Cardiology consult   HTN, possible CHF - volume overload Presented with cough, shortness of breath and lower extremity edema likely secondary to volume overload in the setting of uncontrolled hypertension and high sodium diet  BNP elevated, given IV lasix BID , output 1 1 , echo pending   Pt w/ bibasilar crackles, scattered wheezing , + JVD and LE edema  HTN urgency /126 , dec to 165/90 - toprol XL , olmedartan - amlodipine -HCTZ   Plan to cont lasix , I&O , renal function monitor , lytes and weights  Start toprol   Echo       7/7 Cardiology note   Accelerated HTN plan to add ACE -I for improved control   Volume overload check echo , cont to diuresis w/ lasix , I&O and daily weight   Hypokalemia replete to keep >4  + JVD , + bibasilar rales and edema   7/7 IM note   CHF cont to c/o difficulty breathing when he lays down   plan to cont lasix   HTN urgency restart po lopressor , amlodipine and ACE   Monitor   Net out put for the last 24hrs 240 ml      ED Triage Vitals   Temperature Pulse Respirations Blood Pressure SpO2   07/06/20 0108 07/06/20 0108 07/06/20 0108 07/06/20 0108 07/06/20 0108   98 4 °F (36 9 °C) (!) 109 18 (!) 269/126 97 %      Temp Source Heart Rate Source Patient Position - Orthostatic VS BP Location FiO2 (%)   07/06/20 0108 07/06/20 0108 07/06/20 0108 07/06/20 0108 --   Oral Monitor Sitting Right arm       Pain Score       07/06/20 0403       No Pain        Wt Readings from Last 1 Encounters:   07/07/20 116 kg (256 lb 3 2 oz)     Additional Vital Signs:   07/07/20 0700  98 2 °F (36 8 °C)  93  18  194/140Abnormal      95 %  None (Room air)  Lying   BP: RN notified at 07/07/20 0700   07/06/20 2207  98 4 °F (36 9 °C)  95  18  169/85    96 %  None (Room air)  Lying   07/06/20 1459  98 8 °F (37 1 °C)  97  18  164/72    98 %  None (Room air)  Lying   07/06/20 0958        142/84        Lying   07/06/20 0856        156/78        Lying   07/06/20 0742              None (Room air)     07/06/20 0706  98 5 °F (36 9 °C)  100  18 172/80Abnormal   122  98 %  None (Room air)  Lying   07/06/20 0500  98 °F (36 7 °C)  108Abnormal   19  196/107Abnormal   142  96 %  None (Room air)  Lying   07/06/20 0451    104  18  180/101Abnormal     96 %  None (Room air)  Lying   07/06/20 0403    104  18  165/90    96 %  None (Room air)  Lying   07/06/20 0245    104  18  216/105Abnormal   151  98 %  None (Room air)  Lying   07/06/20 0200    100  18  210/103Abnormal   147  99 %  None (Room air)         Pertinent Labs/Diagnostic Test Results:   7/6 CTA chest No evidence of pulmonary embolus   7/6 CXR No acute cardiopulmonary disease    7/6 EKG  ST  7/6 Echo - pending  Results from last 7 days   Lab Units 07/06/20  0125   SARS-COV-2  Negative     Results from last 7 days   Lab Units 07/07/20  0735 07/06/20  0551 07/06/20  0125   WBC Thousand/uL 7 89 7 68 8 82   HEMOGLOBIN g/dL 14 2 13 2 15 2   HEMATOCRIT % 40 8 38 6 44 9   PLATELETS Thousands/uL 204 213 245   NEUTROS ABS Thousands/µL 2 85  --  2 56     Results from last 7 days   Lab Units 07/07/20  0735 07/06/20  0710 07/06/20  0125   SODIUM mmol/L 134* 132* 136   POTASSIUM mmol/L 3 3* 4 5 3 0*   CHLORIDE mmol/L 97* 97* 97*   CO2 mmol/L 27 19* 33*   ANION GAP mmol/L 10 16* 6   BUN mg/dL 8 6 7   CREATININE mg/dL 0 96 0 69 0 97   EGFR ml/min/1 73sq m 102 123 100   CALCIUM mg/dL 8 0* 7 7* 8 2*   MAGNESIUM mg/dL 1 2* 1 8 1 1*     Results from last 7 days   Lab Units 07/06/20  0125   AST U/L 56*   ALT U/L 31   ALK PHOS U/L 105   TOTAL PROTEIN g/dL 8 9*   ALBUMIN g/dL 3 8   TOTAL BILIRUBIN mg/dL 0 92     Results from last 7 days   Lab Units 07/07/20  0731 07/06/20  2054 07/06/20  1617 07/06/20  1108 07/06/20  0710 07/06/20  0111   POC GLUCOSE mg/dl 173* 208* 218* 244* 196* 151*     Results from last 7 days   Lab Units 07/07/20  0735 07/06/20  0710 07/06/20  0125   GLUCOSE RANDOM mg/dL 170* 198* 149*     Results from last 7 days   Lab Units 07/06/20  0541   HEMOGLOBIN A1C % 7 0*   EAG mg/dl 154 BETA-HYDROXYBUTYRATE   Date Value Ref Range Status   07/06/2020 0 2 <0 6 mmol/L Final      Results from last 7 days   Lab Units 07/06/20  0125   PH MARCIA  7 342   PCO2 MARCIA mm Hg 59 3*   PO2 MARCIA mm Hg 37 1   HCO3 MARCIA mmol/L 31 4*   BASE EXC MARCIA mmol/L 3 8   O2 CONTENT MARCIA ml/dL 14 3   O2 HGB, VENOUS % 64 1     Results from last 7 days   Lab Units 07/06/20  0541 07/06/20  0125   TROPONIN I ng/mL 0 03 <0 02     Results from last 7 days   Lab Units 07/06/20  0125   D-DIMER QUANTITATIVE ug/ml FEU 0 61*     Results from last 7 days   Lab Units 07/06/20  0125   PROTIME seconds 13 7   INR  1 12   PTT seconds 30     Results from last 7 days   Lab Units 07/06/20  0125   TSH 3RD GENERATON uIU/mL 7 191*     Results from last 7 days   Lab Units 07/06/20  0541   PROCALCITONIN ng/ml 0 16     Results from last 7 days   Lab Units 07/06/20  0125   LACTIC ACID mmol/L 1 0     Results from last 7 days   Lab Units 07/06/20  0125   NT-PRO BNP pg/mL 430*     Results from last 7 days   Lab Units 07/06/20  0237   CLARITY UA  Clear   COLOR UA  Yellow   SPEC GRAV UA  1 020   PH UA  7 5   GLUCOSE UA mg/dl Negative   KETONES UA mg/dl Negative   BLOOD UA  Trace*   PROTEIN UA mg/dl 30 (1+)*   NITRITE UA  Negative   BILIRUBIN UA  Negative   UROBILINOGEN UA E U /dl 0 2   LEUKOCYTES UA  Negative   WBC UA /hpf None Seen   RBC UA /hpf 1-2*   BACTERIA UA /hpf None Seen   EPITHELIAL CELLS WET PREP /hpf None Seen     Results from last 7 days   Lab Units 07/06/20  0541   STREP PNEUMONIAE ANTIGEN, URINE  Negative   LEGIONELLA URINARY ANTIGEN  Negative     Results from last 7 days   Lab Units 07/06/20  0218 07/06/20  0125   BLOOD CULTURE  Received in Microbiology Lab  Culture in Progress  No Growth at 24 hrs       ED Treatment:   Medication Administration from 07/06/2020 0101 to 07/06/2020 0507       Date/Time Order Dose Route Action     07/06/2020 0143 nitroglycerin (NITRO-BID) 2 % TD ointment 0 5 inch 0 5 inch Topical Given     07/06/2020 0147 furosemide (LASIX) injection 20 mg 20 mg Intravenous Given     07/06/2020 0142 albuterol (PROVENTIL HFA,VENTOLIN HFA) inhaler 2 puff 2 puff Inhalation Given     07/06/2020 0214 potassium chloride (K-DUR,KLOR-CON) CR tablet 40 mEq 40 mEq Oral Given     07/06/2020 0220 nitroglycerin (NITRO-BID) 2 % TD ointment 1 inch 1 inch Topical Given     07/06/2020 0216 magnesium sulfate 2 g/50 mL IVPB (premix) 2 g 2 g Intravenous New Bag        Past Medical History:   Diagnosis Date    Diabetes mellitus ([place] [address] )     Hypertension     Inguinal hernia     3/25/15    Onychomycosis     5/24/17    Type 2 diabetes mellitus ([place] [address] ) 05/01/2012     Present on Admission:   Diabetes mellitus type 2, uncontrolled ([place] [address] )      Admitting Diagnosis: Shortness of breath [R06 02]  Malignant hypertension [I10]  Hypokalemia [E87 6]  Hypomagnesemia [E83 42]  Bronchospasm [J98 01]  Nasal congestion [R09 81]  Dyspnea [R06 00]  Raised TSH level [R79 89]  Uncontrolled type 2 diabetes mellitus with hyperglycemia ([place] [address] ) [E11 65]  Age/Sex: 63 y o  male  Admission Orders:  Scheduled Medications:    Medications:  amLODIPine 10 mg Oral Daily   furosemide 40 mg Intravenous BID (diuretic)   heparin (porcine) 5,000 Units Subcutaneous Q8H [facility]   insulin lispro 1-5 Units Subcutaneous TID AC   insulin lispro 1-5 Units Subcutaneous HS   metoprolol succinate 25 mg Oral Daily   senna-docusate sodium 1 tablet Oral BID   Po Norvasc  10 mg qd   Po lisinopril 10 mg qd  Mag sulfate 2g/ 50 ml IVPB x1  Po KCL 40 meq x1    Continuous IV Infusions:     PRN Meds:    acetaminophen 650 mg Oral Q6H PRN   albuterol 2 5 mg Nebulization Q6H PRN   Labetalol HCl 10 mg Intravenous Q4H PRN x1   polyethylene glycol 17 g Oral Daily PRN     Fingerstick ac and hs   Cardiac diet   I&O   Daily weight   Up as loco   Tele   IP CONSULT TO NUTRITION SERVICES  IP CONSULT TO CARDIOLOGY    Network Utilization Review Department  [email or web address]  ATTENTION: Please call with any questions or concerns to [phone] and carefully listen to the prompts so that you are directed to the right person  All voicemails are confidential   Gerard Ferrari all requests for admission clinical reviews, approved or denied determinations and any other requests to dedicated fax number below belonging to the campus where the patient is receiving treatment   List of dedicated fax numbers for the Facilities:  [address] DENIALS (Administrative/Medical Necessity) [phone]   [address] (Maternity/NICU/Pediatrics) [phone]   Toy Flor [phone]   Cherylene Freud [phone]   [facility] [phone]   [facility] [phone]   12[address] [facility] [address] [facility] [phone]   [facility]  [phone]   [address] [facility] [address]  [address] [facility] [address] [facility] [phone] Name band;

## 2021-11-17 PROCEDURE — U0003 INFECTIOUS AGENT DETECTION BY NUCLEIC ACID (DNA OR RNA); SEVERE ACUTE RESPIRATORY SYNDROME CORONAVIRUS 2 (SARS-COV-2) (CORONAVIRUS DISEASE [COVID-19]), AMPLIFIED PROBE TECHNIQUE, MAKING USE OF HIGH THROUGHPUT TECHNOLOGIES AS DESCRIBED BY CMS-2020-01-R: HCPCS | Performed by: FAMILY MEDICINE

## 2021-11-17 PROCEDURE — U0005 INFEC AGEN DETEC AMPLI PROBE: HCPCS | Performed by: FAMILY MEDICINE

## 2021-12-01 ENCOUNTER — OFFICE VISIT (OUTPATIENT)
Dept: OBGYN CLINIC | Facility: CLINIC | Age: 56
End: 2021-12-01
Payer: COMMERCIAL

## 2021-12-01 VITALS
DIASTOLIC BLOOD PRESSURE: 77 MMHG | BODY MASS INDEX: 39.4 KG/M2 | RESPIRATION RATE: 18 BRPM | WEIGHT: 266 LBS | HEART RATE: 87 BPM | HEIGHT: 69 IN | SYSTOLIC BLOOD PRESSURE: 114 MMHG

## 2021-12-01 DIAGNOSIS — G56.01 CARPAL TUNNEL SYNDROME ON RIGHT: ICD-10-CM

## 2021-12-01 DIAGNOSIS — S63.8X1A TEAR OF RIGHT SCAPHOLUNATE LIGAMENT, INITIAL ENCOUNTER: ICD-10-CM

## 2021-12-01 DIAGNOSIS — M77.8 RIGHT WRIST TENDINITIS: Primary | ICD-10-CM

## 2021-12-01 PROCEDURE — 1036F TOBACCO NON-USER: CPT | Performed by: SURGERY

## 2021-12-01 PROCEDURE — 3008F BODY MASS INDEX DOCD: CPT | Performed by: SURGERY

## 2021-12-01 PROCEDURE — 99214 OFFICE O/P EST MOD 30 MIN: CPT | Performed by: SURGERY

## 2021-12-01 PROCEDURE — 20605 DRAIN/INJ JOINT/BURSA W/O US: CPT | Performed by: SURGERY

## 2021-12-01 RX ORDER — BUPIVACAINE HYDROCHLORIDE 2.5 MG/ML
0.5 INJECTION, SOLUTION INFILTRATION; PERINEURAL
Status: COMPLETED | OUTPATIENT
Start: 2021-12-01 | End: 2021-12-01

## 2021-12-01 RX ORDER — TRIAMCINOLONE ACETONIDE 40 MG/ML
40 INJECTION, SUSPENSION INTRA-ARTICULAR; INTRAMUSCULAR
Status: COMPLETED | OUTPATIENT
Start: 2021-12-01 | End: 2021-12-01

## 2021-12-01 RX ORDER — LIDOCAINE HYDROCHLORIDE 10 MG/ML
0.5 INJECTION, SOLUTION INFILTRATION; PERINEURAL
Status: COMPLETED | OUTPATIENT
Start: 2021-12-01 | End: 2021-12-01

## 2021-12-01 RX ADMIN — LIDOCAINE HYDROCHLORIDE 0.5 ML: 10 INJECTION, SOLUTION INFILTRATION; PERINEURAL at 15:59

## 2021-12-01 RX ADMIN — BUPIVACAINE HYDROCHLORIDE 0.5 ML: 2.5 INJECTION, SOLUTION INFILTRATION; PERINEURAL at 15:59

## 2021-12-01 RX ADMIN — TRIAMCINOLONE ACETONIDE 40 MG: 40 INJECTION, SUSPENSION INTRA-ARTICULAR; INTRAMUSCULAR at 15:59

## 2021-12-15 ENCOUNTER — IMMUNIZATIONS (OUTPATIENT)
Dept: FAMILY MEDICINE CLINIC | Facility: HOSPITAL | Age: 56
End: 2021-12-15

## 2021-12-15 DIAGNOSIS — Z23 ENCOUNTER FOR IMMUNIZATION: Primary | ICD-10-CM

## 2021-12-15 PROCEDURE — 91300 COVID-19 PFIZER VACC 0.3 ML: CPT

## 2021-12-15 PROCEDURE — 0001A COVID-19 PFIZER VACC 0.3 ML: CPT

## 2022-01-20 ENCOUNTER — OFFICE VISIT (OUTPATIENT)
Dept: OBGYN CLINIC | Facility: CLINIC | Age: 57
End: 2022-01-20
Payer: COMMERCIAL

## 2022-01-20 VITALS
HEIGHT: 69 IN | SYSTOLIC BLOOD PRESSURE: 186 MMHG | DIASTOLIC BLOOD PRESSURE: 100 MMHG | HEART RATE: 108 BPM | WEIGHT: 257 LBS | BODY MASS INDEX: 38.06 KG/M2

## 2022-01-20 DIAGNOSIS — M77.8 RIGHT WRIST TENDINITIS: Primary | ICD-10-CM

## 2022-01-20 DIAGNOSIS — S63.8X1D TEAR OF RIGHT SCAPHOLUNATE LIGAMENT, SUBSEQUENT ENCOUNTER: ICD-10-CM

## 2022-01-20 DIAGNOSIS — G56.01 CARPAL TUNNEL SYNDROME ON RIGHT: ICD-10-CM

## 2022-01-20 DIAGNOSIS — M75.42 IMPINGEMENT SYNDROME OF LEFT SHOULDER: ICD-10-CM

## 2022-01-20 PROCEDURE — 3008F BODY MASS INDEX DOCD: CPT | Performed by: SURGERY

## 2022-01-20 PROCEDURE — 99213 OFFICE O/P EST LOW 20 MIN: CPT | Performed by: SURGERY

## 2022-01-20 PROCEDURE — 1036F TOBACCO NON-USER: CPT | Performed by: SURGERY

## 2022-01-20 NOTE — PROGRESS NOTES
ASSESSMENT/PLAN:      61 y o  male with a right wrist SL tear, FCR tendinitis and suspected right carpal tunnel syndrome  Overall Zahraa Randolph is doing well  His symptoms have improved  We discussed the radiocarpal joint CSI may be repeated on a 2 month basis as long as it continues to be beneficial for him  If symptoms fail to improve with CSI's, AIN/PIN neurectomy may be discussed  With regards to left shoulder pain, appears to be having rotator cuff impingement  PT was ordered, a referral was provided to Dr Earnestine Early  Follow up in the office as needed if symptoms worsen or fail to improve  The patient verbalized understanding of exam findings and treatment plan  We engaged in the shared decision-making process and treatment options were discussed at length with the patient  Surgical and conservative management discussed today along with risks and benefits  Diagnoses and all orders for this visit:    Right wrist tendinitis    Carpal tunnel syndrome on right    Tear of right scapholunate ligament, subsequent encounter    Impingement syndrome of left shoulder  -     Ambulatory Referral to Physical Therapy; Future  -     Ambulatory Referral to Sports Medicine; Future      Follow Up:  Return if symptoms worsen or fail to improve  To Do Next Visit:  Re-evaluation of current issue    ____________________________________________________________________________________________________________________________________________      CHIEF COMPLAINT:  Chief Complaint   Patient presents with    Right Wrist - Follow-up       SUBJECTIVE:  Janice Chavis is a 61y o  year old LHD male who presents to the office today for a follow up regarding right wrist pain  Elly was last seen in the office on 12/01/21, at which time he underwent a radiocarpal joint CSI  He feels the CSI was beneficial for him  He notes dorsal wrist pain has improved   He will get occasional pain to the top of his wrist at times, but notes this is intermittent in nature and pain resolves quickly  He has not attended therapy for tendinitis  He is taking Tylenol as needed for pain control  He has known right carpal tunnel syndrome diagnosed aprox  10 years ago via EMG which is not available for review  He notes left shoulder pain as his main complaint today  He notes difficulty and pain with shoulder ROM  I have personally reviewed all the relevant PMH, PSH, SH, FH, Medications and allergies  PAST MEDICAL HISTORY:  Past Medical History:   Diagnosis Date    Acute kidney injury ([facility] ) 9/5/2020    Diabetes mellitus ([facility] )     Hypertension     Inguinal hernia     3/25/15    Onychomycosis     5/24/17    [facility] ) 05/01/2012       PAST SURGICAL HISTORY:  Past Surgical History:   Procedure Laterality Date    ARTHROSCOPY KNEE      with Medial and Lateral Meniscus Repair    HERNIA REPAIR      umbilical and inguinal hernia repairs (left)       FAMILY HISTORY:  Family History   Problem Relation Age of Onset    Hypertension Mother         essential    Chronic bronchitis Father     Prostate cancer Father     Breast cancer Sister        SOCIAL HISTORY:  Social History     Tobacco Use    Smoking status: Former Smoker     Types: Cigars     Start date: 6/22/2020    Smokeless tobacco: Never Used    Tobacco comment: current every day smoker, per [employer]   Vaping Use    Vaping Use: Never used   Substance Use Topics    Alcohol use:  Yes     Alcohol/week: 3 0 standard drinks     Types: 2 Cans of beer, 1 Standard drinks or equivalent per week     Comment: weekend: 1 glass of christian or 2 beers    Drug use: No       MEDICATIONS:    Current Outpatient Medications:     amLODIPine (NORVASC) 10 mg tablet, Take 1 tablet (10 mg total) by mouth daily, Disp: 30 tablet, Rfl: 0    Blood Pressure KIT, Twice a day periodically, Disp: 1 each, Rfl: 0    Flovent  MCG/ACT inhaler, INHALE 1 PUFF 2 (TWO) TIMES A DAY RINSE MOUTH AFTER USE , Disp: 12 g, Rfl: 3    furosemide (LASIX) 40 mg tablet, Take 1 tablet (40 mg total) by mouth 2 (two) times a day, Disp: 60 tablet, Rfl: 0    glucose blood test strip, 1 each by Other route daily, Disp: 180 each, Rfl: 5    Lancets (ONETOUCH ULTRASOFT) lancets, by Does not apply route, Disp: , Rfl:     methocarbamol (ROBAXIN) 500 mg tablet, Take 1 tablet (500 mg total) by mouth every 6 (six) hours as needed for muscle spasms for up to 10 days (Patient not taking: Reported on 12/1/2021 ), Disp: 40 tablet, Rfl: 0    metoprolol succinate (TOPROL-XL) 25 mg 24 hr tablet, Take 1 tablet (25 mg total) by mouth daily Start on Monday, 09/06/2021 , Disp: 30 tablet, Rfl: 0    olmesartan (BENICAR) 40 mg tablet, Take 1 tablet (40 mg total) by mouth daily, Disp: 30 tablet, Rfl: 0    sitaGLIPtin (JANUVIA) 100 mg tablet, Take 1 tablet (100 mg total) by mouth daily, Disp: 30 tablet, Rfl: 0    ALLERGIES:  No Known Allergies    REVIEW OF SYSTEMS:  Review of Systems   Constitutional: Negative for chills, fever and unexpected weight change  HENT: Negative for hearing loss, nosebleeds and sore throat  Eyes: Negative for pain, redness and visual disturbance  Respiratory: Negative for cough, shortness of breath and wheezing  Cardiovascular: Negative for chest pain, palpitations and leg swelling  Gastrointestinal: Negative for abdominal pain, nausea and vomiting  Endocrine: Negative for polydipsia and polyuria  Genitourinary: Negative for difficulty urinating and hematuria  Musculoskeletal: Negative for arthralgias, joint swelling and myalgias  Skin: Negative for rash and wound  Neurological: Negative for dizziness, numbness and headaches  Psychiatric/Behavioral: Negative for decreased concentration, dysphoric mood and suicidal ideas  The patient is not nervous/anxious          VITALS:  Vitals:    01/20/22 1125   BP: (!) 186/100   Pulse: (!) 108       LABS:  HgA1c:   Lab Results   Component Value Date    HGBA1C 6 3 (H) 08/31/2021     BMP:   Lab Results   Component Value Date    GLUCOSE 179 (H) 09/04/2020    CALCIUM 9 1 09/04/2021     12/21/2017    K 4 7 09/04/2021    CO2 31 09/04/2021     09/04/2021    BUN 21 09/04/2021    CREATININE 1 87 (H) 09/04/2021       _____________________________________________________  PHYSICAL EXAMINATION:  General: well developed and well nourished, alert, oriented times 3 and appears comfortable  Psychiatric: Normal  HEENT: Normocephalic, Atraumatic Trachea Midline, No torticollis  Pulmonary: No audible wheezing or respiratory distress   Cardiovascular: No pitting edema, 2+ radial pulse   Abdominal/GI: abdomen non tender, non distended   Skin: No masses, erythema, lacerations, fluctation, ulcerations  Neurovascular: Sensation Intact to the Median, Ulnar, Radial Nerve, Motor Intact to the Median, Ulnar, Radial Nerve and Pulses Intact  Musculoskeletal: Normal, except as noted in detailed exam and in HPI        MUSCULOSKELETAL EXAMINATION:    Right wrist:   No erythema, ecchymosis or edema  FCR non tender to palpation   Radiocarpal joint non tender to palpation   No pain with wrist ROM   - Burgos's test  Full composite fist   2+ radial pulse        Left shoulder:   Decreased ROM in all planes, FF, internal rotation and external rotation   Pain with shoulder ROM   + Impingement     ___________________________________________________  STUDIES REVIEWED:  No new imaging to review           PROCEDURES PERFORMED:  Procedures  No Procedures performed today    _____________________________________________________      Tripp Beavers    I,:  Osmany Chacon am acting as a scribe while in the presence of the attending physician :       I,:  Elaine Fuentes MD personally performed the services described in this documentation    as scribed in my presence :

## 2022-02-01 DIAGNOSIS — Z11.52 ENCOUNTER FOR SCREENING FOR COVID-19: Primary | ICD-10-CM

## 2022-02-01 PROCEDURE — U0003 INFECTIOUS AGENT DETECTION BY NUCLEIC ACID (DNA OR RNA); SEVERE ACUTE RESPIRATORY SYNDROME CORONAVIRUS 2 (SARS-COV-2) (CORONAVIRUS DISEASE [COVID-19]), AMPLIFIED PROBE TECHNIQUE, MAKING USE OF HIGH THROUGHPUT TECHNOLOGIES AS DESCRIBED BY CMS-2020-01-R: HCPCS | Performed by: FAMILY MEDICINE

## 2022-02-01 PROCEDURE — U0005 INFEC AGEN DETEC AMPLI PROBE: HCPCS | Performed by: FAMILY MEDICINE

## 2022-02-02 ENCOUNTER — TELEPHONE (OUTPATIENT)
Dept: FAMILY MEDICINE CLINIC | Facility: CLINIC | Age: 57
End: 2022-02-02

## 2022-02-02 NOTE — TELEPHONE ENCOUNTER
----- Message from Lamont Barfield MD sent at 9/8/1852 11:51 AM EST -----  Recent COVID test was negative

## 2022-02-08 ENCOUNTER — APPOINTMENT (EMERGENCY)
Dept: RADIOLOGY | Facility: HOSPITAL | Age: 57
End: 2022-02-08
Payer: COMMERCIAL

## 2022-02-08 ENCOUNTER — HOSPITAL ENCOUNTER (EMERGENCY)
Facility: HOSPITAL | Age: 57
Discharge: HOME | End: 2022-02-08
Attending: EMERGENCY MEDICINE | Admitting: EMERGENCY MEDICINE
Payer: COMMERCIAL

## 2022-02-08 VITALS
HEART RATE: 91 BPM | DIASTOLIC BLOOD PRESSURE: 92 MMHG | OXYGEN SATURATION: 98 % | TEMPERATURE: 98.4 F | SYSTOLIC BLOOD PRESSURE: 168 MMHG | RESPIRATION RATE: 16 BRPM

## 2022-02-08 DIAGNOSIS — M25.512 ACUTE PAIN OF LEFT SHOULDER: Primary | ICD-10-CM

## 2022-02-08 PROCEDURE — 73030 X-RAY EXAM OF SHOULDER: CPT

## 2022-02-08 PROCEDURE — 99284 EMERGENCY DEPT VISIT MOD MDM: CPT | Performed by: EMERGENCY MEDICINE

## 2022-02-08 PROCEDURE — 73060 X-RAY EXAM OF HUMERUS: CPT

## 2022-02-08 PROCEDURE — 99283 EMERGENCY DEPT VISIT LOW MDM: CPT

## 2022-02-08 RX ORDER — ACETAMINOPHEN 325 MG/1
650 TABLET ORAL ONCE
Status: COMPLETED | OUTPATIENT
Start: 2022-02-08 | End: 2022-02-08

## 2022-02-08 RX ORDER — OXYCODONE HYDROCHLORIDE 5 MG/1
5 TABLET ORAL ONCE
Status: COMPLETED | OUTPATIENT
Start: 2022-02-08 | End: 2022-02-08

## 2022-02-08 RX ADMIN — ACETAMINOPHEN 650 MG: 325 TABLET, FILM COATED ORAL at 08:33

## 2022-02-08 RX ADMIN — OXYCODONE HYDROCHLORIDE 5 MG: 5 TABLET ORAL at 08:32

## 2022-02-08 NOTE — DISCHARGE INSTRUCTIONS
Please follow up with your PCP for continued evaluation of your shoulder pain  You have also been provided with a number for continued follow-up with the orthopedic screw here  Please utilize in this team for continued evaluation of your shoulder and further evaluation if symptoms do not improve with conservative management at home  Please utilize the sling as needed for continued support of your shoulder  At least 1 time per day, removed shoulder from the sling and go through full range of motion exercises for movement of the shoulder  Repeat and multiple movements of your shoulder throughout the day no provide the best improvement in your symptoms  Continued movement of the shoulder will help with the healing process  Please use over-the-counter pain medication as needed for continued management of her symptoms  Please return to the emergency department if you experience worsening of symptoms that include but are not limited to: Loss of sensation in your hands, increased numbness/tingling sensations in your hands, fevers, chills, increasing pain that does not respond over-the-counter medications, chest pains, lightheadedness, headaches, changes in vision, or changes in hearing

## 2022-02-08 NOTE — ED PROVIDER NOTES
History  Chief Complaint   Patient presents with    Shoulder Injury     pt reports tripping up outside steps last night, striking L shoulder on step  decreased rom, denies head strike or daily anticoagulants  [facility], [facility] emergency department today after persistent left shoulder discomfort after falling at home  He states that he lost his balance after slipping on a misplaced carpet that was on his stairs  He said that he did not have any dizziness, loss of consciousness, head strike, increased sweating, difficulty catching his breath prior to his fall  He stated that after the fall which was 1 day prior (February 7th) he did not take any medications for his discomfort  He stated that he went to bed thinking that he could sleep it off  He awoke this morning was having difficulty be the actively abducting/adducting his shoulder but was able to passively range it  He stated that 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, he decided that this would not be prudent of him to go to work today and came to the emergency department for continued evaluation  Denies numbness, paresthesias, loss of pallor in his hands, decrease in color, loss of muscle strength, loss of sensation, chest pain, shortness of breath, or visible deformity  He describes that he was unsure if he potentially fractured or dislocated anything  He has not taken any medication to alleviate any of his symptoms today  History provided by:  Patient   used: No    Shoulder Injury  Location:  Shoulder  Shoulder location:  L shoulder  Injury: yes    Time since incident:  18 hours  Mechanism of injury: fall    Fall:     Fall occurred: Up the stairs  Impact surface:  Hard floor    Point of impact: Shoulder      Entrapped after fall: no    Pain details:     Quality:  Throbbing and aching    Radiates to:  Does not radiate    Severity:  Mild    Onset quality:  Sudden    Duration:  18 hours    Timing:  Constant Progression:  Unchanged  Dislocation: no    Foreign body present:  No foreign bodies  Prior injury to area:  No  Relieved by:  Nothing  Worsened by:  Nothing  Ineffective treatments:  None tried  Associated symptoms: stiffness    Associated symptoms: no back pain, no decreased range of motion, no fatigue, no fever, no muscle weakness, no neck pain, no numbness, no swelling and no tingling    Risk factors: no known bone disorder, no frequent fractures and no recent illness        Prior to Admission Medications   Prescriptions Last Dose Informant Patient Reported? Taking? Blood Pressure KIT  Self No No   Sig: Twice a day periodically   Flovent  MCG/ACT inhaler  Self No No   Sig: INHALE 1 PUFF 2 (TWO) TIMES A DAY RINSE MOUTH AFTER USE  Lancets (ONETOUCH ULTRASOFT) lancets  Self Yes No   Sig: by Does not apply route   amLODIPine (NORVASC) 10 mg tablet  Self No No   Sig: Take 1 tablet (10 mg total) by mouth daily   furosemide (LASIX) 40 mg tablet  Self No No   Sig: Take 1 tablet (40 mg total) by mouth 2 (two) times a day   glucose blood test strip  Self No No   Si each by Other route daily   methocarbamol (ROBAXIN) 500 mg tablet   No No   Sig: Take 1 tablet (500 mg total) by mouth every 6 (six) hours as needed for muscle spasms for up to 10 days   Patient not taking: Reported on 2021    metoprolol succinate (TOPROL-XL) 25 mg 24 hr tablet  Self No No   Sig: Take 1 tablet (25 mg total) by mouth daily Start on Monday, 2021     olmesartan (BENICAR) 40 mg tablet  Self No No   Sig: Take 1 tablet (40 mg total) by mouth daily   sitaGLIPtin (JANUVIA) 100 mg tablet  Self No No   Sig: Take 1 tablet (100 mg total) by mouth daily      Facility-Administered Medications: None       Past Medical History:   Diagnosis Date    [facility] ) 2020    Diabetes mellitus ([facility] )     Hypertension     Inguinal hernia     3/25/15    Onychomycosis     17    Type 2 diabetes mellitus ([facility] ) 2012 Past Surgical History:   Procedure Laterality Date    ARTHROSCOPY KNEE      with Medial and Lateral Meniscus Repair    HERNIA REPAIR      umbilical and inguinal hernia repairs (left)       Family History   Problem Relation Age of Onset    Hypertension Mother         essential    Chronic bronchitis Father     Prostate cancer Father     Breast cancer Sister      I have reviewed and agree with the history as documented  E-Cigarette/Vaping    E-Cigarette Use Never User      E-Cigarette/Vaping Substances    Nicotine No     THC No     CBD No     Flavoring No     Other No     Unknown No      Social History     Tobacco Use    Smoking status: Former Smoker     Types: Cigars     Start date: 6/22/2020    Smokeless tobacco: Never Used    Tobacco comment: current every day smoker, per [employer]   Vaping Use    Vaping Use: Never used   Substance Use Topics    Alcohol use: Yes     Alcohol/week: 3 0 standard drinks     Types: 2 Cans of beer, 1 Standard drinks or equivalent per week     Comment: weekend: 1 glass of christian or 2 beers    Drug use: No        Review of Systems   Constitutional: Negative for chills, fatigue and fever  HENT: Negative for ear pain and sore throat  Eyes: Negative for pain and visual disturbance  Respiratory: Negative for cough and shortness of breath  Cardiovascular: Negative for chest pain and palpitations  Gastrointestinal: Negative for abdominal pain and vomiting  Genitourinary: Negative for dysuria and hematuria  Musculoskeletal: Positive for stiffness  Negative for arthralgias, back pain and neck pain  Skin: Negative for color change and rash  Neurological: Negative for seizures and syncope  All other systems reviewed and are negative        Physical Exam  ED Triage Vitals   Temperature Pulse Respirations Blood Pressure SpO2   02/08/22 0741 02/08/22 0741 02/08/22 0741 02/08/22 0741 02/08/22 0741   98 4 °F (36 9 °C) 104 18 (!) 223/116 98 %      Temp Source Heart Rate Source Patient Position - Orthostatic VS BP Location FiO2 (%)   02/08/22 0741 02/08/22 0741 02/08/22 0741 02/08/22 0741 --   Oral Monitor Sitting Right arm       Pain Score       02/08/22 0832       9             Orthostatic Vital Signs  Vitals:    02/08/22 0741 02/08/22 0857   BP: (!) 223/116 168/92   Pulse: 104 91   Patient Position - Orthostatic VS: Sitting Sitting       Physical Exam  Vitals and nursing note reviewed  Constitutional:       Appearance: He is well-developed  HENT:      Head: Normocephalic and atraumatic  Right Ear: External ear normal       Left Ear: External ear normal       Nose: Nose normal       Mouth/Throat:      Mouth: Mucous membranes are moist    Eyes:      General:         Right eye: No discharge  Left eye: No discharge  Extraocular Movements: Extraocular movements intact  Conjunctiva/sclera: Conjunctivae normal    Cardiovascular:      Rate and Rhythm: Normal rate and regular rhythm  Pulses: Normal pulses  Heart sounds: Normal heart sounds  Pulmonary:      Effort: Pulmonary effort is normal       Breath sounds: Normal breath sounds  Abdominal:      Palpations: Abdomen is soft  Tenderness: There is no abdominal tenderness  There is no guarding or rebound  Musculoskeletal:         General: Tenderness and signs of injury present  No swelling or deformity  Cervical back: Normal range of motion and neck supple  No rigidity or tenderness  Right lower leg: No edema  Left lower leg: No edema  Skin:     General: Skin is warm and dry  Capillary Refill: Capillary refill takes less than 2 seconds  Neurological:      General: No focal deficit present  Mental Status: He is alert and oriented to person, place, and time     Psychiatric:         Mood and Affect: Mood normal          ED Medications  Medications   acetaminophen (TYLENOL) tablet 650 mg (650 mg Oral Given 2/8/22 0833)   oxyCODONE (ROXICODONE) IR tablet 5 mg (5 mg Oral Given 2/8/22 0344)       Diagnostic Studies  Results Reviewed     None                 XR humerus LEFT   ED Interpretation by Evert Moran MD (02/08 0912)   No acute intraosseous pathology, fractures, or dislocations  Final Result by Gee Berrios MD (02/08 1257)      No acute osseous abnormality  Workstation performed: [id number]         XR shoulder 2+ views LEFT   ED Interpretation by Evert Moran MD (02/08 5371)   No acute intraosseous pathology  No signs of fracture/dislocation  Final Result by Gee Berrios MD (02/08 1034)      No acute osseous abnormality  Workstation performed: [id number]               Procedures  Procedures      ED Course                             SBIRT 22yo+      Most Recent Value   SBIRT (25 yo +)    In order to provide better care to our patients, we are screening all of our patients for alcohol and drug use  Would it be okay to ask you these screening questions? No Filed at: 02/08/2022 0846                MDM  Number of Diagnoses or Management Options  Acute pain of left shoulder: new and requires workup  Diagnosis management comments: Ernst Elizabeth comes to the emergency department after falling onto his left shoulder while ambulating up the stairs  Patient describes a mechanical fall  Patient expresses continued tenderness over the deltoid  Patient had x-rays conducted of both the shoulder and the humerus which showed no signs of acute intraosseous pathology  No acute fracture dislocation  Patient was able to arrange for pickup/ride from family members home, patient was provided with a dosage of Tylenol and oxycodone here in the emergency department  Patient was provided with contact information to follow up with Orthopedics and to follow-up with his PCP    Patient was counseled to utilize over-the-counter medications for pain relief and to range the muscle frequently throughout the day  When not ranging it, patient was provided with a shoulder sling in order to provide stabilization of the shoulder joint  Patient expressed understanding with this, would reach out to the appropriate contact information and schedule a appropriate appointments  Patient was also provided with a note for work secondary to his shoulder and 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  Disposition:  Discharged home with close PCP/Orthopedics follow-up  Over-the-counter medications for pain relief  Strict return precautions discussed at bedside  Amount and/or Complexity of Data Reviewed  Tests in the radiology section of CPT®: reviewed and ordered  Obtain history from someone other than the patient: yes  Review and summarize past medical records: yes  Discuss the patient with other providers: yes  Independent visualization of images, tracings, or specimens: yes    Risk of Complications, Morbidity, and/or Mortality  Presenting problems: moderate  Diagnostic procedures: moderate  Management options: moderate    Patient Progress  Patient progress: stable      Disposition  Final diagnoses:   Acute pain of left shoulder     Time reflects when diagnosis was documented in both MDM as applicable and the Disposition within this note     Time User Action Codes Description Comment    2/8/2022  8:59 AM Lorne Sunshine Add [M23 048] Acute pain of left shoulder       ED Disposition     ED Disposition Condition Date/Time Comment    Discharge Stable Tue Feb 8, 2022  9:12 AM Kaela Estevez discharge to home/self care              Follow-up Information     Follow up With Specialties Details Why Contact Info Additional Information    Abilio Perdomo MD Family Medicine Schedule an appointment as soon as possible for a visit  As needed [address] [place] [address] 56       [facility] Emergency [facility]  As needed, If symptoms worsen [facility]  [phone] [address] Emergency Department, [address], [facility] [place], [place], [address] [facility] Schedule an appointment as soon as possible for a visit  As soon as possible for continued evaluation of your shoulder injury   [phone]  [facility], [address], [address] [place], [place], [address] [place] [address]          Discharge Medication List as of 2/8/2022  9:13 AM      CONTINUE these medications which have NOT CHANGED    Details   amLODIPine (NORVASC) 10 mg tablet Take 1 tablet (10 mg total) by mouth daily, Starting Sat 9/4/2021, Until Wed 12/1/2021, Normal      Blood Pressure KIT Twice a day periodically, Normal      Flovent  MCG/ACT inhaler INHALE 1 PUFF 2 (TWO) TIMES A DAY RINSE MOUTH AFTER USE , Normal      furosemide (LASIX) 40 mg tablet Take 1 tablet (40 mg total) by mouth 2 (two) times a day, Starting Sat 9/4/2021, Until Wed 12/1/2021, Normal      glucose blood test strip 1 each by Other route daily, Starting Tue 11/3/2020, Normal      Lancets (ONETOUCH ULTRASOFT) lancets by Does not apply route, Starting Thu 12/28/2017, Historical Med      methocarbamol (ROBAXIN) 500 mg tablet Take 1 tablet (500 mg total) by mouth every 6 (six) hours as needed for muscle spasms for up to 10 days, Starting Sat 9/4/2021, Until Tue 9/14/2021 at 2359, Normal      metoprolol succinate (TOPROL-XL) 25 mg 24 hr tablet Take 1 tablet (25 mg total) by mouth daily Start on Monday, 09/06/2021 , Starting Sat 9/4/2021, Until Wed 12/1/2021, Normal      olmesartan (BENICAR) 40 mg tablet Take 1 tablet (40 mg total) by mouth daily, Starting Sat 9/4/2021, Until Wed 12/1/2021, Normal      sitaGLIPtin (JANUVIA) 100 mg tablet Take 1 tablet (100 mg total) by mouth daily, Starting Sat 9/4/2021, Until Wed 12/1/2021, Normal           No discharge procedures on file  PDMP Review       Value Time User    PDMP Reviewed  Yes 9/4/2021  8:39 AM Leanne Castaneda DO           ED Provider  Attending physically available and evaluated Lisa Carrion I managed the patient along with the ED Attending      Electronically Signed by         Isabel Ormond, MD  02/08/22 7097

## 2022-02-08 NOTE — Clinical Note
Gerda Ramirez was seen and treated in our emergency department on 2/8/2022  No work until cleared by Family Doctor/Orthopedics        Diagnosis:     Elly    He may return on this date: If you have any questions or concerns, please don't hesitate to call        Samy Whiting MD    ______________________________           _______________          _______________  [facility] Representative                              Date                                Time

## 2022-02-08 NOTE — Clinical Note
Merissa Douglass was seen and treated in our emergency department on 2/8/2022  No work until cleared by Family Doctor/Orthopedics        Diagnosis:     Elly    He may return on this date: If you have any questions or concerns, please don't hesitate to call        Monty Alejandro MD    ______________________________           _______________          _______________  [facility] [place] Representative                              Date                                Time

## 2022-02-09 NOTE — ED ATTENDING ATTESTATION
2/8/2022  IMarilyn MD, saw and evaluated the patient  I have discussed the patient with the resident/non-physician practitioner and agree with the resident's/non-physician practitioner's findings, Plan of Care, and MDM as documented in the resident's/non-physician practitioner's note, except where noted  All available labs and Radiology studies were reviewed  I was present for key portions of any procedure(s) performed by the resident/non-physician practitioner and I was immediately available to provide assistance  At this point I agree with the current assessment done in the Emergency Department  I have conducted an independent evaluation of this patient a history and physical is as follows:    60 y/o fell on shoulder  Mechanical fall  No other injuries  rom limited due to pain  neurovasc intact distally      Agree with xray, pain control, ortho follow up    ED Course         Critical Care Time  Procedures

## 2022-02-28 ENCOUNTER — OFFICE VISIT (OUTPATIENT)
Dept: OBGYN CLINIC | Facility: CLINIC | Age: 57
End: 2022-02-28
Payer: COMMERCIAL

## 2022-02-28 VITALS — HEIGHT: 69 IN | WEIGHT: 262.2 LBS | BODY MASS INDEX: 38.83 KG/M2

## 2022-02-28 DIAGNOSIS — M75.42 IMPINGEMENT SYNDROME OF LEFT SHOULDER: Primary | ICD-10-CM

## 2022-02-28 PROCEDURE — 1036F TOBACCO NON-USER: CPT | Performed by: PHYSICAL MEDICINE & REHABILITATION

## 2022-02-28 PROCEDURE — 20610 DRAIN/INJ JOINT/BURSA W/O US: CPT | Performed by: PHYSICAL MEDICINE & REHABILITATION

## 2022-02-28 PROCEDURE — 3008F BODY MASS INDEX DOCD: CPT | Performed by: PHYSICAL MEDICINE & REHABILITATION

## 2022-02-28 PROCEDURE — 99214 OFFICE O/P EST MOD 30 MIN: CPT | Performed by: PHYSICAL MEDICINE & REHABILITATION

## 2022-02-28 RX ORDER — LIDOCAINE HYDROCHLORIDE 10 MG/ML
3 INJECTION, SOLUTION INFILTRATION; PERINEURAL
Status: COMPLETED | OUTPATIENT
Start: 2022-02-28 | End: 2022-02-28

## 2022-02-28 RX ORDER — TRIAMCINOLONE ACETONIDE 40 MG/ML
80 INJECTION, SUSPENSION INTRA-ARTICULAR; INTRAMUSCULAR
Status: COMPLETED | OUTPATIENT
Start: 2022-02-28 | End: 2022-02-28

## 2022-02-28 RX ADMIN — LIDOCAINE HYDROCHLORIDE 3 ML: 10 INJECTION, SOLUTION INFILTRATION; PERINEURAL at 10:24

## 2022-02-28 RX ADMIN — TRIAMCINOLONE ACETONIDE 80 MG: 40 INJECTION, SUSPENSION INTRA-ARTICULAR; INTRAMUSCULAR at 10:24

## 2022-02-28 NOTE — PATIENT INSTRUCTIONS
You had a cortisone injection today  Some people also refer to this as steroid or corticosteroid  There are two medicines in this injection, a numbing medicine (anesthetic) and a steroid  Most people get relief immediately but it can take up to two weeks  Rarely, some people can get a flushing reaction where they feel warm and flushed in the face  This is a side effect and resolves on its own  If you experience any drainage, redness or fevers, please give us a call or go to the nearest emergency room  You will be starting physical therapy  It is important to do home exercises as given by your physical therapist as you go through PT to speed up your recovery  Physical therapy addresses the problems that are causing your pain, instead of covering them up, as some other treatment options do

## 2022-02-28 NOTE — PROGRESS NOTES
1  Impingement syndrome of left shoulder  Ambulatory Referral to Sports Medicine    Large joint arthrocentesis: L subacromial bursa     Orders Placed This Encounter   Procedures    Large joint arthrocentesis: L subacromial bursa        Impression:  Left shoulder pain likely secondary to subacromial impingement  We discussed different treatment options and decided to proceed with a subacromial space steroid injection  Patient had improvement in range of motion and his typical pain symptoms  The patient will start physical therapy  I will see him back in five weeks if needed  Imaging Studies (I personally reviewed images in PACS and report):  Left shoulder x-rays most recent to this encounter reviewed  These images show osteoarthritis of the acromioclavicular and glenohumeral joint  The patient's pertinent emergency department/urgent care/referring physician's notes were reviewed  Return in about 5 weeks (around 4/4/2022)  Patient is in agreement with the above plan  HPI:  Breanna Marlow is a 61 y o  male  who presents for evaluation of   Chief Complaint   Patient presents with    Left Shoulder - Pain     Onset/Mechanism: Patient had a fall on 2/8/2022  He fell onto his shoulder  Location: Through the shoulder  Radiation: Denies  Provocative: Climbing ladders for work  Severity: Improved  Associated Symptoms: Denies current numbness/tingling  Treatment so far: Activity modification      Following history reviewed and updated:  Past Medical History:   Diagnosis Date    Acute kidney injury ([address] ) 9/5/2020    Diabetes mellitus ([address] )     Hypertension     Inguinal hernia     3/25/15    Onychomycosis     5/24/17    Type 2 diabetes mellitus ([address] ) 05/01/2012     Past Surgical History:   Procedure Laterality Date    ARTHROSCOPY KNEE      with Medial and Lateral Meniscus Repair    HERNIA REPAIR      umbilical and inguinal hernia repairs (left)     Social History   Social History     Substance and Sexual Activity   Alcohol Use Yes    Alcohol/week: 3 0 standard drinks    Types: 2 Cans of beer, 1 Standard drinks or equivalent per week    Comment: weekend: 1 glass of christian or 2 beers     Social History     Substance and Sexual Activity   Drug Use No     Social History     Tobacco Use   Smoking Status Former Smoker    Types: Cigars    Start date: 6/22/2020   Smokeless Tobacco Never Used   Tobacco Comment    current every day smoker, per [employer]     Family History   Problem Relation Age of Onset    Hypertension Mother         essential    Chronic bronchitis Father     Prostate cancer Father     Breast cancer Sister      No Known Allergies     Constitutional:  Ht 5' 9" (1 753 m)   Wt 119 kg (262 lb 3 2 oz)   BMI 38 72 kg/m²    General: NAD  Eyes: Anicteric sclerae  Neck: Supple  Lungs: Unlabored breathing  Cardiovascular: No lower extremity edema  Skin: Intact without erythema  Neurologic: Sensation intact to light touch  Psychiatric: Mood and affect are appropriate  Left Shoulder Exam     Tenderness   The patient is experiencing tenderness in the acromion      Range of Motion   Active abduction: abnormal   Passive abduction: normal   Forward flexion: abnormal     Tests   Blount test: positive  Impingement: positive  Drop arm: negative  Sulcus: absent    Other   Erythema: absent  Scars: absent  Sensation: normal  Pulse: present              Large joint arthrocentesis: L subacromial bursa  Universal Protocol:  Consent given by: patient  Timeout called at: 2/28/2022 10:23 AM   Site marked: the operative site was marked  Supporting Documentation  Indications: pain   Procedure Details  Location: shoulder - L subacromial bursa  Needle gauge: 21G 2''  Ultrasound guidance: no  Approach: posterolateral  Medications administered: 80 mg triamcinolone acetonide 40 mg/mL; 3 mL lidocaine 1 %    Patient tolerance: patient tolerated the procedure well with no immediate complications  Dressing:  Sterile dressing applied    Prior to the procedure, the patient was informed of the following risks in layman terms:    - Risk of bleeding since a needle is involved  - Risk of infection (1/10,000 chance as per recent studies)  Signs/symptoms were discussed and they would prompt an urgent evaluation at an emergency department   - Risk of pigmentation or skin dimpling in the skin (2-3% chance as per recent studies) from the steroid  - Risk of increased pain from steroid flare (1% chance as per recent studies) that typically lasts 24-48 hours  - Risk of increased blood sugars from the steroid medication that can last for a few weeks  If the patient is a diabetic or pre-diabetic, they were encouraged to closely monitor their blood sugars and discuss with PCP if elevated more than usual or if having symptoms  After going over these risks, we decided that the benefits outweigh the risks and proceeded with the procedure

## 2022-03-14 ENCOUNTER — OFFICE VISIT (OUTPATIENT)
Dept: FAMILY MEDICINE CLINIC | Facility: CLINIC | Age: 57
End: 2022-03-14
Payer: COMMERCIAL

## 2022-03-14 VITALS
HEIGHT: 69 IN | OXYGEN SATURATION: 99 % | HEART RATE: 96 BPM | WEIGHT: 248.38 LBS | DIASTOLIC BLOOD PRESSURE: 90 MMHG | SYSTOLIC BLOOD PRESSURE: 160 MMHG | BODY MASS INDEX: 36.79 KG/M2 | RESPIRATION RATE: 18 BRPM | TEMPERATURE: 97.6 F

## 2022-03-14 DIAGNOSIS — E11.65 UNCONTROLLED TYPE 2 DIABETES MELLITUS WITH HYPERGLYCEMIA (HCC): Primary | ICD-10-CM

## 2022-03-14 DIAGNOSIS — R35.1 NOCTURIA: ICD-10-CM

## 2022-03-14 DIAGNOSIS — I50.9 CHF (CONGESTIVE HEART FAILURE) (HCC): ICD-10-CM

## 2022-03-14 DIAGNOSIS — I10 ESSENTIAL HYPERTENSION: ICD-10-CM

## 2022-03-14 DIAGNOSIS — I16.0 HYPERTENSIVE URGENCY: ICD-10-CM

## 2022-03-14 DIAGNOSIS — I50.31 ACUTE DIASTOLIC CONGESTIVE HEART FAILURE (HCC): ICD-10-CM
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
## 2022-03-14 PROBLEM — R07.9 CHEST PAIN IN ADULT: Status: RESOLVED | Noted: 2019-10-21 | Resolved: 2022-03-14

## 2022-03-14 PROBLEM — B34.9 VIRAL SYNDROME: Status: RESOLVED | Noted: 2020-12-11 | Resolved: 2022-03-14

## 2022-03-14 PROBLEM — Z91.199 PATIENT NONCOMPLIANCE: Status: RESOLVED | Noted: 2019-10-21 | Resolved: 2022-03-14

## 2022-03-14 PROBLEM — Z91.19 PATIENT NONCOMPLIANCE: Status: RESOLVED | Noted: 2019-10-21 | Resolved: 2022-03-14

## 2022-03-14 PROBLEM — N17.9 ACUTE KIDNEY INJURY (HCC): Status: RESOLVED | Noted: 2020-09-05 | Resolved: 2022-03-14

## 2022-03-14 PROBLEM — R19.4 RECENT CHANGE IN FREQUENCY OF BOWEL MOVEMENTS: Status: RESOLVED | Noted: 2020-12-11 | Resolved: 2022-03-14

## 2022-03-14 PROBLEM — R05.9 COUGH: Status: RESOLVED | Noted: 2020-07-15 | Resolved: 2022-03-14

## 2022-03-14 LAB
ALBUMIN SERPL-MCNC: 3.8 G/DL (ref 3.6–5.1)
ALBUMIN/GLOB SERPL: 1.4 (CALC) (ref 1–2.5)
ALP SERPL-CCNC: 96 U/L (ref 35–144)
ALT SERPL-CCNC: 38 U/L (ref 9–46)
AST SERPL-CCNC: 34 U/L (ref 10–35)
BASOPHILS # BLD AUTO: 56 CELLS/UL (ref 0–200)
BASOPHILS NFR BLD AUTO: 0.5 %
BILIRUB SERPL-MCNC: 1.6 MG/DL (ref 0.2–1.2)
BUN SERPL-MCNC: 13 MG/DL (ref 7–25)
BUN/CREAT SERPL: ABNORMAL (CALC) (ref 6–22)
CALCIUM SERPL-MCNC: 8.8 MG/DL (ref 8.6–10.3)
CHLORIDE SERPL-SCNC: 96 MMOL/L (ref 98–110)
CHOLEST SERPL-MCNC: 169 MG/DL
CHOLEST/HDLC SERPL: 3.6 (CALC)
CO2 SERPL-SCNC: 28 MMOL/L (ref 20–32)
CREAT SERPL-MCNC: 0.95 MG/DL (ref 0.7–1.33)
CREAT UR-MCNC: 287 MG/DL
EOSINOPHIL # BLD AUTO: 112 CELLS/UL (ref 15–500)
EOSINOPHIL NFR BLD AUTO: 1 %
ERYTHROCYTE [DISTWIDTH] IN BLOOD BY AUTOMATED COUNT: 11.9 % (ref 11–15)
GLOBULIN SER CALC-MCNC: 2.8 G/DL (CALC) (ref 1.9–3.7)
GLUCOSE SERPL-MCNC: 239 MG/DL (ref 65–99)
HCT VFR BLD AUTO: 38.6 % (ref 38.5–50)
HDLC SERPL-MCNC: 47 MG/DL
HGB BLD-MCNC: 13.4 G/DL (ref 13.2–17.1)
LDLC SERPL CALC-MCNC: 104 MG/DL (CALC)
LYMPHOCYTES # BLD AUTO: 2240 CELLS/UL (ref 850–3900)
LYMPHOCYTES NFR BLD AUTO: 20 %
MCH RBC QN AUTO: 34.3 PG (ref 27–33)
MCHC RBC AUTO-ENTMCNC: 34.7 G/DL (ref 32–36)
MCV RBC AUTO: 98.7 FL (ref 80–100)
MICROALBUMIN UR-MCNC: 117 MG/L (ref 0–20)
MICROALBUMIN/CREAT 24H UR: 41 MG/G CREATININE (ref 0–30)
MONOCYTES # BLD AUTO: 1198 CELLS/UL (ref 200–950)
MONOCYTES NFR BLD AUTO: 10.7 %
NEUTROPHILS # BLD AUTO: 7594 CELLS/UL (ref 1500–7800)
NEUTROPHILS NFR BLD AUTO: 67.8 %
NONHDLC SERPL-MCNC: 122 MG/DL (CALC)
PLATELET # BLD AUTO: 188 THOUSAND/UL (ref 140–400)
PMV BLD REES-ECKER: 11.9 FL (ref 7.5–12.5)
POTASSIUM SERPL-SCNC: 3.9 MMOL/L (ref 3.5–5.3)
PROT SERPL-MCNC: 6.6 G/DL (ref 6.1–8.1)
PSA SERPL-MCNC: 0.37 NG/ML
RBC # BLD AUTO: 3.91 MILLION/UL (ref 4.2–5.8)
SL AMB EGFR AFRICAN AMERICAN: 102 ML/MIN/1.73M2
SL AMB EGFR NON AFRICAN AMERICAN: 88 ML/MIN/1.73M2
SL AMB POCT HEMOGLOBIN AIC: 8.2 (ref ?–6.5)
SODIUM SERPL-SCNC: 134 MMOL/L (ref 135–146)
TRIGL SERPL-MCNC: 86 MG/DL
TSH SERPL-ACNC: 3.58 MIU/L (ref 0.4–4.5)
WBC # BLD AUTO: 11.2 THOUSAND/UL (ref 3.8–10.8)

## 2022-03-14 PROCEDURE — 82043 UR ALBUMIN QUANTITATIVE: CPT | Performed by: FAMILY MEDICINE

## 2022-03-14 PROCEDURE — 3008F BODY MASS INDEX DOCD: CPT | Performed by: FAMILY MEDICINE

## 2022-03-14 PROCEDURE — 82570 ASSAY OF URINE CREATININE: CPT | Performed by: FAMILY MEDICINE

## 2022-03-14 PROCEDURE — 3052F HG A1C>EQUAL 8.0%<EQUAL 9.0%: CPT | Performed by: FAMILY MEDICINE

## 2022-03-14 PROCEDURE — 99214 OFFICE O/P EST MOD 30 MIN: CPT | Performed by: FAMILY MEDICINE

## 2022-03-14 PROCEDURE — 4010F ACE/ARB THERAPY RXD/TAKEN: CPT | Performed by: FAMILY MEDICINE

## 2022-03-14 PROCEDURE — 1036F TOBACCO NON-USER: CPT | Performed by: FAMILY MEDICINE

## 2022-03-14 PROCEDURE — 3725F SCREEN DEPRESSION PERFORMED: CPT | Performed by: FAMILY MEDICINE

## 2022-03-14 PROCEDURE — 83036 HEMOGLOBIN GLYCOSYLATED A1C: CPT | Performed by: FAMILY MEDICINE

## 2022-03-14 PROCEDURE — 3060F POS MICROALBUMINURIA REV: CPT | Performed by: FAMILY MEDICINE

## 2022-03-14 RX ORDER — FUROSEMIDE 40 MG/1
40 TABLET ORAL 2 TIMES DAILY
Qty: 60 TABLET | Refills: 5 | Status: SHIPPED | OUTPATIENT
Start: 2022-03-14 | End: 2022-04-13

## 2022-03-14 RX ORDER — AMLODIPINE BESYLATE 10 MG/1
10 TABLET ORAL DAILY
Qty: 30 TABLET | Refills: 5 | Status: SHIPPED | OUTPATIENT
Start: 2022-03-14 | End: 2022-04-13

## 2022-03-14 RX ORDER — METOPROLOL SUCCINATE 25 MG/1
25 TABLET, EXTENDED RELEASE ORAL DAILY
Qty: 30 TABLET | Refills: 5 | Status: SHIPPED | OUTPATIENT
Start: 2022-03-14 | End: 2022-04-13

## 2022-03-14 RX ORDER — OLMESARTAN MEDOXOMIL 40 MG/1
40 TABLET ORAL DAILY
Qty: 30 TABLET | Refills: 5 | Status: SHIPPED | OUTPATIENT
Start: 2022-03-14 | End: 2022-07-05

## 2022-03-14 NOTE — ASSESSMENT & PLAN NOTE
Hypertension  Patient blood pressure is above goal however he has been not compliant with taking medication    He was given a refill on all medications to take on of daily basis as directed

## 2022-03-14 NOTE — ASSESSMENT & PLAN NOTE
Diabetes  Patient was given a refill on his Januvia medication and stressed the importance of being compliant with taking medication on regular basis    His foot exam is up-to-date  Lab Results   Component Value Date    HGBA1C 8 2 (A) 03/14/2022

## 2022-03-14 NOTE — PROGRESS NOTES
FAMILY PRACTICE OFFICE VISIT       NAME: Brooks Jaeger  AGE: 61 y o  SEX: male       : 1965        MRN: [id number]    DATE: 3/14/2022  TIME: 8:40 AM    Assessment and Plan     Problem List Items Addressed This Visit        Endocrine    Diabetes mellitus type 2, uncontrolled ([address] ) - Primary     Diabetes  Patient was given a refill on his Januvia medication and stressed the importance of being compliant with taking medication on regular basis  His foot exam is up-to-date  Lab Results   Component Value Date    HGBA1C 8 2 (A) 2022            Relevant Medications    sitaGLIPtin (JANUVIA) 100 mg tablet    Other Relevant Orders    POCT hemoglobin A1c (Completed)    Microalbumin / creatinine urine ratio    CBC    Comprehensive metabolic panel    Lipid panel    TSH, 3rd generation       Cardiovascular and Mediastinum    Essential hypertension     Hypertension  Patient blood pressure is above goal however he has been not compliant with taking medication  He was given a refill on all medications to take on of daily basis as directed         Relevant Medications    furosemide (LASIX) 40 mg tablet    amLODIPine (NORVASC) 10 mg tablet    metoprolol succinate (TOPROL-XL) 25 mg 24 hr tablet    olmesartan (BENICAR) 40 mg tablet    Other Relevant Orders    CBC    Comprehensive metabolic panel    Lipid panel    TSH, 3rd generation    CHF (congestive heart failure) (HCC)     Wt Readings from Last 3 Encounters:   22 113 kg (248 lb 6 oz)   22 119 kg (262 lb 3 2 oz)   22 117 kg (257 lb)       Congestive heart failure  I stressed the importance of taking his diuretic on a daily basis b i d  due to his history of CHF  He had been in the emergency room with CHF in 2021             Relevant Medications    furosemide (LASIX) 40 mg tablet    amLODIPine (NORVASC) 10 mg tablet    metoprolol succinate (TOPROL-XL) 25 mg 24 hr tablet    olmesartan (BENICAR) 40 mg tablet      Other Visit Diagnoses Nocturia        Relevant Orders    PSA, Total Screen    Hypertensive urgency        Relevant Medications    furosemide (LASIX) 40 mg tablet    amLODIPine (NORVASC) 10 mg tablet    metoprolol succinate (TOPROL-XL) 25 mg 24 hr tablet    olmesartan (BENICAR) 40 mg tablet              Chief Complaint     Chief Complaint   Patient presents with    Well Check     physical , talk about medication        History of Present Illness     Patient the office to have review chronic medical condition  He is accompanied by his wife who states he has been noncompliant with taking any of his medications on a regular basis  His A1c has risen to 8 2  He states he had a sore throat last week which has since improved at this time  He denies any chest pain or shortness of breath  Review of Systems   Review of Systems   Constitutional: Negative  HENT: Negative  Eyes: Negative  Respiratory: Negative  Cardiovascular: Negative  Gastrointestinal: Negative  Genitourinary: Negative  Musculoskeletal: Positive for arthralgias  Chronic intermittent shoulder pains for which he sees orthopedist   Skin: Negative  Neurological: Negative  Psychiatric/Behavioral: Negative          Active Problem List     Patient Active Problem List   Diagnosis    Essential hypertension    Diabetes mellitus type 2, uncontrolled ([facility] )    Elevated lipoprotein(a)    Microalbuminuria    Obesity    Peripheral edema    Hypomagnesemia    CHF (congestive heart failure) (HCC)    Alcohol abuse    Pain and swelling of left shoulder    Onychomycosis    Arthritis of right wrist    Acute on chronic diastolic congestive heart failure (HCC)    Impingement syndrome of left shoulder       Past Medical History:  Past Medical History:   Diagnosis Date    Acute kidney injury ([facility] ) 9/5/2020    Diabetes mellitus ([facility] )     Hypertension     Inguinal hernia     3/25/15    Onychomycosis     5/24/17    Type 2 diabetes mellitus ([facility] ) 05/01/2012       Past Surgical History:  Past Surgical History:   Procedure Laterality Date    ARTHROSCOPY KNEE      with Medial and Lateral Meniscus Repair    HERNIA REPAIR      umbilical and inguinal hernia repairs (left)       Family History:  Family History   Problem Relation Age of Onset    Hypertension Mother         essential    Chronic bronchitis Father     Prostate cancer Father     Breast cancer Sister        Social History:  Social History     Socioeconomic History    Marital status: /Civil Union     Spouse name: Not on file    Number of children: Not on file    Years of education: Not on file    Highest education level: Not on file   Occupational History    Occupation: [employer]    Tobacco Use    Smoking status: Former Smoker     Types: Cigars     Start date: 6/22/2020    Smokeless tobacco: Never Used    Tobacco comment: current every day smoker, per [employer]   Vaping Use    Vaping Use: Never used   Substance and Sexual Activity    Alcohol use:  Yes     Alcohol/week: 3 0 standard drinks     Types: 2 Cans of beer, 1 Standard drinks or equivalent per week     Comment: weekend: 1 glass of christian or 2 beers    Drug use: No    Sexual activity: Not on file   Other Topics Concern    Not on file   Social History Narrative    Alcohol dependence    Nicotine dependence    Denied, alcohol Use    Marital problems     Social Determinants of Health     Financial Resource Strain: Not on file   Food Insecurity: Not on file   Transportation Needs: Not on file   Physical Activity: Not on file   Stress: Not on file   Social Connections: Not on file   Intimate Partner Violence: Not on file   Housing Stability: Not on file       Objective     Vitals:    03/14/22 0811   BP: 160/90   Pulse: 96   Resp: 18   Temp: 97 6 °F (36 4 °C)   SpO2: 99%     Wt Readings from Last 3 Encounters:   03/14/22 113 kg (248 lb 6 oz)   02/28/22 119 kg (262 lb 3 2 oz)   01/20/22 117 kg (257 lb)       Physical Exam  Constitutional:       General: He is not in acute distress  Appearance: Normal appearance  He is not ill-appearing  HENT:      Head: Normocephalic and atraumatic  Eyes:      General:         Right eye: No discharge  Left eye: No discharge  Extraocular Movements: Extraocular movements intact  Conjunctiva/sclera: Conjunctivae normal       Pupils: Pupils are equal, round, and reactive to light  Neck:      Vascular: No carotid bruit  Cardiovascular:      Rate and Rhythm: Normal rate and regular rhythm  Heart sounds: Normal heart sounds  No murmur heard  Pulmonary:      Effort: Pulmonary effort is normal       Breath sounds: Normal breath sounds  No wheezing, rhonchi or rales  Abdominal:      General: Abdomen is flat  Bowel sounds are normal  There is no distension  Palpations: Abdomen is soft  Tenderness: There is no abdominal tenderness  There is no guarding or rebound  Musculoskeletal:      Right lower leg: No edema  Left lower leg: No edema  Lymphadenopathy:      Cervical: No cervical adenopathy  Skin:     Findings: No rash  Neurological:      General: No focal deficit present  Mental Status: He is alert and oriented to person, place, and time  Cranial Nerves: No cranial nerve deficit  Psychiatric:         Mood and Affect: Mood normal          Behavior: Behavior normal          Thought Content:  Thought content normal          Judgment: Judgment normal          Pertinent Laboratory/Diagnostic Studies:  Lab Results   Component Value Date    GLUCOSE 179 (H) 09/04/2020    BUN 21 09/04/2021    CREATININE 1 87 (H) 09/04/2021    CALCIUM 9 1 09/04/2021     12/21/2017    K 4 7 09/04/2021    CO2 31 09/04/2021     09/04/2021     Lab Results   Component Value Date    ALT 20 08/31/2021    AST 37 08/31/2021    ALKPHOS 84 08/31/2021    BILITOT 1 3 (H) 12/21/2017       Lab Results   Component Value Date    WBC 7 73 09/01/2021    HGB 12 8 09/01/2021    HCT 37 5 09/01/2021     (H) 09/01/2021     (L) 09/01/2021       No results found for: TSH    Lab Results   Component Value Date    CHOL 153 12/21/2017     Lab Results   Component Value Date    TRIG 65 07/06/2020     Lab Results   Component Value Date    HDL 47 07/06/2020     Lab Results   Component Value Date    LDLCALC 77 07/06/2020     Lab Results   Component Value Date    HGBA1C 8 2 (A) 03/14/2022       Results for orders placed or performed in visit on 03/14/22   POCT hemoglobin A1c   Result Value Ref Range    Hemoglobin A1C 8 2 (A) 6 5       Orders Placed This Encounter   Procedures    Microalbumin / creatinine urine ratio    CBC    Comprehensive metabolic panel    Lipid panel    TSH, 3rd generation    PSA, Total Screen    POCT hemoglobin A1c       ALLERGIES:  No Known Allergies    Current Medications     Current Outpatient Medications   Medication Sig Dispense Refill    Blood Pressure KIT Twice a day periodically 1 each 0    Flovent  MCG/ACT inhaler INHALE 1 PUFF 2 (TWO) TIMES A DAY RINSE MOUTH AFTER USE  12 g 3    glucose blood test strip 1 each by Other route daily 180 each 5    Lancets (ONETOUCH ULTRASOFT) lancets by Does not apply route      amLODIPine (NORVASC) 10 mg tablet Take 1 tablet (10 mg total) by mouth daily 30 tablet 5    furosemide (LASIX) 40 mg tablet Take 1 tablet (40 mg total) by mouth 2 (two) times a day 60 tablet 5    metoprolol succinate (TOPROL-XL) 25 mg 24 hr tablet Take 1 tablet (25 mg total) by mouth daily Start on Monday, 09/06/2021  30 tablet 5    olmesartan (BENICAR) 40 mg tablet Take 1 tablet (40 mg total) by mouth daily 30 tablet 5    sitaGLIPtin (JANUVIA) 100 mg tablet Take 1 tablet (100 mg total) by mouth daily 30 tablet 5     No current facility-administered medications for this visit           Health Maintenance     Health Maintenance   Topic Date Due    Pneumococcal Vaccine: Pediatrics (0 to 5 Years) and At-Risk Patients (6 to 59 Years) (1 of 2 - PPSV23) Never done    DM Eye Exam  Never done    Annual Physical  Never done    URINE MICROALBUMIN  10/25/2020    Influenza Vaccine (1) 09/01/2021    BMI: Followup Plan  09/08/2022    Diabetic Foot Exam  09/08/2022    HEMOGLOBIN A1C  09/14/2022    Depression Screening  03/14/2023    BMI: Adult  03/14/2023    Colorectal Cancer Screening  05/13/2025    DTaP,Tdap,and Td Vaccines (3 - Td or Tdap) 09/05/2030    HIV Screening  Completed    Hepatitis C Screening  Completed    COVID-19 Vaccine  Completed    HIB Vaccine  Aged Out    Hepatitis B Vaccine  Aged Out    IPV Vaccine  Aged Out    Hepatitis A Vaccine  Aged Out    Meningococcal ACWY Vaccine  Aged Out    HPV Vaccine  Aged Out     Immunization History   Administered Date(s) Administered    COVID-19 PFIZER VACCINE 0 3 ML IM 04/12/2021, 05/03/2021, 12/15/2021    Influenza, injectable, quadrivalent, preservative free 0 5 mL 10/27/2020    Influenza, recombinant, quadrivalent,injectable, preservative free 10/21/2019    Influenza, seasonal, injectable 09/23/2015    Tdap 10/21/2019, 96/18/6481       Kwame Palacios MD    Spent 25 minutes with this patient which greater than 50% was spent counseling reviewing chart

## 2022-03-14 NOTE — ASSESSMENT & PLAN NOTE
Wt Readings from Last 3 Encounters:   03/14/22 113 kg (248 lb 6 oz)   02/28/22 119 kg (262 lb 3 2 oz)   01/20/22 117 kg (257 lb)       Congestive heart failure  I stressed the importance of taking his diuretic on a daily basis b i d  due to his history of CHF  He had been in the emergency room with CHF in August 2021

## 2022-05-23 ENCOUNTER — HOSPITAL ENCOUNTER (EMERGENCY)
Facility: HOSPITAL | Age: 57
Discharge: HOME | End: 2022-05-23
Attending: EMERGENCY MEDICINE
Payer: COMMERCIAL

## 2022-05-23 ENCOUNTER — APPOINTMENT (EMERGENCY)
Dept: RADIOLOGY | Facility: HOSPITAL | Age: 57
End: 2022-05-23
Payer: COMMERCIAL

## 2022-05-23 ENCOUNTER — TELEPHONE (OUTPATIENT)
Dept: OBGYN CLINIC | Facility: CLINIC | Age: 57
End: 2022-05-23

## 2022-05-23 VITALS
RESPIRATION RATE: 18 BRPM | SYSTOLIC BLOOD PRESSURE: 134 MMHG | OXYGEN SATURATION: 98 % | HEART RATE: 91 BPM | TEMPERATURE: 98.7 F | DIASTOLIC BLOOD PRESSURE: 73 MMHG

## 2022-05-23 DIAGNOSIS — S63.501A RIGHT WRIST SPRAIN, INITIAL ENCOUNTER: Primary | ICD-10-CM

## 2022-05-23 LAB
ALBUMIN SERPL BCP-MCNC: 3.4 G/DL (ref 3.5–5)
ALP SERPL-CCNC: 57 U/L (ref 34–104)
ALT SERPL W P-5'-P-CCNC: 7 U/L (ref 7–52)
ANION GAP SERPL CALCULATED.3IONS-SCNC: 10 MMOL/L (ref 4–13)
AST SERPL W P-5'-P-CCNC: 16 U/L (ref 13–39)
BASOPHILS # BLD AUTO: 0.05 THOUSANDS/ΜL (ref 0–0.1)
BASOPHILS NFR BLD AUTO: 1 % (ref 0–1)
BILIRUB SERPL-MCNC: 1.36 MG/DL (ref 0.2–1)
BNP SERPL-MCNC: 93 PG/ML (ref 0–100)
BUN SERPL-MCNC: 11 MG/DL (ref 5–25)
CALCIUM ALBUM COR SERPL-MCNC: 8.8 MG/DL (ref 8.3–10.1)
CALCIUM SERPL-MCNC: 8.3 MG/DL (ref 8.4–10.2)
CHLORIDE SERPL-SCNC: 100 MMOL/L (ref 96–108)
CO2 SERPL-SCNC: 26 MMOL/L (ref 21–32)
CREAT SERPL-MCNC: 0.88 MG/DL (ref 0.6–1.3)
EOSINOPHIL # BLD AUTO: 0.29 THOUSAND/ΜL (ref 0–0.61)
EOSINOPHIL NFR BLD AUTO: 3 % (ref 0–6)
ERYTHROCYTE [DISTWIDTH] IN BLOOD BY AUTOMATED COUNT: 13.4 % (ref 11.6–15.1)
GFR SERPL CREATININE-BSD FRML MDRD: 94 ML/MIN/1.73SQ M
GLUCOSE SERPL-MCNC: 162 MG/DL (ref 65–140)
HCT VFR BLD AUTO: 35.4 % (ref 36.5–49.3)
HGB BLD-MCNC: 12.1 G/DL (ref 12–17)
IMM GRANULOCYTES # BLD AUTO: 0.06 THOUSAND/UL (ref 0–0.2)
IMM GRANULOCYTES NFR BLD AUTO: 1 % (ref 0–2)
LYMPHOCYTES # BLD AUTO: 2.64 THOUSANDS/ΜL (ref 0.6–4.47)
LYMPHOCYTES NFR BLD AUTO: 31 % (ref 14–44)
MCH RBC QN AUTO: 33.5 PG (ref 26.8–34.3)
MCHC RBC AUTO-ENTMCNC: 34.2 G/DL (ref 31.4–37.4)
MCV RBC AUTO: 98 FL (ref 82–98)
MONOCYTES # BLD AUTO: 1.08 THOUSAND/ΜL (ref 0.17–1.22)
MONOCYTES NFR BLD AUTO: 13 % (ref 4–12)
NEUTROPHILS # BLD AUTO: 4.45 THOUSANDS/ΜL (ref 1.85–7.62)
NEUTS SEG NFR BLD AUTO: 51 % (ref 43–75)
NRBC BLD AUTO-RTO: 0 /100 WBCS
PLATELET # BLD AUTO: 227 THOUSANDS/UL (ref 149–390)
PMV BLD AUTO: 10.6 FL (ref 8.9–12.7)
POTASSIUM SERPL-SCNC: 3.5 MMOL/L (ref 3.5–5.3)
PROT SERPL-MCNC: 6.8 G/DL (ref 6.4–8.4)
RBC # BLD AUTO: 3.61 MILLION/UL (ref 3.88–5.62)
SODIUM SERPL-SCNC: 136 MMOL/L (ref 135–147)
WBC # BLD AUTO: 8.57 THOUSAND/UL (ref 4.31–10.16)

## 2022-05-23 PROCEDURE — 99284 EMERGENCY DEPT VISIT MOD MDM: CPT

## 2022-05-23 PROCEDURE — 83880 ASSAY OF NATRIURETIC PEPTIDE: CPT | Performed by: EMERGENCY MEDICINE

## 2022-05-23 PROCEDURE — 85025 COMPLETE CBC W/AUTO DIFF WBC: CPT | Performed by: EMERGENCY MEDICINE

## 2022-05-23 PROCEDURE — 99284 EMERGENCY DEPT VISIT MOD MDM: CPT | Performed by: EMERGENCY MEDICINE

## 2022-05-23 PROCEDURE — 36415 COLL VENOUS BLD VENIPUNCTURE: CPT | Performed by: EMERGENCY MEDICINE

## 2022-05-23 PROCEDURE — 80053 COMPREHEN METABOLIC PANEL: CPT | Performed by: EMERGENCY MEDICINE

## 2022-05-23 PROCEDURE — 73110 X-RAY EXAM OF WRIST: CPT

## 2022-05-23 RX ORDER — IBUPROFEN 600 MG/1
600 TABLET ORAL ONCE
Status: COMPLETED | OUTPATIENT
Start: 2022-05-23 | End: 2022-05-23

## 2022-05-23 RX ORDER — ACETAMINOPHEN 325 MG/1
975 TABLET ORAL ONCE
Status: COMPLETED | OUTPATIENT
Start: 2022-05-23 | End: 2022-05-23

## 2022-05-23 RX ADMIN — ACETAMINOPHEN 975 MG: 325 TABLET ORAL at 12:24

## 2022-05-23 RX ADMIN — IBUPROFEN 600 MG: 600 TABLET ORAL at 12:24

## 2022-05-23 NOTE — ED PROVIDER NOTES
History  Chief Complaint   Patient presents with    Hand Swelling     Patient started Thursday with R hand swelling  States tips of fingers feel numb      43-year-old male presents with 4 days of swelling in his right hand and wrist reports this has happened previously and he saw Dr Alen Lopez ([facility]) who did an injection and it improved  Patient reports it is mildly painful worse with movement, says that previously this was due to carpal tunnel syndrome, patient denies any numbness and tingling in his hand, he has had decreased  strength due to pain denies any recent trauma, and has had no associated fever, headache, dizziness, chest pain, cough, shortness of breath, nausea vomiting, abdominal pain, GI or urinary complaints  Patient has a history of hypertension, diabetes, drinks occasionally, smokes 1-2 cigarettes per week, does not use any drugs  Prior to Admission Medications   Prescriptions Last Dose Informant Patient Reported? Taking? Blood Pressure KIT  Self No No   Sig: Twice a day periodically   Flovent  MCG/ACT inhaler  Self No No   Sig: INHALE 1 PUFF 2 (TWO) TIMES A DAY RINSE MOUTH AFTER USE  Lancets (ONETOUCH ULTRASOFT) lancets  Self Yes No   Sig: by Does not apply route   amLODIPine (NORVASC) 10 mg tablet   No No   Sig: Take 1 tablet (10 mg total) by mouth daily   furosemide (LASIX) 40 mg tablet   No No   Sig: Take 1 tablet (40 mg total) by mouth 2 (two) times a day   glucose blood test strip  Self No No   Si each by Other route daily   metoprolol succinate (TOPROL-XL) 25 mg 24 hr tablet   No No   Sig: Take 1 tablet (25 mg total) by mouth daily Start on Monday, 2021     olmesartan (BENICAR) 40 mg tablet   No No   Sig: Take 1 tablet (40 mg total) by mouth daily   sitaGLIPtin (JANUVIA) 100 mg tablet   No No   Sig: Take 1 tablet (100 mg total) by mouth daily      Facility-Administered Medications: None       Past Medical History:   Diagnosis Date    Acute kidney injury ([facility] ) 9/5/2020    Diabetes mellitus ([place] [address] )     Hypertension     Inguinal hernia     3/25/15    Onychomycosis     5/24/17    Type 2 diabetes mellitus ([place] [address] ) 05/01/2012       Past Surgical History:   Procedure Laterality Date    ARTHROSCOPY KNEE      with Medial and Lateral Meniscus Repair    HERNIA REPAIR      umbilical and inguinal hernia repairs (left)       Family History   Problem Relation Age of Onset    Hypertension Mother         essential    Chronic bronchitis Father     Prostate cancer Father     Breast cancer Sister      I have reviewed and agree with the history as documented  E-Cigarette/Vaping    E-Cigarette Use Never User      E-Cigarette/Vaping Substances    Nicotine No     THC No     CBD No     Flavoring No     Other No     Unknown No      Social History     Tobacco Use    Smoking status: Former Smoker     Types: Cigars     Start date: 6/22/2020    Smokeless tobacco: Never Used    Tobacco comment: current every day smoker, per [employer]   Vaping Use    Vaping Use: Never used   Substance Use Topics    Alcohol use: Yes     Alcohol/week: 3 0 standard drinks     Types: 2 Cans of beer, 1 Standard drinks or equivalent per week     Comment: weekend: 1 glass of christian or 2 beers    Drug use: No       Review of Systems   Constitutional: Negative for fever  HENT: Negative for congestion  Eyes: Negative for visual disturbance  Respiratory: Negative for cough and shortness of breath  Cardiovascular: Negative for chest pain  Gastrointestinal: Negative for abdominal pain, diarrhea and vomiting  Endocrine: Negative for polyuria  Genitourinary: Negative for dysuria and hematuria  Musculoskeletal: Positive for arthralgias, joint swelling and myalgias  Neurological: Negative for dizziness and headaches  Physical Exam  Physical Exam  Constitutional:       Appearance: Normal appearance  HENT:      Head: Normocephalic and atraumatic        Right Ear: External ear normal       Left Ear: External ear normal       Mouth/Throat:      Mouth: Mucous membranes are moist       Pharynx: Oropharynx is clear  Eyes:      Conjunctiva/sclera: Conjunctivae normal       Pupils: Pupils are equal, round, and reactive to light  Cardiovascular:      Rate and Rhythm: Normal rate  Abdominal:      General: Abdomen is flat  There is no distension  Musculoskeletal:         General: No deformity  Normal range of motion  Cervical back: Normal range of motion  Comments: Diffuse swelling throughout the right wrist of most prominently over the dorsum of the hand  No fluctuance, mild tenderness, no significant warmth, Phalen's and Tinel's sign are negative   Skin:     General: Skin is warm and dry  Neurological:      General: No focal deficit present  Mental Status: He is alert and oriented to person, place, and time     Psychiatric:         Mood and Affect: Mood normal          Behavior: Behavior normal          Vital Signs  ED Triage Vitals [05/23/22 1133]   Temperature Pulse Respirations Blood Pressure SpO2   98 7 °F (37 1 °C) 91 18 134/73 98 %      Temp Source Heart Rate Source Patient Position - Orthostatic VS BP Location FiO2 (%)   Oral Monitor Sitting Right arm --      Pain Score       8           Vitals:    05/23/22 1133   BP: 134/73   Pulse: 91   Patient Position - Orthostatic VS: Sitting         Visual Acuity      ED Medications  Medications   acetaminophen (TYLENOL) tablet 975 mg (975 mg Oral Given 5/23/22 1224)   ibuprofen (MOTRIN) tablet 600 mg (600 mg Oral Given 5/23/22 1224)       Diagnostic Studies  Results Reviewed     Procedure Component Value Units Date/Time    B-Type Natriuretic Peptide(BNP), AN, CA, EA Campuses Only [[id number]]  (Normal) Collected: 05/23/22 1220    Lab Status: Final result Specimen: Blood from Arm, Right Updated: 05/23/22 1304     BNP 93 pg/mL     Comprehensive metabolic panel [[id number]]  (Abnormal) Collected: 05/23/22 1220    Lab Status: Final result Specimen: Blood from Arm, Right Updated: 05/23/22 1258     Sodium 136 mmol/L      Potassium 3 5 mmol/L      Chloride 100 mmol/L      CO2 26 mmol/L      ANION GAP 10 mmol/L      BUN 11 mg/dL      Creatinine 0 88 mg/dL      Glucose 162 mg/dL      Calcium 8 3 mg/dL      Corrected Calcium 8 8 mg/dL      AST 16 U/L      ALT 7 U/L      Alkaline Phosphatase 57 U/L      Total Protein 6 8 g/dL      Albumin 3 4 g/dL      Total Bilirubin 1 36 mg/dL      eGFR 94 ml/min/1 73sq m     Narrative:      National Kidney Disease Foundation guidelines for Chronic Kidney Disease (CKD):     Stage 1 with normal or high GFR (GFR > 90 mL/min/1 73 square meters)    Stage 2 Mild CKD (GFR = 60-89 mL/min/1 73 square meters)    Stage 3A Moderate CKD (GFR = 45-59 mL/min/1 73 square meters)    Stage 3B Moderate CKD (GFR = 30-44 mL/min/1 73 square meters)    Stage 4 Severe CKD (GFR = 15-29 mL/min/1 73 square meters)    Stage 5 End Stage CKD (GFR <15 mL/min/1 73 square meters)  Note: GFR calculation is accurate only with a steady state creatinine    CBC and differential [[id number]]  (Abnormal) Collected: 05/23/22 1220    Lab Status: Final result Specimen: Blood from Arm, Right Updated: 05/23/22 1235     WBC 8 57 Thousand/uL      RBC 3 61 Million/uL      Hemoglobin 12 1 g/dL      Hematocrit 35 4 %      MCV 98 fL      MCH 33 5 pg      MCHC 34 2 g/dL      RDW 13 4 %      MPV 10 6 fL      Platelets 552 Thousands/uL      nRBC 0 /100 WBCs      Neutrophils Relative 51 %      Immat GRANS % 1 %      Lymphocytes Relative 31 %      Monocytes Relative 13 %      Eosinophils Relative 3 %      Basophils Relative 1 %      Neutrophils Absolute 4 45 Thousands/µL      Immature Grans Absolute 0 06 Thousand/uL      Lymphocytes Absolute 2 64 Thousands/µL      Monocytes Absolute 1 08 Thousand/µL      Eosinophils Absolute 0 29 Thousand/µL      Basophils Absolute 0 05 Thousands/µL                  XR wrist 3+ views RIGHT    (Results Pending) Procedures  Procedures         ED Course       [facility]  Number of Diagnoses or Management Options  Right wrist sprain, initial encounter  Diagnosis management comments: Patient's workup is unremarkable, on chart review patient previously had a ligamentous injury in his right wrist, patient likely has recurrence of this injury to his ligaments and be discharged home with follow-up with orthopedic surgery as an outpatient for further  Return indications and follow-up instructions given, patient is safe for discharge  Disposition  Final diagnoses:   Right wrist sprain, initial encounter     Time reflects when diagnosis was documented in both MDM as applicable and the Disposition within this note     Time User Action Codes Description Comment    5/23/2022  2:04 PM Kingston Goldberg Add [[id number]] Right wrist sprain, initial encounter       ED Disposition     ED Disposition   Discharge    Condition   Stable    Date/Time   Mon May 23, 2022  2:04 PM    [address] discharge to home/self care  Follow-up Information     Follow up With Specialties Details Why Contact Info Additional Information    Pamila Mcburney, MD Family Medicine Schedule an appointment as soon as possible for a visit in 3 days For follow-up [address] [address] Emergency Department Emergency Medicine Go to  As needed [address] [facility] [address] [facility] Emergency Department, [address], [place], [address] Thomas Tejada MD Orthopedic Surgery, Hand Surgery Schedule an appointment as soon as possible for a visit in 3 days For follow-up [address] [facility] [address] [facility]              Patient's Medications   Discharge Prescriptions    No medications on file       No discharge procedures on file      PDMP Review       Value Time User    PDMP Reviewed  Yes 9/4/2021  8:39 AM Radha Carter DO          ED Provider  Electronically Signed by           Chris Sexton MD  05/23/22 5997

## 2022-05-23 NOTE — TELEPHONE ENCOUNTER
Pt came into the office this morning to try to schedule a f/u appt w/Dr Amy Bernardo for the right wrist  He is sanjuanita g a flare up with swelling & pain    Please call him at [phone]

## 2022-05-24 ENCOUNTER — OFFICE VISIT (OUTPATIENT)
Dept: OBGYN CLINIC | Facility: CLINIC | Age: 57
End: 2022-05-24
Payer: COMMERCIAL

## 2022-05-24 VITALS
HEART RATE: 98 BPM | WEIGHT: 248 LBS | BODY MASS INDEX: 36.73 KG/M2 | DIASTOLIC BLOOD PRESSURE: 92 MMHG | SYSTOLIC BLOOD PRESSURE: 155 MMHG | HEIGHT: 69 IN

## 2022-05-24 DIAGNOSIS — M77.8 RIGHT WRIST TENDINITIS: Primary | ICD-10-CM

## 2022-05-24 DIAGNOSIS — M19.031 OSTEOARTHRITIS OF RIGHT WRIST, UNSPECIFIED OSTEOARTHRITIS TYPE: ICD-10-CM

## 2022-05-24 DIAGNOSIS — S63.8X1D TEAR OF RIGHT SCAPHOLUNATE LIGAMENT, SUBSEQUENT ENCOUNTER: ICD-10-CM

## 2022-05-24 PROCEDURE — 99214 OFFICE O/P EST MOD 30 MIN: CPT | Performed by: PHYSICIAN ASSISTANT

## 2022-05-24 PROCEDURE — 1036F TOBACCO NON-USER: CPT | Performed by: PHYSICIAN ASSISTANT

## 2022-05-24 PROCEDURE — 20605 DRAIN/INJ JOINT/BURSA W/O US: CPT | Performed by: PHYSICIAN ASSISTANT

## 2022-05-24 PROCEDURE — 3008F BODY MASS INDEX DOCD: CPT | Performed by: PHYSICIAN ASSISTANT

## 2022-05-24 RX ORDER — TRIAMCINOLONE ACETONIDE 40 MG/ML
20 INJECTION, SUSPENSION INTRA-ARTICULAR; INTRAMUSCULAR
Status: COMPLETED | OUTPATIENT
Start: 2022-05-24 | End: 2022-05-24

## 2022-05-24 RX ORDER — LIDOCAINE HYDROCHLORIDE 20 MG/ML
2 INJECTION, SOLUTION EPIDURAL; INFILTRATION; INTRACAUDAL; PERINEURAL
Status: COMPLETED | OUTPATIENT
Start: 2022-05-24 | End: 2022-05-24

## 2022-05-24 RX ADMIN — TRIAMCINOLONE ACETONIDE 20 MG: 40 INJECTION, SUSPENSION INTRA-ARTICULAR; INTRAMUSCULAR at 16:02

## 2022-05-24 RX ADMIN — LIDOCAINE HYDROCHLORIDE 2 ML: 20 INJECTION, SOLUTION EPIDURAL; INFILTRATION; INTRACAUDAL; PERINEURAL at 16:02

## 2022-05-24 NOTE — PROGRESS NOTES
CHIEF COMPLAINT:  Chief Complaint   Patient presents with    Right Hand - Numbness       SUBJECTIVE:  Burgess Dunbar is a 61y o  year old male who presents for follow-up regarding right hand numbness and tingling as well as wrist pain  He states that he also started to note some swelling of his right wrist   He did go to the emergency room on 05/23/2022 as he was having increased wrist pain as well as swelling  States he has difficulty with wrist range of motion  He states he will notes some numbness and tingling into the small, ring and long finger  PAST MEDICAL HISTORY:  Past Medical History:   Diagnosis Date    Acute kidney [facility] ([facility] ) 9/5/2020    Diabetes mellitus ([facility] )     Hypertension     Inguinal hernia     3/25/15    Onychomycosis     5/24/17    Type 2 diabetes mellitus ([facility] ) 05/01/2012       PAST SURGICAL HISTORY:  Past Surgical History:   Procedure Laterality Date    ARTHROSCOPY KNEE      with Medial and Lateral Meniscus Repair    HERNIA REPAIR      umbilical and inguinal hernia repairs (left)       FAMILY HISTORY:  Family History   Problem Relation Age of Onset    Hypertension Mother         essential    Chronic bronchitis Father     Prostate cancer Father     Breast cancer Sister        SOCIAL HISTORY:  Social History     Tobacco Use    Smoking status: Former Smoker     Types: Cigars     Start date: 6/22/2020    Smokeless tobacco: Never Used    Tobacco comment: current every day smoker, per [employer]   Vaping Use    Vaping Use: Never used   Substance Use Topics    Alcohol use:  Yes     Alcohol/week: 3 0 standard drinks     Types: 2 Cans of beer, 1 Standard drinks or equivalent per week     Comment: weekend: 1 glass of christian or 2 beers    Drug use: No       MEDICATIONS:    Current Outpatient Medications:     amLODIPine (NORVASC) 10 mg tablet, Take 1 tablet (10 mg total) by mouth daily, Disp: 30 tablet, Rfl: 5    Blood Pressure KIT, Twice a day periodically, Disp: 1 each, Rfl: 0    Flovent  MCG/ACT inhaler, INHALE 1 PUFF 2 (TWO) TIMES A DAY RINSE MOUTH AFTER USE , Disp: 12 g, Rfl: 3    furosemide (LASIX) 40 mg tablet, Take 1 tablet (40 mg total) by mouth 2 (two) times a day, Disp: 60 tablet, Rfl: 5    glucose blood test strip, 1 each by Other route daily, Disp: 180 each, Rfl: 5    Lancets (ONETOUCH ULTRASOFT) lancets, by Does not apply route, Disp: , Rfl:     metoprolol succinate (TOPROL-XL) 25 mg 24 hr tablet, Take 1 tablet (25 mg total) by mouth daily Start on Monday, 09/06/2021 , Disp: 30 tablet, Rfl: 5    olmesartan (BENICAR) 40 mg tablet, Take 1 tablet (40 mg total) by mouth daily, Disp: 30 tablet, Rfl: 5    sitaGLIPtin (JANUVIA) 100 mg tablet, Take 1 tablet (100 mg total) by mouth daily, Disp: 30 tablet, Rfl: 5    ALLERGIES:  No Known Allergies    REVIEW OF SYSTEMS:  Review of Systems  ROS:   General: no fever, no chills  HEENT:  No loss of hearing or eyesight problems  Eyes:  No red eyes  Respiratory:  No coughing, shortness of breath or wheezing  Cardiovascular:  No chest pain, no palpitations  GI:  Abdomen soft nontender, denies nausea  Endocrine:  No muscle weakness, no frequent urination, no excessive thirst  Urinary:  No dysuria, no incontinence  Musculoskeletal: see HPI and PE  SKIN:  No skin rash, no dry skin  Neurological:  No headaches, no confusion  Psychiatric:  No suicide thoughts, no anxiety, no depression  Review of all other systems is negative    VITALS:  Vitals:    05/24/22 1342   BP: 155/92   Pulse: 98       LABS:  HgA1c:   Lab Results   Component Value Date    HGBA1C 8 2 (A) 03/14/2022     BMP:   Lab Results   Component Value Date    GLUCOSE 179 (H) 09/04/2020    CALCIUM 8 3 (L) 05/23/2022     12/21/2017    K 3 5 05/23/2022    CO2 26 05/23/2022     05/23/2022    BUN 11 05/23/2022    CREATININE 0 88 05/23/2022       _____________________________________________________  PHYSICAL EXAMINATION:  General: well developed and well nourished, alert, oriented times 3 and appears comfortable  Psychiatric: Normal  HEENT: Trachea Midline, No torticollis  Pulmonary: No audible wheezing or respiratory distress   Skin: No masses, erythema, lacerations, fluctation, ulcerations  Neurovascular: Sensation Intact to the Median, Ulnar, Radial Nerve, Motor Intact to the Median, Ulnar, Radial Nerve and Pulses Intact    MUSCULOSKELETAL EXAMINATION:  Right hand   Swelling is noted over the wrist and into the digits, no erythema or ecchymosis noted   Tenderness to palpation over the radiocarpal joint and over the FCR tendon  He has full wrist range of motion with flexion extension however notes pain while doing so   Pain with wrist circumduction  Negative Tinel's over the wrist at the median nerve, negative compression over the wrist for the median nerve       ___________________________________________________  STUDIES REVIEWED:  No studies reviewed  PROCEDURES PERFORMED:  Medium joint arthrocentesis: R radiocarpal  Universal Protocol:  Consent: Verbal consent obtained    Risks and benefits: risks, benefits and alternatives were discussed  Consent given by: patient  Patient understanding: patient states understanding of the procedure being performed  Patient consent: the patient's understanding of the procedure matches consent given  Site marked: the operative site was marked  Patient identity confirmed: verbally with patient    Supporting Documentation  Indications: pain   Procedure Details  Location: wrist - R radiocarpal  Preparation: Patient was prepped and draped in the usual sterile fashion  Needle size: 25 G  Approach: dorsal  Medications administered: 20 mg triamcinolone acetonide 40 mg/mL; 2 mL lidocaine (PF) 2 %    Patient tolerance: patient tolerated the procedure well with no immediate complications  Dressing:  Sterile dressing applied             _____________________________________________________  ASSESSMENT/PLAN:      Diagnoses and all orders for this visit:    Right wrist FCR tendinitis  · Patient was advised to attend physical therapy to work on range of motion, stretching and strengthening   · He can take Tylenol and anti-inflammatories as needed for pain   · He was advised that we can try cortisone injection if he were to have continued pain   · He will follow up in 6 weeks for re-evaluation    Tear of right scapholunate ligament, subsequent encounter  · Patient was given cortisone injection into the radiocarpal joint today  He tolerated well  · He was advised that we can repeat the cortisone injections every 3-4 months as needed for pain   · He can take Tylenol and anti-inflammatories as needed for pain  · He will follow up in 6 weeks for re-evaluation    Follow Up:  Return in about 6 weeks (around 7/5/2022) for with Dr Maureen Ronquillo   To Do Next Visit:  Re-evaluation of current issue      Portions of the record may have been created with voice recognition software  Occasional wrong word or "sound a like" substitutions may have occurred due to the inherent limitations of voice recognition software  Read the chart carefully and recognize, using context, where substitutions have occurred

## 2022-06-09 ENCOUNTER — TELEMEDICINE (OUTPATIENT)
Dept: FAMILY MEDICINE CLINIC | Facility: CLINIC | Age: 57
End: 2022-06-09
Payer: COMMERCIAL

## 2022-06-09 VITALS — WEIGHT: 248 LBS | HEIGHT: 69 IN | BODY MASS INDEX: 36.73 KG/M2

## 2022-06-09 DIAGNOSIS — U07.1 COVID-19 VIRUS INFECTION: Primary | ICD-10-CM

## 2022-06-09 PROCEDURE — 3008F BODY MASS INDEX DOCD: CPT | Performed by: FAMILY MEDICINE

## 2022-06-09 PROCEDURE — 1036F TOBACCO NON-USER: CPT | Performed by: FAMILY MEDICINE

## 2022-06-09 PROCEDURE — 99213 OFFICE O/P EST LOW 20 MIN: CPT | Performed by: FAMILY MEDICINE

## 2022-06-09 RX ORDER — PREDNISONE 20 MG/1
TABLET ORAL
Qty: 10 TABLET | Refills: 0 | Status: SHIPPED | OUTPATIENT
Start: 2022-06-09

## 2022-06-09 RX ORDER — AZITHROMYCIN 250 MG/1
TABLET, FILM COATED ORAL
Qty: 6 TABLET | Refills: 0 | Status: SHIPPED | OUTPATIENT
Start: 2022-06-09 | End: 2022-06-14

## 2022-06-10 ENCOUNTER — TELEPHONE (OUTPATIENT)
Dept: FAMILY MEDICINE CLINIC | Facility: CLINIC | Age: 57
End: 2022-06-10

## 2022-06-10 PROBLEM — U07.1 COVID-19 VIRUS INFECTION: Status: ACTIVE | Noted: 2022-06-10

## 2022-06-10 NOTE — ASSESSMENT & PLAN NOTE
COVID-19 infection  Patient given prescription for Zithromax Z-Memo take as directed for 5 days as well as prednisone 40 mg daily for 5 days    Patient will call if symptoms persist after medication completed

## 2022-06-10 NOTE — PROGRESS NOTES
Virtual Regular Visit    Verification of patient location:    Patient is located in the following state in which I hold an active license [place]      Assessment/Plan:    Problem List Items Addressed This Visit        Other    COVID-19 virus infection - Primary     COVID-19 infection  Patient given prescription for Zithromax Z-Memo take as directed for 5 days as well as prednisone 40 mg daily for 5 days  Patient will call if symptoms persist after medication completed           Relevant Medications    azithromycin (Zithromax) 250 mg tablet    predniSONE 20 mg tablet               Reason for visit is   Chief Complaint   Patient presents with    COVID-19    Cough     Abd pain         Encounter provider Mily Salgado MD    Provider located at [address] [place] [id number]      Recent Visits  Date Type Provider Dept   06/09/22 Gianni Ramirez MD [facility] recent visits within past 7 days and meeting all other requirements  Future Appointments  No visits were found meeting these conditions  Showing future appointments within next 150 days and meeting all other requirements       The patient was identified by name and date of birth  Namita Fuentes was informed that this is a telemedicine visit and that the visit is being conducted through [address] [place] [address] Now and patient was informed that this is a secure, HIPAA-compliant platform  He agrees to proceed     My office door was closed  No one else was in the room  He acknowledged consent and understanding of privacy and security of the video platform  The patient has agreed to participate and understands they can discontinue the visit at any time  Patient is aware this is a billable service  Subjective  Namita Fuentes is a 61 y o  male       Patient having telemedicine visit with symptoms of body aches, headache, and coughing which began on June 5 , 2022    He did perform a COVID-19 home test that was positive on June 6  Patient has not taken any over-the-counter medications  He is scheduled to travel to [place] on Tuesday June 14th  Past Medical History:   Diagnosis Date    Acute kidney injury ([facility] ) 9/5/2020    Diabetes mellitus ([facility] )     Hypertension     Inguinal hernia     3/25/15    Onychomycosis     5/24/17    Type 2 diabetes mellitus ([facility] ) 05/01/2012       Past Surgical History:   Procedure Laterality Date    ARTHROSCOPY KNEE      with Medial and Lateral Meniscus Repair    HERNIA REPAIR      umbilical and inguinal hernia repairs (left)       Current Outpatient Medications   Medication Sig Dispense Refill    azithromycin (Zithromax) 250 mg tablet Take 2 tablets (500 mg total) by mouth daily for 1 day, THEN 1 tablet (250 mg total) daily for 4 days  6 tablet 0    Blood Pressure KIT Twice a day periodically 1 each 0    glucose blood test strip 1 each by Other route daily 180 each 5    Lancets (ONETOUCH ULTRASOFT) lancets by Does not apply route      predniSONE 20 mg tablet 2 tabs po q day with food 10 tablet 0    amLODIPine (NORVASC) 10 mg tablet Take 1 tablet (10 mg total) by mouth daily 30 tablet 5    Flovent  MCG/ACT inhaler INHALE 1 PUFF 2 (TWO) TIMES A DAY RINSE MOUTH AFTER USE  (Patient not taking: Reported on 6/9/2022) 12 g 3    furosemide (LASIX) 40 mg tablet Take 1 tablet (40 mg total) by mouth 2 (two) times a day 60 tablet 5    metoprolol succinate (TOPROL-XL) 25 mg 24 hr tablet Take 1 tablet (25 mg total) by mouth daily Start on Monday, 09/06/2021  30 tablet 5    olmesartan (BENICAR) 40 mg tablet Take 1 tablet (40 mg total) by mouth daily 30 tablet 5    sitaGLIPtin (JANUVIA) 100 mg tablet Take 1 tablet (100 mg total) by mouth daily 30 tablet 5     No current facility-administered medications for this visit  No Known Allergies    Review of Systems   Constitutional: Positive for fatigue  Negative for fever  HENT: Positive for congestion  Respiratory: Positive for cough  Musculoskeletal: Positive for myalgias  Neurological: Positive for headaches  Video Exam    Vitals:    06/09/22 1446   Weight: 112 kg (248 lb)   Height: 5' 9" (1 753 m)       Physical Exam  Constitutional:       General: He is not in acute distress  Appearance: Normal appearance  He is not ill-appearing  HENT:      Head: Normocephalic and atraumatic  Eyes:      General:         Right eye: No discharge  Left eye: No discharge  Extraocular Movements: Extraocular movements intact  Conjunctiva/sclera: Conjunctivae normal       Pupils: Pupils are equal, round, and reactive to light  Pulmonary:      Effort: No respiratory distress  Neurological:      General: No focal deficit present  Mental Status: He is alert and oriented to person, place, and time  Psychiatric:         Mood and Affect: Mood normal          Behavior: Behavior normal          Thought Content: Thought content normal          Judgment: Judgment normal           I spent 15 minutes directly with the patient during this visit    Elisa Harris [id number] verbally agrees to participate in [employer]  Pt is aware that [employer] could be limited without vital signs or the ability to perform a full hands-on physical Hans Melendez understands he or the provider may request at any time to terminate the video visit and request the patient to seek care or treatment in person

## 2022-06-10 NOTE — TELEPHONE ENCOUNTER
Spoke to patient's wife, she stated that patient needs a letter for travel, she stated they are flying out on Monday night, she stated the letter needs to state that patient tested positive for covid on 6/7/22 and that he safe to travel, is this ok to write?  She would like it faxed to [phone]

## 2022-07-05 DIAGNOSIS — E11.65 UNCONTROLLED TYPE 2 DIABETES MELLITUS WITH HYPERGLYCEMIA (HCC): ICD-10-CM

## 2022-07-05 DIAGNOSIS — I16.0 HYPERTENSIVE URGENCY: ICD-10-CM

## 2022-07-05 DIAGNOSIS — I50.9 CHF (CONGESTIVE HEART FAILURE) (HCC): ICD-10-CM

## 2022-07-05 RX ORDER — SITAGLIPTIN 100 MG/1
TABLET, FILM COATED ORAL
Qty: 90 TABLET | Refills: 1 | Status: SHIPPED | OUTPATIENT
Start: 2022-07-05

## 2022-07-05 RX ORDER — OLMESARTAN MEDOXOMIL 40 MG/1
TABLET ORAL
Qty: 90 TABLET | Refills: 1 | Status: SHIPPED | OUTPATIENT
Start: 2022-07-05

## 2022-09-03 DIAGNOSIS — I16.0 HYPERTENSIVE URGENCY: ICD-10-CM

## 2022-09-03 DIAGNOSIS — I50.9 CHF (CONGESTIVE HEART FAILURE) (HCC): ICD-10-CM

## 2022-09-03 RX ORDER — METOPROLOL SUCCINATE 25 MG/1
25 TABLET, EXTENDED RELEASE ORAL DAILY
Qty: 90 TABLET | Refills: 1 | Status: SHIPPED | OUTPATIENT
Start: 2022-09-03 | End: 2022-10-03

## 2022-09-03 RX ORDER — AMLODIPINE BESYLATE 10 MG/1
TABLET ORAL
Qty: 90 TABLET | Refills: 1 | Status: SHIPPED | OUTPATIENT
Start: 2022-09-03

## 2022-09-03 RX ORDER — FUROSEMIDE 40 MG/1
TABLET ORAL
Qty: 180 TABLET | Refills: 1 | Status: SHIPPED | OUTPATIENT
Start: 2022-09-03

## 2022-09-07 ENCOUNTER — OFFICE VISIT (OUTPATIENT)
Dept: FAMILY MEDICINE CLINIC | Facility: CLINIC | Age: 57
End: 2022-09-07
Payer: COMMERCIAL

## 2022-09-07 VITALS
SYSTOLIC BLOOD PRESSURE: 120 MMHG | WEIGHT: 261.25 LBS | TEMPERATURE: 98 F | DIASTOLIC BLOOD PRESSURE: 76 MMHG | BODY MASS INDEX: 38.69 KG/M2 | RESPIRATION RATE: 18 BRPM | HEART RATE: 93 BPM | HEIGHT: 69 IN | OXYGEN SATURATION: 97 %

## 2022-09-07 DIAGNOSIS — M77.9 TENDONITIS: ICD-10-CM

## 2022-09-07 DIAGNOSIS — E11.9 TYPE 2 DIABETES MELLITUS WITHOUT COMPLICATION, WITHOUT LONG-TERM CURRENT USE OF INSULIN (HCC): ICD-10-CM

## 2022-09-07 DIAGNOSIS — E11.65 UNCONTROLLED TYPE 2 DIABETES MELLITUS WITH HYPERGLYCEMIA (HCC): Primary | ICD-10-CM

## 2022-09-07 LAB — SL AMB POCT HEMOGLOBIN AIC: 6.2 (ref ?–6.5)

## 2022-09-07 PROCEDURE — 83036 HEMOGLOBIN GLYCOSYLATED A1C: CPT | Performed by: FAMILY MEDICINE

## 2022-09-07 PROCEDURE — 99213 OFFICE O/P EST LOW 20 MIN: CPT | Performed by: FAMILY MEDICINE

## 2022-09-07 PROCEDURE — 3044F HG A1C LEVEL LT 7.0%: CPT | Performed by: FAMILY MEDICINE

## 2022-09-07 RX ORDER — PREDNISONE 20 MG/1
TABLET ORAL
Qty: 10 TABLET | Refills: 0 | Status: SHIPPED | OUTPATIENT
Start: 2022-09-07

## 2022-09-07 NOTE — ASSESSMENT & PLAN NOTE
Tendinitis  Patient with a history of tendinitis although other etiology may be gout  He was given prescription for prednisone to take 40 mg daily for 5 days    Patient will call if symptoms persist without improvement after medication completed

## 2022-09-07 NOTE — ASSESSMENT & PLAN NOTE
Diabetes  A1c is stable at 6 2 in the office today  We will proceed with providing prednisone for suspected tendinitis of gout    Patient will continue with current regimen of medications  Lab Results   Component Value Date    HGBA1C 6 2 09/07/2022

## 2022-09-07 NOTE — PROGRESS NOTES
FAMILY PRACTICE OFFICE VISIT       NAME: Carlos Wu  AGE: 61 y o  SEX: male       : 1965        MRN: [id number]    DATE: 2022  TIME: 12:07 PM    Assessment and Plan     Problem List Items Addressed This Visit        Endocrine    Diabetes mellitus type 2, uncontrolled - Primary    Relevant Orders    POCT hemoglobin A1c (Completed)    Type 2 diabetes mellitus, without long-term current use of insulin (HCC)     Diabetes  A1c is stable at 6 2 in the office today  We will proceed with providing prednisone for suspected tendinitis of gout  Patient will continue with current regimen of medications  Lab Results   Component Value Date    HGBA1C 6 2 2022               Musculoskeletal and Integument    Tendonitis     Tendinitis  Patient with a history of tendinitis although other etiology may be gout  He was given prescription for prednisone to take 40 mg daily for 5 days  Patient will call if symptoms persist without improvement after medication completed         Relevant Medications    predniSONE 20 mg tablet              Chief Complaint     Chief Complaint   Patient presents with    Follow-up     6 month     Diabetes     Foot exam shoes & sock remove , A1C done        History of Present Illness     Patient in the office with approximately 3-4 day history of acute onset of right hand swelling and pain  He denies any recent injuries  Does have a history of tendinitis of his forearm for which she has received cortisone injections  He has been out of work for the past 3 days due to his condition    Diabetes        Review of Systems   Review of Systems   Constitutional: Negative  Respiratory: Negative  Cardiovascular: Negative      Musculoskeletal:        As per HPI       Active Problem List     Patient Active Problem List   Diagnosis    Essential hypertension    Diabetes mellitus type 2, uncontrolled    Elevated lipoprotein(a)    Microalbuminuria    Obesity    Peripheral edema    Hypomagnesemia    [facility]    Alcohol abuse    Pain and swelling of left shoulder    Onychomycosis    Arthritis of right wrist    Acute on chronic diastolic congestive heart failure (HCC)    Impingement syndrome of left shoulder    COVID-19 virus infection    Tendonitis    Type 2 diabetes mellitus, without long-term current use of insulin ([facility])       Past Medical History:  Past Medical History:   Diagnosis Date    [facility] kidney injury ([facility] ) 9/5/2020    Diabetes mellitus ([facility] )     Hypertension     Inguinal hernia     3/25/15    Onychomycosis     5/24/17    Type 2 diabetes mellitus ([facility] ) 05/01/2012       Past Surgical History:  Past Surgical History:   Procedure Laterality Date    ARTHROSCOPY KNEE      with Medial and Lateral Meniscus Repair    HERNIA REPAIR      umbilical and inguinal hernia repairs (left)       Family History:  Family History   Problem Relation Age of Onset    Hypertension Mother         essential    Chronic bronchitis Father     Prostate cancer Father     Breast cancer Sister        Social History:  Social History     Socioeconomic History    Marital status: /Civil Union     Spouse name: Not on file    Number of children: Not on file    Years of education: Not on file    Highest education level: Not on file   Occupational History    Occupation: [employer] worker   Tobacco Use    Smoking status: Former Smoker     Types: Cigars     Start date: 6/22/2020    Smokeless tobacco: Never Used    Tobacco comment: current every day smoker, per [employer]   Vaping Use    Vaping Use: Never used   Substance and Sexual Activity    Alcohol use:  Yes     Alcohol/week: 3 0 standard drinks     Types: 2 Cans of beer, 1 Standard drinks or equivalent per week     Comment: weekend: 1 glass of christian or 2 beers    Drug use: No    Sexual activity: Yes   Other Topics Concern    Not on file   Social History Narrative    Alcohol dependence    Nicotine dependence    Denied, alcohol Use    Marital problems     Social Determinants of Health     Financial Resource Strain: Not on file   Food Insecurity: Not on file   Transportation Needs: Not on file   Physical Activity: Not on file   Stress: Not on file   Social Connections: Not on file   Intimate Partner Violence: Not on file   Housing Stability: Not on file       Objective     Vitals:    09/07/22 1123   BP: 120/76   Pulse: 93   Resp: 18   Temp: 98 °F (36 7 °C)   SpO2: 97%     Wt Readings from Last 3 Encounters:   09/07/22 119 kg (261 lb 4 oz)   06/09/22 112 kg (248 lb)   05/24/22 112 kg (248 lb)       Physical Exam  Constitutional:       General: He is not in acute distress  Appearance: Normal appearance  He is not ill-appearing  Cardiovascular:      Pulses: no weak pulses          Dorsalis pedis pulses are 2+ on the right side and 2+ on the left side  Posterior tibial pulses are 2+ on the right side and 2+ on the left side  Musculoskeletal:      Comments: Patient's right hand has some swelling from the wrist to the fingers  He has difficulties making a full fist due to swelling and pain  Plus two radial pulse  Good capillary refill  No obvious signs of infection   Feet:      Right foot:      Skin integrity: No ulcer, skin breakdown, erythema, warmth, callus or dry skin  Left foot:      Skin integrity: No ulcer, skin breakdown, erythema, warmth, callus or dry skin  Neurological:      Mental Status: He is alert  Patient's shoes and socks removed  Right Foot/Ankle   Right Foot Inspection  Skin Exam: skin normal and skin intact  No dry skin, no warmth, no callus, no erythema, no maceration, no abnormal color, no pre-ulcer, no ulcer and no callus  Toe Exam: ROM and strength within normal limits  Sensory   Vibration: intact  Monofilament testing: intact    Vascular  The right DP pulse is 2+  The right PT pulse is 2+       Left Foot/Ankle  Left Foot Inspection  Skin Exam: skin normal and skin intact  No dry skin, no warmth, no erythema, no maceration, normal color, no pre-ulcer, no ulcer and no callus  Toe Exam: ROM and strength within normal limits  Sensory   Vibration: intact  Monofilament testing: intact    Vascular  The left DP pulse is 2+  The left PT pulse is 2+  Assign Risk Category  No deformity present  No loss of protective sensation  No weak pulses  Risk: 0    Patient with chronic long toenails with suspected onychomycosis      Pertinent Laboratory/Diagnostic Studies:  Lab Results   Component Value Date    GLUCOSE 179 (H) 09/04/2020    BUN 11 05/23/2022    CREATININE 0 88 05/23/2022    CALCIUM 8 3 (L) 05/23/2022     12/21/2017    K 3 5 05/23/2022    CO2 26 05/23/2022     05/23/2022     Lab Results   Component Value Date    ALT 7 05/23/2022    AST 16 05/23/2022    ALKPHOS 57 05/23/2022    BILITOT 1 3 (H) 12/21/2017       Lab Results   Component Value Date    WBC 8 57 05/23/2022    HGB 12 1 05/23/2022    HCT 35 4 (L) 05/23/2022    MCV 98 05/23/2022     05/23/2022       Lab Results   Component Value Date    TSH 3 58 03/14/2022       Lab Results   Component Value Date    CHOL 153 12/21/2017     Lab Results   Component Value Date    TRIG 86 03/14/2022     Lab Results   Component Value Date    HDL 47 03/14/2022     Lab Results   Component Value Date    LDLCALC 104 (H) 03/14/2022     Lab Results   Component Value Date    HGBA1C 6 2 09/07/2022       Results for orders placed or performed in visit on 09/07/22   POCT hemoglobin A1c   Result Value Ref Range    Hemoglobin A1C 6 2 6 5       Orders Placed This Encounter   Procedures    POCT hemoglobin A1c       ALLERGIES:  No Known Allergies    Current Medications     Current Outpatient Medications   Medication Sig Dispense Refill    amLODIPine (NORVASC) 10 mg tablet TAKE 1 TABLET BY MOUTH EVERY DAY 90 tablet 1    Blood Pressure KIT Twice a day periodically 1 each 0    furosemide (LASIX) 40 mg tablet TAKE 1 TABLET BY MOUTH TWICE A  tablet 1    glucose blood test strip 1 each by Other route daily 180 each 5    Januvia 100 MG tablet TAKE 1 TABLET BY MOUTH EVERY DAY 90 tablet 1    Lancets (ONETOUCH ULTRASOFT) lancets by Does not apply route      metoprolol succinate (TOPROL-XL) 25 mg 24 hr tablet TAKE 1 TABLET (25 MG TOTAL) BY MOUTH DAILY START ON MONDAY, 09/06/2021  90 tablet 1    olmesartan (BENICAR) 40 mg tablet TAKE 1 TABLET BY MOUTH EVERY DAY 90 tablet 1    predniSONE 20 mg tablet 2 tabs daily X 5 10 tablet 0    Flovent  MCG/ACT inhaler INHALE 1 PUFF 2 (TWO) TIMES A DAY RINSE MOUTH AFTER USE  (Patient not taking: No sig reported) 12 g 3    predniSONE 20 mg tablet 2 tabs po q day with food (Patient not taking: Reported on 9/7/2022) 10 tablet 0     No current facility-administered medications for this visit           Health Maintenance     Health Maintenance   Topic Date Due    Pneumococcal Vaccine: Pediatrics (0 to 5 Years) and At-Risk Patients (6 to 59 Years) (1 - PCV) Never done    DM Eye Exam  Never done    Annual Physical  Never done    COVID-19 Vaccine (4 - Booster for [employer] series) 04/15/2022    Influenza Vaccine (1) 09/01/2022    BMI: Followup Plan  09/08/2022    Diabetic Foot Exam  09/08/2022    HEMOGLOBIN A1C  03/07/2023    Depression Screening  03/14/2023    BMI: Adult  09/07/2023    Colorectal Cancer Screening  05/13/2025    DTaP,Tdap,and Td Vaccines (3 - Td or Tdap) 09/05/2030    HIV Screening  Completed    Hepatitis C Screening  Completed    HIB Vaccine  Aged Out    Hepatitis B Vaccine  Aged Out    IPV Vaccine  Aged Out    Hepatitis A Vaccine  Aged Out    Meningococcal ACWY Vaccine  Aged Out    HPV Vaccine  Aged Out     Immunization History   Administered Date(s) Administered    COVID-19 PFIZER VACCINE 0 3 ML IM 04/12/2021, 05/03/2021, 12/15/2021    Influenza, injectable, quadrivalent, preservative free 0 5 mL 10/27/2020    Influenza, recombinant, quadrivalent,injectable, preservative free 10/21/2019    Influenza, seasonal, injectable 09/23/2015    Tdap 10/21/2019, 48/40/5568       Karri Chavira MD

## 2022-10-13 ENCOUNTER — TELEPHONE (OUTPATIENT)
Dept: OBGYN CLINIC | Facility: CLINIC | Age: 57
End: 2022-10-13

## 2022-10-13 NOTE — TELEPHONE ENCOUNTER
Please give patient a call to schedule appointment with  Dr Nithya Marshall patient is experiencing  pain in rt hand

## 2022-10-17 ENCOUNTER — OFFICE VISIT (OUTPATIENT)
Dept: FAMILY MEDICINE CLINIC | Facility: CLINIC | Age: 57
End: 2022-10-17
Payer: COMMERCIAL

## 2022-10-17 VITALS
OXYGEN SATURATION: 95 % | SYSTOLIC BLOOD PRESSURE: 101 MMHG | WEIGHT: 265.25 LBS | HEART RATE: 82 BPM | RESPIRATION RATE: 18 BRPM | BODY MASS INDEX: 39.29 KG/M2 | HEIGHT: 69 IN | DIASTOLIC BLOOD PRESSURE: 60 MMHG | TEMPERATURE: 97.8 F

## 2022-10-17 DIAGNOSIS — M10.9 ACUTE GOUT OF RIGHT HAND, UNSPECIFIED CAUSE: Primary | ICD-10-CM

## 2022-10-17 PROCEDURE — 99213 OFFICE O/P EST LOW 20 MIN: CPT | Performed by: FAMILY MEDICINE

## 2022-10-17 RX ORDER — PREDNISONE 20 MG/1
TABLET ORAL
Qty: 14 TABLET | Refills: 0 | Status: SHIPPED | OUTPATIENT
Start: 2022-10-17

## 2022-10-17 NOTE — PROGRESS NOTES
FAMILY PRACTICE OFFICE VISIT       NAME: Perico Cheema  AGE: 61 y o  SEX: male       : 1965        MRN: [id number]    DATE: 10/17/2022  TIME: 5:30 PM    Assessment and Plan     Problem List Items Addressed This Visit        Musculoskeletal and Integument    Acute gout of right hand - Primary     Gout versus tendinitis  Patient given prescription for prednisone to take 60 mg x 2 days, 40 x 2, 20 x2, 10 x 2 days  Patient will call if symptoms persist without improvement and there medication completed  Relevant Medications    predniSONE 20 mg tablet    Other Relevant Orders    IRIS Diabetic eye exam              Chief Complaint     Chief Complaint   Patient presents with   • swollen hand      Rt        History of Present Illness     Patient in the office with approximate 6 day history of right hand swelling which she states was worse last week compared to 2 day  He has pain from the palm of his hands to mid forearm  He denies any recent injury or trauma  He has as similar recurrence several weeks to months whereby he was seen by orthopedist and treated for tendinitis  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      Review of Systems   Review of Systems   Constitutional: Negative  Eyes: Negative  Respiratory: Negative  Cardiovascular: Negative  Gastrointestinal: Negative  Musculoskeletal: Positive for joint swelling  Psychiatric/Behavioral: Negative          Active Problem List     Patient Active Problem List   Diagnosis   • Essential hypertension   • Diabetes mellitus type 2, uncontrolled   • Elevated lipoprotein(a)   • Microalbuminuria   • Obesity   • Peripheral edema   • Hypomagnesemia   • CHF (congestive heart failure) (HCC)   • Alcohol abuse   • Pain and swelling of left shoulder   • Onychomycosis   • Arthritis of right wrist   • Acute on chronic diastolic congestive heart failure (HCC)   • Impingement syndrome of left shoulder   • COVID-19 virus infection   • Tendonitis   • Type 2 diabetes mellitus, without long-term current use of insulin (HCC)   • Acute gout of right hand       Past Medical History:  Past Medical History:   Diagnosis Date   • Acute kidney injury ([facility] ) 9/5/2020   • Diabetes mellitus ([facility] )    • Hypertension    • Inguinal hernia     3/25/15   • Onychomycosis     5/24/17   • Type 2 diabetes mellitus ([facility] ) 05/01/2012       Past Surgical History:  Past Surgical History:   Procedure Laterality Date   • ARTHROSCOPY KNEE      with Medial and Lateral Meniscus Repair   • HERNIA REPAIR      umbilical and inguinal hernia repairs (left)       Family History:  Family History   Problem Relation Age of Onset   • Hypertension Mother         essential   • Chronic bronchitis Father    • Prostate cancer Father    • Breast cancer Sister        Social History:  Social History     Socioeconomic History   • Marital status: /Civil Union     Spouse name: Not on file   • Number of children: Not on file   • Years of education: Not on file   • Highest education level: Not on file   Occupational History   • Occupation: [employer] worker   Tobacco Use   • Smoking status: Former Smoker     Types: Cigars     Start date: 6/22/2020   • Smokeless tobacco: Never Used   • Tobacco comment: current every day smoker, per [employer]   Vaping Use   • Vaping Use: Never used   Substance and Sexual Activity   • Alcohol use:  Yes     Alcohol/week: 3 0 standard drinks     Types: 2 Cans of beer, 1 Standard drinks or equivalent per week     Comment: weekend: 1 glass of christian or 2 beers   • Drug use: No   • Sexual activity: Yes   Other Topics Concern   • Not on file   Social History Narrative    Alcohol dependence    Nicotine dependence    Denied, alcohol Use    Marital problems     Social Determinants of Health     Financial Resource Strain: Not on file   Food Insecurity: Not on file   Transportation Needs: Not on file   Physical Activity: Not on file   Stress: Not on file   Social Connections: Not on file   Intimate Partner Violence: Not on file   Housing Stability: Not on file       Objective     Vitals:    10/17/22 1613   BP: 101/60   Pulse: 82   Resp: 18   Temp: 97 8 °F (36 6 °C)   SpO2: 95%     Wt Readings from Last 3 Encounters:   10/17/22 120 kg (265 lb 4 oz)   09/07/22 119 kg (261 lb 4 oz)   06/09/22 112 kg (248 lb)       Physical Exam  Constitutional:       General: He is not in acute distress  Appearance: Normal appearance  He is not ill-appearing  Musculoskeletal:         General: Swelling and tenderness present  No signs of injury  Comments: Patient with significant dorsal swelling of his right hand to just above wrist area  Normal range of motion of wrist   Grasp 5/5 strength  Mild tenderness to palpation of swelling  Plus two radial pulse  Neurological:      Mental Status: He is alert           Pertinent Laboratory/Diagnostic Studies:  Lab Results   Component Value Date    GLUCOSE 179 (H) 09/04/2020    BUN 11 05/23/2022    CREATININE 0 88 05/23/2022    CALCIUM 8 3 (L) 05/23/2022     12/21/2017    K 3 5 05/23/2022    CO2 26 05/23/2022     05/23/2022     Lab Results   Component Value Date    ALT 7 05/23/2022    AST 16 05/23/2022    ALKPHOS 57 05/23/2022    BILITOT 1 3 (H) 12/21/2017       Lab Results   Component Value Date    WBC 8 57 05/23/2022    HGB 12 1 05/23/2022    HCT 35 4 (L) 05/23/2022    MCV 98 05/23/2022     05/23/2022       Lab Results   Component Value Date    TSH 3 58 03/14/2022       Lab Results   Component Value Date    CHOL 153 12/21/2017     Lab Results   Component Value Date    TRIG 86 03/14/2022     Lab Results   Component Value Date    HDL 47 03/14/2022     Lab Results   Component Value Date    LDLCALC 104 (H) 03/14/2022     Lab Results   Component Value Date    HGBA1C 6 2 09/07/2022       Results for orders placed or performed in visit on 09/07/22   POCT hemoglobin A1c   Result Value Ref Range    Hemoglobin A1C 6 2 6 5       Orders Placed This Encounter   Procedures   • IRIS Diabetic eye exam       ALLERGIES:  No Known Allergies    Current Medications     Current Outpatient Medications   Medication Sig Dispense Refill   • amLODIPine (NORVASC) 10 mg tablet TAKE 1 TABLET BY MOUTH EVERY DAY 90 tablet 1   • Blood Pressure KIT Twice a day periodically 1 each 0   • furosemide (LASIX) 40 mg tablet TAKE 1 TABLET BY MOUTH TWICE A  tablet 1   • glucose blood test strip 1 each by Other route daily 180 each 5   • Januvia 100 MG tablet TAKE 1 TABLET BY MOUTH EVERY DAY 90 tablet 1   • Lancets (ONETOUCH ULTRASOFT) lancets by Does not apply route     • olmesartan (BENICAR) 40 mg tablet TAKE 1 TABLET BY MOUTH EVERY DAY 90 tablet 1   • predniSONE 20 mg tablet 2 tabs daily X 5 10 tablet 0   • predniSONE 20 mg tablet 3 tabs X 2 days, 2 tabs X 2 days, 1 tab x 2 days, 1/2 tab x 2 dyas 14 tablet 0   • Flovent  MCG/ACT inhaler INHALE 1 PUFF 2 (TWO) TIMES A DAY RINSE MOUTH AFTER USE  (Patient not taking: No sig reported) 12 g 3   • metoprolol succinate (TOPROL-XL) 25 mg 24 hr tablet TAKE 1 TABLET (25 MG TOTAL) BY MOUTH DAILY START ON MONDAY, 09/06/2021  90 tablet 1   • predniSONE 20 mg tablet 2 tabs po q day with food (Patient not taking: No sig reported) 10 tablet 0     No current facility-administered medications for this visit           Health Maintenance     Health Maintenance   Topic Date Due   • Hepatitis A Vaccine (1 of 2 - Risk 2-dose series) Never done   • Pneumococcal Vaccine: Pediatrics (0 to 5 Years) and At-Risk Patients (6 to 59 Years) (1 - PCV) Never done   • DM Eye Exam  Never done   • Annual Physical  Never done   • Hepatitis B Vaccine (1 of 3 - Risk 3-dose series) Never done   • COVID-19 Vaccine (4 - Booster for Pfizer series) 04/15/2022   • Influenza Vaccine (1) 09/01/2022   • BMI: Followup Plan  09/08/2022   • HEMOGLOBIN A1C  03/07/2023   • Diabetic Foot Exam  09/13/2023   • Depression Screening  10/17/2023   • BMI: Adult  10/17/2023   • Colorectal Cancer Screening  05/13/2025   • DTaP,Tdap,and Td Vaccines (3 - Td or Tdap) 09/05/2030   • HIV Screening  Completed   • Hepatitis C Screening  Completed   • HIB Vaccine  Aged Out   • IPV Vaccine  Aged Out   • Meningococcal ACWY Vaccine  Aged Out   • HPV Vaccine  Aged Out     Immunization History   Administered Date(s) Administered   • COVID-19 PFIZER VACCINE 0 3 ML IM 04/12/2021, 05/03/2021, 12/15/2021   • Influenza, injectable, quadrivalent, preservative free 0 5 mL 10/27/2020   • Influenza, recombinant, quadrivalent,injectable, preservative free 10/21/2019   • Influenza, seasonal, injectable 09/23/2015   • Tdap 10/21/2019, 67/33/3209       Soham Walsh

## 2022-10-17 NOTE — ASSESSMENT & PLAN NOTE
Gout versus tendinitis  Patient given prescription for prednisone to take 60 mg x 2 days, 40 x 2, 20 x2, 10 x 2 days  Patient will call if symptoms persist without improvement and there medication completed

## 2022-10-18 LAB
LEFT EYE DIABETIC RETINOPATHY: NORMAL
LEFT EYE IMAGE QUALITY: NORMAL
LEFT EYE MACULAR EDEMA: NORMAL
LEFT EYE OTHER RETINOPATHY: NORMAL
RIGHT EYE DIABETIC RETINOPATHY: NORMAL
RIGHT EYE IMAGE QUALITY: NORMAL
RIGHT EYE MACULAR EDEMA: NORMAL
RIGHT EYE OTHER RETINOPATHY: NORMAL
SEVERITY (EYE EXAM): NORMAL

## 2022-11-07 DIAGNOSIS — J45.20 MILD INTERMITTENT REACTIVE AIRWAY DISEASE WITHOUT COMPLICATION: ICD-10-CM

## 2022-11-07 RX ORDER — FLUTICASONE PROPIONATE 220 UG/1
AEROSOL, METERED RESPIRATORY (INHALATION)
Qty: 12 G | Refills: 5 | Status: SHIPPED | OUTPATIENT
Start: 2022-11-07

## 2022-11-11 DIAGNOSIS — J45.20 MILD INTERMITTENT REACTIVE AIRWAY DISEASE WITHOUT COMPLICATION: ICD-10-CM

## 2022-11-11 RX ORDER — FLUTICASONE PROPIONATE 220 UG/1
AEROSOL, METERED RESPIRATORY (INHALATION)
Refills: 5 | OUTPATIENT
Start: 2022-11-11

## 2022-11-21 ENCOUNTER — TELEPHONE (OUTPATIENT)
Dept: OBGYN CLINIC | Facility: HOSPITAL | Age: 57
End: 2022-11-21

## 2022-11-21 NOTE — TELEPHONE ENCOUNTER
Caller: patient wife    Doctor: Deborah Elizabeth    Reason for call: reschedule last cancelled appt    Call back#: [phone]

## 2022-11-23 DIAGNOSIS — I16.0 HYPERTENSIVE URGENCY: ICD-10-CM

## 2022-11-23 DIAGNOSIS — E11.65 UNCONTROLLED TYPE 2 DIABETES MELLITUS WITH HYPERGLYCEMIA (HCC): ICD-10-CM

## 2022-11-23 DIAGNOSIS — I50.9 CHF (CONGESTIVE HEART FAILURE) (HCC): ICD-10-CM

## 2022-11-24 RX ORDER — AMLODIPINE BESYLATE 10 MG/1
10 TABLET ORAL DAILY
Qty: 90 TABLET | Refills: 1 | Status: SHIPPED | OUTPATIENT
Start: 2022-11-24

## 2022-11-24 RX ORDER — METOPROLOL SUCCINATE 25 MG/1
25 TABLET, EXTENDED RELEASE ORAL DAILY
Qty: 90 TABLET | Refills: 1 | Status: SHIPPED | OUTPATIENT
Start: 2022-11-24 | End: 2022-12-24

## 2022-11-24 RX ORDER — OLMESARTAN MEDOXOMIL 40 MG/1
40 TABLET ORAL DAILY
Qty: 90 TABLET | Refills: 1 | Status: SHIPPED | OUTPATIENT
Start: 2022-11-24

## 2022-12-22 ENCOUNTER — OFFICE VISIT (OUTPATIENT)
Dept: OBGYN CLINIC | Facility: CLINIC | Age: 57
End: 2022-12-22

## 2022-12-22 ENCOUNTER — OFFICE VISIT (OUTPATIENT)
Dept: FAMILY MEDICINE CLINIC | Facility: CLINIC | Age: 57
End: 2022-12-22

## 2022-12-22 VITALS
BODY MASS INDEX: 39.93 KG/M2 | WEIGHT: 269.6 LBS | HEART RATE: 94 BPM | HEIGHT: 69 IN | DIASTOLIC BLOOD PRESSURE: 83 MMHG | SYSTOLIC BLOOD PRESSURE: 130 MMHG

## 2022-12-22 VITALS
HEIGHT: 69 IN | DIASTOLIC BLOOD PRESSURE: 80 MMHG | WEIGHT: 271 LBS | BODY MASS INDEX: 40.14 KG/M2 | TEMPERATURE: 97.8 F | SYSTOLIC BLOOD PRESSURE: 120 MMHG | OXYGEN SATURATION: 99 % | HEART RATE: 90 BPM | RESPIRATION RATE: 16 BRPM

## 2022-12-22 DIAGNOSIS — Z23 INFLUENZA VACCINE NEEDED: Primary | ICD-10-CM

## 2022-12-22 DIAGNOSIS — M19.031 ARTHRITIS OF RIGHT WRIST: ICD-10-CM

## 2022-12-22 DIAGNOSIS — E11.9 TYPE 2 DIABETES MELLITUS WITHOUT COMPLICATION, WITHOUT LONG-TERM CURRENT USE OF INSULIN (HCC): ICD-10-CM

## 2022-12-22 DIAGNOSIS — M19.031 OSTEOARTHRITIS OF RIGHT WRIST, UNSPECIFIED OSTEOARTHRITIS TYPE: ICD-10-CM

## 2022-12-22 DIAGNOSIS — Z00.00 WELL ADULT EXAM: ICD-10-CM

## 2022-12-22 DIAGNOSIS — S63.8X1D TEAR OF RIGHT SCAPHOLUNATE LIGAMENT, SUBSEQUENT ENCOUNTER: Primary | ICD-10-CM

## 2022-12-22 RX ORDER — LIDOCAINE HYDROCHLORIDE 10 MG/ML
0.5 INJECTION, SOLUTION INFILTRATION; PERINEURAL
Status: COMPLETED | OUTPATIENT
Start: 2022-12-22 | End: 2022-12-22

## 2022-12-22 RX ORDER — LORAZEPAM 1 MG/1
1 TABLET ORAL AS NEEDED
COMMUNITY
Start: 2022-11-08

## 2022-12-22 RX ORDER — ALBUTEROL SULFATE 90 UG/1
2 AEROSOL, METERED RESPIRATORY (INHALATION) AS NEEDED
COMMUNITY
Start: 2022-11-08

## 2022-12-22 RX ORDER — BUPIVACAINE HYDROCHLORIDE 2.5 MG/ML
0.5 INJECTION, SOLUTION INFILTRATION; PERINEURAL
Status: COMPLETED | OUTPATIENT
Start: 2022-12-22 | End: 2022-12-22

## 2022-12-22 RX ORDER — TRIAMCINOLONE ACETONIDE 40 MG/ML
40 INJECTION, SUSPENSION INTRA-ARTICULAR; INTRAMUSCULAR
Status: COMPLETED | OUTPATIENT
Start: 2022-12-22 | End: 2022-12-22

## 2022-12-22 RX ADMIN — LIDOCAINE HYDROCHLORIDE 0.5 ML: 10 INJECTION, SOLUTION INFILTRATION; PERINEURAL at 09:36

## 2022-12-22 RX ADMIN — TRIAMCINOLONE ACETONIDE 40 MG: 40 INJECTION, SUSPENSION INTRA-ARTICULAR; INTRAMUSCULAR at 09:36

## 2022-12-22 RX ADMIN — BUPIVACAINE HYDROCHLORIDE 0.5 ML: 2.5 INJECTION, SOLUTION INFILTRATION; PERINEURAL at 09:36

## 2022-12-22 NOTE — PROGRESS NOTES
ASSESSMENT/PLAN:      61 y o  male with right wrist SL tear     The decision was made to proceed with a right wrist radiocarpal joint CSI  The CSI was performed in the office without complication  Post injection protocol/expectations were reviewed  The CSI may be repeated on a 3 month basis, as needed  Follow up in the office as needed if symptoms worsen or fail to improve  The patient verbalized understanding of exam findings and treatment plan  We engaged in the shared decision-making process and treatment options were discussed at length with the patient  Surgical and conservative management discussed today along with risks and benefits  Diagnoses and all orders for this visit:    Tear of right scapholunate ligament, subsequent encounter  -     Medium joint arthrocentesis: R radiocarpal    Osteoarthritis of right wrist, unspecified osteoarthritis type  -     Medium joint arthrocentesis: R radiocarpal    Other orders  -     LORazepam (ATIVAN) 1 mg tablet; Take 1 tablet by mouth as needed  -     albuterol (PROVENTIL HFA,VENTOLIN HFA) 90 mcg/act inhaler; Inhale 2 puffs as needed      Follow Up:  Return if symptoms worsen or fail to improve  To Do Next Visit:  Re-evaluation of current issue    ____________________________________________________________________________________________________________________________________________      CHIEF COMPLAINT:  Chief Complaint   Patient presents with   • Right Wrist - Pain, Follow-up   • Right Hand - Pain, Follow-up, Numbness, Tingling       SUBJECTIVE:  John Barbour is a 61y o  year old LHD male who presents to the office today for a follow up regarding his right wrist  He was last seen by Raudel Gomez PA-C on 5/23/22, at which time he underwent a right wrist radiocarpal joint CSI due to a SL tear and wrist arthritis  He notes that the CSI was beneficial for him until aprox  2 months ago   He notes pain to the dorsal radial aspect of his right wrist  He will take Tylenol as needed for pain control  I have personally reviewed all the relevant PMH, PSH, SH, FH, Medications and allergies  PAST MEDICAL HISTORY:  Past Medical History:   Diagnosis Date   • Acute kidney injury ([facility] ) 9/5/2020   • Diabetes mellitus ([facility] )    • Hypertension    • Inguinal hernia     3/25/15   • Onychomycosis     5/24/17   • Type 2 diabetes mellitus ([facility] ) 05/01/2012       PAST SURGICAL HISTORY:  Past Surgical History:   Procedure Laterality Date   • ARTHROSCOPY KNEE      with Medial and Lateral Meniscus Repair   • HERNIA REPAIR      umbilical and inguinal hernia repairs (left)       FAMILY HISTORY:  Family History   Problem Relation Age of Onset   • Hypertension Mother         essential   • Chronic bronchitis Father    • Prostate cancer Father    • Breast cancer Sister        SOCIAL HISTORY:  Social History     Tobacco Use   • Smoking status: Former     Types: Cigars     Start date: 6/22/2020   • Smokeless tobacco: Never   • Tobacco comments:     current every day smoker, per [employer]   Vaping Use   • Vaping Use: Never used   Substance Use Topics   • Alcohol use:  Yes     Alcohol/week: 3 0 standard drinks     Types: 2 Cans of beer, 1 Standard drinks or equivalent per week     Comment: weekend: 1 glass of christian or 2 beers   • Drug use: No       MEDICATIONS:    Current Outpatient Medications:   •  albuterol (PROVENTIL HFA,VENTOLIN HFA) 90 mcg/act inhaler, Inhale 2 puffs as needed, Disp: , Rfl:   •  amLODIPine (NORVASC) 10 mg tablet, Take 1 tablet (10 mg total) by mouth daily, Disp: 90 tablet, Rfl: 1  •  Blood Pressure KIT, Twice a day periodically, Disp: 1 each, Rfl: 0  •  Flovent  MCG/ACT inhaler, INHALE 1 PUFF BY MOUTH 2 (TWO) TIMES A DAY RINSE MOUTH AFTER USE , Disp: 12 g, Rfl: 5  •  furosemide (LASIX) 40 mg tablet, TAKE 1 TABLET BY MOUTH TWICE A DAY, Disp: 180 tablet, Rfl: 1  •  glucose blood test strip, 1 each by Other route daily, Disp: 180 each, Rfl: 5  •  Lancets (ONETOUCH ULTRASOFT) lancets, by Does not apply route, Disp: , Rfl:   •  LORazepam (ATIVAN) 1 mg tablet, Take 1 tablet by mouth as needed, Disp: , Rfl:   •  metoprolol succinate (TOPROL-XL) 25 mg 24 hr tablet, Take 1 tablet (25 mg total) by mouth daily Start on Monday, 09/06/2021 , Disp: 90 tablet, Rfl: 1  •  olmesartan (BENICAR) 40 mg tablet, Take 1 tablet (40 mg total) by mouth daily, Disp: 90 tablet, Rfl: 1  •  predniSONE 20 mg tablet, 3 tabs X 2 days, 2 tabs X 2 days, 1 tab x 2 days, 1/2 tab x 2 dyas, Disp: 14 tablet, Rfl: 0  •  sitaGLIPtin (Januvia) 100 mg tablet, Take 1 tablet (100 mg total) by mouth daily, Disp: 90 tablet, Rfl: 1  •  predniSONE 20 mg tablet, 2 tabs po q day with food (Patient not taking: Reported on 9/7/2022), Disp: 10 tablet, Rfl: 0  •  predniSONE 20 mg tablet, 2 tabs daily X 5 (Patient not taking: Reported on 12/22/2022), Disp: 10 tablet, Rfl: 0    ALLERGIES:  No Known Allergies    REVIEW OF SYSTEMS:  Review of Systems   Constitutional: Negative for chills, fever and unexpected weight change  HENT: Negative for hearing loss, nosebleeds and sore throat  Eyes: Negative for pain, redness and visual disturbance  Respiratory: Negative for cough, shortness of breath and wheezing  Cardiovascular: Negative for chest pain, palpitations and leg swelling  Gastrointestinal: Negative for abdominal pain, nausea and vomiting  Endocrine: Negative for polydipsia and polyuria  Genitourinary: Negative for difficulty urinating and hematuria  Musculoskeletal: Negative for arthralgias, joint swelling and myalgias  Skin: Negative for rash and wound  Neurological: Negative for dizziness, numbness and headaches  Psychiatric/Behavioral: Negative for decreased concentration, dysphoric mood and suicidal ideas  The patient is not nervous/anxious          VITALS:  Vitals:    12/22/22 0915   BP: 130/83   Pulse: 94       LABS:  HgA1c:   Lab Results   Component Value Date    HGBA1C 6 2 09/07/2022 BMP:   Lab Results   Component Value Date    GLUCOSE 179 (H) 09/04/2020    CALCIUM 8 3 (L) 05/23/2022     12/21/2017    K 3 5 05/23/2022    CO2 26 05/23/2022     05/23/2022    BUN 11 05/23/2022    CREATININE 0 88 05/23/2022       _____________________________________________________  PHYSICAL EXAMINATION:  General: well developed and well nourished, alert, oriented times 3 and appears comfortable  Psychiatric: Normal  HEENT: Normocephalic, Atraumatic Trachea Midline, No torticollis  Pulmonary: No audible wheezing or respiratory distress   Cardiovascular: No pitting edema, 2+ radial pulse   Abdominal/GI: abdomen non tender, non distended   Skin: No masses, erythema, lacerations, fluctation, ulcerations  Neurovascular: Sensation Intact to the Median, Ulnar, Radial Nerve, Motor Intact to the Median, Ulnar, Radial Nerve and Pulses Intact  Musculoskeletal: Normal, except as noted in detailed exam and in HPI  MUSCULOSKELETAL EXAMINATION:    Right wrist:     No erythema, ecchymosis or edema  Radiocarpal joint is tender to palpation   SL interval is tender to palpation   No other soft tissue or bony tenderness noted   Wrist ROM WNL's   Full composite fist   2+ radial pulse     ___________________________________________________  STUDIES REVIEWED:  No new imaging to review           PROCEDURES PERFORMED:  Medium joint arthrocentesis: R radiocarpal  Universal Protocol:  Consent: Verbal consent obtained  Written consent not obtained  Risks and benefits: risks, benefits and alternatives were discussed  Consent given by: patient  Time out: Immediately prior to procedure a "time out" was called to verify the correct patient, procedure, equipment, support staff and site/side marked as required    Site marked: the operative site was marked  Patient identity confirmed: verbally with patient    Supporting Documentation  Indications: pain   Procedure Details  Location: wrist - R radiocarpal  Preparation: Patient was prepped and draped in the usual sterile fashion  Needle size: 25 G  Ultrasound guidance: no  Medications administered: 0 5 mL bupivacaine 0 25 %; 0 5 mL lidocaine 1 %; 40 mg triamcinolone acetonide 40 mg/mL    Patient tolerance: patient tolerated the procedure well with no immediate complications  Dressing:  Sterile dressing applied           _____________________________________________________      Scribe Attestation    I,:  Katherine Chacon am acting as a scribe while in the presence of the attending physician :       I,:  Saray Correa MD personally performed the services described in this documentation    as scribed in my presence :

## 2022-12-22 NOTE — ASSESSMENT & PLAN NOTE
Diabetes  Previous A1c was stable  He will continue current regimen of medications    Lab Results   Component Value Date    HGBA1C 6 2 09/07/2022

## 2022-12-22 NOTE — PROGRESS NOTES
FAMILY PRACTICE OFFICE VISIT       NAME: Jojo Quispe  AGE: 61 y o  SEX: male       : 1965        MRN: [id number]    DATE: 2022  TIME: 5:15 PM    Assessment and Plan     Problem List Items Addressed This Visit        Endocrine    Type 2 diabetes mellitus, without long-term current use of insulin ([address] )     Diabetes  Previous A1c was stable  He will continue current regimen of medications  Lab Results   Component Value Date    HGBA1C 6 2 2022               Musculoskeletal and Integument    Arthritis of right wrist     Arthritis of right wrist   Patient recently received a therapeutic injection  He will follow-up with orthopedist as recommended  He was given a note to be out of work at least until 2023 and that is provided that he feels 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 [place] without any difficulties  Patient normally has to climb 50 foot ladders to do his job  I explained this would not be in his best interest if he has any compromised use of his right hand or wrist            Other    Well adult exam     Well adult  Overall the patient appears to be in stable health  He recently completed an inpatient alcohol rehabilitation program   His colonoscopy is up-to-date         Influenza vaccine needed - Primary    Relevant Orders    influenza vaccine, quadrivalent, recombinant, PF, 0 5 mL, for patients 18 yr+ (FLUBLOK) (Completed)     Patient received his annual influenza vaccine today  Chief Complaint     Chief Complaint   Patient presents with   • Well Check       History of Present Illness     Patient in the office for annual wellness exam   He recently completed an inpatient alcohol rehabilitation program in [place]  He also recently saw orthopedic hand surgeon for evaluation of torn ligament of his right wrist area  He received injection of Novocain and steroid  He has been unable to be at work since 2022    He would like to return to work January 2, 2023 if his right wrist and hand swelling and pain improves after injection  Review of Systems   Review of Systems   Constitutional: Negative  HENT: Negative  Eyes: Negative  Respiratory: Negative  Cardiovascular: Negative  Gastrointestinal: Negative  Genitourinary: Negative  Musculoskeletal:        As per HPI   Skin: Negative  Neurological: Negative  Psychiatric/Behavioral: Negative          Active Problem List     Patient Active Problem List   Diagnosis   • Essential hypertension   • Diabetes mellitus type 2, uncontrolled   • Elevated lipoprotein(a)   • Microalbuminuria   • Obesity   • Peripheral edema   • Hypomagnesemia   • CHF (congestive heart failure) ([facility])   • Alcohol abuse   • Pain and swelling of left shoulder   • Onychomycosis   • Arthritis of right wrist   • Acute on chronic diastolic congestive heart failure ([facility])   • Impingement syndrome of left shoulder   • COVID-19 virus infection   • Tendonitis   • Type 2 diabetes mellitus, without long-term current use of insulin ([facility])   • Acute gout of right hand   • Well adult exam   • Influenza vaccine needed       Past Medical History:  Past Medical History:   Diagnosis Date   • Acute kidney injury ([facility] ) 9/5/2020   • Diabetes mellitus ([facility] )    • Hypertension    • Inguinal hernia     3/25/15   • Onychomycosis     5/24/17   • Type 2 diabetes mellitus ([facility] ) 05/01/2012       Past Surgical History:  Past Surgical History:   Procedure Laterality Date   • ARTHROSCOPY KNEE      with Medial and Lateral Meniscus Repair   • HERNIA REPAIR      umbilical and inguinal hernia repairs (left)       Family History:  Family History   Problem Relation Age of Onset   • Hypertension Mother         essential   • Chronic bronchitis Father    • Prostate cancer Father    • Breast cancer Sister        Social History:  Social History     Socioeconomic History   • Marital status: /Civil Union     Spouse name: Not on file   • Number of children: Not on file   • Years of education: Not on file   • Highest education level: Not on file   Occupational History   • Occupation: [employer]    Tobacco Use   • Smoking status: Former     Types: Cigars     Start date: 6/22/2020   • Smokeless tobacco: Never   • Tobacco comments:     current every day smoker, per [employer]   Vaping Use   • Vaping Use: Never used   Substance and Sexual Activity   • Alcohol use: Yes     Alcohol/week: 3 0 standard drinks     Types: 2 Cans of beer, 1 Standard drinks or equivalent per week     Comment: weekend: 1 glass of christian or 2 beers   • Drug use: No   • Sexual activity: Yes   Other Topics Concern   • Not on file   Social History Narrative    Alcohol dependence    Nicotine dependence    Denied, alcohol Use    Marital problems     Social Determinants of Health     Financial Resource Strain: Not on file   Food Insecurity: Not on file   Transportation Needs: Not on file   Physical Activity: Not on file   Stress: Not on file   Social Connections: Not on file   Intimate Partner Violence: Not on file   Housing Stability: Not on file       Objective     Vitals:    12/22/22 1311   BP: 120/80   Pulse: 90   Resp: 16   Temp: 97 8 °F (36 6 °C)   SpO2: 99%     Wt Readings from Last 3 Encounters:   12/22/22 123 kg (271 lb)   12/22/22 122 kg (269 lb 9 6 oz)   10/17/22 120 kg (265 lb 4 oz)       Physical Exam  Constitutional:       General: He is not in acute distress  Appearance: Normal appearance  He is not ill-appearing  HENT:      Head: Normocephalic and atraumatic  Eyes:      General:         Right eye: No discharge  Left eye: No discharge  Extraocular Movements: Extraocular movements intact  Conjunctiva/sclera: Conjunctivae normal       Pupils: Pupils are equal, round, and reactive to light  Neck:      Vascular: No carotid bruit  Cardiovascular:      Rate and Rhythm: Normal rate and regular rhythm        Heart sounds: Normal heart sounds  No murmur heard  Pulmonary:      Effort: Pulmonary effort is normal       Breath sounds: Normal breath sounds  No wheezing, rhonchi or rales  Abdominal:      General: Abdomen is flat  Bowel sounds are normal  There is no distension  Palpations: Abdomen is soft  Tenderness: There is no abdominal tenderness  There is no guarding or rebound  Musculoskeletal:         General: Swelling and tenderness present  Right lower leg: No edema  Left lower leg: No edema  Comments: Patient with continued swelling and tenderness of the dorsal surface of his right hand and wrist   Patient recently received therapeutic injection  Lymphadenopathy:      Cervical: No cervical adenopathy  Skin:     Findings: No rash  Neurological:      General: No focal deficit present  Mental Status: He is alert and oriented to person, place, and time  Cranial Nerves: No cranial nerve deficit  Psychiatric:         Mood and Affect: Mood normal          Behavior: Behavior normal          Thought Content:  Thought content normal          Judgment: Judgment normal          Pertinent Laboratory/Diagnostic Studies:  Lab Results   Component Value Date    GLUCOSE 179 (H) 09/04/2020    BUN 11 05/23/2022    CREATININE 0 88 05/23/2022    CALCIUM 8 3 (L) 05/23/2022     12/21/2017    K 3 5 05/23/2022    CO2 26 05/23/2022     05/23/2022     Lab Results   Component Value Date    ALT 7 05/23/2022    AST 16 05/23/2022    ALKPHOS 57 05/23/2022    BILITOT 1 3 (H) 12/21/2017       Lab Results   Component Value Date    WBC 8 57 05/23/2022    HGB 12 1 05/23/2022    HCT 35 4 (L) 05/23/2022    MCV 98 05/23/2022     05/23/2022       Lab Results   Component Value Date    TSH 3 58 03/14/2022       Lab Results   Component Value Date    CHOL 153 12/21/2017     Lab Results   Component Value Date    TRIG 86 03/14/2022     Lab Results   Component Value Date    HDL 47 03/14/2022     Lab Results   Component Value Date    LDLCALC 104 (H) 2022     Lab Results   Component Value Date    HGBA1C 6 2 2022       Results for orders placed or performed in visit on 10/17/22   IRIS Diabetic eye exam   Result Value Ref Range    Severity NORMAL     Right Eye Diabetic Retinopathy None     Right Eye Macular Edema None     Right Eye Other Retinopathy None     Right Eye Image Quality Gradable Image     Left Eye Diabetic Retinopathy None     Left Eye Macular Edema None     Left Eye Other Retinopathy None     Left Eye Image Quality Gradable Image     Result Retinal Study Result for MANN ASENCIO FAHE[facility]     Result       Result        karan Anduajr Mt 62 y/o, M (: 1965, MRN: [id number])    Result        presented to [address] [facility] on 10- for a retinal imaging study of the left and right eyes  Result       Result        Based on the findings of the study, the following is recommended for MANN PULIDO    Result        Normal Study: Re-scan the patient in 12 months or in the next calendar year  Result       Result        Interpreting Provider's Comments:  No comments provided    Result  Diagnoses Present: M109 - Gout, unspecified     Result       Result        Right eye findings: Negative for Diabetic Retinopathy    Result Negative for Macular Edema     Result       Result        Left eye findings: Negative for Diabetic Retinopathy    Result Negative for Macular Edema     Result       Result       Result        This result was electronically signed by Jeny Mcnair MD, NPI: [id number], Taxonomy: [id number] on 10- 14:23 [facility]  Result       Result       NOTE:  Any pathology noted on this diabetic retinal evaluation should be confirmed by an appropriate ophthalmic examination         Orders Placed This Encounter   Procedures   • influenza vaccine, quadrivalent, recombinant, PF, 0 5 mL, for patients 18 yr+ (FLUBLOK)       ALLERGIES:  No Known Allergies    Current Medications Current Outpatient Medications   Medication Sig Dispense Refill   • albuterol (PROVENTIL HFA,VENTOLIN HFA) 90 mcg/act inhaler Inhale 2 puffs as needed     • amLODIPine (NORVASC) 10 mg tablet Take 1 tablet (10 mg total) by mouth daily 90 tablet 1   • Blood Pressure KIT Twice a day periodically 1 each 0   • Flovent  MCG/ACT inhaler INHALE 1 PUFF BY MOUTH 2 (TWO) TIMES A DAY RINSE MOUTH AFTER USE  12 g 5   • furosemide (LASIX) 40 mg tablet TAKE 1 TABLET BY MOUTH TWICE A  tablet 1   • glucose blood test strip 1 each by Other route daily 180 each 5   • Lancets (ONETOUCH ULTRASOFT) lancets by Does not apply route     • LORazepam (ATIVAN) 1 mg tablet Take 1 tablet by mouth as needed     • metoprolol succinate (TOPROL-XL) 25 mg 24 hr tablet Take 1 tablet (25 mg total) by mouth daily Start on Monday, 09/06/2021  90 tablet 1   • olmesartan (BENICAR) 40 mg tablet Take 1 tablet (40 mg total) by mouth daily 90 tablet 1   • sitaGLIPtin (Januvia) 100 mg tablet Take 1 tablet (100 mg total) by mouth daily 90 tablet 1   • predniSONE 20 mg tablet 2 tabs po q day with food 10 tablet 0   • predniSONE 20 mg tablet 2 tabs daily X 5 10 tablet 0   • predniSONE 20 mg tablet 3 tabs X 2 days, 2 tabs X 2 days, 1 tab x 2 days, 1/2 tab x 2 dyas 14 tablet 0     No current facility-administered medications for this visit           Health Maintenance     Health Maintenance   Topic Date Due   • Hepatitis A Vaccine (1 of 2 - Risk 2-dose series) Never done   • Pneumococcal Vaccine: Pediatrics (0 to 5 Years) and At-Risk Patients (6 to 59 Years) (1 - PCV) Never done   • Annual Physical  Never done   • COVID-19 Vaccine (4 - Booster for Pfizer series) 02/09/2022   • BMI: Followup Plan  09/08/2022   • HEMOGLOBIN A1C  03/07/2023   • Diabetic Foot Exam  09/13/2023   • Depression Screening  10/17/2023   • DM Eye Exam  10/17/2023   • BMI: Adult  12/22/2023   • Hepatitis B Vaccine (1 of 3 - Risk 3-dose series) 03/05/2024   • Colorectal Cancer Screening  05/13/2025   • DTaP,Tdap,and Td Vaccines (3 - Td or Tdap) 09/05/2030   • HIV Screening  Completed   • Hepatitis C Screening  Completed   • Influenza Vaccine  Completed   • HIB Vaccine  Aged Out   • IPV Vaccine  Aged Out   • Meningococcal ACWY Vaccine  Aged Out   • HPV Vaccine  Aged Out     Immunization History   Administered Date(s) Administered   • COVID-19 PFIZER VACCINE 0 3 ML IM 04/12/2021, 05/03/2021, 12/15/2021   • Influenza, injectable, quadrivalent, preservative free 0 5 mL 10/27/2020   • Influenza, recombinant, quadrivalent,injectable, preservative free 10/21/2019, 12/22/2022   • Influenza, seasonal, injectable 09/23/2015   • Tdap 10/21/2019, 04/69/8705       Antoinette Menon MD

## 2022-12-22 NOTE — ASSESSMENT & PLAN NOTE
Well adult  Overall the patient appears to be in stable health    He recently completed an inpatient alcohol rehabilitation program   His colonoscopy is up-to-date

## 2023-01-23 ENCOUNTER — OFFICE VISIT (OUTPATIENT)
Dept: FAMILY MEDICINE CLINIC | Facility: CLINIC | Age: 58
End: 2023-01-23

## 2023-01-23 VITALS
SYSTOLIC BLOOD PRESSURE: 144 MMHG | BODY MASS INDEX: 40.21 KG/M2 | WEIGHT: 271.5 LBS | HEART RATE: 90 BPM | TEMPERATURE: 98.2 F | HEIGHT: 69 IN | OXYGEN SATURATION: 98 % | RESPIRATION RATE: 18 BRPM | DIASTOLIC BLOOD PRESSURE: 90 MMHG

## 2023-01-23 DIAGNOSIS — M54.50 ACUTE MIDLINE LOW BACK PAIN WITHOUT SCIATICA: Primary | ICD-10-CM

## 2023-01-23 DIAGNOSIS — E11.9 TYPE 2 DIABETES MELLITUS WITHOUT COMPLICATION, WITHOUT LONG-TERM CURRENT USE OF INSULIN (HCC): ICD-10-CM

## 2023-01-23 LAB
CREAT UR-MCNC: 310 MG/DL
MICROALBUMIN UR-MCNC: 137 MG/L (ref 0–20)
MICROALBUMIN/CREAT 24H UR: 44 MG/G CREATININE (ref 0–30)
SL AMB  POCT GLUCOSE, UA: ABNORMAL
SL AMB LEUKOCYTE ESTERASE,UA: ABNORMAL
SL AMB POCT BILIRUBIN,UA: ABNORMAL
SL AMB POCT BLOOD,UA: ABNORMAL
SL AMB POCT CLARITY,UA: CLEAR
SL AMB POCT COLOR,UA: YELLOW
SL AMB POCT HEMOGLOBIN AIC: 6.8 (ref ?–6.5)
SL AMB POCT KETONES,UA: ABNORMAL
SL AMB POCT NITRITE,UA: ABNORMAL
SL AMB POCT PH,UA: 5
SL AMB POCT SPECIFIC GRAVITY,UA: 1.01
SL AMB POCT URINE PROTEIN: ABNORMAL
SL AMB POCT UROBILINOGEN: ABNORMAL

## 2023-01-23 RX ORDER — METHOCARBAMOL 500 MG/1
500 TABLET, FILM COATED ORAL 4 TIMES DAILY
Qty: 40 TABLET | Refills: 1 | Status: SHIPPED | OUTPATIENT
Start: 2023-01-23

## 2023-01-23 NOTE — PROGRESS NOTES
FAMILY PRACTICE OFFICE VISIT       NAME: Mickey Luke  AGE: 61 y o  SEX: male       : 1965        MRN: [id number]    DATE: 2023  TIME: 6:15 AM    Assessment and Plan     Problem List Items Addressed This Visit        Endocrine    Type 2 diabetes mellitus, without long-term current use of insulin ([facility] [address] )     Diabetes  Patient's A1c is stable at 6 8  He will continue with current regimen of medications  He was counseled in regards to exposure to alcohol which may increase his blood sugars  Lab Results   Component Value Date    HGBA1C 6 8 (A) 2023            Relevant Orders    Microalbumin / creatinine urine ratio (Completed)    POCT hemoglobin A1c (Completed)       Other    Acute midline low back pain without sciatica - Primary     Back pain  Patient was given prescription for methocarbamol 500 mg to use 3 times daily as needed  He will also continue with 2 Tylenol 3 times daily for pain management  He may continue with using heat to the affected area 3 times daily  Patient will be out of work until symptoms improves sufficiently to safely perform his job duties as a          Relevant Medications    methocarbamol (ROBAXIN) 500 mg tablet    Other Relevant Orders    Ambulatory Referral to Physical Therapy    POCT urine dip (Completed)           Chief Complaint     Chief Complaint   Patient presents with   • Follow-up     Hospital        History of Present Illness     I reviewed the patient's emergency room report from his recent fall  Patient states he fell on his way to enter his home and landed partly on grass and partly on his driveway  He had extensive imaging studies that were unremarkable however he continues to have chronic mid to low back discomfort  Patient has been unable to work since his injury  Of note patient did have elevated alcohol levels in his blood  Patient had undergone alcohol rehabilitation late last year        Review of Systems   Review of Systems Constitutional: Negative  Respiratory: Negative  Cardiovascular: Negative  Gastrointestinal: Negative  Musculoskeletal: Positive for arthralgias and back pain  Neurological: Negative  Psychiatric/Behavioral: Negative          Active Problem List     Patient Active Problem List   Diagnosis   • Essential hypertension   • Diabetes mellitus type 2, uncontrolled   • Elevated lipoprotein(a)   • Microalbuminuria   • Obesity   • Peripheral edema   • Hypomagnesemia   • CHF (congestive heart failure) ([facility])   • Alcohol abuse   • Pain and swelling of left shoulder   • Onychomycosis   • Arthritis of right wrist   • Acute on chronic diastolic congestive heart failure ([facility])   • Impingement syndrome of left shoulder   • COVID-19 virus infection   • Tendonitis   • Type 2 diabetes mellitus, without long-term current use of insulin ([facility])   • Acute gout of right hand   • Well adult exam   • Influenza vaccine needed   • Acute midline low back pain without sciatica       Past Medical History:  Past Medical History:   Diagnosis Date   • Acute kidney injury ([facility] ) 9/5/2020   • Diabetes mellitus ([facility] )    • Hypertension    • Inguinal hernia     3/25/15   • Onychomycosis     5/24/17   • Type 2 diabetes mellitus ([facility] ) 05/01/2012       Past Surgical History:  Past Surgical History:   Procedure Laterality Date   • ARTHROSCOPY KNEE      with Medial and Lateral Meniscus Repair   • HERNIA REPAIR      umbilical and inguinal hernia repairs (left)       Family History:  Family History   Problem Relation Age of Onset   • Hypertension Mother         essential   • Chronic bronchitis Father    • Prostate cancer Father    • Breast cancer Sister        Social History:  Social History     Socioeconomic History   • Marital status: /Civil Union     Spouse name: Not on file   • Number of children: Not on file   • Years of education: Not on file   • Highest education level: Not on file   Occupational History   • Occupation: [employer]    Tobacco Use   • Smoking status: Former     Types: Cigars     Start date: 6/22/2020   • Smokeless tobacco: Never   • Tobacco comments:     current every day smoker, per [employer]   Vaping Use   • Vaping Use: Never used   Substance and Sexual Activity   • Alcohol use: Yes     Alcohol/week: 3 0 standard drinks     Types: 2 Cans of beer, 1 Standard drinks or equivalent per week     Comment: weekend: 1 glass of christian or 2 beers   • Drug use: No   • Sexual activity: Yes   Other Topics Concern   • Not on file   Social History Narrative    Alcohol dependence    Nicotine dependence    Denied, alcohol Use    Marital problems     Social Determinants of Health     Financial Resource Strain: Not on file   Food Insecurity: Not on file   Transportation Needs: Not on file   Physical Activity: Not on file   Stress: Not on file   Social Connections: Not on file   Intimate Partner Violence: Not on file   Housing Stability: Not on file       Objective     Vitals:    01/23/23 1429   BP: 144/90   Pulse: 90   Resp: 18   Temp: 98 2 °F (36 8 °C)   SpO2: 98%     Wt Readings from Last 3 Encounters:   01/23/23 123 kg (271 lb 8 oz)   12/22/22 123 kg (271 lb)   12/22/22 122 kg (269 lb 9 6 oz)       Physical Exam  Constitutional:       General: He is not in acute distress  Appearance: Normal appearance  He is not ill-appearing  HENT:      Head: Normocephalic and atraumatic  Eyes:      General:         Right eye: No discharge  Left eye: No discharge  Conjunctiva/sclera: Conjunctivae normal       Pupils: Pupils are equal, round, and reactive to light  Neck:      Vascular: No carotid bruit  Cardiovascular:      Rate and Rhythm: Normal rate and regular rhythm  Heart sounds: Normal heart sounds  No murmur heard  Pulmonary:      Effort: Pulmonary effort is normal       Breath sounds: Normal breath sounds  No wheezing, rhonchi or rales  Musculoskeletal:      Right lower leg: No edema        Left lower leg: No edema  Comments: Negative vertebral tenderness to palpation  Minimal paravertebral musculature tenderness to palpation along thoracic and lumbar spine  Muscle strength +5/5 lower extremities  Lymphadenopathy:      Cervical: No cervical adenopathy  Skin:     Findings: No rash  Neurological:      General: No focal deficit present  Mental Status: He is alert and oriented to person, place, and time  Cranial Nerves: No cranial nerve deficit  Psychiatric:         Mood and Affect: Mood normal          Behavior: Behavior normal          Thought Content: Thought content normal          Judgment: Judgment normal          Pertinent Laboratory/Diagnostic Studies:  Lab Results   Component Value Date    GLUCOSE 179 (H) 09/04/2020    BUN 11 05/23/2022    CREATININE 0 88 05/23/2022    CALCIUM 8 3 (L) 05/23/2022     12/21/2017    K 3 5 05/23/2022    CO2 26 05/23/2022     05/23/2022     Lab Results   Component Value Date    ALT 7 05/23/2022    AST 16 05/23/2022    ALKPHOS 57 05/23/2022    BILITOT 1 3 (H) 12/21/2017       Lab Results   Component Value Date    WBC 8 57 05/23/2022    HGB 12 1 05/23/2022    HCT 35 4 (L) 05/23/2022    MCV 98 05/23/2022     05/23/2022       Lab Results   Component Value Date    TSH 3 58 03/14/2022       Lab Results   Component Value Date    CHOL 153 12/21/2017     Lab Results   Component Value Date    TRIG 86 03/14/2022     Lab Results   Component Value Date    HDL 47 03/14/2022     Lab Results   Component Value Date    LDLCALC 104 (H) 03/14/2022     Lab Results   Component Value Date    HGBA1C 6 8 (A) 01/23/2023       Results for orders placed or performed in visit on 01/23/23   Microalbumin / creatinine urine ratio   Result Value Ref Range    Creatinine, Ur 310 0 mg/dL    Microalbum  ,U,Random 137 0 (H) 0 0 - 20 0 mg/L    Microalb Creat Ratio 44 (H) 0 - 30 mg/g creatinine   POCT urine dip   Result Value Ref Range    LEUKOCYTE ESTERASE,UA neg     NITRITE,UA neg     SL AMB POCT UROBILINOGEN neg     POCT URINE PROTEIN 30+      PH,UA 5 0     BLOOD,UA neg     SPECIFIC GRAVITY,UA 1 010     KETONES,UA neg     BILIRUBIN,UA neg     GLUCOSE, UA neg      COLOR,UA yellow     CLARITY,UA clear    POCT hemoglobin A1c   Result Value Ref Range    Hemoglobin A1C 6 8 (A) 6 5       Orders Placed This Encounter   Procedures   • Microalbumin / creatinine urine ratio   • Ambulatory Referral to Physical Therapy   • POCT urine dip   • POCT hemoglobin A1c       ALLERGIES:  No Known Allergies    Current Medications     Current Outpatient Medications   Medication Sig Dispense Refill   • albuterol (PROVENTIL HFA,VENTOLIN HFA) 90 mcg/act inhaler Inhale 2 puffs as needed     • amLODIPine (NORVASC) 10 mg tablet Take 1 tablet (10 mg total) by mouth daily 90 tablet 1   • Blood Pressure KIT Twice a day periodically 1 each 0   • Flovent  MCG/ACT inhaler INHALE 1 PUFF BY MOUTH 2 (TWO) TIMES A DAY RINSE MOUTH AFTER USE  12 g 5   • furosemide (LASIX) 40 mg tablet TAKE 1 TABLET BY MOUTH TWICE A  tablet 1   • glucose blood test strip 1 each by Other route daily 180 each 5   • Lancets (ONETOUCH ULTRASOFT) lancets by Does not apply route     • LORazepam (ATIVAN) 1 mg tablet Take 1 tablet by mouth as needed     • methocarbamol (ROBAXIN) 500 mg tablet Take 1 tablet (500 mg total) by mouth 4 (four) times a day 40 tablet 1   • olmesartan (BENICAR) 40 mg tablet Take 1 tablet (40 mg total) by mouth daily 90 tablet 1   • predniSONE 20 mg tablet 2 tabs po q day with food 10 tablet 0   • predniSONE 20 mg tablet 2 tabs daily X 5 10 tablet 0   • predniSONE 20 mg tablet 3 tabs X 2 days, 2 tabs X 2 days, 1 tab x 2 days, 1/2 tab x 2 dyas 14 tablet 0   • sitaGLIPtin (Januvia) 100 mg tablet Take 1 tablet (100 mg total) by mouth daily 90 tablet 1   • metoprolol succinate (TOPROL-XL) 25 mg 24 hr tablet Take 1 tablet (25 mg total) by mouth daily Start on Monday, 09/06/2021  90 tablet 1     No current facility-administered medications for this visit           Health Maintenance     Health Maintenance   Topic Date Due   • Hepatitis A Vaccine (1 of 2 - Risk 2-dose series) Never done   • Pneumococcal Vaccine: Pediatrics (0 to 5 Years) and At-Risk Patients (6 to 59 Years) (1 - PCV) Never done   • COVID-19 Vaccine (4 - Booster for Pfizer series) 02/09/2022   • BMI: Followup Plan  09/08/2022   • HEMOGLOBIN A1C  07/23/2023   • Diabetic Foot Exam  09/13/2023   • Depression Screening  10/17/2023   • DM Eye Exam  10/17/2023   • Annual Physical  12/22/2023   • Kidney Health Evaluation: GFR  01/18/2024   • BMI: Adult  01/23/2024   • Kidney Health Evaluation: Microalbumin/Creatinine Ratio  01/23/2024   • Hepatitis B Vaccine (1 of 3 - Risk 3-dose series) 03/05/2024   • Colorectal Cancer Screening  05/13/2025   • DTaP,Tdap,and Td Vaccines (3 - Td or Tdap) 09/05/2030   • HIV Screening  Completed   • Hepatitis C Screening  Completed   • Influenza Vaccine  Completed   • HIB Vaccine  Aged Out   • IPV Vaccine  Aged Out   • Meningococcal ACWY Vaccine  Aged Out   • HPV Vaccine  Aged Out     Immunization History   Administered Date(s) Administered   • COVID-19 PFIZER VACCINE 0 3 ML IM 04/12/2021, 05/03/2021, 12/15/2021   • INFLUENZA 12/22/2022   • Influenza, injectable, quadrivalent, preservative free 0 5 mL 10/27/2020   • Influenza, recombinant, quadrivalent,injectable, preservative free 10/21/2019, 12/22/2022   • Influenza, seasonal, injectable 09/23/2015   • Tdap 10/21/2019, 68/74/9130       Trent Fuchs MD    I spent 30 minutes with this patient of which greater than 50% spent counseling or reviewing chart

## 2023-01-24 NOTE — ASSESSMENT & PLAN NOTE
Back pain  Patient was given prescription for methocarbamol 500 mg to use 3 times daily as needed  He will also continue with 2 Tylenol 3 times daily for pain management  He may continue with using heat to the affected area 3 times daily    Patient will be out of work until symptoms improves sufficiently to safely perform his job duties as a

## 2023-01-24 NOTE — ASSESSMENT & PLAN NOTE
Diabetes  Patient's A1c is stable at 6 8  He will continue with current regimen of medications  He was counseled in regards to exposure to alcohol which may increase his blood sugars    Lab Results   Component Value Date    HGBA1C 6 8 (A) 01/23/2023

## 2023-01-25 ENCOUNTER — EVALUATION (OUTPATIENT)
Dept: PHYSICAL THERAPY | Facility: CLINIC | Age: 58
End: 2023-01-25

## 2023-01-25 DIAGNOSIS — M54.50 ACUTE MIDLINE LOW BACK PAIN WITHOUT SCIATICA: Primary | ICD-10-CM

## 2023-01-25 NOTE — PROGRESS NOTES
PT Evaluation     Today's date: 2023  Patient name: Yogi Valencia  : 1965  MRN: [id number]  Referring provider: Prasanna Polo MD  Dx:   Encounter Diagnosis     ICD-10-CM    1  Acute midline low back pain without sciatica  M54 50 Ambulatory Referral to Physical Therapy                     Assessment  Assessment details: Pt presents with s/s consistent with acute LBP with a stabilization preference  He will benefit from skilled PT services to address his impairments in order to decrease pain and restore function  Impairments: abnormal muscle firing, abnormal or restricted ROM, abnormal movement, activity intolerance, impaired physical strength, lacks appropriate home exercise program, pain with function, weight-bearing intolerance, poor posture  and poor body mechanics  Understanding of Dx/Px/POC: good   Prognosis: fair    Goals  STG - 2-4 wks  1) pt will demonstrate normalized transfers  2) pt will improve pain 50%    LTG - 6-12 wks  1) pt will demonstrate full lumbar ROM  2) pt will demonstrate normalized posture and lifting mechanics  3) pt will RTW  4) pt will improve pain 90%     Plan  Planned modality interventions: low level laser therapy  Planned therapy interventions: joint mobilization, manual therapy, abdominal trunk stabilization, body mechanics training, functional ROM exercises, flexibility, gait training, home exercise program, therapeutic exercise, therapeutic activities, stretching, strengthening, neuromuscular re-education, patient education and postural training  Frequency: 2x week  Duration in weeks: 12  Treatment plan discussed with: patient        Subjective Evaluation    History of Present Illness  Mechanism of injury: Pt is a 61 y o  male with PMHx significant for HTN, CHF, DM II with neuropathy  He reports sudden onset of B/L upper lumbar pain without radicular symptoms s/p fall 23      Pain  Current pain ratin (sitting in IE)  At best pain ratin (standing still)  At worst pain rating: 10 (getting OOB)  Relieving factors: medications  Aggravating factors: sitting, lifting and standing  Progression: no change    Social Support    Employment status: working ([employer])    Diagnostic Tests  CT scan: normal  Treatments  No previous or current treatments  Patient Goals  Patient goals for therapy: decreased pain and return to work          Objective     Postural Observations  Seated posture: poor  Standing posture: poor  Correction of posture: makes symptoms better        Neurological Testing     Sensation     Lumbar   Left   Intact: light touch    Right   Intact: light touch    Comments   Left light touch: L1-S2  Right light touch: L1-S2    Reflexes   Left   Patellar (L4): trace (1+)  Achilles (S1): trace (1+)    Right   Patellar (L4): trace (1+)  Achilles (S1): trace (1+)    Active Range of Motion     Lumbar   Flexion:  with pain Restriction level: minimal  Extension:  with pain Restriction level: moderate  Left lateral flexion:  with pain Restriction level: moderate  Right lateral flexion:  with pain Restriction level: moderate  Mechanical Assessment    Cervical      Thoracic      Lumbar    Standing flexion: repeated movements   Pain location:no change  Pain level: increased  Standing extension: repeated movements  Pain location: no change  Pain intensity: better  Pain level: increased  Lying extension: sustained positions  Pain location: no change  Pain intensity: worse  Pain level: increased  unable to perform PPU 2/2 UE weakness    Strength/Myotome Testing     Left Hip   Planes of Motion   Flexion: 4    Right Hip   Planes of Motion   Flexion: 4    Left Knee   Flexion: 4  Extension: 4    Right Knee   Flexion: 4  Extension: 4    Left Ankle/Foot   Dorsiflexion: 4  Plantar flexion: 4-  Great toe extension: 4    Right Ankle/Foot   Dorsiflexion: 4  Plantar flexion: 4-  Great toe extension: 4    Tests     Lumbar     Left   Negative slump test      Right   Negative slump test  Precautions:  PMHx: CHF, HTN, DM II with neuropathy  Dx: acute LBP s/p fall with a stabilization preference    Manuals 1/25            Laser T10-L3                                                    Neuro Re-Ed             Abdominal bracing 10"x10            Supine DLS marches 5O77            bridges 1x5            Standing TB palof press             DLS TB rows                                       Ther Ex                                                                                                                     Ther Activity             STS: adjustable table                          Gait Training             Gait training on floor with abdominal brace                          Modalities

## 2023-01-27 ENCOUNTER — OFFICE VISIT (OUTPATIENT)
Dept: PHYSICAL THERAPY | Facility: CLINIC | Age: 58
End: 2023-01-27

## 2023-01-27 DIAGNOSIS — M54.50 ACUTE MIDLINE LOW BACK PAIN WITHOUT SCIATICA: Primary | ICD-10-CM

## 2023-01-27 NOTE — PROGRESS NOTES
Daily Note     Today's date: 2023  Patient name: Lina Sanchez  : 1965  MRN: [id number]  Referring provider: Steven Hook MD  Dx:   Encounter Diagnosis     ICD-10-CM    1  Acute midline low back pain without sciatica  M54 50                      Subjective: Pt reports improved pain with transfers when he remembers to perform an abdominal brace  Objective: See treatment diary below    Assessment: Pt demonstrated improved tolerance to core stab  Plan: Cont  POC  Precautions:  PMHx: CHF, HTN, DM II with neuropathy  Dx: acute LBP s/p fall with a stabilization preference    Manuals            Laser T10-L3                                                    Neuro Re-Ed             Abdominal bracing 10"x10 10"x10           Supine DLS marches 7T16 7T50 3x15          bridges 1x5 2x10           Standing TB palof press  G/  1x20 G/  3x15          DLS TB rows  Blue  6P23                                     Ther Ex                                                                                                                     Ther Activity             STS: adjustable table  1x10                        Gait Training             Gait training on floor with abdominal brace                          Modalities

## 2023-01-31 ENCOUNTER — OFFICE VISIT (OUTPATIENT)
Dept: PHYSICAL THERAPY | Facility: CLINIC | Age: 58
End: 2023-01-31

## 2023-01-31 DIAGNOSIS — M54.50 ACUTE MIDLINE LOW BACK PAIN WITHOUT SCIATICA: Primary | ICD-10-CM

## 2023-01-31 NOTE — PROGRESS NOTES
Daily Note     Today's date: 2023  Patient name: Madison Vanessa  : 1965  MRN: [id number]  Referring provider: Benny Hyde MD  Dx:   Encounter Diagnosis     ICD-10-CM    1  Acute midline low back pain without sciatica  M54 50                      Subjective: Pt reports improved pain with transfers, no pain with walking  Objective: See treatment diary below    Assessment: Pt demonstrated improved ROM following standing lumbar extension and less pain with STS and bed mobility following PA mobs  Plan: Cont  POC  Address lifting mechanics nv  Precautions:  PMHx: CHF, HTN, DM II with neuropathy  Dx: acute LBP s/p fall with a stabilization preference    Manuals           Prone L3-S1 PA mobs   1x20 ea                                                 Neuro Re-Ed             Abdominal bracing 10"x10 10"x10 10"x10          Supine DLS marches 9V07 1B14 2#  3x15          bridges 1x5 2x10 3x10          Standing TB palof press  G/  1x20 G/  1x30          DLS TB rows  Blue  7O85 Blue  3x15                                    Ther Ex             Standing lumbar extension   1x10                                                                                                     Ther Activity             STS: adjustable table  1x10 2x10                       Gait Training                                       Modalities

## 2023-02-02 ENCOUNTER — OFFICE VISIT (OUTPATIENT)
Dept: PHYSICAL THERAPY | Facility: CLINIC | Age: 58
End: 2023-02-02

## 2023-02-02 DIAGNOSIS — M54.50 ACUTE MIDLINE LOW BACK PAIN WITHOUT SCIATICA: Primary | ICD-10-CM

## 2023-02-02 NOTE — PROGRESS NOTES
Daily Note     Today's date: 2023  Patient name: John Barbour  : 1965  MRN: [id number]  Referring provider: Kenyetta Hernández MD  Dx:   Encounter Diagnosis     ICD-10-CM    1  Acute midline low back pain without sciatica  M54 50                      Subjective: Pt reports gradually improving pain  Objective: See treatment diary below    Assessment: Pt demonstrated improved postural control and pain with supine-to-sit but still with LBP with STS from a low position  Plan: Cont  POC  Address lifting mechanics nv  Precautions:  PMHx: CHF, HTN, DM II with neuropathy  Dx: acute LBP s/p fall with a stabilization preference    Manuals 1/25 1/27 1/31 2/3         Prone L3-S1 PA mobs   1x20 ea 1x25 ea                                                Neuro Re-Ed             Abdominal bracing 10"x10 10"x10 10"x10 10"x10         Supine DLS marches 9X72 6O84 2#  3x15 2#  3x20         bridges 1x5 2x10 3x10 3x12         Standing TB palof press  G/  1x20 G/  1x30 G/  2x20         DLS TB rows  Blue  1Z41 Blue  3x15 Black  3x15         Back extension machine    70#  3x10                      Ther Ex             Standing lumbar extension   1x10 3x10                                                                                                    Ther Activity             STS: adjustable table  1x10 2x10 1x25                      Gait Training                                       Modalities

## 2023-02-07 ENCOUNTER — OFFICE VISIT (OUTPATIENT)
Dept: PHYSICAL THERAPY | Facility: CLINIC | Age: 58
End: 2023-02-07

## 2023-02-07 DIAGNOSIS — M54.50 ACUTE MIDLINE LOW BACK PAIN WITHOUT SCIATICA: Primary | ICD-10-CM

## 2023-02-07 NOTE — PROGRESS NOTES
Daily Note     Today's date: 2023  Patient name: Kristine Ascencio  : 1965  MRN: [id number]  Referring provider: Ariana Medley MD  Dx:   Encounter Diagnosis     ICD-10-CM    1  Acute midline low back pain without sciatica  M54 50                      Subjective: Pt reports 6/10 midline LBP with prolonged sitting, no pain in standing, and significantly improved pain with STS and transfers  Objective: See treatment diary below    Assessment: Pt demonstrated improved pain and ROM following lumbar extension and improved pain following core stab  Plan: Cont  POC  Assess HA length nv  Precautions:  PMHx: CHF, HTN, DM II with neuropathy  Dx: acute LBP s/p fall with a stabilization preference    Manuals 1/25 1/27 1/31 2/3         Prone L3-S1 PA mobs   1x20 ea 1x30 ea         Prone press-ups with OP    1x5                                   Neuro Re-Ed             Abdominal bracing 10"x10 10"x10 10"x10 10"x10         Supine DLS marches 6Q65 4D41 2#  3x15 2#  3x20         bridges 1x5 2x10 3x10 3x12 3x20        Standing TB palof press  G/  1x20 G/  1x30 G/  2x20 Blue  3x10        DLS TB rows  Blue  [id number] Blue  3x15 Black  3x15 Silver  3x10        Back extension machine    70#  3x10 70#  3x15                     Ther Ex             Standing lumbar extension   1x10 3x10 1x10        TB OH pull-downs     Black  3x10        Prone press-ups     1x5                                                                         Ther Activity             STS: adjustable table  1x10 2x10 1x25 3x10        Box lifts     10#  1x10 hold       Gait Training                                       Modalities

## 2023-02-09 ENCOUNTER — OFFICE VISIT (OUTPATIENT)
Dept: PHYSICAL THERAPY | Facility: CLINIC | Age: 58
End: 2023-02-09

## 2023-02-09 DIAGNOSIS — M54.50 ACUTE MIDLINE LOW BACK PAIN WITHOUT SCIATICA: Primary | ICD-10-CM

## 2023-02-09 NOTE — PROGRESS NOTES
Daily Note     Today's date: 2023  Patient name: Keagan Munson  : 1965  MRN: [id number]  Referring provider: Karen Mckeon MD  Dx:   Encounter Diagnosis     ICD-10-CM    1  Acute midline low back pain without sciatica  M54 49                      Subjective: Pt reports 4/10 midline low back pain with standing from a seated position first thing this morning  Pt reports no pain currently  Objective: See treatment diary below    Assessment: Pt demonstrated decreased pain intensity with STS, continued limited lumbar extension ROM  Plan: Cont  POC  Precautions:  PMHx: CHF, HTN, DM II with neuropathy  Dx: acute LBP s/p fall with a stabilization preference    Manuals 1/25 1/27 1/31 2/3  2/9       Prone L3-S1 PA mobs   1x20 ea 1x30 ea         Prone press-ups with OP    1x5  2x5                                 Neuro Re-Ed             Abdominal bracing 10"x10 10"x10 10"x10 10"x10         Supine DLS marches [id number] 2#  3x15 2#  3x20         bridges 1x5 2x10 3x10 3x12 3x20 3x20       Standing TB palof press  G/  1x20 G/  1x30 G/  2x20 Blue  3x10 Blue  3x10       DLS TB rows  Blue  [id number] Blue  3x15 Black  3x15 Silver  3x10 Silver  3x15       Back extension machine    70#  3x10 70#  3x15 70#  3x15                    Ther Ex             Standing lumbar extension   1x10 3x10 1x10 1x10       TB OH pull-downs     Black  3x10 Silver  3x10 w/ one foot on step stool       Prone press-ups     1x5 1x5                                                                        Ther Activity             STS: adjustable table  1x10 2x10 1x25 3x10 3x10       Box lifts     10#  1x10 hold       Gait Training                                       Modalities

## 2023-02-14 ENCOUNTER — OFFICE VISIT (OUTPATIENT)
Dept: FAMILY MEDICINE CLINIC | Facility: CLINIC | Age: 58
End: 2023-02-14

## 2023-02-14 VITALS
WEIGHT: 271.06 LBS | DIASTOLIC BLOOD PRESSURE: 90 MMHG | OXYGEN SATURATION: 99 % | HEART RATE: 100 BPM | BODY MASS INDEX: 40.15 KG/M2 | HEIGHT: 69 IN | TEMPERATURE: 98.2 F | RESPIRATION RATE: 17 BRPM | SYSTOLIC BLOOD PRESSURE: 148 MMHG

## 2023-02-14 DIAGNOSIS — J06.9 UPPER RESPIRATORY TRACT INFECTION, UNSPECIFIED TYPE: Primary | ICD-10-CM

## 2023-02-14 RX ORDER — CEFUROXIME AXETIL 500 MG/1
500 TABLET ORAL 2 TIMES DAILY
Qty: 20 TABLET | Refills: 0 | Status: SHIPPED | OUTPATIENT
Start: 2023-02-14 | End: 2023-02-24

## 2023-02-14 NOTE — ASSESSMENT & PLAN NOTE
URI   Patient given prescription for Ceftin 500 mg take 1 twice daily for 10 days  He may use over-the-counter Robitussin or Mucinex for congestion    Patient will call if symptoms persist after medication completed

## 2023-02-14 NOTE — PROGRESS NOTES
FAMILY PRACTICE OFFICE VISIT       NAME: Gurpreet Smith  AGE: 61 y o  SEX: male       : 1965        MRN: [id number]    DATE: 2023  TIME: 12:12 PM    Assessment and Plan     Problem List Items Addressed This Visit        Respiratory    Upper respiratory tract infection - Primary     URI  Patient given prescription for Ceftin 500 mg take 1 twice daily for 10 days  He may use over-the-counter Robitussin or Mucinex for congestion  Patient will call if symptoms persist after medication completed         Relevant Medications    cefuroxime (CEFTIN) 500 mg tablet           Chief Complaint     Chief Complaint   Patient presents with   • Cough     CONGESTION X 1 WEEK        History of Present Illness     Patient in the office with 7 to 10-day history of congestion and coughing up brown sputum  He denies any documented fevers  Patient is still out of work due to his prior back injury after sustaining a fall 2023  He has been receiving physical therapy  He does still use his inhalers as prescribed  Normally patient smokes 1 cigar/day when he is working  Review of Systems   Review of Systems   Constitutional: Positive for fatigue  Negative for fever  HENT: Positive for congestion  Respiratory: Positive for cough  Cardiovascular: Negative  Gastrointestinal: Negative  Musculoskeletal: Positive for arthralgias and back pain         Active Problem List     Patient Active Problem List   Diagnosis   • Essential hypertension   • Diabetes mellitus type 2, uncontrolled   • Elevated lipoprotein(a)   • Microalbuminuria   • Obesity   • Peripheral edema   • Hypomagnesemia   • CHF (congestive heart failure) ([facility])   • Alcohol abuse   • Pain and swelling of left shoulder   • Onychomycosis   • Arthritis of right wrist   • Acute on chronic diastolic congestive heart failure (HCC)   • Impingement syndrome of left shoulder   • COVID-19 virus infection   • Tendonitis   • Type 2 diabetes mellitus, without long-term current use of insulin (HCC)   • Acute gout of right hand   • Well adult exam   • Influenza vaccine needed   • Acute midline low back pain without sciatica   • Upper respiratory tract infection       Past Medical History:  Past Medical History:   Diagnosis Date   • Acute kidney injury ([facility] ) 9/5/2020   • Diabetes mellitus ([facility] )    • Hypertension    • Inguinal hernia     3/25/15   • Onychomycosis     5/24/17   • Type 2 diabetes mellitus ([facility] ) 05/01/2012       Past Surgical History:  Past Surgical History:   Procedure Laterality Date   • ARTHROSCOPY KNEE      with Medial and Lateral Meniscus Repair   • HERNIA REPAIR      umbilical and inguinal hernia repairs (left)       Family History:  Family History   Problem Relation Age of Onset   • Hypertension Mother         essential   • Chronic bronchitis Father    • Prostate cancer Father    • Breast cancer Sister        Social History:  Social History     Socioeconomic History   • Marital status: /Civil Union     Spouse name: Not on file   • Number of children: Not on file   • Years of education: Not on file   • Highest education level: Not on file   Occupational History   • Occupation: [employer] worker   Tobacco Use   • Smoking status: Former     Types: Cigars     Start date: 6/22/2020   • Smokeless tobacco: Never   • Tobacco comments:     current every day smoker, per [employer]   Vaping Use   • Vaping Use: Never used   Substance and Sexual Activity   • Alcohol use:  Yes     Alcohol/week: 3 0 standard drinks     Types: 2 Cans of beer, 1 Standard drinks or equivalent per week     Comment: weekend: 1 glass of christian or 2 beers   • Drug use: No   • Sexual activity: Yes   Other Topics Concern   • Not on file   Social History Narrative    Alcohol dependence    Nicotine dependence    Denied, alcohol Use    Marital problems     Social Determinants of Health     Financial Resource Strain: Not on file   Food Insecurity: Not on file   Transportation Needs: Not on file   Physical Activity: Not on file   Stress: Not on file   Social Connections: Not on file   Intimate Partner Violence: Not on file   Housing Stability: Not on file       Objective     Vitals:    02/14/23 1148   BP: 148/90   Pulse: 100   Resp: 17   Temp: 98 2 °F (36 8 °C)   SpO2: 99%     Wt Readings from Last 3 Encounters:   02/14/23 123 kg (271 lb 1 oz)   01/23/23 123 kg (271 lb 8 oz)   12/22/22 123 kg (271 lb)       Physical Exam  Constitutional:       General: He is not in acute distress  Appearance: Normal appearance  He is not ill-appearing  HENT:      Head: Normocephalic and atraumatic  Right Ear: Tympanic membrane, ear canal and external ear normal  There is no impacted cerumen  Left Ear: Tympanic membrane, ear canal and external ear normal  There is no impacted cerumen  Eyes:      General:         Right eye: No discharge  Left eye: No discharge  Extraocular Movements: Extraocular movements intact  Conjunctiva/sclera: Conjunctivae normal       Pupils: Pupils are equal, round, and reactive to light  Neck:      Vascular: No carotid bruit  Cardiovascular:      Rate and Rhythm: Normal rate and regular rhythm  Heart sounds: Normal heart sounds  No murmur heard  Pulmonary:      Effort: Pulmonary effort is normal       Breath sounds: Normal breath sounds  No wheezing, rhonchi or rales  Musculoskeletal:      Right lower leg: No edema  Left lower leg: No edema  Lymphadenopathy:      Cervical: No cervical adenopathy  Skin:     Findings: No rash  Neurological:      General: No focal deficit present  Mental Status: He is alert and oriented to person, place, and time  Cranial Nerves: No cranial nerve deficit  Psychiatric:         Mood and Affect: Mood normal          Behavior: Behavior normal          Thought Content:  Thought content normal          Judgment: Judgment normal          Pertinent Laboratory/Diagnostic Studies:  Lab Results   Component Value Date    GLUCOSE 179 (H) 09/04/2020    BUN 11 05/23/2022    CREATININE 0 88 05/23/2022    CALCIUM 8 3 (L) 05/23/2022     12/21/2017    K 3 5 05/23/2022    CO2 26 05/23/2022     05/23/2022     Lab Results   Component Value Date    ALT 7 05/23/2022    AST 16 05/23/2022    ALKPHOS 57 05/23/2022    BILITOT 1 3 (H) 12/21/2017       Lab Results   Component Value Date    WBC 8 57 05/23/2022    HGB 12 1 05/23/2022    HCT 35 4 (L) 05/23/2022    MCV 98 05/23/2022     05/23/2022       Lab Results   Component Value Date    TSH 3 58 03/14/2022       Lab Results   Component Value Date    CHOL 153 12/21/2017     Lab Results   Component Value Date    TRIG 86 03/14/2022     Lab Results   Component Value Date    HDL 47 03/14/2022     Lab Results   Component Value Date    LDLCALC 104 (H) 03/14/2022     Lab Results   Component Value Date    HGBA1C 6 8 (A) 01/23/2023       Results for orders placed or performed in visit on 01/23/23   Microalbumin / creatinine urine ratio   Result Value Ref Range    Creatinine, Ur 310 0 mg/dL    Microalbum  ,U,Random 137 0 (H) 0 0 - 20 0 mg/L    Microalb Creat Ratio 44 (H) 0 - 30 mg/g creatinine   POCT urine dip   Result Value Ref Range    LEUKOCYTE ESTERASE,UA neg     NITRITE,UA neg     SL AMB POCT UROBILINOGEN neg     POCT URINE PROTEIN 30+      PH,UA 5 0     BLOOD,UA neg     SPECIFIC GRAVITY,UA 1 010     KETONES,UA neg     BILIRUBIN,UA neg     GLUCOSE, UA neg      COLOR,UA yellow     CLARITY,UA clear    POCT hemoglobin A1c   Result Value Ref Range    Hemoglobin A1C 6 8 (A) 6 5       No orders of the defined types were placed in this encounter        ALLERGIES:  No Known Allergies    Current Medications     Current Outpatient Medications   Medication Sig Dispense Refill   • albuterol (PROVENTIL HFA,VENTOLIN HFA) 90 mcg/act inhaler Inhale 2 puffs as needed     • amLODIPine (NORVASC) 10 mg tablet Take 1 tablet (10 mg total) by mouth daily 90 tablet 1   • Blood Pressure KIT Twice a day periodically 1 each 0   • cefuroxime (CEFTIN) 500 mg tablet Take 1 tablet (500 mg total) by mouth 2 (two) times a day for 10 days 20 tablet 0   • Flovent  MCG/ACT inhaler INHALE 1 PUFF BY MOUTH 2 (TWO) TIMES A DAY RINSE MOUTH AFTER USE  12 g 5   • furosemide (LASIX) 40 mg tablet TAKE 1 TABLET BY MOUTH TWICE A  tablet 1   • glucose blood test strip 1 each by Other route daily 180 each 5   • Lancets (ONETOUCH ULTRASOFT) lancets by Does not apply route     • LORazepam (ATIVAN) 1 mg tablet Take 1 tablet by mouth as needed     • methocarbamol (ROBAXIN) 500 mg tablet Take 1 tablet (500 mg total) by mouth 4 (four) times a day 40 tablet 1   • olmesartan (BENICAR) 40 mg tablet Take 1 tablet (40 mg total) by mouth daily 90 tablet 1   • sitaGLIPtin (Januvia) 100 mg tablet Take 1 tablet (100 mg total) by mouth daily 90 tablet 1   • metoprolol succinate (TOPROL-XL) 25 mg 24 hr tablet Take 1 tablet (25 mg total) by mouth daily Start on Monday, 09/06/2021  90 tablet 1   • predniSONE 20 mg tablet 2 tabs po q day with food (Patient not taking: Reported on 2/14/2023) 10 tablet 0   • predniSONE 20 mg tablet 2 tabs daily X 5 (Patient not taking: Reported on 2/14/2023) 10 tablet 0   • predniSONE 20 mg tablet 3 tabs X 2 days, 2 tabs X 2 days, 1 tab x 2 days, 1/2 tab x 2 dyas (Patient not taking: Reported on 2/14/2023) 14 tablet 0     No current facility-administered medications for this visit           Health Maintenance     Health Maintenance   Topic Date Due   • Hepatitis A Vaccine (1 of 2 - Risk 2-dose series) Never done   • Pneumococcal Vaccine: Pediatrics (0 to 5 Years) and At-Risk Patients (6 to 59 Years) (1 - PCV) Never done   • COVID-19 Vaccine (4 - Booster for Pfizer series) 02/09/2022   • BMI: Followup Plan  09/08/2022   • PT PLAN OF CARE  02/24/2023   • HEMOGLOBIN A1C  07/23/2023   • Diabetic Foot Exam  09/13/2023   • Depression Screening  10/17/2023   • DM Eye Exam  10/17/2023   • Annual Physical  12/22/2023   • Kidney Health Evaluation: GFR  01/18/2024   • BMI: Adult  01/23/2024   • Kidney Health Evaluation: Microalbumin/Creatinine Ratio  01/23/2024   • Hepatitis B Vaccine (1 of 3 - Risk 3-dose series) 03/05/2024   • Colorectal Cancer Screening  05/13/2025   • DTaP,Tdap,and Td Vaccines (3 - Td or Tdap) 09/05/2030   • HIV Screening  Completed   • Hepatitis C Screening  Completed   • Influenza Vaccine  Completed   • HIB Vaccine  Aged Out   • IPV Vaccine  Aged Out   • Meningococcal ACWY Vaccine  Aged Out   • HPV Vaccine  Aged Out     Immunization History   Administered Date(s) Administered   • COVID-19 PFIZER VACCINE 0 3 ML IM 04/12/2021, 05/03/2021, 12/15/2021   • INFLUENZA 12/22/2022   • Influenza, injectable, quadrivalent, preservative free 0 5 mL 10/27/2020   • Influenza, recombinant, quadrivalent,injectable, preservative free 10/21/2019, 12/22/2022   • Influenza, seasonal, injectable 09/23/2015   • Tdap 10/21/2019, 21/89/6580       Schuyler Ruiz MD

## 2023-02-20 PROBLEM — Z00.00 WELL ADULT EXAM: Status: RESOLVED | Noted: 2022-12-22 | Resolved: 2023-02-20

## 2023-03-02 ENCOUNTER — HOSPITAL ENCOUNTER (EMERGENCY)
Facility: HOSPITAL | Age: 58
Discharge: HOME | End: 2023-03-02
Admitting: EMERGENCY MEDICINE

## 2023-03-02 VITALS
HEART RATE: 105 BPM | BODY MASS INDEX: 41.35 KG/M2 | TEMPERATURE: 98.6 F | SYSTOLIC BLOOD PRESSURE: 160 MMHG | DIASTOLIC BLOOD PRESSURE: 81 MMHG | OXYGEN SATURATION: 98 % | WEIGHT: 279.98 LBS | RESPIRATION RATE: 16 BRPM

## 2023-03-02 LAB
ATRIAL RATE: 103 BPM
P AXIS: 57 DEGREES
PR INTERVAL: 150 MS
QRS AXIS: 51 DEGREES
QRSD INTERVAL: 86 MS
QT INTERVAL: 348 MS
QTC INTERVAL: 455 MS
T WAVE AXIS: -53 DEGREES
VENTRICULAR RATE: 103 BPM

## 2023-03-03 ENCOUNTER — APPOINTMENT (EMERGENCY)
Dept: RADIOLOGY | Facility: HOSPITAL | Age: 58
End: 2023-03-03

## 2023-03-03 ENCOUNTER — HOSPITAL ENCOUNTER (INPATIENT)
Facility: HOSPITAL | Age: 58
LOS: 3 days | Discharge: HOME | End: 2023-03-06
Attending: EMERGENCY MEDICINE | Admitting: INTERNAL MEDICINE

## 2023-03-03 DIAGNOSIS — I50.9 CHF EXACERBATION (HCC): Primary | ICD-10-CM

## 2023-03-03 DIAGNOSIS — R06.00 DYSPNEA: ICD-10-CM

## 2023-03-03 DIAGNOSIS — R55 SYNCOPE: ICD-10-CM

## 2023-03-03 PROBLEM — R05.8 POST-TUSSIVE SYNCOPE: Status: ACTIVE | Noted: 2023-03-03

## 2023-03-03 LAB
2HR DELTA HS TROPONIN: -2 NG/L
ALBUMIN SERPL BCP-MCNC: 3.9 G/DL (ref 3.5–5)
ALP SERPL-CCNC: 91 U/L (ref 34–104)
ALT SERPL W P-5'-P-CCNC: 15 U/L (ref 7–52)
ANION GAP SERPL CALCULATED.3IONS-SCNC: 7 MMOL/L (ref 4–13)
AST SERPL W P-5'-P-CCNC: 20 U/L (ref 13–39)
BASOPHILS # BLD AUTO: 0.05 THOUSANDS/ÂΜL (ref 0–0.1)
BASOPHILS NFR BLD AUTO: 1 % (ref 0–1)
BILIRUB SERPL-MCNC: 1 MG/DL (ref 0.2–1)
BNP SERPL-MCNC: 54 PG/ML (ref 0–100)
BUN SERPL-MCNC: 8 MG/DL (ref 5–25)
CALCIUM SERPL-MCNC: 8.9 MG/DL (ref 8.4–10.2)
CARDIAC TROPONIN I PNL SERPL HS: 3 NG/L
CARDIAC TROPONIN I PNL SERPL HS: 5 NG/L
CHLORIDE SERPL-SCNC: 102 MMOL/L (ref 96–108)
CO2 SERPL-SCNC: 29 MMOL/L (ref 21–32)
CREAT SERPL-MCNC: 0.89 MG/DL (ref 0.6–1.3)
EOSINOPHIL # BLD AUTO: 0.66 THOUSAND/ÂΜL (ref 0–0.61)
EOSINOPHIL NFR BLD AUTO: 8 % (ref 0–6)
ERYTHROCYTE [DISTWIDTH] IN BLOOD BY AUTOMATED COUNT: 15.2 % (ref 11.6–15.1)
FLUAV RNA RESP QL NAA+PROBE: NEGATIVE
FLUBV RNA RESP QL NAA+PROBE: NEGATIVE
GFR SERPL CREATININE-BSD FRML MDRD: 94 ML/MIN/1.73SQ M
GLUCOSE SERPL-MCNC: 128 MG/DL (ref 65–140)
GLUCOSE SERPL-MCNC: 138 MG/DL (ref 65–140)
GLUCOSE SERPL-MCNC: 147 MG/DL (ref 65–140)
HCT VFR BLD AUTO: 45.1 % (ref 36.5–49.3)
HGB BLD-MCNC: 14.6 G/DL (ref 12–17)
IMM GRANULOCYTES # BLD AUTO: 0.02 THOUSAND/UL (ref 0–0.2)
IMM GRANULOCYTES NFR BLD AUTO: 0 % (ref 0–2)
LYMPHOCYTES # BLD AUTO: 3.25 THOUSANDS/ÂΜL (ref 0.6–4.47)
LYMPHOCYTES NFR BLD AUTO: 41 % (ref 14–44)
MCH RBC QN AUTO: 30 PG (ref 26.8–34.3)
MCHC RBC AUTO-ENTMCNC: 32.4 G/DL (ref 31.4–37.4)
MCV RBC AUTO: 93 FL (ref 82–98)
MONOCYTES # BLD AUTO: 0.8 THOUSAND/ÂΜL (ref 0.17–1.22)
MONOCYTES NFR BLD AUTO: 10 % (ref 4–12)
NEUTROPHILS # BLD AUTO: 3.17 THOUSANDS/ÂΜL (ref 1.85–7.62)
NEUTS SEG NFR BLD AUTO: 40 % (ref 43–75)
NRBC BLD AUTO-RTO: 0 /100 WBCS
PLATELET # BLD AUTO: 259 THOUSANDS/UL (ref 149–390)
PMV BLD AUTO: 9.7 FL (ref 8.9–12.7)
POTASSIUM SERPL-SCNC: 3.9 MMOL/L (ref 3.5–5.3)
PROT SERPL-MCNC: 7.2 G/DL (ref 6.4–8.4)
RBC # BLD AUTO: 4.87 MILLION/UL (ref 3.88–5.62)
RSV RNA RESP QL NAA+PROBE: NEGATIVE
SARS-COV-2 RNA RESP QL NAA+PROBE: NEGATIVE
SODIUM SERPL-SCNC: 138 MMOL/L (ref 135–147)
WBC # BLD AUTO: 7.95 THOUSAND/UL (ref 4.31–10.16)

## 2023-03-03 RX ORDER — FUROSEMIDE 10 MG/ML
40 INJECTION INTRAMUSCULAR; INTRAVENOUS ONCE
Status: COMPLETED | OUTPATIENT
Start: 2023-03-03 | End: 2023-03-03

## 2023-03-03 RX ORDER — AMLODIPINE BESYLATE 10 MG/1
10 TABLET ORAL DAILY
Status: DISCONTINUED | OUTPATIENT
Start: 2023-03-04 | End: 2023-03-06 | Stop reason: HOSPADM

## 2023-03-03 RX ORDER — METOPROLOL SUCCINATE 25 MG/1
25 TABLET, EXTENDED RELEASE ORAL DAILY
Status: DISCONTINUED | OUTPATIENT
Start: 2023-03-04 | End: 2023-03-06 | Stop reason: HOSPADM

## 2023-03-03 RX ORDER — ONDANSETRON 2 MG/ML
4 INJECTION INTRAMUSCULAR; INTRAVENOUS EVERY 6 HOURS PRN
Status: DISCONTINUED | OUTPATIENT
Start: 2023-03-03 | End: 2023-03-06 | Stop reason: HOSPADM

## 2023-03-03 RX ORDER — ALBUTEROL SULFATE 90 UG/1
2 AEROSOL, METERED RESPIRATORY (INHALATION) EVERY 6 HOURS PRN
Status: DISCONTINUED | OUTPATIENT
Start: 2023-03-03 | End: 2023-03-06 | Stop reason: HOSPADM

## 2023-03-03 RX ORDER — BENZONATATE 100 MG/1
100 CAPSULE ORAL 3 TIMES DAILY PRN
Status: DISCONTINUED | OUTPATIENT
Start: 2023-03-03 | End: 2023-03-06 | Stop reason: HOSPADM

## 2023-03-03 RX ORDER — FUROSEMIDE 10 MG/ML
60 INJECTION INTRAMUSCULAR; INTRAVENOUS
Status: DISCONTINUED | OUTPATIENT
Start: 2023-03-04 | End: 2023-03-05

## 2023-03-03 RX ORDER — LOSARTAN POTASSIUM 50 MG/1
100 TABLET ORAL DAILY
Status: DISCONTINUED | OUTPATIENT
Start: 2023-03-04 | End: 2023-03-06 | Stop reason: HOSPADM

## 2023-03-03 RX ORDER — FLUTICASONE PROPIONATE 220 UG/1
1 AEROSOL, METERED RESPIRATORY (INHALATION) EVERY 12 HOURS SCHEDULED
Status: DISCONTINUED | OUTPATIENT
Start: 2023-03-03 | End: 2023-03-06 | Stop reason: HOSPADM

## 2023-03-03 RX ORDER — METHOCARBAMOL 500 MG/1
500 TABLET, FILM COATED ORAL EVERY 6 HOURS PRN
Status: DISCONTINUED | OUTPATIENT
Start: 2023-03-03 | End: 2023-03-06 | Stop reason: HOSPADM

## 2023-03-03 RX ORDER — ENOXAPARIN SODIUM 100 MG/ML
40 INJECTION SUBCUTANEOUS 2 TIMES DAILY
Status: DISCONTINUED | OUTPATIENT
Start: 2023-03-03 | End: 2023-03-06 | Stop reason: HOSPADM

## 2023-03-03 RX ORDER — POTASSIUM CHLORIDE 20 MEQ/1
40 TABLET, EXTENDED RELEASE ORAL DAILY
Status: DISCONTINUED | OUTPATIENT
Start: 2023-03-04 | End: 2023-03-06 | Stop reason: HOSPADM

## 2023-03-03 RX ORDER — ACETAMINOPHEN 325 MG/1
650 TABLET ORAL EVERY 4 HOURS PRN
Status: DISCONTINUED | OUTPATIENT
Start: 2023-03-03 | End: 2023-03-06 | Stop reason: HOSPADM

## 2023-03-03 RX ORDER — INSULIN LISPRO 100 [IU]/ML
1-6 INJECTION, SOLUTION INTRAVENOUS; SUBCUTANEOUS
Status: DISCONTINUED | OUTPATIENT
Start: 2023-03-03 | End: 2023-03-06 | Stop reason: HOSPADM

## 2023-03-03 RX ORDER — INSULIN LISPRO 100 [IU]/ML
2-12 INJECTION, SOLUTION INTRAVENOUS; SUBCUTANEOUS
Status: DISCONTINUED | OUTPATIENT
Start: 2023-03-03 | End: 2023-03-06 | Stop reason: HOSPADM

## 2023-03-03 RX ADMIN — FLUTICASONE PROPIONATE 1 PUFF: 220 AEROSOL, METERED RESPIRATORY (INHALATION) at 22:55

## 2023-03-03 RX ADMIN — ENOXAPARIN SODIUM 40 MG: 40 INJECTION SUBCUTANEOUS at 19:28

## 2023-03-03 RX ADMIN — FUROSEMIDE 40 MG: 10 INJECTION, SOLUTION INTRAMUSCULAR; INTRAVENOUS at 12:39

## 2023-03-03 NOTE — H&P
[facility]  H&P- [facility] 1965, 61 y o  male MRN: [id number]  Unit/Bed#: S -01 Encounter: [id number]  Primary Care Provider: Remi Sol MD   Date and time admitted to hospital: 3/3/2023 10:59 AM    * Acute on chronic diastolic congestive heart failure (HCC)  Assessment & Plan  Wt Readings from Last 3 Encounters:   03/03/23 122 kg (269 lb 2 9 oz)   03/02/23 127 kg (279 lb 15 8 oz)   02/14/23 123 kg (271 lb 1 oz)         Patient presents with worsening lower extremity swelling, weight gain, shortness of breath  Reports noncompliance with outpatient diuretics and salt restriction  Chest x-ray on admission shows pulmonary vascular congestion  Blood pressure was elevated  Initiate patient on furosemide 60 mg twice daily  May need further titration based on diuretic response  Continue with sodium and fluid restricted diet intake output monitoring and daily weights  Follow-up on repeat echocardiogram     Post-tussive syncope  Assessment & Plan  Reported recent URI symptoms and cough treated with antibiotics  Reported syncopal episode while sitting twice over the last week  Currently denies any palpitations lightheadedness or dizziness  Denies any chest pain or pressure  We will continue with telemetry monitoring, serial cardiac enzymes and echocardiogram     Acute midline low back pain without sciatica  Assessment & Plan  Recently had a back injury and was prescribed a course of steroids  He has completed that  Reports improvement in back pain  PT OT evaluation will be obtained  Continue with as needed Tylenol and muscle relaxant  Obesity  Assessment & Plan  Needs lifestyle modification      Diabetes mellitus type 2, uncontrolled  Assessment & Plan  Lab Results   Component Value Date    HGBA1C 6 8 (A) 01/23/2023       Recent Labs     03/03/23  1632   POCGLU 147*       Blood Sugar Average: Last 72 hrs:  (P) 147   Continue with sliding scale insulin and serial blood glucose monitoring  Essential hypertension  Assessment & Plan  Patient has not been compliant with medications recently  Blood pressure elevated at 195/105 on admission  Resume outpatient dose of amlodipine and ARB  He may need further titration of medications if still poorly controlled on current regimen  VTE Pharmacologic Prophylaxis: VTE Score: 4 High Risk (Score >/= 5) - Pharmacological DVT Prophylaxis Ordered: enoxaparin (Lovenox)  Sequential Compression Devices Ordered  Code Status: Level 1 - Full Code   Discussion with family: Patient declined call to   Anticipated Length of Stay: Patient will be admitted on an inpatient basis with an anticipated length of stay of greater than 2 midnights secondary to IV diuresis  Total Time Spent on Date of Encounter in care of patient: 75 minutes This time was spent on one or more of the following: performing physical exam; counseling and coordination of care; obtaining or reviewing history; documenting in the medical record; reviewing/ordering tests, medications or procedures; communicating with other healthcare professionals and discussing with patient's family/caregivers  Chief Complaint: Shortness of breath    History of Present Illness:  Howard Cole is a 61 y o  male with a PMH of hypertension, type 2 diabetes, diastolic heart failure who presents with worsening shortness of breath lower extremity swelling  Patient states that he has been having a URI and was placed by his primary care physician on antibiotics which she took for approximately 10 days but no significant improvement  He states that the cough has nonproductive and during 1 of those coughing spells he also had syncopal episode while sitting  That was approximately last week  He also noticed shortness of breath with minimal exertion and at rest with orthopnea and paroxysmal nocturnal dyspnea    Noticed weight gain and worsening bilateral lower extremity swelling  He denied any palpitations, lightheadedness or dizziness  Denied any numbness or weakness of any extremity, headache, chest pain pressure or tightness  Denied any fever or chills  He states that he has not been compliant with his antihypertensives and diuretics recently secondary to being recovering from back injury  Review of Systems:  Review of Systems   Constitutional: Positive for activity change, fatigue and unexpected weight change  HENT: Negative  Eyes: Negative  Respiratory: Positive for cough and shortness of breath  Cardiovascular: Positive for leg swelling  Gastrointestinal: Positive for abdominal distention  Endocrine: Negative  Genitourinary: Negative  Musculoskeletal: Positive for back pain and gait problem  Skin: Negative  Allergic/Immunologic: Negative  Neurological: Positive for syncope  Hematological: Negative  Psychiatric/Behavioral: Negative  Past Medical and Surgical History:   Past Medical History:   Diagnosis Date   • Acute kidney injury ([facility] ) 9/5/2020   • Diabetes mellitus ([facility] )    • Hypertension    • Inguinal hernia     3/25/15   • Onychomycosis     5/24/17   • Type 2 diabetes mellitus ([facility] ) 05/01/2012       Past Surgical History:   Procedure Laterality Date   • ARTHROSCOPY KNEE      with Medial and Lateral Meniscus Repair   • HERNIA REPAIR      umbilical and inguinal hernia repairs (left)       Meds/Allergies:  Prior to Admission medications    Medication Sig Start Date End Date Taking?  Authorizing Provider   albuterol (PROVENTIL HFA,VENTOLIN HFA) 90 mcg/act inhaler Inhale 2 puffs every 6 (six) hours as needed 11/8/22   Historical Provider, MD   amLODIPine (NORVASC) 10 mg tablet Take 1 tablet (10 mg total) by mouth daily 21/29/30   Iris Salamanca MD   Blood Pressure KIT Twice a day periodically 7/20/20   SHAY Caal   Flovent  MCG/ACT inhaler INHALE 1 PUFF BY MOUTH 2 (TWO) TIMES A DAY RINSE MOUTH AFTER USE  11/7/22 Brennan Corbett MD   furosemide (LASIX) 40 mg tablet TAKE 1 TABLET BY MOUTH TWICE A DAY 9/1/70   Brennan Corbett MD   glucose blood test strip 1 each by Other route daily 31/8/05   Brennan Corbett MD   Lancets [facility] ULTRASOFT) lancets by Does not apply route 12/28/17   Historical Provider, MD   LORazepam (ATIVAN) 1 mg tablet Take 1 tablet by mouth daily as needed 11/8/22   Historical Provider, MD   methocarbamol (ROBAXIN) 500 mg tablet Take 1 tablet (500 mg total) by mouth 4 (four) times a day  Patient not taking: Reported on 3/3/2023 4/39/92   Brennan Corbett MD   metoprolol succinate (TOPROL-XL) 25 mg 24 hr tablet Take 1 tablet (25 mg total) by mouth daily Start on Monday, 09/06/2021 11/24/22 73/22/05  Brennan Corbett MD   olmesartan (BENICAR) 40 mg tablet Take 1 tablet (40 mg total) by mouth daily 03/85/18   Brennan Corbett MD   predniSONE 20 mg tablet 2 tabs po q day with food  Patient not taking: Reported on 2/14/2023 2/5/41   Brennan Corbett MD   predniSONE 20 mg tablet 2 tabs daily X 5  Patient not taking: Reported on 2/14/2023 2/4/33   Brennan Corbett MD   predniSONE 20 mg tablet 3 tabs X 2 days, 2 tabs X 2 days, 1 tab x 2 days, 1/2 tab x 2 dyas  Patient not taking: Reported on 2/14/2023 49/12/93   Brennan Corbett MD   sitaGLIPtin (Januvia) 100 mg tablet Take 1 tablet (100 mg total) by mouth daily 98/54/15   Brennan Corbett MD     I have reviewed home medications with patient personally      Allergies: No Known Allergies    Social History:  Marital Status: /Civil Union   Substance Use History:   Social History     Substance and Sexual Activity   Alcohol Use Yes   • Alcohol/week: 3 0 standard drinks   • Types: 2 Cans of beer, 1 Standard drinks or equivalent per week    Comment: weekend: 1 glass of christian or 2 beers     Social History     Tobacco Use   Smoking Status Former   • Types: Cigars   • Start date: 6/22/2020   Smokeless Tobacco Never   Tobacco Comments    current every day smoker, per [employer]     Social History     Substance and Sexual Activity   Drug Use No       Family History:  Family History   Problem Relation Age of Onset   • Hypertension Mother         essential   • Chronic bronchitis Father    • Prostate cancer Father    • Breast cancer Sister        Physical Exam:     Vitals:   Blood Pressure: 157/91 (03/03/23 1624)  Pulse: 100 (03/03/23 1624)  Temperature: 98 6 °F (37 °C) (03/03/23 1624)  Temp Source: Oral (03/03/23 1057)  Respirations: 19 (03/03/23 1624)  Height: 5' 9" (175 3 cm) (03/03/23 1626)  Weight - Scale: 122 kg (269 lb 2 9 oz) (03/03/23 1626)  SpO2: 95 % (03/03/23 1624)    Physical Exam  Constitutional:       General: He is in acute distress  Appearance: He is obese  HENT:      Head: Normocephalic  Nose: Nose normal       Mouth/Throat:      Mouth: Mucous membranes are moist    Eyes:      Extraocular Movements: Extraocular movements intact  Pupils: Pupils are equal, round, and reactive to light  Cardiovascular:      Rate and Rhythm: Regular rhythm  Tachycardia present  Pulmonary:      Effort: Pulmonary effort is normal       Comments: Bibasilar Rales with scattered expiratory wheezing  Abdominal:      General: Abdomen is flat  Bowel sounds are normal    Musculoskeletal:      Cervical back: Neck supple  Right lower leg: Edema present  Left lower leg: Edema present  Skin:     General: Skin is warm  Neurological:      General: No focal deficit present  Mental Status: He is alert and oriented to person, place, and time  Mental status is at baseline     Psychiatric:         Mood and Affect: Mood normal        Additional Data:     Lab Results:  Results from last 7 days   Lab Units 03/03/23  1126   WBC Thousand/uL 7 95   HEMOGLOBIN g/dL 14 6   HEMATOCRIT % 45 1   PLATELETS Thousands/uL 259   NEUTROS PCT % 40*   LYMPHS PCT % 41   MONOS PCT % 10   EOS PCT % 8*     Results from last 7 days   Lab Units 03/03/23  1126   SODIUM mmol/L 138   POTASSIUM mmol/L 3 9   CHLORIDE mmol/L 102   CO2 mmol/L 29   BUN mg/dL 8   CREATININE mg/dL 0 89   ANION GAP mmol/L 7   CALCIUM mg/dL 8 9   ALBUMIN g/dL 3 9   TOTAL BILIRUBIN mg/dL 1 00   ALK PHOS U/L 91   ALT U/L 15   AST U/L 20   GLUCOSE RANDOM mg/dL 128         Results from last 7 days   Lab Units 03/03/23  1632   POC GLUCOSE mg/dl 147*               Lines/Drains:  Invasive Devices     Peripheral Intravenous Line  Duration           Peripheral IV 03/03/23 Right Antecubital <1 day                    Imaging: Reviewed radiology reports from this admission including: chest xray  XR chest 1 view portable   ED Interpretation by Tabitha Díaz MD (03/03 1154)   Venous congestion      Final Result by Zainab Grant MD (03/03 1212)      Development of borderline vascular congestion      Workstation performed: [id number]             EKG and Other Studies Reviewed on Admission:   · EKG: NSR    ** Please Note: This note has been constructed using a voice recognition system   **

## 2023-03-03 NOTE — LETTER
[address] [facility] [address]  Dept: [phone]    March 6, 2023     Patient: Lynda Clayton   YOB: 1965   Date of Visit: 3/3/2023       To Whom it May Concern:    Lynda Clayton is under my professional care  He was seen in the hospital from 3/3/2023 to 03/06/23  He may return to work on 3/8/23 without limitations  If you have any questions or concerns, please don't hesitate to call           Sincerely,          Tamiko Braga PA-C

## 2023-03-03 NOTE — ASSESSMENT & PLAN NOTE
Lab Results   Component Value Date    HGBA1C 6 8 (A) 01/23/2023       Recent Labs     03/03/23  1632   POCGLU 147*       Blood Sugar Average: Last 72 hrs:  (P) 147   Continue with sliding scale insulin and serial blood glucose monitoring

## 2023-03-03 NOTE — ED ATTENDING ATTESTATION
3/3/2023  IJeff MD, saw and evaluated the patient  I have discussed the patient with the resident/non-physician practitioner and agree with the resident's/non-physician practitioner's findings, Plan of Care, and MDM as documented in the resident's/non-physician practitioner's note, except where noted  All available labs and Radiology studies were reviewed  I was present for key portions of any procedure(s) performed by the resident/non-physician practitioner and I was immediately available to provide assistance  At this point I agree with the current assessment done in the Emergency Department  I have conducted an independent evaluation of this patient a history and physical is as follows:    62-year-old presenting to the ER with dyspnea, worse with exertion, worsening swelling in the legs  Syncope x2 after coughing  Worsening cough for the last week  No fevers  Leg edema equal, metrical   8 pound weight gain in the last 3 weeks      Agree with work-up, likely CHF, diuresis, admission to hospital    Wt Readings from Last 3 Encounters:   03/02/23 127 kg (279 lb 15 8 oz)   02/14/23 123 kg (271 lb 1 oz)   01/23/23 123 kg (271 lb 8 oz)         ED Course         Critical Care Time  Procedures

## 2023-03-03 NOTE — ASSESSMENT & PLAN NOTE
Wt Readings from Last 3 Encounters:   03/03/23 122 kg (269 lb 2 9 oz)   03/02/23 127 kg (279 lb 15 8 oz)   02/14/23 123 kg (271 lb 1 oz)         Patient presents with worsening lower extremity swelling, weight gain, shortness of breath  Reports noncompliance with outpatient diuretics and salt restriction  Chest x-ray on admission shows pulmonary vascular congestion  Blood pressure was elevated  Initiate patient on furosemide 60 mg twice daily  May need further titration based on diuretic response  Continue with sodium and fluid restricted diet intake output monitoring and daily weights    Follow-up on repeat echocardiogram

## 2023-03-03 NOTE — ED PROVIDER NOTES
History  Chief Complaint   Patient presents with   • Syncope   • Shortness of Breath     Pt arrives c/o coughing fits x 2 wks, states he has lost consciousness during two of these episodes during the last wk, was sitting each time so denies injury, reports he's unsure for how long he was out  Denies CP  Increased SOB with exertion, increased B/L LE edema     43-year-old male with past medical history of hypertension, CHF, non-insulin-dependent diabetes presents for evaluation of persistent cough x2 weeks, recently worsening shortness of breath with exertion and at rest, increasing bilateral lower extremity edema  Patient evaluated by his PCP recently and was placed on Rx cefitin for 10 days without improvement of cough  Patient reports he had x2 episodes of syncope while sitting in bed over the past week which is never happened before  He denies associated symptoms of chest pain, back pain, dizziness/lightheadedness, headache, focal weakness/numbness/tingling, fever/chills, N/V/D or any other symptoms  Patient has been compliant on all medications including his diuretic  No recent long distance car/plane traveling easily  No history of PE or DVT  No other concerns  Prior to Admission Medications   Prescriptions Last Dose Informant Patient Reported? Taking? Blood Pressure KIT   No No   Sig: Twice a day periodically   Flovent  MCG/ACT inhaler   No No   Sig: INHALE 1 PUFF BY MOUTH 2 (TWO) TIMES A DAY RINSE MOUTH AFTER USE     LORazepam (ATIVAN) 1 mg tablet   Yes No   Sig: Take 1 tablet by mouth as needed   Lancets (ONETOUCH ULTRASOFT) lancets   Yes No   Sig: by Does not apply route   albuterol (PROVENTIL HFA,VENTOLIN HFA) 90 mcg/act inhaler   Yes No   Sig: Inhale 2 puffs as needed   amLODIPine (NORVASC) 10 mg tablet   No No   Sig: Take 1 tablet (10 mg total) by mouth daily   furosemide (LASIX) 40 mg tablet   No No   Sig: TAKE 1 TABLET BY MOUTH TWICE A DAY   glucose blood test strip   No No Si each by Other route daily   methocarbamol (ROBAXIN) 500 mg tablet   No No   Sig: Take 1 tablet (500 mg total) by mouth 4 (four) times a day   metoprolol succinate (TOPROL-XL) 25 mg 24 hr tablet   No No   Sig: Take 1 tablet (25 mg total) by mouth daily Start on Monday, 2021  olmesartan (BENICAR) 40 mg tablet   No No   Sig: Take 1 tablet (40 mg total) by mouth daily   predniSONE 20 mg tablet   No No   Si tabs po q day with food   Patient not taking: Reported on 2023   predniSONE 20 mg tablet   No No   Si tabs daily X 5   Patient not taking: Reported on 2023   predniSONE 20 mg tablet   No No   Sig: 3 tabs X 2 days, 2 tabs X 2 days, 1 tab x 2 days, 1/2 tab x 2 dyas   Patient not taking: Reported on 2023   sitaGLIPtin (Januvia) 100 mg tablet   No No   Sig: Take 1 tablet (100 mg total) by mouth daily      Facility-Administered Medications: None       Past Medical History:   Diagnosis Date   • Acute kidney injury ([facility] [address] ) 2020   • Diabetes mellitus ([facility] )    • Hypertension    • Inguinal hernia     3/25/15   • Onychomycosis     16   • Type 2 diabetes mellitus ([facility] [address] ) 2012       Past Surgical History:   Procedure Laterality Date   • ARTHROSCOPY KNEE      with Medial and Lateral Meniscus Repair   • HERNIA REPAIR      umbilical and inguinal hernia repairs (left)       Family History   Problem Relation Age of Onset   • Hypertension Mother         essential   • Chronic bronchitis Father    • Prostate cancer Father    • Breast cancer Sister      I have reviewed and agree with the history as documented      E-Cigarette/Vaping   • E-Cigarette Use Never User      E-Cigarette/Vaping Substances   • Nicotine No    • THC No    • CBD No    • Flavoring No    • Other No    • Unknown No      Social History     Tobacco Use   • Smoking status: Former     Types: Cigars     Start date: 2020   • Smokeless tobacco: Never   • Tobacco comments:     current every day smoker, per [employer] Vaping Use   • Vaping Use: Never used   Substance Use Topics   • Alcohol use: Yes     Alcohol/week: 3 0 standard drinks     Types: 2 Cans of beer, 1 Standard drinks or equivalent per week     Comment: weekend: 1 glass of christian or 2 beers   • Drug use: No        Review of Systems   Constitutional: Negative for chills and fever  HENT: Negative for ear pain and sore throat  Eyes: Negative for pain and visual disturbance  Respiratory: Positive for cough and shortness of breath  Cardiovascular: Negative for chest pain and palpitations  Gastrointestinal: Negative for abdominal pain, diarrhea, nausea and vomiting  Genitourinary: Negative for dysuria and hematuria  Musculoskeletal: Negative for arthralgias and back pain  Skin: Negative for color change and rash  Neurological: Positive for syncope  Negative for seizures  All other systems reviewed and are negative  Physical Exam  ED Triage Vitals   Temperature Pulse Respirations Blood Pressure SpO2   03/03/23 1057 03/03/23 1057 03/03/23 1057 03/03/23 1057 03/03/23 1057   98 3 °F (36 8 °C) 100 16 (!) 195/105 98 %      Temp Source Heart Rate Source Patient Position - Orthostatic VS BP Location FiO2 (%)   03/03/23 1057 03/03/23 1057 03/03/23 1057 03/03/23 1057 --   Oral Monitor Sitting Right arm       Pain Score       03/03/23 1115       No Pain             Orthostatic Vital Signs  Vitals:    03/03/23 1130 03/03/23 1200 03/03/23 1230 03/03/23 1330   BP: 170/81 156/79 166/87 150/72   Pulse: 104 99 103 98   Patient Position - Orthostatic VS: Sitting Sitting Lying Lying       Physical Exam  Vitals and nursing note reviewed  Constitutional:       General: He is not in acute distress  Appearance: He is well-developed  He is ill-appearing  HENT:      Head: Normocephalic and atraumatic  Eyes:      Extraocular Movements: Extraocular movements intact        Conjunctiva/sclera: Conjunctivae normal    Cardiovascular:      Rate and Rhythm: Normal rate and regular rhythm  Heart sounds: No murmur heard  Pulmonary:      Effort: Pulmonary effort is normal  No respiratory distress  Breath sounds: Examination of the right-upper field reveals decreased breath sounds  Examination of the left-upper field reveals decreased breath sounds  Examination of the right-middle field reveals decreased breath sounds  Examination of the left-middle field reveals decreased breath sounds  Examination of the right-lower field reveals decreased breath sounds  Examination of the left-lower field reveals decreased breath sounds  Decreased breath sounds present  Chest:      Chest wall: No mass, deformity, tenderness or crepitus  Abdominal:      Palpations: Abdomen is soft  Tenderness: There is no abdominal tenderness  There is no guarding or rebound  Musculoskeletal:         General: No swelling  Normal range of motion  Cervical back: Normal range of motion and neck supple  Right lower leg: Edema present  Left lower leg: Edema present  Skin:     General: Skin is warm and dry  Capillary Refill: Capillary refill takes less than 2 seconds  Neurological:      Mental Status: He is alert and oriented to person, place, and time     Psychiatric:         Mood and Affect: Mood normal          Behavior: Behavior normal          ED Medications  Medications   furosemide (LASIX) injection 40 mg (40 mg Intravenous Given 3/3/23 1239)       Diagnostic Studies  Results Reviewed     Procedure Component Value Units Date/Time    HS Troponin I 2hr [[id number]]  (Normal) Collected: 03/03/23 1347    Lab Status: Final result Specimen: Blood from Arm, Right Updated: 03/03/23 1418     hs TnI 2hr 3 ng/L      Delta 2hr hsTnI -2 ng/L     B-Type Natriuretic Peptide(BNP) [[id number]]  (Normal) Collected: 03/03/23 1126    Lab Status: Final result Specimen: Blood from Arm, Right Updated: 03/03/23 1246     BNP 54 pg/mL     FLU/RSV/COVID - if FLU/RSV clinically relevant [[id number]] (Normal) Collected: 03/03/23 1126    Lab Status: Final result Specimen: Nares from Nose Updated: 03/03/23 1245     SARS-CoV-2 Negative     INFLUENZA A PCR Negative     INFLUENZA B PCR Negative     RSV PCR Negative    Narrative:      FOR PEDIATRIC PATIENTS - copy/paste COVID Guidelines URL to browser: [email or web address]    SARS-CoV-2 assay is a Nucleic Acid Amplification assay intended for the  qualitative detection of nucleic acid from SARS-CoV-2 in nasopharyngeal  swabs  Results are for the presumptive identification of SARS-CoV-2 RNA  Positive results are indicative of infection with SARS-CoV-2, the virus  causing COVID-19, but do not rule out bacterial infection or co-infection  with other viruses  Laboratories within the [place] and its  territories are required to report all positive results to the appropriate  public health authorities  Negative results do not preclude SARS-CoV-2  infection and should not be used as the sole basis for treatment or other  patient management decisions  Negative results must be combined with  clinical observations, patient history, and epidemiological information  This test has not been FDA cleared or approved  This test has been authorized by FDA under an Emergency Use Authorization  (EUA)  This test is only authorized for the duration of time the  declaration that circumstances exist justifying the authorization of the  emergency use of an in vitro diagnostic tests for detection of SARS-CoV-2  virus and/or diagnosis of COVID-19 infection under section 564(b)(1) of  the Act, 21 U  S C  526GNN-0(B)(3), unless the authorization is terminated  or revoked sooner  The test has been validated but independent review by FDA  and CLIA is pending  Test performed using [employer] GeneXpert: This RT-PCR assay targets N2,  a region unique to SARS-CoV-2   A conserved region in the E-gene was chosen  for pan-Sarbecovirus detection which includes SARS-CoV-2  According to CMS-2020-01-R, this platform meets the definition of high-throughput technology      HS Troponin 0hr (reflex protocol) [[id number]]  (Normal) Collected: 03/03/23 1126    Lab Status: Final result Specimen: Blood from Arm, Right Updated: 03/03/23 1218     hs TnI 0hr 5 ng/L     Comprehensive metabolic panel [[id number]] Collected: 03/03/23 1126    Lab Status: Final result Specimen: Blood from Arm, Right Updated: 03/03/23 1209     Sodium 138 mmol/L      Potassium 3 9 mmol/L      Chloride 102 mmol/L      CO2 29 mmol/L      ANION GAP 7 mmol/L      BUN 8 mg/dL      Creatinine 0 89 mg/dL      Glucose 128 mg/dL      Calcium 8 9 mg/dL      AST 20 U/L      ALT 15 U/L      Alkaline Phosphatase 91 U/L      Total Protein 7 2 g/dL      Albumin 3 9 g/dL      Total Bilirubin 1 00 mg/dL      eGFR 94 ml/min/1 73sq m     Narrative:      [facility] guidelines for Chronic Kidney Disease (CKD):   •  Stage 1 with normal or high GFR (GFR > 90 mL/min/1 73 square meters)  •  Stage 2 Mild CKD (GFR = 60-89 mL/min/1 73 square meters)  •  Stage 3A Moderate CKD (GFR = 45-59 mL/min/1 73 square meters)  •  Stage 3B Moderate CKD (GFR = 30-44 mL/min/1 73 square meters)  •  Stage 4 Severe CKD (GFR = 15-29 mL/min/1 73 square meters)  •  Stage 5 End Stage CKD (GFR <15 mL/min/1 73 square meters)  Note: GFR calculation is accurate only with a steady state creatinine    CBC and differential [[id number]]  (Abnormal) Collected: 03/03/23 1126    Lab Status: Final result Specimen: Blood from Arm, Right Updated: 03/03/23 1153     WBC 7 95 Thousand/uL      RBC 4 87 Million/uL      Hemoglobin 14 6 g/dL      Hematocrit 45 1 %      MCV 93 fL      MCH 30 0 pg      MCHC 32 4 g/dL      RDW 15 2 %      MPV 9 7 fL      Platelets 552 Thousands/uL      nRBC 0 /100 WBCs      Neutrophils Relative 40 %      Immat GRANS % 0 %      Lymphocytes Relative 41 %      Monocytes Relative 10 %      Eosinophils Relative 8 %      Basophils Relative 1 %      Neutrophils Absolute 3 17 Thousands/µL      Immature Grans Absolute 0 02 Thousand/uL      Lymphocytes Absolute 3 25 Thousands/µL      Monocytes Absolute 0 80 Thousand/µL      Eosinophils Absolute 0 66 Thousand/µL      Basophils Absolute 0 05 Thousands/µL                  XR chest 1 view portable   ED Interpretation by Tatum Porter MD (03/03 1154)   Venous congestion      Final Result by Evans Pack MD (03/03 1212)      Development of borderline vascular congestion      Workstation performed: [id number]               Procedures  ECG 12 Lead Documentation Only    Date/Time: 3/3/2023 10:56 AM  Performed by: Tatum Porter MD  Authorized by: Tatum Porter MD     Indications / Diagnosis:  Syncope  ECG reviewed by me, the ED Provider: yes    Patient location:  ED  Previous ECG:     Previous ECG:  Compared to current    Comparison ECG info:  3/2/23    Similarity:  No change    Comparison to cardiac monitor: Yes    Interpretation:     Interpretation: non-specific    Rate:     ECG rate:  100    ECG rate assessment: tachycardic    Rhythm:     Rhythm: sinus tachycardia    Ectopy:     Ectopy: none    QRS:     QRS axis:  Normal    QRS intervals:  Normal  Conduction:     Conduction: normal    ST segments:     ST segments:  Normal  T waves:     T waves: non-specific and inverted    Q waves:     Q waves:  III          ED Course            Wt Readings from Last 3 Encounters:   03/02/23 127 kg (279 lb 15 8 oz)   02/14/23 123 kg (271 lb 1 oz)   01/23/23 123 kg (271 lb 8 oz)                                   Medical Decision Making  Patient remained stable throughout ED course  Given presenting history and complaints, Dx likely CHF exacerbation today despite BNP within normal limits and chest x-ray showing mild pulmonary vascular congestion with weight gain of about 8 pounds since 2/14/2023  Patient was provided 40 mg Lasix IV for acute CHF exacerbation    Admitted for further management, possible need for echo inpatient given recent syncope  EKG nonacute, CBC, CMP, troponin otherwise nonacute  She admitted to [facility]  Pt understands and agreed with plan  All questions answered  No other concerns  Amount and/or Complexity of Data Reviewed  Labs: ordered  Radiology: ordered and independent interpretation performed  Risk  Prescription drug management  Decision regarding hospitalization  Disposition  Final diagnoses:   CHF exacerbation ([facility] [address] )   Syncope   Dyspnea     Time reflects when diagnosis was documented in both MDM as applicable and the Disposition within this note     Time User Action Codes Description Comment    3/3/2023  1:11 PM Elías Sauk Add [I50 9] CHF exacerbation ([facility] [address] )     3/3/2023  1:11 PM Kourtney Calixto Add [R55] Syncope     3/3/2023  1:11 PM Union Sauk Add [R06 00] Dyspnea       ED Disposition     ED Disposition   Admit    Condition   Stable    Date/Time   Fri Mar 3, 2023  1:11 PM    Comment   Case was discussed with Dr José Miguel Trejo and the patient's admission status was agreed to be Admission Status: inpatient status to the service of Dr José Migeul Trejo   Follow-up Information    None         Patient's Medications   Discharge Prescriptions    No medications on file     No discharge procedures on file  PDMP Review       Value Time User    PDMP Reviewed  Yes 9/4/2021  8:39 AM Delfino Wesley DO           ED Provider  Attending physically available and evaluated Anaid Espinosa  I managed the patient along with the ED Attending      Electronically Signed by         Fab Walsh MD  03/03/23 9930

## 2023-03-03 NOTE — ASSESSMENT & PLAN NOTE
Recently had a back injury and was prescribed a course of steroids  He has completed that  Reports improvement in back pain  PT OT evaluation will be obtained  Continue with as needed Tylenol and muscle relaxant

## 2023-03-03 NOTE — ASSESSMENT & PLAN NOTE
Reported recent URI symptoms and cough treated with antibiotics  Reported syncopal episode while sitting twice over the last week  Currently denies any palpitations lightheadedness or dizziness  Denies any chest pain or pressure    We will continue with telemetry monitoring, serial cardiac enzymes and echocardiogram

## 2023-03-03 NOTE — ASSESSMENT & PLAN NOTE
Patient has not been compliant with medications recently  Blood pressure elevated at 195/105 on admission  Resume outpatient dose of amlodipine and ARB  He may need further titration of medications if still poorly controlled on current regimen

## 2023-03-04 LAB
ANION GAP SERPL CALCULATED.3IONS-SCNC: 10 MMOL/L (ref 4–13)
BUN SERPL-MCNC: 10 MG/DL (ref 5–25)
CALCIUM SERPL-MCNC: 8.9 MG/DL (ref 8.4–10.2)
CHLORIDE SERPL-SCNC: 102 MMOL/L (ref 96–108)
CO2 SERPL-SCNC: 27 MMOL/L (ref 21–32)
CREAT SERPL-MCNC: 0.86 MG/DL (ref 0.6–1.3)
GFR SERPL CREATININE-BSD FRML MDRD: 95 ML/MIN/1.73SQ M
GLUCOSE SERPL-MCNC: 109 MG/DL (ref 65–140)
GLUCOSE SERPL-MCNC: 121 MG/DL (ref 65–140)
GLUCOSE SERPL-MCNC: 133 MG/DL (ref 65–140)
GLUCOSE SERPL-MCNC: 150 MG/DL (ref 65–140)
GLUCOSE SERPL-MCNC: 152 MG/DL (ref 65–140)
GLUCOSE SERPL-MCNC: 187 MG/DL (ref 65–140)
MAGNESIUM SERPL-MCNC: 1.3 MG/DL (ref 1.9–2.7)
POTASSIUM SERPL-SCNC: 3.6 MMOL/L (ref 3.5–5.3)
SODIUM SERPL-SCNC: 139 MMOL/L (ref 135–147)
TSH SERPL DL<=0.05 MIU/L-ACNC: 1.21 UIU/ML (ref 0.45–4.5)

## 2023-03-04 RX ADMIN — METOPROLOL SUCCINATE 25 MG: 25 TABLET, EXTENDED RELEASE ORAL at 09:33

## 2023-03-04 RX ADMIN — POTASSIUM CHLORIDE 40 MEQ: 1500 TABLET, EXTENDED RELEASE ORAL at 09:34

## 2023-03-04 RX ADMIN — FUROSEMIDE 60 MG: 10 INJECTION, SOLUTION INTRAMUSCULAR; INTRAVENOUS at 09:33

## 2023-03-04 RX ADMIN — ENOXAPARIN SODIUM 40 MG: 40 INJECTION SUBCUTANEOUS at 09:37

## 2023-03-04 RX ADMIN — INSULIN LISPRO 1 UNITS: 100 INJECTION, SOLUTION INTRAVENOUS; SUBCUTANEOUS at 21:57

## 2023-03-04 RX ADMIN — FLUTICASONE PROPIONATE 1 PUFF: 220 AEROSOL, METERED RESPIRATORY (INHALATION) at 21:57

## 2023-03-04 RX ADMIN — INSULIN LISPRO 2 UNITS: 100 INJECTION, SOLUTION INTRAVENOUS; SUBCUTANEOUS at 12:41

## 2023-03-04 RX ADMIN — FUROSEMIDE 60 MG: 10 INJECTION, SOLUTION INTRAMUSCULAR; INTRAVENOUS at 17:20

## 2023-03-04 RX ADMIN — ENOXAPARIN SODIUM 40 MG: 40 INJECTION SUBCUTANEOUS at 17:20

## 2023-03-04 RX ADMIN — AMLODIPINE BESYLATE 10 MG: 10 TABLET ORAL at 09:34

## 2023-03-04 RX ADMIN — LOSARTAN POTASSIUM 100 MG: 50 TABLET, FILM COATED ORAL at 09:33

## 2023-03-04 RX ADMIN — FLUTICASONE PROPIONATE 1 PUFF: 220 AEROSOL, METERED RESPIRATORY (INHALATION) at 09:34

## 2023-03-04 NOTE — ASSESSMENT & PLAN NOTE
Wt Readings from Last 3 Encounters:   03/04/23 121 kg (266 lb 5 1 oz)   03/02/23 127 kg (279 lb 15 8 oz)   02/14/23 123 kg (271 lb 1 oz)       Patient presented to the emergency department with worsening lower extremity swelling, shortness of breath, dyspnea on exertion and inability to lay flat in bed  Reports noncompliance with outpatient diuretics and salt restriction  Chest x-ray showed pulmonary vascular congestion  Recent echo was reviewed and showed an EF of 55%  This echocardiogram was from September 2021    He was initiated on Lasix 60 mg twice daily  · He notes improved lower extremity edema although still has inability to lay flat  · Continue IV diuresis  · Echocardiogram pending

## 2023-03-04 NOTE — ASSESSMENT & PLAN NOTE
Patient noncompliant with medications recently reported  Continue IV diuresis, losartan, metoprolol and amlodipine    His wife is at the bedside and we discussed an important regimen includes blood pressure control to prevent worsening heart failure

## 2023-03-04 NOTE — ASSESSMENT & PLAN NOTE
Ported syncopal episodes during coughing  His coughing has improved  Completed a course of antibiotics per his primary care physician  Denies fever/chills

## 2023-03-04 NOTE — PROGRESS NOTES
[facility]  Progress Note - Cyndi García 1965, 61 y o  male MRN: [id number]  Unit/Bed#: S -01 Encounter: [id number]  Primary Care Provider: Love Pichardo MD   Date and time admitted to hospital: 3/3/2023 10:59 AM    * Acute on chronic diastolic congestive heart failure ([address] )  Assessment & Plan  Wt Readings from Last 3 Encounters:   03/04/23 121 kg (266 lb 5 1 oz)   03/02/23 127 kg (279 lb 15 8 oz)   02/14/23 123 kg (271 lb 1 oz)       Patient presented to the emergency department with worsening lower extremity swelling, shortness of breath, dyspnea on exertion and inability to lay flat in bed  Reports noncompliance with outpatient diuretics and salt restriction  Chest x-ray showed pulmonary vascular congestion  Recent echo was reviewed and showed an EF of 55%  This echocardiogram was from September 2021  He was initiated on Lasix 60 mg twice daily  · He notes improved lower extremity edema although still has inability to lay flat  · Continue IV diuresis  · Echocardiogram pending    Post-tussive syncope  Assessment & Plan  Ported syncopal episodes during coughing  His coughing has improved  Completed a course of antibiotics per his primary care physician  Denies fever/chills  Acute midline low back pain without sciatica  Assessment & Plan  Patient recently with a back injury and prescribed a course of steroids  He has had improvement of his back pain  Physical therapy pending  Obesity  Assessment & Plan  Needs lifestyle modification  Essential hypertension  Assessment & Plan  Patient noncompliant with medications recently reported  Continue IV diuresis, losartan, metoprolol and amlodipine    His wife is at the bedside and we discussed an important regimen includes blood pressure control to prevent worsening heart failure          VTE Pharmacologic Prophylaxis: VTE Score: 4 Moderate Risk (Score 3-4) - Pharmacological DVT Prophylaxis Ordered: enoxaparin (Lovenox)  Patient Centered Rounds: I performed bedside rounds with nursing staff today  Discussions with Specialists or Other Care Team Provider: ER notes reviewed    Education and Discussions with Family / Patient: Updated  (wife) at bedside  Total Time Spent on Date of Encounter in care of patient: 35 minutes This time was spent on one or more of the following: performing physical exam; counseling and coordination of care; obtaining or reviewing history; documenting in the medical record; reviewing/ordering tests, medications or procedures; communicating with other healthcare professionals and discussing with patient's family/caregivers  Current Length of Stay: 1 day(s)  Current Patient Status: Inpatient   Certification Statement: The patient will continue to require additional inpatient hospital stay due to CHF exacerbation  Discharge Plan: Anticipate discharge tomorrow to home  Code Status: Level 1 - Full Code    Subjective:   He is feeling better though still cant lay flat  He denies cp  He denies fever/chills  He denies abd pain  He feels his edema has improved  Objective:     Vitals:   Temp (24hrs), Av 3 °F (36 8 °C), Min:97 9 °F (36 6 °C), Max:98 7 °F (37 1 °C)    Temp:  [97 9 °F (36 6 °C)-98 7 °F (37 1 °C)] 98 6 °F (37 °C)  HR:  [] 94  Resp:  [16-19] 18  BP: (130-195)/() 136/83  SpO2:  [93 %-100 %] 93 %  Body mass index is 39 33 kg/m²  Input and Output Summary (last 24 hours): Intake/Output Summary (Last 24 hours) at 3/4/2023 1027  Last data filed at 3/4/2023 0550  Gross per 24 hour   Intake --   Output 1200 ml   Net -1200 ml       Physical Exam:   Physical Exam  Vitals and nursing note reviewed  Constitutional:       Appearance: Normal appearance  He is not ill-appearing or diaphoretic  HENT:      Head: Normocephalic  Nose: Nose normal  No congestion        Mouth/Throat:      Mouth: Mucous membranes are moist       Pharynx: No oropharyngeal exudate  Eyes:      General: No scleral icterus  Conjunctiva/sclera: Conjunctivae normal    Pulmonary:      Effort: Pulmonary effort is normal  No respiratory distress  Breath sounds: Rales present  No wheezing  Abdominal:      General: Bowel sounds are normal  There is no distension  Palpations: Abdomen is soft  Tenderness: There is no abdominal tenderness  Musculoskeletal:      Cervical back: Neck supple  No rigidity  Right lower leg: Edema present  Left lower leg: Edema present  Skin:     General: Skin is warm  Coloration: Skin is not jaundiced  Neurological:      Mental Status: He is alert and oriented to person, place, and time     Psychiatric:         Mood and Affect: Mood normal          Behavior: Behavior normal           Additional Data:     Labs:  Results from last 7 days   Lab Units 03/03/23  1126   WBC Thousand/uL 7 95   HEMOGLOBIN g/dL 14 6   HEMATOCRIT % 45 1   PLATELETS Thousands/uL 259   NEUTROS PCT % 40*   LYMPHS PCT % 41   MONOS PCT % 10   EOS PCT % 8*     Results from last 7 days   Lab Units 03/04/23  0550 03/03/23  1126   SODIUM mmol/L 139 138   POTASSIUM mmol/L 3 6 3 9   CHLORIDE mmol/L 102 102   CO2 mmol/L 27 29   BUN mg/dL 10 8   CREATININE mg/dL 0 86 0 89   ANION GAP mmol/L 10 7   CALCIUM mg/dL 8 9 8 9   ALBUMIN g/dL  --  3 9   TOTAL BILIRUBIN mg/dL  --  1 00   ALK PHOS U/L  --  91   ALT U/L  --  15   AST U/L  --  20   GLUCOSE RANDOM mg/dL 109 128         Results from last 7 days   Lab Units 03/04/23  0720 03/03/23  2210 03/03/23  1632   POC GLUCOSE mg/dl 121 138 147*               Lines/Drains:  Invasive Devices     Peripheral Intravenous Line  Duration           Peripheral IV 03/03/23 Right Antecubital <1 day                  Telemetry:  Telemetry Orders (From admission, onward)             24 Hour Telemetry Monitoring  Continuous x 24 Hours (Telem)        References:    Telemetry Guidelines   Question:  Reason for 24 Hour Telemetry  Answer: Syncope suspected to be cardiac in origin                 Telemetry Reviewed: Normal Sinus Rhythm  Indication for Continued Telemetry Use: Acute CHF on >200 mg lasix/day or equivalent dose or with new reduced EF  Imaging: Personally reviewed the following imaging: chest xray    Recent Cultures (last 7 days):         Last 24 Hours Medication List:   Current Facility-Administered Medications   Medication Dose Route Frequency Provider Last Rate   • acetaminophen  650 mg Oral Q4H PRN Dixon Kat MD     • albuterol  2 puff Inhalation Q6H PRN Dixon Kat MD     • amLODIPine  10 mg Oral Daily Dixon Kat MD     • benzonatate  100 mg Oral TID PRN Dixon Kat MD     • enoxaparin  40 mg Subcutaneous BID Dixon Kat MD     • fluticasone  1 puff Inhalation Q12H Walter Bush MD     • furosemide  60 mg Intravenous BID (diuretic) Dixon Kat MD     • insulin lispro  1-6 Units Subcutaneous HS Dixon Kat MD     • insulin lispro  2-12 Units Subcutaneous TID AC Dixon Kat MD     • losartan  100 mg Oral Daily Dixon Kat MD     • methocarbamol  500 mg Oral Q6H PRN Dixon Kat MD     • metoprolol succinate  25 mg Oral Daily Dixon Kat MD     • ondansetron  4 mg Intravenous Q6H PRN Dixon Kat MD     • potassium chloride  40 mEq Oral Daily Dixon Kat MD          Today, Patient Was Seen By: Paula Rizvi MD    **Please Note: This note may have been constructed using a voice recognition system  **

## 2023-03-04 NOTE — ASSESSMENT & PLAN NOTE
Patient recently with a back injury and prescribed a course of steroids  He has had improvement of his back pain  Physical therapy pending

## 2023-03-04 NOTE — UTILIZATION REVIEW
Initial Clinical Review    Admission: Date/Time/Statement:   Admission Orders (From admission, onward)     Ordered        03/03/23 1311  INPATIENT ADMISSION  Once                      Orders Placed This Encounter   Procedures   • INPATIENT ADMISSION     Standing Status:   Standing     Number of Occurrences:   1     Order Specific Question:   Level of Care     Answer:   Med Surg [16]     Order Specific Question:   Estimated length of stay     Answer:   Not Applicable     ED Arrival Information     Expected   -    Arrival   3/3/2023 10:10    Acuity   Emergent            Means of arrival   Walk-In    Escorted by   Self    Service   Hospitalist    Admission type   Emergency            Arrival complaint   COUGH/ SOB           Chief Complaint   Patient presents with   • Syncope   • Shortness of Breath     Pt arrives c/o coughing fits x 2 wks, states he has lost consciousness during two of these episodes during the last wk, was sitting each time so denies injury, reports he's unsure for how long he was out  Denies CP  Increased SOB with exertion, increased B/L LE edema       Initial Presentation: 60 y o  male who presented to [address] [place] [address] ED  Admitted Inpatient status to med surg telemetry dt CHF  PMHx: HTN, CHF, type 2 DM  Presented with SOB with minimal exertion and lower extremity swelling  Recent URI, rx for abx by PCP, no improvement  Pt also states of coughing spell followed by syncopal episode while sitting  On exam, tachycardic, Bibasilar Rales with scattered expiratory wheezing noted along with BL LE edema  CXR revealed pulmonary vascular congestion  IV Lasix given in ED  Plan: Na and fluid restricted diet, monitor IO and daily wts, order echo, serial cardiac enzymes  PT OT eval, pain control with muscle relaxants for low back pain  Accuchecks w/ SSI, continue home antihypertensives and monitor need for change in regimen  Date: 3/4   Day 2:   Unable to lay flat, edema improving   Rales and BL LE edema continues  Continue IV diuresis, fu on echo, PT eval pending  Stress test ordered  ED Triage Vitals   Temperature Pulse Respirations Blood Pressure SpO2   03/03/23 1057 03/03/23 1057 03/03/23 1057 03/03/23 1057 03/03/23 1057   98 3 °F (36 8 °C) 100 16 (!) 195/105 98 %      Temp Source Heart Rate Source Patient Position - Orthostatic VS BP Location FiO2 (%)   03/03/23 1057 03/03/23 1057 03/03/23 1057 03/03/23 1057 --   Oral Monitor Sitting Right arm       Pain Score       03/03/23 1115       No Pain          Wt Readings from Last 1 Encounters:   03/06/23 118 kg (260 lb)     Additional Vital Signs:   Date/Time Temp Pulse Resp BP MAP (mmHg) SpO2 O2 Device Patient Position - Orthostatic VS   03/04/23 07:22:07 98 6 °F (37 °C) 94 -- 136/83 101 93 % -- --   03/03/23 2213 98 7 °F (37 1 °C) -- -- 130/84 99 -- -- --   03/03/23 18:15:03 97 9 °F (36 6 °C) 98 -- 158/97 117 95 % -- --   03/03/23 18:11:06 97 9 °F (36 6 °C) 104 18 -- -- 96 % -- --   03/03/23 1701 -- 99 18 -- -- -- -- --   03/03/23 16:24:13 98 6 °F (37 °C) 100 19 157/91 113 95 % -- --   03/03/23 1600 -- 95 16 158/82 111 96 % None (Room air) Sitting   03/03/23 1430 -- 104 16 161/85 117 99 % None (Room air) Sitting   03/03/23 1330 -- 98 16 150/72 103 97 % None (Room air) Lying   03/03/23 1230 -- 103 16 166/87 119 97 % None (Room air) Lying   03/03/23 1200 -- 99 16 156/79 109 97 % None (Room air) Sitting   03/03/23 1130 -- 104 16 170/81 116 100 % None (Room air) Sitting   03/03/23 1115 -- 103 16 173/92 Abnormal  124 98 % None (Room air) Lying   03/03/23 1057 98 3 °F (36 8 °C) 100 16 195/105 Abnormal  137 98 % None (Room air) Sitting     Pertinent Labs/Diagnostic Test Results:  3/5 ECHO:  •  Left Ventricle: Left ventricular cavity size is normal  Wall thickness is mildly increased  There is mild concentric hypertrophy  The left ventricular ejection fraction is 50%   Systolic function is normal  Diastolic function is mildly abnormal, consistent with grade I (abnormal) relaxation  •  The following segments are hypokinetic: basal inferior, basal inferolateral and mid inferolateral   •  All other segments are normal   •  Right Ventricle: Right ventricular cavity size is normal  Systolic function is normal   •  Aorta: The aortic root is normal in size   The ascending aorta is mildly dilated      Compared to the prior echo from September 2021, inferior/inferolateral wall motion abnormalities are now present      3/3 EKG:  ST  XR chest 1 view portable   ED Interpretation by Joseph Canas MD (03/03 1154)   Venous congestion      Final Result by Kimmy Mccormick MD (03/03 1212)      Development of borderline vascular congestion      Workstation performed: [id number]           Results from last 7 days   Lab Units 03/03/23  1126   SARS-COV-2  Negative     Results from last 7 days   Lab Units 03/05/23  0550 03/03/23  1126   WBC Thousand/uL 8 43 7 95   HEMOGLOBIN g/dL 14 7 14 6   HEMATOCRIT % 44 3 45 1   PLATELETS Thousands/uL 215 259   NEUTROS ABS Thousands/µL 3 58 3 17         Results from last 7 days   Lab Units 03/05/23  0550 03/04/23  0550 03/03/23  1126   SODIUM mmol/L 137 139 138   POTASSIUM mmol/L 3 5 3 6 3 9   CHLORIDE mmol/L 99 102 102   CO2 mmol/L 30 27 29   ANION GAP mmol/L 8 10 7   BUN mg/dL 15 10 8   CREATININE mg/dL 0 97 0 86 0 89   EGFR ml/min/1 73sq m 85 95 94   CALCIUM mg/dL 9 0 8 9 8 9   MAGNESIUM mg/dL  --  1 3*  --      Results from last 7 days   Lab Units 03/03/23  1126   AST U/L 20   ALT U/L 15   ALK PHOS U/L 91   TOTAL PROTEIN g/dL 7 2   ALBUMIN g/dL 3 9   TOTAL BILIRUBIN mg/dL 1 00     Results from last 7 days   Lab Units 03/05/23  2156 03/05/23  1557 03/05/23  1120 03/05/23  0746 03/04/23  2209 03/04/23  2155 03/04/23  1644 03/04/23  1217 03/04/23  0720 03/03/23  2210 03/03/23  1632   POC GLUCOSE mg/dl 167* 187* 199* 115 152* 150* 133 187* 121 138 147*     Results from last 7 days   Lab Units 03/05/23  0550 03/04/23  0550 03/03/23  1126   GLUCOSE RANDOM mg/dL 114 109 128     BETA-HYDROXYBUTYRATE   Date Value Ref Range Status   07/06/2020 0 2 <0 6 mmol/L Final      Results from last 7 days   Lab Units 03/03/23  1347 03/03/23  1126   HS TNI 0HR ng/L  --  5   HS TNI 2HR ng/L 3  --    HSTNI D2 ng/L -2  --      Results from last 7 days   Lab Units 03/04/23  0550   TSH 3RD GENERATON uIU/mL 1 210     Results from last 7 days   Lab Units 03/03/23  1126   BNP pg/mL 54     Results from last 7 days   Lab Units 03/03/23  1126   INFLUENZA A PCR  Negative   INFLUENZA B PCR  Negative   RSV PCR  Negative     ED Treatment:   Medication Administration from 03/03/2023 1010 to 03/03/2023 1610       Date/Time Order Dose Route Action     03/03/2023 1239 EST furosemide (LASIX) injection 40 mg 40 mg Intravenous Given        Past Medical History:   Diagnosis Date   • Acute kidney injury ([facility] ) 9/5/2020   • Diabetes mellitus ([place] [address] )    • Hypertension    • Inguinal hernia     3/25/15   • Onychomycosis     5/24/17   • Type 2 diabetes mellitus ([facility] ) 05/01/2012     Present on Admission:  • Essential hypertension  • Obesity  • Acute on chronic diastolic congestive heart failure (HCC)  • Acute midline low back pain without sciatica      Admitting Diagnosis: Syncope [R55]  Dyspnea [R06 00]  CHF exacerbation (HCC) [I50 9]  Age/Sex: 60 y o  male  Admission Orders:  Scheduled Medications:  amLODIPine, 10 mg, Oral, Daily  enoxaparin, 40 mg, Subcutaneous, BID  fluticasone, 1 puff, Inhalation, Q12H ELIANE  furosemide, 40 mg, Intravenous, BID (diuretic)  insulin lispro, 1-6 Units, Subcutaneous, HS  insulin lispro, 2-12 Units, Subcutaneous, TID AC  losartan, 100 mg, Oral, Daily  metoprolol succinate, 25 mg, Oral, Daily  potassium chloride, 40 mEq, Oral, Daily      Continuous IV Infusions: none     PRN Meds:  acetaminophen, 650 mg, Oral, Q4H PRN  albuterol, 2 puff, Inhalation, Q6H PRN  benzonatate, 100 mg, Oral, TID PRN  methocarbamol, 500 mg, Oral, Q6H PRN  ondansetron, 4 mg, Intravenous, Q6H PRN      scd    IP CONSULT TO NUTRITION SERVICES    Network Utilization Review Department  ATTENTION: Please call with any questions or concerns to [phone] and carefully listen to the prompts so that you are directed to the right person  All voicemails are confidential   [employer] all requests for admission clinical reviews, approved or denied determinations and any other requests to dedicated fax number below belonging to the campus where the patient is receiving treatment   List of dedicated fax numbers for the Facilities:  [address] DENIALS (Administrative/Medical Necessity) [phone]   [address] (Maternity/NICU/Pediatrics) [phone]   [address] Alecia [address]aird [phone]   [place] [address] [phone]   [address] [place] [address] [facility] [phone]   [address] [facility] 815 [facility] [phone]

## 2023-03-04 NOTE — PLAN OF CARE
Problem: MOBILITY - ADULT  Goal: Maintain or return to baseline ADL function  Description: INTERVENTIONS:  -  Assess patient's ability to carry out ADLs; assess patient's baseline for ADL function and identify physical deficits which impact ability to perform ADLs (bathing, care of mouth/teeth, toileting, grooming, dressing, etc )  - Assess/evaluate cause of self-care deficits   - Assess range of motion  - Assess patient's mobility; develop plan if impaired  - Assess patient's need for assistive devices and provide as appropriate  - Encourage maximum independence but intervene and supervise when necessary  - Involve family in performance of ADLs  - Assess for home care needs following discharge   - Consider OT consult to assist with ADL evaluation and planning for discharge  - Provide patient education as appropriate  Outcome: Progressing  Goal: Maintains/Returns to pre admission functional level  Description: INTERVENTIONS:  - Perform BMAT or MOVE assessment daily    - Set and communicate daily mobility goal to care team and patient/family/caregiver     - Collaborate with rehabilitation services on mobility goals if consulted  - Perform Range of Motion   Problem: MOBILITY - ADULT  Goal: Maintain or return to baseline ADL function  Description: INTERVENTIONS:  -  Assess patient's ability to carry out ADLs; assess patient's baseline for ADL function and identify physical deficits which impact ability to perform ADLs (bathing, care of mouth/teeth, toileting, grooming, dressing, etc )  - Assess/evaluate cause of self-care deficits   - Assess range of motion  - Assess patient's mobility; develop plan if impaired  - Assess patient's need for assistive devices and provide as appropriate  - Encourage maximum independence but intervene and supervise when necessary  - Involve family in performance of ADLs  - Assess for home care needs following discharge   - Consider OT consult to assist with ADL evaluation and planning for discharge  - Provide patient education as appropriate  3/4/2023 0742 by Eder Rm RN  Outcome: Progressing  3/4/2023 0742 by Eder Rm RN  Outcome: Progressing  Goal: Maintains/Returns to pre admission functional level  Description: INTERVENTIONS:  - Perform BMAT or MOVE assessment daily    - Set and communicate daily mobility goal to care team and patient/family/caregiver  - Collaborate with rehabilitation services on mobility goals if consulted  - Perform Range of Motion times a day  - Reposition patient every  hours  - Dangle patient times a day  - Stand patient times a day  - Ambulate patient times a day  - Out of bed to chair times a day   - Out of bed for meals  times a day  - Out of bed for toileting  - Record patient progress and toleration of activity level   3/4/2023 0742 by Eder Rm RN  Outcome: Progressing  3/4/2023 0742 by Eder Rm RN  Outcome: Progressing     Problem: MOBILITY - ADULT  Goal: Maintains/Returns to pre admission functional level  Description: INTERVENTIONS:  - Perform BMAT or MOVE assessment daily    - Set and communicate daily mobility goal to care team and patient/family/caregiver  - Collaborate with rehabilitation services on mobility goals if consulted  - Perform Range of Motion  times a day  - Reposition patient every  hours    - Dangle patient  times a day  - Stand patient times a day  - Ambulate patient times a day  - Out of bed to chair  times a day   - Out of bed for meals  times a day  - Out of bed for toileting  - Record patient progress and toleration of activity level   3/4/2023 0742 by Eder Rm RN  Outcome: Progressing  3/4/2023 0742 by Eder Rm RN  Outcome: Progressing     Problem: Potential for Falls  Goal: Patient will remain free of falls  Description: INTERVENTIONS:  - Educate patient/family on patient safety including physical limitations  - Instruct patient to call for assistance with activity   - Consult OT/PT to assist with strengthening/mobility   - Keep Call bell within reach  - Keep bed low and locked with side rails adjusted as appropriate  - Keep care items and personal belongings within reach  - Initiate and maintain comfort rounds  - Make Fall Risk Sign visible to staff  - Offer Toileting every  Hours, in advance of need  - Initiate/Maintain alarm  - Obtain necessary fall risk management equipment:   - Apply yellow socks and bracelet for high fall risk patients  - Consider moving patient to room near nurses station  Outcome: Progressing    times a day  - Reposition patient every  hours    - Dangle patient times a day  - Stand patient times a day  - Ambulate patient  times a day  - Out of bed to chair  times a day   - Out of bed for meals times a day  - Out of bed for toileting  - Record patient progress and toleration of activity level   Outcome: Progressing

## 2023-03-05 ENCOUNTER — APPOINTMENT (INPATIENT)
Dept: NON INVASIVE DIAGNOSTICS | Facility: HOSPITAL | Age: 58
End: 2023-03-05
Attending: INTERNAL MEDICINE

## 2023-03-05 LAB
ANION GAP SERPL CALCULATED.3IONS-SCNC: 8 MMOL/L (ref 4–13)
AORTIC ROOT: 3.4 CM
APICAL FOUR CHAMBER EJECTION FRACTION: 55 %
ASCENDING AORTA: 3.6 CM
ATRIAL RATE: 100 BPM
BASOPHILS # BLD AUTO: 0.04 THOUSANDS/ÂΜL (ref 0–0.1)
BASOPHILS NFR BLD AUTO: 1 % (ref 0–1)
BUN SERPL-MCNC: 15 MG/DL (ref 5–25)
CALCIUM SERPL-MCNC: 9 MG/DL (ref 8.4–10.2)
CHLORIDE SERPL-SCNC: 99 MMOL/L (ref 96–108)
CO2 SERPL-SCNC: 30 MMOL/L (ref 21–32)
CREAT SERPL-MCNC: 0.97 MG/DL (ref 0.6–1.3)
E WAVE DECELERATION TIME: 160 MS
EOSINOPHIL # BLD AUTO: 0.53 THOUSAND/ÂΜL (ref 0–0.61)
EOSINOPHIL NFR BLD AUTO: 6 % (ref 0–6)
ERYTHROCYTE [DISTWIDTH] IN BLOOD BY AUTOMATED COUNT: 14.6 % (ref 11.6–15.1)
FRACTIONAL SHORTENING: 22 % (ref 28–44)
GFR SERPL CREATININE-BSD FRML MDRD: 85 ML/MIN/1.73SQ M
GLUCOSE SERPL-MCNC: 114 MG/DL (ref 65–140)
GLUCOSE SERPL-MCNC: 115 MG/DL (ref 65–140)
GLUCOSE SERPL-MCNC: 167 MG/DL (ref 65–140)
GLUCOSE SERPL-MCNC: 187 MG/DL (ref 65–140)
GLUCOSE SERPL-MCNC: 199 MG/DL (ref 65–140)
HCT VFR BLD AUTO: 44.3 % (ref 36.5–49.3)
HGB BLD-MCNC: 14.7 G/DL (ref 12–17)
IMM GRANULOCYTES # BLD AUTO: 0.02 THOUSAND/UL (ref 0–0.2)
IMM GRANULOCYTES NFR BLD AUTO: 0 % (ref 0–2)
INTERVENTRICULAR SEPTUM IN DIASTOLE (PARASTERNAL SHORT AXIS VIEW): 1.4 CM
INTERVENTRICULAR SEPTUM: 1.4 CM (ref 0.6–1.1)
LAAS-AP2: 15.5 CM2
LAAS-AP4: 15.2 CM2
LEFT ATRIUM SIZE: 2.9 CM
LEFT INTERNAL DIMENSION IN SYSTOLE: 3.5 CM (ref 2.1–4)
LEFT VENTRICULAR INTERNAL DIMENSION IN DIASTOLE: 4.5 CM (ref 3.5–6)
LEFT VENTRICULAR POSTERIOR WALL IN END DIASTOLE: 1.3 CM
LEFT VENTRICULAR STROKE VOLUME: 42 ML
LVSV (TEICH): 42 ML
LYMPHOCYTES # BLD AUTO: 3.44 THOUSANDS/ÂΜL (ref 0.6–4.47)
LYMPHOCYTES NFR BLD AUTO: 41 % (ref 14–44)
MCH RBC QN AUTO: 30.4 PG (ref 26.8–34.3)
MCHC RBC AUTO-ENTMCNC: 33.2 G/DL (ref 31.4–37.4)
MCV RBC AUTO: 92 FL (ref 82–98)
MONOCYTES # BLD AUTO: 0.82 THOUSAND/ÂΜL (ref 0.17–1.22)
MONOCYTES NFR BLD AUTO: 10 % (ref 4–12)
MV E'TISSUE VEL-LAT: 7 CM/S
MV E'TISSUE VEL-SEP: 7 CM/S
MV PEAK A VEL: 0.72 M/S
MV PEAK E VEL: 46 CM/S
MV STENOSIS PRESSURE HALF TIME: 46 MS
MV VALVE AREA P 1/2 METHOD: 4.78 CM2
NEUTROPHILS # BLD AUTO: 3.58 THOUSANDS/ÂΜL (ref 1.85–7.62)
NEUTS SEG NFR BLD AUTO: 42 % (ref 43–75)
NRBC BLD AUTO-RTO: 0 /100 WBCS
P AXIS: 51 DEGREES
PLATELET # BLD AUTO: 215 THOUSANDS/UL (ref 149–390)
PMV BLD AUTO: 9.9 FL (ref 8.9–12.7)
POTASSIUM SERPL-SCNC: 3.5 MMOL/L (ref 3.5–5.3)
PR INTERVAL: 146 MS
QRS AXIS: 60 DEGREES
QRSD INTERVAL: 86 MS
QT INTERVAL: 348 MS
QTC INTERVAL: 448 MS
RBC # BLD AUTO: 4.83 MILLION/UL (ref 3.88–5.62)
RIGHT ATRIUM AREA SYSTOLE A4C: 14.5 CM2
RIGHT VENTRICLE ID DIMENSION: 3.5 CM
SL CV LEFT ATRIUM LENGTH A2C: 3.8 CM
SL CV LV EF: 50
SL CV PED ECHO LEFT VENTRICLE DIASTOLIC VOLUME (MOD BIPLANE) 2D: 93 ML
SL CV PED ECHO LEFT VENTRICLE SYSTOLIC VOLUME (MOD BIPLANE) 2D: 51 ML
SODIUM SERPL-SCNC: 137 MMOL/L (ref 135–147)
T WAVE AXIS: -6 DEGREES
VENTRICULAR RATE: 100 BPM
WBC # BLD AUTO: 8.43 THOUSAND/UL (ref 4.31–10.16)

## 2023-03-05 RX ORDER — FUROSEMIDE 10 MG/ML
60 INJECTION INTRAMUSCULAR; INTRAVENOUS ONCE
Status: COMPLETED | OUTPATIENT
Start: 2023-03-05 | End: 2023-03-05

## 2023-03-05 RX ORDER — FUROSEMIDE 10 MG/ML
40 INJECTION INTRAMUSCULAR; INTRAVENOUS
Status: DISCONTINUED | OUTPATIENT
Start: 2023-03-05 | End: 2023-03-06 | Stop reason: HOSPADM

## 2023-03-05 RX ADMIN — FLUTICASONE PROPIONATE 1 PUFF: 220 AEROSOL, METERED RESPIRATORY (INHALATION) at 22:15

## 2023-03-05 RX ADMIN — FUROSEMIDE 60 MG: 10 INJECTION, SOLUTION INTRAMUSCULAR; INTRAVENOUS at 12:56

## 2023-03-05 RX ADMIN — AMLODIPINE BESYLATE 10 MG: 10 TABLET ORAL at 08:55

## 2023-03-05 RX ADMIN — LOSARTAN POTASSIUM 100 MG: 50 TABLET, FILM COATED ORAL at 08:56

## 2023-03-05 RX ADMIN — INSULIN LISPRO 1 UNITS: 100 INJECTION, SOLUTION INTRAVENOUS; SUBCUTANEOUS at 22:15

## 2023-03-05 RX ADMIN — ENOXAPARIN SODIUM 40 MG: 40 INJECTION SUBCUTANEOUS at 08:55

## 2023-03-05 RX ADMIN — FLUTICASONE PROPIONATE 1 PUFF: 220 AEROSOL, METERED RESPIRATORY (INHALATION) at 08:55

## 2023-03-05 RX ADMIN — METOPROLOL SUCCINATE 25 MG: 25 TABLET, EXTENDED RELEASE ORAL at 08:55

## 2023-03-05 RX ADMIN — FUROSEMIDE 40 MG: 10 INJECTION, SOLUTION INTRAMUSCULAR; INTRAVENOUS at 17:13

## 2023-03-05 RX ADMIN — POTASSIUM CHLORIDE 40 MEQ: 1500 TABLET, EXTENDED RELEASE ORAL at 08:56

## 2023-03-05 RX ADMIN — INSULIN LISPRO 2 UNITS: 100 INJECTION, SOLUTION INTRAVENOUS; SUBCUTANEOUS at 12:56

## 2023-03-05 RX ADMIN — ENOXAPARIN SODIUM 40 MG: 40 INJECTION SUBCUTANEOUS at 17:13

## 2023-03-05 RX ADMIN — INSULIN LISPRO 2 UNITS: 100 INJECTION, SOLUTION INTRAVENOUS; SUBCUTANEOUS at 17:13

## 2023-03-05 RX ADMIN — FUROSEMIDE 60 MG: 10 INJECTION, SOLUTION INTRAMUSCULAR; INTRAVENOUS at 08:55

## 2023-03-05 NOTE — QUICK NOTE
Reviewed echo for discharge,  Not available for read yet    Normal LV function-EF 55%, basal inferior, basal to mid inferolateral hypokinesis    Mild LVH  Normal RV size and function    Full report to follow

## 2023-03-05 NOTE — PROGRESS NOTES
[facility]  Progress Note - Stefani Olszewski 1965, 60 y o  male MRN: [id number]  Unit/Bed#: S -01 Encounter: [id number]  Primary Care Provider: Ginny Vigil MD   Date and time admitted to hospital: 3/3/2023 10:59 AM    * Acute on chronic diastolic congestive heart failure ([address] )  Assessment & Plan  Wt Readings from Last 3 Encounters:   03/05/23 118 kg (261 lb)   03/02/23 127 kg (279 lb 15 8 oz)   02/14/23 123 kg (271 lb 1 oz)       Patient presented to the emergency department with worsening lower extremity swelling, shortness of breath, dyspnea on exertion and inability to lay flat in bed  Reports noncompliance with outpatient diuretics and salt restriction  Chest x-ray showed pulmonary vascular congestion  Recent echo was reviewed and showed an EF of 55%  This echocardiogram was from September 2021  He was initiated on Lasix 60 mg IV twice daily  · He continues to note improved lower extremity edema although still has inability to lay flat  · Continue IV diuresis  · Echocardiogram showed similar EF of 55% with new basal inferior lateral hypokinesis  · We will pursue stress test tomorrow morning to evaluate further  He followed up with cardiology last 2 years ago and will need close follow-up with them  If his stress test is abnormal would recommend cardiology consultation  If his stress test is normal, he can follow-up with them outpatient    Post-tussive syncope  Assessment & Plan  Ported syncopal episodes during coughing  His coughing has improved  Completed a course of antibiotics per his primary care physician  Denies fever/chills  Acute midline low back pain without sciatica  Assessment & Plan  Patient recently with a back injury and prescribed a course of steroids  He has had improvement of his back pain  Physical therapy pending  Essential hypertension  Assessment & Plan  Patient noncompliant with medications recently reported    Continue IV diuresis, losartan, metoprolol and amlodipine  His wife is at the bedside and we discussed an important regimen includes blood pressure control to prevent worsening heart failure          VTE Pharmacologic Prophylaxis: VTE Score: 4 Moderate Risk (Score 3-4) - Pharmacological DVT Prophylaxis Ordered: enoxaparin (Lovenox)  Patient Centered Rounds: I performed bedside rounds with nursing staff today  Discussions with Specialists or Other Care Team Provider: Reviewed echocardiogram with cardiology    Education and Discussions with Family / Patient: Patient declined call to   Total Time Spent on Date of Encounter in care of patient: 35 minutes This time was spent on one or more of the following: performing physical exam; counseling and coordination of care; obtaining or reviewing history; documenting in the medical record; reviewing/ordering tests, medications or procedures; communicating with other healthcare professionals and discussing with patient's family/caregivers  Current Length of Stay: 2 day(s)  Current Patient Status: Inpatient   Certification Statement: The patient will continue to require additional inpatient hospital stay due to Congestive heart failure  Discharge Plan: Anticipate discharge in 24-48 hrs to home  Code Status: Level 1 - Full Code    Subjective:   He notes improved lower extremity edema today  He still has some inability to lay flat  He still has a cough  An echocardiogram showed basal inferior lateral hypokinesis and we talked about requiring a new stress test   His stress test 2 years ago was unremarkable  His ejection fraction is similar to where it was 1 year ago    He is okay to stay overnight for the stress test as well as need for continued IV diuresis    Objective:     Vitals:   Temp (24hrs), Av 3 °F (36 8 °C), Min:98 3 °F (36 8 °C), Max:98 3 °F (36 8 °C)    Temp:  [98 3 °F (36 8 °C)] 98 3 °F (36 8 °C)  HR:  [84-97] 97  Resp:  [20] 20  BP: (137-146)/(86-88) 146/88  SpO2:  [96 %-97 %] 97 %  Body mass index is 38 54 kg/m²  Input and Output Summary (last 24 hours): Intake/Output Summary (Last 24 hours) at 3/5/2023 1533  Last data filed at 3/5/2023 1131  Gross per 24 hour   Intake --   Output 2300 ml   Net -2300 ml       Physical Exam:   Physical Exam  Vitals and nursing note reviewed  Constitutional:       Appearance: Normal appearance  He is not ill-appearing or diaphoretic  HENT:      Head: Normocephalic  Nose: Nose normal  No congestion  Mouth/Throat:      Mouth: Mucous membranes are moist    Eyes:      General: No scleral icterus  Conjunctiva/sclera: Conjunctivae normal    Pulmonary:      Effort: Pulmonary effort is normal  No respiratory distress  Breath sounds: No wheezing  Abdominal:      General: Bowel sounds are normal  There is no distension  Palpations: Abdomen is soft  Tenderness: There is no abdominal tenderness  Musculoskeletal:      Cervical back: Normal range of motion and neck supple  No rigidity  Right lower leg: Edema present  Left lower leg: Edema present  Skin:     General: Skin is warm  Coloration: Skin is not jaundiced  Neurological:      Mental Status: He is alert and oriented to person, place, and time     Psychiatric:         Behavior: Behavior normal           Additional Data:     Labs:  Results from last 7 days   Lab Units 03/05/23  0550   WBC Thousand/uL 8 43   HEMOGLOBIN g/dL 14 7   HEMATOCRIT % 44 3   PLATELETS Thousands/uL 215   NEUTROS PCT % 42*   LYMPHS PCT % 41   MONOS PCT % 10   EOS PCT % 6     Results from last 7 days   Lab Units 03/05/23  0550 03/04/23  0550 03/03/23  1126   SODIUM mmol/L 137   < > 138   POTASSIUM mmol/L 3 5   < > 3 9   CHLORIDE mmol/L 99   < > 102   CO2 mmol/L 30   < > 29   BUN mg/dL 15   < > 8   CREATININE mg/dL 0 97   < > 0 89   ANION GAP mmol/L 8   < > 7   CALCIUM mg/dL 9 0   < > 8 9   ALBUMIN g/dL  --   --  3 9   TOTAL BILIRUBIN mg/dL  --   --  1 00 ALK PHOS U/L  --   --  91   ALT U/L  --   --  15   AST U/L  --   --  20   GLUCOSE RANDOM mg/dL 114   < > 128    < > = values in this interval not displayed  Results from last 7 days   Lab Units 03/05/23  1120 03/05/23  0746 03/04/23  2209 03/04/23  2155 03/04/23  1644 03/04/23  1217 03/04/23  0720 03/03/23  2210 03/03/23  1632   POC GLUCOSE mg/dl 199* 115 152* 150* 133 187* 121 138 147*               Lines/Drains:  Invasive Devices     Peripheral Intravenous Line  Duration           Peripheral IV 03/03/23 Right Antecubital 2 days                      Imaging: Personally reviewed the following imaging: ECHO    Recent Cultures (last 7 days):         Last 24 Hours Medication List:   Current Facility-Administered Medications   Medication Dose Route Frequency Provider Last Rate   • acetaminophen  650 mg Oral Q4H PRN Robby Shrestha MD     • albuterol  2 puff Inhalation Q6H PRN Robby Shrestha MD     • amLODIPine  10 mg Oral Daily Robby Shrestha MD     • benzonatate  100 mg Oral TID PRN Robby Shrestha MD     • enoxaparin  40 mg Subcutaneous BID Robby Shrestha MD     • fluticasone  1 puff Inhalation Q12H [place] MD Freddie     • furosemide  40 mg Intravenous BID (diuretic) Stuart Braxton MD     • insulin lispro  1-6 Units Subcutaneous HS Robby Shrestha MD     • insulin lispro  2-12 Units Subcutaneous TID AC Robby Shrestha MD     • losartan  100 mg Oral Daily Robby Shrestha MD     • methocarbamol  500 mg Oral Q6H PRN Robby Shrestha MD     • metoprolol succinate  25 mg Oral Daily Robby Shrestha MD     • ondansetron  4 mg Intravenous Q6H PRN Robby Shrestha MD     • potassium chloride  40 mEq Oral Daily Robby Shrestha MD          Today, Patient Was Seen By: Stuart Braxton MD    **Please Note: This note may have been constructed using a voice recognition system  **

## 2023-03-05 NOTE — ASSESSMENT & PLAN NOTE
Wt Readings from Last 3 Encounters:   03/05/23 118 kg (261 lb)   03/02/23 127 kg (279 lb 15 8 oz)   02/14/23 123 kg (271 lb 1 oz)       Patient presented to the emergency department with worsening lower extremity swelling, shortness of breath, dyspnea on exertion and inability to lay flat in bed  Reports noncompliance with outpatient diuretics and salt restriction  Chest x-ray showed pulmonary vascular congestion  Recent echo was reviewed and showed an EF of 55%  This echocardiogram was from September 2021  He was initiated on Lasix 60 mg IV twice daily  · He continues to note improved lower extremity edema although still has inability to lay flat  · Continue IV diuresis  · Echocardiogram showed similar EF of 55% with new basal inferior lateral hypokinesis  · We will pursue stress test tomorrow morning to evaluate further  He followed up with cardiology last 2 years ago and will need close follow-up with them  If his stress test is abnormal would recommend cardiology consultation    If his stress test is normal, he can follow-up with them outpatient

## 2023-03-06 ENCOUNTER — APPOINTMENT (INPATIENT)
Dept: NON INVASIVE DIAGNOSTICS | Facility: HOSPITAL | Age: 58
End: 2023-03-06

## 2023-03-06 ENCOUNTER — TRANSITIONAL CARE MANAGEMENT (OUTPATIENT)
Dept: FAMILY MEDICINE CLINIC | Facility: CLINIC | Age: 58
End: 2023-03-06

## 2023-03-06 ENCOUNTER — APPOINTMENT (OUTPATIENT)
Dept: NUCLEAR MEDICINE | Facility: HOSPITAL | Age: 58
End: 2023-03-06

## 2023-03-06 VITALS
TEMPERATURE: 98.9 F | SYSTOLIC BLOOD PRESSURE: 128 MMHG | HEIGHT: 69 IN | WEIGHT: 260 LBS | DIASTOLIC BLOOD PRESSURE: 80 MMHG | OXYGEN SATURATION: 97 % | RESPIRATION RATE: 18 BRPM | HEART RATE: 93 BPM | BODY MASS INDEX: 38.51 KG/M2

## 2023-03-06 LAB
CHEST PAIN STATEMENT: NORMAL
CHEST PAIN STATEMENT: NORMAL
GLUCOSE SERPL-MCNC: 166 MG/DL (ref 65–140)
MAX DIASTOLIC BP: 82 MMHG
MAX DIASTOLIC BP: 82 MMHG
MAX HEART RATE: 112 BPM
MAX HEART RATE: 112 BPM
MAX PREDICTED HEART RATE: 161 BPM
MAX PREDICTED HEART RATE: 161 BPM
MAX. SYSTOLIC BP: 158 MMHG
MAX. SYSTOLIC BP: 158 MMHG
NUC STRESS EJECTION FRACTION: 49 %
PROTOCOL NAME: NORMAL
PROTOCOL NAME: NORMAL
RATE PRESSURE PRODUCT: NORMAL
REASON FOR TERMINATION: NORMAL
REASON FOR TERMINATION: NORMAL
SL CV REST NUCLEAR ISOTOPE DOSE: 15.9 MCI
SL CV STRESS NUCLEAR ISOTOPE DOSE: 33 MCI
SL CV STRESS RECOVERY BP: NORMAL MMHG
SL CV STRESS RECOVERY HR: 100 BPM
SL CV STRESS RECOVERY O2 SAT: 100 %
STRESS ANGINA INDEX: 0
STRESS BASELINE BP: NORMAL MMHG
STRESS BASELINE HR: 93 BPM
STRESS O2 SAT REST: 97 %
STRESS PEAK HR: 107 BPM
STRESS POST O2 SAT PEAK: 99 %
STRESS POST PEAK BP: 158 MMHG
STRESS ST DEPRESSION: 0 MM
STRESS/REST PERFUSION RATIO: 1.13
TARGET HR FORMULA: NORMAL
TARGET HR FORMULA: NORMAL
TEST INDICATION: NORMAL
TEST INDICATION: NORMAL
TIME IN EXERCISE PHASE: NORMAL
TIME IN EXERCISE PHASE: NORMAL

## 2023-03-06 RX ORDER — POTASSIUM CHLORIDE 20 MEQ/1
40 TABLET, EXTENDED RELEASE ORAL DAILY
Qty: 60 TABLET | Refills: 0 | Status: SHIPPED | OUTPATIENT
Start: 2023-03-07 | End: 2023-04-06

## 2023-03-06 RX ADMIN — METOPROLOL SUCCINATE 25 MG: 25 TABLET, EXTENDED RELEASE ORAL at 11:08

## 2023-03-06 RX ADMIN — LOSARTAN POTASSIUM 100 MG: 50 TABLET, FILM COATED ORAL at 11:08

## 2023-03-06 RX ADMIN — ENOXAPARIN SODIUM 40 MG: 40 INJECTION SUBCUTANEOUS at 11:09

## 2023-03-06 RX ADMIN — INSULIN LISPRO 2 UNITS: 100 INJECTION, SOLUTION INTRAVENOUS; SUBCUTANEOUS at 12:16

## 2023-03-06 RX ADMIN — FLUTICASONE PROPIONATE 1 PUFF: 220 AEROSOL, METERED RESPIRATORY (INHALATION) at 11:11

## 2023-03-06 RX ADMIN — FUROSEMIDE 40 MG: 10 INJECTION, SOLUTION INTRAMUSCULAR; INTRAVENOUS at 11:08

## 2023-03-06 RX ADMIN — REGADENOSON 0.4 MG: 0.08 INJECTION, SOLUTION INTRAVENOUS at 09:00

## 2023-03-06 RX ADMIN — AMLODIPINE BESYLATE 10 MG: 10 TABLET ORAL at 11:08

## 2023-03-06 RX ADMIN — POTASSIUM CHLORIDE 40 MEQ: 1500 TABLET, EXTENDED RELEASE ORAL at 11:08

## 2023-03-06 NOTE — DISCHARGE SUMMARY
[facility]  Discharge- Jolie Reason 1965, 60 y o  male MRN: [id number]  Unit/Bed#: S -01 Encounter: [id number]  Primary Care Provider: Tony Guerra MD   Date and time admitted to hospital: 3/3/2023 10:59 AM    * Acute on chronic diastolic congestive heart failure ([address] )  Assessment & Plan  Wt Readings from Last 3 Encounters:   03/06/23 118 kg (260 lb)   03/02/23 127 kg (279 lb 15 8 oz)   02/14/23 123 kg (271 lb 1 oz)       Patient presented to the emergency department with worsening lower extremity swelling, shortness of breath, dyspnea on exertion and inability to lay flat in bed  Reports noncompliance with outpatient diuretics and salt restriction  Chest x-ray showed pulmonary vascular congestion  Recent echo was reviewed and showed an EF of 55%  This echocardiogram was from September 2021  He was initiated on Lasix 60 mg IV twice daily  · This is improved  · Can discontinue IV Lasix and start on p o  Lasix 40 mg twice daily  · Echocardiogram showed similar EF of 55% with new basal inferior lateral hypokinesis  · Patient stress testing with no evidence of ischemia  Post-tussive syncope  Assessment & Plan  · Ported syncopal episodes during coughing  His coughing has improved  Completed a course of antibiotics per his primary care physician  Denies fever/chills  Acute midline low back pain without sciatica  Assessment & Plan  · Patient recently with a back injury and prescribed a course of steroids  He has had improvement of his back pain  Obesity  Assessment & Plan  · Needs lifestyle modification  Essential hypertension  Assessment & Plan  · Patient noncompliant with medications recently reported  Continue IV diuresis, losartan, metoprolol and amlodipine    His wife is at the bedside and we discussed an important regimen includes blood pressure control to prevent worsening heart failure    Medical Problems     Resolved Problems  Date Reviewed: 3/6/2023 None       Discharging Physician / Practitioner: Mukul Chavarria PA-C  PCP: Remi Sol MD  Admission Date:   Admission Orders (From admission, onward)     Ordered        03/03/23 1311  INPATIENT ADMISSION  Once                      Discharge Date: 03/06/23    Consultations During Hospital Stay:  · None     Procedures Performed:   · None     Significant Findings / Test Results:   · Stress testing with no evidence of ischemia  · EF 50% with grade 1 diastolic dysfunction    Incidental Findings:   · none     Test Results Pending at Discharge (will require follow up):   · none     Outpatient Tests Requested:  · none    Complications:  none    Reason for Admission: CHF exacebation    Hospital Course:   Thierry Lambert is a 60 y o  male patient who originally presented to the hospital on 3/3/2023 due to shortness of breath with lower extremity edema and dyspnea upon exertion  Also reported orthopnea  He was noncompliant with his outpatient diuretics and salt restriction  Came in with CHF exacerbation and diuresed with IV Lasix 60 mg twice daily  While hospitalized repeat echocardiogram showed a new basal lateral hypokinesis thus an inpatient stress test was pursued  This was normal with no evidence of ischemia  Patient will be discharged after he was stressed the importance of taking his diuretics and salt restriction  He will follow-up hi with his outpatient cardiologist, his diuretic regimen was not adjusted  Please see above list of diagnoses and related plan for additional information  Condition at Discharge: stable    Discharge Day Visit / Exam:   Subjective: Patient is pain-free  Shortness of breath has resolved  He is back to his baseline  Normal stress testing  Need outpatient cardiology follow-up    Vitals: Blood Pressure: 128/80 (03/06/23 1114)  Pulse: 93 (03/06/23 0857)  Temperature: 98 9 °F (37 2 °C) (03/05/23 2155)  Temp Source: Oral (03/05/23 2155)  Respirations: 18 (03/05/23 2155)  Height: 5' 9" (175 3 cm) (03/06/23 0857)  Weight - Scale: 118 kg (260 lb) (03/06/23 0857)  SpO2: 97 % (03/06/23 0857)  Exam:   Physical Exam  Vitals and nursing note reviewed  Constitutional:       General: He is not in acute distress  Appearance: Normal appearance  HENT:      Head: Normocephalic  Mouth/Throat:      Mouth: Mucous membranes are moist    Eyes:      Pupils: Pupils are equal, round, and reactive to light  Cardiovascular:      Rate and Rhythm: Normal rate and regular rhythm  Heart sounds: No murmur heard  Pulmonary:      Effort: Pulmonary effort is normal  No respiratory distress  Breath sounds: Normal breath sounds  No wheezing, rhonchi or rales  Abdominal:      General: Bowel sounds are normal  There is no distension  Palpations: Abdomen is soft  Tenderness: There is no abdominal tenderness  There is no guarding  Musculoskeletal:         General: No deformity  Cervical back: Normal range of motion  Right lower leg: No edema  Left lower leg: No edema  Skin:     Capillary Refill: Capillary refill takes less than 2 seconds  Neurological:      General: No focal deficit present  Mental Status: He is alert and oriented to person, place, and time  Mental status is at baseline  Discussion with Family: Patient declined call to   Discharge instructions/Information to patient and family:   See after visit summary for information provided to patient and family  Provisions for Follow-Up Care:  See after visit summary for information related to follow-up care and any pertinent home health orders  Disposition:   Home    Planned Readmission: no     Discharge Statement:  I spent 45 minutes discharging the patient  This time was spent on the day of discharge  I had direct contact with the patient on the day of discharge   Greater than 50% of the total time was spent examining patient, answering all patient questions, arranging and discussing plan of care with patient as well as directly providing post-discharge instructions  Additional time then spent on discharge activities  Discharge Medications:  See after visit summary for reconciled discharge medications provided to patient and/or family        **Please Note: This note may have been constructed using a voice recognition system**

## 2023-03-06 NOTE — ASSESSMENT & PLAN NOTE
· Ported syncopal episodes during coughing  His coughing has improved  Completed a course of antibiotics per his primary care physician  Denies fever/chills

## 2023-03-06 NOTE — ASSESSMENT & PLAN NOTE
Wt Readings from Last 3 Encounters:   03/06/23 118 kg (260 lb)   03/02/23 127 kg (279 lb 15 8 oz)   02/14/23 123 kg (271 lb 1 oz)       Patient presented to the emergency department with worsening lower extremity swelling, shortness of breath, dyspnea on exertion and inability to lay flat in bed  Reports noncompliance with outpatient diuretics and salt restriction  Chest x-ray showed pulmonary vascular congestion  Recent echo was reviewed and showed an EF of 55%  This echocardiogram was from September 2021  He was initiated on Lasix 60 mg IV twice daily  · This is improved  · Can discontinue IV Lasix and start on p o  Lasix 40 mg twice daily  · Echocardiogram showed similar EF of 55% with new basal inferior lateral hypokinesis  · Patient stress testing with no evidence of ischemia

## 2023-03-06 NOTE — UTILIZATION REVIEW
NOTIFICATION OF INPATIENT ADMISSION   AUTHORIZATION REQUEST   SERVICING FACILITY:   [address] [place] [facility], [address] [place] [address]  Tax ID: [id number]  NPI: [id number]   ATTENDING PROVIDER:  Attending Name and NPI#: Alberta Neri Md [[id number]]  Address: [address] [place] [address] [address] [place]  [place], [address] [place] [address]  Phone: [phone]     ADMISSION INFORMATION:  Place of Service: Inpatient [address] [facility]  [address]  Place of Service Code: 21  Inpatient Admission Date/Time: 3/3/23  1:11 PM  Discharge Date/Time: No discharge date for patient encounter  Admitting Diagnosis Code/Description:  Syncope [R55]  Dyspnea [R06 00]  CHF exacerbation ([facility] [address] ) [I50 9]     UTILIZATION REVIEW CONTACT:  Abilio Sunshine Utilization   Network Utilization Review Department  Phone: [phone]  Fax: [phone]  Email: [email or web address]  Contact for approvals/pending authorizations, clinical reviews, and discharge  PHYSICIAN ADVISORY SERVICES:  Medical Necessity Denial & Rkah-fa-Ests Review  Phone: [phone]  Fax: [phone]  Email: [email or web address]

## 2023-03-06 NOTE — ASSESSMENT & PLAN NOTE
· Patient recently with a back injury and prescribed a course of steroids  He has had improvement of his back pain

## 2023-03-06 NOTE — ASSESSMENT & PLAN NOTE
· Patient noncompliant with medications recently reported  Continue IV diuresis, losartan, metoprolol and amlodipine    His wife is at the bedside and we discussed an important regimen includes blood pressure control to prevent worsening heart failure

## 2023-03-08 NOTE — UTILIZATION REVIEW
NOTIFICATION OF ADMISSION DISCHARGE   This is a Notification of Discharge from [address] [place] [address]  Please be advised that this patient has been discharge from our facility  Below you will find the admission and discharge date and time including the patient’s disposition  UTILIZATION REVIEW CONTACT:  Yasir Salgado  Utilization   Network Utilization Review Department  Phone: [phone] x carefully listen to the prompts  All voicemails are confidential   Email: [email or web address]     ADMISSION INFORMATION  PRESENTATION DATE: 3/3/2023 10:59 AM  OBERVATION ADMISSION DATE:   INPATIENT ADMISSION DATE: 3/3/23  1:11 PM   DISCHARGE DATE: 3/6/2023  1:33 PM   DISPOSITION:Home/Self Care    IMPORTANT INFORMATION:  Send all requests for admission clinical reviews, approved or denied determinations and any other requests to dedicated fax number below belonging to the campus where the patient is receiving treatment   List of dedicated fax numbers:  [address] DENIALS (Administrative/Medical Necessity) [phone]   [address] (Maternity/NICU/Pediatrics) [phone]   [facility] [phone]   [facility] 87 [phone]   [address] [phone]   220 [facility] [phone]   90 [facility] [phone]   [address] [place] [address] 119 [phone]   [facility]  [phone]   4057 [facility] [phone]   [facility] [address] [facility] [phone]

## 2023-03-10 DIAGNOSIS — I50.9 CHF (CONGESTIVE HEART FAILURE) (HCC): ICD-10-CM

## 2023-03-10 RX ORDER — FUROSEMIDE 40 MG/1
TABLET ORAL
Qty: 180 TABLET | Refills: 1 | Status: SHIPPED | OUTPATIENT
Start: 2023-03-10

## 2023-04-04 NOTE — ASSESSMENT & PLAN NOTE
Lab Results   Component Value Date    HGBA1C 6 3 (H) 08/31/2021       Recent Labs     09/01/21  1601 09/01/21  2057 09/02/21  0630 09/02/21  1109   POCGLU 169* 158* 111 208*       Blood Sugar Average: Last 72 hrs:  (P) 159 [id number]   · Poor compliance  · On Janumet at home, will hold  · Will place on insulin sliding scale, Accu-Cheks hypoglycemia protocol Patient arrived to room. PIV placed. Admit assessment completed. Plan of care discussed with patient.

## 2023-05-12 ENCOUNTER — TELEPHONE (OUTPATIENT)
Dept: CARDIOLOGY CLINIC | Facility: CLINIC | Age: 58
End: 2023-05-12

## 2023-05-22 ENCOUNTER — TELEPHONE (OUTPATIENT)
Dept: CARDIOLOGY CLINIC | Facility: CLINIC | Age: 58
End: 2023-05-22

## 2023-06-05 ENCOUNTER — OFFICE VISIT (OUTPATIENT)
Dept: FAMILY MEDICINE CLINIC | Facility: CLINIC | Age: 58
End: 2023-06-05
Payer: COMMERCIAL

## 2023-06-05 VITALS
RESPIRATION RATE: 18 BRPM | HEIGHT: 69 IN | TEMPERATURE: 98.4 F | WEIGHT: 260.25 LBS | HEART RATE: 102 BPM | BODY MASS INDEX: 38.55 KG/M2 | DIASTOLIC BLOOD PRESSURE: 75 MMHG | OXYGEN SATURATION: 98 % | SYSTOLIC BLOOD PRESSURE: 139 MMHG

## 2023-06-05 DIAGNOSIS — R05.8 POST-TUSSIVE SYNCOPE: ICD-10-CM

## 2023-06-05 DIAGNOSIS — I10 ESSENTIAL HYPERTENSION: ICD-10-CM

## 2023-06-05 DIAGNOSIS — E11.9 TYPE 2 DIABETES MELLITUS WITHOUT COMPLICATION, WITHOUT LONG-TERM CURRENT USE OF INSULIN (HCC): Primary | ICD-10-CM

## 2023-06-05 DIAGNOSIS — J45.20 MILD INTERMITTENT REACTIVE AIRWAY DISEASE WITHOUT COMPLICATION: ICD-10-CM

## 2023-06-05 DIAGNOSIS — E11.65 UNCONTROLLED TYPE 2 DIABETES MELLITUS WITH HYPERGLYCEMIA (HCC): ICD-10-CM

## 2023-06-05 PROBLEM — J06.9 UPPER RESPIRATORY TRACT INFECTION: Status: RESOLVED | Noted: 2023-02-14 | Resolved: 2023-06-05

## 2023-06-05 LAB — SL AMB POCT HEMOGLOBIN AIC: 9 (ref ?–6.5)

## 2023-06-05 PROCEDURE — 99214 OFFICE O/P EST MOD 30 MIN: CPT | Performed by: FAMILY MEDICINE

## 2023-06-05 PROCEDURE — 83036 HEMOGLOBIN GLYCOSYLATED A1C: CPT | Performed by: FAMILY MEDICINE

## 2023-06-05 RX ORDER — ALBUTEROL SULFATE 90 UG/1
2 AEROSOL, METERED RESPIRATORY (INHALATION) EVERY 6 HOURS PRN
Qty: 18 G | Refills: 2 | Status: SHIPPED | OUTPATIENT
Start: 2023-06-05

## 2023-06-05 RX ORDER — PREDNISONE 20 MG/1
20 TABLET ORAL DAILY
Qty: 5 TABLET | Refills: 0 | Status: SHIPPED | OUTPATIENT
Start: 2023-06-05

## 2023-06-05 RX ORDER — FLUTICASONE PROPIONATE 220 UG/1
1 AEROSOL, METERED RESPIRATORY (INHALATION) 2 TIMES DAILY
Qty: 12 G | Refills: 5 | Status: SHIPPED | OUTPATIENT
Start: 2023-06-05

## 2023-06-05 NOTE — ASSESSMENT & PLAN NOTE
Cough  Patient given refills on his Flovent 220 mcg inhaler to use 1 elation twice daily  He will use prednisone 20 mg once daily for 5 days  He was also given refill on his Proventil rescue inhaler    Patient will call if coughing persists after 2 weeks of medication

## 2023-06-05 NOTE — ASSESSMENT & PLAN NOTE
Diabetes  A1c in the office has increased to 9 0 however patient states he has been off his Januvia medication for several weeks to months since he ran out of his last refills    He was instructed to restart Januvia once daily and repeat A1c in 3 months  Lab Results   Component Value Date    HGBA1C 9 0 (A) 06/05/2023
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
## 2023-06-07 ENCOUNTER — OFFICE VISIT (OUTPATIENT)
Dept: CARDIOLOGY CLINIC | Facility: CLINIC | Age: 58
End: 2023-06-07
Payer: COMMERCIAL

## 2023-06-07 VITALS
WEIGHT: 265.2 LBS | HEART RATE: 107 BPM | DIASTOLIC BLOOD PRESSURE: 80 MMHG | BODY MASS INDEX: 39.28 KG/M2 | OXYGEN SATURATION: 99 % | SYSTOLIC BLOOD PRESSURE: 132 MMHG | HEIGHT: 69 IN

## 2023-06-07 DIAGNOSIS — R55 SYNCOPE, UNSPECIFIED SYNCOPE TYPE: ICD-10-CM

## 2023-06-07 DIAGNOSIS — I16.0 HYPERTENSIVE URGENCY: ICD-10-CM

## 2023-06-07 DIAGNOSIS — E11.9 TYPE 2 DIABETES MELLITUS WITHOUT COMPLICATION, WITHOUT LONG-TERM CURRENT USE OF INSULIN (HCC): ICD-10-CM

## 2023-06-07 DIAGNOSIS — I50.31 ACUTE DIASTOLIC CONGESTIVE HEART FAILURE (HCC): Primary | ICD-10-CM

## 2023-06-07 PROCEDURE — 99214 OFFICE O/P EST MOD 30 MIN: CPT | Performed by: STUDENT IN AN ORGANIZED HEALTH CARE EDUCATION/TRAINING PROGRAM

## 2023-07-09 ENCOUNTER — HOSPITAL ENCOUNTER (EMERGENCY)
Facility: HOSPITAL | Age: 58
Discharge: HOME | End: 2023-07-09
Attending: EMERGENCY MEDICINE
Payer: COMMERCIAL

## 2023-07-09 VITALS
RESPIRATION RATE: 18 BRPM | TEMPERATURE: 98 F | BODY MASS INDEX: 39.36 KG/M2 | SYSTOLIC BLOOD PRESSURE: 165 MMHG | DIASTOLIC BLOOD PRESSURE: 83 MMHG | WEIGHT: 266.54 LBS | HEART RATE: 98 BPM | OXYGEN SATURATION: 97 %

## 2023-07-09 DIAGNOSIS — M54.9 UPPER BACK PAIN: Primary | ICD-10-CM

## 2023-07-09 DIAGNOSIS — M54.2 NECK PAIN: ICD-10-CM

## 2023-07-09 PROCEDURE — 99284 EMERGENCY DEPT VISIT MOD MDM: CPT

## 2023-07-09 PROCEDURE — 96372 THER/PROPH/DIAG INJ SC/IM: CPT

## 2023-07-09 RX ORDER — METHOCARBAMOL 500 MG/1
500 TABLET, FILM COATED ORAL 2 TIMES DAILY
Qty: 20 TABLET | Refills: 0 | Status: SHIPPED | OUTPATIENT
Start: 2023-07-09

## 2023-07-09 RX ORDER — KETOROLAC TROMETHAMINE 30 MG/ML
15 INJECTION, SOLUTION INTRAMUSCULAR; INTRAVENOUS ONCE
Status: COMPLETED | OUTPATIENT
Start: 2023-07-09 | End: 2023-07-09

## 2023-07-09 RX ORDER — LIDOCAINE 50 MG/G
1 PATCH TOPICAL ONCE
Status: DISCONTINUED | OUTPATIENT
Start: 2023-07-09 | End: 2023-07-09 | Stop reason: HOSPADM

## 2023-07-09 RX ORDER — NAPROXEN 500 MG/1
500 TABLET ORAL 2 TIMES DAILY WITH MEALS
Qty: 30 TABLET | Refills: 0 | Status: SHIPPED | OUTPATIENT
Start: 2023-07-09

## 2023-07-09 RX ADMIN — LIDOCAINE 1 PATCH: 50 PATCH TOPICAL at 09:41

## 2023-07-09 RX ADMIN — KETOROLAC TROMETHAMINE 15 MG: 30 INJECTION, SOLUTION INTRAMUSCULAR; INTRAVENOUS at 09:41

## 2023-07-09 NOTE — Clinical Note
Yolanda Hof was seen and treated in our emergency department on 7/9/2023. Please avoid climbing ladders until pain subsides. Diagnosis:     Ty Ferrer  may return to work on return date. He may return on this date: 07/11/2023    Please excuse the patient from work on 7/10/2023. The patient was seen and evaluated in the emergency department on 7/9/2023. The patient should avoid climbing ladders until his pain is subsided. If you have any questions or concerns, please don't hesitate to call.       Hamida Momin, DO    ______________________________           _______________          _______________  Hospital Representative                              Date                                Time

## 2023-07-09 NOTE — ED ATTENDING ATTESTATION
7/9/2023  IMarychuy MD, saw and evaluated the patient. I have discussed the patient with the resident/non-physician practitioner and agree with the resident's/non-physician practitioner's findings, Plan of Care, and MDM as documented in the resident's/non-physician practitioner's note, except where noted. All available labs and Radiology studies were reviewed. I was present for key portions of any procedure(s) performed by the resident/non-physician practitioner and I was immediately available to provide assistance. At this point I agree with the current assessment done in the Emergency Department. I have conducted an independent evaluation of this patient a history and physical is as follows:    62-year-old presenting to the ER with neck pain, tenderness over the trapezius bilaterally, full range of motion of the neck, no radicular symptoms, no fevers, no trauma, not on blood thinners, no neurodeficits.     Muscle skeletal pain, symptomatic treatment      ED Course         Critical Care Time  Procedures

## 2023-07-09 NOTE — ED PROVIDER NOTES
History  Chief Complaint   Patient presents with   • Back Pain     Patient reports upper back pain between the shoulder blades that radiates into the neck for three days. Patient states he thinks its related to work due to him climbing 70ft ladders. Reports he now has headaches due to the pain. The patient is a 58-year-old male with a past medical history of diabetes and hypertension who presents to the emergency department with complaint of upper back and neck pain. 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 The patient states his pain began approximately 3 days ago in the posterior lateral aspect of his neck. He described it as a sharp achy pain that is made it difficult to turn his neck secondary to pain. He states the pain is also radiated down into his upper back. He denies any chest pain, shortness of breath, headache, change in vision, lightheadedness, abdominal pain, nausea, vomiting, numbness, tingling, weakness, hematuria, dysuria, rash or fever. Prior to Admission Medications   Prescriptions Last Dose Informant Patient Reported? Taking? Blood Pressure KIT  Self No No   Sig: Twice a day periodically   Empagliflozin (Jardiance) 25 MG TABS   No No   Sig: Take 1 tablet (25 mg total) by mouth every morning   LORazepam (ATIVAN) 1 mg tablet  Self Yes No   Sig: Take 1 tablet by mouth daily as needed   Lancets (ONETOUCH ULTRASOFT) lancets  Self Yes No   Sig: by Does not apply route   albuterol (PROVENTIL HFA,VENTOLIN HFA) 90 mcg/act inhaler  Self No No   Sig: Inhale 2 puffs every 6 (six) hours as needed (as needed)   amLODIPine (NORVASC) 10 mg tablet  Self No No   Sig: Take 1 tablet (10 mg total) by mouth daily   fluticasone (Flovent HFA) 220 mcg/act inhaler  Self No No   Sig: Inhale 1 puff 2 (two) times a day Rinse mouth after use.    furosemide (LASIX) 40 mg tablet  Self No No   Sig: TAKE 1 TABLET BY MOUTH TWICE A DAY glucose blood test strip  Self No No   Si each by Other route daily   metoprolol succinate (TOPROL-XL) 25 mg 24 hr tablet  Self No No   Sig: Take 1 tablet (25 mg total) by mouth daily Start on Monday, 2021. olmesartan (BENICAR) 40 mg tablet  Self No No   Sig: Take 1 tablet (40 mg total) by mouth daily   potassium chloride (K-DUR,KLOR-CON) 20 mEq tablet  Self No No   Sig: Take 2 tablets (40 mEq total) by mouth daily Do not start before 2023. predniSONE 20 mg tablet  Self No No   Sig: Take 1 tablet (20 mg total) by mouth daily   sitaGLIPtin (Januvia) 100 mg tablet  Self No No   Sig: Take 1 tablet (100 mg total) by mouth daily      Facility-Administered Medications: None       Past Medical History:   Diagnosis Date   • Acute kidney injury ([address]) 2020   • Diabetes mellitus ([address])    • Hypertension    • Inguinal hernia     3/25/15   • Onychomycosis     16   • Type 2 diabetes mellitus ([address]) 2012       Past Surgical History:   Procedure Laterality Date   • ARTHROSCOPY KNEE      with Medial and Lateral Meniscus Repair   • HERNIA REPAIR      umbilical and inguinal hernia repairs (left)       Family History   Problem Relation Age of Onset   • Hypertension Mother         essential   • Chronic bronchitis Father    • Prostate cancer Father    • Breast cancer Sister      I have reviewed and agree with the history as documented. E-Cigarette/Vaping   • E-Cigarette Use Never User      E-Cigarette/Vaping Substances   • Nicotine No    • THC No    • CBD No    • Flavoring No    • Other No    • Unknown No      Social History     Tobacco Use   • Smoking status: Former     Types: Cigars     Start date: 2020   • Smokeless tobacco: Never   • Tobacco comments:     current every day smoker, per [employer]   Vaping Use   • Vaping Use: Never used   Substance Use Topics   • Alcohol use:  Yes     Alcohol/week: 3.0 standard drinks of alcohol     Types: 2 Cans of beer, 1 Standard drinks or equivalent per week     Comment: weekend: 1 glass of christian or 2 beers   • Drug use: No        Review of Systems   Constitutional: Negative for chills, fatigue and fever. HENT: Negative for congestion, ear pain and sore throat. Eyes: Negative for photophobia, pain and visual disturbance. Respiratory: Negative for cough, shortness of breath, wheezing and stridor. Cardiovascular: Negative for chest pain, palpitations and leg swelling. Gastrointestinal: Negative for abdominal distention, abdominal pain, diarrhea, nausea and vomiting. Endocrine: Negative. Genitourinary: Negative for dysuria and hematuria. Musculoskeletal: Positive for myalgias, neck pain and neck stiffness. Negative for arthralgias and back pain. Skin: Negative for color change and rash. Allergic/Immunologic: Negative. Neurological: Negative for dizziness, seizures, syncope, weakness, light-headedness, numbness and headaches. Hematological: Negative. Psychiatric/Behavioral: Negative. All other systems reviewed and are negative. Physical Exam  ED Triage Vitals   Temperature Pulse Respirations Blood Pressure SpO2   07/09/23 0906 07/09/23 0902 07/09/23 0902 07/09/23 0902 07/09/23 0902   98 °F (36.7 °C) 104 20 (!) 182/96 98 %      Temp Source Heart Rate Source Patient Position - Orthostatic VS BP Location FiO2 (%)   07/09/23 0906 07/09/23 0902 07/09/23 0902 07/09/23 0902 --   Oral Monitor Sitting Right arm       Pain Score       07/09/23 0902       9             Orthostatic Vital Signs  Vitals:    07/09/23 0902 07/09/23 0930   BP: (!) 182/96 165/83   Pulse: 104 98   Patient Position - Orthostatic VS: Sitting Lying       Physical Exam  Vitals and nursing note reviewed. Constitutional:       General: He is not in acute distress. Appearance: He is well-developed. He is obese. He is ill-appearing. HENT:      Head: Normocephalic and atraumatic.       Nose: Nose normal.      Mouth/Throat:      Mouth: Mucous membranes are moist. Pharynx: Oropharynx is clear. Eyes:      Extraocular Movements: Extraocular movements intact. Conjunctiva/sclera: Conjunctivae normal.      Pupils: Pupils are equal, round, and reactive to light. Neck:      Comments: Posterior lateral tenderness to palpation and decreased range of motion of his neck secondary to pain. Cardiovascular:      Rate and Rhythm: Normal rate and regular rhythm. Pulses: Normal pulses. Heart sounds: Normal heart sounds. No murmur heard. No friction rub. Pulmonary:      Effort: Pulmonary effort is normal. No respiratory distress. Breath sounds: Normal breath sounds. No stridor. No wheezing, rhonchi or rales. Abdominal:      General: Abdomen is flat. Bowel sounds are normal. There is no distension. Palpations: Abdomen is soft. Tenderness: There is no abdominal tenderness. There is no right CVA tenderness, left CVA tenderness, guarding or rebound. Musculoskeletal:         General: Tenderness present. No swelling, deformity or signs of injury. Normal range of motion. Cervical back: Rigidity and tenderness present. Right lower leg: No edema. Left lower leg: No edema. Comments: Tenderness to palpation of the upper back in the trapezius muscles bilaterally. Muscles felt very tight. Skin:     General: Skin is warm and dry. Capillary Refill: Capillary refill takes less than 2 seconds. Coloration: Skin is not jaundiced. Findings: No erythema or rash. Neurological:      General: No focal deficit present. Mental Status: He is alert and oriented to person, place, and time. Mental status is at baseline. Cranial Nerves: No cranial nerve deficit. Sensory: No sensory deficit. Motor: No weakness.    Psychiatric:         Mood and Affect: Mood normal.         ED Medications  Medications   lidocaine (LIDODERM) 5 % patch 1 patch (1 patch Topical Medication Applied 7/9/23 3706)   ketorolac (TORADOL) injection 15 mg (15 mg Intramuscular Given 7/9/23 0941)       Diagnostic Studies  Results Reviewed     None                 No orders to display         Procedures  Procedures      ED Course                                       Medical Decision Making  The patient is a 62-year-old male with a past medical history of diabetes and hypertension who presents to the emergency department with complaint of upper back and neck pain. Upon initial presentation the patient was alert and oriented x4 and in no acute distress. His pain was reproducible to palpation in his trapezius muscles from insertion at the base of the occiput down into his upper back. The muscles were very tight and tender to palpation. The differential includes but is not limited to trapezius muscle strain, musculoskeletal pain, early onset radiculitis. Not consistent with dissection or CAD as the patient has no chest pain or shortness of breath. There was no report of trauma or evidence of trauma on physical exam that would suggest fracture. I have ordered a Lidoderm patch and Toradol at this time. The patient will be discharged with prescriptions for naproxen and Robaxin. Return precautions will be discussed. Further instructions per discharge orders. Risk  Prescription drug management. Disposition  Final diagnoses:   Upper back pain   Neck pain     Time reflects when diagnosis was documented in both MDM as applicable and the Disposition within this note     Time User Action Codes Description Comment    7/9/2023  9:37 AM Sunita Scriver Add [M54.9] Upper back pain     7/9/2023  9:37 AM Sunita Scriver Add [M54.2] Neck pain       ED Disposition     ED Disposition   Discharge    Condition   Stable    Date/Time   Sun Jul 9, 2023  9:37 AM    Comment   Allan Evans discharge to home/self care.                Follow-up Information    None         Patient's Medications   Discharge Prescriptions    METHOCARBAMOL (ROBAXIN) 500 MG TABLET    Take 1 tablet (500 mg total) by mouth 2 (two) times a day       Start Date: 7/9/2023  End Date: --       Order Dose: 500 mg       Quantity: 20 tablet    Refills: 0    NAPROXEN (NAPROSYN) 500 MG TABLET    Take 1 tablet (500 mg total) by mouth 2 (two) times a day with meals       Start Date: 7/9/2023  End Date: --       Order Dose: 500 mg       Quantity: 30 tablet    Refills: 0     No discharge procedures on file. PDMP Review       Value Time User    PDMP Reviewed  Yes 9/4/2021  8:39 AM Eliot Miramontes DO           ED Provider  Attending physically available and evaluated Carson Obando. I managed the patient along with the ED Attending.     Electronically Signed by         Darryl Ferrari DO  07/09/23 5975

## 2023-07-09 NOTE — DISCHARGE INSTRUCTIONS
You have been given a prescription for naproxen and instructed to take 1 tablet by mouth twice a day with small meals to avoid upset stomach. You have also been given a prescription for Robaxin with instructions to take 1 tablet by mouth twice a day. Please avoid operating heavy equipment or driving a motor vehicle as this medication may cause drowsiness. You have also been given the contact information for orthopedics should your symptoms not begin to resolve. Should you develop numbness, tingling, weakness, confusion, chest pain, shortness of breath, pain or fever uncontrolled with medication or any other symptoms that you find concerning please return to the emergency department immediately.

## 2023-07-09 NOTE — Clinical Note
Celena Wheatley was seen and treated in our emergency department on 7/9/2023. Please avoid climbing ladders until pain subsides. Diagnosis:     Billie Jimenez  may return to work on return date. He may return on this date: 07/11/2023    Please excuse the patient from work on 7/10/2023. The patient was seen and evaluated in the emergency department on 7/9/2023. The patient should avoid climbing ladders until his pain is subsided. If you have any questions or concerns, please don't hesitate to call.       Vijay Bowie, DO    ______________________________           _______________          _______________  Hospital Representative                              Date                                Time

## 2023-08-04 ENCOUNTER — TELEPHONE (OUTPATIENT)
Dept: FAMILY MEDICINE CLINIC | Facility: CLINIC | Age: 58
End: 2023-08-04

## 2023-08-04 DIAGNOSIS — E11.9 TYPE 2 DIABETES MELLITUS, WITHOUT LONG-TERM CURRENT USE OF INSULIN (HCC): Primary | ICD-10-CM

## 2023-09-05 DIAGNOSIS — I50.9 CHF (CONGESTIVE HEART FAILURE) (HCC): ICD-10-CM

## 2023-09-05 RX ORDER — FUROSEMIDE 40 MG/1
TABLET ORAL
Qty: 180 TABLET | Refills: 1 | Status: SHIPPED | OUTPATIENT
Start: 2023-09-05

## 2023-09-21 DIAGNOSIS — I16.0 HYPERTENSIVE URGENCY: ICD-10-CM

## 2023-09-21 DIAGNOSIS — J45.20 MILD INTERMITTENT REACTIVE AIRWAY DISEASE WITHOUT COMPLICATION: ICD-10-CM

## 2023-09-21 DIAGNOSIS — E11.65 UNCONTROLLED TYPE 2 DIABETES MELLITUS WITH HYPERGLYCEMIA (HCC): ICD-10-CM

## 2023-09-21 DIAGNOSIS — I50.9 CHF (CONGESTIVE HEART FAILURE) (HCC): ICD-10-CM

## 2023-09-21 RX ORDER — AMLODIPINE BESYLATE 10 MG/1
10 TABLET ORAL DAILY
Qty: 90 TABLET | Refills: 1 | Status: SHIPPED | OUTPATIENT
Start: 2023-09-21

## 2023-09-21 RX ORDER — OLMESARTAN MEDOXOMIL 40 MG/1
40 TABLET ORAL DAILY
Qty: 90 TABLET | Refills: 1 | Status: SHIPPED | OUTPATIENT
Start: 2023-09-21

## 2023-09-21 RX ORDER — ALBUTEROL SULFATE 90 UG/1
2 AEROSOL, METERED RESPIRATORY (INHALATION) EVERY 6 HOURS PRN
Qty: 18 G | Refills: 2 | Status: SHIPPED | OUTPATIENT
Start: 2023-09-21

## 2023-09-21 RX ORDER — FLUTICASONE PROPIONATE 220 UG/1
1 AEROSOL, METERED RESPIRATORY (INHALATION) 2 TIMES DAILY
Qty: 12 G | Refills: 5 | Status: SHIPPED | OUTPATIENT
Start: 2023-09-21

## 2023-09-26 ENCOUNTER — HOSPITAL ENCOUNTER (INPATIENT)
Facility: HOSPITAL | Age: 58
LOS: 3 days | Discharge: HOME | End: 2023-09-29
Attending: EMERGENCY MEDICINE | Admitting: HOSPITALIST
Payer: COMMERCIAL

## 2023-09-26 ENCOUNTER — APPOINTMENT (EMERGENCY)
Dept: RADIOLOGY | Facility: HOSPITAL | Age: 58
End: 2023-09-26
Payer: COMMERCIAL

## 2023-09-26 DIAGNOSIS — N17.9 AKI (ACUTE KIDNEY INJURY) (HCC): Primary | ICD-10-CM

## 2023-09-26 DIAGNOSIS — R05.1 ACUTE COUGH: ICD-10-CM

## 2023-09-26 DIAGNOSIS — E87.6 HYPOKALEMIA: ICD-10-CM

## 2023-09-26 DIAGNOSIS — I10 ESSENTIAL HYPERTENSION: ICD-10-CM

## 2023-09-26 DIAGNOSIS — E87.1 HYPONATREMIA: ICD-10-CM

## 2023-09-26 DIAGNOSIS — I50.9 CHF (CONGESTIVE HEART FAILURE) (HCC): ICD-10-CM

## 2023-09-26 DIAGNOSIS — I50.9 CHF EXACERBATION (HCC): ICD-10-CM

## 2023-09-26 DIAGNOSIS — J45.909 REACTIVE AIRWAY DISEASE WITHOUT COMPLICATION: ICD-10-CM

## 2023-09-26 DIAGNOSIS — E83.51 HYPOCALCEMIA: ICD-10-CM

## 2023-09-26 PROBLEM — K59.09 OTHER CONSTIPATION: Status: ACTIVE | Noted: 2023-09-26

## 2023-09-26 LAB
ALBUMIN SERPL BCP-MCNC: 3.3 G/DL (ref 3.5–5)
ALP SERPL-CCNC: 93 U/L (ref 34–104)
ALT SERPL W P-5'-P-CCNC: 6 U/L (ref 7–52)
ANION GAP SERPL CALCULATED.3IONS-SCNC: 10 MMOL/L
AST SERPL W P-5'-P-CCNC: 13 U/L (ref 13–39)
BASOPHILS # BLD AUTO: 0.05 THOUSANDS/ÂΜL (ref 0–0.1)
BASOPHILS NFR BLD AUTO: 0 % (ref 0–1)
BILIRUB SERPL-MCNC: 1.08 MG/DL (ref 0.2–1)
BNP SERPL-MCNC: 23 PG/ML (ref 0–100)
BUN SERPL-MCNC: 15 MG/DL (ref 5–25)
CALCIUM ALBUM COR SERPL-MCNC: 8 MG/DL (ref 8.3–10.1)
CALCIUM SERPL-MCNC: 7.4 MG/DL (ref 8.4–10.2)
CARDIAC TROPONIN I PNL SERPL HS: 5 NG/L
CHLORIDE SERPL-SCNC: 91 MMOL/L (ref 96–108)
CO2 SERPL-SCNC: 33 MMOL/L (ref 21–32)
CREAT SERPL-MCNC: 2.75 MG/DL (ref 0.6–1.3)
EOSINOPHIL # BLD AUTO: 0.49 THOUSAND/ÂΜL (ref 0–0.61)
EOSINOPHIL NFR BLD AUTO: 4 % (ref 0–6)
ERYTHROCYTE [DISTWIDTH] IN BLOOD BY AUTOMATED COUNT: 13.2 % (ref 11.6–15.1)
FLUAV RNA RESP QL NAA+PROBE: NEGATIVE
FLUBV RNA RESP QL NAA+PROBE: NEGATIVE
GFR SERPL CREATININE-BSD FRML MDRD: 24 ML/MIN/1.73SQ M
GLUCOSE SERPL-MCNC: 140 MG/DL (ref 65–140)
GLUCOSE SERPL-MCNC: 146 MG/DL (ref 65–140)
GLUCOSE SERPL-MCNC: 167 MG/DL (ref 65–140)
HCT VFR BLD AUTO: 38.5 % (ref 36.5–49.3)
HGB BLD-MCNC: 12.9 G/DL (ref 12–17)
IMM GRANULOCYTES # BLD AUTO: 0.06 THOUSAND/UL (ref 0–0.2)
IMM GRANULOCYTES NFR BLD AUTO: 1 % (ref 0–2)
LYMPHOCYTES # BLD AUTO: 2.76 THOUSANDS/ÂΜL (ref 0.6–4.47)
LYMPHOCYTES NFR BLD AUTO: 22 % (ref 14–44)
MCH RBC QN AUTO: 32 PG (ref 26.8–34.3)
MCHC RBC AUTO-ENTMCNC: 33.5 G/DL (ref 31.4–37.4)
MCV RBC AUTO: 96 FL (ref 82–98)
MONOCYTES # BLD AUTO: 1.28 THOUSAND/ÂΜL (ref 0.17–1.22)
MONOCYTES NFR BLD AUTO: 10 % (ref 4–12)
NEUTROPHILS # BLD AUTO: 7.99 THOUSANDS/ÂΜL (ref 1.85–7.62)
NEUTS SEG NFR BLD AUTO: 63 % (ref 43–75)
NRBC BLD AUTO-RTO: 0 /100 WBCS
PLATELET # BLD AUTO: 199 THOUSANDS/UL (ref 149–390)
PMV BLD AUTO: 10.2 FL (ref 8.9–12.7)
POTASSIUM SERPL-SCNC: 3.1 MMOL/L (ref 3.5–5.3)
PROT SERPL-MCNC: 6.7 G/DL (ref 6.4–8.4)
RBC # BLD AUTO: 4.03 MILLION/UL (ref 3.88–5.62)
RSV RNA RESP QL NAA+PROBE: NEGATIVE
SARS-COV-2 RNA RESP QL NAA+PROBE: NEGATIVE
SODIUM SERPL-SCNC: 134 MMOL/L (ref 135–147)
WBC # BLD AUTO: 12.63 THOUSAND/UL (ref 4.31–10.16)

## 2023-09-26 PROCEDURE — 99285 EMERGENCY DEPT VISIT HI MDM: CPT | Performed by: EMERGENCY MEDICINE

## 2023-09-26 PROCEDURE — 82948 REAGENT STRIP/BLOOD GLUCOSE: CPT

## 2023-09-26 PROCEDURE — 81003 URINALYSIS AUTO W/O SCOPE: CPT | Performed by: INTERNAL MEDICINE

## 2023-09-26 PROCEDURE — 96360 HYDRATION IV INFUSION INIT: CPT

## 2023-09-26 PROCEDURE — 36415 COLL VENOUS BLD VENIPUNCTURE: CPT | Performed by: EMERGENCY MEDICINE

## 2023-09-26 PROCEDURE — 85025 COMPLETE CBC W/AUTO DIFF WBC: CPT | Performed by: EMERGENCY MEDICINE

## 2023-09-26 PROCEDURE — 71046 X-RAY EXAM CHEST 2 VIEWS: CPT

## 2023-09-26 PROCEDURE — 84484 ASSAY OF TROPONIN QUANT: CPT | Performed by: EMERGENCY MEDICINE

## 2023-09-26 PROCEDURE — 93005 ELECTROCARDIOGRAM TRACING: CPT

## 2023-09-26 PROCEDURE — 99222 1ST HOSP IP/OBS MODERATE 55: CPT | Performed by: HOSPITALIST

## 2023-09-26 PROCEDURE — 80053 COMPREHEN METABOLIC PANEL: CPT | Performed by: EMERGENCY MEDICINE

## 2023-09-26 PROCEDURE — 83880 ASSAY OF NATRIURETIC PEPTIDE: CPT | Performed by: EMERGENCY MEDICINE

## 2023-09-26 PROCEDURE — 99285 EMERGENCY DEPT VISIT HI MDM: CPT

## 2023-09-26 PROCEDURE — 0241U HB NFCT DS VIR RESP RNA 4 TRGT: CPT | Performed by: EMERGENCY MEDICINE

## 2023-09-26 RX ORDER — ENOXAPARIN SODIUM 100 MG/ML
40 INJECTION SUBCUTANEOUS DAILY
Status: DISCONTINUED | OUTPATIENT
Start: 2023-09-27 | End: 2023-09-29 | Stop reason: HOSPADM

## 2023-09-26 RX ORDER — ALBUTEROL SULFATE 90 UG/1
2 AEROSOL, METERED RESPIRATORY (INHALATION) EVERY 6 HOURS PRN
Status: DISCONTINUED | OUTPATIENT
Start: 2023-09-26 | End: 2023-09-29 | Stop reason: HOSPADM

## 2023-09-26 RX ORDER — FUROSEMIDE 40 MG/1
40 TABLET ORAL 2 TIMES DAILY
Status: DISCONTINUED | OUTPATIENT
Start: 2023-09-26 | End: 2023-09-26

## 2023-09-26 RX ORDER — METOPROLOL SUCCINATE 25 MG/1
25 TABLET, EXTENDED RELEASE ORAL DAILY
Status: DISCONTINUED | OUTPATIENT
Start: 2023-09-27 | End: 2023-09-29 | Stop reason: HOSPADM

## 2023-09-26 RX ORDER — SODIUM CHLORIDE, SODIUM LACTATE, POTASSIUM CHLORIDE, CALCIUM CHLORIDE 600; 310; 30; 20 MG/100ML; MG/100ML; MG/100ML; MG/100ML
125 INJECTION, SOLUTION INTRAVENOUS CONTINUOUS
Status: DISCONTINUED | OUTPATIENT
Start: 2023-09-26 | End: 2023-09-28

## 2023-09-26 RX ORDER — POLYETHYLENE GLYCOL 3350 17 G/17G
17 POWDER, FOR SOLUTION ORAL DAILY
Status: DISCONTINUED | OUTPATIENT
Start: 2023-09-27 | End: 2023-09-29 | Stop reason: HOSPADM

## 2023-09-26 RX ORDER — POTASSIUM CHLORIDE 20 MEQ/1
40 TABLET, EXTENDED RELEASE ORAL ONCE
Status: COMPLETED | OUTPATIENT
Start: 2023-09-26 | End: 2023-09-26

## 2023-09-26 RX ORDER — ACETAMINOPHEN 325 MG/1
650 TABLET ORAL EVERY 6 HOURS PRN
Status: DISCONTINUED | OUTPATIENT
Start: 2023-09-26 | End: 2023-09-29 | Stop reason: HOSPADM

## 2023-09-26 RX ORDER — INSULIN LISPRO 100 [IU]/ML
1-6 INJECTION, SOLUTION INTRAVENOUS; SUBCUTANEOUS
Status: DISCONTINUED | OUTPATIENT
Start: 2023-09-27 | End: 2023-09-29 | Stop reason: HOSPADM

## 2023-09-26 RX ORDER — POTASSIUM CHLORIDE 20 MEQ/1
40 TABLET, EXTENDED RELEASE ORAL DAILY
Status: DISCONTINUED | OUTPATIENT
Start: 2023-09-27 | End: 2023-09-29 | Stop reason: HOSPADM

## 2023-09-26 RX ORDER — GUAIFENESIN 600 MG/1
600 TABLET, EXTENDED RELEASE ORAL EVERY 12 HOURS SCHEDULED
Status: DISCONTINUED | OUTPATIENT
Start: 2023-09-26 | End: 2023-09-29 | Stop reason: HOSPADM

## 2023-09-26 RX ORDER — SENNOSIDES 8.6 MG
1 TABLET ORAL DAILY
Status: DISCONTINUED | OUTPATIENT
Start: 2023-09-27 | End: 2023-09-29 | Stop reason: HOSPADM

## 2023-09-26 RX ORDER — INSULIN LISPRO 100 [IU]/ML
1-6 INJECTION, SOLUTION INTRAVENOUS; SUBCUTANEOUS
Status: DISCONTINUED | OUTPATIENT
Start: 2023-09-27 | End: 2023-09-26

## 2023-09-26 RX ORDER — INSULIN LISPRO 100 [IU]/ML
1-5 INJECTION, SOLUTION INTRAVENOUS; SUBCUTANEOUS
Status: DISCONTINUED | OUTPATIENT
Start: 2023-09-26 | End: 2023-09-29 | Stop reason: HOSPADM

## 2023-09-26 RX ORDER — AMLODIPINE BESYLATE 10 MG/1
10 TABLET ORAL DAILY
Status: DISCONTINUED | OUTPATIENT
Start: 2023-09-27 | End: 2023-09-29 | Stop reason: HOSPADM

## 2023-09-26 RX ORDER — BENZONATATE 100 MG/1
100 CAPSULE ORAL 3 TIMES DAILY PRN
Status: DISCONTINUED | OUTPATIENT
Start: 2023-09-26 | End: 2023-09-29 | Stop reason: HOSPADM

## 2023-09-26 RX ADMIN — ACETAMINOPHEN 650 MG: 325 TABLET, FILM COATED ORAL at 21:06

## 2023-09-26 RX ADMIN — SODIUM CHLORIDE, SODIUM LACTATE, POTASSIUM CHLORIDE, AND CALCIUM CHLORIDE 125 ML/HR: .6; .31; .03; .02 INJECTION, SOLUTION INTRAVENOUS at 19:52

## 2023-09-26 RX ADMIN — BENZONATATE 100 MG: 100 CAPSULE ORAL at 21:06

## 2023-09-26 RX ADMIN — POTASSIUM CHLORIDE 40 MEQ: 1500 TABLET, EXTENDED RELEASE ORAL at 15:43

## 2023-09-26 RX ADMIN — GUAIFENESIN 600 MG: 600 TABLET ORAL at 20:14

## 2023-09-26 RX ADMIN — SODIUM CHLORIDE 1000 ML: 0.9 INJECTION, SOLUTION INTRAVENOUS at 15:50

## 2023-09-26 NOTE — ED ATTENDING ATTESTATION
9/26/2023  IKely MD, saw and evaluated the patient. I have discussed the patient with the resident/non-physician practitioner and agree with the resident's/non-physician practitioner's findings, Plan of Care, and MDM as documented in the resident's/non-physician practitioner's note, except where noted. All available labs and Radiology studies were reviewed. I was present for key portions of any procedure(s) performed by the resident/non-physician practitioner and I was immediately available to provide assistance. At this point I agree with the current assessment done in the Emergency Department. I have conducted an independent evaluation of this patient a history and physical is as follows:    79-year-old male with a history of hypertension, diabetes, CHF, reactive airway disease presenting for evaluation of multiple symptoms. Patient states he has had dizziness and general malaise in addition to cough, chest tightness, and increasing shortness of breath. Notes increasing leg swelling, as well. Has missed a few doses of his Lasix. Denies fever, chills, sore throat, abdominal pain. States he has headaches and dizziness with his coughing episodes. Notes decreased appetite at home, as well. Notes constipation, 2. Please see resident documentation for histories and review of systems.     Exam: Vital signs and nursing notes reviewed  General: Awake, alert, no acute distress  HEENT: Normocephalic, atraumatic, mucous membranes moist  Neck: Supple  Heart: Regular rate and rhythm  Lungs: Clear to auscultation bilaterally  Abdomen: Soft, nontender, mildly distended  Extremities: Pitting edema to the bilateral lower extremities  Skin: Warm, dry, intact, no rash    EKG: Reviewed by myself the resident physician agree with documentation: Normal sinus rhythm without evidence of ischemia or malignant dysrhythmia    ED Course  ED Course as of 09/26/23 [id number] [facility] Sep 26, 2023   5923 Potassium(!): 3.1   1540 Creatinine(!): 2.75   1540 TOTAL BILIRUBIN(!): 1.08   1540 X-ray chest 2 views  IMPRESSION:     No acute cardiopulmonary disease. 1544 hs TnI 0hr: 5   1544 BNP: 23   1620 SARS-COV-2: Negative   1620 INFLU A PCR: Negative   1620 INFLU B PCR: Negative   1620 RSV PCR: Negative     ED course/medical decision m[age]-year-old male presenting for evaluation of shortness of breath, chest discomfort, cough, dizziness. Differential diagnosis includes acute coronary syndrome, malignant dysrhythmia, pneumonia, pneumothorax, pulmonary edema, CHF, anemia, electrolyte derangement, dehydration, COVID-19/influenza/RSV. EKG shows normal sinus rhythm without evidence of ischemia or malignant dysrhythmia. Initial troponin is 5. Low suspicion for ACS at this time. Chest x-ray per my interpretation is negative for acute cardiopulmonary abnormality; radiology final interpretation agrees. CMP is notable for hypokalemia in addition to significant acute kidney injury compared to patient's previous creatinine. BNP is normal.  Does not appear to be significantly fluid overloaded. COVID-19, influenza, RSV testing is negative; cough likely secondary to other upper respiratory virus. CBC is still pending at time of this note. Patient provided with IV fluid hydration. Patient's acute kidney injury and worsening renal function may be contributing to his symptoms. He will be admitted to medicine for further care. Patient is in agreement with this plan.     Diagnosis: Acute kidney injury, hypokalemia, cough  Disposition: Admission

## 2023-09-26 NOTE — H&P
[facility]  H&P  Name: Daxa Paul 60 y.o. male I MRN: [id number]  Unit/Bed#: S -01 I Date of Admission: 9/26/2023   Date of Service: 9/26/2023 I Hospital Day: 0    Assessment/Plan   * DELFINO (acute kidney injury) ([address])  Assessment & Plan    · POA 2.75; (baseline 0.8-. 0. 9)  • Likely suspect DELFINO in the setting of dehydration. Will investigate for possible intrinsic renal disease. Plan:  • UA w/ microscopic, Urinary retention protocol, Bladder scan, Daily weights, I/O  • IV Fluids  ml/hr  • Monitor BMP daily and observe for downward trend of creatinine  • Check phosphorus, Mg  • Avoid hypoperfusion of the kidneys, minimize nephrotoxins   • RAAS Blockers/Diuretics held: Home lasix and ARB held      Type 2 diabetes mellitus, without long-term current use of insulin ([facility])  Assessment & Plan  Lab Results   Component Value Date    HGBA1C 9.0 (A) 06/05/2023     Plan:  • Hold home regimen Januvia and Jardiance  • Diet Consistent CHO Level 2 (5 CHO servings/75g CHO per meal)  o SSI QID AC  • Goal -180 while admitted, adjusting insulin regimen as appropriate  • Monitor for hypoglycemia and treat per protocol        CHF (congestive heart failure) ([facility])  Assessment & Plan  Wt Readings from Last 3 Encounters:   07/09/23 121 kg (266 lb 8.6 oz)   06/07/23 120 kg (265 lb 3.2 oz)   06/05/23 118 kg (260 lb 4 oz)     Does not appear to be CHF exacerbation. Not volume-overloaded on exam.  Negative CXR, BNP, dry weight around 255 lbs. Recently admitted for CHF exacerbation in March, ECHO unremarkable at that time. Plan:  • Hold home lasix in setting of DELFINO  · Monitor BMP, Replete K as needed  · Home K 40 mg qd  · Daily weights and strict I/O  · Home toprol-xl 25 mg qd            Other constipation  Assessment & Plan  No BM reported in past 4 days but denies feeling constipated. Been having decreased oral intake.     Plan:  Start bowel regimen    Reactive airway disease without complication  Assessment & Plan  Continue home albuterol PRN and flovent daily. mucinex q12h for congestion    Obesity  Assessment & Plan  Encourage lifestyle modifications    Essential hypertension  Assessment & Plan  Continue home meds, amlodipine, metoprolol    Hold olmesartan for DELFINO       VTE Pharmacologic Prophylaxis: VTE Score: 4 Moderate Risk (Score 3-4) - Pharmacological DVT Prophylaxis Ordered: enoxaparin (Lovenox). Code Status: Level 1 - Full Code   Discussion with family: Updated  (wife) via phone. Anticipated Length of Stay: Patient will be admitted on an inpatient basis with an anticipated length of stay of greater than 2 midnights secondary to DELFINO. Chief Complaint: productive cough    History of Present Illness:  Idamae Paget is a 60 y.o. male with a PMH of HTN, NIDDM, reactive airway disease, CHF, BMI 39 who presents with 1.5 weeks of productive cough, congestion, general fatigue, chest tightness, heavy breathing, stomach cramps, decreased appetite, posttussive headaches and dizziness. Reports that all of his symptoms started with a cough. Reports that he has been trouble getting medication refills over the past 2-3 weeks. He is unable to tell me exactly the timeframe of which medications and for exactly how long he's been without. He seems to remember that he did miss his lasix for a few days due to running out. He reports being without his albuterol or fluticasone and possibly other meds for at least 2-3 weeks. Also endorsing some recent difficulty with urinary stream. He also has been having runny nose and itchy eyes. In the ED, patient received 1 L NS bolus, repleted K, and had unremarkable CXR, EKG, BNP, Troponin. CMP remarkable for mild electrolyte abnormality and DELFINO. Tested negative for COVID/flu/rsv.      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 He uses tylenol for this at home. He denies any recent travel, he commutes to [place] for work. No recent sick contacts. He worked 7 days straight last week logging in around 80 hours despite having the aforementioned symptoms. Spoke with wife who is a . She typically helps manage her 's medications but this past few weeks his  was managing on his own therefore she is unsure about his compliance or if there were any confusion with meds. Review of Systems:  Review of Systems   Constitutional: Positive for fatigue. Negative for chills and fever. HENT: Positive for congestion and rhinorrhea. Negative for ear pain and sore throat. Eyes: Positive for itching. Negative for pain and visual disturbance. Respiratory: Positive for cough, chest tightness, shortness of breath and wheezing. Cardiovascular: Negative for chest pain. Gastrointestinal: Negative for abdominal pain, constipation (no BM in 4 days. usually goes once/day), diarrhea, nausea and vomiting. Endocrine: Negative for polyuria. Genitourinary: Negative for difficulty urinating and dysuria. Musculoskeletal: Positive for arthralgias (upper hips) and back pain. Negative for myalgias. Skin: Negative for rash. Allergic/Immunologic: Negative for environmental allergies. Neurological: Positive for dizziness (after coughing), light-headedness (after coughing) and headaches (with cough pressure). Negative for numbness (fingers/toes. chronic). Psychiatric/Behavioral: Positive for sleep disturbance (2/2 cough). All other systems reviewed and are negative.       Past Medical and Surgical History:   Past Medical History:   Diagnosis Date   • Acute kidney injury ([facility]) 9/5/2020   • Diabetes mellitus ([facility])    • Hypertension    • Inguinal hernia     3/25/15   • Onychomycosis     5/24/17   • Type 2 diabetes mellitus ([facility]) 05/01/2012       Past Surgical History:   Procedure Laterality Date   • ARTHROSCOPY KNEE      with Medial and Lateral Meniscus Repair   • HERNIA REPAIR      umbilical and inguinal hernia repairs (left)       Meds/Allergies:  Prior to Admission medications    Medication Sig Start Date End Date Taking?  Authorizing Provider   albuterol (PROVENTIL HFA,VENTOLIN HFA) 90 mcg/act inhaler Inhale 2 puffs every 6 (six) hours as needed (as needed) 3/80/57   Halile Gruber MD   amLODIPine (NORVASC) 10 mg tablet Take 1 tablet (10 mg total) by mouth daily 9/85/28   Hallie Gruber MD   Blood Pressure KIT Twice a day periodically 7/20/20   SHAY Shelley   Empagliflozin (Jardiance) 25 MG TABS Take 1 tablet (25 mg total) by mouth every morning 6/7/23 11/28/24  Maurice Burch MD   fluticasone (Flovent HFA) 220 mcg/act inhaler Inhale 1 puff 2 (two) times a day Rinse mouth after use. 1/16/81   Hallie Gruber MD   furosemide (LASIX) 40 mg tablet TAKE 1 TABLET BY MOUTH TWICE A DAY 5/0/44   Hallie Gruber MD   glucose blood test strip 1 each by Other route daily 39/3/25   Hallie Gruber MD   Lancets [facility] ULTRASOFT) lancets by Does not apply route 12/28/17   Historical Provider, MD   LORazepam (ATIVAN) 1 mg tablet Take 1 tablet by mouth daily as needed 11/8/22   Historical Provider, MD   methocarbamol (ROBAXIN) 500 mg tablet Take 1 tablet (500 mg total) by mouth 2 (two) times a day 7/9/23   Latia Courtney DO   metoprolol succinate (TOPROL-XL) 25 mg 24 hr tablet Take 1 tablet (25 mg total) by mouth daily Start on Monday, 09/06/2021. 76/49/20 1/3/28  Hallie Gruber MD   naproxen (Naprosyn) 500 mg tablet Take 1 tablet (500 mg total) by mouth 2 (two) times a day with meals 7/9/23   Latia Courtney DO   olmesartan (BENICAR) 40 mg tablet Take 1 tablet (40 mg total) by mouth daily 0/66/76   Hallie Gruber MD   potassium chloride (K-DUR,KLOR-CON) 20 mEq tablet Take 2 tablets (40 mEq total) by mouth daily Do not start before March 7, 2023. 3/7/23 6/7/23  Angella Bermeo PA-C   predniSONE 20 mg tablet Take 1 tablet (20 mg total) by mouth daily 9/1/42   Kimberly Martinez MD   sitaGLIPtin (Januvia) 100 mg tablet Take 1 tablet (100 mg total) by mouth daily 8/65/53   Kimberly Martinez MD     I have reviewed home medications with patient personally. And with wife and electronically. Allergies: No Known Allergies    Social History:  Marital Status: /Civil Union   Occupation: Long shoreman  Patient Pre-hospital Living Situation: Home  Patient Pre-hospital Level of Mobility: walks  Patient Pre-hospital Diet Restrictions: None  Substance Use History:   Social History     Substance and Sexual Activity   Alcohol Use Yes   • Alcohol/week: 3.0 standard drinks of alcohol   • Types: 2 Cans of beer, 1 Standard drinks or equivalent per week    Comment: weekend: 1 glass of christian or 2 beers     Social History     Tobacco Use   Smoking Status Former   • Types: Cigars   • Start date: 6/22/2020   Smokeless Tobacco Never   Tobacco Comments    current every day smoker, per [employer]     Social History     Substance and Sexual Activity   Drug Use No       Family History:  Family History   Problem Relation Age of Onset   • Hypertension Mother         essential   • Chronic bronchitis Father    • Prostate cancer Father    • Breast cancer Sister        Physical Exam:     Vitals:   Blood Pressure: 102/63 (09/26/23 1942)  Pulse: 96 (09/26/23 1942)  Temperature: 98.6 °F (37 °C) (09/26/23 1942)  Temp Source: Oral (09/26/23 1355)  Respirations: 16 (09/26/23 1942)  SpO2: 93 % (09/26/23 1942)    Physical Exam  Vitals and nursing note reviewed. Constitutional:       General: He is not in acute distress. Appearance: Normal appearance. He is obese. HENT:      Head: Normocephalic and atraumatic. Right Ear: Tympanic membrane, ear canal and external ear normal.      Left Ear: Tympanic membrane, ear canal and external ear normal.      Nose: Congestion present.       Mouth/Throat:      Mouth: Mucous membranes are moist.      Pharynx: Oropharynx is clear. No posterior oropharyngeal erythema. Eyes:      Conjunctiva/sclera: Conjunctivae normal.   Cardiovascular:      Rate and Rhythm: Normal rate and regular rhythm. Pulses: Normal pulses. Heart sounds: Normal heart sounds. Pulmonary:      Effort: Respiratory distress (very mild expiratory wheeze) present. Breath sounds: Normal breath sounds. Abdominal:      General: Abdomen is flat. There is no distension. Palpations: Abdomen is soft. There is no mass. Tenderness: There is no abdominal tenderness. There is no guarding. Hernia: No hernia is present. Musculoskeletal:         General: No swelling or tenderness. Normal range of motion. Cervical back: Neck supple. Skin:     General: Skin is warm and dry. Findings: No rash. Neurological:      Mental Status: He is alert. Additional Data:     Lab Results:  Results from last 7 days   Lab Units 09/26/23  1510   WBC Thousand/uL 12.63*   HEMOGLOBIN g/dL 12.9   HEMATOCRIT % 38.5   PLATELETS Thousands/uL 199   NEUTROS PCT % 63   LYMPHS PCT % 22   MONOS PCT % 10   EOS PCT % 4     Results from last 7 days   Lab Units 09/26/23  1510   SODIUM mmol/L 134*   POTASSIUM mmol/L 3.1*   CHLORIDE mmol/L 91*   CO2 mmol/L 33*   BUN mg/dL 15   CREATININE mg/dL 2.75*   ANION GAP mmol/L 10   CALCIUM mg/dL 7.4*   ALBUMIN g/dL 3.3*   TOTAL BILIRUBIN mg/dL 1.08*   ALK PHOS U/L 93   ALT U/L 6*   AST U/L 13   GLUCOSE RANDOM mg/dL 140                       Lines/Drains:  Invasive Devices     Peripheral Intravenous Line  Duration           Peripheral IV 09/26/23 Right Antecubital <1 day                Imaging: Reviewed radiology reports from this admission including: chest xray  X-ray chest 2 views   Final Result by Sarah Guzman MD (09/26 1525)      No acute cardiopulmonary disease. Workstation performed: [id number]             EKG and Other Studies Reviewed on Admission:   · EKG: NSR.  HR 92.    ** Please Note: This note has been constructed using a voice recognition system.  **

## 2023-09-26 NOTE — ED PROVIDER NOTES
History  Chief Complaint   Patient presents with   • Cough     Pt to ED with c/o productive cough. Patient states coughing fits causing dizziness. 58-year-old male with past medical history of CHF, diabetes, mild reactive airway disease presenting to the ED complaining of persistent productive cough for the past 1.5 weeks associated with orthopnea, posttussive dizziness, nausea, chest tightness, shortness of breath and congestion. States that the cough is worse at night, and notes that he has been requiring more pillows to sleep. Reports missing multiple doses of his Lasix with increased lower extremity swelling. States that he is coughing up white/clear sputum most of the time, however sometimes yellow. No recent sick contacts. Reports intermittent chest tightness after coughing episodes, with shortness of breath. Also states that he has been eating less due to nausea in the morning which is worsened with his cough and notes 1 episode of posttussive emesis. Reports prior history of COVID infection, states that he is fully vaccinated. Last echo with 55% EF. Denies fevers, chills, headache, neck pain, abdominal pain, diarrhea, constipation, rash. Prior to Admission Medications   Prescriptions Last Dose Informant Patient Reported? Taking?    Blood Pressure KIT  Self No No   Sig: Twice a day periodically   Empagliflozin (Jardiance) 25 MG TABS   No No   Sig: Take 1 tablet (25 mg total) by mouth every morning   LORazepam (ATIVAN) 1 mg tablet  Self Yes No   Sig: Take 1 tablet by mouth daily as needed   Lancets (ONETOUCH ULTRASOFT) lancets  Self Yes No   Sig: by Does not apply route   albuterol (PROVENTIL HFA,VENTOLIN HFA) 90 mcg/act inhaler   No No   Sig: Inhale 2 puffs every 6 (six) hours as needed (as needed)   amLODIPine (NORVASC) 10 mg tablet   No No   Sig: Take 1 tablet (10 mg total) by mouth daily   fluticasone (Flovent HFA) 220 mcg/act inhaler   No No   Sig: Inhale 1 puff 2 (two) times a day Rinse mouth after use. furosemide (LASIX) 40 mg tablet   No No   Sig: TAKE 1 TABLET BY MOUTH TWICE A DAY   glucose blood test strip  Self No No   Si each by Other route daily   methocarbamol (ROBAXIN) 500 mg tablet   No No   Sig: Take 1 tablet (500 mg total) by mouth 2 (two) times a day   metoprolol succinate (TOPROL-XL) 25 mg 24 hr tablet  Self No No   Sig: Take 1 tablet (25 mg total) by mouth daily Start on Monday, 2021.   naproxen (Naprosyn) 500 mg tablet   No No   Sig: Take 1 tablet (500 mg total) by mouth 2 (two) times a day with meals   olmesartan (BENICAR) 40 mg tablet   No No   Sig: Take 1 tablet (40 mg total) by mouth daily   potassium chloride (K-DUR,KLOR-CON) 20 mEq tablet  Self No No   Sig: Take 2 tablets (40 mEq total) by mouth daily Do not start before 2023. predniSONE 20 mg tablet  Self No No   Sig: Take 1 tablet (20 mg total) by mouth daily   sitaGLIPtin (Januvia) 100 mg tablet   No No   Sig: Take 1 tablet (100 mg total) by mouth daily      Facility-Administered Medications: None       Past Medical History:   Diagnosis Date   • Acute kidney injury ([address]) 2020   • Diabetes mellitus ([address])    • Hypertension    • Inguinal hernia     3/25/15   • Onychomycosis     17   • Type 2 diabetes mellitus ([address]) 2012       Past Surgical History:   Procedure Laterality Date   • ARTHROSCOPY KNEE      with Medial and Lateral Meniscus Repair   • HERNIA REPAIR      umbilical and inguinal hernia repairs (left)       Family History   Problem Relation Age of Onset   • Hypertension Mother         essential   • Chronic bronchitis Father    • Prostate cancer Father    • Breast cancer Sister      I have reviewed and agree with the history as documented.     E-Cigarette/Vaping   • E-Cigarette Use Never User      E-Cigarette/Vaping Substances   • Nicotine No    • THC No    • CBD No    • Flavoring No    • Other No    • Unknown No      Social History     Tobacco Use   • Smoking status: Former Types: Cigars     Start date: 6/22/2020   • Smokeless tobacco: Never   • Tobacco comments:     current every day smoker, per [employer]   Vaping Use   • Vaping Use: Never used   Substance Use Topics   • Alcohol use: Yes     Alcohol/week: 3.0 standard drinks of alcohol     Types: 2 Cans of beer, 1 Standard drinks or equivalent per week     Comment: weekend: 1 glass of christian or 2 beers   • Drug use: No        Review of Systems   Constitutional: Positive for appetite change. Negative for chills and fever. HENT: Positive for congestion. Negative for sore throat. Eyes: Positive for discharge. Negative for photophobia and visual disturbance. Respiratory: Positive for cough, chest tightness and shortness of breath. Cardiovascular: Positive for chest pain and leg swelling. Negative for palpitations. Gastrointestinal: Positive for nausea and vomiting. Negative for abdominal pain, constipation and diarrhea. Genitourinary: Negative for difficulty urinating, flank pain and frequency. Musculoskeletal: Negative for back pain, myalgias, neck pain and neck stiffness. Skin: Negative for pallor and rash. Neurological: Positive for dizziness. Negative for syncope, light-headedness and headaches. Physical Exam  ED Triage Vitals   Temperature Pulse Respirations Blood Pressure SpO2   09/26/23 1355 09/26/23 1355 09/26/23 1355 09/26/23 1355 09/26/23 1355   98 °F (36.7 °C) 99 22 121/67 98 %      Temp Source Heart Rate Source Patient Position - Orthostatic VS BP Location FiO2 (%)   09/26/23 1355 09/26/23 1355 09/26/23 1355 09/26/23 1355 --   Oral Monitor Sitting Right arm       Pain Score       09/26/23 2015       No Pain             Orthostatic Vital Signs  Vitals:    09/26/23 1800 09/26/23 1900 09/26/23 1942 09/26/23 2257   BP: 112/56 103/64 102/63 118/72   Pulse: 93 93 96 91   Patient Position - Orthostatic VS:           Physical Exam  Vitals and nursing note reviewed.    Constitutional:       General: He is not in acute distress. Appearance: Normal appearance. He is not ill-appearing or toxic-appearing. HENT:      Head: Normocephalic and atraumatic. Right Ear: External ear normal.      Left Ear: External ear normal.      Nose: Congestion present. Mouth/Throat:      Mouth: Mucous membranes are moist.      Pharynx: Oropharynx is clear. No oropharyngeal exudate. Eyes:      Extraocular Movements: Extraocular movements intact. Conjunctiva/sclera: Conjunctivae normal.      Pupils: Pupils are equal, round, and reactive to light. Cardiovascular:      Rate and Rhythm: Normal rate and regular rhythm. Pulses: Normal pulses. Heart sounds: Normal heart sounds. No murmur heard. No friction rub. No gallop. Pulmonary:      Effort: Pulmonary effort is normal. No respiratory distress. Breath sounds: Normal breath sounds. No decreased breath sounds, wheezing, rhonchi or rales. Abdominal:      General: Abdomen is flat. Bowel sounds are normal.      Palpations: Abdomen is soft. Tenderness: There is no abdominal tenderness. There is no guarding or rebound. Musculoskeletal:         General: Swelling present. No tenderness or deformity. Normal range of motion. Cervical back: Normal range of motion and neck supple. Right lower le+ Pitting Edema present. Left lower le+ Pitting Edema present. Skin:     General: Skin is warm and dry. Capillary Refill: Capillary refill takes less than 2 seconds. Findings: No bruising, erythema, lesion or rash. Neurological:      General: No focal deficit present. Mental Status: He is alert and oriented to person, place, and time. Mental status is at baseline. GCS: GCS eye subscore is 4. GCS verbal subscore is 5. GCS motor subscore is 6. Cranial Nerves: Cranial nerves 2-12 are intact. No cranial nerve deficit. Sensory: Sensation is intact. No sensory deficit. Motor: Motor function is intact. No weakness. Coordination: Coordination is intact. Coordination normal.      Gait: Gait is intact. Gait normal.   Psychiatric:         Mood and Affect: Mood normal.         Behavior: Behavior normal.         Thought Content:  Thought content normal.         Judgment: Judgment normal.         ED Medications  Medications   albuterol (PROVENTIL HFA,VENTOLIN HFA) inhaler 2 puff (has no administration in time range)   amLODIPine (NORVASC) tablet 10 mg (has no administration in time range)   fluticasone (ARNUITY ELLIPTA) 200 MCG/ACT inhaler 1 puff (has no administration in time range)   metoprolol succinate (TOPROL-XL) 24 hr tablet 25 mg (has no administration in time range)   potassium chloride (K-DUR,KLOR-CON) CR tablet 40 mEq (has no administration in time range)   enoxaparin (LOVENOX) subcutaneous injection 40 mg (has no administration in time range)   acetaminophen (TYLENOL) tablet 650 mg (650 mg Oral Given 9/26/23 2106)   polyethylene glycol (MIRALAX) packet 17 g (has no administration in time range)   senna (SENOKOT) tablet 8.6 mg (has no administration in time range)   guaiFENesin (MUCINEX) 12 hr tablet 600 mg (600 mg Oral Given 9/26/23 2014)   lactated ringers infusion (125 mL/hr Intravenous New Bag 9/26/23 1952)   insulin lispro (HumaLOG) 100 units/mL subcutaneous injection 1-6 Units (has no administration in time range)   benzonatate (TESSALON PERLES) capsule 100 mg (100 mg Oral Given 9/26/23 2106)   insulin lispro (HumaLOG) 100 units/mL subcutaneous injection 1-5 Units ( Subcutaneous Not Given 9/26/23 2304)   potassium chloride (K-DUR,KLOR-CON) CR tablet 40 mEq (40 mEq Oral Given 9/26/23 1543)   sodium chloride 0.9 % bolus 1,000 mL (0 mL Intravenous Stopped 9/26/23 1913)       Diagnostic Studies  Results Reviewed     Procedure Component Value Units Date/Time    CBC and differential [[id number]]  (Abnormal) Collected: 09/26/23 1510    Lab Status: Final result Specimen: Blood from Arm, Right Updated: 09/26/23 2058     WBC 12.63 Thousand/uL      RBC 4.03 Million/uL      Hemoglobin 12.9 g/dL      Hematocrit 38.5 %      MCV 96 fL      MCH 32.0 pg      MCHC 33.5 g/dL      RDW 13.2 %      MPV 10.2 fL      Platelets 176 Thousands/uL      nRBC 0 /100 WBCs      Neutrophils Relative 63 %      Immat GRANS % 1 %      Lymphocytes Relative 22 %      Monocytes Relative 10 %      Eosinophils Relative 4 %      Basophils Relative 0 %      Neutrophils Absolute 7.99 Thousands/µL      Immature Grans Absolute 0.06 Thousand/uL      Lymphocytes Absolute 2.76 Thousands/µL      Monocytes Absolute 1.28 Thousand/µL      Eosinophils Absolute 0.49 Thousand/µL      Basophils Absolute 0.05 Thousands/µL     Platelet count [[id number]]     Lab Status: No result Specimen: Blood     FLU/RSV/COVID - if FLU/RSV clinically relevant [[id number]]  (Normal) Collected: 09/26/23 1510    Lab Status: Final result Specimen: Nares from Nose Updated: 09/26/23 1619     SARS-CoV-2 Negative     INFLUENZA A PCR Negative     INFLUENZA B PCR Negative     RSV PCR Negative    Narrative:      FOR PEDIATRIC PATIENTS - copy/paste COVID Guidelines URL to browser: [email or web address]    SARS-CoV-2 assay is a Nucleic Acid Amplification assay intended for the  qualitative detection of nucleic acid from SARS-CoV-2 in nasopharyngeal  swabs. Results are for the presumptive identification of SARS-CoV-2 RNA. Positive results are indicative of infection with SARS-CoV-2, the virus  causing COVID-19, but do not rule out bacterial infection or co-infection  with other viruses. Laboratories within the [facility] and its  territories are required to report all positive results to the appropriate  public health authorities. Negative results do not preclude SARS-CoV-2  infection and should not be used as the sole basis for treatment or other  patient management decisions.  Negative results must be combined with  clinical observations, patient history, and epidemiological information. This test has not been FDA cleared or approved. This test has been authorized by FDA under an Emergency Use Authorization  (EUA). This test is only authorized for the duration of time the  declaration that circumstances exist justifying the authorization of the  emergency use of an in vitro diagnostic tests for detection of SARS-CoV-2  virus and/or diagnosis of COVID-19 infection under section 564(b)(1) of  the Act, 21 U. S.C. 182JUP-5(N)(3), unless the authorization is terminated  or revoked sooner. The test has been validated but independent review by FDA  and CLIA is pending. Test performed using [employer] GeneXpert: This RT-PCR assay targets N2,  a region unique to SARS-CoV-2. A conserved region in the E-gene was chosen  for pan-Sarbecovirus detection which includes SARS-CoV-2. According to CMS-2020-01-R, this platform meets the definition of high-throughput technology.     HS Troponin I 4hr [[id number]]     Lab Status: No result Specimen: Blood     HS Troponin 0hr (reflex protocol) [[id number]]  (Normal) Collected: 09/26/23 1510    Lab Status: Final result Specimen: Blood from Arm, Right Updated: 09/26/23 1543     hs TnI 0hr 5 ng/L     HS Troponin I 2hr [[id number]]     Lab Status: No result Specimen: Blood     B-Type Natriuretic Peptide(BNP) [[id number]]  (Normal) Collected: 09/26/23 1510    Lab Status: Final result Specimen: Blood from Arm, Right Updated: 09/26/23 1541     BNP 23 pg/mL     Comprehensive metabolic panel [[id number]]  (Abnormal) Collected: 09/26/23 1510    Lab Status: Final result Specimen: Blood from Arm, Right Updated: 09/26/23 1535     Sodium 134 mmol/L      Potassium 3.1 mmol/L      Chloride 91 mmol/L      CO2 33 mmol/L      ANION GAP 10 mmol/L      BUN 15 mg/dL      Creatinine 2.75 mg/dL      Glucose 140 mg/dL      Calcium 7.4 mg/dL      Corrected Calcium 8.0 mg/dL      AST 13 U/L      ALT 6 U/L      Alkaline Phosphatase 93 U/L      Total Protein 6.7 g/dL Albumin 3.3 g/dL      Total Bilirubin 1.08 mg/dL      eGFR 24 ml/min/1.73sq m     Narrative:      [facility] (CKD):   •  Stage 1 with normal or high GFR (GFR > 90 mL/min/1.73 square meters)  •  Stage 2 Mild CKD (GFR = 60-89 mL/min/1.73 square meters)  •  Stage 3A Moderate CKD (GFR = 45-59 mL/min/1.73 square meters)  •  Stage 3B Moderate CKD (GFR = 30-44 mL/min/1.73 square meters)  •  Stage 4 Severe CKD (GFR = 15-29 mL/min/1.73 square meters)  •  Stage 5 End Stage CKD (GFR <15 mL/min/1.73 square meters)  Note: GFR calculation is accurate only with a steady state creatinine                 X-ray chest 2 views   Final Result by Fantasma Vigil MD (09/26 1525)      No acute cardiopulmonary disease. Workstation performed: [id number]               Procedures  ECG 12 Lead Documentation Only    Date/Time: 9/26/2023 4:30 PM    Performed by: Tadeo Martinez DO  Authorized by: Tadeo Martinez DO    Patient location:  ED  Previous ECG:     Previous ECG:  Compared to current    Similarity:  Changes noted  Interpretation:     Interpretation: non-specific    Rate:     ECG rate:  100    ECG rate assessment: normal    Rhythm:     Rhythm: sinus rhythm    Ectopy:     Ectopy: none    QRS:     QRS axis:  Normal    QRS intervals:  Normal  Conduction:     Conduction: normal    ST segments:     ST segments:  Normal  T waves:     T waves: inverted      Inverted:  III          ED Course  ED Course as of 09/26/23 2354   Tue Sep 26, 2023   1520 Independently interpreted pts CXR: no acute cardiopulmonary process. Lungs are clear without focal consolidations on PTX.    1528 Independently interpreted patient's EKG, showing normal sinus rhythm at 100 bpm.  No acute ischemic changes normal intervals normal axis. Nonspecific T wave inversions in lead III. Similar to prior EKG   1535 Sodium(!): 134   1535 Potassium(!): 3.1  Will replace orally.    1536 Creatinine(!): 2.75 1536 Calcium(!): 7.4   1544 BNP: 23   1545 09/01/2021: LEFT VENTRICLE:  Systolic function was normal. Ejection fraction was estimated to be 55 %. [facility] 34 x-ray reviewed, Cardiomediastinal silhouette appears unremarkable.     The lungs are clear. No pneumothorax or pleural effusion.     Osseous structures appear within normal limits for patient age.     IMPRESSION:     No acute cardiopulmonary disease. 1556 Patient with significant increase in his creatinine consistent with DELFINO. Will start 1 L normal saline. Chest x-ray negative, BNP 23.  Plan for admission   1557 hs TnI 0hr: 5  Low suspicion for ACS at this time. 1619 FLU/RSV/COVID - if FLU/RSV clinically relevant  Negative. 1631 Discussed case with [facility] hospitalist, they will admit to inpatient status. Medical Decision Making  Patient with history as above presented with Patient presents with:  Cough: Pt to ED with c/o productive cough. Patient states coughing fits causing dizziness. [facility] Speaker Hx obtained from pt    Patient was nontoxic, and stable at this time. Exam as above. 79-year-old male with history of diabetes and CHF presenting to the ED with complaints of persistent cough, orthopnea, dyspnea/SOB with intermittent chest tightness, congestion. Also complaining of nausea/vomiting and constipation. Symptoms progressively worsening over 1.5 weeks, no recent sick contacts. Fully vaccinated for COVID. Patient reported noncompliance with Lasix. On exam patient is afebrile and nontoxic-appearing lungs clear to auscultation bilaterally, heart regular rate regular rhythm, no abdominal tenderness. Exam only significant for 1+ bilateral pitting edema to lower extremities. Vital signs stable. Initial work-up targeted for possible CHF exacerbation versus viral URI. Chest x-ray negative for pneumonia, pleural effusions, pulmonary edema or pneumothorax. BNP WNL.   EKG normal sinus rhythm at 100 bpm, no acute ischemic changes. Initial troponin 5. Lower suspicion for pneumonia, CHF, ACS after initial work-up. CMP significant for increased creatinine at 2.75 from patient's baseline at 0.96 months ago. Also with hypokalemia, hyponatremia, hypocalcemia. Given significant DELFINO patient was started on IV fluids. Patient with history of diabetes, and noncompliance with medication likely contributing to worsening renal function. Suspect symptoms to be related to worsening renal function, and possibly with concurrent viral URI. COVID/flu/RSV negative. CBC pending. Discussed case with hospitalist regarding admission for DELFINO and further management. Patient updated and agreeable with plan. I have independently ordered, reviewed and interpreted the following lab and/or imaging studies listed above. EKG independently reviewed/interpreted by me as above. Reviewed external records including notes, and prior labs/imaging results. Differential diagnosis considered including but not limited to DDx including but not limited to:  URI, CHF, ACS, bronchitis, pneumonia, RAD/asthma, GERD, aspiration pneumonitis, viral illness, COVID 23, smoke inhalation, CO poisoning, adverse medication reaction. . Overall presentation is consistent with DELFINO and cough    Patient was treated with improvement in symptoms from the following:  IV NS    Disposition: Discussed need for admission for further management and workup with pt and they are agreeable with plan. Discussed case with IM hospitalist Dr. Austin Yeh, who agreed to admit patient to inpatient status. Amount and/or Complexity of Data Reviewed  Labs: ordered. Decision-making details documented in ED Course. Radiology: ordered and independent interpretation performed. Decision-making details documented in ED Course. ECG/medicine tests: ordered and independent interpretation performed. Decision-making details documented in ED Course.       Risk  Prescription drug management. Decision regarding hospitalization. Disposition  Final diagnoses:   DELFINO (acute kidney injury) ([address])   Hypokalemia   Hypocalcemia   Hyponatremia   Acute cough     Time reflects when diagnosis was documented in both MDM as applicable and the Disposition within this note     Time User Action Codes Description Comment    9/26/2023  3:46 PM Ahmed, Abdiel Arm Add [N17.9] DELFINO (acute kidney injury) ([address])     9/26/2023  3:46 PM Ahmed, Abdiel Arm Add [R05.2] Subacute cough     9/26/2023  4:02 PM Ahmed, Mike Add [E87.6] Hypokalemia     9/26/2023  4:02 PM Ahmed, Mike Add [E83.51] Hypocalcemia     9/26/2023  4:02 PM Ahmed, Mike Add [E87.1] Hyponatremia     9/26/2023  4:22 PM Ahmed, Abdiel Arm Remove [R05.2] Subacute cough     9/26/2023  4:22 PM Vallarie Kos Add [R05.1] Acute cough       ED Disposition     ED Disposition   Admit    Condition   Stable    Date/Time   Tue Sep 26, 2023  4:30 PM    Comment   Case was discussed with [facility] hospitalist and the patient's admission status was agreed to be Admission Status: inpatient status to the service of Dr. Jonathon Flores .           Follow-up Information    None         Current Discharge Medication List      CONTINUE these medications which have NOT CHANGED    Details   albuterol (PROVENTIL HFA,VENTOLIN HFA) 90 mcg/act inhaler Inhale 2 puffs every 6 (six) hours as needed (as needed)  Qty: 18 g, Refills: 2    Comments: Substitution to a formulary equivalent within the same pharmaceutical class is authorized.   Associated Diagnoses: Mild intermittent reactive airway disease without complication      amLODIPine (NORVASC) 10 mg tablet Take 1 tablet (10 mg total) by mouth daily  Qty: 90 tablet, Refills: 1    Associated Diagnoses: Hypertensive urgency      Blood Pressure KIT Twice a day periodically  Qty: 1 each, Refills: 0    Associated Diagnoses: Benign essential hypertension      Empagliflozin (Jardiance) 25 MG TABS Take 1 tablet (25 mg total) by mouth every morning  Qty: 90 tablet, Refills: 5    Associated Diagnoses: Type 2 diabetes mellitus without complication, without long-term current use of insulin ([address]); Acute diastolic congestive heart failure (HCC)      fluticasone (Flovent HFA) 220 mcg/act inhaler Inhale 1 puff 2 (two) times a day Rinse mouth after use. Qty: 12 g, Refills: 5    Associated Diagnoses: Mild intermittent reactive airway disease without complication      furosemide (LASIX) 40 mg tablet TAKE 1 TABLET BY MOUTH TWICE A DAY  Qty: 180 tablet, Refills: 1    Associated Diagnoses: CHF (congestive heart failure) ([facility])      glucose blood test strip 1 each by Other route daily  Qty: 180 each, Refills: 5    Associated Diagnoses: Uncontrolled type 2 diabetes mellitus with hyperglycemia ([facility])      Lancets (ONETOUCH ULTRASOFT) lancets by Does not apply route      LORazepam (ATIVAN) 1 mg tablet Take 1 tablet by mouth daily as needed      methocarbamol (ROBAXIN) 500 mg tablet Take 1 tablet (500 mg total) by mouth 2 (two) times a day  Qty: 20 tablet, Refills: 0    Associated Diagnoses: Upper back pain      metoprolol succinate (TOPROL-XL) 25 mg 24 hr tablet Take 1 tablet (25 mg total) by mouth daily Start on Monday, 09/06/2021. Qty: 90 tablet, Refills: 1    Associated Diagnoses: Hypertensive urgency      naproxen (Naprosyn) 500 mg tablet Take 1 tablet (500 mg total) by mouth 2 (two) times a day with meals  Qty: 30 tablet, Refills: 0    Associated Diagnoses: Upper back pain      olmesartan (BENICAR) 40 mg tablet Take 1 tablet (40 mg total) by mouth daily  Qty: 90 tablet, Refills: 1    Associated Diagnoses: CHF (congestive heart failure) ([address]); Hypertensive urgency      potassium chloride (K-DUR,KLOR-CON) 20 mEq tablet Take 2 tablets (40 mEq total) by mouth daily Do not start before March 7, 2023.   Qty: 60 tablet, Refills: 0    Associated Diagnoses: CHF exacerbation (HCC)      predniSONE 20 mg tablet Take 1 tablet (20 mg total) by mouth daily  Qty: 5 tablet, Refills: 0    Associated Diagnoses: Mild intermittent reactive airway disease without complication      sitaGLIPtin (Januvia) 100 mg tablet Take 1 tablet (100 mg total) by mouth daily  Qty: 90 tablet, Refills: 1    Associated Diagnoses: Uncontrolled type 2 diabetes mellitus with hyperglycemia ([address])           No discharge procedures on file. PDMP Review       Value Time User    PDMP Reviewed  Yes 9/4/2021  8:39 AM Real Rain DO           ED Provider  Attending physically available and evaluated Shira Cheng. I managed the patient along with the ED Attending.     Electronically Signed by         Corey Gabriel DO  09/26/23 3419

## 2023-09-26 NOTE — LETTER
[address] [place] [address] FLOOR MED SURG UNIT  [facility]  [place] [address]  Dept: [phone]    September 29, 2023     Patient: Garfield Emmanuel   YOB: 1965   Date of Visit: 9/26/2023       To Whom it May Concern:    Garfield Emmanuel is under my professional care. He was seen in the hospital from 9/26/2023 to 09/29/23. He may return to work on 102/2/23 without limitations. If you have any questions or concerns, please don't hesitate to call.          Sincerely,          Zak Santana MD

## 2023-09-26 NOTE — LETTER
Thank you for allowing us to participate in the care of your patient, Ronaldo Magana, who was hospitalized from [unfilled] through 9/29/2023 with the admitting diagnosis of acute kidney injury likely multifactorial secondary to seemingly recent viral illness, dehydration and running out of medication. On the morning of discharge we resumed his Lasix. He takes lasix 40 mg twice daily at home as well as potassium 40 daily -we are discharging him on 20 mg daily and potassium 20 daily until he can follow-up with his primary care doctor and cardiologist.  We have recommended him to get a BMP check within a week and to follow-up with his doctors within 2 weeks. If you have any additional questions or would like to discuss further, please feel free to contact me.     Bridgette Snell MD  [facility], Hospitalist  [phone]

## 2023-09-27 PROBLEM — F10.90 ALCOHOL USE: Status: ACTIVE | Noted: 2023-09-27

## 2023-09-27 PROBLEM — Z78.9 ALCOHOL USE: Status: ACTIVE | Noted: 2023-09-27

## 2023-09-27 LAB
ANION GAP SERPL CALCULATED.3IONS-SCNC: 9 MMOL/L
ATRIAL RATE: 92 BPM
BASOPHILS # BLD AUTO: 0.05 THOUSANDS/ÂΜL (ref 0–0.1)
BASOPHILS NFR BLD AUTO: 0 % (ref 0–1)
BILIRUB UR QL STRIP: NEGATIVE
BUN SERPL-MCNC: 17 MG/DL (ref 5–25)
CALCIUM SERPL-MCNC: 7.1 MG/DL (ref 8.4–10.2)
CHLORIDE SERPL-SCNC: 92 MMOL/L (ref 96–108)
CLARITY UR: NORMAL
CO2 SERPL-SCNC: 32 MMOL/L (ref 21–32)
COLOR UR: YELLOW
CREAT SERPL-MCNC: 2.61 MG/DL (ref 0.6–1.3)
EOSINOPHIL # BLD AUTO: 0.62 THOUSAND/ÂΜL (ref 0–0.61)
EOSINOPHIL NFR BLD AUTO: 6 % (ref 0–6)
ERYTHROCYTE [DISTWIDTH] IN BLOOD BY AUTOMATED COUNT: 13.3 % (ref 11.6–15.1)
GFR SERPL CREATININE-BSD FRML MDRD: 25 ML/MIN/1.73SQ M
GLUCOSE SERPL-MCNC: 137 MG/DL (ref 65–140)
GLUCOSE SERPL-MCNC: 141 MG/DL (ref 65–140)
GLUCOSE SERPL-MCNC: 162 MG/DL (ref 65–140)
GLUCOSE SERPL-MCNC: 188 MG/DL (ref 65–140)
GLUCOSE SERPL-MCNC: 244 MG/DL (ref 65–140)
GLUCOSE UR STRIP-MCNC: NEGATIVE MG/DL
HCT VFR BLD AUTO: 38.6 % (ref 36.5–49.3)
HGB BLD-MCNC: 12.9 G/DL (ref 12–17)
HGB UR QL STRIP.AUTO: NEGATIVE
IMM GRANULOCYTES # BLD AUTO: 0.05 THOUSAND/UL (ref 0–0.2)
IMM GRANULOCYTES NFR BLD AUTO: 0 % (ref 0–2)
KETONES UR STRIP-MCNC: NEGATIVE MG/DL
LEUKOCYTE ESTERASE UR QL STRIP: NEGATIVE
LYMPHOCYTES # BLD AUTO: 3.18 THOUSANDS/ÂΜL (ref 0.6–4.47)
LYMPHOCYTES NFR BLD AUTO: 28 % (ref 14–44)
MAGNESIUM SERPL-MCNC: 1.2 MG/DL (ref 1.9–2.7)
MCH RBC QN AUTO: 32.3 PG (ref 26.8–34.3)
MCHC RBC AUTO-ENTMCNC: 33.4 G/DL (ref 31.4–37.4)
MCV RBC AUTO: 97 FL (ref 82–98)
MONOCYTES # BLD AUTO: 1.06 THOUSAND/ÂΜL (ref 0.17–1.22)
MONOCYTES NFR BLD AUTO: 9 % (ref 4–12)
NEUTROPHILS # BLD AUTO: 6.35 THOUSANDS/ÂΜL (ref 1.85–7.62)
NEUTS SEG NFR BLD AUTO: 57 % (ref 43–75)
NITRITE UR QL STRIP: NEGATIVE
NRBC BLD AUTO-RTO: 0 /100 WBCS
P AXIS: 63 DEGREES
PH UR STRIP.AUTO: 5 [PH]
PHOSPHATE SERPL-MCNC: 3.8 MG/DL (ref 2.7–4.5)
PLATELET # BLD AUTO: 194 THOUSANDS/UL (ref 149–390)
PMV BLD AUTO: 10.6 FL (ref 8.9–12.7)
POTASSIUM SERPL-SCNC: 3.3 MMOL/L (ref 3.5–5.3)
PR INTERVAL: 146 MS
PROT UR STRIP-MCNC: NEGATIVE MG/DL
QRS AXIS: 46 DEGREES
QRSD INTERVAL: 82 MS
QT INTERVAL: 382 MS
QTC INTERVAL: 472 MS
RBC # BLD AUTO: 3.99 MILLION/UL (ref 3.88–5.62)
SODIUM SERPL-SCNC: 133 MMOL/L (ref 135–147)
SP GR UR STRIP.AUTO: 1.01 (ref 1–1.03)
T WAVE AXIS: 18 DEGREES
UROBILINOGEN UR STRIP-ACNC: <2 MG/DL
VENTRICULAR RATE: 92 BPM
WBC # BLD AUTO: 11.31 THOUSAND/UL (ref 4.31–10.16)

## 2023-09-27 PROCEDURE — 93010 ELECTROCARDIOGRAM REPORT: CPT | Performed by: INTERNAL MEDICINE

## 2023-09-27 PROCEDURE — 84100 ASSAY OF PHOSPHORUS: CPT

## 2023-09-27 PROCEDURE — 83735 ASSAY OF MAGNESIUM: CPT

## 2023-09-27 PROCEDURE — 80048 BASIC METABOLIC PNL TOTAL CA: CPT

## 2023-09-27 PROCEDURE — 99232 SBSQ HOSP IP/OBS MODERATE 35: CPT | Performed by: INTERNAL MEDICINE

## 2023-09-27 PROCEDURE — 82948 REAGENT STRIP/BLOOD GLUCOSE: CPT

## 2023-09-27 PROCEDURE — 85025 COMPLETE CBC W/AUTO DIFF WBC: CPT

## 2023-09-27 RX ORDER — MAGNESIUM SULFATE HEPTAHYDRATE 40 MG/ML
4 INJECTION, SOLUTION INTRAVENOUS ONCE
Status: COMPLETED | OUTPATIENT
Start: 2023-09-27 | End: 2023-09-27

## 2023-09-27 RX ORDER — FOLIC ACID 1 MG/1
1 TABLET ORAL DAILY
Status: DISCONTINUED | OUTPATIENT
Start: 2023-09-27 | End: 2023-09-29 | Stop reason: HOSPADM

## 2023-09-27 RX ORDER — LANOLIN ALCOHOL/MO/W.PET/CERES
100 CREAM (GRAM) TOPICAL DAILY
Status: DISCONTINUED | OUTPATIENT
Start: 2023-09-27 | End: 2023-09-29 | Stop reason: HOSPADM

## 2023-09-27 RX ADMIN — FLUTICASONE FUROATE 1 PUFF: 200 POWDER RESPIRATORY (INHALATION) at 09:08

## 2023-09-27 RX ADMIN — GUAIFENESIN 600 MG: 600 TABLET ORAL at 09:09

## 2023-09-27 RX ADMIN — INSULIN LISPRO 1 UNITS: 100 INJECTION, SOLUTION INTRAVENOUS; SUBCUTANEOUS at 16:18

## 2023-09-27 RX ADMIN — MAGNESIUM SULFATE HEPTAHYDRATE 4 G: 40 INJECTION, SOLUTION INTRAVENOUS at 09:57

## 2023-09-27 RX ADMIN — MULTIPLE VITAMINS W/ MINERALS TAB 1 TABLET: TAB ORAL at 16:17

## 2023-09-27 RX ADMIN — INSULIN LISPRO 1 UNITS: 100 INJECTION, SOLUTION INTRAVENOUS; SUBCUTANEOUS at 22:24

## 2023-09-27 RX ADMIN — ENOXAPARIN SODIUM 40 MG: 40 INJECTION SUBCUTANEOUS at 09:10

## 2023-09-27 RX ADMIN — Medication 100 MG: at 16:17

## 2023-09-27 RX ADMIN — FOLIC ACID 1 MG: 1 TABLET ORAL at 16:17

## 2023-09-27 RX ADMIN — INSULIN LISPRO 3 UNITS: 100 INJECTION, SOLUTION INTRAVENOUS; SUBCUTANEOUS at 12:20

## 2023-09-27 RX ADMIN — GUAIFENESIN 600 MG: 600 TABLET ORAL at 21:21

## 2023-09-27 RX ADMIN — POTASSIUM CHLORIDE 40 MEQ: 1500 TABLET, EXTENDED RELEASE ORAL at 09:09

## 2023-09-27 RX ADMIN — SODIUM CHLORIDE, SODIUM LACTATE, POTASSIUM CHLORIDE, AND CALCIUM CHLORIDE 125 ML/HR: .6; .31; .03; .02 INJECTION, SOLUTION INTRAVENOUS at 19:41

## 2023-09-27 RX ADMIN — BENZONATATE 100 MG: 100 CAPSULE ORAL at 09:22

## 2023-09-27 RX ADMIN — BENZONATATE 100 MG: 100 CAPSULE ORAL at 19:40

## 2023-09-27 NOTE — ASSESSMENT & PLAN NOTE
Continue home albuterol PRN and flovent daily.    mucinex q12h for congestion  Tessalon Perles as needed

## 2023-09-27 NOTE — ASSESSMENT & PLAN NOTE
Wt Readings from Last 3 Encounters:   09/27/23 121 kg (266 lb 12.1 oz)   07/09/23 121 kg (266 lb 8.6 oz)   06/07/23 120 kg (265 lb 3.2 oz)     Does not appear to be CHF exacerbation. Not volume-overloaded on exam.  Negative CXR, BNP, dry weight around 255 lbs. Recently admitted for CHF exacerbation in March, ECHO unremarkable at that time.     Plan:  • Hold home lasix in setting of DELFINO  · Monitor BMP, Replete K as needed  · Home K 40 mg qd  · Daily weights and strict I/O  · Home toprol-xl 25 mg qd

## 2023-09-27 NOTE — PROGRESS NOTES
[facility]  Progress Note  Name: Gypsy Weems  MRN: [id number]  Unit/Bed#: S -01 I Date of Admission: 9/26/2023   Date of Service: 9/27/2023 I Hospital Day: 1    Assessment/Plan   * DELFINO (acute kidney injury) ([address])  Assessment & Plan    · POA 2.75; (baseline 0.8-. 0. 9)  • Likely suspect DELFINO in the setting of dehydration  • Passed bladder scan, 10 PVR  • U/A unremarkable    Plan:  · Daily weights, I/O  • Continue IV Fluids  ml/hr  • Monitor BMP daily and observe for downward trend of creatinine  • Check phosphorus, Mg  o Recheck a.m. replete as necessary  • Avoid hypoperfusion of the kidneys, minimize nephrotoxins   • RAAS Blockers/Diuretics held: Home lasix and ARB held      Type 2 diabetes mellitus, without long-term current use of insulin ([facility])  Assessment & Plan  Lab Results   Component Value Date    HGBA1C 9.0 (A) 06/05/2023     Plan:  • Hold home regimen Januvia and Jardiance  • Diet Consistent CHO Level 2 (5 CHO servings/75g CHO per meal)  o SSI QID AC  • Goal -180 while admitted, adjusting insulin regimen as appropriate  • Monitor for hypoglycemia and treat per protocol        Alcohol use  Assessment & Plan  Wife told me that her  typically understates how much alcohol he drinks. He is not exactly sure but will estimate that he drinks 2 cans 12 oz beer and 16-32 oz of christian/liquor (she is unsure whether it is one 1/2 pint bottle or 2). She says that he has a history of alcohol withdrawal in the past.  She thinks his last drink was the day of admission. Therefore last drink is approximately around 24 hours timeframe.     Plan:  Initiate CIWA protocol  Start folic acid, thiamine, multivitamins  Check B1, B9 levels    CHF (congestive heart failure) ([facility])  Assessment & Plan  Wt Readings from Last 3 Encounters:   09/27/23 121 kg (266 lb 12.1 oz)   07/09/23 121 kg (266 lb 8.6 oz)   06/07/23 120 kg (265 lb 3.2 oz)     Does not appear to be CHF exacerbation.  Not volume-overloaded on exam.  Negative CXR, BNP, dry weight around 255 lbs. Recently admitted for CHF exacerbation in March, ECHO unremarkable at that time. Plan:  • Hold home lasix in setting of DELFINO  · Monitor BMP, Replete K as needed  · Home K 40 mg qd  · Daily weights and strict I/O  · Home toprol-xl 25 mg qd            Other constipation  Assessment & Plan  No BM reported in past 4 days but denies feeling constipated. Been having decreased oral intake. Plan:  Start bowel regimen    Reactive airway disease without complication  Assessment & Plan  Continue home albuterol PRN and flovent daily. mucinex q12h for congestion  Tessalon Perles as needed    Obesity  Assessment & Plan  Encourage lifestyle modifications    Essential hypertension  Assessment & Plan  Continue home meds, amlodipine, metoprolol    Hold olmesartan for DELFINO           VTE Pharmacologic Prophylaxis: VTE Score: 4 Moderate Risk (Score 3-4) - Pharmacological DVT Prophylaxis Ordered: enoxaparin (Lovenox). Patient Centered Rounds: I performed bedside rounds with nursing staff today. Discussions with Specialists or Other Care Team Provider: Attending    Education and Discussions with Family / Patient: Updated  (wife) via phone. Current Length of Stay: 1 day(s)  Current Patient Status: Inpatient   Discharge Plan: Anticipate discharge in 24-48 hrs to home. Code Status: Level 1 - Full Code    Subjective:   Patient feeling a little bit better. He was able to sleep okay but continues to have coughing and congestion. Denies having difficulty breathing. He is breathing well on room air. He says that the Mucinex and Tessalon Perles have helped a little bit. He denies any fever or chills. Patient says he is urinating better.     Objective:     Vitals:   Temp (24hrs), Av.6 °F (37 °C), Min:98.1 °F (36.7 °C), Max:99.6 °F (37.6 °C)    Temp:  [98.1 °F (36.7 °C)-99.6 °F (37.6 °C)] 98.1 °F (36.7 °C)  HR:  [91-97] 91  Resp: [16-22] 16  BP: ()/(56-72) 100/62  SpO2:  [93 %-100 %] 95 %  Body mass index is 39.39 kg/m². Input and Output Summary (last 24 hours): Intake/Output Summary (Last 24 hours) at 9/27/2023 1555  Last data filed at 9/27/2023 1434  Gross per 24 hour   Intake 1000 ml   Output 390 ml   Net 610 ml       Physical Exam:   Physical Exam  Vitals and nursing note reviewed. Constitutional:       General: He is not in acute distress. Appearance: Normal appearance. He is obese. HENT:      Head: Normocephalic and atraumatic. Right Ear: Tympanic membrane, ear canal and external ear normal.      Left Ear: Tympanic membrane, ear canal and external ear normal.      Nose: Congestion present. Mouth/Throat:      Mouth: Mucous membranes are moist.      Pharynx: Oropharynx is clear. No posterior oropharyngeal erythema. Eyes:      Conjunctiva/sclera: Conjunctivae normal.   Cardiovascular:      Rate and Rhythm: Normal rate and regular rhythm. Pulses: Normal pulses. Heart sounds: Normal heart sounds. Pulmonary:      Effort: Pulmonary effort is normal. No respiratory distress. Breath sounds: Normal breath sounds. Abdominal:      General: Abdomen is flat. There is no distension. Palpations: Abdomen is soft. There is no mass. Tenderness: There is no abdominal tenderness. There is no guarding. Hernia: No hernia is present. Musculoskeletal:         General: No swelling or tenderness. Normal range of motion. Cervical back: Neck supple. Right lower leg: Edema present. Left lower leg: Edema present. Skin:     General: Skin is warm and dry. Findings: No rash. Neurological:      Mental Status: He is alert.           Additional Data:     Labs:  Results from last 7 days   Lab Units 09/27/23  0447   WBC Thousand/uL 11.31*   HEMOGLOBIN g/dL 12.9   HEMATOCRIT % 38.6   PLATELETS Thousands/uL 194   NEUTROS PCT % 57   LYMPHS PCT % 28   MONOS PCT % 9   EOS PCT % 6 Results from last 7 days   Lab Units 09/27/23  0447 09/26/23  1510   SODIUM mmol/L 133* 134*   POTASSIUM mmol/L 3.3* 3.1*   CHLORIDE mmol/L 92* 91*   CO2 mmol/L 32 33*   BUN mg/dL 17 15   CREATININE mg/dL 2.61* 2.75*   ANION GAP mmol/L 9 10   CALCIUM mg/dL 7.1* 7.4*   ALBUMIN g/dL  --  3.3*   TOTAL BILIRUBIN mg/dL  --  1.08*   ALK PHOS U/L  --  93   ALT U/L  --  6*   AST U/L  --  13   GLUCOSE RANDOM mg/dL 141* 140         Results from last 7 days   Lab Units 09/27/23  1521 09/27/23  1032 09/27/23  0745 09/26/23  2303 09/26/23  2103   POC GLUCOSE mg/dl 188* 244* 137 146* 167*               Lines/Drains:  Invasive Devices     Peripheral Intravenous Line  Duration           Peripheral IV 09/26/23 Right Antecubital 1 day    Peripheral IV 09/26/23 Distal;Right;Ventral (anterior) Forearm <1 day              Imaging: Reviewed radiology reports from this admission including: chest xray    Recent Cultures (last 7 days):         Last 24 Hours Medication List:   Current Facility-Administered Medications   Medication Dose Route Frequency Provider Last Rate   • acetaminophen  650 mg Oral Q6H PRN Ector Frank MD     • albuterol  2 puff Inhalation Q6H PRN Ector Frank MD     • amLODIPine  10 mg Oral Daily Ector Frank MD     • benzonatate  100 mg Oral TID PRN Ector Frank MD     • enoxaparin  40 mg Subcutaneous Daily Ector Frank MD     • fluticasone  1 puff Inhalation Daily Ector Frank MD     • folic acid  1 mg Oral Daily Ector Frank MD     • guaiFENesin  600 mg Oral Q12H [address] Ector Frank MD     • insulin lispro  1-5 Units Subcutaneous HS Kelle Harris DO     • insulin lispro  1-6 Units Subcutaneous TID Atilio Whyte MD     • lactated ringers  125 mL/hr Intravenous Continuous Ector Frank  mL/hr (09/26/23 1952)   • metoprolol succinate  25 mg Oral Daily Ector Frank MD     • multivitamin-minerals  1 tablet Oral Daily Ector Frank MD • polyethylene glycol  17 g Oral Daily Jayy Baxter MD     • potassium chloride  40 mEq Oral Daily Jayy Baxter MD     • senna  1 tablet Oral Daily Jayy Baxter MD     • thiamine  100 mg Oral Daily Jayy Baxter MD          Today, Patient Was Seen By: Jayy Baxter MD    **Please Note: This note may have been constructed using a voice recognition system. **

## 2023-09-27 NOTE — ASSESSMENT & PLAN NOTE
Lab Results   Component Value Date    HGBA1C 9.0 (A) 06/05/2023     Plan:  • Hold home regimen Januvia and Jardiance  • Diet Consistent CHO Level 2 (5 CHO servings/75g CHO per meal)  o SSI QID AC  • Goal -180 while admitted, adjusting insulin regimen as appropriate  • Monitor for hypoglycemia and treat per protocol

## 2023-09-27 NOTE — UTILIZATION REVIEW
NOTIFICATION OF INPATIENT ADMISSION   AUTHORIZATION REQUEST   SERVICING FACILITY:   [address] [place] [address]  Tax ID: [id number]  NPI: [id number]   ATTENDING PROVIDER:  Attending Name and NPI#: Leonel Heath Md [[id number]]  Address: [address] [place] [address]  Phone: [phone]     ADMISSION INFORMATION:  Place of Service: Inpatient [address] [facility]  Place of Service Code: 21  Inpatient Admission Date/Time: 9/26/23  4:31 PM  Discharge Date/Time: No discharge date for patient encounter. Admitting Diagnosis Code/Description:  Hypocalcemia [E83.51]  Cough [R05.9]  Hypokalemia [E87.6]  Hyponatremia [E87.1]  DELFINO (acute kidney injury) ([address]) [N17.9]  Acute cough [R05.1]     UTILIZATION REVIEW CONTACT:  Emeka London, Utilization   Network Utilization Review Department  Phone: [phone]  Fax: [phone]  Email: [email or web address]  Contact for approvals/pending authorizations, clinical reviews, and discharge. PHYSICIAN ADVISORY SERVICES:  Medical Necessity Denial & Msgn-kx-Qter Review  Phone: [phone]  Fax: [phone]  Email: [email or web address]

## 2023-09-27 NOTE — ASSESSMENT & PLAN NOTE
Wife told me that her  typically understates how much alcohol he drinks. He is not exactly sure but will estimate that he drinks 2 cans 12 oz beer and 16-32 oz of christian/liquor (she is unsure whether it is one 1/2 pint bottle or 2). She says that he has a history of alcohol withdrawal in the past.  She thinks his last drink was the day of admission.  Therefore last drink is approximately around 24 hours timeframe.     Plan:  Initiate CIWA protocol  Start folic acid, thiamine, multivitamins  Check B1, B9 levels

## 2023-09-27 NOTE — ASSESSMENT & PLAN NOTE
No BM reported in past 4 days but denies feeling constipated. Been having decreased oral intake.     Plan:  Start bowel regimen

## 2023-09-27 NOTE — UTILIZATION REVIEW
Initial Clinical Review    Admission: Date/Time/Statement:   Admission Orders (From admission, onward)     Ordered        09/26/23 1631  INPATIENT ADMISSION  Once                      Orders Placed This Encounter   Procedures   • INPATIENT ADMISSION     Standing Status:   Standing     Number of Occurrences:   1     Order Specific Question:   Level of Care     Answer:   Med Surg [16]     Order Specific Question:   Estimated length of stay     Answer:   More than 2 Midnights     Order Specific Question:   Certification     Answer:   I certify that inpatient services are medically necessary for this patient for a duration of greater than two midnights. See H&P and MD Progress Notes for additional information about the patient's course of treatment. ED Arrival Information     Expected   -    Arrival   9/26/2023 13:50    Acuity   Urgent            Means of arrival   Walk-In    Escorted by   Self    Service   Hospitalist    Admission type   Emergency            Arrival complaint   dizziness           Chief Complaint   Patient presents with   • Cough     Pt to ED with c/o productive cough. Patient states coughing fits causing dizziness. Initial Presentation: 60 y.o. male PMH of HTN, NIDDM, reactive airway disease, CHF, BMI 39 who presents with 1.5 weeks of productive cough, congestion, general fatigue, chest tightness, heavy breathing, stomach cramps, decreased appetite, posttussive headaches and dizziness. Reports that all of his symptoms started with a cough. Reports that he has been trouble getting medication refills over the past 2-3 weeks but is unclear on details. Pt reports did miss his lasix for a few days due to running out, was without his albuterol or fluticasone, possibly other meds. Endorsing some recent difficulty with urinary stream. He also has been having runny nose and itchy eyes,reports decreased oral intake  And no bm x 4 days . On exam, mild expiratory wheezes, nasal congestion .  Labs- WBC elevated , creat 2.75 from baseline 0.8-0.9 , K 3.1. ECG- NSR . Pt given IVF, K repletion in ED. Admitted as Inpatient with AK in setting dehydration. Plan - IVF, daily BMP . Check mag and phos  . Home lasix and ARB(Olmesartan) held . Bowel regime . Mucinex . Date: 9/27   Day 2:    Medicine- per pts wife, pt understates how much alcohol he drinks. He is not exactly sure but will estimate that he drinks 2 cans 12 oz beer and 16-32 oz of christian/liquor (she is unsure whether it is one 1/2 pint bottle or 2). She says that he has a history of alcohol withdrawal in the past.  She thinks his last drink was the day of admission. PLan to start CIWA monitoring. Check B1 , B9 levels. Start folic acid, thiamine, multivitamins. Creat 2.61 today, IVF infusing . Per nsg, diminished urine production w/darker yellow urine . Passed bladder scan, 10 PVR. Pt continues with cough, congestion . On RA . For am labs .      ED Triage Vitals   Temperature Pulse Respirations Blood Pressure SpO2   09/26/23 1355 09/26/23 1355 09/26/23 1355 09/26/23 1355 09/26/23 1355   98 °F (36.7 °C) 99 22 121/67 98 %      Temp Source Heart Rate Source Patient Position - Orthostatic VS BP Location FiO2 (%)   09/26/23 1355 09/26/23 1355 09/26/23 1355 09/26/23 1355 --   Oral Monitor Sitting Right arm       Pain Score       09/26/23 2015       No Pain          Wt Readings from Last 1 Encounters:   09/27/23 121 kg (266 lb 12.1 oz)     Additional Vital Signs:   Date/Time Temp Pulse Resp BP MAP (mmHg) SpO2   09/27/23 07:45:15 99.6 °F (37.6 °C) 93 -- 99/68 78 95 %   09/26/23 22:57:50 98.2 °F (36.8 °C) 91 16 118/72 87 95 %   09/26/23 2100 -- -- -- -- -- --   09/26/23 19:42:38 98.6 °F (37 °C) 96 16 102/63 76 93 %   09/26/23 1900 -- 93 20 103/64 77 96 %   09/26/23 1800 -- 93 22 112/56 81 95 %   09/26/23 1745 -- 94 -- 122/59 83 96 %   09/26/23 1700 -- 92 -- 118/58 81 100 %     Pertinent Labs/Diagnostic Test Results:    9/26 ECG- ED read   ECG rate:  100     ECG rate assessment: normal     Rhythm:     Rhythm: sinus rhythm     Ectopy:     Ectopy: none     QRS:     QRS axis:  Normal     QRS intervals:  Normal   Conduction:     Conduction: normal     ST segments:     ST segments:  Normal   T waves:     T waves: inverted       Inverted:  III                 X-ray chest 2 views   Final Result by Shavon Roach MD (09/26 1525)      No acute cardiopulmonary disease.                   Workstation performed: [id number]           Results from last 7 days   Lab Units 09/26/23  1510   SARS-COV-2  Negative     Results from last 7 days   Lab Units 09/27/23  0447 09/26/23  1510   WBC Thousand/uL 11.31* 12.63*   HEMOGLOBIN g/dL 12.9 12.9   HEMATOCRIT % 38.6 38.5   PLATELETS Thousands/uL 194 199   NEUTROS ABS Thousands/µL 6.35 7.99*         Results from last 7 days   Lab Units 09/27/23 0447 09/26/23  1510   SODIUM mmol/L 133* 134*   POTASSIUM mmol/L 3.3* 3.1*   CHLORIDE mmol/L 92* 91*   CO2 mmol/L 32 33*   ANION GAP mmol/L 9 10   BUN mg/dL 17 15   CREATININE mg/dL 2.61* 2.75*   EGFR ml/min/1.73sq m 25 24   CALCIUM mg/dL 7.1* 7.4*   MAGNESIUM mg/dL 1.2*  --    PHOSPHORUS mg/dL 3.8  --      Results from last 7 days   Lab Units 09/26/23  1510   AST U/L 13   ALT U/L 6*   ALK PHOS U/L 93   TOTAL PROTEIN g/dL 6.7   ALBUMIN g/dL 3.3*   TOTAL BILIRUBIN mg/dL 1.08*     Results from last 7 days   Lab Units 09/27/23  0745 09/26/23  2303 09/26/23  2103   POC GLUCOSE mg/dl 137 146* 167*     Results from last 7 days   Lab Units 09/27/23  0447 09/26/23  1510   GLUCOSE RANDOM mg/dL 141* 140             BETA-HYDROXYBUTYRATE   Date Value Ref Range Status   07/06/2020 0.2 <0.6 mmol/L Final                      Results from last 7 days   Lab Units 09/26/23  1510   HS TNI 0HR ng/L 5                   Results from last 7 days   Lab Units 09/26/23  1510   BNP pg/mL 23               Results from last 7 days   Lab Units 09/26/23 2103   CLARITY UA  Turbid   COLOR UA  Yellow   SPEC GRAV UA  1.015   PH UA  5.0 GLUCOSE UA mg/dl Negative   KETONES UA mg/dl Negative   BLOOD UA  Negative   PROTEIN UA mg/dl Negative   NITRITE UA  Negative   BILIRUBIN UA  Negative   UROBILINOGEN UA (BE) mg/dl <2.0   LEUKOCYTES UA  Negative     Results from last 7 days   Lab Units 09/26/23  1510   INFLUENZA A PCR  Negative   INFLUENZA B PCR  Negative   RSV PCR  Negative             ED Treatment:   Medication Administration from 09/26/2023 1350 to 09/26/2023 1939       Date/Time Order Dose Route Action     09/26/2023 1543 EDT potassium chloride (K-DUR,KLOR-CON) CR tablet 40 mEq 40 mEq Oral Given     09/26/2023 1913 EDT sodium chloride 0.9 % bolus 1,000 mL 0 mL Intravenous Stopped     09/26/2023 1550 EDT sodium chloride 0.9 % bolus 1,000 mL 1,000 mL Intravenous New Bag        Past Medical History:   Diagnosis Date   • Acute kidney injury ([address]) 9/5/2020   • Diabetes mellitus ([address])    • Hypertension    • Inguinal hernia     3/25/15   • Onychomycosis     5/24/17   • Type 2 diabetes mellitus ([address]) 05/01/2012     Present on Admission:  • Essential hypertension  • Obesity  • CHF (congestive heart failure) ([facility])  • Type 2 diabetes mellitus, without long-term current use of insulin ([facility])      Admitting Diagnosis: Hypocalcemia [E83.51]  Cough [R05.9]  Hypokalemia [E87.6]  Hyponatremia [E87.1]  DELFINO (acute kidney injury) ([address]) [N17.9]  Acute cough [R05.1]  Age/Sex: 60 y.o. male  Admission Orders:  Scheduled Medications:  amLODIPine, 10 mg, Oral, Daily  enoxaparin, 40 mg, Subcutaneous, Daily  fluticasone, 1 puff, Inhalation, Daily  guaiFENesin, 600 mg, Oral, Q12H [address]  insulin lispro, 1-5 Units, Subcutaneous, HS  insulin lispro, 1-6 Units, Subcutaneous, TID AC  magnesium sulfate, 4 g, Intravenous, Once  metoprolol succinate, 25 mg, Oral, Daily  polyethylene glycol, 17 g, Oral, Daily  potassium chloride, 40 mEq, Oral, Daily  senna, 1 tablet, Oral, Daily      Continuous IV Infusions:  lactated ringers, 125 mL/hr, Intravenous, Continuous      PRN Meds:  acetaminophen, 650 mg, Oral, Q6H PRNx1 9/26  albuterol, 2 puff, Inhalation, Q6H PRN  benzonatate, 100 mg, Oral, TID PRN x1 9/26      Cardiac diet    Network Utilization Review Department  ATTENTION: Please call with any questions or concerns to [phone] and carefully listen to the prompts so that you are directed to the right person. All voicemails are confidential.  Bill Lefort all requests for admission clinical reviews, approved or denied determinations and any other requests to dedicated fax number below belonging to the campus where the patient is receiving treatment.  List of dedicated fax numbers for the Facilities:  [place] DENIALS (Administrative/Medical Necessity) [phone] [address] [facility] (Maternity/NICU/Pediatrics) [phone]   [address] [place] [address] [phone]   [facility] [address] [facility] [phone]   [address] [facility] [address] [place] [address] [phone]   [address] [facility] [address] [phone]

## 2023-09-27 NOTE — ASSESSMENT & PLAN NOTE
· POA 2.75; (baseline 0.8-. 0. 9)  • Likely suspect DELIFNO in the setting of dehydration. Will investigate for possible intrinsic renal disease.      Plan:  • UA w/ microscopic, Urinary retention protocol, Bladder scan, Daily weights, I/O  • IV Fluids  ml/hr  • Monitor BMP daily and observe for downward trend of creatinine  • Check phosphorus, Mg  • Avoid hypoperfusion of the kidneys, minimize nephrotoxins   • RAAS Blockers/Diuretics held: Home lasix and ARB held

## 2023-09-27 NOTE — QUICK NOTE
Spoke with patient's wife and updated them on the patient's current condition, care management plan, and goals. All questions were answered to their satisfaction. Wife told me that her  typically understates how much alcohol he drinks. He is not exactly sure but will estimate that he drinks 2 cans 12 oz beer and 16-32 oz of christian/liquor (she is unsure whether it is one 1/2 pint bottle or 2). She says that he has a history of alcohol withdrawal in the past.  She thinks his last drink was the day of admission. Therefore last drink is approximately around 24 hours timeframe.     Plan:  Initiate CIWA protocol  Start folic acid, thiamine, multivitamins  Check B1, B9 levels

## 2023-09-27 NOTE — ASSESSMENT & PLAN NOTE
Wt Readings from Last 3 Encounters:   07/09/23 121 kg (266 lb 8.6 oz)   06/07/23 120 kg (265 lb 3.2 oz)   06/05/23 118 kg (260 lb 4 oz)     Does not appear to be CHF exacerbation. Not volume-overloaded on exam.  Negative CXR, BNP, dry weight around 255 lbs. Recently admitted for CHF exacerbation in March, ECHO unremarkable at that time.     Plan:  • Hold home lasix in setting of DELFINO  · Monitor BMP, Replete K as needed  · Home K 40 mg qd  · Daily weights and strict I/O  · Home toprol-xl 25 mg qd

## 2023-09-27 NOTE — ASSESSMENT & PLAN NOTE
· POA 2.75; (baseline 0.8-. 0. 9)  • Likely suspect DELFINO in the setting of dehydration  • Passed bladder scan, 10 PVR  • U/A unremarkable    Plan:  · Daily weights, I/O  • Continue IV Fluids  ml/hr  • Monitor BMP daily and observe for downward trend of creatinine  • Check phosphorus, Mg  o Recheck a.m. replete as necessary  • Avoid hypoperfusion of the kidneys, minimize nephrotoxins   • RAAS Blockers/Diuretics held: Home lasix and ARB held

## 2023-09-28 LAB
ANION GAP SERPL CALCULATED.3IONS-SCNC: 7 MMOL/L
BASOPHILS # BLD AUTO: 0.03 THOUSANDS/ÂΜL (ref 0–0.1)
BASOPHILS NFR BLD AUTO: 0 % (ref 0–1)
BUN SERPL-MCNC: 19 MG/DL (ref 5–25)
CALCIUM SERPL-MCNC: 7.5 MG/DL (ref 8.4–10.2)
CHLORIDE SERPL-SCNC: 95 MMOL/L (ref 96–108)
CO2 SERPL-SCNC: 30 MMOL/L (ref 21–32)
CREAT SERPL-MCNC: 1.4 MG/DL (ref 0.6–1.3)
EOSINOPHIL # BLD AUTO: 0.49 THOUSAND/ÂΜL (ref 0–0.61)
EOSINOPHIL NFR BLD AUTO: 6 % (ref 0–6)
ERYTHROCYTE [DISTWIDTH] IN BLOOD BY AUTOMATED COUNT: 13.3 % (ref 11.6–15.1)
FOLATE SERPL-MCNC: 14.2 NG/ML
GFR SERPL CREATININE-BSD FRML MDRD: 54 ML/MIN/1.73SQ M
GLUCOSE SERPL-MCNC: 140 MG/DL (ref 65–140)
GLUCOSE SERPL-MCNC: 145 MG/DL (ref 65–140)
GLUCOSE SERPL-MCNC: 194 MG/DL (ref 65–140)
GLUCOSE SERPL-MCNC: 219 MG/DL (ref 65–140)
GLUCOSE SERPL-MCNC: 228 MG/DL (ref 65–140)
HCT VFR BLD AUTO: 37.2 % (ref 36.5–49.3)
HGB BLD-MCNC: 12.3 G/DL (ref 12–17)
IMM GRANULOCYTES # BLD AUTO: 0.05 THOUSAND/UL (ref 0–0.2)
IMM GRANULOCYTES NFR BLD AUTO: 1 % (ref 0–2)
LYMPHOCYTES # BLD AUTO: 2.32 THOUSANDS/ÂΜL (ref 0.6–4.47)
LYMPHOCYTES NFR BLD AUTO: 29 % (ref 14–44)
MAGNESIUM SERPL-MCNC: 1.8 MG/DL (ref 1.9–2.7)
MCH RBC QN AUTO: 31.9 PG (ref 26.8–34.3)
MCHC RBC AUTO-ENTMCNC: 33.1 G/DL (ref 31.4–37.4)
MCV RBC AUTO: 97 FL (ref 82–98)
MONOCYTES # BLD AUTO: 0.77 THOUSAND/ÂΜL (ref 0.17–1.22)
MONOCYTES NFR BLD AUTO: 10 % (ref 4–12)
NEUTROPHILS # BLD AUTO: 4.44 THOUSANDS/ÂΜL (ref 1.85–7.62)
NEUTS SEG NFR BLD AUTO: 54 % (ref 43–75)
NRBC BLD AUTO-RTO: 0 /100 WBCS
PLATELET # BLD AUTO: 184 THOUSANDS/UL (ref 149–390)
PMV BLD AUTO: 10.5 FL (ref 8.9–12.7)
POTASSIUM SERPL-SCNC: 3.3 MMOL/L (ref 3.5–5.3)
RBC # BLD AUTO: 3.85 MILLION/UL (ref 3.88–5.62)
SODIUM SERPL-SCNC: 132 MMOL/L (ref 135–147)
WBC # BLD AUTO: 8.1 THOUSAND/UL (ref 4.31–10.16)

## 2023-09-28 PROCEDURE — 82948 REAGENT STRIP/BLOOD GLUCOSE: CPT

## 2023-09-28 PROCEDURE — 85025 COMPLETE CBC W/AUTO DIFF WBC: CPT

## 2023-09-28 PROCEDURE — 84425 ASSAY OF VITAMIN B-1: CPT

## 2023-09-28 PROCEDURE — 80048 BASIC METABOLIC PNL TOTAL CA: CPT

## 2023-09-28 PROCEDURE — 99232 SBSQ HOSP IP/OBS MODERATE 35: CPT | Performed by: INTERNAL MEDICINE

## 2023-09-28 PROCEDURE — 83735 ASSAY OF MAGNESIUM: CPT

## 2023-09-28 PROCEDURE — 82746 ASSAY OF FOLIC ACID SERUM: CPT

## 2023-09-28 RX ORDER — MAGNESIUM SULFATE HEPTAHYDRATE 40 MG/ML
2 INJECTION, SOLUTION INTRAVENOUS ONCE
Status: COMPLETED | OUTPATIENT
Start: 2023-09-28 | End: 2023-09-28

## 2023-09-28 RX ADMIN — MULTIPLE VITAMINS W/ MINERALS TAB 1 TABLET: TAB ORAL at 10:08

## 2023-09-28 RX ADMIN — SENNOSIDES 8.6 MG: 8.6 TABLET, FILM COATED ORAL at 10:09

## 2023-09-28 RX ADMIN — AMLODIPINE BESYLATE 10 MG: 10 TABLET ORAL at 10:08

## 2023-09-28 RX ADMIN — MAGNESIUM SULFATE HEPTAHYDRATE 2 G: 40 INJECTION, SOLUTION INTRAVENOUS at 10:27

## 2023-09-28 RX ADMIN — INSULIN LISPRO 2 UNITS: 100 INJECTION, SOLUTION INTRAVENOUS; SUBCUTANEOUS at 21:30

## 2023-09-28 RX ADMIN — GUAIFENESIN 600 MG: 600 TABLET ORAL at 10:09

## 2023-09-28 RX ADMIN — POTASSIUM CHLORIDE 40 MEQ: 1500 TABLET, EXTENDED RELEASE ORAL at 10:17

## 2023-09-28 RX ADMIN — INSULIN LISPRO 2 UNITS: 100 INJECTION, SOLUTION INTRAVENOUS; SUBCUTANEOUS at 11:38

## 2023-09-28 RX ADMIN — GUAIFENESIN 600 MG: 600 TABLET ORAL at 21:30

## 2023-09-28 RX ADMIN — SODIUM CHLORIDE, SODIUM LACTATE, POTASSIUM CHLORIDE, AND CALCIUM CHLORIDE 125 ML/HR: .6; .31; .03; .02 INJECTION, SOLUTION INTRAVENOUS at 04:31

## 2023-09-28 RX ADMIN — ENOXAPARIN SODIUM 40 MG: 40 INJECTION SUBCUTANEOUS at 10:10

## 2023-09-28 RX ADMIN — FOLIC ACID 1 MG: 1 TABLET ORAL at 10:09

## 2023-09-28 RX ADMIN — POLYETHYLENE GLYCOL 3350 17 G: 17 POWDER, FOR SOLUTION ORAL at 10:10

## 2023-09-28 RX ADMIN — INSULIN LISPRO 2 UNITS: 100 INJECTION, SOLUTION INTRAVENOUS; SUBCUTANEOUS at 16:48

## 2023-09-28 RX ADMIN — Medication 100 MG: at 10:08

## 2023-09-28 RX ADMIN — FLUTICASONE FUROATE 1 PUFF: 200 POWDER RESPIRATORY (INHALATION) at 10:12

## 2023-09-28 RX ADMIN — METOPROLOL SUCCINATE 25 MG: 25 TABLET, EXTENDED RELEASE ORAL at 10:09

## 2023-09-28 NOTE — PROGRESS NOTES
[facility]  Progress Note  Name: David Fenton  MRN: [id number]  Unit/Bed#: S -01 I Date of Admission: 9/26/2023   Date of Service: 9/28/2023 I Hospital Day: 2    Assessment/Plan   * DELFINO (acute kidney injury) ([address])  Assessment & Plan    · POA 2.75; (baseline 0.8-. 0. 9)  • Likely suspect DELFINO in the setting of dehydration  • Passed bladder scan, 10 PVR  • U/A unremarkable    Plan:  · Daily weights, I/O  • Discontinue IVF, clinically suspicious for volume overload  o Total IVF fluids approx 4.5 L since admission  • Monitor BMP daily and observe for downward trend of creatinine  o Recheck a.m. Mg, replete as necessary  • Avoid hypoperfusion of the kidneys, minimize nephrotoxins   • RAAS Blockers/Diuretics held: Home lasix and ARB held      CHF (congestive heart failure) ([facility])  Assessment & Plan  Wt Readings from Last 3 Encounters:   09/28/23 118 kg (259 lb 14.4 oz)   07/09/23 121 kg (266 lb 8.6 oz)   06/07/23 120 kg (265 lb 3.2 oz)     Does not appear to be CHF exacerbation. Not volume-overloaded on exam.  Negative CXR, BNP, dry weight around 255 lbs. Recently admitted for CHF exacerbation in March, ECHO unremarkable at that time. Plan:  • Hold home lasix in setting of DELFINO  o Consider restarting tomorrow  o If having increasing oxygen requirements/worsening volume overload, give lasix IV 20 mg  · Monitor BMP, Replete K as needed  · Home K 40 mg qd  · Daily weights and strict I/O  · Home toprol-xl 25 mg qd            Alcohol use  Assessment & Plan  Wife told me that her  typically understates how much alcohol he drinks. He is not exactly sure but will estimate that he drinks 2 cans 12 oz beer and 16-32 oz of christian/liquor (she is unsure whether it is one 1/2 pint bottle or 2). She says that he has a history of alcohol withdrawal in the past.  She thinks his last drink was the day of admission.  Therefore last drink is approximately around 24 hours timeframe.     Plan:  LINDA protocol  Continue folic acid, thiamine, multivitamins  B1 pending, B9 normal    Reactive airway disease without complication  Assessment & Plan  Continue home albuterol PRN and flovent daily. mucinex q12h for congestion  Tessalon Perles as needed    Type 2 diabetes mellitus, without long-term current use of insulin ([facility])  Assessment & Plan  Lab Results   Component Value Date    HGBA1C 9.0 (A) 06/05/2023     Plan:  • Hold home regimen Januvia and Jardiance  • Diet Consistent CHO Level 2 (5 CHO servings/75g CHO per meal)  o SSI QID AC  • Goal -180 while admitted, adjusting insulin regimen as appropriate  • Monitor for hypoglycemia and treat per protocol        Other constipation  Assessment & Plan  No BM reported in past 4 days but denies feeling constipated. Been having decreased oral intake. Plan:  Start bowel regimen    Hypokalemia  Assessment & Plan  BMP daily  Replete as needed  Continue home potassium; he may have been without for 1-2 weeks    Obesity  Assessment & Plan  Encourage lifestyle modifications    Essential hypertension  Assessment & Plan  Continue home meds, amlodipine, metoprolol    Hold olmesartan for DELFINO           VTE Pharmacologic Prophylaxis: VTE Score: 4 Moderate Risk (Score 3-4) - Pharmacological DVT Prophylaxis Ordered: enoxaparin (Lovenox). Patient Centered Rounds: I performed bedside rounds with nursing staff today. Discussions with Specialists or Other Care Team Provider: Attending    Education and Discussions with Family / Patient: Updated  (wife) via phone. this morning, will contact her this afternoon for medication inventory. Current Length of Stay: 2 day(s)  Current Patient Status: Inpatient   Discharge Plan: Anticipate discharge tomorrow to home. Code Status: Level 1 - Full Code    Subjective:   Overnight patient had increased difficulty with breathing and coughing and was placed on 1 to 2 L nasal cannula for comfort.   Otherwise, this morning he feels generally better. He has not noticing worsening shortness of breath when upright. He is on room air at this time. Denies any pain. Objective:     Vitals:   Temp (24hrs), Av.2 °F (36.8 °C), Min:98.1 °F (36.7 °C), Max:98.2 °F (36.8 °C)    Temp:  [98.1 °F (36.7 °C)-98.2 °F (36.8 °C)] 98.2 °F (36.8 °C)  HR:  [88-99] 97  Resp:  [18] 18  BP: (100-136)/(62-85) 133/85  SpO2:  [94 %-98 %] 95 %  Body mass index is 38.38 kg/m². Input and Output Summary (last 24 hours): Intake/Output Summary (Last 24 hours) at 2023 1230  Last data filed at 2023 0900  Gross per 24 hour   Intake 120 ml   Output 725 ml   Net -605 ml       Physical Exam:   Physical Exam  Vitals and nursing note reviewed. Constitutional:       General: He is not in acute distress. Appearance: Normal appearance. He is obese. HENT:      Head: Normocephalic and atraumatic. Right Ear: Tympanic membrane, ear canal and external ear normal.      Left Ear: Tympanic membrane, ear canal and external ear normal.      Nose: Nose normal.      Mouth/Throat:      Mouth: Mucous membranes are moist.      Pharynx: Oropharynx is clear. No posterior oropharyngeal erythema. Eyes:      Conjunctiva/sclera: Conjunctivae normal.   Cardiovascular:      Rate and Rhythm: Normal rate and regular rhythm. Pulses: Normal pulses. Heart sounds: Normal heart sounds. Comments: No JVD  Pulmonary:      Effort: Pulmonary effort is normal. No respiratory distress. Breath sounds: Normal breath sounds. Abdominal:      General: Abdomen is flat. There is no distension. Palpations: Abdomen is soft. There is no mass. Tenderness: There is no abdominal tenderness. There is no guarding. Hernia: No hernia is present. Musculoskeletal:         General: No swelling or tenderness. Normal range of motion. Cervical back: Neck supple. Right lower leg: Edema (2+) present. Left lower leg: Edema (2+) present.    Skin: General: Skin is warm and dry. Findings: No rash. Neurological:      Mental Status: He is alert. Additional Data:     Labs:  Results from last 7 days   Lab Units 09/28/23  0519   WBC Thousand/uL 8.10   HEMOGLOBIN g/dL 12.3   HEMATOCRIT % 37.2   PLATELETS Thousands/uL 184   NEUTROS PCT % 54   LYMPHS PCT % 29   MONOS PCT % 10   EOS PCT % 6     Results from last 7 days   Lab Units 09/28/23  0519 09/27/23  0447 09/26/23  1510   SODIUM mmol/L 132*   < > 134*   POTASSIUM mmol/L 3.3*   < > 3.1*   CHLORIDE mmol/L 95*   < > 91*   CO2 mmol/L 30   < > 33*   BUN mg/dL 19   < > 15   CREATININE mg/dL 1.40*   < > 2.75*   ANION GAP mmol/L 7   < > 10   CALCIUM mg/dL 7.5*   < > 7.4*   ALBUMIN g/dL  --   --  3.3*   TOTAL BILIRUBIN mg/dL  --   --  1.08*   ALK PHOS U/L  --   --  93   ALT U/L  --   --  6*   AST U/L  --   --  13   GLUCOSE RANDOM mg/dL 140   < > 140    < > = values in this interval not displayed.          Results from last 7 days   Lab Units 09/28/23  1057 09/28/23  0729 09/27/23  2143 09/27/23  1521 09/27/23  1032 09/27/23  0745 09/26/23  2303 09/26/23  2103   POC GLUCOSE mg/dl 228* 145* 162* 188* 244* 137 146* 167*               Lines/Drains:  Invasive Devices     Peripheral Intravenous Line  Duration           Peripheral IV 09/26/23 Distal;Right;Ventral (anterior) Forearm 1 day    Peripheral IV 09/26/23 Right Antecubital 1 day              Imaging: Reviewed radiology reports from this admission including: chest xray    Recent Cultures (last 7 days):         Last 24 Hours Medication List:   Current Facility-Administered Medications   Medication Dose Route Frequency Provider Last Rate   • acetaminophen  650 mg Oral Q6H PRN Bridgette Snell MD     • albuterol  2 puff Inhalation Q6H PRN Bridgette Snell MD     • amLODIPine  10 mg Oral Daily Bridgette Snell MD     • benzonatate  100 mg Oral TID PRN Bridgette Snell MD     • enoxaparin  40 mg Subcutaneous Daily Bridgette Snell MD     • fluticasone  1 puff Inhalation Daily Tim Larose MD     • folic acid  1 mg Oral Daily Tim Larose MD     • guaiFENesin  600 mg Oral Q12H [address] Tim Larose MD     • insulin lispro  1-5 Units Subcutaneous HS Kelle Harris DO     • insulin lispro  1-6 Units Subcutaneous TID Noni Mcclain MD     • metoprolol succinate  25 mg Oral Daily Tim Larose MD     • multivitamin-minerals  1 tablet Oral Daily Tim Larose MD     • polyethylene glycol  17 g Oral Daily Tim Larose MD     • potassium chloride  40 mEq Oral Daily Tim Larose MD     • senna  1 tablet Oral Daily Tim Larose MD     • thiamine  100 mg Oral Daily Tim Larose MD          Today, Patient Was Seen By: Tim Larose MD    **Please Note: This note may have been constructed using a voice recognition system. **

## 2023-09-28 NOTE — ASSESSMENT & PLAN NOTE
Continue home albuterol PRN and flovent daily.    mucinex q12h for congestion  Tessalon Perles as needed    Provided Mucinex and Tessalon Perles as needed on discharge

## 2023-09-28 NOTE — ASSESSMENT & PLAN NOTE
Wife told me that her  typically understates how much alcohol he drinks. He is not exactly sure but will estimate that he drinks 2 cans 12 oz beer and 16-32 oz of christian/liquor (she is unsure whether it is one 1/2 pint bottle or 2). She says that he has a history of alcohol withdrawal in the past.  She thinks his last drink was the day of admission.  Therefore last drink is approximately around 24 hours timeframe.     Plan:  [facility] protocol  No complications during hospitalization  Continue folic acid, thiamine, multivitamins  B1 pending, B9 normal    Follow-up B1 labs requested on discharge summary

## 2023-09-28 NOTE — ASSESSMENT & PLAN NOTE
Wt Readings from Last 3 Encounters:   09/29/23 118 kg (259 lb 14.4 oz)   07/09/23 121 kg (266 lb 8.6 oz)   06/07/23 120 kg (265 lb 3.2 oz)     Does not appear to be CHF exacerbation. Not volume-overloaded on exam.  Negative CXR, BNP, dry weight around 255 lbs. Recently admitted for CHF exacerbation in March, ECHO unremarkable at that time.     Plan:  • Lasix resumed this AM  o On discharge, decreasing Lasix to 40 mg daily from 40 mg twice a day  · Monitor BMP, Replete K as needed  · Home K 40 qd  · On discharge, recommend 20 qd  · Daily weights and strict I/O  · Home toprol-xl 25 mg qd

## 2023-09-28 NOTE — ASSESSMENT & PLAN NOTE
Lab Results   Component Value Date    HGBA1C 9.0 (A) 06/05/2023     Plan:  • Hold home regimen Januvia and Jardiance  • Diet Consistent CHO Level 2 (5 CHO servings/75g CHO per meal)  o SSI QID AC  • Goal -180 while admitted, adjusting insulin regimen as appropriate  • Monitor for hypoglycemia and treat per protocol    Continue home medications on discharge

## 2023-09-28 NOTE — ASSESSMENT & PLAN NOTE
Continue home meds, amlodipine, metoprolol    Benicar held on admission due to DELFINO; recommend continuation on discharge

## 2023-09-28 NOTE — ASSESSMENT & PLAN NOTE
No BM reported in past 4 days but denies feeling constipated. Been having decreased oral intake.     Plan:  Patient received bowel regimen and had a BM prior to discharge

## 2023-09-28 NOTE — ASSESSMENT & PLAN NOTE
· POA 2.75; (baseline 0.8-. 0. 9)  • Likely suspect DELFINO in the setting of dehydration  • Passed bladder scan, 10 PVR  • U/A unremarkable    Plan:  · Kidney function markers all dramatically improved on day of discharge  • Discontinue IVF, clinically suspicious for volume overload  o Total IVF fluids approx 4.5 L since admission  • Avoid hypoperfusion of the kidneys, minimize nephrotoxins   • RAAS Blockers/Diuretics held: Resume home lasix and ARB this AM  • Recommend follow-up BMP in 1 week  • Will recommend discontinuation of home NSAIDs  • Medication changes:  o Decreasing Lasix to 40 mg QD from 40 mg BID  o Decreasing potassium to 20 daily from 40  o Close outpatient follow-up with PCP and cardiologist

## 2023-09-29 ENCOUNTER — TRANSITIONAL CARE MANAGEMENT (OUTPATIENT)
Dept: FAMILY MEDICINE CLINIC | Facility: CLINIC | Age: 58
End: 2023-09-29

## 2023-09-29 VITALS
RESPIRATION RATE: 16 BRPM | HEART RATE: 92 BPM | WEIGHT: 259.9 LBS | DIASTOLIC BLOOD PRESSURE: 83 MMHG | BODY MASS INDEX: 38.38 KG/M2 | SYSTOLIC BLOOD PRESSURE: 125 MMHG | OXYGEN SATURATION: 96 % | TEMPERATURE: 98.4 F

## 2023-09-29 LAB
ANION GAP SERPL CALCULATED.3IONS-SCNC: 3 MMOL/L
BASOPHILS # BLD AUTO: 0.03 THOUSANDS/ÂΜL (ref 0–0.1)
BASOPHILS NFR BLD AUTO: 0 % (ref 0–1)
BUN SERPL-MCNC: 14 MG/DL (ref 5–25)
CALCIUM SERPL-MCNC: 8 MG/DL (ref 8.4–10.2)
CHLORIDE SERPL-SCNC: 100 MMOL/L (ref 96–108)
CO2 SERPL-SCNC: 32 MMOL/L (ref 21–32)
CREAT SERPL-MCNC: 0.94 MG/DL (ref 0.6–1.3)
EOSINOPHIL # BLD AUTO: 0.41 THOUSAND/ÂΜL (ref 0–0.61)
EOSINOPHIL NFR BLD AUTO: 6 % (ref 0–6)
ERYTHROCYTE [DISTWIDTH] IN BLOOD BY AUTOMATED COUNT: 13.2 % (ref 11.6–15.1)
GFR SERPL CREATININE-BSD FRML MDRD: 88 ML/MIN/1.73SQ M
GLUCOSE SERPL-MCNC: 139 MG/DL (ref 65–140)
GLUCOSE SERPL-MCNC: 168 MG/DL (ref 65–140)
GLUCOSE SERPL-MCNC: 202 MG/DL (ref 65–140)
HCT VFR BLD AUTO: 35.6 % (ref 36.5–49.3)
HGB BLD-MCNC: 12.2 G/DL (ref 12–17)
IMM GRANULOCYTES # BLD AUTO: 0.03 THOUSAND/UL (ref 0–0.2)
IMM GRANULOCYTES NFR BLD AUTO: 0 % (ref 0–2)
LYMPHOCYTES # BLD AUTO: 2.31 THOUSANDS/ÂΜL (ref 0.6–4.47)
LYMPHOCYTES NFR BLD AUTO: 32 % (ref 14–44)
MAGNESIUM SERPL-MCNC: 2 MG/DL (ref 1.9–2.7)
MCH RBC QN AUTO: 33 PG (ref 26.8–34.3)
MCHC RBC AUTO-ENTMCNC: 34.3 G/DL (ref 31.4–37.4)
MCV RBC AUTO: 96 FL (ref 82–98)
MONOCYTES # BLD AUTO: 0.94 THOUSAND/ÂΜL (ref 0.17–1.22)
MONOCYTES NFR BLD AUTO: 13 % (ref 4–12)
NEUTROPHILS # BLD AUTO: 3.42 THOUSANDS/ÂΜL (ref 1.85–7.62)
NEUTS SEG NFR BLD AUTO: 49 % (ref 43–75)
NRBC BLD AUTO-RTO: 0 /100 WBCS
PLATELET # BLD AUTO: 178 THOUSANDS/UL (ref 149–390)
PMV BLD AUTO: 10.1 FL (ref 8.9–12.7)
POTASSIUM SERPL-SCNC: 3.7 MMOL/L (ref 3.5–5.3)
RBC # BLD AUTO: 3.7 MILLION/UL (ref 3.88–5.62)
SODIUM SERPL-SCNC: 135 MMOL/L (ref 135–147)
WBC # BLD AUTO: 7.14 THOUSAND/UL (ref 4.31–10.16)

## 2023-09-29 PROCEDURE — 82948 REAGENT STRIP/BLOOD GLUCOSE: CPT

## 2023-09-29 PROCEDURE — 83735 ASSAY OF MAGNESIUM: CPT

## 2023-09-29 PROCEDURE — 85025 COMPLETE CBC W/AUTO DIFF WBC: CPT

## 2023-09-29 PROCEDURE — 99239 HOSP IP/OBS DSCHRG MGMT >30: CPT | Performed by: INTERNAL MEDICINE

## 2023-09-29 PROCEDURE — 80048 BASIC METABOLIC PNL TOTAL CA: CPT

## 2023-09-29 RX ORDER — POTASSIUM CHLORIDE 20 MEQ/1
20 TABLET, EXTENDED RELEASE ORAL DAILY
Qty: 30 TABLET | Refills: 0 | Status: SHIPPED | OUTPATIENT
Start: 2023-09-29 | End: 2023-10-29

## 2023-09-29 RX ORDER — OLMESARTAN MEDOXOMIL 40 MG/1
40 TABLET ORAL DAILY
Qty: 30 TABLET | Refills: 0 | Status: SHIPPED | OUTPATIENT
Start: 2023-09-29 | End: 2023-10-29

## 2023-09-29 RX ORDER — BENZONATATE 100 MG/1
100 CAPSULE ORAL 3 TIMES DAILY PRN
Qty: 20 CAPSULE | Refills: 0 | Status: SHIPPED | OUTPATIENT
Start: 2023-09-29

## 2023-09-29 RX ORDER — GUAIFENESIN 600 MG/1
600 TABLET, EXTENDED RELEASE ORAL EVERY 12 HOURS SCHEDULED
Qty: 14 TABLET | Refills: 0 | Status: SHIPPED | OUTPATIENT
Start: 2023-09-29 | End: 2023-10-06

## 2023-09-29 RX ORDER — FUROSEMIDE 40 MG/1
40 TABLET ORAL
Status: DISCONTINUED | OUTPATIENT
Start: 2023-09-29 | End: 2023-09-29 | Stop reason: HOSPADM

## 2023-09-29 RX ORDER — ALBUTEROL SULFATE 90 UG/1
2 AEROSOL, METERED RESPIRATORY (INHALATION) EVERY 6 HOURS PRN
Qty: 8.5 G | Refills: 0 | Status: SHIPPED | OUTPATIENT
Start: 2023-09-29

## 2023-09-29 RX ORDER — FUROSEMIDE 40 MG/1
40 TABLET ORAL DAILY
Qty: 30 TABLET | Refills: 0 | Status: SHIPPED | OUTPATIENT
Start: 2023-09-29 | End: 2023-10-29

## 2023-09-29 RX ADMIN — ENOXAPARIN SODIUM 40 MG: 40 INJECTION SUBCUTANEOUS at 09:20

## 2023-09-29 RX ADMIN — METOPROLOL SUCCINATE 25 MG: 25 TABLET, EXTENDED RELEASE ORAL at 09:19

## 2023-09-29 RX ADMIN — SENNOSIDES 8.6 MG: 8.6 TABLET, FILM COATED ORAL at 09:20

## 2023-09-29 RX ADMIN — FLUTICASONE FUROATE 1 PUFF: 200 POWDER RESPIRATORY (INHALATION) at 09:28

## 2023-09-29 RX ADMIN — AMLODIPINE BESYLATE 10 MG: 10 TABLET ORAL at 09:18

## 2023-09-29 RX ADMIN — GUAIFENESIN 600 MG: 600 TABLET ORAL at 09:35

## 2023-09-29 RX ADMIN — POLYETHYLENE GLYCOL 3350 17 G: 17 POWDER, FOR SOLUTION ORAL at 09:20

## 2023-09-29 RX ADMIN — MULTIPLE VITAMINS W/ MINERALS TAB 1 TABLET: TAB ORAL at 09:17

## 2023-09-29 RX ADMIN — FOLIC ACID 1 MG: 1 TABLET ORAL at 09:18

## 2023-09-29 RX ADMIN — INSULIN LISPRO 2 UNITS: 100 INJECTION, SOLUTION INTRAVENOUS; SUBCUTANEOUS at 12:09

## 2023-09-29 RX ADMIN — POTASSIUM CHLORIDE 40 MEQ: 1500 TABLET, EXTENDED RELEASE ORAL at 09:35

## 2023-09-29 RX ADMIN — Medication 100 MG: at 09:19

## 2023-09-29 RX ADMIN — INSULIN LISPRO 1 UNITS: 100 INJECTION, SOLUTION INTRAVENOUS; SUBCUTANEOUS at 09:21

## 2023-09-29 RX ADMIN — FUROSEMIDE 40 MG: 40 TABLET ORAL at 09:18

## 2023-09-29 NOTE — DISCHARGE INSTR - AVS FIRST PAGE
Dear Garfield Emmanuel,     It was our pleasure to care for you here at [facility]. It is our hope that we were always able to exceed the expected standards for your care during your stay. You were hospitalized due to acute kidney injury. You were cared for on the [place] fourth floor by Zak Santana MD under the service of Selam Farrar MD with the [facility] who covers for your primary care physician (PCP), Shin Paul MD, while you were hospitalized. If you have any questions or concerns related to this hospitalization, you may contact us at [phone]. For follow up as well as any medication refills, we recommend that you follow up with your primary care physician. A registered nurse will reach out to you by phone within a few days after your discharge to answer any additional questions that you may have after going home. However, at this time we provide for you here, the most important instructions / recommendations at discharge:     Notable Medication Adjustments -   We are changing the dosing part 2 of your medications:  Furosemide (Lasix) -start taking 40 mg once a day instead of twice a day  Potassium -start taking 20 mEq once a day instead of 40  Please stop taking any ibuprofen including naproxen as this could worsen your kidneys  Continue other home medications  I will send a short refill for albuterol and Benicar as her wife has told me that you do not have these right now, please follow-up with your usual doctors for more refills.   Testing Required after Discharge -   Please get a BMP in a week to make sure your kidney numbers are okay  ** Please contact your PCP to request testing orders for any of the testing recommended here **  Important follow up information -   We encourage you to follow-up with your primary care doctor and cardiologist for your CHF  I have sent a referral for cardiology  Other Instructions - Please take the medications as changed as listed above until directed otherwise by your other doctors  Please review this entire after visit summary as additional general instructions including medication list, appointments, activity, diet, any pertinent wound care, and other additional recommendations from your care team that may be provided for you.       Sincerely,     Sally Alvarez MD

## 2023-09-29 NOTE — DISCHARGE SUMMARY
[facility]  Discharge- Mustapha Rayomnd 1965, 60 y.o. male MRN: [id number]  Unit/Bed#: S -01 Encounter: [id number]  Primary Care Provider: Leandro Holguin MD   Date and time admitted to hospital: 9/26/2023  2:24 PM    * DELFINO (acute kidney injury) ([address])  Assessment & Plan    · POA 2.75; (baseline 0.8-. 0. 9)  • Likely suspect DELFINO in the setting of dehydration  • Passed bladder scan, 10 PVR  • U/A unremarkable    Plan:  · Kidney function markers all dramatically improved on day of discharge  • Discontinue IVF, clinically suspicious for volume overload  o Total IVF fluids approx 4.5 L since admission  • Avoid hypoperfusion of the kidneys, minimize nephrotoxins   • RAAS Blockers/Diuretics held: Resume home lasix and ARB this AM  • Recommend follow-up BMP in 1 week  • Will recommend discontinuation of home NSAIDs  • Medication changes:  o Decreasing Lasix to 40 mg QD from 40 mg BID  o Decreasing potassium to 20 daily from 40  o Close outpatient follow-up with PCP and cardiologist      CHF (congestive heart failure) ([address])  Assessment & Plan  Wt Readings from Last 3 Encounters:   09/29/23 118 kg (259 lb 14.4 oz)   07/09/23 121 kg (266 lb 8.6 oz)   06/07/23 120 kg (265 lb 3.2 oz)     Does not appear to be CHF exacerbation. Not volume-overloaded on exam.  Negative CXR, BNP, dry weight around 255 lbs. Recently admitted for CHF exacerbation in March, ECHO unremarkable at that time. Plan:  • Lasix resumed this AM  o On discharge, decreasing Lasix to 40 mg daily from 40 mg twice a day  · Monitor BMP, Replete K as needed  · Home K 40 qd  · On discharge, recommend 20 qd  · Daily weights and strict I/O  · Home toprol-xl 25 mg qd            Alcohol use  Assessment & Plan  Wife told me that her  typically understates how much alcohol he drinks.   He is not exactly sure but will estimate that he drinks 2 cans 12 oz beer and 16-32 oz of christian/liquor (she is unsure whether it is one 1/2 pint bottle or 2). She says that he has a history of alcohol withdrawal in the past.  She thinks his last drink was the day of admission. Therefore last drink is approximately around 24 hours timeframe.     Plan:  CIWA protocol  No complications during hospitalization  Continue folic acid, thiamine, multivitamins  B1 pending, B9 normal    Follow-up B1 labs requested on discharge summary    Reactive airway disease without complication  Assessment & Plan  Continue home albuterol PRN and flovent daily. mucinex q12h for congestion  Tessalon Perles as needed    Provided Mucinex and Tessalon Perles as needed on discharge    Type 2 diabetes mellitus, without long-term current use of insulin ([address])  Assessment & Plan  Lab Results   Component Value Date    HGBA1C 9.0 (A) 06/05/2023     Plan:  • Hold home regimen Januvia and Jardiance  • Diet Consistent CHO Level 2 (5 CHO servings/75g CHO per meal)  o SSI QID AC  • Goal -180 while admitted, adjusting insulin regimen as appropriate  • Monitor for hypoglycemia and treat per protocol    Continue home medications on discharge        Other constipation  Assessment & Plan  No BM reported in past 4 days but denies feeling constipated. Been having decreased oral intake.     Plan:  Patient received bowel regimen and had a BM prior to discharge    Hypokalemia  Assessment & Plan  BMP daily  Replete as needed  Continue home potassium; he may have been without for 1-2 weeks    Obesity  Assessment & Plan  Encourage lifestyle modifications    Essential hypertension  Assessment & Plan  Continue home meds, amlodipine, metoprolol    Benicar held on admission due to DELFINO; recommend continuation on discharge      Medical Problems     Resolved Problems  Date Reviewed: 3/6/2023   None       Discharging Resident: Joanne Rosales MD  Discharging Attending: Gurpreet Laguerre MD  PCP: Mich Huffman MD  Admission Date:   Admission Orders (From admission, onward)     Ordered        09/26/23 8280 INPATIENT ADMISSION  Once                      Discharge Date: 09/29/23    Consultations During Hospital Stay:  · None    Procedures Performed:   · None    Significant Findings / Test Results:   · None    Incidental Findings:   · None    Test Results Pending at Discharge (will require follow up): · Vitamin B1     Outpatient Tests Requested:  · BMP in a week for DELFINO    Complications: None    Reason for Admission: DELFINO    Hospital Course:   Aki Green is a 60 y.o. male patient with a PMH of HTN, NIDDM, reactive airway disease, CHF, BMI 39 who originally presented to the hospital on 9/26/2023 due to 1.5 weeks of productive cough, congestion, general fatigue, chest tightness, heavy breathing, stomach cramps, decreased appetite, posttussive headaches and dizziness after working an 110-hr work week. Initial symptom was a cough. He was also without medications for approximately 2 to 3 weeks, unsure as to which medications and for how long he was without on each med. In the ED, patient received 1 L NS bolus, repleted K, and had unremarkable CXR, EKG, BNP, Troponin. CMP remarkable for mild electrolyte abnormality and DELFINO. Tested negative for COVID/flu/rsv and was admitted for DELFINO. Patient was treated uncomplicated late with IV fluids until kidney numbers improved while diuretics were held. Diuretics resumed on the morning of discharge. We are recommending that he follows up with his PCP for BMP check in a week. We have made the following medication changes: Lasix 40 mg once a day instead of twice a day and potassium 20 instead of 40 once a day until he has follow-up with PCP and cardiologist-cardiology referral placed for CHF as has been a long time since he has seen follow-up. We have recommended that he stop using any NSAIDs for now. I have sent a short refill for albuterol and Benicar at his wife's request as they do not have these medications right now.     Please see above list of diagnoses and related plan for additional information. Condition at Discharge: stable    Discharge Day Visit / Exam:   Subjective: Patient is doing well and improving. He is still complaining of intermittent coughing but did not require oxygen overnight. His energy levels are better and he is looking forward to resting at home. Vitals: Blood Pressure: 125/83 (09/29/23 1000)  Pulse: 92 (09/29/23 1000)  Temperature: 98.4 °F (36.9 °C) (09/29/23 0704)  Temp Source: Oral (09/28/23 1856)  Respirations: 16 (09/29/23 0704)  Weight - Scale: 118 kg (259 lb 14.4 oz) (09/29/23 0539)  SpO2: 96 % (09/29/23 0854)  Exam:   Physical Exam  Vitals and nursing note reviewed. Constitutional:       General: He is not in acute distress. Appearance: Normal appearance. He is obese. HENT:      Head: Normocephalic and atraumatic. Right Ear: Tympanic membrane, ear canal and external ear normal.      Left Ear: Tympanic membrane, ear canal and external ear normal.      Nose: Nose normal.      Mouth/Throat:      Mouth: Mucous membranes are moist.      Pharynx: Oropharynx is clear. No posterior oropharyngeal erythema. Eyes:      Conjunctiva/sclera: Conjunctivae normal.   Cardiovascular:      Rate and Rhythm: Normal rate and regular rhythm. Pulses: Normal pulses. Heart sounds: Normal heart sounds. Comments: No JVD  Pulmonary:      Effort: Pulmonary effort is normal. No respiratory distress. Breath sounds: Normal breath sounds. No wheezing or rales. Abdominal:      General: Abdomen is flat. There is no distension. Palpations: Abdomen is soft. There is no mass. Tenderness: There is no abdominal tenderness. There is no guarding. Hernia: No hernia is present. Musculoskeletal:         General: No swelling or tenderness. Normal range of motion. Cervical back: Neck supple. Right lower leg: Edema (1+) present. Left lower leg: Edema (1+) present. Skin:     General: Skin is warm and dry.       Findings: No rash.   Neurological:      Mental Status: He is alert. Discussion with Family: Updated  (wife) via phone. Discharge instructions/Information to patient and family:   See after visit summary for information provided to patient and family. Provisions for Follow-Up Care:  See after visit summary for information related to follow-up care and any pertinent home health orders. Disposition:   Home    Planned Readmission: No    Discharge Medications:  See after visit summary for reconciled discharge medications provided to patient and/or family.       **Please Note: This note may have been constructed using a voice recognition system**

## 2023-10-02 ENCOUNTER — OFFICE VISIT (OUTPATIENT)
Dept: FAMILY MEDICINE CLINIC | Facility: CLINIC | Age: 58
End: 2023-10-02
Payer: COMMERCIAL

## 2023-10-02 VITALS
SYSTOLIC BLOOD PRESSURE: 142 MMHG | HEART RATE: 85 BPM | RESPIRATION RATE: 16 BRPM | WEIGHT: 264.4 LBS | BODY MASS INDEX: 39.16 KG/M2 | HEIGHT: 69 IN | TEMPERATURE: 98.4 F | OXYGEN SATURATION: 97 % | DIASTOLIC BLOOD PRESSURE: 82 MMHG

## 2023-10-02 DIAGNOSIS — I10 ESSENTIAL HYPERTENSION: ICD-10-CM

## 2023-10-02 DIAGNOSIS — E11.9 TYPE 2 DIABETES MELLITUS WITHOUT COMPLICATION, WITHOUT LONG-TERM CURRENT USE OF INSULIN (HCC): Primary | ICD-10-CM

## 2023-10-02 DIAGNOSIS — J45.40 MODERATE PERSISTENT REACTIVE AIRWAY DISEASE WITHOUT COMPLICATION: ICD-10-CM

## 2023-10-02 PROBLEM — M77.9 TENDONITIS: Status: RESOLVED | Noted: 2022-09-07 | Resolved: 2023-10-02

## 2023-10-02 PROBLEM — R60.0 PERIPHERAL EDEMA: Status: RESOLVED | Noted: 2019-10-21 | Resolved: 2023-10-02

## 2023-10-02 PROBLEM — Z78.9 ALCOHOL USE: Status: RESOLVED | Noted: 2023-09-27 | Resolved: 2023-10-02

## 2023-10-02 PROBLEM — K59.09 OTHER CONSTIPATION: Status: RESOLVED | Noted: 2023-09-26 | Resolved: 2023-10-02

## 2023-10-02 PROBLEM — Z23 INFLUENZA VACCINE NEEDED: Status: RESOLVED | Noted: 2022-12-22 | Resolved: 2023-10-02

## 2023-10-02 PROBLEM — M25.512 PAIN AND SWELLING OF LEFT SHOULDER: Status: RESOLVED | Noted: 2020-09-05 | Resolved: 2023-10-02

## 2023-10-02 PROBLEM — F10.90 ALCOHOL USE: Status: RESOLVED | Noted: 2023-09-27 | Resolved: 2023-10-02

## 2023-10-02 PROBLEM — E87.6 HYPOKALEMIA: Status: RESOLVED | Noted: 2020-07-06 | Resolved: 2023-10-02

## 2023-10-02 PROBLEM — E83.42 HYPOMAGNESEMIA: Status: RESOLVED | Noted: 2020-07-06 | Resolved: 2023-10-02

## 2023-10-02 PROBLEM — M25.412 PAIN AND SWELLING OF LEFT SHOULDER: Status: RESOLVED | Noted: 2020-09-05 | Resolved: 2023-10-02

## 2023-10-02 PROBLEM — R60.9 PERIPHERAL EDEMA: Status: RESOLVED | Noted: 2019-10-21 | Resolved: 2023-10-02

## 2023-10-02 PROBLEM — U07.1 COVID-19 VIRUS INFECTION: Status: RESOLVED | Noted: 2022-06-10 | Resolved: 2023-10-02

## 2023-10-02 PROBLEM — R05.8 POST-TUSSIVE SYNCOPE: Status: RESOLVED | Noted: 2023-03-03 | Resolved: 2023-10-02

## 2023-10-02 LAB
SL AMB POCT HEMOGLOBIN AIC: 8.8 (ref ?–6.5)
VIT B1 BLD-SCNC: 82 NMOL/L (ref 66.5–200)

## 2023-10-02 PROCEDURE — 99214 OFFICE O/P EST MOD 30 MIN: CPT | Performed by: FAMILY MEDICINE

## 2023-10-02 PROCEDURE — 83036 HEMOGLOBIN GLYCOSYLATED A1C: CPT | Performed by: FAMILY MEDICINE

## 2023-10-02 RX ORDER — GLIMEPIRIDE 1 MG/1
1 TABLET ORAL 2 TIMES DAILY
Qty: 180 TABLET | Refills: 1 | Status: SHIPPED | OUTPATIENT
Start: 2023-10-02 | End: 2024-09-26

## 2023-10-02 NOTE — PROGRESS NOTES
FAMILY PRACTICE OFFICE VISIT       NAME: Sonya Patel  AGE: 60 y.o. SEX: male       : 1965        MRN: [id number]    DATE: 10/2/2023  TIME: 1:07 PM    Assessment and Plan     Problem List Items Addressed This Visit        Endocrine    Type 2 diabetes mellitus, without long-term current use of insulin ([address]) - Primary     Diabetes. A1c has risen to 8.8. Patient does take his Januvia on a regular basis but discontinued his Jardiance due to suspected side effects. Patient was given prescription to initiate glimepiride 1 mg twice daily and continue with Januvia. His foot exam is up-to-date  Lab Results   Component Value Date    HGBA1C 8.8 (A) 10/02/2023            Relevant Medications    glimepiride (AMARYL) 1 mg tablet    Other Relevant Orders    POCT hemoglobin A1c (Completed)       Respiratory    Reactive airway disease without complication     Reactive airway disease. Patient with history of chronic cough which is probably multifactorial.  He is aware of risks involved with continuing to smoke cigars. Patient stated he he was unable to get his refill of his albuterol and Flovent medication. We will notify pharmacy to see if there is an insurance issue. Patient was recommended to restart both medications as ordered. He will call if coughing persists without improvement over the first 2 weeks            Cardiovascular and Mediastinum    Essential hypertension     Hypertension.   The patient's blood pressure is stable at this time and he will continue current regimen of medications          TCM Call     Date and time call was made  2023  4:09 PM    Hospital care reviewed  Records reviewed    Patient was hospitialized at  [address] [place] [address]    Date of Admission  23    Date of discharge  23    Diagnosis  DELFINO    Disposition  Home    Were the patients medications reviewed and updated  No    Current Symptoms  None      TCM Call     Post hospital issues  None    Should patient be enrolled in anticoag monitoring? No    Scheduled for follow up? Yes    Did you obtain your prescribed medications  Yes    Do you need help managing your prescriptions or medications  No    Is transportation to your appointment needed  No    I have advised the patient to call PCP with any new or worsening symptoms  Makeda Mackenzie MA    Living Arrangements  Spouse or Significiant other    Support System  Spouse    Are you recieving any outpatient services  No    Are you recieving home care services  No    Are you using any community resources  No    Current waiver services  No    Have you fallen in the last 12 months  No    Interperter language line needed  No    Counseling  Patient    Counseling topics  Importance of RX compliance    Comments  TCM Scheduled for 57/5/43 with Dr. Guido Ennis @ [facility] is a 60 y.o. male patient with a PMH of HTN, NIDDM, reactive airway disease, CHF, BMI 44 who originally presented to the hospital on 9/26/2023 due to 1.5 weeks of productive cough, congestion, general fatigue, chest tightness, heavy breathing, stomach cramps, decreased appetite, posttussive headaches and dizziness after working an 110-hr work week. Initial symptom was a cough. He was also without medications for approximately 2 to 3 weeks, unsure as to which medications and for how long he was without on each med. In the ED, patient received 1 L NS bolus, repleted K, and had unremarkable CXR, EKG, BNP, Troponin. CMP remarkable for mild electrolyte abnormality and DELFINO. Tested negative for COVID/flu/rsv and was admitted for DELFINO. Patient was treated uncomplicated late with IV fluids until kidney numbers improved while diuretics were held. Diuretics resumed on the morning of discharge. We are recommending that he follows up with his PCP for BMP check in a week.  We have made the following medication changes: Lasix 40 mg once a day instead of twice a day and potassium 20 instead of 40 once a day until he has follow-up with PCP and cardiologist-cardiology referral placed for CHF as has been a long time since he has seen follow-up. We have recommended that he stop using any NSAIDs for now. I have sent a short refill for albuterol and Benicar at his wife's request as they do not have these medications right now. Chief Complaint     Chief Complaint   Patient presents with   • Transition of Care Management       History of Present Illness     I reviewed the patient's discharge summary from his latest hospitalization. Patient denies any chest pain or shortness of breath but continues to have persistent coughing. Patient has been using over-the-counter cough drops which helps him sleep at night. He states he has not been on his albuterol or Flovent inhalers. He believes there is a question of insurance coverage at the pharmacy. Patient did have an adjustment of his medications during hospital stay. Patient feels he is unable to work at this time due to his coughing spells. Normally patient does smoke 3 small cigars per day. Patient was also prescribed Jardiance by his cardiology office. Unfortunately patient felt that 1 to 2 days after initiating medication he developed a tickle in his throat with some throat tightness which caused patient to discontinue medication. His A1c in the office was elevated at 8.8. Patient states he does take Januvia once daily. He is on no other diabetic medication. Patient also has a history of alcohol use. Review of Systems   Review of Systems   Constitutional: Negative. HENT: Negative. Respiratory: Positive for cough. Cardiovascular: Negative. Gastrointestinal: Negative. Genitourinary: Negative. Musculoskeletal: Negative. Skin: Negative. Neurological: Negative. Psychiatric/Behavioral: Negative.         Active Problem List     Patient Active Problem List   Diagnosis   • Essential hypertension   • Elevated lipoprotein(a)   • Microalbuminuria • Obesity   • CHF (congestive heart failure) ([facility])   • Alcohol abuse   • DELFINO (acute kidney injury) ([address])   • Onychomycosis   • Arthritis of right wrist   • Acute on chronic diastolic congestive heart failure ([facility])   • Impingement syndrome of left shoulder   • Type 2 diabetes mellitus, without long-term current use of insulin ([facility])   • Acute gout of right hand   • Acute midline low back pain without sciatica   • Reactive airway disease without complication       Past Medical History:  Past Medical History:   Diagnosis Date   • Acute kidney injury ([address]) 9/5/2020   • Diabetes mellitus ([address])    • Hypertension    • Inguinal hernia     3/25/15   • Onychomycosis     5/24/17   • Type 2 diabetes mellitus ([address]) 05/01/2012       Past Surgical History:  Past Surgical History:   Procedure Laterality Date   • ARTHROSCOPY KNEE      with Medial and Lateral Meniscus Repair   • HERNIA REPAIR      umbilical and inguinal hernia repairs (left)       Family History:  Family History   Problem Relation Age of Onset   • Hypertension Mother         essential   • Chronic bronchitis Father    • Prostate cancer Father    • Breast cancer Sister        Social History:  Social History     Socioeconomic History   • Marital status: /Civil Union     Spouse name: Not on file   • Number of children: Not on file   • Years of education: Not on file   • Highest education level: Not on file   Occupational History   • Occupation: [employer] worker   Tobacco Use   • Smoking status: Former     Types: Cigars     Start date: 6/22/2020   • Smokeless tobacco: Never   • Tobacco comments:     current every day smoker, per [employer]   Vaping Use   • Vaping Use: Never used   Substance and Sexual Activity   • Alcohol use:  Yes     Alcohol/week: 3.0 standard drinks of alcohol     Types: 2 Cans of beer, 1 Standard drinks or equivalent per week     Comment: weekend: 1 glass of christian or 2 beers   • Drug use: No   • Sexual activity: Yes   Other Topics Concern   • Not on file   Social History Narrative    Alcohol dependence    Nicotine dependence    Denied, alcohol Use    Marital problems     Social Determinants of Health     Financial Resource Strain: Not on file   Food Insecurity: Not on file   Transportation Needs: Not on file   Physical Activity: Not on file   Stress: Not on file   Social Connections: Not on file   Intimate Partner Violence: Not on file   Housing Stability: Not on file       Objective     Vitals:    10/02/23 0952   BP: 142/82   Pulse: 85   Resp: 16   Temp: 98.4 °F (36.9 °C)   SpO2: 97%     Wt Readings from Last 3 Encounters:   10/02/23 120 kg (264 lb 6.4 oz)   09/29/23 118 kg (259 lb 14.4 oz)   07/09/23 121 kg (266 lb 8.6 oz)       Physical Exam  Constitutional:       General: He is not in acute distress. Appearance: Normal appearance. He is not ill-appearing. HENT:      Head: Normocephalic and atraumatic. Eyes:      General:         Right eye: No discharge. Left eye: No discharge. Extraocular Movements: Extraocular movements intact. Conjunctiva/sclera: Conjunctivae normal.      Pupils: Pupils are equal, round, and reactive to light. Neck:      Vascular: No carotid bruit. Cardiovascular:      Rate and Rhythm: Normal rate and regular rhythm. Pulses: no weak pulses          Dorsalis pedis pulses are 2+ on the right side and 2+ on the left side. Posterior tibial pulses are 2+ on the right side and 2+ on the left side. Heart sounds: Normal heart sounds. No murmur heard. Pulmonary:      Effort: Pulmonary effort is normal.      Breath sounds: Normal breath sounds. No wheezing, rhonchi or rales. Abdominal:      General: Abdomen is flat. Bowel sounds are normal. There is no distension. Palpations: Abdomen is soft. Tenderness: There is no abdominal tenderness. There is no guarding or rebound. Musculoskeletal:      Right lower leg: No edema. Left lower leg: No edema.    Feet:      Right foot:      Skin integrity: Callus and dry skin present. No ulcer, skin breakdown, erythema or warmth. Left foot:      Skin integrity: Callus and dry skin present. No ulcer, skin breakdown, erythema or warmth. Lymphadenopathy:      Cervical: No cervical adenopathy. Skin:     Findings: No rash. Neurological:      General: No focal deficit present. Mental Status: He is alert and oriented to person, place, and time. Cranial Nerves: No cranial nerve deficit. Psychiatric:         Mood and Affect: Mood normal.         Behavior: Behavior normal.         Thought Content: Thought content normal.         Judgment: Judgment normal.     Patient's shoes and socks removed. Right Foot/Ankle   Right Foot Inspection  Skin Exam: skin normal, skin intact, dry skin, callus and callus. No warmth, no erythema, no maceration, no abnormal color, no pre-ulcer and no ulcer. Toe Exam: ROM and strength within normal limits and right toe deformity (Onychomycosis of several toes). Sensory   Vibration: intact  Monofilament testing: intact    Vascular  The right DP pulse is 2+. The right PT pulse is 2+. Left Foot/Ankle  Left Foot Inspection  Skin Exam: skin normal, skin intact, dry skin and callus. No warmth, no erythema, no maceration, normal color, no pre-ulcer and no ulcer. Toe Exam: ROM and strength within normal limits and left toe deformity (Onychomycosis of several toes). Sensory   Vibration: intact  Monofilament testing: intact    Vascular  The left DP pulse is 2+. The left PT pulse is 2+.      Assign Risk Category  No deformity present  No loss of protective sensation  No weak pulses  Risk: 0      Pertinent Laboratory/Diagnostic Studies:  Lab Results   Component Value Date    GLUCOSE 179 (H) 09/04/2020    BUN 14 09/29/2023    CREATININE 0.94 09/29/2023    CALCIUM 8.0 (L) 09/29/2023     12/21/2017    K 3.7 09/29/2023    CO2 32 09/29/2023     09/29/2023     Lab Results   Component Value Date    ALT 6 (L) 09/26/2023    AST 13 09/26/2023    ALKPHOS 93 09/26/2023    BILITOT 1.3 (H) 12/21/2017       Lab Results   Component Value Date    WBC 7.14 09/29/2023    HGB 12.2 09/29/2023    HCT 35.6 (L) 09/29/2023    MCV 96 09/29/2023     09/29/2023       Lab Results   Component Value Date    TSH 3.58 03/14/2022       Lab Results   Component Value Date    CHOL 153 12/21/2017     Lab Results   Component Value Date    TRIG 86 03/14/2022     Lab Results   Component Value Date    HDL 47 03/14/2022     Lab Results   Component Value Date    LDLCALC 104 (H) 03/14/2022     Lab Results   Component Value Date    HGBA1C 8.8 (A) 10/02/2023       Results for orders placed or performed in visit on 10/02/23   POCT hemoglobin A1c   Result Value Ref Range    Hemoglobin A1C 8.8 (A) 6.5       Orders Placed This Encounter   Procedures   • POCT hemoglobin A1c       ALLERGIES:  No Known Allergies    Current Medications     Current Outpatient Medications   Medication Sig Dispense Refill   • albuterol (ProAir HFA) 90 mcg/act inhaler Inhale 2 puffs every 6 (six) hours as needed for wheezing 8.5 g 0   • albuterol (PROVENTIL HFA,VENTOLIN HFA) 90 mcg/act inhaler Inhale 2 puffs every 6 (six) hours as needed (as needed) 18 g 2   • amLODIPine (NORVASC) 10 mg tablet Take 1 tablet (10 mg total) by mouth daily 90 tablet 1   • benzonatate (TESSALON PERLES) 100 mg capsule Take 1 capsule (100 mg total) by mouth 3 (three) times a day as needed for cough 20 capsule 0   • Blood Pressure KIT Twice a day periodically 1 each 0   • fluticasone (Flovent HFA) 220 mcg/act inhaler Inhale 1 puff 2 (two) times a day Rinse mouth after use.  12 g 5   • furosemide (LASIX) 40 mg tablet Take 1 tablet (40 mg total) by mouth daily 30 tablet 0   • glimepiride (AMARYL) 1 mg tablet Take 1 tablet (1 mg total) by mouth 2 (two) times a day 180 tablet 1   • glucose blood test strip 1 each by Other route daily 180 each 5   • guaiFENesin (MUCINEX) 600 mg 12 hr tablet Take 1 tablet (600 mg total) by mouth every 12 (twelve) hours for 7 days 14 tablet 0   • Lancets (ONETOUCH ULTRASOFT) lancets by Does not apply route     • LORazepam (ATIVAN) 1 mg tablet Take 1 tablet by mouth daily as needed     • methocarbamol (ROBAXIN) 500 mg tablet Take 1 tablet (500 mg total) by mouth 2 (two) times a day 20 tablet 0   • metoprolol succinate (TOPROL-XL) 25 mg 24 hr tablet Take 1 tablet (25 mg total) by mouth daily Start on Monday, 09/06/2021. 90 tablet 1   • olmesartan (BENICAR) 40 mg tablet Take 1 tablet (40 mg total) by mouth daily 90 tablet 1   • olmesartan (BENICAR) 40 mg tablet Take 1 tablet (40 mg total) by mouth daily 30 tablet 0   • potassium chloride (K-DUR,KLOR-CON) 20 mEq tablet Take 1 tablet (20 mEq total) by mouth daily 30 tablet 0   • sitaGLIPtin (Januvia) 100 mg tablet Take 1 tablet (100 mg total) by mouth daily 90 tablet 1     No current facility-administered medications for this visit.          Health Maintenance     Health Maintenance   Topic Date Due   • Pneumococcal Vaccine: Pediatrics (0 to 5 Years) and At-Risk Patients (6 to 59 Years) (1 - PCV) Never done   • Hepatitis A Vaccine (1 of 2 - Risk 2-dose series) Never done   • COVID-19 Vaccine (4 - Pfizer series) 02/09/2022   • BMI: Followup Plan  09/08/2022   • Influenza Vaccine (1) 09/01/2023   • DM Eye Exam  10/17/2023   • Annual Physical  12/22/2023   • Kidney Health Evaluation: Albumin/Creatinine Ratio  01/23/2024   • Hepatitis B Vaccine (1 of 3 - Risk 3-dose series) 03/05/2024   • HEMOGLOBIN A1C  04/02/2024   • Depression Screening  06/05/2024   • Kidney Health Evaluation: GFR  09/29/2024   • BMI: Adult  10/02/2024   • Diabetic Foot Exam  10/02/2024   • Colorectal Cancer Screening  05/13/2025   • DTaP,Tdap,and Td Vaccines (3 - Td or Tdap) 09/05/2030   • HIV Screening  Completed   • Hepatitis C Screening  Completed   • HIB Vaccine  Aged Out   • IPV Vaccine  Aged Out   • Meningococcal ACWY Vaccine  Aged Out   • HPV Vaccine  Aged Out     Immunization History   Administered Date(s) Administered   • COVID-19 PFIZER VACCINE 0.3 ML IM 04/12/2021, 05/03/2021, 12/15/2021   • INFLUENZA 12/22/2022   • Influenza, injectable, quadrivalent, preservative free 0.5 mL 10/27/2020   • Influenza, recombinant, quadrivalent,injectable, preservative free 10/21/2019, 12/22/2022   • Influenza, seasonal, injectable 09/23/2015   • Tdap 10/21/2019, 12/86/3294       Shira Proctor MD    I spent 30 minutes with this patient of which greater than 50% was spent counseling or reviewing chart

## 2023-10-02 NOTE — ASSESSMENT & PLAN NOTE
Reactive airway disease. Patient with history of chronic cough which is probably multifactorial.  He is aware of risks involved with continuing to smoke cigars. Patient stated he he was unable to get his refill of his albuterol and Flovent medication. We will notify pharmacy to see if there is an insurance issue. Patient was recommended to restart both medications as ordered.   He will call if coughing persists without improvement over the first 2 weeks

## 2023-10-02 NOTE — ASSESSMENT & PLAN NOTE
Diabetes. A1c has risen to 8.8. Patient does take his Januvia on a regular basis but discontinued his Jardiance due to suspected side effects. Patient was given prescription to initiate glimepiride 1 mg twice daily and continue with Januvia.   His foot exam is up-to-date  Lab Results   Component Value Date    HGBA1C 8.8 (A) 10/02/2023

## 2023-10-03 NOTE — UTILIZATION REVIEW
NOTIFICATION OF ADMISSION DISCHARGE   This is a Notification of Discharge from [address] [place] [address]. Please be advised that this patient has been discharge from our facility. Below you will find the admission and discharge date and time including the patient’s disposition. UTILIZATION REVIEW CONTACT:  Kamila Maya  Utilization   Network Utilization Review Department  Phone: [phone] x carefully listen to the prompts. All voicemails are confidential.  Email: [email or web address]     ADMISSION INFORMATION  PRESENTATION DATE: 9/26/2023  2:24 PM  OBERVATION ADMISSION DATE:   INPATIENT ADMISSION DATE: 9/26/23  4:31 PM   DISCHARGE DATE: 9/29/2023  3:41 PM   DISPOSITION:Home/Self Care    IMPORTANT INFORMATION:  Send all requests for admission clinical reviews, approved or denied determinations and any other requests to dedicated fax number below belonging to the campus where the patient is receiving treatment.  List of dedicated fax numbers:  [place] DENIALS (Administrative/Medical Necessity) [phone] [address] [facility] (Maternity/NICU/Pediatrics) [phone]   [facility] [phone]   [facility] [phone] [address] [facility] [place] [address] [phone] [address] [facility] [phone]   [facility] [phone]   [address] [place] [address] [address] [place] [address] [phone]   [address] [place] [address] [phone] [address] [facility] [phone] [address] [facility] [address] [phone]

## 2023-10-04 ENCOUNTER — TELEPHONE (OUTPATIENT)
Dept: FAMILY MEDICINE CLINIC | Facility: CLINIC | Age: 58
End: 2023-10-04

## 2023-10-04 NOTE — TELEPHONE ENCOUNTER
Please speak to patient. I spoke with his [facility] pharmacist and he is able to  his Flovent and albuterol inhalers later this afternoon.

## 2023-10-09 ENCOUNTER — OFFICE VISIT (OUTPATIENT)
Dept: FAMILY MEDICINE CLINIC | Facility: CLINIC | Age: 58
End: 2023-10-09
Payer: COMMERCIAL

## 2023-10-09 VITALS
RESPIRATION RATE: 18 BRPM | OXYGEN SATURATION: 98 % | SYSTOLIC BLOOD PRESSURE: 100 MMHG | DIASTOLIC BLOOD PRESSURE: 70 MMHG | TEMPERATURE: 97.8 F | HEIGHT: 69 IN | WEIGHT: 202.25 LBS | BODY MASS INDEX: 29.96 KG/M2 | HEART RATE: 95 BPM

## 2023-10-09 DIAGNOSIS — J45.20 MILD INTERMITTENT REACTIVE AIRWAY DISEASE WITHOUT COMPLICATION: ICD-10-CM

## 2023-10-09 DIAGNOSIS — M75.42 IMPINGEMENT SYNDROME OF LEFT SHOULDER: ICD-10-CM

## 2023-10-09 DIAGNOSIS — E11.9 TYPE 2 DIABETES MELLITUS WITHOUT COMPLICATION, WITHOUT LONG-TERM CURRENT USE OF INSULIN (HCC): Primary | ICD-10-CM

## 2023-10-09 DIAGNOSIS — J06.9 UPPER RESPIRATORY TRACT INFECTION, UNSPECIFIED TYPE: ICD-10-CM

## 2023-10-09 PROCEDURE — 99214 OFFICE O/P EST MOD 30 MIN: CPT | Performed by: FAMILY MEDICINE

## 2023-10-09 RX ORDER — ALBUTEROL SULFATE 90 UG/1
2 AEROSOL, METERED RESPIRATORY (INHALATION) EVERY 6 HOURS PRN
Qty: 54 G | Refills: 1 | Status: SHIPPED | OUTPATIENT
Start: 2023-10-09

## 2023-10-09 RX ORDER — PREDNISONE 20 MG/1
TABLET ORAL
Qty: 21 TABLET | Refills: 0 | Status: SHIPPED | OUTPATIENT
Start: 2023-10-09

## 2023-10-09 NOTE — ASSESSMENT & PLAN NOTE
Left shoulder pain. Patient given prescription for tapering dose of prednisone as previously ordered. His insurance paperwork filled out to be out of work for at least 4 more weeks. Patient will call if symptoms improve prior to this time.

## 2023-10-09 NOTE — PROGRESS NOTES
FAMILY PRACTICE OFFICE VISIT       NAME: Paula Arias  AGE: 60 y.o. SEX: male       : 1965        MRN: [id number]    DATE: 10/9/2023  TIME: 12:45 PM    Assessment and Plan     Problem List Items Addressed This Visit        Endocrine    Type 2 diabetes mellitus, without long-term current use of insulin ([address]) - Primary    Relevant Orders    IRIS Diabetic eye exam (Completed)       Respiratory    Reactive airway disease without complication     Reactive airway disease. Patient given prescription for prednisone tapering dose consisting of 60 mg x 2 days, 50 x 2 days, 40 x 2 days, 30 x 2 days, 20 x 2 days, 10 x 2 days. Patient will call if symptoms persist after medication completed         Relevant Medications    albuterol (PROVENTIL HFA,VENTOLIN HFA) 90 mcg/act inhaler    Upper respiratory tract infection    Relevant Medications    predniSONE 20 mg tablet       Musculoskeletal and Integument    Impingement syndrome of left shoulder     Left shoulder pain. Patient given prescription for tapering dose of prednisone as previously ordered. His insurance paperwork filled out to be out of work for at least 4 more weeks. Patient will call if symptoms improve prior to this time. Chief Complaint     Chief Complaint   Patient presents with   • Follow-up     4 month        History of Present Illness     Patient in the office accompanied by his wife. He states he continues to have chronic coughing despite the use of his Flovent which he began this past week. Patient has not had his Ventolin inhaler to use. He denies any documented fevers. Patient also reports 1 week history of left shoulder discomfort with no documented trauma. He has difficulties lifting shoulder above 90 degrees. Patient also feels his right hand has been swelling due to his prior history of gout. Review of Systems   Review of Systems   Constitutional: Positive for fatigue.    Respiratory: Positive for cough and chest tightness. Cardiovascular: Negative. Gastrointestinal: Negative. Musculoskeletal: Positive for arthralgias.        Active Problem List     Patient Active Problem List   Diagnosis   • Essential hypertension   • Elevated lipoprotein(a)   • Microalbuminuria   • Obesity   • CHF (congestive heart failure) ([facility])   • Alcohol abuse   • DELFINO (acute kidney injury) ([address])   • Onychomycosis   • Arthritis of right wrist   • Acute on chronic diastolic congestive heart failure ([facility])   • Impingement syndrome of left shoulder   • Type 2 diabetes mellitus, without long-term current use of insulin ([facility])   • Acute gout of right hand   • Acute midline low back pain without sciatica   • Reactive airway disease without complication   • Upper respiratory tract infection       Past Medical History:  Past Medical History:   Diagnosis Date   • Acute kidney injury ([address]) 9/5/2020   • Diabetes mellitus ([address])    • Hypertension    • Inguinal hernia     3/25/15   • Onychomycosis     5/24/17   • Type 2 diabetes mellitus ([address]) 05/01/2012       Past Surgical History:  Past Surgical History:   Procedure Laterality Date   • ARTHROSCOPY KNEE      with Medial and Lateral Meniscus Repair   • HERNIA REPAIR      umbilical and inguinal hernia repairs (left)       Family History:  Family History   Problem Relation Age of Onset   • Hypertension Mother         essential   • Chronic bronchitis Father    • Prostate cancer Father    • Breast cancer Sister        Social History:  Social History     Socioeconomic History   • Marital status: /Civil Union     Spouse name: Not on file   • Number of children: Not on file   • Years of education: Not on file   • Highest education level: Not on file   Occupational History   • Occupation: [employer]   Tobacco Use   • Smoking status: Former     Types: Cigars     Start date: 6/22/2020   • Smokeless tobacco: Never   • Tobacco comments:     current every day smoker, per [employer]   Vaping Use   • Vaping Use: Never used   Substance and Sexual Activity   • Alcohol use: Yes     Alcohol/week: 3.0 standard drinks of alcohol     Types: 2 Cans of beer, 1 Standard drinks or equivalent per week     Comment: weekend: 1 glass of christian or 2 beers   • Drug use: No   • Sexual activity: Yes   Other Topics Concern   • Not on file   Social History Narrative    Alcohol dependence    Nicotine dependence    Denied, alcohol Use    Marital problems     Social Determinants of Health     Financial Resource Strain: Not on file   Food Insecurity: Not on file   Transportation Needs: Not on file   Physical Activity: Not on file   Stress: Not on file   Social Connections: Not on file   Intimate Partner Violence: Not on file   Housing Stability: Not on file       Objective     Vitals:    10/09/23 0924   BP: 100/70   Pulse: 95   Resp: 18   Temp: 97.8 °F (36.6 °C)   SpO2: 98%     Wt Readings from Last 3 Encounters:   10/09/23 91.7 kg (202 lb 4 oz)   10/02/23 120 kg (264 lb 6.4 oz)   09/29/23 118 kg (259 lb 14.4 oz)       Physical Exam  Constitutional:       General: He is not in acute distress. Appearance: Normal appearance. He is not ill-appearing. Cardiovascular:      Rate and Rhythm: Normal rate and regular rhythm. Heart sounds: Normal heart sounds. No murmur heard. Pulmonary:      Effort: Pulmonary effort is normal. No respiratory distress. Breath sounds: Wheezing present. No rhonchi or rales. Musculoskeletal:      Comments: Patient with mild tenderness to palpation of posterior left shoulder. Decreased range of motion with maximum elevation left arm to approximately 70 degrees. +5/5 muscle strength with hand or forearm. There is mild swelling of the dorsal surface of the right hand. Increased discomfort of left shoulder with internal and external rotation. Neurological:      Mental Status: He is alert.          Pertinent Laboratory/Diagnostic Studies:  Lab Results   Component Value Date    GLUCOSE 179 (H) 09/04/2020 BUN 14 2023    CREATININE 0.94 2023    CALCIUM 8.0 (L) 2023     2017    K 3.7 2023    CO2 32 2023     2023     Lab Results   Component Value Date    ALT 6 (L) 2023    AST 13 2023    ALKPHOS 93 2023    BILITOT 1.3 (H) 2017       Lab Results   Component Value Date    WBC 7.14 2023    HGB 12.2 2023    HCT 35.6 (L) 2023    MCV 96 2023     2023       Lab Results   Component Value Date    TSH 3.58 2022       Lab Results   Component Value Date    CHOL 153 2017     Lab Results   Component Value Date    TRIG 86 2022     Lab Results   Component Value Date    HDL 47 2022     Lab Results   Component Value Date    LDLCALC 104 (H) 2022     Lab Results   Component Value Date    HGBA1C 8.8 (A) 10/02/2023       Results for orders placed or performed in visit on 10/09/23   IRIS Diabetic eye exam   Result Value Ref Range    Severity NORMAL     Right Eye Diabetic Retinopathy None     Right Eye Macular Edema None     Right Eye Other Retinopathy None     Right Eye Image Quality Gradable Image     Left Eye Diabetic Retinopathy None     Left Eye Macular Edema None     Left Eye Other Retinopathy None     Left Eye Image Quality Gradable Image     Result Retinal Study Result for [address] [place] [address],  61 y/o, M (: 1965, MRN: [id number])    Result        presented to [address] [place] [address] on 10- for a retinal imaging study of the left and right eyes. Result       Result        Based on the findings of the study, the following is recommended for MANN ASHOK    Result        Normal Study: Re-scan the patient in 12 months or in the next calendar year.     Result       Result        Interpreting Provider's Comments:  No comments provided    Result        Diagnoses Present: E119 - Type 2 diabetes mellitus without complications Result       Result        Right eye findings: Negative for Diabetic Retinopathy    Result Negative for Macular Edema     Result       Result        Left eye findings: Negative for Diabetic Retinopathy    Result Negative for Macular Edema     Result       Result        This result was electronically signed by Gwendolyn Marquez MD, NPI: [id number], Taxonomy: [id number] on 10- 13:35 [facility]. Result      Result       NOTE:  Any pathology noted on this diabetic retinal evaluation should be confirmed by an appropriate ophthalmic examination. Orders Placed This Encounter   Procedures   • IRIS Diabetic eye exam       ALLERGIES:  No Known Allergies    Current Medications     Current Outpatient Medications   Medication Sig Dispense Refill   • albuterol (ProAir HFA) 90 mcg/act inhaler Inhale 2 puffs every 6 (six) hours as needed for wheezing 8.5 g 0   • albuterol (PROVENTIL HFA,VENTOLIN HFA) 90 mcg/act inhaler Inhale 2 puffs every 6 (six) hours as needed (as needed) 54 g 1   • amLODIPine (NORVASC) 10 mg tablet Take 1 tablet (10 mg total) by mouth daily 90 tablet 1   • benzonatate (TESSALON PERLES) 100 mg capsule Take 1 capsule (100 mg total) by mouth 3 (three) times a day as needed for cough 20 capsule 0   • Blood Pressure KIT Twice a day periodically 1 each 0   • fluticasone (Flovent HFA) 220 mcg/act inhaler Inhale 1 puff 2 (two) times a day Rinse mouth after use.  12 g 5   • furosemide (LASIX) 40 mg tablet Take 1 tablet (40 mg total) by mouth daily 30 tablet 0   • glimepiride (AMARYL) 1 mg tablet Take 1 tablet (1 mg total) by mouth 2 (two) times a day 180 tablet 1   • glucose blood test strip 1 each by Other route daily 180 each 5   • Lancets (ONETOUCH ULTRASOFT) lancets by Does not apply route     • LORazepam (ATIVAN) 1 mg tablet Take 1 tablet by mouth daily as needed     • methocarbamol (ROBAXIN) 500 mg tablet Take 1 tablet (500 mg total) by mouth 2 (two) times a day 20 tablet 0   • olmesartan (BENICAR) 40 mg tablet Take 1 tablet (40 mg total) by mouth daily 90 tablet 1   • olmesartan (BENICAR) 40 mg tablet Take 1 tablet (40 mg total) by mouth daily 30 tablet 0   • potassium chloride (K-DUR,KLOR-CON) 20 mEq tablet Take 1 tablet (20 mEq total) by mouth daily 30 tablet 0   • predniSONE 20 mg tablet 3 tabs X 2 days, 2.5 tabs X 2 days, 2 tabs x 2 days, 1.5 tabs X 2 days, 1 tab X 2 days, 0.5 tabs X 2 days 21 tablet 0   • sitaGLIPtin (Januvia) 100 mg tablet Take 1 tablet (100 mg total) by mouth daily 90 tablet 1   • metoprolol succinate (TOPROL-XL) 25 mg 24 hr tablet Take 1 tablet (25 mg total) by mouth daily Start on Monday, 09/06/2021. 90 tablet 1     No current facility-administered medications for this visit.          Health Maintenance     Health Maintenance   Topic Date Due   • Pneumococcal Vaccine: Pediatrics (0 to 5 Years) and At-Risk Patients (6 to 59 Years) (1 - PCV) Never done   • Hepatitis A Vaccine (1 of 2 - Risk 2-dose series) Never done   • COVID-19 Vaccine (4 - Pfizer series) 02/09/2022   • BMI: Followup Plan  09/08/2022   • Influenza Vaccine (1) 09/01/2023   • Annual Physical  12/22/2023   • Kidney Health Evaluation: Albumin/Creatinine Ratio  01/23/2024   • Hepatitis B Vaccine (1 of 3 - Risk 3-dose series) 03/05/2024   • HEMOGLOBIN A1C  04/02/2024   • Depression Screening  06/05/2024   • Kidney Health Evaluation: GFR  09/29/2024   • Diabetic Foot Exam  10/02/2024   • BMI: Adult  10/09/2024   • DM Eye Exam  10/09/2024   • Colorectal Cancer Screening  05/13/2025   • DTaP,Tdap,and Td Vaccines (3 - Td or Tdap) 09/05/2030   • HIV Screening  Completed   • Hepatitis C Screening  Completed   • HIB Vaccine  Aged Out   • IPV Vaccine  Aged Out   • Meningococcal ACWY Vaccine  Aged Out   • HPV Vaccine  Aged Out     Immunization History   Administered Date(s) Administered   • COVID-19 PFIZER VACCINE 0.3 ML IM 04/12/2021, 05/03/2021, 12/15/2021   • INFLUENZA 12/22/2022   • Influenza, injectable, quadrivalent, preservative free 0.5 mL 10/27/2020   • Influenza, recombinant, quadrivalent,injectable, preservative free 10/21/2019, 12/22/2022   • Influenza, seasonal, injectable 09/23/2015   • Tdap 10/21/2019, 06/56/6463       Mich Huffman MD    I spent 30 minutes with this patient of which greater than 50% was spent counseling or reviewing chart

## 2023-10-09 NOTE — ASSESSMENT & PLAN NOTE
Reactive airway disease. Patient given prescription for prednisone tapering dose consisting of 60 mg x 2 days, 50 x 2 days, 40 x 2 days, 30 x 2 days, 20 x 2 days, 10 x 2 days.   Patient will call if symptoms persist after medication completed
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
## 2023-10-23 ENCOUNTER — OFFICE VISIT (OUTPATIENT)
Dept: CARDIOLOGY CLINIC | Facility: CLINIC | Age: 58
End: 2023-10-23
Payer: COMMERCIAL

## 2023-10-23 VITALS
WEIGHT: 260.9 LBS | HEART RATE: 87 BPM | HEIGHT: 69 IN | DIASTOLIC BLOOD PRESSURE: 70 MMHG | BODY MASS INDEX: 38.64 KG/M2 | SYSTOLIC BLOOD PRESSURE: 106 MMHG | OXYGEN SATURATION: 99 %

## 2023-10-23 DIAGNOSIS — E11.9 TYPE 2 DIABETES MELLITUS WITHOUT COMPLICATION, WITHOUT LONG-TERM CURRENT USE OF INSULIN (HCC): ICD-10-CM

## 2023-10-23 DIAGNOSIS — I50.9 CHF (CONGESTIVE HEART FAILURE) (HCC): Primary | ICD-10-CM

## 2023-10-23 DIAGNOSIS — N17.9 AKI (ACUTE KIDNEY INJURY) (HCC): ICD-10-CM

## 2023-10-23 DIAGNOSIS — I50.9 ACUTE ON CHRONIC CONGESTIVE HEART FAILURE, UNSPECIFIED HEART FAILURE TYPE (HCC): ICD-10-CM

## 2023-10-23 DIAGNOSIS — R55 SYNCOPE, UNSPECIFIED SYNCOPE TYPE: ICD-10-CM

## 2023-10-23 DIAGNOSIS — I16.0 HYPERTENSIVE URGENCY: ICD-10-CM

## 2023-10-23 PROCEDURE — 99214 OFFICE O/P EST MOD 30 MIN: CPT | Performed by: STUDENT IN AN ORGANIZED HEALTH CARE EDUCATION/TRAINING PROGRAM

## 2023-10-23 RX ORDER — FUROSEMIDE 40 MG/1
60 TABLET ORAL DAILY
Qty: 135 TABLET | Refills: 5 | Status: SHIPPED | OUTPATIENT
Start: 2023-10-23 | End: 2025-04-15

## 2023-10-31 ENCOUNTER — OFFICE VISIT (OUTPATIENT)
Dept: FAMILY MEDICINE CLINIC | Facility: CLINIC | Age: 58
End: 2023-10-31
Payer: COMMERCIAL

## 2023-10-31 VITALS
RESPIRATION RATE: 18 BRPM | HEIGHT: 69 IN | OXYGEN SATURATION: 98 % | DIASTOLIC BLOOD PRESSURE: 62 MMHG | HEART RATE: 103 BPM | WEIGHT: 249.56 LBS | BODY MASS INDEX: 36.96 KG/M2 | SYSTOLIC BLOOD PRESSURE: 130 MMHG | TEMPERATURE: 98.4 F

## 2023-10-31 DIAGNOSIS — J45.20 MILD INTERMITTENT REACTIVE AIRWAY DISEASE WITHOUT COMPLICATION: ICD-10-CM

## 2023-10-31 DIAGNOSIS — Z23 ENCOUNTER FOR IMMUNIZATION: Primary | ICD-10-CM

## 2023-10-31 DIAGNOSIS — E11.65 UNCONTROLLED TYPE 2 DIABETES MELLITUS WITH HYPERGLYCEMIA (HCC): ICD-10-CM

## 2023-10-31 DIAGNOSIS — M75.42 IMPINGEMENT SYNDROME OF LEFT SHOULDER: ICD-10-CM

## 2023-10-31 DIAGNOSIS — I10 ESSENTIAL HYPERTENSION: ICD-10-CM

## 2023-10-31 DIAGNOSIS — M19.031 ARTHRITIS OF RIGHT WRIST: ICD-10-CM

## 2023-10-31 PROBLEM — J06.9 UPPER RESPIRATORY TRACT INFECTION: Status: RESOLVED | Noted: 2023-10-09 | Resolved: 2023-10-31

## 2023-10-31 PROCEDURE — 90471 IMMUNIZATION ADMIN: CPT

## 2023-10-31 PROCEDURE — 99214 OFFICE O/P EST MOD 30 MIN: CPT | Performed by: FAMILY MEDICINE

## 2023-10-31 PROCEDURE — 90686 IIV4 VACC NO PRSV 0.5 ML IM: CPT

## 2023-10-31 RX ORDER — CELECOXIB 200 MG/1
200 CAPSULE ORAL 2 TIMES DAILY
Qty: 30 CAPSULE | Refills: 5 | Status: SHIPPED | OUTPATIENT
Start: 2023-10-31

## 2023-10-31 RX ORDER — ALBUTEROL SULFATE 90 UG/1
2 AEROSOL, METERED RESPIRATORY (INHALATION) EVERY 6 HOURS PRN
Qty: 54 G | Refills: 1 | Status: SHIPPED | OUTPATIENT
Start: 2023-10-31

## 2023-10-31 NOTE — PROGRESS NOTES
FAMILY PRACTICE OFFICE VISIT       NAME: Sonya Patel  AGE: 60 y.o. SEX: male       : 1965        MRN: [id number]    DATE: 2023  TIME: 10:29 AM    Assessment and Plan     Problem List Items Addressed This Visit       Essential hypertension    Uncontrolled type 2 diabetes mellitus with hyperglycemia ([address])    Relevant Medications    sitaGLIPtin (Januvia) 100 mg tablet    Arthritis of right wrist     Arthritis of wrist.  Patient given prescription for trial of Celebrex to use 200 mg 1 p.o. daily. Patient will report progress to see if he is able to return to work prior to 2023. FMLA paperwork was filled out for patient as well as disability paperwork         Relevant Medications    celecoxib (CeleBREX) 200 mg capsule    Impingement syndrome of left shoulder     Shoulder pain. Patient will initiate Celebrex 200 mg once daily. Hopefully this will improve his pain level enough to return to work in the future. If symptoms persist over the next few weeks patient will call the office to consider other treatment options         Encounter for immunization - Primary     Immunizations. Patient received annual flu vaccine today         Relevant Orders    influenza vaccine, quadrivalent, 0.5 mL, preservative-free, for adult and pediatric patients 6 mos+ (AFLURIA, FLUARIX, FLULAVAL, FLUZONE) (Completed)    Reactive airway disease without complication    Relevant Medications    albuterol (PROVENTIL HFA,VENTOLIN HFA) 90 mcg/act inhaler           Chief Complaint     Chief Complaint   Patient presents with    Shoulder Pain     L        History of Present Illness     Patient in the office to review chronic medical condition. He continues to experience bilateral hand pain and weakness with grasping as well as left shoulder pain. 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   He still has some lingering mild coughing and states he is using his inhalers but denies any documented fevers. Shoulder Pain         Review of Systems   Review of Systems   Constitutional:  Positive for fatigue. HENT: Negative. Respiratory:  Positive for cough. Cardiovascular: Negative. Gastrointestinal: Negative. Genitourinary: Negative. Musculoskeletal:  Positive for arthralgias and joint swelling. Skin: Negative. Neurological:  Positive for weakness. Psychiatric/Behavioral: Negative.          Active Problem List     Patient Active Problem List   Diagnosis    Essential hypertension    Uncontrolled type 2 diabetes mellitus with hyperglycemia ([facility])    Elevated lipoprotein(a)    Microalbuminuria    Obesity    CHF (congestive heart failure) ([facility])    Alcohol abuse    DELFINO (acute kidney injury) ([address])    Onychomycosis    Arthritis of right wrist    Acute on chronic diastolic congestive heart failure ([facility])    Impingement syndrome of left shoulder    Type 2 diabetes mellitus, without long-term current use of insulin ([facility])    Acute gout of right hand    Encounter for immunization    Acute midline low back pain without sciatica    Reactive airway disease without complication       Past Medical History:  Past Medical History:   Diagnosis Date    Acute kidney injury ([address]) 9/5/2020    Diabetes mellitus ([address])     Hypertension     Inguinal hernia     3/25/15    Onychomycosis     5/24/17    Type 2 diabetes mellitus ([address]) 05/01/2012       Past Surgical History:  Past Surgical History:   Procedure Laterality Date    ARTHROSCOPY KNEE      with Medial and Lateral Meniscus Repair    HERNIA REPAIR      umbilical and inguinal hernia repairs (left)       Family History:  Family History   Problem Relation Age of Onset    Hypertension Mother         essential    Chronic bronchitis Father     Prostate cancer Father     Breast cancer Sister        Social History:  Social History     Socioeconomic History    Marital status: /Civil Union     Spouse name: Not on file    Number of children: Not on file    Years of education: Not on file    Highest education level: Not on file   Occupational History    Occupation: [address] [employer] [address]    Tobacco Use    Smoking status: Former     Types: Cigars     Start date: 6/22/2020    Smokeless tobacco: Never    Tobacco comments:     current every day smoker, per [employer]   Vaping Use    Vaping Use: Never used   Substance and Sexual Activity    Alcohol use: Yes     Alcohol/week: 3.0 standard drinks of alcohol     Types: 2 Cans of beer, 1 Standard drinks or equivalent per week     Comment: weekend: 1 glass of christian or 2 beers    Drug use: No    Sexual activity: Yes   Other Topics Concern    Not on file   Social History Narrative    Alcohol dependence    Nicotine dependence    Denied, alcohol Use    Marital problems     Social Determinants of Health     Financial Resource Strain: Not on file   Food Insecurity: Not on file   Transportation Needs: Not on file   Physical Activity: Not on file   Stress: Not on file   Social Connections: Not on file   Intimate Partner Violence: Not on file   Housing Stability: Not on file       Objective     Vitals:    10/31/23 1525   BP: 130/62   Pulse: 103   Resp: 18   Temp: 98.4 °F (36.9 °C)   SpO2: 98%     Wt Readings from Last 3 Encounters:   10/31/23 113 kg (249 lb 9 oz)   10/23/23 118 kg (260 lb 14.4 oz)   10/09/23 91.7 kg (202 lb 4 oz)       Physical Exam  Constitutional:       General: He is not in acute distress. Appearance: Normal appearance. He is not ill-appearing. HENT:      Head: Normocephalic and atraumatic. Eyes:      General:         Right eye: No discharge. Left eye: No discharge. Conjunctiva/sclera: Conjunctivae normal.      Pupils: Pupils are equal, round, and reactive to light. Neck:      Vascular: No carotid bruit. Cardiovascular:      Rate and Rhythm: Normal rate and regular rhythm. Heart sounds: Normal heart sounds. No murmur heard.   Pulmonary: Effort: Pulmonary effort is normal.      Breath sounds: Normal breath sounds. No wheezing, rhonchi or rales. Abdominal:      General: Abdomen is flat. Bowel sounds are normal. There is no distension. Palpations: Abdomen is soft. Tenderness: There is no abdominal tenderness. There is no guarding or rebound. Musculoskeletal:         General: Swelling present. Right lower leg: No edema. Left lower leg: No edema. Comments: Patient with mild swelling of right hand but improved from previous episodes of gout. Patient does have some decreasing grasping at +4/5 bilaterally. Patient has decreased elevation of left shoulder above 90 degrees due to discomfort. Lymphadenopathy:      Cervical: No cervical adenopathy. Skin:     Findings: No rash. Neurological:      General: No focal deficit present. Mental Status: He is alert and oriented to person, place, and time. Cranial Nerves: No cranial nerve deficit. Motor: Weakness present. Psychiatric:         Mood and Affect: Mood normal.         Behavior: Behavior normal.         Thought Content:  Thought content normal.         Judgment: Judgment normal.         Pertinent Laboratory/Diagnostic Studies:  Lab Results   Component Value Date    GLUCOSE 179 (H) 09/04/2020    BUN 14 09/29/2023    CREATININE 0.94 09/29/2023    CALCIUM 8.0 (L) 09/29/2023     12/21/2017    K 3.7 09/29/2023    CO2 32 09/29/2023     09/29/2023     Lab Results   Component Value Date    ALT 6 (L) 09/26/2023    AST 13 09/26/2023    ALKPHOS 93 09/26/2023    BILITOT 1.3 (H) 12/21/2017       Lab Results   Component Value Date    WBC 7.14 09/29/2023    HGB 12.2 09/29/2023    HCT 35.6 (L) 09/29/2023    MCV 96 09/29/2023     09/29/2023       Lab Results   Component Value Date    TSH 3.58 03/14/2022       Lab Results   Component Value Date    CHOL 153 12/21/2017     Lab Results   Component Value Date    TRIG 86 03/14/2022     Lab Results   Component Value Date    HDL 47 2022     Lab Results   Component Value Date    LDLCALC 104 (H) 2022     Lab Results   Component Value Date    HGBA1C 8.8 (A) 10/02/2023       Results for orders placed or performed in visit on 10/09/23   IRIS Diabetic eye exam   Result Value Ref Range    Severity NORMAL     Right Eye Diabetic Retinopathy None     Right Eye Macular Edema None     Right Eye Other Retinopathy None     Right Eye Image Quality Gradable Image     Left Eye Diabetic Retinopathy None     Left Eye Macular Edema None     Left Eye Other Retinopathy None     Left Eye Image Quality Gradable Image     Result Retinal Study Result for [address] [place] [address], a 61 y/o, M (: 1965, MRN: [id number])    Result        presented to [address] [place] [address] on 10- for a retinal imaging study of the left and right eyes. Result       Result        Based on the findings of the study, the following is recommended for MANN PULIDO    Result        Normal Study: Re-scan the patient in 12 months or in the next calendar year. Result       Result        Interpreting Provider's Comments:  No comments provided    Result        Diagnoses Present: E119 - Type 2 diabetes mellitus without complications    Result       Result        Right eye findings: Negative for Diabetic Retinopathy    Result Negative for Macular Edema     Result       Result        Left eye findings: Negative for Diabetic Retinopathy    Result Negative for Macular Edema     Result       Result        This result was electronically signed by Leah Wei MD, NPI: [id number], Taxonomy: [id number] on 10- 13:35 UT. Result      Result       NOTE:  Any pathology noted on this diabetic retinal evaluation should be confirmed by an appropriate ophthalmic examination.        Orders Placed This Encounter   Procedures    influenza vaccine, quadrivalent, 0.5 mL, preservative-free, for adult and pediatric patients 6 mos+ (AFLURIA, FLUARIX, FLULAVAL, FLUZONE)       ALLERGIES:  No Known Allergies    Current Medications     Current Outpatient Medications   Medication Sig Dispense Refill    albuterol (ProAir HFA) 90 mcg/act inhaler Inhale 2 puffs every 6 (six) hours as needed for wheezing 8.5 g 0    albuterol (PROVENTIL HFA,VENTOLIN HFA) 90 mcg/act inhaler Inhale 2 puffs every 6 (six) hours as needed (as needed) 54 g 1    amLODIPine (NORVASC) 10 mg tablet Take 1 tablet (10 mg total) by mouth daily 90 tablet 1    Blood Pressure KIT Twice a day periodically 1 each 0    celecoxib (CeleBREX) 200 mg capsule Take 1 capsule (200 mg total) by mouth 2 (two) times a day 30 capsule 5    fluticasone (Flovent HFA) 220 mcg/act inhaler Inhale 1 puff 2 (two) times a day Rinse mouth after use.  12 g 5    furosemide (LASIX) 40 mg tablet Take 1.5 tablets (60 mg total) by mouth daily 135 tablet 5    glimepiride (AMARYL) 1 mg tablet Take 1 tablet (1 mg total) by mouth 2 (two) times a day 180 tablet 1    glucose blood test strip 1 each by Other route daily 180 each 5    Lancets (ONETOUCH ULTRASOFT) lancets by Does not apply route      LORazepam (ATIVAN) 1 mg tablet Take 1 tablet by mouth daily as needed      methocarbamol (ROBAXIN) 500 mg tablet Take 1 tablet (500 mg total) by mouth 2 (two) times a day 20 tablet 0    olmesartan (BENICAR) 40 mg tablet Take 1 tablet (40 mg total) by mouth daily 90 tablet 1    predniSONE 20 mg tablet 3 tabs X 2 days, 2.5 tabs X 2 days, 2 tabs x 2 days, 1.5 tabs X 2 days, 1 tab X 2 days, 0.5 tabs X 2 days 21 tablet 0    sitaGLIPtin (Januvia) 100 mg tablet Take 1 tablet (100 mg total) by mouth daily 90 tablet 1    benzonatate (TESSALON PERLES) 100 mg capsule Take 1 capsule (100 mg total) by mouth 3 (three) times a day as needed for cough (Patient not taking: Reported on 10/23/2023) 20 capsule 0    olmesartan (BENICAR) 40 mg tablet Take 1 tablet (40 mg total) by mouth daily 30 tablet 0    potassium chloride (K-DUR,KLOR-CON) 20 mEq tablet Take 1 tablet (20 mEq total) by mouth daily 30 tablet 0     No current facility-administered medications for this visit.          Health Maintenance     Health Maintenance   Topic Date Due    Pneumococcal Vaccine: Pediatrics (0 to 5 Years) and At-Risk Patients (6 to 59 Years) (1 - PCV) Never done    Hepatitis A Vaccine (1 of 2 - Risk 2-dose series) Never done    COVID-19 Vaccine (4 - Pfizer series) 02/09/2022    BMI: Followup Plan  09/08/2022    Annual Physical  12/22/2023    Kidney Health Evaluation: Albumin/Creatinine Ratio  01/23/2024    Hepatitis B Vaccine (1 of 3 - Risk 3-dose series) 03/05/2024    HEMOGLOBIN A1C  04/02/2024    Depression Screening  06/05/2024    Kidney Health Evaluation: GFR  09/29/2024    Diabetic Foot Exam  10/02/2024    DM Eye Exam  10/09/2024    BMI: Adult  10/31/2024    Colorectal Cancer Screening  05/13/2025    DTaP,Tdap,and Td Vaccines (3 - Td or Tdap) 09/05/2030    HIV Screening  Completed    Hepatitis C Screening  Completed    Influenza Vaccine  Completed    HIB Vaccine  Aged Out    IPV Vaccine  Aged Out    Meningococcal ACWY Vaccine  Aged Out    HPV Vaccine  Aged Out     Immunization History   Administered Date(s) Administered    COVID-19 PFIZER VACCINE 0.3 ML IM 04/12/2021, 05/03/2021, 12/15/2021    INFLUENZA 12/22/2022    Influenza, injectable, quadrivalent, preservative free 0.5 mL 10/27/2020, 10/31/2023    Influenza, recombinant, quadrivalent,injectable, preservative free 10/21/2019, 12/22/2022    Influenza, seasonal, injectable 09/23/2015    Tdap 10/21/2019, [id number]/97/8790       Kitty Schafer MD  I spent 30 minutes with this patient of which greater than 50% was spent counseling or reviewing chart

## 2023-11-01 NOTE — ASSESSMENT & PLAN NOTE
Shoulder pain. Patient will initiate Celebrex 200 mg once daily. Hopefully this will improve his pain level enough to return to work in the future.   If symptoms persist over the next few weeks patient will call the office to consider other treatment options

## 2023-11-01 NOTE — ASSESSMENT & PLAN NOTE
Arthritis of wrist.  Patient given prescription for trial of Celebrex to use 200 mg 1 p.o. daily. Patient will report progress to see if he is able to return to work prior to December 26, 2023.   FMLA paperwork was filled out for patient as well as disability paperwork

## 2023-11-20 ENCOUNTER — APPOINTMENT (EMERGENCY)
Dept: RADIOLOGY | Facility: HOSPITAL | Age: 58
DRG: 291 | End: 2023-11-20
Payer: COMMERCIAL

## 2023-11-20 ENCOUNTER — HOSPITAL ENCOUNTER (INPATIENT)
Facility: HOSPITAL | Age: 58
LOS: 5 days | Discharge: HOME | DRG: 291 | End: 2023-11-25
Attending: EMERGENCY MEDICINE | Admitting: INTERNAL MEDICINE
Payer: COMMERCIAL

## 2023-11-20 DIAGNOSIS — I16.0 HYPERTENSIVE URGENCY: ICD-10-CM

## 2023-11-20 DIAGNOSIS — I10 ESSENTIAL HYPERTENSION: ICD-10-CM

## 2023-11-20 DIAGNOSIS — E87.70 VOLUME OVERLOAD: ICD-10-CM

## 2023-11-20 DIAGNOSIS — G47.33 OSA (OBSTRUCTIVE SLEEP APNEA): ICD-10-CM

## 2023-11-20 DIAGNOSIS — Z91.148 DIFFICULTY OBTAINING MEDICATION: ICD-10-CM

## 2023-11-20 DIAGNOSIS — R13.10 DYSPHAGIA, UNSPECIFIED TYPE: ICD-10-CM

## 2023-11-20 DIAGNOSIS — R06.89 ACUTE RESPIRATORY INSUFFICIENCY: Primary | ICD-10-CM

## 2023-11-20 DIAGNOSIS — I50.30 (HFPEF) HEART FAILURE WITH PRESERVED EJECTION FRACTION (HCC): ICD-10-CM

## 2023-11-20 DIAGNOSIS — K21.9 GERD (GASTROESOPHAGEAL REFLUX DISEASE): ICD-10-CM

## 2023-11-20 DIAGNOSIS — J45.909 REACTIVE AIRWAY DISEASE: ICD-10-CM

## 2023-11-20 DIAGNOSIS — I50.9 CHF EXACERBATION (HCC): ICD-10-CM

## 2023-11-20 DIAGNOSIS — R13.10 DIFFICULTY SWALLOWING: ICD-10-CM

## 2023-11-20 LAB
2HR DELTA HS TROPONIN: -1 NG/L
4HR DELTA HS TROPONIN: -1 NG/L
ALBUMIN SERPL BCP-MCNC: 3.6 G/DL (ref 3.5–5)
ALP SERPL-CCNC: 92 U/L (ref 34–104)
ALT SERPL W P-5'-P-CCNC: 8 U/L (ref 7–52)
ANION GAP SERPL CALCULATED.3IONS-SCNC: 10 MMOL/L
AST SERPL W P-5'-P-CCNC: 17 U/L (ref 13–39)
BASOPHILS # BLD MANUAL: 0.13 THOUSAND/UL (ref 0–0.1)
BASOPHILS NFR MAR MANUAL: 1 % (ref 0–1)
BILIRUB SERPL-MCNC: 1.3 MG/DL (ref 0.2–1)
BNP SERPL-MCNC: 38 PG/ML (ref 0–100)
BUN SERPL-MCNC: 30 MG/DL (ref 5–25)
CALCIUM SERPL-MCNC: 9.3 MG/DL (ref 8.4–10.2)
CARDIAC TROPONIN I PNL SERPL HS: 5 NG/L
CARDIAC TROPONIN I PNL SERPL HS: 5 NG/L
CARDIAC TROPONIN I PNL SERPL HS: 6 NG/L
CHLORIDE SERPL-SCNC: 97 MMOL/L (ref 96–108)
CO2 SERPL-SCNC: 31 MMOL/L (ref 21–32)
CREAT SERPL-MCNC: 1.65 MG/DL (ref 0.6–1.3)
EOSINOPHIL # BLD MANUAL: 1.05 THOUSAND/UL (ref 0–0.4)
EOSINOPHIL NFR BLD MANUAL: 8 % (ref 0–6)
ERYTHROCYTE [DISTWIDTH] IN BLOOD BY AUTOMATED COUNT: 14.8 % (ref 11.6–15.1)
FLUAV RNA RESP QL NAA+PROBE: NEGATIVE
FLUBV RNA RESP QL NAA+PROBE: NEGATIVE
GFR SERPL CREATININE-BSD FRML MDRD: 44 ML/MIN/1.73SQ M
GLUCOSE SERPL-MCNC: 115 MG/DL (ref 65–140)
GLUCOSE SERPL-MCNC: 125 MG/DL (ref 65–140)
HCT VFR BLD AUTO: 34.3 % (ref 36.5–49.3)
HGB BLD-MCNC: 11.2 G/DL (ref 12–17)
LYMPHOCYTES # BLD AUTO: 32 % (ref 14–44)
LYMPHOCYTES # BLD AUTO: 4.2 THOUSAND/UL (ref 0.6–4.47)
MCH RBC QN AUTO: 30.1 PG (ref 26.8–34.3)
MCHC RBC AUTO-ENTMCNC: 32.7 G/DL (ref 31.4–37.4)
MCV RBC AUTO: 92 FL (ref 82–98)
MONOCYTES # BLD AUTO: 1.18 THOUSAND/UL (ref 0–1.22)
MONOCYTES NFR BLD: 9 % (ref 4–12)
NEUTROPHILS # BLD MANUAL: 6.56 THOUSAND/UL (ref 1.85–7.62)
NEUTS SEG NFR BLD AUTO: 50 % (ref 43–75)
PLATELET # BLD AUTO: 203 THOUSANDS/UL (ref 149–390)
PLATELET BLD QL SMEAR: ADEQUATE
PMV BLD AUTO: 10.1 FL (ref 8.9–12.7)
POTASSIUM SERPL-SCNC: 3.6 MMOL/L (ref 3.5–5.3)
PROT SERPL-MCNC: 7.4 G/DL (ref 6.4–8.4)
RBC # BLD AUTO: 3.72 MILLION/UL (ref 3.88–5.62)
RBC MORPH BLD: NORMAL
RSV RNA RESP QL NAA+PROBE: NEGATIVE
SARS-COV-2 RNA RESP QL NAA+PROBE: NEGATIVE
SODIUM SERPL-SCNC: 138 MMOL/L (ref 135–147)
WBC # BLD AUTO: 13.11 THOUSAND/UL (ref 4.31–10.16)

## 2023-11-20 PROCEDURE — 71045 X-RAY EXAM CHEST 1 VIEW: CPT

## 2023-11-20 PROCEDURE — 93005 ELECTROCARDIOGRAM TRACING: CPT

## 2023-11-20 PROCEDURE — 83880 ASSAY OF NATRIURETIC PEPTIDE: CPT | Performed by: EMERGENCY MEDICINE

## 2023-11-20 PROCEDURE — 85007 BL SMEAR W/DIFF WBC COUNT: CPT | Performed by: EMERGENCY MEDICINE

## 2023-11-20 PROCEDURE — 80053 COMPREHEN METABOLIC PANEL: CPT | Performed by: EMERGENCY MEDICINE

## 2023-11-20 PROCEDURE — 85027 COMPLETE CBC AUTOMATED: CPT | Performed by: EMERGENCY MEDICINE

## 2023-11-20 PROCEDURE — 84484 ASSAY OF TROPONIN QUANT: CPT | Performed by: EMERGENCY MEDICINE

## 2023-11-20 PROCEDURE — 99285 EMERGENCY DEPT VISIT HI MDM: CPT | Performed by: EMERGENCY MEDICINE

## 2023-11-20 PROCEDURE — 94640 AIRWAY INHALATION TREATMENT: CPT

## 2023-11-20 PROCEDURE — 0241U HB NFCT DS VIR RESP RNA 4 TRGT: CPT | Performed by: EMERGENCY MEDICINE

## 2023-11-20 PROCEDURE — 36415 COLL VENOUS BLD VENIPUNCTURE: CPT

## 2023-11-20 PROCEDURE — 99223 1ST HOSP IP/OBS HIGH 75: CPT | Performed by: INTERNAL MEDICINE

## 2023-11-20 PROCEDURE — 96365 THER/PROPH/DIAG IV INF INIT: CPT

## 2023-11-20 PROCEDURE — 82948 REAGENT STRIP/BLOOD GLUCOSE: CPT

## 2023-11-20 PROCEDURE — 99285 EMERGENCY DEPT VISIT HI MDM: CPT

## 2023-11-20 RX ORDER — ENOXAPARIN SODIUM 100 MG/ML
30 INJECTION SUBCUTANEOUS DAILY
Status: DISCONTINUED | OUTPATIENT
Start: 2023-11-21 | End: 2023-11-20

## 2023-11-20 RX ORDER — FUROSEMIDE 40 MG/1
60 TABLET ORAL DAILY
Status: DISCONTINUED | OUTPATIENT
Start: 2023-11-21 | End: 2023-11-20

## 2023-11-20 RX ORDER — ALBUTEROL SULFATE 2.5 MG/3ML
5 SOLUTION RESPIRATORY (INHALATION) ONCE
Status: COMPLETED | OUTPATIENT
Start: 2023-11-20 | End: 2023-11-20

## 2023-11-20 RX ORDER — BENZONATATE 100 MG/1
100 CAPSULE ORAL 3 TIMES DAILY PRN
Status: DISCONTINUED | OUTPATIENT
Start: 2023-11-20 | End: 2023-11-24

## 2023-11-20 RX ORDER — HEPARIN SODIUM 5000 [USP'U]/ML
5000 INJECTION, SOLUTION INTRAVENOUS; SUBCUTANEOUS EVERY 8 HOURS SCHEDULED
Status: DISCONTINUED | OUTPATIENT
Start: 2023-11-20 | End: 2023-11-25 | Stop reason: HOSPADM

## 2023-11-20 RX ORDER — INSULIN LISPRO 100 [IU]/ML
1-6 INJECTION, SOLUTION INTRAVENOUS; SUBCUTANEOUS
Status: DISCONTINUED | OUTPATIENT
Start: 2023-11-20 | End: 2023-11-25 | Stop reason: HOSPADM

## 2023-11-20 RX ORDER — METHOCARBAMOL 500 MG/1
500 TABLET, FILM COATED ORAL 2 TIMES DAILY
Status: DISCONTINUED | OUTPATIENT
Start: 2023-11-20 | End: 2023-11-25 | Stop reason: HOSPADM

## 2023-11-20 RX ORDER — ALBUTEROL SULFATE 90 UG/1
2 AEROSOL, METERED RESPIRATORY (INHALATION) EVERY 6 HOURS PRN
Status: DISCONTINUED | OUTPATIENT
Start: 2023-11-20 | End: 2023-11-25 | Stop reason: HOSPADM

## 2023-11-20 RX ORDER — FUROSEMIDE 10 MG/ML
75 INJECTION INTRAMUSCULAR; INTRAVENOUS
Status: DISCONTINUED | OUTPATIENT
Start: 2023-11-21 | End: 2023-11-22

## 2023-11-20 RX ADMIN — ALBUTEROL SULFATE 5 MG: 2.5 SOLUTION RESPIRATORY (INHALATION) at 19:20

## 2023-11-20 RX ADMIN — METHOCARBAMOL TABLETS 500 MG: 500 TABLET, COATED ORAL at 22:27

## 2023-11-20 RX ADMIN — IPRATROPIUM BROMIDE 0.5 MG: 0.5 SOLUTION RESPIRATORY (INHALATION) at 17:33

## 2023-11-20 RX ADMIN — FUROSEMIDE 150 MG: 10 INJECTION, SOLUTION INTRAMUSCULAR; INTRAVENOUS at 19:54

## 2023-11-20 RX ADMIN — IPRATROPIUM BROMIDE 0.5 MG: 0.5 SOLUTION RESPIRATORY (INHALATION) at 19:20

## 2023-11-20 RX ADMIN — ALBUTEROL SULFATE 5 MG: 2.5 SOLUTION RESPIRATORY (INHALATION) at 17:33

## 2023-11-20 NOTE — ED PROVIDER NOTES
History  Chief Complaint   Patient presents with    Shortness of Breath     Pt c/o increased sob and BL leg swelling x1 week. Changed medication end of sept to 1 lasix instead of 2. Denies chest pain     Patient is a 65-year-old male who presents to the emergency department from home. He relates that he has been having "heavy breathing."  He notes that he has had swelling in his hands as well as in his feet. All of the symptoms have been going on for a couple of weeks. Over this time he appreciates orthopnea as well. For the last week and a half he has been doubling his furosemide to 60 mg twice daily. He initially appreciated increased urine output although then urine output seem to decline to typical amounts. He initially appreciated some leg swelling improvement at night although now appreciates continuous ongoing increased edema. He has had a cough throughout this time-occasionally productive of tan sputum and commonly clear fluid. He has not appreciated any fevers. He occasionally has a transient sharp pain in the chest during cough but suspects that this is muscular. He did run out of his flovent inhaler approximately 2 weeks ago but denies any other medication changes aside from the increase in furosemide. He does admit that due to his difficult work schedule and coordination with his wife schedule he is not able to adhere well to a low-sodium diet and often consumes premade sandwiches and soups. He denies awareness of any specific change in eating pattern 2 to 3 weeks ago when symptoms started. Chart briefly reviewed. Patient had an outpatient visit to establish care with cardiologist on October 23. There was review of his evaluation and treatment from a prior hospitalization. As there was concern for exacerbation of symptoms patient was advised at that time to increase baseline furosemide dose from 40 mg to 60 mg as well as to doubled his doses for a short amount of time.   He was additionally counseled on increasing furosemide use upon a future occasion as needed for increased volume overload. Wt Readings from Last 3 Encounters:  23 : 118 kg (259 lb 11.2 oz)  10/31/23 : 113 kg (249 lb 9 oz)  10/23/23 : 118 kg (260 lb 14.4 oz)           Prior to Admission Medications   Prescriptions Last Dose Informant Patient Reported? Taking? Blood Pressure KIT  Self No No   Sig: Twice a day periodically   LORazepam (ATIVAN) 1 mg tablet 2023 Self Yes Yes   Sig: Take 1 tablet by mouth daily as needed   Lancets (ONETOUCH ULTRASOFT) lancets  Self Yes No   Sig: by Does not apply route   albuterol (PROVENTIL HFA,VENTOLIN HFA) 90 mcg/act inhaler Past Week  No Yes   Sig: Inhale 2 puffs every 6 (six) hours as needed (as needed)   albuterol (ProAir HFA) 90 mcg/act inhaler Past Week Self No Yes   Sig: Inhale 2 puffs every 6 (six) hours as needed for wheezing   amLODIPine (NORVASC) 10 mg tablet 2023 Self No Yes   Sig: Take 1 tablet (10 mg total) by mouth daily   benzonatate (TESSALON PERLES) 100 mg capsule Not Taking Self No No   Sig: Take 1 capsule (100 mg total) by mouth 3 (three) times a day as needed for cough   Patient not taking: Reported on 10/23/2023   celecoxib (CeleBREX) 200 mg capsule 2023  No Yes   Sig: Take 1 capsule (200 mg total) by mouth 2 (two) times a day   fluticasone (Flovent HFA) 220 mcg/act inhaler Unknown Self No No   Sig: Inhale 1 puff 2 (two) times a day Rinse mouth after use.    furosemide (LASIX) 40 mg tablet 2023  No Yes   Sig: Take 1.5 tablets (60 mg total) by mouth daily   glimepiride (AMARYL) 1 mg tablet 2023 Self No Yes   Sig: Take 1 tablet (1 mg total) by mouth 2 (two) times a day   glucose blood test strip  Self No No   Si each by Other route daily   methocarbamol (ROBAXIN) 500 mg tablet 2023 Self No Yes   Sig: Take 1 tablet (500 mg total) by mouth 2 (two) times a day   olmesartan (BENICAR) 40 mg tablet 2023 Self No Yes   Sig: Take 1 tablet (40 mg total) by mouth daily   olmesartan (BENICAR) 40 mg tablet  Self No No   Sig: Take 1 tablet (40 mg total) by mouth daily   potassium chloride (K-DUR,KLOR-CON) 20 mEq tablet  Self No No   Sig: Take 1 tablet (20 mEq total) by mouth daily   predniSONE 20 mg tablet 11/20/2023 Self No Yes   Sig: 3 tabs X 2 days, 2.5 tabs X 2 days, 2 tabs x 2 days, 1.5 tabs X 2 days, 1 tab X 2 days, 0.5 tabs X 2 days   sitaGLIPtin (Januvia) 100 mg tablet 11/20/2023  No Yes   Sig: Take 1 tablet (100 mg total) by mouth daily      Facility-Administered Medications: None       Past Medical History:   Diagnosis Date    Acute kidney injury ([address]) 9/5/2020    Diabetes mellitus ([address])     Hypertension     Inguinal hernia     3/25/15    Onychomycosis     5/24/17    Type 2 diabetes mellitus ([address] [facility]) 05/01/2012       Past Surgical History:   Procedure Laterality Date    ARTHROSCOPY KNEE      with Medial and Lateral Meniscus Repair    HERNIA REPAIR      umbilical and inguinal hernia repairs (left)       Family History   Problem Relation Age of Onset    Hypertension Mother         essential    Chronic bronchitis Father     Prostate cancer Father     Breast cancer Sister      I have reviewed and agree with the history as documented. E-Cigarette/Vaping    E-Cigarette Use Never User      E-Cigarette/Vaping Substances    Nicotine No     THC No     CBD No     Flavoring No     Other No     Unknown No      Social History     Tobacco Use    Smoking status: Former     Types: Cigars     Start date: 6/22/2020    Smokeless tobacco: Never    Tobacco comments:     current every day smoker, per [employer]   Vaping Use    Vaping Use: Never used   Substance Use Topics    Alcohol use:  Yes     Alcohol/week: 3.0 standard drinks of alcohol     Types: 2 Cans of beer, 1 Standard drinks or equivalent per week     Comment: weekend: 1 glass of christian or 2 beers    Drug use: No       Review of Systems   All other systems reviewed and are negative. Physical Exam  Physical Exam  Vitals and nursing note reviewed. Constitutional:       General: He is in acute distress. Appearance: He is well-developed. He is obese. He is ill-appearing. HENT:      Head: Normocephalic. Cardiovascular:      Rate and Rhythm: Normal rate and regular rhythm. Pulmonary:      Effort: Tachypnea and accessory muscle usage present. Breath sounds: Decreased breath sounds (Small amounts of air movement are appreciated in the upper and midlung fields. Absent air movement appreciated at the bases. ) present. No wheezing, rhonchi or rales. Comments: Patient satting 99 to 100% on 3 L of supplemental O2. O2 sats on room air were in the mid to upper 80s. He does have mild accessory muscle use and borderline tachypnea. Chest:      Chest wall: No tenderness. Abdominal:      Palpations: Abdomen is soft. Tenderness: There is no abdominal tenderness. Comments: Mild bilateral CVA tenderness. Musculoskeletal:      Right lower leg: Edema present. Left lower leg: Edema present. Comments: Mild pitting edema of the hands appreciated. Feet are extensively edematous as are her distal calves-3+. Skin is taut and dry with superficial peeling. No drainage. +1 bilateral DP pulses   Skin:     General: Skin is warm and dry. Neurological:      General: No focal deficit present. Mental Status: He is alert and oriented to person, place, and time.    Psychiatric:         Mood and Affect: Mood normal.         Behavior: Behavior normal.         Vital Signs  ED Triage Vitals   Temperature Pulse Respirations Blood Pressure SpO2   11/20/23 1701 11/20/23 1659 11/20/23 1659 11/20/23 1659 11/20/23 1659   98.4 °F (36.9 °C) 93 (!) 24 134/77 (!) 86 %      Temp Source Heart Rate Source Patient Position - Orthostatic VS BP Location FiO2 (%)   11/20/23 1701 11/20/23 1659 11/20/23 1659 11/20/23 1659 --   Oral Monitor Sitting Right arm       Pain Score 11/20/23 2200       No Pain           Vitals:    11/21/23 0711 11/21/23 1438 11/21/23 1550 11/21/23 2119   BP: 114/67 114/67 107/63 116/71   Pulse: 96 98 105 (!) 108   Patient Position - Orthostatic VS:             Visual Acuity      ED Medications  Medications   albuterol (PROVENTIL HFA,VENTOLIN HFA) inhaler 2 puff (has no administration in time range)   benzonatate (TESSALON PERLES) capsule 100 mg (100 mg Oral Given 11/21/23 2233)   fluticasone (ARNUITY ELLIPTA) 200 MCG/ACT inhaler 1 puff (1 puff Inhalation Given 11/21/23 0805)   methocarbamol (ROBAXIN) tablet 500 mg (500 mg Oral Given 11/21/23 1716)   furosemide (LASIX) injection 75 mg (40 mg Intravenous Not Given 11/21/23 1716)   heparin (porcine) subcutaneous injection 5,000 Units (5,000 Units Subcutaneous Given 11/21/23 2232)   insulin lispro (HumaLOG) 100 units/mL subcutaneous injection 1-6 Units (2 Units Subcutaneous Given 11/21/23 2235)   ipratropium (ATROVENT) 0.02 % inhalation solution 0.5 mg (0.5 mg Nebulization Given 11/21/23 2026)   levalbuterol (XOPENEX) inhalation solution 1.25 mg (1.25 mg Nebulization Given 11/21/23 2026)   albuterol inhalation solution 5 mg (5 mg Nebulization Given 11/20/23 1733)   ipratropium (ATROVENT) 0.02 % inhalation solution 0.5 mg (0.5 mg Nebulization Given 11/20/23 1733)   albuterol inhalation solution 5 mg (5 mg Nebulization Given 11/20/23 1920)   ipratropium (ATROVENT) 0.02 % inhalation solution 0.5 mg (0.5 mg Nebulization Given 11/20/23 1920)   furosemide (LASIX) 150 mg in dextrose 5 % 50 mL IVPB (0 mg Intravenous Stopped 11/20/23 2137)   magnesium sulfate 2 g/50 mL IVPB (premix) 2 g (2 g Intravenous New Bag 11/21/23 1107)   potassium chloride (K-DUR,KLOR-CON) CR tablet 40 mEq (40 mEq Oral Given 11/21/23 3297)       Diagnostic Studies  Results Reviewed       Procedure Component Value Units Date/Time    CBC and differential [[id number]]  (Abnormal) Collected: 11/21/23 4635    Lab Status: Final result Specimen: Blood from Hand, Left Updated: 11/21/23 0602     WBC 12.06 Thousand/uL      RBC 3.49 Million/uL      Hemoglobin 10.9 g/dL      Hematocrit 33.2 %      MCV 95 fL      MCH 31.2 pg      MCHC 32.8 g/dL      RDW 14.8 %      MPV 10.4 fL      Platelets 794 Thousands/uL     Narrative:      See Manual Diff Done Within 3 Days  This is an appended report. These results have been appended to a previously verified report.     Basic metabolic panel [[id number]]  (Abnormal) Collected: 11/21/23 0452    Lab Status: Final result Specimen: Blood from Hand, Left Updated: 11/21/23 0536     Sodium 139 mmol/L      Potassium 3.7 mmol/L      Chloride 98 mmol/L      CO2 32 mmol/L      ANION GAP 9 mmol/L      BUN 29 mg/dL      Creatinine 1.56 mg/dL      Glucose 102 mg/dL      Calcium 9.0 mg/dL      eGFR 47 ml/min/1.73sq m     Narrative:      [facility] (CKD):     Stage 1 with normal or high GFR (GFR > 90 mL/min/1.73 square meters)    Stage 2 Mild CKD (GFR = 60-89 mL/min/1.73 square meters)    Stage 3A Moderate CKD (GFR = 45-59 mL/min/1.73 square meters)    Stage 3B Moderate CKD (GFR = 30-44 mL/min/1.73 square meters)    Stage 4 Severe CKD (GFR = 15-29 mL/min/1.73 square meters)    Stage 5 End Stage CKD (GFR <15 mL/min/1.73 square meters)  Note: GFR calculation is accurate only with a steady state creatinine    Lipid panel [[id number]]  (Abnormal) Collected: 11/21/23 0452    Lab Status: Final result Specimen: Blood from Hand, Left Updated: 11/21/23 0536     Cholesterol 120 mg/dL      Triglycerides 69 mg/dL      HDL, Direct 36 mg/dL      LDL Calculated 70 mg/dL      Non-HDL-Chol (CHOL-HDL) 84 mg/dl     Magnesium [[id number]]  (Abnormal) Collected: 11/21/23 0452    Lab Status: Final result Specimen: Blood from Hand, Left Updated: 11/21/23 0536     Magnesium 1.4 mg/dL     HS Troponin I 4hr [[id number]]  (Normal) Collected: 11/20/23 2140    Lab Status: Final result Specimen: Blood from Arm, Right Updated: 11/20/23 2210     hs TnI 4hr 5 ng/L      Delta 4hr hsTnI -1 ng/L     HS Troponin I 2hr [[id number]]  (Normal) Collected: 11/20/23 1954    Lab Status: Final result Specimen: Blood from Arm, Right Updated: 11/20/23 2025     hs TnI 2hr 5 ng/L      Delta 2hr hsTnI -1 ng/L     B-Type Natriuretic Peptide(BNP) [[id number]]  (Normal) Collected: 11/20/23 1705    Lab Status: Final result Specimen: Blood from Arm, Right Updated: 11/20/23 1910     BNP 38 pg/mL     FLU/RSV/COVID - if FLU/RSV clinically relevant [[id number]]  (Normal) Collected: 11/20/23 1734    Lab Status: Final result Specimen: Nares from Nose Updated: 11/20/23 1819     SARS-CoV-2 Negative     INFLUENZA A PCR Negative     INFLUENZA B PCR Negative     RSV PCR Negative    Narrative:      FOR PEDIATRIC PATIENTS - copy/paste COVID Guidelines URL to browser: [email or web address]    SARS-CoV-2 assay is a Nucleic Acid Amplification assay intended for the  qualitative detection of nucleic acid from SARS-CoV-2 in nasopharyngeal  swabs. Results are for the presumptive identification of SARS-CoV-2 RNA. Positive results are indicative of infection with SARS-CoV-2, the virus  causing COVID-19, but do not rule out bacterial infection or co-infection  with other viruses. Laboratories within the [facility] and its  territories are required to report all positive results to the appropriate  public health authorities. Negative results do not preclude SARS-CoV-2  infection and should not be used as the sole basis for treatment or other  patient management decisions. Negative results must be combined with  clinical observations, patient history, and epidemiological information. This test has not been FDA cleared or approved. This test has been authorized by FDA under an Emergency Use Authorization  (EUA).  This test is only authorized for the duration of time the  declaration that circumstances exist justifying the authorization of the  emergency use of an in vitro diagnostic tests for detection of SARS-CoV-2  virus and/or diagnosis of COVID-19 infection under section 564(b)(1) of  the Act, 21 U. S.C. 933NFX-3(T)(2), unless the authorization is terminated  or revoked sooner. The test has been validated but independent review by FDA  and CLIA is pending. Test performed using [employer] GeneXpert: This RT-PCR assay targets N2,  a region unique to SARS-CoV-2. A conserved region in the E-gene was chosen  for pan-Sarbecovirus detection which includes SARS-CoV-2. According to CMS-2020-01-R, this platform meets the definition of high-throughput technology. RBC Morphology Reflex Test [[id number]] Collected: 11/20/23 1705    Lab Status: Final result Specimen: Blood from Arm, Right Updated: 11/20/23 1801    CBC and differential [[id number]]  (Abnormal) Collected: 11/20/23 1705    Lab Status: Final result Specimen: Blood from Arm, Right Updated: 11/20/23 1746     WBC 13.11 Thousand/uL      RBC 3.72 Million/uL      Hemoglobin 11.2 g/dL      Hematocrit 34.3 %      MCV 92 fL      MCH 30.1 pg      MCHC 32.7 g/dL      RDW 14.8 %      MPV 10.1 fL      Platelets 510 Thousands/uL     Narrative: This is an appended report. These results have been appended to a previously verified report.     Manual Differential(PHLEBS Do Not Order) [[id number]]  (Abnormal) Collected: 11/20/23 1705    Lab Status: Final result Specimen: Blood from Arm, Right Updated: 11/20/23 1746     Segmented % 50 %      Lymphocytes % 32 %      Monocytes % 9 %      Eosinophils, % 8 %      Basophils % 1 %      Absolute Neutrophils 6.56 Thousand/uL      Lymphocytes Absolute 4.20 Thousand/uL      Monocytes Absolute 1.18 Thousand/uL      Eosinophils Absolute 1.05 Thousand/uL      Basophils Absolute 0.13 Thousand/uL      Total Counted --     RBC Morphology Normal     Platelet Estimate Adequate    HS Troponin 0hr (reflex protocol) [[id number]]  (Normal) Collected: 11/20/23 1705    Lab Status: Final result Specimen: Blood from Arm, Right Updated: 11/20/23 1735     hs TnI 0hr 6 ng/L     Comprehensive metabolic panel [[id number]]  (Abnormal) Collected: 11/20/23 1705    Lab Status: Final result Specimen: Blood from Arm, Right Updated: 11/20/23 1728     Sodium 138 mmol/L      Potassium 3.6 mmol/L      Chloride 97 mmol/L      CO2 31 mmol/L      ANION GAP 10 mmol/L      BUN 30 mg/dL      Creatinine 1.65 mg/dL      Glucose 115 mg/dL      Calcium 9.3 mg/dL      AST 17 U/L      ALT 8 U/L      Alkaline Phosphatase 92 U/L      Total Protein 7.4 g/dL      Albumin 3.6 g/dL      Total Bilirubin 1.30 mg/dL      eGFR 44 ml/min/1.73sq m     Narrative:      [place] guidelines for Chronic Kidney Disease (CKD):     Stage 1 with normal or high GFR (GFR > 90 mL/min/1.73 square meters)    Stage 2 Mild CKD (GFR = 60-89 mL/min/1.73 square meters)    Stage 3A Moderate CKD (GFR = 45-59 mL/min/1.73 square meters)    Stage 3B Moderate CKD (GFR = 30-44 mL/min/1.73 square meters)    Stage 4 Severe CKD (GFR = 15-29 mL/min/1.73 square meters)    Stage 5 End Stage CKD (GFR <15 mL/min/1.73 square meters)  Note: GFR calculation is accurate only with a steady state creatinine                   XR chest 1 view portable   ED Interpretation by Lyn Brugos MD (11/20 1937)   Unremarkable cardiac silhouette. Mild vascular congestion and crowding in the right perihilar region. No appreciated infiltrate or effusion. Final Result by Rayna Mejia MD (10/08 8314)      Suggestion of mild pulmonary vascular congestion. No focal consolidation, pleural effusion, or pneumothorax. Resident: Isabella Bustamante, the attending radiologist, have reviewed the images and agree with the final report above.       Workstation performed: [id number]                    Procedures  Procedures         ED Course  ED Course as of 11/21/23 2323 Mon Nov 20, 2023   5059 Patient with history of CHF and prior exacerbations-at times secondary to medication noncompliance. Clinically he does appear volume overloaded and I am certain that this is contributing to his dyspnea. Question presence of possible pleural effusions given absent bibasilar breath sounds. With poor air movement and absence of recent inhaler/bronchodilator also with concern for bronchospasm contributing. He does endorse cough which is at times productive of tan sputum. Must consider bronchitis and pneumonia. Uncertain what led to decreased response to furosemide. Checking for DELFINO among other abnormality. Patient does have improved comfort with nasal cannula in place. Anticipate admission. 1730 Patient does have acute renal insufficiency. GFR has decreased from the 80s to the 40s since recent discharge. 1908 Patient reports feeling improved. While talking with him over a few minutes I dialed oxygen back from 3 L down to 1 L and he remained at 99 to 100%. I subsequently discontinued this. At this point he does have air movement diffusely although it is still greatly diminished in the lower and midlung fields. Nebulizer treatment being repeated. Clarified with patient which inhaler he had run out of. It was the Flovent. He reports not having had an albuterol inhaler. Without visible pleural effusions or significant vascular congestion on x-ray I am beginning to think that dyspnea/hypoxia may be more secondary to degree of bronchospasm as opposed to volume overload or at least with this contributing a fair amount. Second nebulizer treatment has been ordered. 1913 BNP is not elevated. Patient does describe decreased urine output and increased extremity edema. Furosemide dose ordered per heart failure diuretic algorithm. Weight pending; standing scale is being located. SBIRT 22yo+      Flowsheet Row Most Recent Value   Initial Alcohol Screen: US AUDIT-C     1.  How often do you have a drink containing alcohol? 3 Filed at: 11/20/2023 2142   2. How many drinks containing alcohol do you have on a typical day you are drinking? 2 Filed at: 11/20/2023 2142   3a. Male UNDER 65: How often do you have five or more drinks on one occasion? 1 Filed at: 11/20/2023 2142   Audit-C Score 6 Filed at: 11/20/2023 2142   JUMA: How many times in the past year have you. .. Used an illegal drug or used a prescription medication for non-medical reasons? Never Filed at: 11/20/2023 2142                      Medical Decision Making  Amount and/or Complexity of Data Reviewed  Labs: ordered. Radiology: ordered and independent interpretation performed. Risk  Prescription drug management. Decision regarding hospitalization. Disposition  Final diagnoses:   Acute respiratory insufficiency   CHF exacerbation (HCC)   Reactive airway disease   Volume overload   Difficulty obtaining medication     Time reflects when diagnosis was documented in both MDM as applicable and the Disposition within this note       Time User Action Codes Description Comment    11/20/2023  8:42 PM Génesis Chalk A Add [R06.89] Acute respiratory insufficiency     11/20/2023  8:42 PM Génesis Chalk A Add [I50.9] CHF exacerbation ([address])     11/20/2023  8:42 PM Génesis Chalk A Add [Z67.240] Reactive airway disease     11/20/2023  8:42 PM Génesis Chalk A Add [E87.70] Volume overload     11/20/2023  8:44 PM Génesis Chalk A Add [Z91.148] Difficulty obtaining medication     11/21/2023  3:09 PM [facility] Add [R13.10] Dysphagia, unspecified type           ED Disposition       ED Disposition   Admit    Condition   Stable    Date/Time   Mon Nov 20, 2023 2042    Comment   Case was discussed with Dr. Zeinab Goldberg and the patient's admission status was agreed to be Admission Status: inpatient status to the service of Dr. Candace Mendoza .                Follow-up Information    None Current Discharge Medication List        CONTINUE these medications which have NOT CHANGED    Details   !! albuterol (ProAir HFA) 90 mcg/act inhaler Inhale 2 puffs every 6 (six) hours as needed for wheezing  Qty: 8.5 g, Refills: 0    Comments: Substitution to a formulary equivalent within the same pharmaceutical class is authorized. Associated Diagnoses: Reactive airway disease without complication      !! albuterol (PROVENTIL HFA,VENTOLIN HFA) 90 mcg/act inhaler Inhale 2 puffs every 6 (six) hours as needed (as needed)  Qty: 54 g, Refills: 1    Comments: Substitution to a formulary equivalent within the same pharmaceutical class is authorized. Associated Diagnoses: Mild intermittent reactive airway disease without complication      amLODIPine (NORVASC) 10 mg tablet Take 1 tablet (10 mg total) by mouth daily  Qty: 90 tablet, Refills: 1    Associated Diagnoses: Hypertensive urgency      celecoxib (CeleBREX) 200 mg capsule Take 1 capsule (200 mg total) by mouth 2 (two) times a day  Qty: 30 capsule, Refills: 5    Associated Diagnoses: Arthritis of right wrist      furosemide (LASIX) 40 mg tablet Take 1.5 tablets (60 mg total) by mouth daily  Qty: 135 tablet, Refills: 5    Associated Diagnoses: CHF (congestive heart failure) (HCC)      glimepiride (AMARYL) 1 mg tablet Take 1 tablet (1 mg total) by mouth 2 (two) times a day  Qty: 180 tablet, Refills: 1    Associated Diagnoses: Type 2 diabetes mellitus without complication, without long-term current use of insulin (HCC)      LORazepam (ATIVAN) 1 mg tablet Take 1 tablet by mouth daily as needed      methocarbamol (ROBAXIN) 500 mg tablet Take 1 tablet (500 mg total) by mouth 2 (two) times a day  Qty: 20 tablet, Refills: 0    Associated Diagnoses: Upper back pain      olmesartan (BENICAR) 40 mg tablet Take 1 tablet (40 mg total) by mouth daily  Qty: 90 tablet, Refills: 1    Associated Diagnoses: CHF (congestive heart failure) ([address]);  Hypertensive urgency predniSONE 20 mg tablet 3 tabs X 2 days, 2.5 tabs X 2 days, 2 tabs x 2 days, 1.5 tabs X 2 days, 1 tab X 2 days, 0.5 tabs X 2 days  Qty: 21 tablet, Refills: 0    Associated Diagnoses: Upper respiratory tract infection, unspecified type      sitaGLIPtin (Januvia) 100 mg tablet Take 1 tablet (100 mg total) by mouth daily  Qty: 90 tablet, Refills: 1    Associated Diagnoses: Uncontrolled type 2 diabetes mellitus with hyperglycemia (HCC)      benzonatate (TESSALON PERLES) 100 mg capsule Take 1 capsule (100 mg total) by mouth 3 (three) times a day as needed for cough  Qty: 20 capsule, Refills: 0    Associated Diagnoses: Acute cough      Blood Pressure KIT Twice a day periodically  Qty: 1 each, Refills: 0    Associated Diagnoses: Benign essential hypertension      fluticasone (Flovent HFA) 220 mcg/act inhaler Inhale 1 puff 2 (two) times a day Rinse mouth after use. Qty: 12 g, Refills: 5    Associated Diagnoses: Mild intermittent reactive airway disease without complication      glucose blood test strip 1 each by Other route daily  Qty: 180 each, Refills: 5    Associated Diagnoses: Uncontrolled type 2 diabetes mellitus with hyperglycemia (HCC)      Lancets (ONETOUCH ULTRASOFT) lancets by Does not apply route      potassium chloride (K-DUR,KLOR-CON) 20 mEq tablet Take 1 tablet (20 mEq total) by mouth daily  Qty: 30 tablet, Refills: 0    Associated Diagnoses: CHF exacerbation ([address])       ! ! - Potential duplicate medications found. Please discuss with provider. No discharge procedures on file.     PDMP Review         Value Time User    PDMP Reviewed  Yes 9/4/2021  8:39 AM Negar Prince DO            ED Provider  Electronically Signed by             Raul Dan MD  11/21/23 8923

## 2023-11-21 ENCOUNTER — APPOINTMENT (INPATIENT)
Dept: NON INVASIVE DIAGNOSTICS | Facility: HOSPITAL | Age: 58
DRG: 291 | End: 2023-11-21
Payer: COMMERCIAL

## 2023-11-21 PROBLEM — R13.10 DIFFICULTY SWALLOWING: Status: ACTIVE | Noted: 2023-11-21

## 2023-11-21 PROBLEM — J96.21 ACUTE ON CHRONIC HYPOXIC RESPIRATORY FAILURE (HCC): Status: ACTIVE | Noted: 2023-11-21

## 2023-11-21 PROBLEM — R79.89 ELEVATED SERUM CREATININE: Status: ACTIVE | Noted: 2023-11-21

## 2023-11-21 LAB
ANION GAP SERPL CALCULATED.3IONS-SCNC: 7 MMOL/L
ANION GAP SERPL CALCULATED.3IONS-SCNC: 9 MMOL/L
AORTIC ROOT: 3.3 CM
APICAL FOUR CHAMBER EJECTION FRACTION: 64 %
ASCENDING AORTA: 3.4 CM
ATRIAL RATE: 90 BPM
BUN SERPL-MCNC: 29 MG/DL (ref 5–25)
BUN SERPL-MCNC: 31 MG/DL (ref 5–25)
CALCIUM SERPL-MCNC: 9 MG/DL (ref 8.4–10.2)
CALCIUM SERPL-MCNC: 9.1 MG/DL (ref 8.4–10.2)
CHLORIDE SERPL-SCNC: 98 MMOL/L (ref 96–108)
CHLORIDE SERPL-SCNC: 98 MMOL/L (ref 96–108)
CHOLEST SERPL-MCNC: 120 MG/DL
CO2 SERPL-SCNC: 32 MMOL/L (ref 21–32)
CO2 SERPL-SCNC: 32 MMOL/L (ref 21–32)
CREAT SERPL-MCNC: 1.51 MG/DL (ref 0.6–1.3)
CREAT SERPL-MCNC: 1.56 MG/DL (ref 0.6–1.3)
E WAVE DECELERATION TIME: 100 MS
E/A RATIO: 0.93
ERYTHROCYTE [DISTWIDTH] IN BLOOD BY AUTOMATED COUNT: 14.8 % (ref 11.6–15.1)
FRACTIONAL SHORTENING: 28 (ref 28–44)
GFR SERPL CREATININE-BSD FRML MDRD: 47 ML/MIN/1.73SQ M
GFR SERPL CREATININE-BSD FRML MDRD: 49 ML/MIN/1.73SQ M
GLUCOSE SERPL-MCNC: 102 MG/DL (ref 65–140)
GLUCOSE SERPL-MCNC: 117 MG/DL (ref 65–140)
GLUCOSE SERPL-MCNC: 169 MG/DL (ref 65–140)
GLUCOSE SERPL-MCNC: 175 MG/DL (ref 65–140)
GLUCOSE SERPL-MCNC: 182 MG/DL (ref 65–140)
GLUCOSE SERPL-MCNC: 195 MG/DL (ref 65–140)
HCT VFR BLD AUTO: 33.2 % (ref 36.5–49.3)
HDLC SERPL-MCNC: 36 MG/DL
HGB BLD-MCNC: 10.9 G/DL (ref 12–17)
INTERVENTRICULAR SEPTUM IN DIASTOLE (PARASTERNAL SHORT AXIS VIEW): 1.1 CM
INTERVENTRICULAR SEPTUM: 1.1 CM (ref 0.6–1.1)
LAAS-AP2: 22.7 CM2
LAAS-AP4: 18.9 CM2
LDLC SERPL CALC-MCNC: 70 MG/DL (ref 0–100)
LEFT ATRIUM SIZE: 3.3 CM
LEFT ATRIUM VOLUME (MOD BIPLANE): 57 ML
LEFT ATRIUM VOLUME INDEX (MOD BIPLANE): 25 ML/M2
LEFT INTERNAL DIMENSION IN SYSTOLE: 3.1 CM (ref 2.1–4)
LEFT VENTRICULAR INTERNAL DIMENSION IN DIASTOLE: 4.3 CM (ref 3.5–6)
LEFT VENTRICULAR POSTERIOR WALL IN END DIASTOLE: 1.1 CM
LEFT VENTRICULAR STROKE VOLUME: 45 ML
LVSV (TEICH): 45 ML
MAGNESIUM SERPL-MCNC: 1.4 MG/DL (ref 1.9–2.7)
MAGNESIUM SERPL-MCNC: 2 MG/DL (ref 1.9–2.7)
MCH RBC QN AUTO: 31.2 PG (ref 26.8–34.3)
MCHC RBC AUTO-ENTMCNC: 32.8 G/DL (ref 31.4–37.4)
MCV RBC AUTO: 95 FL (ref 82–98)
MV E'TISSUE VEL-SEP: 10 CM/S
MV PEAK A VEL: 0.74 M/S
MV PEAK E VEL: 69 CM/S
MV STENOSIS PRESSURE HALF TIME: 29 MS
MV VALVE AREA P 1/2 METHOD: 7.59
NONHDLC SERPL-MCNC: 84 MG/DL
P AXIS: 54 DEGREES
PLATELET # BLD AUTO: 199 THOUSANDS/UL (ref 149–390)
PMV BLD AUTO: 10.4 FL (ref 8.9–12.7)
POTASSIUM SERPL-SCNC: 3.3 MMOL/L (ref 3.5–5.3)
POTASSIUM SERPL-SCNC: 3.7 MMOL/L (ref 3.5–5.3)
PR INTERVAL: 152 MS
QRS AXIS: 54 DEGREES
QRSD INTERVAL: 84 MS
QT INTERVAL: 368 MS
QTC INTERVAL: 450 MS
RBC # BLD AUTO: 3.49 MILLION/UL (ref 3.88–5.62)
RIGHT ATRIUM AREA SYSTOLE A4C: 10.2 CM2
RIGHT VENTRICLE ID DIMENSION: 3.1 CM
SL CV LEFT ATRIUM LENGTH A2C: 5.7 CM
SL CV LV EF: 60
SL CV PED ECHO LEFT VENTRICLE DIASTOLIC VOLUME (MOD BIPLANE) 2D: 83 ML
SL CV PED ECHO LEFT VENTRICLE SYSTOLIC VOLUME (MOD BIPLANE) 2D: 38 ML
SODIUM SERPL-SCNC: 137 MMOL/L (ref 135–147)
SODIUM SERPL-SCNC: 139 MMOL/L (ref 135–147)
T WAVE AXIS: 46 DEGREES
TRICUSPID ANNULAR PLANE SYSTOLIC EXCURSION: 2 CM
TRIGL SERPL-MCNC: 69 MG/DL
VENTRICULAR RATE: 90 BPM
WBC # BLD AUTO: 12.06 THOUSAND/UL (ref 4.31–10.16)

## 2023-11-21 PROCEDURE — 99222 1ST HOSP IP/OBS MODERATE 55: CPT | Performed by: STUDENT IN AN ORGANIZED HEALTH CARE EDUCATION/TRAINING PROGRAM

## 2023-11-21 PROCEDURE — 85027 COMPLETE CBC AUTOMATED: CPT

## 2023-11-21 PROCEDURE — 83735 ASSAY OF MAGNESIUM: CPT

## 2023-11-21 PROCEDURE — 92610 EVALUATE SWALLOWING FUNCTION: CPT

## 2023-11-21 PROCEDURE — 80048 BASIC METABOLIC PNL TOTAL CA: CPT

## 2023-11-21 PROCEDURE — 94760 N-INVAS EAR/PLS OXIMETRY 1: CPT

## 2023-11-21 PROCEDURE — 93306 TTE W/DOPPLER COMPLETE: CPT

## 2023-11-21 PROCEDURE — 80061 LIPID PANEL: CPT

## 2023-11-21 PROCEDURE — 93010 ELECTROCARDIOGRAM REPORT: CPT | Performed by: INTERNAL MEDICINE

## 2023-11-21 PROCEDURE — 93306 TTE W/DOPPLER COMPLETE: CPT | Performed by: INTERNAL MEDICINE

## 2023-11-21 PROCEDURE — 94640 AIRWAY INHALATION TREATMENT: CPT

## 2023-11-21 PROCEDURE — 82948 REAGENT STRIP/BLOOD GLUCOSE: CPT

## 2023-11-21 RX ORDER — POTASSIUM CHLORIDE 20 MEQ/1
40 TABLET, EXTENDED RELEASE ORAL ONCE
Status: COMPLETED | OUTPATIENT
Start: 2023-11-21 | End: 2023-11-21

## 2023-11-21 RX ORDER — LEVALBUTEROL INHALATION SOLUTION 1.25 MG/3ML
1.25 SOLUTION RESPIRATORY (INHALATION)
Status: DISCONTINUED | OUTPATIENT
Start: 2023-11-21 | End: 2023-11-25 | Stop reason: HOSPADM

## 2023-11-21 RX ORDER — POTASSIUM CHLORIDE 20 MEQ/1
20 TABLET, EXTENDED RELEASE ORAL ONCE
Status: DISCONTINUED | OUTPATIENT
Start: 2023-11-21 | End: 2023-11-21

## 2023-11-21 RX ORDER — MAGNESIUM SULFATE HEPTAHYDRATE 40 MG/ML
2 INJECTION, SOLUTION INTRAVENOUS
Status: COMPLETED | OUTPATIENT
Start: 2023-11-21 | End: 2023-11-21

## 2023-11-21 RX ORDER — LEVALBUTEROL INHALATION SOLUTION 1.25 MG/3ML
1.25 SOLUTION RESPIRATORY (INHALATION)
Status: DISCONTINUED | OUTPATIENT
Start: 2023-11-21 | End: 2023-11-21

## 2023-11-21 RX ADMIN — HEPARIN SODIUM 5000 UNITS: 5000 INJECTION INTRAVENOUS; SUBCUTANEOUS at 22:32

## 2023-11-21 RX ADMIN — IPRATROPIUM BROMIDE 0.5 MG: 0.5 SOLUTION RESPIRATORY (INHALATION) at 08:54

## 2023-11-21 RX ADMIN — BENZONATATE 100 MG: 100 CAPSULE ORAL at 22:33

## 2023-11-21 RX ADMIN — IPRATROPIUM BROMIDE 0.5 MG: 0.5 SOLUTION RESPIRATORY (INHALATION) at 20:26

## 2023-11-21 RX ADMIN — LEVALBUTEROL HYDROCHLORIDE 1.25 MG: 1.25 SOLUTION RESPIRATORY (INHALATION) at 20:26

## 2023-11-21 RX ADMIN — FLUTICASONE FUROATE 1 PUFF: 200 POWDER RESPIRATORY (INHALATION) at 08:05

## 2023-11-21 RX ADMIN — FUROSEMIDE 75 MG: 10 INJECTION, SOLUTION INTRAMUSCULAR; INTRAVENOUS at 08:05

## 2023-11-21 RX ADMIN — INSULIN LISPRO 1 UNITS: 100 INJECTION, SOLUTION INTRAVENOUS; SUBCUTANEOUS at 12:10

## 2023-11-21 RX ADMIN — POTASSIUM CHLORIDE 40 MEQ: 1500 TABLET, EXTENDED RELEASE ORAL at 22:32

## 2023-11-21 RX ADMIN — INSULIN LISPRO 2 UNITS: 100 INJECTION, SOLUTION INTRAVENOUS; SUBCUTANEOUS at 22:35

## 2023-11-21 RX ADMIN — IPRATROPIUM BROMIDE 0.5 MG: 0.5 SOLUTION RESPIRATORY (INHALATION) at 14:46

## 2023-11-21 RX ADMIN — METHOCARBAMOL TABLETS 500 MG: 500 TABLET, COATED ORAL at 17:16

## 2023-11-21 RX ADMIN — LEVALBUTEROL HYDROCHLORIDE 1.25 MG: 1.25 SOLUTION RESPIRATORY (INHALATION) at 14:46

## 2023-11-21 RX ADMIN — INSULIN LISPRO 1 UNITS: 100 INJECTION, SOLUTION INTRAVENOUS; SUBCUTANEOUS at 17:17

## 2023-11-21 RX ADMIN — METHOCARBAMOL TABLETS 500 MG: 500 TABLET, COATED ORAL at 08:05

## 2023-11-21 RX ADMIN — HEPARIN SODIUM 5000 UNITS: 5000 INJECTION INTRAVENOUS; SUBCUTANEOUS at 05:54

## 2023-11-21 RX ADMIN — MAGNESIUM SULFATE HEPTAHYDRATE 2 G: 40 INJECTION, SOLUTION INTRAVENOUS at 11:07

## 2023-11-21 RX ADMIN — MAGNESIUM SULFATE HEPTAHYDRATE 2 G: 40 INJECTION, SOLUTION INTRAVENOUS at 09:07

## 2023-11-21 RX ADMIN — BENZONATATE 100 MG: 100 CAPSULE ORAL at 05:54

## 2023-11-21 RX ADMIN — BENZONATATE 100 MG: 100 CAPSULE ORAL at 12:10

## 2023-11-21 NOTE — ASSESSMENT & PLAN NOTE
Lab Results   Component Value Date    CREATININE 1.56 (H) 11/21/2023    CREATININE 1.65 (H) 11/20/2023    CREATININE 0.94 09/29/2023    CREATININE 1.40 (H) 09/28/2023      Baseline: 0.4-1. 40. Patient has been taking celecoxib for left shoulder pain and right wrist pain prescribed by his pcp. Held home medication losartan. UA: Not ordered. Plan:  Avoid NSAIDs.   Avoid hypotension  Continue to monitor creat with Lasix usage
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
## 2023-11-21 NOTE — ASSESSMENT & PLAN NOTE
Lab Results   Component Value Date    HGBA1C 8.8 (A) 10/02/2023       Recent Labs     11/20/23  2241   POCGLU 125       Blood Sugar Average: Last 72 hrs:  (P) 125    Home medications: Januvia and glimepiride. C/o B/l leg numbness. Possible diabetic neuropathy. Does not take gabapentin or duloxetine. Care as per pcp. Plan: We will start on sliding scale algorithm 3. Adjust insulin regimen based on blood glucose levels.

## 2023-11-21 NOTE — H&P
[facility]  H&P  Name: Ángel Howe 60 y.o. male I MRN: [id number]  Unit/Bed#: S -01 I Date of Admission: 11/20/2023   Date of Service: 11/21/2023 I Hospital Day: 1      Assessment/Plan   * Acute on chronic hypoxic respiratory failure [facility])  Assessment & Plan  Recurrent hospital admissions complaining of productive cough. Last ED admission on 9/26/2023 for coughing fits causing dizziness X1.5 WEEKS. Cough with white productive sputum More in night time, requiring more pillows. Associated with orthopnea, posttussive dizziness, nausea, chest tightness, shortness of breath and congestion. Patient did not have official COPD/asthma/AMANDO diagnosis. The last time he saw his pulmonologist was 8 years ago. Recommended to use BiPAP. But patient did not use BiPAP since then. His family physician prescribed him rescue albuterol inhaler, he uses 3 times a day with significant improvement in shortness of breath. Patient was advised to take prednisone 20 mg with tapering dose. Currently he ran out of the medication and appears that he completed the course. Could possibly explain leukocytosis. 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 Risk factors-patient smokes 2 cigars every day, consumes alcohol occasionally, eats sandwich and soup, insomnia because he cannot lie flat and usually sleeps only 1.5 hours per night. In the ED his saturations are 86, requiring 3 L of oxygen. Patient received albuterol nebulization. Following nebulization patient back to 94% on room air. Plan:  Possible etiology: COPD versus AMANDO versus exacerbation of CHF. resumed his home albuterol and fluticasone inhalers. CHF-see the plan below. AMANDO-patient will need outpatient sleep study and pulmonology consult.       (HFpEF) heart failure with preserved ejection fraction [facility])  Assessment & Plan  Wt Readings from Last 3 Encounters:   11/20/23 118 kg (259 lb 11.2 oz)   10/31/23 113 kg (249 lb 9 oz)   10/23/23 118 kg (260 lb 14.4 oz)     In 3/2023,HF admit, repeat echocardiogram showed a new basal lateral hypokinesis thus an inpatient stress test was pursued. This was normal with no evidence of ischemia."   3/6/23  Post-stress ejection fraction is 49 %. 3/5/23 ECHO: hypokinetic: basal inferior, basal inferolateral and mid inferolateral. inferior/inferolateral wall motion abnormalities. EF 55%  10/23/23 cardiology follow-up-patient was near euvolemic, +peripheral edema presumed in the setting of Norvasc use, Rx noncompliance and running out of medsx1 month before hospitalization - contributing to recent admits in addition to other factors  Holter ordered last visit but not done. Attempted to start jardiance last visit for hfpef and DM - he ttempted it last visit for a bout a week but he feels his throat swelled up and got sore so he stopped it, couldn't swallow - these Sx resolved w jardiance cessation  -Increase maintenance lasix 40 qd to 60 qd. Advised to stop metoprolol for now, which seems to be on board for HTN only  His cardiologist had a Long discussion about evidence of worsening HF, when to self uptitrate home diuretic and call cardiology office. Rec to  consider AMANDO screening with PCP. Last PCP visit was on 10/31/2023 for arthritis of right joint,Impingement of left shoulder and was prescribed celecoxib 200 mg capsule once a day. FMLA was filed by his primary care with anticipation to return to work on December 26, 2023    On my examination, patient appears volume overloaded, bilateral pitting edema up to the knees, cough with mild productive sputum white in color. Left lung base crackles although already you can pulmonary examination was limited due to body habitus. In the ED, troponins normal   patient received, albuterol nebulization that improved his cough.   Received IV Lasix 150 mg one-time dose. Lab Results   Component Value Date    BNP 38 11/20/2023    BNP 23 09/26/2023    LVEF 50 03/05/2023     Plan:  As per CHF Lasix algorithm,   Furosemide dose conversion-patient been taking 60 mg QD-patient will need to receive IV Lasix 75 twice daily. Ordered IV Lasix 75 twice daily starting from 11/21/2023. Standing weights. Fluid restriction 2000 mL. Ordered echo. Cardiology consult. Monitor creatinine while on Lasix. Recent Labs     11/20/23  1705   CREATININE 1.65*   EGFR 44     Estimated Creatinine Clearance: 61.1 mL/min (A) (by C-G formula based on SCr of 1.65 mg/dL (H)). Elevated serum creatinine  Assessment & Plan  Lab Results   Component Value Date    CREATININE 1.65 (H) 11/20/2023    CREATININE 0.94 09/29/2023    CREATININE 1.40 (H) 09/28/2023    CREATININE 2.61 (H) 09/27/2023      Baseline: 0.4-1. 40. Patient has been taking celecoxib for left shoulder pain and right wrist pain prescribed by his pcp. Held home medication losartan. UA: Not ordered. Plan:  Possible kidney disease due to underlying diabetes, NSAID use. Albumin by creatinine ratio. Avoid NSAIDs. Avoid hypotension. Diabetes mellitus [facility])  Assessment & Plan  Lab Results   Component Value Date    HGBA1C 8.8 (A) 10/02/2023       Recent Labs     11/20/23  2241   POCGLU 125       Blood Sugar Average: Last 72 hrs:  (P) 125    Home medications: Januvia and glimepiride. C/o B/l leg numbness. Possible diabetic neuropathy. Does not take gabapentin or duloxetine. Care as per pcp. Plan: We will start on sliding scale algorithm 3. Adjust insulin regimen based on blood glucose levels. VTE Pharmacologic Prophylaxis: VTE Score: 4 Moderate Risk (Score 3-4) - Pharmacological DVT Prophylaxis Ordered: heparin. Code Status: Level 1 - Full Code   Discussion with family: Attempted to update  (wife) via phone. Unable to contact.     Anticipated Length of Stay: Patient will be admitted on an inpatient basis with an anticipated length of stay of greater than 2 midnights secondary to CHF exacerbation. Chief Complaint:     History of Present Illness:  Cara Rodas is a 60 y.o. male with a PMH of CHF, diabetes, mild reactive airway disease who presents with cough,worsening dyspnea, orthopnea over the last 2 weeks. Patient has been following his primary care last was visit was 1 year ago. Patient was advised to take furosemide 1 and half tablet twice a day. Over the last week and half patient has doubled furosemide to 60 mg twice a day. Despite that he has persistent increasing swelling of his legs and increasing weight gain up to 10 pounds. He was never diagnosed with COPD before. His PCP started him on albuterol inhaler. Patient has been taking 3 times daily, prior to work, at lunch, and after work and noted significant improvement in his breathing. He ran out of his Flovent inhaler approximately 2 weeks ago, and was concerned about swelling of his legs and that is why he came for evaluation. In the ED on room air his saturation is 80s requiring supplemental oxygen of 3 L. Received nebulization treatment, following nebulization treatment he no longer required supplemental oxygen at rest.  He was mildly tachypneic poor air movement. ED ordered second nebulization and furosemide. his troponin levels are normal, BNP normal, magnesium normal, UA normal.  Chest x-ray wet read increased bronchopulmonary markings. Mild vascular congestion. No consolidation no pneumothorax, no pleural effusion. Possible etiology   EKG 90 beats normal sinus rhythm  3/6/23  Post-stress ejection fraction is 49 %. 3/5/23 ECHO: hypokinetic: basal inferior, basal inferolateral and mid inferolateral. inferior/inferolateral wall motion abnormalities. EF 55%    On my examination: Patient on room air, saturating at 94%. Decreased breath sounds on left lower lung.   Cardiopulmonary examination is limited due to increasing body habitus. Distended abdomen, bilateral pitting edema up to the knees. Patient is admitted in the inpatient for CHF exacerbation. Review of Systems:  Review of Systems   Constitutional:  Positive for fatigue and unexpected weight change. Negative for fever. HENT: Negative. Eyes: Negative. Respiratory:  Positive for cough, chest tightness and shortness of breath. Cardiovascular:  Positive for chest pain and leg swelling. Negative for palpitations. Gastrointestinal:  Positive for abdominal distention. Negative for abdominal pain. Endocrine: Negative. Genitourinary:  Positive for difficulty urinating. Negative for flank pain. Musculoskeletal:  Positive for back pain and myalgias. Skin:         Dry skin B/l ANKLES   Neurological:  Positive for dizziness, weakness, light-headedness and numbness. Psychiatric/Behavioral: Negative. All other systems reviewed and are negative. Past Medical and Surgical History:   Past Medical History:   Diagnosis Date    Acute kidney injury ([address] [facility]) 9/5/2020    Diabetes mellitus ([address] [facility])     Hypertension     Inguinal hernia     3/25/15    Onychomycosis     5/24/17    Type 2 diabetes mellitus ([address] [facility]) 05/01/2012       Past Surgical History:   Procedure Laterality Date    ARTHROSCOPY KNEE      with Medial and Lateral Meniscus Repair    HERNIA REPAIR      umbilical and inguinal hernia repairs (left)       Meds/Allergies:  Prior to Admission medications    Medication Sig Start Date End Date Taking?  Authorizing Provider   albuterol (ProAir HFA) 90 mcg/act inhaler Inhale 2 puffs every 6 (six) hours as needed for wheezing 9/29/23   Peter Mansfield MD   albuterol (PROVENTIL HFA,VENTOLIN HFA) 90 mcg/act inhaler Inhale 2 puffs every 6 (six) hours as needed (as needed) 61/44/85   Veronica Ignacio MD   amLODIPine (NORVASC) 10 mg tablet Take 1 tablet (10 mg total) by mouth daily 0/47/37   Veronica Ignacio MD   benzonatate (TESSALON PERLES) 100 mg capsule Take 1 capsule (100 mg total) by mouth 3 (three) times a day as needed for cough  Patient not taking: Reported on 10/23/2023 9/29/23   Tim Larose MD   Blood Pressure KIT Twice a day periodically 7/20/20   SHAY Puente   celecoxib (CeleBREX) 200 mg capsule Take 1 capsule (200 mg total) by mouth 2 (two) times a day 81/83/90   Kali Rosales MD   fluticasone (Flovent HFA) 220 mcg/act inhaler Inhale 1 puff 2 (two) times a day Rinse mouth after use. 7/98/51   Kali Rosales MD   furosemide (LASIX) 40 mg tablet Take 1.5 tablets (60 mg total) by mouth daily 10/23/23 4/15/25  Ree Duncan MD   glimepiride (AMARYL) 1 mg tablet Take 1 tablet (1 mg total) by mouth 2 (two) times a day 10/2/23 3/27/63  Kali Rosales MD   glucose blood test strip 1 each by Other route daily 51/8/99   Kali Rosales MD   Lancets (ONETOUCH ULTRASOFT) lancets by Does not apply route 12/28/17   Historical Provider, MD   LORazepam (ATIVAN) 1 mg tablet Take 1 tablet by mouth daily as needed 11/8/22   Historical Provider, MD   methocarbamol (ROBAXIN) 500 mg tablet Take 1 tablet (500 mg total) by mouth 2 (two) times a day 7/9/23   Madonna Mg,    olmesartan (BENICAR) 40 mg tablet Take 1 tablet (40 mg total) by mouth daily 7/04/15   Kali Rosales MD   olmesartan (BENICAR) 40 mg tablet Take 1 tablet (40 mg total) by mouth daily 9/29/23 10/29/23  Tim Larose MD   potassium chloride (K-DUR,KLOR-CON) 20 mEq tablet Take 1 tablet (20 mEq total) by mouth daily 9/29/23 10/29/23  Tim Larose MD   predniSONE 20 mg tablet 3 tabs X 2 days, 2.5 tabs X 2 days, 2 tabs x 2 days, 1.5 tabs X 2 days, 1 tab X 2 days, 0.5 tabs X 2 days 30/1/35   Kali Rosales MD   sitaGLIPtin (Januvia) 100 mg tablet Take 1 tablet (100 mg total) by mouth daily 91/42/32   Kali Rosales MD     I have reviewed home medications with patient personally.     Allergies: No Known Allergies    Social History:  Marital Status: /Civil Union   Occupation: Weaved   Patient Pre-hospital Living Situation: Home  Patient Pre-hospital Level of Mobility: walks  Patient Pre-hospital Diet Restrictions: none  Substance Use History:   Social History     Substance and Sexual Activity   Alcohol Use Yes    Alcohol/week: 3.0 standard drinks of alcohol    Types: 2 Cans of beer, 1 Standard drinks or equivalent per week    Comment: weekend: 1 glass of christian or 2 beers     Social History     Tobacco Use   Smoking Status Former    Types: Cigars    Start date: 6/22/2020   Smokeless Tobacco Never   Tobacco Comments    current every day smoker, per [employer]     Social History     Substance and Sexual Activity   Drug Use No       Family History:  Family History   Problem Relation Age of Onset    Hypertension Mother         essential    Chronic bronchitis Father     Prostate cancer Father     Breast cancer Sister        Physical Exam:     Vitals:   Blood Pressure: 118/64 (11/20/23 2213)  Pulse: 99 (11/20/23 2213)  Temperature: 98.1 °F (36.7 °C) (11/20/23 2213)  Temp Source: Oral (11/20/23 1701)  Respirations: 18 (11/20/23 2213)  Weight - Scale: 118 kg (259 lb 11.2 oz) (11/20/23 1951)  SpO2: 97 % (11/20/23 2213)    Physical Exam  Vitals reviewed. Constitutional:       Appearance: He is obese. HENT:      Head: Normocephalic. Nose: Nose normal.   Eyes:      Extraocular Movements: Extraocular movements intact. Conjunctiva/sclera: Conjunctivae normal.   Cardiovascular:      Rate and Rhythm: Normal rate. Heart sounds: Normal heart sounds. Pulmonary:      Effort: Pulmonary effort is normal. No respiratory distress. Breath sounds: Normal breath sounds. No stridor. No wheezing. Chest:      Chest wall: No tenderness. Abdominal:      General: Bowel sounds are normal. There is distension. Tenderness: There is no abdominal tenderness. Musculoskeletal:         General: Normal range of motion. Cervical back: Normal range of motion.    Skin:     General: Skin is dry. Neurological:      Mental Status: He is alert and oriented to person, place, and time. Mental status is at baseline. Additional Data:     Lab Results:  Results from last 7 days   Lab Units 11/20/23  1705   WBC Thousand/uL 13.11*   HEMOGLOBIN g/dL 11.2*   HEMATOCRIT % 34.3*   PLATELETS Thousands/uL 203   LYMPHO PCT % 32   MONO PCT % 9   EOS PCT % 8*     Results from last 7 days   Lab Units 11/20/23  1705   SODIUM mmol/L 138   POTASSIUM mmol/L 3.6   CHLORIDE mmol/L 97   CO2 mmol/L 31   BUN mg/dL 30*   CREATININE mg/dL 1.65*   ANION GAP mmol/L 10   CALCIUM mg/dL 9.3   ALBUMIN g/dL 3.6   TOTAL BILIRUBIN mg/dL 1.30*   ALK PHOS U/L 92   ALT U/L 8   AST U/L 17   GLUCOSE RANDOM mg/dL 115         Results from last 7 days   Lab Units 11/20/23  2241   POC GLUCOSE mg/dl 125               Lines/Drains:  Invasive Devices       Peripheral Intravenous Line  Duration             Peripheral IV 11/20/23 Distal;Right;Upper;Ventral (anterior) Arm 1 day                        Imaging: Reviewed radiology reports from this admission including: chest xray  XR chest 1 view portable   ED Interpretation by Valentin Yung MD (11/20 1937)   Unremarkable cardiac silhouette. Mild vascular congestion and crowding in the right perihilar region. No appreciated infiltrate or effusion. EKG and Other Studies Reviewed on Admission:   EKG: Sinus Tachycardia. HR 93.    ** Please Note: This note has been constructed using a voice recognition system.  **

## 2023-11-21 NOTE — ASSESSMENT & PLAN NOTE
- In 3/2023,HF admit, echocardiogram showed a new basal lateral hypokinesis thus an inpatient stress test was pursued - normal with no evidence of ischemia  - 10/23/23 cardiology follow-up-patient was near euvolemic, +peripheral edema presumed in the setting of Norvasc use, jardiance was not tolerated, and Lasix was increased from 40 to 60mg   - Trops, BNP unremarkable in ED    Plan:  - Lasix 75mg BID  - Standing weights  - Fluid restriction 2000 mL  - Echo  - Cardiology consult

## 2023-11-21 NOTE — ASSESSMENT & PLAN NOTE
Lab Results   Component Value Date    CREATININE 1.65 (H) 11/20/2023    CREATININE 0.94 09/29/2023    CREATININE 1.40 (H) 09/28/2023    CREATININE 2.61 (H) 09/27/2023      Baseline: 0.4-1. 40. Patient has been taking celecoxib for left shoulder pain and right wrist pain prescribed by his pcp. Held home medication losartan. UA: Not ordered. Plan:  Possible kidney disease due to underlying diabetes, NSAID use. Albumin by creatinine ratio. Avoid NSAIDs. Avoid hypotension.

## 2023-11-21 NOTE — ASSESSMENT & PLAN NOTE
Lab Results   Component Value Date    HGBA1C 8.8 (A) 10/02/2023       Recent Labs     11/20/23  2241   POCGLU 125         Blood Sugar Average: Last 72 hrs:  (P) 125    Home medications: Januvia and glimepiride. Plan:  SSI  Adjust insulin regimen based on blood glucose levels.

## 2023-11-21 NOTE — ASSESSMENT & PLAN NOTE
Wt Readings from Last 3 Encounters:   11/21/23 114 kg (251 lb 3.2 oz)   10/31/23 113 kg (249 lb 9 oz)   10/23/23 118 kg (260 lb 14.4 oz)

## 2023-11-21 NOTE — ASSESSMENT & PLAN NOTE
Recurrent hospital admissions complaining of productive cough. Last ED admission on 9/26/2023 for coughing fits causing dizziness X1.5 WEEKS. Cough with white productive sputum More in night time, requiring more pillows. Associated with orthopnea, posttussive dizziness, nausea, chest tightness, shortness of breath and congestion. Patient did not have official COPD/asthma/MAANDO diagnosis. The last time he saw his pulmonologist was 8 years ago. Recommended to use BiPAP. But patient did not use BiPAP since then. His family physician prescribed him rescue albuterol inhaler, he uses 3 times a day with significant improvement in shortness of breath. Patient was advised to take prednisone 20 mg with tapering dose. Currently he ran out of the medication and appears that he completed the course. Could possibly explain leukocytosis. 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 Risk factors-patient smokes 2 cigars every day, consumes alcohol occasionally, eats sandwich and soup, insomnia because he cannot lie flat and usually sleeps only 1.5 hours per night. In the ED his saturations are 86, requiring 3 L of oxygen. Patient received albuterol nebulization. Following nebulization patient back to 94% on room air. Plan:  Possible etiology: COPD versus AMANDO versus exacerbation of CHF. resumed his home albuterol and fluticasone inhalers. CHF-see the plan below. AMANDO-patient will need outpatient sleep study and pulmonology consult.

## 2023-11-21 NOTE — ASSESSMENT & PLAN NOTE
Wt Readings from Last 3 Encounters:   11/20/23 118 kg (259 lb 11.2 oz)   10/31/23 113 kg (249 lb 9 oz)   10/23/23 118 kg (260 lb 14.4 oz)     In 3/2023,HF admit, repeat echocardiogram showed a new basal lateral hypokinesis thus an inpatient stress test was pursued. This was normal with no evidence of ischemia."   3/6/23  Post-stress ejection fraction is 49 %. 3/5/23 ECHO: hypokinetic: basal inferior, basal inferolateral and mid inferolateral. inferior/inferolateral wall motion abnormalities. EF 55%  10/23/23 cardiology follow-up-patient was near euvolemic, +peripheral edema presumed in the setting of Norvasc use, Rx noncompliance and running out of medsx1 month before hospitalization - contributing to recent admits in addition to other factors  Holter ordered last visit but not done. Attempted to start jardiance last visit for hfpef and DM - he ttempted it last visit for a bout a week but he feels his throat swelled up and got sore so he stopped it, couldn't swallow - these Sx resolved w jardiance cessation  -Increase maintenance lasix 40 qd to 60 qd. Advised to stop metoprolol for now, which seems to be on board for HTN only  His cardiologist had a Long discussion about evidence of worsening HF, when to self uptitrate home diuretic and call cardiology office. Rec to  consider AMANDO screening with PCP. Last PCP visit was on 10/31/2023 for arthritis of right joint,Impingement of left shoulder and was prescribed celecoxib 200 mg capsule once a day. FMLA was filed by his primary care with anticipation to return to work on December 26, 2023    On my examination, patient appears volume overloaded, bilateral pitting edema up to the knees, cough with mild productive sputum white in color. Left lung base crackles although already you can pulmonary examination was limited due to body habitus. In the ED, troponins normal   patient received, albuterol nebulization that improved his cough.   Received IV Lasix 150 mg one-time dose. Lab Results   Component Value Date    BNP 38 11/20/2023    BNP 23 09/26/2023    LVEF 50 03/05/2023     Plan:  As per CHF Lasix algorithm,   Furosemide dose conversion-patient been taking 60 mg QD-patient will need to receive IV Lasix 75 twice daily. Ordered IV Lasix 75 twice daily starting from 11/21/2023. Standing weights. Fluid restriction 2000 mL. Ordered echo. Cardiology consult. Monitor creatinine while on Lasix. Recent Labs     11/20/23  1705   CREATININE 1.65*   EGFR 44     Estimated Creatinine Clearance: 61.1 mL/min (A) (by C-G formula based on SCr of 1.65 mg/dL (H)).

## 2023-11-21 NOTE — UTILIZATION REVIEW
Initial Clinical Review    Admission: Date/Time/Statement:   Admission Orders (From admission, onward)       Ordered        11/20/23 2045  INPATIENT ADMISSION  Once                          Orders Placed This Encounter   Procedures    INPATIENT ADMISSION     Standing Status:   Standing     Number of Occurrences:   1     Order Specific Question:   Level of Care     Answer:   Med Surg [16]     Order Specific Question:   Estimated length of stay     Answer:   More than 2 Midnights     Order Specific Question:   Certification     Answer:   I certify that inpatient services are medically necessary for this patient for a duration of greater than two midnights. See H&P and MD Progress Notes for additional information about the patient's course of treatment. ED Arrival Information       Expected   -    Arrival   11/20/2023 16:39    Acuity   Emergent              Means of arrival   Walk-In    Escorted by   Self    Service   Hospitalist    Admission type   Emergency              Arrival complaint   SOB/PAIN LEGS             Chief Complaint   Patient presents with    Shortness of Breath     Pt c/o increased sob and BL leg swelling x1 week. Changed medication end of sept to 1 lasix instead of 2. Denies chest pain       Initial Presentation: 60 y.o. male with a PMH of CHF, diabetes, mild reactive airway disease who presents with cough,worsening dyspnea, orthopnea over the last 2 weeks. Patient has been following his primary care last was visit was 1 year ago. Patient was advised to take furosemide 1 and half tablet twice a day. Over the last week and half patient has doubled furosemide to 60 mg twice a day. Despite that he has persistent increasing swelling of his legs and increasing weight gain up to 10 pounds. He was never diagnosed with COPD before. His PCP started him on albuterol inhaler. Patient has been taking 3 times daily, prior to work, at lunch, and after work and noted significant improvement in his breathing. He ran out of his Flovent inhaler approximately 2 weeks ago, and was concerned about swelling of his legs and that is why he came for evaluation. In the ED his saturations are 86, requiring 3 L of oxygen. Plan: Inpatient admission for evaluation and treatment of acute on chronic hypoxic resp failure, CHF, elevated serum creatinine, DM: resume home albuterol and fluticasone inhalers, Pulmonology consult, IV lasix 75 mg bid, 2000 ml fluid restriction, Echo, Cardiology consult, SSI. Date: 11/21   Day 2:     Internal medicine: continue IV lasix 75 mg bid, 2000 ml fluid restriction, Echo, Cardiology consult, albuterol and fluticasone inhalers, SSI. Cardiology consult: Agree with lasix 80 IV bid, may further escalate diuresis pending response. Check BID lytes while aggressively diuresing. Keep K>4, Mg>2. Note Some edema contribution from home norvasc - can permanently DC now.      ED Triage Vitals   Temperature Pulse Respirations Blood Pressure SpO2   11/20/23 1701 11/20/23 1659 11/20/23 1659 11/20/23 1659 11/20/23 1659   98.4 °F (36.9 °C) 93 (!) 24 134/77 (!) 86 %      Temp Source Heart Rate Source Patient Position - Orthostatic VS BP Location FiO2 (%)   11/20/23 1701 11/20/23 1659 11/20/23 1659 11/20/23 1659 --   Oral Monitor Sitting Right arm       Pain Score       11/20/23 2200       No Pain          Wt Readings from Last 1 Encounters:   11/21/23 114 kg (251 lb 3.2 oz)     Additional Vital Signs:     Date/Time Temp Pulse Resp BP MAP (mmHg) SpO2 Calculated FIO2 (%) - Nasal Cannula Nasal Cannula O2 Flow Rate (L/min) O2 Device   11/21/23 0900 -- -- -- -- -- 94 % -- -- None (Room air)   11/21/23 07:11:57 98.7 °F (37.1 °C) 96 18 114/67 83 94 % -- -- --   11/20/23 2213 98.1 °F (36.7 °C) 99 18 118/64 82 97 % -- -- --   11/20/23 2140 -- -- -- -- -- -- -- -- None (Room air)   11/20/23 2030 -- 100 18 124/63 87 100 % -- -- None (Room air)   11/20/23 2000 -- 101 20 109/64 82 97 % -- -- None (Room air)   11/20/23 1830 -- 98 24 Abnormal  117/59 84 99 % -- -- None (Room air)   11/20/23 1800 -- 93 24 Abnormal  116/55 77 100 % -- -- None (Room air)   11/20/23 1730 -- 90 24 Abnormal  122/62 84 99 % 28 2 L/min Nasal cannula   11/20/23 1701 98.4 °F (36.9 °C) -- -- -- -- 94 % 28 2 L/min Nasal cannula     Pertinent Labs/Diagnostic Test Results:   XR chest 1 view portable   ED Interpretation by Faith Hopkins MD (11/20 1937)   Unremarkable cardiac silhouette. Mild vascular congestion and crowding in the right perihilar region. No appreciated infiltrate or effusion. Final Result by Kwasi Conway MD (13/22 2600)      Suggestion of mild pulmonary vascular congestion. No focal consolidation, pleural effusion, or pneumothorax. Resident: Ana Miles, the attending radiologist, have reviewed the images and agree with the final report above. Workstation performed: [id number]           11/21 Echo: Interpretation Summary         Left Ventricle: Left ventricular cavity size is normal. Wall thickness is mildly increased. There is concentric remodeling. The left ventricular ejection fraction is 60%. Systolic function is normal. Wall motion is normal. Diastolic function is mildly abnormal, consistent with grade I (abnormal) relaxation.     Right Ventricle: Right ventricular cavity size is normal. Systolic function is normal.    11/20 EKG:  Normal sinus rhythm  Normal ECG  When compared with ECG of 26-SEP-2023 15:25,  No significant change was found    Results from last 7 days   Lab Units 11/20/23  1734   SARS-COV-2  Negative     Results from last 7 days   Lab Units 11/21/23 0452 11/20/23  1705   WBC Thousand/uL 12.06* 13.11*   HEMOGLOBIN g/dL 10.9* 11.2*   HEMATOCRIT % 33.2* 34.3*   PLATELETS Thousands/uL 199 203         Results from last 7 days   Lab Units 11/21/23  0452 11/20/23  1705   SODIUM mmol/L 139 138   POTASSIUM mmol/L 3.7 3.6   CHLORIDE mmol/L 98 97   CO2 mmol/L 32 31   ANION GAP mmol/L 9 10   BUN mg/dL 29* 30*   CREATININE mg/dL 1.56* 1.65*   EGFR ml/min/1.73sq m 47 44   CALCIUM mg/dL 9.0 9.3   MAGNESIUM mg/dL 1.4*  --      Results from last 7 days   Lab Units 11/20/23  1705   AST U/L 17   ALT U/L 8   ALK PHOS U/L 92   TOTAL PROTEIN g/dL 7.4   ALBUMIN g/dL 3.6   TOTAL BILIRUBIN mg/dL 1.30*     Results from last 7 days   Lab Units 11/21/23  1051 11/21/23  0711 11/20/23  2241   POC GLUCOSE mg/dl 169* 117 125     Results from last 7 days   Lab Units 11/21/23  0452 11/20/23  1705   GLUCOSE RANDOM mg/dL 102 115             BETA-HYDROXYBUTYRATE   Date Value Ref Range Status   07/06/2020 0.2 <0.6 mmol/L Final            Results from last 7 days   Lab Units 11/20/23  2140 11/20/23  1954 11/20/23  1705   HS TNI 0HR ng/L  --   --  6   HS TNI 2HR ng/L  --  5  --    HSTNI D2 ng/L  --  -1  --    HS TNI 4HR ng/L 5  --   --    HSTNI D4 ng/L -1  --   --            Results from last 7 days   Lab Units 11/20/23  1705   BNP pg/mL 38           Results from last 7 days   Lab Units 11/20/23  1734   INFLUENZA A PCR  Negative   INFLUENZA B PCR  Negative   RSV PCR  Negative         ED Treatment:   Medication Administration from 11/20/2023 1639 to 11/20/2023 2206         Date/Time Order Dose Route Action     11/20/2023 1733 EST albuterol inhalation solution 5 mg 5 mg Nebulization Given     11/20/2023 1733 EST ipratropium (ATROVENT) 0.02 % inhalation solution 0.5 mg 0.5 mg Nebulization Given     11/20/2023 1920 EST albuterol inhalation solution 5 mg 5 mg Nebulization Given     11/20/2023 1920 EST ipratropium (ATROVENT) 0.02 % inhalation solution 0.5 mg 0.5 mg Nebulization Given     11/20/2023 1954 EST furosemide (LASIX) 150 mg in dextrose 5 % 50 mL IVPB 150 mg Intravenous New Bag          Past Medical History:   Diagnosis Date    Acute kidney injury ([address]) 9/5/2020    Diabetes mellitus ([address])     Hypertension     Inguinal hernia     3/25/15    Onychomycosis     5/24/17    Type 2 diabetes mellitus ([address]) 05/01/2012 Present on Admission:   Diabetes mellitus ([address] [facility])      Admitting Diagnosis: Acute respiratory insufficiency [R06.89]  SOB (shortness of breath) [R06.02]  Reactive airway disease [J45.909]  CHF exacerbation (HCC) [I50.9]  Volume overload [E87.70]  Difficulty obtaining medication [Z91.148]  Age/Sex: 60 y.o. male  Admission Orders:  Scheduled Medications:  fluticasone, 1 puff, Inhalation, Daily  furosemide, 75 mg, Intravenous, BID (diuretic)  heparin (porcine), 5,000 Units, Subcutaneous, Q8H [facility]  insulin lispro, 1-6 Units, Subcutaneous, 4x Daily (PC & HS)  ipratropium, 0.5 mg, Nebulization, TID  levalbuterol, 1.25 mg, Nebulization, TID  magnesium sulfate, 2 g, Intravenous, Q2H  methocarbamol, 500 mg, Oral, BID      Continuous IV Infusions:     PRN Meds:  albuterol, 2 puff, Inhalation, Q6H PRN  benzonatate, 100 mg, Oral, TID PRN        IP CONSULT TO NUTRITION SERVICES  IP CONSULT TO CARDIOLOGY    Network Utilization Review Department  ATTENTION: Please call with any questions or concerns to [phone] and carefully listen to the prompts so that you are directed to the right person. All voicemails are confidential.   For Discharge needs, contact Care Management DC Support Team at [phone] opt. 2  Send all requests for admission clinical reviews, approved or denied determinations and any other requests to dedicated fax number below belonging to the campus where the patient is receiving treatment.  List of dedicated fax numbers for the Facilities:  [place] DENIALS (Administrative/Medical Necessity) [phone]   DISCHARGE SUPPORT TEAM (NETWORK) [id number] [address] (Maternity/NICU/Pediatrics) [phone]   [address] [phone]   [facility] [address] [place] [address] [address] [address] [facility] [address] [facility]  [address]  [phone]

## 2023-11-21 NOTE — ASSESSMENT & PLAN NOTE
- Last ED visit on 9/26/2023 for coughing associated with orthopnea, posttussive dizziness, nausea, chest tightness, shortness of breath and congestion  - No official COPD/asthma/AMANDO diagnosis. The last time he saw his pulmonologist was 8 years ago. Recommended to use BiPAP. But patient did not use BiPAP since then. - In the ED his saturations are 86, requiring 3 L of oxygen. Patient received albuterol nebulization. Following nebulization patient back to 94% on room air.  - Etiology: COPD vs CHF    Plan:  - PTA  albuterol and fluticasone inhalers.   - AMANDO-patient will need outpatient sleep study

## 2023-11-21 NOTE — SPEECH THERAPY NOTE
Speech Language/Pathology  Speech-Language Pathology Bedside Swallow Evaluation        Patient Name: Adwoa Aragon    DGISG'Y Date: 11/21/2023     Problem List  Principal Problem:    Acute on chronic hypoxic respiratory failure ([address])  Active Problems:    Diabetes mellitus (HCC)    (HFpEF) heart failure with preserved ejection fraction (HCC)    Elevated serum creatinine         Past Medical History  Past Medical History:   Diagnosis Date    Acute kidney injury ([address]) 9/5/2020    Diabetes mellitus ([address])     Hypertension     Inguinal hernia     3/25/15    Onychomycosis     5/24/17    Type 2 diabetes mellitus ([address]) 05/01/2012       Past Surgical History  Past Surgical History:   Procedure Laterality Date    ARTHROSCOPY KNEE      with Medial and Lateral Meniscus Repair    HERNIA REPAIR      umbilical and inguinal hernia repairs (left)       Summary   Pt reporting s/s of esophageal dysphagia/reflux? Recommend GI consult to further investigate. ST to follow to monitor for s/s of aspiration/dysphagia given a larger PO sample. Recommendations:   Diet: regular diet and thin liquids   Meds: whole with liquid   Frequent Oral care: 4x/day  Aspiration precautions and compensatory swallowing strategies: upright posture  Other Recommendations/ considerations: Pt reporting globus sensation following meals. Pt reports coughing at end of meals because he feels as if "the food is in my upper chest" and then "goes back up". Pt reporting globus sensation at his clavicle to his sternum. Current Medical Status  Pt is a 60 y.o. male who presented to [address] [place] [address]  with complaining of productive cough. PMH significant for CHF, diabetes, mild reactive airway disease who presents with cough, worsening dyspnea, and orthopnea over the last 2 weeks. Past medical history:  Please see H&P for details    Special Studies:  11/20/2023 Chest XR IMPRESSION: Suggestion of mild pulmonary vascular congestion. No focal consolidation, pleural effusion, or pneumothorax. Social/Education/Vocational Hx:  Pt lives  spouse and 2 adult children    Swallow Information   Current Risks for Dysphagia & Aspiration:  COPD  Current Symptoms/Concerns: change in respiratory status  Current Diet: regular diet and thin liquids   Baseline Diet: regular diet and thin liquids  Takes pills: whole w/ liquids    Baseline Assessment   Behavior/Cognition: alert  Speech/Language Status: able to participate in conversation and able to follow commands  Patient Positioning:  upright on EOB     Swallow Mechanism Exam   Facial: symmetrical  Labial: WFL  Lingual: WFL  Velum: symmetrical  Mandible: adequate ROM  Dentition:  pt with less than 5 teeth, no dentures  Vocal quality:clear/adequate   Volitional Cough: strong/productive   Respiratory: NC 2 liters    Consistencies Assessed and Performance   Consistencies Administered: thin liquids, puree, soft solids, and regular (applesauce, shortbread cookie, kortney crackers)    Oral Stage: Pt was able to adequately retrieve bolus from spoon and straw as well bite through soft solids. No anterior leakage. Mastication was functional despite significantly limited dentition. AP transfer of bolus appeared functional. No pocketing or oral residue. Miles Reining Pharyngeal Stage: Suspect complete laryngeal elevation via palpation of the swallow. No coughing, throat clearing, or change in vocal quality immediately following swallow. Pt with observed delayed coughing that SLP observed 2-3 minutes following the swallow. Esophageal Concerns: Pt reporting globus sensation following meals. Pt reports coughing at end of meal because he feels as if "the food is in my upper chest" and then "goes back up". Pt reporting globus sensation at his clavicle to his sternum. Results Reviewed with: patient   Dysphagia Goals: 1. Pt will consume regular solid w/ thin liquids without overt s/s of aspiration/dysphagia x2-3 tx session.  SLP will recommend/complete VBS as clinically indicated.    Discharge recommendation: likely no follow up needed for swallowing    Speech Therapy Prognosis   Prognosis: good    Prognosis Considerations: age, medical status, respiratory status, and therapeutic potential

## 2023-11-22 LAB
ANION GAP SERPL CALCULATED.3IONS-SCNC: 7 MMOL/L
ANION GAP SERPL CALCULATED.3IONS-SCNC: 8 MMOL/L
BASOPHILS # BLD AUTO: 0.03 THOUSANDS/ÂΜL (ref 0–0.1)
BASOPHILS NFR BLD AUTO: 0 % (ref 0–1)
BUN SERPL-MCNC: 24 MG/DL (ref 5–25)
BUN SERPL-MCNC: 30 MG/DL (ref 5–25)
CALCIUM SERPL-MCNC: 8.8 MG/DL (ref 8.4–10.2)
CALCIUM SERPL-MCNC: 9.2 MG/DL (ref 8.4–10.2)
CHLORIDE SERPL-SCNC: 101 MMOL/L (ref 96–108)
CHLORIDE SERPL-SCNC: 101 MMOL/L (ref 96–108)
CO2 SERPL-SCNC: 29 MMOL/L (ref 21–32)
CO2 SERPL-SCNC: 31 MMOL/L (ref 21–32)
CREAT SERPL-MCNC: 1.21 MG/DL (ref 0.6–1.3)
CREAT SERPL-MCNC: 1.31 MG/DL (ref 0.6–1.3)
EOSINOPHIL # BLD AUTO: 0.99 THOUSAND/ÂΜL (ref 0–0.61)
EOSINOPHIL NFR BLD AUTO: 10 % (ref 0–6)
ERYTHROCYTE [DISTWIDTH] IN BLOOD BY AUTOMATED COUNT: 14.7 % (ref 11.6–15.1)
GFR SERPL CREATININE-BSD FRML MDRD: 59 ML/MIN/1.73SQ M
GFR SERPL CREATININE-BSD FRML MDRD: 65 ML/MIN/1.73SQ M
GLUCOSE SERPL-MCNC: 103 MG/DL (ref 65–140)
GLUCOSE SERPL-MCNC: 116 MG/DL (ref 65–140)
GLUCOSE SERPL-MCNC: 131 MG/DL (ref 65–140)
GLUCOSE SERPL-MCNC: 152 MG/DL (ref 65–140)
GLUCOSE SERPL-MCNC: 176 MG/DL (ref 65–140)
GLUCOSE SERPL-MCNC: 198 MG/DL (ref 65–140)
HCT VFR BLD AUTO: 29.2 % (ref 36.5–49.3)
HGB BLD-MCNC: 9.4 G/DL (ref 12–17)
IMM GRANULOCYTES # BLD AUTO: 0.03 THOUSAND/UL (ref 0–0.2)
IMM GRANULOCYTES NFR BLD AUTO: 0 % (ref 0–2)
LYMPHOCYTES # BLD AUTO: 2.21 THOUSANDS/ÂΜL (ref 0.6–4.47)
LYMPHOCYTES NFR BLD AUTO: 23 % (ref 14–44)
MAGNESIUM SERPL-MCNC: 1.8 MG/DL (ref 1.9–2.7)
MAGNESIUM SERPL-MCNC: 1.9 MG/DL (ref 1.9–2.7)
MCH RBC QN AUTO: 30.2 PG (ref 26.8–34.3)
MCHC RBC AUTO-ENTMCNC: 32.2 G/DL (ref 31.4–37.4)
MCV RBC AUTO: 94 FL (ref 82–98)
MONOCYTES # BLD AUTO: 1.09 THOUSAND/ÂΜL (ref 0.17–1.22)
MONOCYTES NFR BLD AUTO: 11 % (ref 4–12)
NEUTROPHILS # BLD AUTO: 5.43 THOUSANDS/ÂΜL (ref 1.85–7.62)
NEUTS SEG NFR BLD AUTO: 56 % (ref 43–75)
NRBC BLD AUTO-RTO: 0 /100 WBCS
PLATELET # BLD AUTO: 211 THOUSANDS/UL (ref 149–390)
PMV BLD AUTO: 10.4 FL (ref 8.9–12.7)
POTASSIUM SERPL-SCNC: 3.4 MMOL/L (ref 3.5–5.3)
POTASSIUM SERPL-SCNC: 4 MMOL/L (ref 3.5–5.3)
RBC # BLD AUTO: 3.11 MILLION/UL (ref 3.88–5.62)
SODIUM SERPL-SCNC: 138 MMOL/L (ref 135–147)
SODIUM SERPL-SCNC: 139 MMOL/L (ref 135–147)
WBC # BLD AUTO: 9.78 THOUSAND/UL (ref 4.31–10.16)

## 2023-11-22 PROCEDURE — 94760 N-INVAS EAR/PLS OXIMETRY 1: CPT

## 2023-11-22 PROCEDURE — 80048 BASIC METABOLIC PNL TOTAL CA: CPT

## 2023-11-22 PROCEDURE — 99222 1ST HOSP IP/OBS MODERATE 55: CPT | Performed by: INTERNAL MEDICINE

## 2023-11-22 PROCEDURE — 99232 SBSQ HOSP IP/OBS MODERATE 35: CPT | Performed by: INTERNAL MEDICINE

## 2023-11-22 PROCEDURE — 94640 AIRWAY INHALATION TREATMENT: CPT

## 2023-11-22 PROCEDURE — 85025 COMPLETE CBC W/AUTO DIFF WBC: CPT

## 2023-11-22 PROCEDURE — 82948 REAGENT STRIP/BLOOD GLUCOSE: CPT

## 2023-11-22 PROCEDURE — 99232 SBSQ HOSP IP/OBS MODERATE 35: CPT | Performed by: STUDENT IN AN ORGANIZED HEALTH CARE EDUCATION/TRAINING PROGRAM

## 2023-11-22 PROCEDURE — 83735 ASSAY OF MAGNESIUM: CPT

## 2023-11-22 RX ORDER — POTASSIUM CHLORIDE 20 MEQ/1
40 TABLET, EXTENDED RELEASE ORAL ONCE
Status: COMPLETED | OUTPATIENT
Start: 2023-11-22 | End: 2023-11-22

## 2023-11-22 RX ORDER — PANTOPRAZOLE SODIUM 40 MG/1
40 TABLET, DELAYED RELEASE ORAL
Status: DISCONTINUED | OUTPATIENT
Start: 2023-11-22 | End: 2023-11-24

## 2023-11-22 RX ORDER — MAGNESIUM SULFATE HEPTAHYDRATE 40 MG/ML
2 INJECTION, SOLUTION INTRAVENOUS ONCE
Status: COMPLETED | OUTPATIENT
Start: 2023-11-22 | End: 2023-11-22

## 2023-11-22 RX ORDER — FUROSEMIDE 10 MG/ML
80 INJECTION INTRAMUSCULAR; INTRAVENOUS
Status: DISCONTINUED | OUTPATIENT
Start: 2023-11-22 | End: 2023-11-23

## 2023-11-22 RX ADMIN — BENZONATATE 100 MG: 100 CAPSULE ORAL at 06:15

## 2023-11-22 RX ADMIN — LEVALBUTEROL HYDROCHLORIDE 1.25 MG: 1.25 SOLUTION RESPIRATORY (INHALATION) at 14:27

## 2023-11-22 RX ADMIN — POTASSIUM CHLORIDE 40 MEQ: 1500 TABLET, EXTENDED RELEASE ORAL at 08:08

## 2023-11-22 RX ADMIN — MAGNESIUM SULFATE HEPTAHYDRATE 2 G: 40 INJECTION, SOLUTION INTRAVENOUS at 08:08

## 2023-11-22 RX ADMIN — METHOCARBAMOL TABLETS 500 MG: 500 TABLET, COATED ORAL at 08:08

## 2023-11-22 RX ADMIN — FLUTICASONE FUROATE 1 PUFF: 200 POWDER RESPIRATORY (INHALATION) at 08:09

## 2023-11-22 RX ADMIN — IPRATROPIUM BROMIDE 0.5 MG: 0.5 SOLUTION RESPIRATORY (INHALATION) at 20:12

## 2023-11-22 RX ADMIN — FUROSEMIDE 75 MG: 10 INJECTION, SOLUTION INTRAMUSCULAR; INTRAVENOUS at 08:08

## 2023-11-22 RX ADMIN — ALBUTEROL SULFATE 2 PUFF: 90 AEROSOL, METERED RESPIRATORY (INHALATION) at 06:12

## 2023-11-22 RX ADMIN — FUROSEMIDE 80 MG: 10 INJECTION, SOLUTION INTRAMUSCULAR; INTRAVENOUS at 17:01

## 2023-11-22 RX ADMIN — INSULIN LISPRO 2 UNITS: 100 INJECTION, SOLUTION INTRAVENOUS; SUBCUTANEOUS at 17:02

## 2023-11-22 RX ADMIN — IPRATROPIUM BROMIDE 0.5 MG: 0.5 SOLUTION RESPIRATORY (INHALATION) at 14:27

## 2023-11-22 RX ADMIN — POTASSIUM CHLORIDE 40 MEQ: 1500 TABLET, EXTENDED RELEASE ORAL at 11:20

## 2023-11-22 RX ADMIN — INSULIN LISPRO 1 UNITS: 100 INJECTION, SOLUTION INTRAVENOUS; SUBCUTANEOUS at 23:09

## 2023-11-22 RX ADMIN — LEVALBUTEROL HYDROCHLORIDE 1.25 MG: 1.25 SOLUTION RESPIRATORY (INHALATION) at 08:22

## 2023-11-22 RX ADMIN — METHOCARBAMOL TABLETS 500 MG: 500 TABLET, COATED ORAL at 17:01

## 2023-11-22 RX ADMIN — IPRATROPIUM BROMIDE 0.5 MG: 0.5 SOLUTION RESPIRATORY (INHALATION) at 08:22

## 2023-11-22 RX ADMIN — HEPARIN SODIUM 5000 UNITS: 5000 INJECTION INTRAVENOUS; SUBCUTANEOUS at 23:10

## 2023-11-22 RX ADMIN — LEVALBUTEROL HYDROCHLORIDE 1.25 MG: 1.25 SOLUTION RESPIRATORY (INHALATION) at 20:12

## 2023-11-22 RX ADMIN — HEPARIN SODIUM 5000 UNITS: 5000 INJECTION INTRAVENOUS; SUBCUTANEOUS at 14:04

## 2023-11-22 RX ADMIN — PANTOPRAZOLE SODIUM 40 MG: 40 TABLET, DELAYED RELEASE ORAL at 10:53
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
## 2023-11-22 NOTE — PROGRESS NOTES
[address] [facility]  Progress Note  Name: Chirag Nice  MRN: [id number]  Unit/Bed#: S -01 I Date of Admission: 11/20/2023   Date of Service: 11/22/2023 I Hospital Day: 2    Assessment/Plan   (HFpEF) heart failure with preserved ejection fraction [facility])  Assessment & Plan  - In 3/2023,HF admit, echocardiogram showed a new basal lateral hypokinesis thus an inpatient stress test was pursued - normal with no evidence of ischemia  - 10/23/23 cardiology follow-up-patient was near euvolemic, +peripheral edema presumed in the setting of Norvasc use, jardiance was not tolerated, and Lasix was increased from 40 to 60mg   - Trops, BNP unremarkable in ED  - Echo with G1DD and EF of 60%    Plan:  - Lasix 75mg BID  - Standing weights  - Fluid restriction 2000 mL  - Cardiology consult  - Twice daily BMP's with aggressive diuresis         * Acute on chronic hypoxic respiratory failure ([address])  Assessment & Plan  - Last ED visit on 9/26/2023 for coughing associated with orthopnea, posttussive dizziness, nausea, chest tightness, shortness of breath and congestion  - No official COPD/asthma/AMANDO diagnosis. The last time he saw his pulmonologist was 8 years ago. Recommended to use BiPAP. But patient did not use BiPAP since then. - In the ED his saturations are 86, requiring 3 L of oxygen. Patient received albuterol nebulization. Following nebulization patient back to 94% on room air.  - Etiology: COPD vs CHF    Plan:  - PTA  albuterol and fluticasone inhalers.   - AMANDO-patient will need outpatient sleep study      Difficulty swallowing  Assessment & Plan  - Patient admitted to coughing during meals   - Speech eval patient noted globus sensation between clavicle and sternum with meals    Plan:   - GI consult   - NPO for potential EGD today     Elevated serum creatinine  Assessment & Plan  Lab Results   Component Value Date    CREATININE 1.51 (H) 11/21/2023    CREATININE 1.56 (H) 11/21/2023    CREATININE 1.65 (H) 2023    CREATININE 0.94 2023      Baseline: 0.4-1. 40. Patient has been taking celecoxib for left shoulder pain and right wrist pain prescribed by his pcp. Held home medication losartan. UA: Not ordered. Plan:  Avoid NSAIDs. Avoid hypotension  Continue to monitor creat with Lasix usage       Diabetes mellitus [facility])  Assessment & Plan  Lab Results   Component Value Date    HGBA1C 8.8 (A) 10/02/2023       Recent Labs     23  0711 23  1051 23  1552 23  2117   POCGLU 117 169* 175* 195*         Blood Sugar Average: Last 72 hrs:  (P) 156.2    Home medications: Januvia and glimepiride. Plan:  SSI  Adjust insulin regimen based on blood glucose levels. VTE Pharmacologic Prophylaxis: VTE Score: 4 Moderate Risk (Score 3-4) - Pharmacological DVT Prophylaxis Ordered: heparin. Mobility:   Basic Mobility Inpatient Raw Score: 24  JH-HLM Goal: 8: Walk 250 feet or more  JH-HLM Achieved: 8: Walk 250 feet ot more  HLM Goal achieved. Continue to encourage appropriate mobility. Patient Centered Rounds: I performed bedside rounds with nursing staff today. Discussions with Specialists or Other Care Team Provider: Cardiology, speech, GI    Education and Discussions with Family / Patient: Updated  (wife) via phone. Current Length of Stay: 2 day(s)  Current Patient Status: Inpatient   Discharge Plan: Anticipate discharge in 24-48 hrs to discharge location to be determined pending rehab evaluations. Code Status: Level 1 - Full Code    Subjective:   No acute events overnight. Feels leg swelling has improved mildly; has less pain when he walks. Denies headache, chest pain, SOB, abd pain, n/v/d.      Objective:     Vitals:   Temp (24hrs), Av.9 °F (37.2 °C), Min:98.7 °F (37.1 °C), Max:99.1 °F (37.3 °C)    Temp:  [98.7 °F (37.1 °C)-99.1 °F (37.3 °C)] 98.7 °F (37.1 °C)  HR:  [] 96  Resp:  [16] 16  BP: (103-116)/(63-71) 103/65  SpO2:  [94 %-97 %] 95 %  Body mass index is 38.28 kg/m². Input and Output Summary (last 24 hours): Intake/Output Summary (Last 24 hours) at 11/22/2023 0823  Last data filed at 11/21/2023 2100  Gross per 24 hour   Intake 240 ml   Output 350 ml   Net -110 ml       Physical Exam:   Physical Exam  Vitals reviewed. Constitutional:       Appearance: Normal appearance. He is obese. HENT:      Head: Normocephalic. Nose: Nose normal.   Eyes:      Extraocular Movements: Extraocular movements intact. Conjunctiva/sclera: Conjunctivae normal.   Cardiovascular:      Rate and Rhythm: Normal rate. Heart sounds: Normal heart sounds. Pulmonary:      Effort: Pulmonary effort is normal. No respiratory distress. Breath sounds: No stridor. No wheezing or rales. Chest:      Chest wall: No tenderness. Abdominal:      General: Bowel sounds are normal. There is no distension. Tenderness: There is no abdominal tenderness. Musculoskeletal:         General: Normal range of motion. Cervical back: Normal range of motion. Right lower leg: Edema present. Left lower leg: Edema present. Skin:     General: Skin is dry. Neurological:      Mental Status: He is alert and oriented to person, place, and time. Mental status is at baseline.           Additional Data:     Labs:  Results from last 7 days   Lab Units 11/22/23  0555   WBC Thousand/uL 9.78   HEMOGLOBIN g/dL 9.4*   HEMATOCRIT % 29.2*   PLATELETS Thousands/uL 211   NEUTROS PCT % 56   LYMPHS PCT % 23   MONOS PCT % 11   EOS PCT % 10*     Results from last 7 days   Lab Units 11/22/23  0555 11/21/23  0452 11/20/23  1705   SODIUM mmol/L 138   < > 138   POTASSIUM mmol/L 3.4*   < > 3.6   CHLORIDE mmol/L 101   < > 97   CO2 mmol/L 29   < > 31   BUN mg/dL 30*   < > 30*   CREATININE mg/dL 1.31*   < > 1.65*   ANION GAP mmol/L 8   < > 10   CALCIUM mg/dL 8.8   < > 9.3   ALBUMIN g/dL  --   --  3.6   TOTAL BILIRUBIN mg/dL  --   --  1.30*   ALK PHOS U/L  --   --  92   ALT U/L  --   --  8   AST U/L  --   --  17   GLUCOSE RANDOM mg/dL 131   < > 115    < > = values in this interval not displayed.          Results from last 7 days   Lab Units 11/22/23  0734 11/21/23  2117 11/21/23  1552 11/21/23  1051 11/21/23  0711 11/20/23  2241   POC GLUCOSE mg/dl 116 195* 175* 169* 117 125               Lines/Drains:  Invasive Devices       Peripheral Intravenous Line  Duration             Peripheral IV 11/20/23 Distal;Right;Upper;Ventral (anterior) Arm 2 days                      Telemetry:  Telemetry Orders (From admission, onward)               24 Hour Telemetry Monitoring  Continuous x 24 Hours (Telem)        Question:  Reason for 24 Hour Telemetry  Answer:  Decompensated CHF- and any one of the following: continuous diuretic infusion or total diuretic dose >200 mg daily, associated electrolyte derangement (I.e. K < 3.0), ionotropic drip (continuous infusion), hx of ventricular arrhythmia, or new EF < 35%                     Telemetry Reviewed: Normal Sinus Rhythm  Indication for Continued Telemetry Use: Acute MI/Unstable Angina/Rule out ACS             Imaging: Reviewed radiology reports from this admission including: chest xray    Recent Cultures (last 7 days):         Last 24 Hours Medication List:   Current Facility-Administered Medications   Medication Dose Route Frequency Provider Last Rate    albuterol  2 puff Inhalation Q6H PRN Sam Bazan MD      benzonatate  100 mg Oral TID PRN Sam Bazan MD      fluticasone  1 puff Inhalation Daily Sam Bazan MD      furosemide  75 mg Intravenous BID (diuretic) Sam Bazan MD      heparin (porcine)  5,000 Units Subcutaneous [facility] Sam Bazan MD      insulin lispro  1-6 Units Subcutaneous 4x Daily (PC & HS) Sam Bazan MD      ipratropium  0.5 mg Nebulization TID Stefano Carrasquillo MD      levalbuterol  1.25 mg Nebulization TID Stefano Carrasquillo MD magnesium sulfate  2 g Intravenous Once Julio Nichols DO 2 g (11/22/23 9496)    methocarbamol  500 mg Oral BID Alaina Jordan MD      potassium chloride  40 mEq Oral Once Julio Nichols DO          Today, Patient Was Seen By: Julio Nichols DO    **Please Note: This note may have been constructed using a voice recognition system. **

## 2023-11-22 NOTE — ASSESSMENT & PLAN NOTE
- In 3/2023,HF admit, echocardiogram showed a new basal lateral hypokinesis thus an inpatient stress test was pursued - normal with no evidence of ischemia  - 10/23/23 cardiology follow-up-patient was near euvolemic, +peripheral edema presumed in the setting of Norvasc use, jardiance was not tolerated, and Lasix was increased from 40 to 60mg   - Trops, BNP unremarkable in ED  - Echo with G1DD and EF of 60%    Plan:  - Transition to oral torsemide 40mg   - Standing weights  - Fluid restriction 1500 mL  - Cardiology following

## 2023-11-22 NOTE — PLAN OF CARE
Problem: Potential for Falls  Goal: Patient will remain free of falls  Description: INTERVENTIONS:  - Educate patient/family on patient safety including physical limitations  - Instruct patient to call for assistance with activity   - Consult OT/PT to assist with strengthening/mobility   - Keep Call bell within reach  - Keep bed low and locked with side rails adjusted as appropriate  - Keep care items and personal belongings within reach  - Initiate and maintain comfort rounds  - Make Fall Risk Sign visible to staff  - Offer Toileting every 2 Hours, in advance of need  - Initiate/Maintain bed/chair alarm  - Obtain necessary fall risk management equipment  - Apply yellow socks and bracelet for high fall risk patients  - Consider moving patient to room near nurses station  Outcome: Progressing     Problem: Prexisting or High Potential for Compromised Skin Integrity  Goal: Skin integrity is maintained or improved  Description: INTERVENTIONS:  - Identify patients at risk for skin breakdown  - Assess and monitor skin integrity  - Assess and monitor nutrition and hydration status  - Monitor labs   - Assess for incontinence   - Turn and reposition patient  - Assist with mobility/ambulation  - Relieve pressure over bony prominences  - Avoid friction and shearing  - Provide appropriate hygiene as needed including keeping skin clean and dry  - Evaluate need for skin moisturizer/barrier cream  - Collaborate with interdisciplinary team   - Patient/family teaching  - Consider wound care consult   Outcome: Progressing     Problem: PAIN - ADULT  Goal: Verbalizes/displays adequate comfort level or baseline comfort level  Description: Interventions:  - Encourage patient to monitor pain and request assistance  - Assess pain using appropriate pain scale  - Administer analgesics based on type and severity of pain and evaluate response  - Implement non-pharmacological measures as appropriate and evaluate response  - Consider cultural and social influences on pain and pain management  - Notify physician/advanced practitioner if interventions unsuccessful or patient reports new pain  Outcome: Progressing     Problem: INFECTION - ADULT  Goal: Absence or prevention of progression during hospitalization  Description: INTERVENTIONS:  - Assess and monitor for signs and symptoms of infection  - Monitor lab/diagnostic results  - Monitor all insertion sites, i.e. indwelling lines, tubes, and drains  - Monitor endotracheal if appropriate and nasal secretions for changes in amount and color  - [place] appropriate cooling/warming therapies per order  - Administer medications as ordered  - Instruct and encourage patient and family to use good hand hygiene technique  - Identify and instruct in appropriate isolation precautions for identified infection/condition  Outcome: Progressing  Goal: Absence of fever/infection during neutropenic period  Description: INTERVENTIONS:  - Monitor WBC    Outcome: Progressing     Problem: SAFETY ADULT  Goal: Patient will remain free of falls  Description: INTERVENTIONS:  - Educate patient/family on patient safety including physical limitations  - Instruct patient to call for assistance with activity   - Consult OT/PT to assist with strengthening/mobility   - Keep Call bell within reach  - Keep bed low and locked with side rails adjusted as appropriate  - Keep care items and personal belongings within reach  - Initiate and maintain comfort rounds  - Make Fall Risk Sign visible to staff  - Offer Toileting every 2 Hours, in advance of need  - Initiate/Maintain bed/chair alarm  - Obtain necessary fall risk management equipment  - Apply yellow socks and bracelet for high fall risk patients  - Consider moving patient to room near nurses station  Outcome: Progressing  Goal: Maintain or return to baseline ADL function  Description: INTERVENTIONS:  -  Assess patient's ability to carry out ADLs; assess patient's baseline for ADL function and identify physical deficits which impact ability to perform ADLs (bathing, care of mouth/teeth, toileting, grooming, dressing, etc.)  - Assess/evaluate cause of self-care deficits   - Assess range of motion  - Assess patient's mobility; develop plan if impaired  - Assess patient's need for assistive devices and provide as appropriate  - Encourage maximum independence but intervene and supervise when necessary  - Involve family in performance of ADLs  - Assess for home care needs following discharge   - Consider OT consult to assist with ADL evaluation and planning for discharge  - Provide patient education as appropriate  Outcome: Progressing  Goal: Maintains/Returns to pre admission functional level  Description: INTERVENTIONS:  - Perform AM-PAC 6 Click Basic Mobility/ Daily Activity assessment daily.  - Set and communicate daily mobility goal to care team and patient/family/caregiver.    - Collaborate with rehabilitation services on mobility goals if consulted  - Perform Range of Motion   - Reposition patient   - Dangle patient   - Stand patient   - Ambulate patient   - Out of bed to chair    - Out of bed for meals   - Out of bed for toileting  - Record patient progress and toleration of activity level   Outcome: Progressing     Problem: DISCHARGE PLANNING  Goal: Discharge to home or other facility with appropriate resources  Description: INTERVENTIONS:  - Identify barriers to discharge w/patient and caregiver  - Arrange for needed discharge resources and transportation as appropriate  - Identify discharge learning needs (meds, wound care, etc.)  - Arrange for interpretive services to assist at discharge as needed  - Refer to Case Management Department for coordinating discharge planning if the patient needs post-hospital services based on physician/advanced practitioner order or complex needs related to functional status, cognitive ability, or social support system  Outcome: Progressing     Problem: Knowledge Deficit  Goal: Patient/family/caregiver demonstrates understanding of disease process, treatment plan, medications, and discharge instructions  Description: Complete learning assessment and assess knowledge base.   Interventions:  - Provide teaching at level of understanding  - Provide teaching via preferred learning methods  Outcome: Progressing

## 2023-11-22 NOTE — CONSULTS
[facility] - New Consultation  Ronaldo Magana 60 y.o. male MRN: [id number]  Encounter: [id number]          ASSESSMENT AND PLAN:    Cough - long standing history of cough that is occasionally triggered by food intake - likely in the setting of undiagnosed COPD (no PFT on file) vs GERD given morning cough and sensation of irritation in throat with associated uvular erythema vs eosinophilic esophagitis. Syncopal episodes associated with cough may be vagal vs cardiac - patient will need further workup for this. Regurgitation of food - patient regurgitates food during cough episodes. Has history of acid reflux treated with OTC medications approximately 1 year ago. Plan: Will start patient on Protonix 40mg daily  Given comorbidities and respiratory status, we will defer non-urgent EGD until patient is cleared by cardiology, likely Thursday 11/24. We will order barium esophagram today. Continue NPO at this time, can start patient on soft diet after barium esophagram.    ______________________________________________________________________    HPI:  Mr. Emily Mandujano is a pleasant 60year old gentleman with a PMH of CHF, HTN, T2DM who presented to the hospital on 11/20 with a 2 week history of worsening cough while eating, dyspnea and orthopnea, found to be in CHF exacerbation and treated with IV diuretics. While hospitalized, patient reported to speech therapy that he is experiencing globus sensation following meals and a cough at the end of meals as well as food regurgitation. At the time of my evaluation, patient denied globus sensation, however he endorses a long history of cough occasionally associated with meals. He reports that approximately 5 minutes after eating, he sometimes gets a scratchy feeling in his throat and a sensation like he has to clear his throat, which causes him to start coughing. He also notes that he sometimes regurgitates food when coughing.  He states that this happens with both liquids and solids. Was taking OTC medications for acid reflux approximately 1 year ago but does not recall which. Mr. Michael Michelle also reports coughing spells when waking up from sleep and throughout the day, as well as morning congestion. He's had 3-4 syncopal episodes during coughing spells, but has not gone to the ED for this. He reports that coughing spells are triggered by deep inspiration, and alleviated with inhaler use. He denies fevers, chills, weight or appetite changes, nausea, vomiting, abdominal tenderness or distension, diarrhea or changes in urinary habits. Endorses constipation with last BM the day before presenting to the hospital.  Mr. Michael Michelle is a former smoker, used to smoke 2 cigars daily, quit approximately 2 weeks ago. REVIEW OF SYSTEMS:    CONSTITUTIONAL: Denies any fever, chills, rigors, and weight loss. HEENT: No earache or tinnitus. Denies hearing loss or visual disturbances. CARDIOVASCULAR: No chest pain or palpitations. +b/l lower extremity edema  RESPIRATORY: Endorses cough, SoB, Mcdonnell, orthopnea. Denies hemoptysis  GASTROINTESTINAL: As noted in the History of Present Illness. GENITOURINARY: No problems with urination. Denies any hematuria or dysuria. NEUROLOGIC: No dizziness or vertigo, denies headaches. MUSCULOSKELETAL: Denies any muscle or joint pain. SKIN: Denies skin rashes or itching. ENDOCRINE: Denies excessive thirst. Denies intolerance to heat or cold. PSYCHOSOCIAL: Denies depression or anxiety. Denies any recent memory loss.        Historical Information   Past Medical History:   Diagnosis Date    Acute kidney injury ([address]) 9/5/2020    Diabetes mellitus ([address] [facility])     Hypertension     Inguinal hernia     3/25/15    Onychomycosis     5/24/17    Type 2 diabetes mellitus ([address]) 05/01/2012     Past Surgical History:   Procedure Laterality Date    ARTHROSCOPY KNEE      with Medial and Lateral Meniscus Repair    HERNIA REPAIR      umbilical and inguinal hernia repairs (left)     Social History   Social History     Substance and Sexual Activity   Alcohol Use Yes    Alcohol/week: 3.0 standard drinks of alcohol    Types: 2 Cans of beer, 1 Standard drinks or equivalent per week    Comment: weekend: 1 glass of christian or 2 beers     Social History     Substance and Sexual Activity   Drug Use No     Social History     Tobacco Use   Smoking Status Former    Types: Cigars    Start date: 6/22/2020   Smokeless Tobacco Never   Tobacco Comments    current every day smoker, per [employer]     Family History   Problem Relation Age of Onset    Hypertension Mother         essential    Chronic bronchitis Father     Prostate cancer Father     Breast cancer Sister        Meds/Allergies       Current Facility-Administered Medications:     albuterol (PROVENTIL HFA,VENTOLIN HFA) inhaler 2 puff, 2 puff, Inhalation, Q6H PRN, 2 puff at 11/22/23 0612    benzonatate (TESSALON PERLES) capsule 100 mg, 100 mg, Oral, TID PRN, 100 mg at 11/22/23 0615    fluticasone (ARNUITY ELLIPTA) 200 MCG/ACT inhaler 1 puff, 1 puff, Inhalation, Daily, 1 puff at 11/22/23 0809    furosemide (LASIX) injection 75 mg, 75 mg, Intravenous, BID (diuretic), 75 mg at 11/22/23 0808    heparin (porcine) subcutaneous injection 5,000 Units, 5,000 Units, Subcutaneous, Q8H [address], 5,000 Units at 11/21/23 2232 **AND** [CANCELED] Platelet count, , , Once    insulin lispro (HumaLOG) 100 units/mL subcutaneous injection 1-6 Units, 1-6 Units, Subcutaneous, 4x Daily (PC & HS), 2 Units at 11/21/23 2235 **AND** Fingerstick Glucose (POCT), , , 4x Daily AC and at bedtime    ipratropium (ATROVENT) 0.02 % inhalation solution 0.5 mg, 0.5 mg, Nebulization, TID, 0.5 mg at 11/22/23 9237    levalbuterol (XOPENEX) inhalation solution 1.25 mg, 1.25 mg, Nebulization, TID, 1.25 mg at 11/22/23 0171    magnesium sulfate 2 g/50 mL IVPB (premix) 2 g, 2 g, Intravenous, Once, 2 g at 11/22/23 0808    methocarbamol (ROBAXIN) tablet 500 mg, 500 mg, Oral, BID, 500 mg at 11/22/23 0808    [COMPLETED] potassium chloride (K-DUR,KLOR-CON) CR tablet 40 mEq, 40 mEq, Oral, Once, 40 mEq at 11/22/23 0808 **FOLLOWED BY** potassium chloride (K-DUR,KLOR-CON) CR tablet 40 mEq, 40 mEq, Oral, Once    No Known Allergies        Objective     Blood pressure 103/65, pulse 96, temperature 98.7 °F (37.1 °C), resp. rate 16, height 5' 9" (1.753 m), weight 118 kg (259 lb 3.2 oz), SpO2 98 %. Body mass index is 38.28 kg/m². PHYSICAL EXAM:      General Appearance:   Alert, cooperative, no distress   HEENT:   Normocephalic, atraumatic, anicteric.  Poor dentition, moist oral mucosa, uvula with mild erythema   Neck:  Supple, symmetrical, trachea midline   Lungs:   Clear to auscultation bilaterally; respirations even and unlabored   Heart:   Regular rate and rhythm; +S1/+S2 WNL, No M/R/G   Abdomen:   Soft, non-tender, non-distended; normal bowel sounds; no masses, no organomegaly    Genitalia:   Deferred    Rectal:   Deferred    Extremities:  No cyanosis, clubbing , 3+ pitting edema in the lower extremities bilaterally   Pulses:  2+ and symmetric    Skin:  No jaundice, rashes, or lesions          Lab Results:   Admission on 11/20/2023   Component Date Value    Ventricular Rate 11/20/2023 90     Atrial Rate 11/20/2023 90     KS Interval 11/20/2023 152     QRSD Interval 11/20/2023 84     QT Interval 11/20/2023 368     QTC Interval 11/20/2023 450     P Axis 11/20/2023 54     QRS Axis 11/20/2023 54     T Wave [place] 11/20/2023 46     WBC 11/20/2023 13.11 (H)     RBC 11/20/2023 3.72 (L)     Hemoglobin 11/20/2023 11.2 (L)     Hematocrit 11/20/2023 34.3 (L)     MCV 11/20/2023 92     MCH 11/20/2023 30.1     MCHC 11/20/2023 32.7     RDW 11/20/2023 14.8     MPV 11/20/2023 10.1     Platelets 27/16/7647 203     Sodium 11/20/2023 138     Potassium 11/20/2023 3.6     Chloride 11/20/2023 97     CO2 11/20/2023 31     ANION GAP 11/20/2023 10     BUN 11/20/2023 30 (H)     Creatinine 11/20/2023 1.65 (H)     Glucose 11/20/2023 115     Calcium 11/20/2023 9.3     AST 11/20/2023 17     ALT 11/20/2023 8     Alkaline Phosphatase 11/20/2023 92     Total Protein 11/20/2023 7.4     Albumin 11/20/2023 3.6     Total Bilirubin 11/20/2023 1.30 (H)     eGFR 11/20/2023 44     hs TnI 0hr 11/20/2023 6     SARS-CoV-2 11/20/2023 Negative     INFLUENZA A PCR 11/20/2023 Negative     INFLUENZA B PCR 11/20/2023 Negative     RSV PCR 11/20/2023 Negative     BNP 11/20/2023 38     hs TnI 2hr 11/20/2023 5     Delta 2hr hsTnI 11/20/2023 -1     Segmented % 11/20/2023 50     Lymphocytes % 11/20/2023 32     Monocytes % 11/20/2023 9     Eosinophils, % 11/20/2023 8 (H)     Basophils % 11/20/2023 1     Absolute Neutrophils 11/20/2023 6.56     Lymphocytes Absolute 11/20/2023 4.20     Monocytes Absolute 11/20/2023 1.18     Eosinophils Absolute 11/20/2023 1.05 (H)     Basophils Absolute 11/20/2023 0.13 (H)     RBC Morphology 11/20/2023 Normal     Platelet Estimate 94/42/6974 Adequate     hs TnI 4hr 11/20/2023 5     Delta 4hr hsTnI 11/20/2023 -1     A4C EF 11/21/2023 64     LVIDd 11/21/2023 4.30     LVIDS 11/21/2023 3.10     IVSd 11/21/2023 1. 10     LVPWd 11/21/2023 1.10     FS 11/21/2023 28     MV E' Tissue Velocity Se* 11/21/2023 10     LA Volume Index (BP) 11/21/2023 25.0     E/A ratio 11/21/2023 0.93     E wave deceleration time 11/21/2023 100     MV Peak E Carson 11/21/2023 69     MV Peak A Carson 11/21/2023 0.74     RVID d 11/21/2023 3.1     Tricuspid annular plane * 11/21/2023 2.00     LA size 11/21/2023 3.3     LA length (A2C) 11/21/2023 5.70     LA volume (BP) 11/21/2023 57     RAA A4C 11/21/2023 10.2     MV stenosis pressure 1/2* 11/21/2023 29     MV valve area p 1/2 meth* 11/21/2023 7.59     Ao root 11/21/2023 3.30     Asc Ao 11/21/2023 3.4     Left ventricular stroke * 11/21/2023 45.00     IVS 11/21/2023 1.1     LEFT VENTRICLE SYSTOLIC * 96/81/5510 38     LV DIASTOLIC VOLUME (MOD* 85/28/6028 83     Left Atrium Area-systoli* 11/21/2023 18.9     Left Atrium Area-systoli* 11/21/2023 22.7     LVSV, 2D 11/21/2023 45     LV EF 11/21/2023 60     POC Glucose 11/20/2023 125     WBC 11/21/2023 12.06 (H)     RBC 11/21/2023 3.49 (L)     Hemoglobin 11/21/2023 10.9 (L)     Hematocrit 11/21/2023 33.2 (L)     MCV 11/21/2023 95     MCH 11/21/2023 31.2     MCHC 11/21/2023 32.8     RDW 11/21/2023 14.8     MPV 11/21/2023 10.4     Platelets 61/47/5234 199     Sodium 11/21/2023 139     Potassium 11/21/2023 3.7     Chloride 11/21/2023 98     CO2 11/21/2023 32     ANION GAP 11/21/2023 9     BUN 11/21/2023 29 (H)     Creatinine 11/21/2023 1.56 (H)     Glucose 11/21/2023 102     Calcium 11/21/2023 9.0     eGFR 11/21/2023 47     Magnesium 11/21/2023 1.4 (L)     Cholesterol 11/21/2023 120     Triglycerides 11/21/2023 69     HDL, Direct 11/21/2023 36 (L)     LDL Calculated 11/21/2023 70     Non-HDL-Chol (CHOL-HDL) 11/21/2023 84     POC Glucose 11/21/2023 117     POC Glucose 11/21/2023 169 (H)     POC Glucose 11/21/2023 175 (H)     Sodium 11/21/2023 137     Potassium 11/21/2023 3.3 (L)     Chloride 11/21/2023 98     CO2 11/21/2023 32     ANION GAP 11/21/2023 7     BUN 11/21/2023 31 (H)     Creatinine 11/21/2023 1.51 (H)     Glucose 11/21/2023 182 (H)     Calcium 11/21/2023 9.1     eGFR 11/21/2023 49     Magnesium 11/21/2023 2.0     POC Glucose 11/21/2023 195 (H)     WBC 11/22/2023 9.78     RBC 11/22/2023 3.11 (L)     Hemoglobin 11/22/2023 9.4 (L)     Hematocrit 11/22/2023 29.2 (L)     MCV 11/22/2023 94     MCH 11/22/2023 30.2     MCHC 11/22/2023 32.2     RDW 11/22/2023 14.7     MPV 11/22/2023 10.4     Platelets 11/44/5730 211     nRBC 11/22/2023 0     Neutrophils Relative 11/22/2023 56     Immat GRANS % 11/22/2023 0     Lymphocytes Relative 11/22/2023 23     Monocytes Relative 11/22/2023 11     Eosinophils Relative 11/22/2023 10 (H)     Basophils Relative 11/22/2023 0     Neutrophils Absolute 11/22/2023 5.43     Immature Grans Absolute 11/22/2023 0.03     Lymphocytes Absolute 11/22/2023 2.21     Monocytes Absolute 11/22/2023 1.09     Eosinophils Absolute 11/22/2023 0.99 (H)     Basophils Absolute 11/22/2023 0.03     Sodium 11/22/2023 138     Potassium 11/22/2023 3.4 (L)     Chloride 11/22/2023 101     CO2 11/22/2023 29     ANION GAP 11/22/2023 8     BUN 11/22/2023 30 (H)     Creatinine 11/22/2023 1.31 (H)     Glucose 11/22/2023 131     Calcium 11/22/2023 8.8     eGFR 11/22/2023 59     Magnesium 11/22/2023 1.9     POC Glucose 11/22/2023 116          Radiology Results:   Echo complete    Result Date: 11/21/2023  Narrative:   Left Ventricle: Left ventricular cavity size is normal. Wall thickness is mildly increased. There is concentric remodeling. The left ventricular ejection fraction is 60%. Systolic function is normal. Wall motion is normal. Diastolic function is mildly abnormal, consistent with grade I (abnormal) relaxation. Right Ventricle: Right ventricular cavity size is normal. Systolic function is normal.     XR chest 1 view portable    Result Date: 11/21/2023  Narrative: CHEST INDICATION:   SOB. Decreased Lasix dosage COMPARISON: Chest radiograph 9/26/2023 EXAM PERFORMED/VIEWS:  XR CHEST PORTABLE  AP semierect FINDINGS: Cardiomediastinal silhouette appears unremarkable. Suggestion of mild pulmonary vascular congestion. No focal consolidation, pleural effusion, or pneumothorax. Osseous structures appear within normal limits for patient age. Impression: Suggestion of mild pulmonary vascular congestion. No focal consolidation, pleural effusion, or pneumothorax. Resident: Darion Mcneil, the attending radiologist, have reviewed the images and agree with the final report above. Workstation performed: [id number]       The patient was seen and examined by Dr. Darvin Wade, all lundy medical decisions were made with Dr. Darvin Wade. Thank you for allowing us to participate in the care of this pleasant patient. We will follow up with you closely.

## 2023-11-22 NOTE — ASSESSMENT & PLAN NOTE
Lab Results   Component Value Date    CREATININE 1.51 (H) 11/21/2023    CREATININE 1.56 (H) 11/21/2023    CREATININE 1.65 (H) 11/20/2023    CREATININE 0.94 09/29/2023      Baseline: 0.4-1. 40. Patient has been taking celecoxib for left shoulder pain and right wrist pain prescribed by his pcp. Held home medication losartan. UA: Not ordered. Plan:  Avoid NSAIDs.   Avoid hypotension  Continue to monitor creat with Lasix usage

## 2023-11-22 NOTE — UTILIZATION REVIEW
NOTIFICATION OF INPATIENT ADMISSION   AUTHORIZATION REQUEST   SERVICING FACILITY:   [address] [place] [address]  Tax ID: [id number]  NPI: [id number]   ATTENDING PROVIDER:  Attending Name and NPI#: Kierra Wray Md [[id number]]  Address: [address] [place] [address]  Phone: [phone]     ADMISSION INFORMATION:  Place of Service: Inpatient [address]  Place of Service Code: 21  Inpatient Admission Date/Time: 11/20/23  8:45 PM  Discharge Date/Time: No discharge date for patient encounter. Admitting Diagnosis Code/Description:  Acute respiratory insufficiency [R06.89]  SOB (shortness of breath) [R06.02]  Reactive airway disease [J45.909]  CHF exacerbation ([address]) [I50.9]  Volume overload [E87.70]  Difficulty obtaining medication [Z91.148]     UTILIZATION REVIEW CONTACT:  Bernabe Salamanca Utilization   Network Utilization Review Department  Phone: [phone]  Fax: [phone]  Email: [email or web address]  Contact for approvals/pending authorizations, clinical reviews, and discharge. PHYSICIAN ADVISORY SERVICES:  Medical Necessity Denial & [facility]wh[facility] Review  Phone: [phone]  Fax: [phone]  Email: [email or web address]     DISCHARGE SUPPORT TEAM:  For Patients Discharge Needs & Updates  Phone: [phone] opt. 2 Fax: [phone]  Email: [email or web address]

## 2023-11-22 NOTE — ASSESSMENT & PLAN NOTE
- Patient admitted to coughing during meals   - Speech eval patient noted globus sensation between clavicle and sternum with meals    Plan:   - GI following  - Barium swallow showed esophagitis  - Potential EGD inpatient vs outpatient pending barium swallow results  - Resume regular diet  - Discharged on Protonix and pepcid

## 2023-11-22 NOTE — ASSESSMENT & PLAN NOTE
Lab Results   Component Value Date    HGBA1C 8.8 (A) 10/02/2023       Recent Labs     11/24/23  1550 11/24/23  2100 11/25/23  0814 11/25/23  1129   POCGLU 164* 166* 137 220*       Blood Sugar Average: Last 72 hrs:  (P) [id number]    Home medications: Januvia and glimepiride. Plan:  SSI while inpatient  Resume home diabetes medication upon discharge.

## 2023-11-22 NOTE — ASSESSMENT & PLAN NOTE
- Last ED visit on 9/26/2023 for coughing associated with orthopnea, posttussive dizziness, nausea, chest tightness, shortness of breath and congestion  - No official COPD/asthma/AMANDO diagnosis. The last time he saw his pulmonologist was 8 years ago. Recommended to use BiPAP. But patient did not use BiPAP since then. - In the ED his saturations are 86, requiring 3 L of oxygen. Patient received albuterol nebulization.   Following nebulization patient back to 94% on room air.  - Etiology: COPD vs CHF    Plan:  - PTA  albuterol and fluticasone inhalers  - Nebs TID  - AMANDO-patient will need outpatient sleep study

## 2023-11-23 PROBLEM — R79.89 ELEVATED SERUM CREATININE: Status: RESOLVED | Noted: 2023-11-21 | Resolved: 2023-11-23

## 2023-11-23 LAB
ANION GAP SERPL CALCULATED.3IONS-SCNC: 6 MMOL/L
ANION GAP SERPL CALCULATED.3IONS-SCNC: 7 MMOL/L
BASOPHILS # BLD MANUAL: 0 THOUSAND/UL (ref 0–0.1)
BASOPHILS NFR MAR MANUAL: 0 % (ref 0–1)
BUN SERPL-MCNC: 19 MG/DL (ref 5–25)
BUN SERPL-MCNC: 22 MG/DL (ref 5–25)
CALCIUM SERPL-MCNC: 9.4 MG/DL (ref 8.4–10.2)
CALCIUM SERPL-MCNC: 9.6 MG/DL (ref 8.4–10.2)
CHLORIDE SERPL-SCNC: 102 MMOL/L (ref 96–108)
CHLORIDE SERPL-SCNC: 97 MMOL/L (ref 96–108)
CO2 SERPL-SCNC: 30 MMOL/L (ref 21–32)
CO2 SERPL-SCNC: 33 MMOL/L (ref 21–32)
CREAT SERPL-MCNC: 1.09 MG/DL (ref 0.6–1.3)
CREAT SERPL-MCNC: 1.12 MG/DL (ref 0.6–1.3)
DACRYOCYTES BLD QL SMEAR: PRESENT
EOSINOPHIL # BLD MANUAL: 1.29 THOUSAND/UL (ref 0–0.4)
EOSINOPHIL NFR BLD MANUAL: 13 % (ref 0–6)
ERYTHROCYTE [DISTWIDTH] IN BLOOD BY AUTOMATED COUNT: 14.9 % (ref 11.6–15.1)
GFR SERPL CREATININE-BSD FRML MDRD: 71 ML/MIN/1.73SQ M
GFR SERPL CREATININE-BSD FRML MDRD: 73 ML/MIN/1.73SQ M
GLUCOSE SERPL-MCNC: 116 MG/DL (ref 65–140)
GLUCOSE SERPL-MCNC: 123 MG/DL (ref 65–140)
GLUCOSE SERPL-MCNC: 159 MG/DL (ref 65–140)
GLUCOSE SERPL-MCNC: 161 MG/DL (ref 65–140)
GLUCOSE SERPL-MCNC: 164 MG/DL (ref 65–140)
GLUCOSE SERPL-MCNC: 194 MG/DL (ref 65–140)
HCT VFR BLD AUTO: 32.3 % (ref 36.5–49.3)
HGB BLD-MCNC: 10.1 G/DL (ref 12–17)
LG PLATELETS BLD QL SMEAR: PRESENT
LYMPHOCYTES # BLD AUTO: 3.47 THOUSAND/UL (ref 0.6–4.47)
LYMPHOCYTES # BLD AUTO: 31 % (ref 14–44)
MAGNESIUM SERPL-MCNC: 1.7 MG/DL (ref 1.9–2.7)
MAGNESIUM SERPL-MCNC: 1.7 MG/DL (ref 1.9–2.7)
MCH RBC QN AUTO: 29.7 PG (ref 26.8–34.3)
MCHC RBC AUTO-ENTMCNC: 31.3 G/DL (ref 31.4–37.4)
MCV RBC AUTO: 95 FL (ref 82–98)
MONOCYTES # BLD AUTO: 0.79 THOUSAND/UL (ref 0–1.22)
MONOCYTES NFR BLD: 8 % (ref 4–12)
NEUTROPHILS # BLD MANUAL: 4.36 THOUSAND/UL (ref 1.85–7.62)
NEUTS SEG NFR BLD AUTO: 44 % (ref 43–75)
PLATELET # BLD AUTO: 254 THOUSANDS/UL (ref 149–390)
PLATELET BLD QL SMEAR: ADEQUATE
PMV BLD AUTO: 10.2 FL (ref 8.9–12.7)
POTASSIUM SERPL-SCNC: 4.1 MMOL/L (ref 3.5–5.3)
POTASSIUM SERPL-SCNC: 4.4 MMOL/L (ref 3.5–5.3)
RBC # BLD AUTO: 3.4 MILLION/UL (ref 3.88–5.62)
RBC MORPH BLD: PRESENT
SODIUM SERPL-SCNC: 137 MMOL/L (ref 135–147)
SODIUM SERPL-SCNC: 138 MMOL/L (ref 135–147)
VARIANT LYMPHS # BLD AUTO: 4 %
WBC # BLD AUTO: 9.92 THOUSAND/UL (ref 4.31–10.16)

## 2023-11-23 PROCEDURE — 85027 COMPLETE CBC AUTOMATED: CPT

## 2023-11-23 PROCEDURE — 83735 ASSAY OF MAGNESIUM: CPT

## 2023-11-23 PROCEDURE — 94640 AIRWAY INHALATION TREATMENT: CPT

## 2023-11-23 PROCEDURE — 99232 SBSQ HOSP IP/OBS MODERATE 35: CPT | Performed by: INTERNAL MEDICINE

## 2023-11-23 PROCEDURE — 85007 BL SMEAR W/DIFF WBC COUNT: CPT

## 2023-11-23 PROCEDURE — 94760 N-INVAS EAR/PLS OXIMETRY 1: CPT

## 2023-11-23 PROCEDURE — 80048 BASIC METABOLIC PNL TOTAL CA: CPT

## 2023-11-23 PROCEDURE — 82948 REAGENT STRIP/BLOOD GLUCOSE: CPT

## 2023-11-23 RX ORDER — MAGNESIUM SULFATE HEPTAHYDRATE 40 MG/ML
2 INJECTION, SOLUTION INTRAVENOUS ONCE
Status: COMPLETED | OUTPATIENT
Start: 2023-11-23 | End: 2023-11-24

## 2023-11-23 RX ORDER — MAGNESIUM SULFATE HEPTAHYDRATE 40 MG/ML
2 INJECTION, SOLUTION INTRAVENOUS ONCE
Status: COMPLETED | OUTPATIENT
Start: 2023-11-23 | End: 2023-11-23

## 2023-11-23 RX ORDER — FUROSEMIDE 10 MG/ML
80 INJECTION INTRAMUSCULAR; INTRAVENOUS ONCE
Status: COMPLETED | OUTPATIENT
Start: 2023-11-23 | End: 2023-11-23

## 2023-11-23 RX ORDER — TORSEMIDE 20 MG/1
40 TABLET ORAL DAILY
Status: DISCONTINUED | OUTPATIENT
Start: 2023-11-24 | End: 2023-11-25 | Stop reason: HOSPADM

## 2023-11-23 RX ORDER — FUROSEMIDE 10 MG/ML
80 INJECTION INTRAMUSCULAR; INTRAVENOUS
Status: COMPLETED | OUTPATIENT
Start: 2023-11-23 | End: 2023-11-23

## 2023-11-23 RX ADMIN — BENZONATATE 100 MG: 100 CAPSULE ORAL at 06:39

## 2023-11-23 RX ADMIN — HEPARIN SODIUM 5000 UNITS: 5000 INJECTION INTRAVENOUS; SUBCUTANEOUS at 13:16

## 2023-11-23 RX ADMIN — HEPARIN SODIUM 5000 UNITS: 5000 INJECTION INTRAVENOUS; SUBCUTANEOUS at 21:30

## 2023-11-23 RX ADMIN — FUROSEMIDE 80 MG: 10 INJECTION, SOLUTION INTRAMUSCULAR; INTRAVENOUS at 13:17

## 2023-11-23 RX ADMIN — LEVALBUTEROL HYDROCHLORIDE 1.25 MG: 1.25 SOLUTION RESPIRATORY (INHALATION) at 07:49

## 2023-11-23 RX ADMIN — METHOCARBAMOL TABLETS 500 MG: 500 TABLET, COATED ORAL at 08:59

## 2023-11-23 RX ADMIN — LEVALBUTEROL HYDROCHLORIDE 1.25 MG: 1.25 SOLUTION RESPIRATORY (INHALATION) at 20:02

## 2023-11-23 RX ADMIN — FUROSEMIDE 80 MG: 10 INJECTION, SOLUTION INTRAMUSCULAR; INTRAVENOUS at 16:14

## 2023-11-23 RX ADMIN — INSULIN LISPRO 1 UNITS: 100 INJECTION, SOLUTION INTRAVENOUS; SUBCUTANEOUS at 13:18

## 2023-11-23 RX ADMIN — BENZONATATE 100 MG: 100 CAPSULE ORAL at 17:26

## 2023-11-23 RX ADMIN — IPRATROPIUM BROMIDE 0.5 MG: 0.5 SOLUTION RESPIRATORY (INHALATION) at 20:02

## 2023-11-23 RX ADMIN — METHOCARBAMOL TABLETS 500 MG: 500 TABLET, COATED ORAL at 17:23

## 2023-11-23 RX ADMIN — PANTOPRAZOLE SODIUM 40 MG: 40 TABLET, DELAYED RELEASE ORAL at 06:39

## 2023-11-23 RX ADMIN — FUROSEMIDE 80 MG: 10 INJECTION, SOLUTION INTRAMUSCULAR; INTRAVENOUS at 08:59

## 2023-11-23 RX ADMIN — IPRATROPIUM BROMIDE 0.5 MG: 0.5 SOLUTION RESPIRATORY (INHALATION) at 14:39

## 2023-11-23 RX ADMIN — FLUTICASONE FUROATE 1 PUFF: 200 POWDER RESPIRATORY (INHALATION) at 08:59

## 2023-11-23 RX ADMIN — MAGNESIUM SULFATE HEPTAHYDRATE 2 G: 40 INJECTION, SOLUTION INTRAVENOUS at 06:38

## 2023-11-23 RX ADMIN — IPRATROPIUM BROMIDE 0.5 MG: 0.5 SOLUTION RESPIRATORY (INHALATION) at 07:49

## 2023-11-23 RX ADMIN — LEVALBUTEROL HYDROCHLORIDE 1.25 MG: 1.25 SOLUTION RESPIRATORY (INHALATION) at 14:39

## 2023-11-23 RX ADMIN — INSULIN LISPRO 1 UNITS: 100 INJECTION, SOLUTION INTRAVENOUS; SUBCUTANEOUS at 17:27

## 2023-11-23 RX ADMIN — HEPARIN SODIUM 5000 UNITS: 5000 INJECTION INTRAVENOUS; SUBCUTANEOUS at 06:39

## 2023-11-23 NOTE — PROGRESS NOTES
Cardiology Progress Note - Idamae Paget 60 y.o. male MRN: [id number]    Unit/Bed#: S -01 Encounter: [id number]      Assessment:  Principal Problem:    Acute on chronic hypoxic respiratory failure ([address])  Active Problems:    Diabetes mellitus ([address])    (HFpEF) heart failure with preserved ejection fraction (HCC)    Difficulty swallowing      Plan:    Acute on chronic heart failure with preserved ejection fraction - EF 60% - He continues to respond to IV Lasix and his edema is improving. Creatinine remaining stable. I would continue IV Lasix today and transition to oral diuretics tomorrow. I am going to give him an extra dose of Lasix as well. I would suggest changing his outpatient regimen to torsemide 40 mg daily. Follow urine output and labs closely. Patient needs to work on compliance of medications and abstaining from alcohol abuse. He also needs to be evaluated for obstructive sleep apnea. Subjective:   Patient seen and examined. No significant events overnight. Patient clinically feeling better from a breathing standpoint. Still has lower extremity edema which has improved. Still has an on and off cough. Objective:     Vitals: Blood pressure 128/95, pulse 101, temperature 98.4 °F (36.9 °C), temperature source Oral, resp. rate 16, height 5' 9" (1.753 m), weight 117 kg (257 lb 8 oz), SpO2 96 %. , Body mass index is 38.03 kg/m².,   Orthostatic Blood Pressures      Flowsheet Row Most Recent Value   Blood Pressure 128/95 filed at 11/23/2023 3029   Patient Position - Orthostatic VS Sitting filed at 11/23/2023 0739              Intake/Output Summary (Last 24 hours) at 11/23/2023 1123  Last data filed at 11/23/2023 2091  Gross per 24 hour   Intake 240 ml   Output 1330 ml   Net -1090 ml         Physical Exam:    GEN: Idamae Paget appears well, alert and oriented x 3, pleasant and cooperative   HEENT: pupils equal, round, and reactive to light; extraocular muscles intact  NECK: supple, no carotid bruits   HEART: regular rhythm, normal S1 and S2, no murmurs, clicks, gallops or rubs   LUNGS: Diminished at bases bilaterally; no wheezes, rales, or rhonchi   ABDOMEN: normal bowel sounds, soft, no tenderness, no distention  EXTREMITIES: peripheral pulses normal; no clubbing, cyanosis +++ edema bilaterally  NEURO: no focal findings   SKIN: normal without suspicious lesions on exposed skin    Medications:      Current Facility-Administered Medications:     albuterol (PROVENTIL HFA,VENTOLIN HFA) inhaler 2 puff, 2 puff, Inhalation, Q6H PRN, Jose Uriostegui MD, 2 puff at 11/22/23 0612    benzonatate (TESSALON PERLES) capsule 100 mg, 100 mg, Oral, TID PRN, Jose Uriostegui MD, 100 mg at 11/23/23 0639    fluticasone (ARNUITY ELLIPTA) 200 MCG/ACT inhaler 1 puff, 1 puff, Inhalation, Daily, Jose Uriostegui MD, 1 puff at 11/23/23 0859    furosemide (LASIX) injection 80 mg, 80 mg, Intravenous, BID (diuretic), Stefano Carrasquillo MD, 80 mg at 11/23/23 0859    heparin (porcine) subcutaneous injection 5,000 Units, 5,000 Units, Subcutaneous, Q8H [facility], 5,000 Units at 11/23/23 0639 **AND** [CANCELED] Platelet count, , , Once, Jose Uriostegui MD    insulin lispro (HumaLOG) 100 units/mL subcutaneous injection 1-6 Units, 1-6 Units, Subcutaneous, 4x Daily (PC & HS), 1 Units at 11/22/23 2309 **AND** Fingerstick Glucose (POCT), , , 4x Daily AC and at bedtime, Jose Uriostegui MD    ipratropium (ATROVENT) 0.02 % inhalation solution 0.5 mg, 0.5 mg, Nebulization, TID, Stefano Carrasquillo MD, 0.5 mg at 11/23/23 0749    levalbuterol (XOPENEX) inhalation solution 1.25 mg, 1.25 mg, Nebulization, TID, Stefano Carrasquillo MD, 1.25 mg at 11/23/23 0749    methocarbamol (ROBAXIN) tablet 500 mg, 500 mg, Oral, BID, Jose Uriostegui MD, 500 mg at 11/23/23 08    pantoprazole (PROTONIX) EC tablet 40 mg, 40 mg, Oral, Early Morning, Josiah Kimball MD, 40 mg at 11/23/23 7430 Labs & Results:        Results from last 7 days   Lab Units 11/23/23 0443 11/22/23  0555 11/21/23 0452   WBC Thousand/uL 9.92 9.78 12.06*   HEMOGLOBIN g/dL 10.1* 9.4* 10.9*   HEMATOCRIT % 32.3* 29.2* 33.2*   PLATELETS Thousands/uL 254 211 199     Results from last 7 days   Lab Units 11/21/23 0452   TRIGLYCERIDES mg/dL 69   HDL mg/dL 36*     Results from last 7 days   Lab Units 11/23/23 0443 11/22/23 1824 11/22/23 0555 11/21/23 0452 11/20/23  1705   POTASSIUM mmol/L 4.4 4.0 3.4*   < > 3.6   CHLORIDE mmol/L 102 101 101   < > 97   CO2 mmol/L 30 31 29   < > 31   BUN mg/dL 22 24 30*   < > 30*   CREATININE mg/dL 1.09 1.21 1.31*   < > 1.65*   CALCIUM mg/dL 9.4 9.2 8.8   < > 9.3   ALK PHOS U/L  --   --   --   --  92   ALT U/L  --   --   --   --  8   AST U/L  --   --   --   --  17    < > = values in this interval not displayed. Results from last 7 days   Lab Units 11/23/23 0443 11/22/23 1824 11/22/23 0555   MAGNESIUM mg/dL 1.7* 1.8* 1.9         EKG personally reviewed by Irma Norman MD. Normal sinus rhythm and is within normal limits. ECHO:    Left Ventricle: Left ventricular cavity size is normal. Wall thickness is mildly increased. There is concentric remodeling. The left ventricular ejection fraction is 60%. Systolic function is normal. Wall motion is normal. Diastolic function is mildly abnormal, consistent with grade I (abnormal) relaxation. Right Ventricle: Right ventricular cavity size is normal. Systolic function is normal.      Counseling / Coordination of Care  Total floor / unit time spent today 25 minutes. Greater than 50% of total time was spent with the patient and / or family counseling and / or coordination of care.

## 2023-11-23 NOTE — PROGRESS NOTES
[address]  Progress Note  Name: Brian Alonzo  MRN: [id number]  Unit/Bed#: S -01 I Date of Admission: 11/20/2023   Date of Service: 11/23/2023 I Hospital Day: 3    Assessment/Plan   (HFpEF) heart failure with preserved ejection fraction [facility])  Assessment & Plan  - In 3/2023,HF admit, echocardiogram showed a new basal lateral hypokinesis thus an inpatient stress test was pursued - normal with no evidence of ischemia  - 10/23/23 cardiology follow-up-patient was near euvolemic, +peripheral edema presumed in the setting of Norvasc use, jardiance was not tolerated, and Lasix was increased from 40 to 60mg   - Trops, BNP unremarkable in ED  - Echo with G1DD and EF of 60%    Plan:  - Transition to oral lasix today   - Standing weights  - Fluid restriction 2000 mL  - Cardiology following        * Acute on chronic hypoxic respiratory failure ([address])  Assessment & Plan  - Last ED visit on 9/26/2023 for coughing associated with orthopnea, posttussive dizziness, nausea, chest tightness, shortness of breath and congestion  - No official COPD/asthma/AMANDO diagnosis. The last time he saw his pulmonologist was 8 years ago. Recommended to use BiPAP. But patient did not use BiPAP since then. - In the ED his saturations are 86, requiring 3 L of oxygen. Patient received albuterol nebulization. Following nebulization patient back to 94% on room air.  - Etiology: COPD vs CHF    Plan:  - PTA  albuterol and fluticasone inhalers.   - AMANDO-patient will need outpatient sleep study      Difficulty swallowing  Assessment & Plan  - Patient admitted to coughing during meals   - Speech eval patient noted globus sensation between clavicle and sternum with meals    Plan:   - GI following  - NPO at midnight for barium swallow on 11/24  - Potential EGD  - Started on Protonix  - Continue to monitor     Diabetes mellitus [facility])  Assessment & Plan  Lab Results   Component Value Date    HGBA1C 8.8 (A) 10/02/2023 Recent Labs     23  0711 23  1051 23  1552 23  2117   POCGLU 117 169* 175* 195*         Blood Sugar Average: Last 72 hrs:  (P) 156.2    Home medications: Januvia and glimepiride. Plan:  SSI  Adjust insulin regimen based on blood glucose levels. Elevated serum creatinine-resolved as of 2023  Assessment & Plan  Lab Results   Component Value Date    CREATININE 1.51 (H) 2023    CREATININE 1.56 (H) 2023    CREATININE 1.65 (H) 2023    CREATININE 0.94 2023      Baseline: 0.4-1. 40. Patient has been taking celecoxib for left shoulder pain and right wrist pain prescribed by his pcp. Held home medication losartan. UA: Not ordered. Plan:  Avoid NSAIDs. Avoid hypotension  Continue to monitor creat with Lasix usage              VTE Pharmacologic Prophylaxis: VTE Score: 4 Moderate Risk (Score 3-4) - Pharmacological DVT Prophylaxis Ordered: heparin. Mobility:   Basic Mobility Inpatient Raw Score: 24  JH-HLM Goal: 8: Walk 250 feet or more  JH-HLM Achieved: 8: Walk 250 feet ot more  HLM Goal achieved. Continue to encourage appropriate mobility. Patient Centered Rounds: I performed bedside rounds with nursing staff today. Discussions with Specialists or Other Care Team Provider: Cardiology, speech, GI    Education and Discussions with Family / Patient: Updated  (wife) via phone. Current Length of Stay: 3 day(s)  Current Patient Status: Inpatient   Discharge Plan: Anticipate discharge in 24-48 hrs to discharge location to be determined pending rehab evaluations. Code Status: Level 1 - Full Code    Subjective:   No acute events overnight. Leg swelling stable. Had two coughing fits overnight. Denies headache, chest pain, SOB, abd pain, n/v/d.      Objective:     Vitals:   Temp (24hrs), Av.7 °F (37.1 °C), Min:98.4 °F (36.9 °C), Max:98.9 °F (37.2 °C)    Temp:  [98.4 °F (36.9 °C)-98.9 °F (37.2 °C)] 98.4 °F (36.9 °C)  HR:  [101-107] 101  BP: (121-128)/(64-95) 128/95  SpO2:  [90 %-99 %] 96 %  Body mass index is 38.03 kg/m². Input and Output Summary (last 24 hours): Intake/Output Summary (Last 24 hours) at 11/23/2023 0809  Last data filed at 11/23/2023 0440  Gross per 24 hour   Intake 240 ml   Output 1905 ml   Net -1665 ml       Physical Exam:   Physical Exam  Vitals reviewed. Constitutional:       Appearance: Normal appearance. He is obese. HENT:      Head: Normocephalic. Nose: Nose normal.   Eyes:      Extraocular Movements: Extraocular movements intact. Conjunctiva/sclera: Conjunctivae normal.   Cardiovascular:      Rate and Rhythm: Normal rate. Heart sounds: Normal heart sounds. Pulmonary:      Effort: Pulmonary effort is normal. No respiratory distress. Breath sounds: No stridor. No wheezing or rales. Chest:      Chest wall: No tenderness. Abdominal:      General: Bowel sounds are normal. There is no distension. Tenderness: There is no abdominal tenderness. Musculoskeletal:         General: Normal range of motion. Cervical back: Normal range of motion. Right lower leg: Edema present. Left lower leg: Edema present. Skin:     General: Skin is dry. Neurological:      Mental Status: He is alert and oriented to person, place, and time. Mental status is at baseline.           Additional Data:     Labs:  Results from last 7 days   Lab Units 11/23/23 0443 11/22/23  0555   WBC Thousand/uL 9.92 9.78   HEMOGLOBIN g/dL 10.1* 9.4*   HEMATOCRIT % 32.3* 29.2*   PLATELETS Thousands/uL 254 211   NEUTROS PCT %  --  56   LYMPHS PCT %  --  23   LYMPHO PCT % 31  --    MONOS PCT %  --  11   MONO PCT % 8  --    EOS PCT % 13* 10*     Results from last 7 days   Lab Units 11/23/23  0443 11/21/23  0452 11/20/23  1705   SODIUM mmol/L 138   < > 138   POTASSIUM mmol/L 4.4   < > 3.6   CHLORIDE mmol/L 102   < > 97   CO2 mmol/L 30   < > 31   BUN mg/dL 22   < > 30*   CREATININE mg/dL 1.09   < > 1.65*   ANION GAP mmol/L 6   < > 10   CALCIUM mg/dL 9.4   < > 9.3   ALBUMIN g/dL  --   --  3.6   TOTAL BILIRUBIN mg/dL  --   --  1.30*   ALK PHOS U/L  --   --  92   ALT U/L  --   --  8   AST U/L  --   --  17   GLUCOSE RANDOM mg/dL 116   < > 115    < > = values in this interval not displayed.          Results from last 7 days   Lab Units 11/23/23  0736 11/22/23  2126 11/22/23  1524 11/22/23  1053 11/22/23  0734 11/21/23  2117 11/21/23  1552 11/21/23  1051 11/21/23  0711 11/20/23  2241   POC GLUCOSE mg/dl 123 152* 198* 103 116 195* 175* 169* 117 125               Lines/Drains:  Invasive Devices       Peripheral Intravenous Line  Duration             Peripheral IV 11/20/23 Distal;Right;Upper;Ventral (anterior) Arm 3 days                      Telemetry:  Telemetry Orders (From admission, onward)               24 Hour Telemetry Monitoring  Continuous x 24 Hours (Telem)        Question:  Reason for 24 Hour Telemetry  Answer:  Decompensated CHF- and any one of the following: continuous diuretic infusion or total diuretic dose >200 mg daily, associated electrolyte derangement (I.e. K < 3.0), ionotropic drip (continuous infusion), hx of ventricular arrhythmia, or new EF < 35%                     Telemetry Reviewed: Normal Sinus Rhythm  Indication for Continued Telemetry Use: Acute MI/Unstable Angina/Rule out ACS             Imaging: Reviewed radiology reports from this admission including: chest xray    Recent Cultures (last 7 days):         Last 24 Hours Medication List:   Current Facility-Administered Medications   Medication Dose Route Frequency Provider Last Rate    albuterol  2 puff Inhalation Q6H PRN Alaina Jordan MD      benzonatate  100 mg Oral TID PRN Alaina Jordan MD      fluticasone  1 puff Inhalation Daily Alaina Jordan MD      furosemide  80 mg Intravenous BID (diuretic) Stefano Carrasquillo MD      heparin (porcine)  5,000 Units Subcutaneous [facility] Alaina Jordan MD insulin lispro  1-6 Units Subcutaneous 4x Daily (PC & HS) Rinku Ragsdale MD      ipratropium  0.5 mg Nebulization TID Stefano Carrasquillo MD      levalbuterol  1.25 mg Nebulization TID Stefano Carrasquillo MD      magnesium sulfate  2 g Intravenous Once [facility], DO 2 g (11/23/23 3751)    methocarbamol  500 mg Oral BID Rinku Ragsdale MD      pantoprazole  40 mg Oral Early Morning Clarissa Sewell MD          Today, Patient Was Seen By: [facility], DO    **Please Note: This note may have been constructed using a voice recognition system. **

## 2023-11-24 ENCOUNTER — APPOINTMENT (INPATIENT)
Dept: RADIOLOGY | Facility: HOSPITAL | Age: 58
DRG: 291 | End: 2023-11-24
Payer: COMMERCIAL

## 2023-11-24 LAB
ANION GAP SERPL CALCULATED.3IONS-SCNC: 6 MMOL/L
BASOPHILS # BLD MANUAL: 0 THOUSAND/UL (ref 0–0.1)
BASOPHILS NFR MAR MANUAL: 0 % (ref 0–1)
BUN SERPL-MCNC: 18 MG/DL (ref 5–25)
CALCIUM SERPL-MCNC: 9.6 MG/DL (ref 8.4–10.2)
CHLORIDE SERPL-SCNC: 98 MMOL/L (ref 96–108)
CO2 SERPL-SCNC: 32 MMOL/L (ref 21–32)
CREAT SERPL-MCNC: 1.16 MG/DL (ref 0.6–1.3)
EOSINOPHIL # BLD MANUAL: 0.88 THOUSAND/UL (ref 0–0.4)
EOSINOPHIL NFR BLD MANUAL: 9 % (ref 0–6)
ERYTHROCYTE [DISTWIDTH] IN BLOOD BY AUTOMATED COUNT: 14.6 % (ref 11.6–15.1)
GFR SERPL CREATININE-BSD FRML MDRD: 68 ML/MIN/1.73SQ M
GLUCOSE SERPL-MCNC: 121 MG/DL (ref 65–140)
GLUCOSE SERPL-MCNC: 131 MG/DL (ref 65–140)
GLUCOSE SERPL-MCNC: 134 MG/DL (ref 65–140)
GLUCOSE SERPL-MCNC: 164 MG/DL (ref 65–140)
GLUCOSE SERPL-MCNC: 166 MG/DL (ref 65–140)
HCT VFR BLD AUTO: 32.5 % (ref 36.5–49.3)
HGB BLD-MCNC: 10.1 G/DL (ref 12–17)
LYMPHOCYTES # BLD AUTO: 3.33 THOUSAND/UL (ref 0.6–4.47)
LYMPHOCYTES # BLD AUTO: 34 % (ref 14–44)
MAGNESIUM SERPL-MCNC: 1.7 MG/DL (ref 1.9–2.7)
MCH RBC QN AUTO: 29 PG (ref 26.8–34.3)
MCHC RBC AUTO-ENTMCNC: 31.1 G/DL (ref 31.4–37.4)
MCV RBC AUTO: 93 FL (ref 82–98)
MONOCYTES # BLD AUTO: 0.59 THOUSAND/UL (ref 0–1.22)
MONOCYTES NFR BLD: 6 % (ref 4–12)
NEUTROPHILS # BLD MANUAL: 4.99 THOUSAND/UL (ref 1.85–7.62)
NEUTS SEG NFR BLD AUTO: 51 % (ref 43–75)
PLATELET # BLD AUTO: 262 THOUSANDS/UL (ref 149–390)
PLATELET BLD QL SMEAR: ADEQUATE
PMV BLD AUTO: 9.8 FL (ref 8.9–12.7)
POTASSIUM SERPL-SCNC: 3.9 MMOL/L (ref 3.5–5.3)
RBC # BLD AUTO: 3.48 MILLION/UL (ref 3.88–5.62)
RBC MORPH BLD: NORMAL
SODIUM SERPL-SCNC: 136 MMOL/L (ref 135–147)
WBC # BLD AUTO: 9.79 THOUSAND/UL (ref 4.31–10.16)

## 2023-11-24 PROCEDURE — 94760 N-INVAS EAR/PLS OXIMETRY 1: CPT

## 2023-11-24 PROCEDURE — 74220 X-RAY XM ESOPHAGUS 1CNTRST: CPT

## 2023-11-24 PROCEDURE — 99232 SBSQ HOSP IP/OBS MODERATE 35: CPT | Performed by: STUDENT IN AN ORGANIZED HEALTH CARE EDUCATION/TRAINING PROGRAM

## 2023-11-24 PROCEDURE — 85027 COMPLETE CBC AUTOMATED: CPT

## 2023-11-24 PROCEDURE — 99232 SBSQ HOSP IP/OBS MODERATE 35: CPT | Performed by: INTERNAL MEDICINE

## 2023-11-24 PROCEDURE — 80048 BASIC METABOLIC PNL TOTAL CA: CPT

## 2023-11-24 PROCEDURE — 94640 AIRWAY INHALATION TREATMENT: CPT

## 2023-11-24 PROCEDURE — 82948 REAGENT STRIP/BLOOD GLUCOSE: CPT

## 2023-11-24 PROCEDURE — 94762 N-INVAS EAR/PLS OXIMTRY CONT: CPT

## 2023-11-24 PROCEDURE — 85007 BL SMEAR W/DIFF WBC COUNT: CPT

## 2023-11-24 PROCEDURE — 83735 ASSAY OF MAGNESIUM: CPT

## 2023-11-24 RX ORDER — SPIRONOLACTONE 25 MG/1
25 TABLET ORAL DAILY
Status: DISCONTINUED | OUTPATIENT
Start: 2023-11-24 | End: 2023-11-25 | Stop reason: HOSPADM

## 2023-11-24 RX ORDER — MAGNESIUM SULFATE HEPTAHYDRATE 40 MG/ML
2 INJECTION, SOLUTION INTRAVENOUS ONCE
Status: COMPLETED | OUTPATIENT
Start: 2023-11-24 | End: 2023-11-24

## 2023-11-24 RX ORDER — GUAIFENESIN 600 MG/1
600 TABLET, EXTENDED RELEASE ORAL EVERY 12 HOURS PRN
Status: DISCONTINUED | OUTPATIENT
Start: 2023-11-24 | End: 2023-11-25

## 2023-11-24 RX ORDER — PANTOPRAZOLE SODIUM 40 MG/1
40 TABLET, DELAYED RELEASE ORAL
Status: DISCONTINUED | OUTPATIENT
Start: 2023-11-24 | End: 2023-11-25 | Stop reason: HOSPADM

## 2023-11-24 RX ORDER — FAMOTIDINE 20 MG/1
20 TABLET, FILM COATED ORAL
Status: DISCONTINUED | OUTPATIENT
Start: 2023-11-24 | End: 2023-11-25 | Stop reason: HOSPADM

## 2023-11-24 RX ADMIN — INSULIN LISPRO 1 UNITS: 100 INJECTION, SOLUTION INTRAVENOUS; SUBCUTANEOUS at 17:40

## 2023-11-24 RX ADMIN — PANTOPRAZOLE SODIUM 40 MG: 40 TABLET, DELAYED RELEASE ORAL at 17:39

## 2023-11-24 RX ADMIN — IPRATROPIUM BROMIDE 0.5 MG: 0.5 SOLUTION RESPIRATORY (INHALATION) at 14:30

## 2023-11-24 RX ADMIN — MAGNESIUM SULFATE HEPTAHYDRATE 2 G: 40 INJECTION, SOLUTION INTRAVENOUS at 01:31

## 2023-11-24 RX ADMIN — LEVALBUTEROL HYDROCHLORIDE 1.25 MG: 1.25 SOLUTION RESPIRATORY (INHALATION) at 20:07

## 2023-11-24 RX ADMIN — FAMOTIDINE 20 MG: 20 TABLET, FILM COATED ORAL at 21:24

## 2023-11-24 RX ADMIN — LEVALBUTEROL HYDROCHLORIDE 1.25 MG: 1.25 SOLUTION RESPIRATORY (INHALATION) at 14:30

## 2023-11-24 RX ADMIN — METHOCARBAMOL TABLETS 500 MG: 500 TABLET, COATED ORAL at 17:39

## 2023-11-24 RX ADMIN — TORSEMIDE 40 MG: 20 TABLET ORAL at 08:41

## 2023-11-24 RX ADMIN — PANTOPRAZOLE SODIUM 40 MG: 40 TABLET, DELAYED RELEASE ORAL at 06:05

## 2023-11-24 RX ADMIN — HEPARIN SODIUM 5000 UNITS: 5000 INJECTION INTRAVENOUS; SUBCUTANEOUS at 21:24

## 2023-11-24 RX ADMIN — HEPARIN SODIUM 5000 UNITS: 5000 INJECTION INTRAVENOUS; SUBCUTANEOUS at 13:10

## 2023-11-24 RX ADMIN — LEVALBUTEROL HYDROCHLORIDE 1.25 MG: 1.25 SOLUTION RESPIRATORY (INHALATION) at 07:26

## 2023-11-24 RX ADMIN — HEPARIN SODIUM 5000 UNITS: 5000 INJECTION INTRAVENOUS; SUBCUTANEOUS at 06:05

## 2023-11-24 RX ADMIN — INSULIN LISPRO 1 UNITS: 100 INJECTION, SOLUTION INTRAVENOUS; SUBCUTANEOUS at 21:25

## 2023-11-24 RX ADMIN — IPRATROPIUM BROMIDE 0.5 MG: 0.5 SOLUTION RESPIRATORY (INHALATION) at 20:07

## 2023-11-24 RX ADMIN — FLUTICASONE FUROATE 1 PUFF: 200 POWDER RESPIRATORY (INHALATION) at 08:41

## 2023-11-24 RX ADMIN — METHOCARBAMOL TABLETS 500 MG: 500 TABLET, COATED ORAL at 08:41

## 2023-11-24 RX ADMIN — GUAIFENESIN 600 MG: 600 TABLET ORAL at 13:14

## 2023-11-24 RX ADMIN — SPIRONOLACTONE 25 MG: 25 TABLET ORAL at 09:26

## 2023-11-24 RX ADMIN — MAGNESIUM SULFATE HEPTAHYDRATE 2 G: 40 INJECTION, SOLUTION INTRAVENOUS at 06:38

## 2023-11-24 RX ADMIN — IPRATROPIUM BROMIDE 0.5 MG: 0.5 SOLUTION RESPIRATORY (INHALATION) at 07:26

## 2023-11-24 RX ADMIN — GUAIFENESIN 600 MG: 600 TABLET ORAL at 21:24

## 2023-11-24 NOTE — DISCHARGE SUMMARY
[facility]  Discharge- Cherylene Barker 1965, 60 y.o. male MRN: [id number]  Unit/Bed#: S -01 Encounter: [id number]  Primary Care Provider: Fani Macias MD   Date and time admitted to hospital: 11/20/2023  4:52 PM    * Acute on chronic hypoxic respiratory failure [facility])  Assessment & Plan  - Last ED visit on 9/26/2023 for coughing associated with orthopnea, posttussive dizziness, nausea, chest tightness, shortness of breath and congestion  - No official COPD/asthma/AMANDO diagnosis. The last time he saw his pulmonologist was 8 years ago. Recommended to use BiPAP. But patient did not use BiPAP since then. - In the ED his saturations are 86, requiring 3 L of oxygen. Patient received albuterol nebulization.   Following nebulization patient back to 94% on room air.  - Etiology: COPD vs CHF    Plan:  - PTA  albuterol and fluticasone inhalers  - Nebs TID  - AMANDO-patient will need outpatient sleep study      (HFpEF) heart failure with preserved ejection fraction [facility])  Assessment & Plan  - In 3/2023,HF admit, echocardiogram showed a new basal lateral hypokinesis thus an inpatient stress test was pursued - normal with no evidence of ischemia  - 10/23/23 cardiology follow-up-patient was near euvolemic, +peripheral edema presumed in the setting of Norvasc use, jardiance was not tolerated, and Lasix was increased from 40 to 60mg   - Trops, BNP unremarkable in ED  - Echo with G1DD and EF of 60%    Plan:  - Transition to oral torsemide 40mg   - Standing weights  - Fluid restriction 1500 mL  - Cardiology following        Difficulty swallowing  Assessment & Plan  - Patient admitted to coughing during meals   - Speech eval patient noted globus sensation between clavicle and sternum with meals    Plan:   - GI following  - Barium swallow showed esophagitis  - Potential EGD inpatient vs outpatient pending barium swallow results  - Resume regular diet  - Discharged on Protonix and pepcid      Diabetes mellitus [facility])  Assessment & Plan  Lab Results   Component Value Date    HGBA1C 8.8 (A) 10/02/2023       Recent Labs     11/24/23  1550 11/24/23  2100 11/25/23  0814 11/25/23  1129   POCGLU 164* 166* 137 220*       Blood Sugar Average: Last 72 hrs:  (P) 151.9565042783339698    Home medications: Januvia and glimepiride. Plan:  SSI while inpatient  Resume home diabetes medication upon discharge. Elevated serum creatinine-resolved as of 11/23/2023  Assessment & Plan  Lab Results   Component Value Date    CREATININE 1.51 (H) 11/21/2023    CREATININE 1.56 (H) 11/21/2023    CREATININE 1.65 (H) 11/20/2023    CREATININE 0.94 09/29/2023      Baseline: 0.4-1. 40. Patient has been taking celecoxib for left shoulder pain and right wrist pain prescribed by his pcp. Held home medication losartan. UA: Not ordered. Plan:  Avoid NSAIDs. Avoid hypotension  Continue to monitor creat with Lasix usage         Medical Problems       Resolved Problems  Date Reviewed: 11/20/2023            Resolved    Elevated serum creatinine 11/23/2023     Resolved by  Martin Robert DO        Discharging Resident: Mary Bills MD  Discharging Attending: No att. providers found  PCP: Miriam Vargas MD  Admission Date:   Admission Orders (From admission, onward)       Ordered        11/20/23 2045  INPATIENT ADMISSION  Once                          Discharge Date: 11/25/23    Consultations During Hospital Stay:  Cardiology, GI, speech    Procedures Performed:   Barium swallow    Significant Findings / Test Results:   FL barium swallow ROUTINE esophagus   Final Result by Hina Warner MD (11/24 1408)      1. Large amount of gastroesophageal reflux occurs when patient is supine/during coughing. 2. No other abnormalities were seen               Workstation performed: [id number]         XR chest 1 view portable   ED Interpretation by Shasta Amezcua MD (11/20 1937)   Unremarkable cardiac silhouette.   Mild vascular congestion and crowding in the right perihilar region. No appreciated infiltrate or effusion. Final Result by Naya Cabral MD (50/50 7990)      Suggestion of mild pulmonary vascular congestion. No focal consolidation, pleural effusion, or pneumothorax. Resident: Kathia Vizcarra, the attending radiologist, have reviewed the images and agree with the final report above. Workstation performed: [id number]               Incidental Findings:   - None    Test Results Pending at Discharge (will require follow up): None     Outpatient Tests Requested:  None    Complications:  None    Reason for Admission: Heart failure exacerbation    Hospital Course:     Chantel Frias is a 60 y.o. male patient who originally presented to the hospital on 11/20/2023 due to heart failure exacerbation. Heart failure was consulted and echocardiogram showed ejection fraction 55%. He was diuresed with IV lasix and transitioned to Torsemide and spironolactone given his persistent hypokalemia. He also complained of a globus sensation when evaluated by speech therapy and GI was consulted. He was taken for barium swallow which revealed esophagitis and referred for outpatient EGD. He was recommended to take PPI for GERD. He was counseled extensively on smoking cessation, medication compliance and will follow up with GI, cardiologist, and PCP outpatient. Ambulatory referral to pulmonary and sleep study was provided upon discharge for further evaluation and management of obstructive sleep apnea. Please see above list of diagnoses and related plan for additional information. Condition at Discharge: stable    Discharge Day Visit / Exam:   Subjective: Patient was seen and examined at bedside. He was sitting comfortably on the recliner chair. He understands that he needs to keep fluid restriction and to take diuretic for volume overload. He denied chest pain, shortness of breath, fever, chills. She did report that he had some mucus production and chest congestion for which he is agreeable to receive Mucinex. He told me to call his wife who is available to talk today. No significant overnight event reported. Vitals: Blood Pressure: 116/73 (11/25/23 0812)  Pulse: 103 (11/25/23 0812)  Temperature: 99.1 °F (37.3 °C) (11/25/23 0812)  Temp Source: Oral (11/25/23 4665)  Respirations: 18 (11/25/23 0812)  Height: 5' 9" (175.3 cm) (11/21/23 1438)  Weight - Scale: 115 kg (253 lb 8.5 oz) (11/25/23 0539)  SpO2: 91 % (11/25/23 0900)  Exam:   Physical Exam  Vitals and nursing note reviewed. Constitutional:       General: He is not in acute distress. Appearance: He is well-developed. He is obese. HENT:      Head: Normocephalic and atraumatic. Mouth/Throat:      Mouth: Mucous membranes are moist.      Pharynx: Oropharynx is clear. Eyes:      Extraocular Movements: Extraocular movements intact. Conjunctiva/sclera: Conjunctivae normal.      Pupils: Pupils are equal, round, and reactive to light. Cardiovascular:      Rate and Rhythm: Normal rate and regular rhythm. Heart sounds: No murmur heard. Pulmonary:      Effort: Pulmonary effort is normal. No respiratory distress. Breath sounds: Normal breath sounds. No wheezing or rales. Comments: Patient is saturating well on room air. Ambulatory pulse ox 91%. Abdominal:      General: Bowel sounds are normal. There is no distension. Palpations: Abdomen is soft. Musculoskeletal:         General: Swelling present. Cervical back: Neck supple. Right lower leg: Edema present. Left lower leg: Edema present. Comments: 1+ pedal edema bilaterally which is chronic. Skin:     General: Skin is warm and dry. Capillary Refill: Capillary refill takes less than 2 seconds. Coloration: Skin is not jaundiced or pale. Findings: No lesion or rash.    Neurological:      Mental Status: He is alert and oriented to person, place, and time. Psychiatric:         Mood and Affect: Mood normal.          Discussion with Family: Updated  (wife) via phone. Discharge instructions/Information to patient and family:   See after visit summary for information provided to patient and family. Provisions for Follow-Up Care:  See after visit summary for information related to follow-up care and any pertinent home health orders. Mobility at time of Discharge:   Basic Mobility Inpatient Raw Score: 24  JH-HLM Goal: 8: Walk 250 feet or more  JH-HLM Achieved: 8: Walk 250 feet ot more  HLM Goal achieved. Continue to encourage appropriate mobility. Disposition:   Home    Planned Readmission: None    Discharge Medications:  See after visit summary for reconciled discharge medications provided to patient and/or family.       Nutrition Assessment and Intervention:     Reviewed food recall journal      Physical Activity Assessment and Intervention:    Activity journal reviewed    Activity journal completion recommended      Emotional and Mental Well-being, Sleep, Connectedness Assessment and Intervention:    Sleep/stress assessment performed    Depression and anxiety screening performed and reviewed      Tobacco and Toxic Substance Assessment and Intervention:     Tobacco use screening performed    Alcohol and drug use screening performed      Therapeutic Lifestyle Change Visit:     One-on-one comprehensive counseling, coaching, and health behavior change visit completed       **Please Note: This note may have been constructed using a voice recognition system**

## 2023-11-24 NOTE — PROGRESS NOTES
[address] [facility]  Progress Note  Name: Parveen Hough  MRN: [id number]  Unit/Bed#: S -01 I Date of Admission: 11/20/2023   Date of Service: 11/24/2023 I Hospital Day: 4    Assessment/Plan   (HFpEF) heart failure with preserved ejection fraction [facility])  Assessment & Plan  - In 3/2023,HF admit, echocardiogram showed a new basal lateral hypokinesis thus an inpatient stress test was pursued - normal with no evidence of ischemia  - 10/23/23 cardiology follow-up-patient was near euvolemic, +peripheral edema presumed in the setting of Norvasc use, jardiance was not tolerated, and Lasix was increased from 40 to 60mg   - Trops, BNP unremarkable in ED  - Echo with G1DD and EF of 60%    Plan:  - Transition to oral torsemide 40mg today  - Standing weights  - Fluid restriction 2000 mL  - Cardiology following        * Acute on chronic hypoxic respiratory failure ([address])  Assessment & Plan  - Last ED visit on 9/26/2023 for coughing associated with orthopnea, posttussive dizziness, nausea, chest tightness, shortness of breath and congestion  - No official COPD/asthma/AMANDO diagnosis. The last time he saw his pulmonologist was 8 years ago. Recommended to use BiPAP. But patient did not use BiPAP since then. - In the ED his saturations are 86, requiring 3 L of oxygen. Patient received albuterol nebulization.   Following nebulization patient back to 94% on room air.  - Etiology: COPD vs CHF    Plan:  - PTA  albuterol and fluticasone inhalers  - Nebs TID  - AMANDO-patient will need outpatient sleep study      Difficulty swallowing  Assessment & Plan  - Patient admitted to coughing during meals   - Speech eval patient noted globus sensation between clavicle and sternum with meals    Plan:   - GI following  - Barium swallow today  - Potential EGD inpatient vs outpatient pending barium swallow results  - Diet per speech  - Started on Protonix and pepcid  - Continue to monitor     Diabetes mellitus ([address])  Assessment & Plan  Lab Results   Component Value Date    HGBA1C 8.8 (A) 10/02/2023       Recent Labs     23  0711 23  1051 23  1552 23  2117   POCGLU 117 169* 175* 195*         Blood Sugar Average: Last 72 hrs:  (P) 156.2    Home medications: Januvia and glimepiride. Plan:  SSI  Adjust insulin regimen based on blood glucose levels. Elevated serum creatinine-resolved as of 2023  Assessment & Plan  Lab Results   Component Value Date    CREATININE 1.51 (H) 2023    CREATININE 1.56 (H) 2023    CREATININE 1.65 (H) 2023    CREATININE 0.94 2023      Baseline: 0.4-1. 40. Patient has been taking celecoxib for left shoulder pain and right wrist pain prescribed by his pcp. Held home medication losartan. UA: Not ordered. Plan:  Avoid NSAIDs. Avoid hypotension  Continue to monitor creat with Lasix usage              VTE Pharmacologic Prophylaxis: VTE Score: 4 Moderate Risk (Score 3-4) - Pharmacological DVT Prophylaxis Ordered: heparin. Mobility:   Basic Mobility Inpatient Raw Score: 24  JH-HLM Goal: 8: Walk 250 feet or more  JH-HLM Achieved: 7: Walk 25 feet or more  HLM Goal achieved. Continue to encourage appropriate mobility. Patient Centered Rounds: I performed bedside rounds with nursing staff today. Discussions with Specialists or Other Care Team Provider: Cardiology, speech, GI    Education and Discussions with Family / Patient: Attempted to update  (wife) via phone. Unable to contact. Current Length of Stay: 4 day(s)  Current Patient Status: Inpatient   Discharge Plan: Anticipate discharge in 24-48 hrs to discharge location to be determined pending rehab evaluations. Code Status: Level 1 - Full Code    Subjective:   No acute events overnight. Leg swelling slightly improved. Cough still present. Denies headache, chest pain, SOB, abd pain, n/v/d.      Objective:     Vitals:   Temp (24hrs), Av °F (37.2 °C), Min:98.5 °F (36.9 °C), Max:99.2 °F (37.3 °C)    Temp:  [98.5 °F (36.9 °C)-99.2 °F (37.3 °C)] 98.5 °F (36.9 °C)  HR:  [107-115] 115  Resp:  [16-18] 16  BP: (101-145)/(64-71) 145/71  SpO2:  [90 %-100 %] 92 %  Body mass index is 37.54 kg/m². Input and Output Summary (last 24 hours): Intake/Output Summary (Last 24 hours) at 11/24/2023 1120  Last data filed at 11/24/2023 0846  Gross per 24 hour   Intake 940 ml   Output 2325 ml   Net -1385 ml         Physical Exam:   Physical Exam  Vitals reviewed. Constitutional:       Appearance: Normal appearance. He is obese. HENT:      Head: Normocephalic. Nose: Nose normal.   Eyes:      Extraocular Movements: Extraocular movements intact. Conjunctiva/sclera: Conjunctivae normal.   Cardiovascular:      Rate and Rhythm: Normal rate. Heart sounds: Normal heart sounds. Pulmonary:      Effort: Pulmonary effort is normal. No respiratory distress. Breath sounds: No stridor. No wheezing or rales. Chest:      Chest wall: No tenderness. Abdominal:      General: Bowel sounds are normal. There is no distension. Tenderness: There is no abdominal tenderness. Musculoskeletal:         General: Normal range of motion. Cervical back: Normal range of motion. Right lower leg: Edema present. Left lower leg: Edema present. Skin:     General: Skin is dry. Neurological:      Mental Status: He is alert and oriented to person, place, and time. Mental status is at baseline.           Additional Data:     Labs:  Results from last 7 days   Lab Units 11/24/23  0539 11/23/23  0443 11/22/23  0555   WBC Thousand/uL 9.79 9.92 9.78   HEMOGLOBIN g/dL 10.1* 10.1* 9.4*   HEMATOCRIT % 32.5* 32.3* 29.2*   PLATELETS Thousands/uL 262 254 211   NEUTROS PCT %  --   --  56   LYMPHS PCT %  --   --  23   LYMPHO PCT %  --  31  --    MONOS PCT %  --   --  11   MONO PCT %  --  8  --    EOS PCT %  --  13* 10*       Results from last 7 days   Lab Units 11/24/23  0539 11/21/23  0452 11/20/23  1705   SODIUM mmol/L 136   < > 138   POTASSIUM mmol/L 3.9   < > 3.6   CHLORIDE mmol/L 98   < > 97   CO2 mmol/L 32   < > 31   BUN mg/dL 18   < > 30*   CREATININE mg/dL 1.16   < > 1.65*   ANION GAP mmol/L 6   < > 10   CALCIUM mg/dL 9.6   < > 9.3   ALBUMIN g/dL  --   --  3.6   TOTAL BILIRUBIN mg/dL  --   --  1.30*   ALK PHOS U/L  --   --  92   ALT U/L  --   --  8   AST U/L  --   --  17   GLUCOSE RANDOM mg/dL 134   < > 115    < > = values in this interval not displayed.            Results from last 7 days   Lab Units 11/24/23  1103 11/24/23  0823 11/23/23  2055 11/23/23  1646 11/23/23  1132 11/23/23  0736 11/22/23  2126 11/22/23  1524 11/22/23  1053 11/22/23  0734 11/21/23  2117 11/21/23  1552   POC GLUCOSE mg/dl 121 131 159* 164* 161* 123 152* 198* 103 116 195* 175*                 Lines/Drains:  Invasive Devices       Peripheral Intravenous Line  Duration             Peripheral IV 11/20/23 Distal;Right;Upper;Ventral (anterior) Arm 4 days                      Telemetry:  Telemetry Orders (From admission, onward)               24 Hour Telemetry Monitoring  Continuous x 24 Hours (Telem)        Question:  Reason for 24 Hour Telemetry  Answer:  Decompensated CHF- and any one of the following: continuous diuretic infusion or total diuretic dose >200 mg daily, associated electrolyte derangement (I.e. K < 3.0), ionotropic drip (continuous infusion), hx of ventricular arrhythmia, or new EF < 35%                     Telemetry Reviewed: Normal Sinus Rhythm  Indication for Continued Telemetry Use: Acute MI/Unstable Angina/Rule out ACS             Imaging: Reviewed radiology reports from this admission including: chest xray    Recent Cultures (last 7 days):         Last 24 Hours Medication List:   Current Facility-Administered Medications   Medication Dose Route Frequency Provider Last Rate    albuterol  2 puff Inhalation Q6H PRN Keilayue Holguin MD      famotidine  20 mg Oral HS Gwen Long PA-C fluticasone  1 puff Inhalation Daily Herlinda Casiano MD      guaiFENesin  600 mg Oral Q12H PRN Mahesh Demarco DO      heparin (porcine)  5,000 Units Subcutaneous [facility] Herlinda Casiano MD      insulin lispro  1-6 Units Subcutaneous 4x Daily (PC & HS) Herlinda Casiano MD      ipratropium  0.5 mg Nebulization TID Stefano Carrasquillo MD      levalbuterol  1.25 mg Nebulization TID Stefano Carrasquillo MD      methocarbamol  500 mg Oral BID Herlinda Casiano MD      pantoprazole  40 mg Oral Early Morning Bridgette Billings MD      spironolactone  25 mg Oral Daily Ree Duncan MD      torsemide  40 mg Oral Daily Alex Wall MD          Today, Patient Was Seen By: Mahesh Demarco DO    **Please Note: This note may have been constructed using a voice recognition system. **

## 2023-11-24 NOTE — PLAN OF CARE
Problem: Potential for Falls  Goal: Patient will remain free of falls  Description: INTERVENTIONS:  - Educate patient/family on patient safety including physical limitations  - Instruct patient to call for assistance with activity   - Consult OT/PT to assist with strengthening/mobility   - Keep Call bell within reach  - Keep bed low and locked with side rails adjusted as appropriate  - Keep care items and personal belongings within reach  - Initiate and maintain comfort rounds  - Make Fall Risk Sign visible to staff  - Offer Toileting every  Hours, in advance of need  - Initiate/Maintain alarm  - Obtain necessary fall risk management equipment:   - Apply yellow socks and bracelet for high fall risk patients  - Consider moving patient to room near nurses station  Outcome: Progressing     Problem: Prexisting or High Potential for Compromised Skin Integrity  Goal: Skin integrity is maintained or improved  Description: INTERVENTIONS:  - Identify patients at risk for skin breakdown  - Assess and monitor skin integrity  - Assess and monitor nutrition and hydration status  - Monitor labs   - Assess for incontinence   - Turn and reposition patient  - Assist with mobility/ambulation  - Relieve pressure over bony prominences  - Avoid friction and shearing  - Provide appropriate hygiene as needed including keeping skin clean and dry  - Evaluate need for skin moisturizer/barrier cream  - Collaborate with interdisciplinary team   - Patient/family teaching  - Consider wound care consult   Outcome: Progressing     Problem: PAIN - ADULT  Goal: Verbalizes/displays adequate comfort level or baseline comfort level  Description: Interventions:  - Encourage patient to monitor pain and request assistance  - Assess pain using appropriate pain scale  - Administer analgesics based on type and severity of pain and evaluate response  - Implement non-pharmacological measures as appropriate and evaluate response  - Consider cultural and social influences on pain and pain management  - Notify physician/advanced practitioner if interventions unsuccessful or patient reports new pain  Outcome: Progressing     Problem: INFECTION - ADULT  Goal: Absence or prevention of progression during hospitalization  Description: INTERVENTIONS:  - Assess and monitor for signs and symptoms of infection  - Monitor lab/diagnostic results  - Monitor all insertion sites, i.e. indwelling lines, tubes, and drains  - Monitor endotracheal if appropriate and nasal secretions for changes in amount and color  - [place] appropriate cooling/warming therapies per order  - Administer medications as ordered  - Instruct and encourage patient and family to use good hand hygiene technique  - Identify and instruct in appropriate isolation precautions for identified infection/condition  Outcome: Progressing  Goal: Absence of fever/infection during neutropenic period  Description: INTERVENTIONS:  - Monitor WBC    Outcome: Progressing     Problem: SAFETY ADULT  Goal: Patient will remain free of falls  Description: INTERVENTIONS:  - Educate patient/family on patient safety including physical limitations  - Instruct patient to call for assistance with activity   - Consult OT/PT to assist with strengthening/mobility   - Keep Call bell within reach  - Keep bed low and locked with side rails adjusted as appropriate  - Keep care items and personal belongings within reach  - Initiate and maintain comfort rounds  - Make Fall Risk Sign visible to staff  - Offer Toileting every  Hours, in advance of need  - Initiate/Maintain alarm  - Obtain necessary fall risk management equipment:   - Apply yellow socks and bracelet for high fall risk patients  - Consider moving patient to room near nurses station  Outcome: Progressing  Goal: Maintain or return to baseline ADL function  Description: INTERVENTIONS:  -  Assess patient's ability to carry out ADLs; assess patient's baseline for ADL function and identify physical deficits which impact ability to perform ADLs (bathing, care of mouth/teeth, toileting, grooming, dressing, etc.)  - Assess/evaluate cause of self-care deficits   - Assess range of motion  - Assess patient's mobility; develop plan if impaired  - Assess patient's need for assistive devices and provide as appropriate  - Encourage maximum independence but intervene and supervise when necessary  - Involve family in performance of ADLs  - Assess for home care needs following discharge   - Consider OT consult to assist with ADL evaluation and planning for discharge  - Provide patient education as appropriate  Outcome: Progressing  Goal: Maintains/Returns to pre admission functional level  Description: INTERVENTIONS:  - Perform AM-PAC 6 Click Basic Mobility/ Daily Activity assessment daily.  - Set and communicate daily mobility goal to care team and patient/family/caregiver. - Collaborate with rehabilitation services on mobility goals if consulted  - Perform Range of Motion  times a day. - Reposition patient every  hours.   - Dangle patient  times a day  - Stand patient  times a day  - Ambulate patient  times a day  - Out of bed to chair  times a day   - Out of bed for meals  times a day  - Out of bed for toileting  - Record patient progress and toleration of activity level   Outcome: Progressing     Problem: DISCHARGE PLANNING  Goal: Discharge to home or other facility with appropriate resources  Description: INTERVENTIONS:  - Identify barriers to discharge w/patient and caregiver  - Arrange for needed discharge resources and transportation as appropriate  - Identify discharge learning needs (meds, wound care, etc.)  - Arrange for interpretive services to assist at discharge as needed  - Refer to Case Management Department for coordinating discharge planning if the patient needs post-hospital services based on physician/advanced practitioner order or complex needs related to functional status, cognitive ability, or social support system  Outcome: Progressing     Problem: Knowledge Deficit  Goal: Patient/family/caregiver demonstrates understanding of disease process, treatment plan, medications, and discharge instructions  Description: Complete learning assessment and assess knowledge base.   Interventions:  - Provide teaching at level of understanding  - Provide teaching via preferred learning methods  Outcome: Progressing

## 2023-11-24 NOTE — UTILIZATION REVIEW
Mg Foy RN  Utilization Review  Specialty: Utilization Review     Utilization Review     Addendum     Date of Service: 11/21/2023 12:11 PM     Expand All Collapse All    Initial Clinical Review     Admission: Date/Time/Statement:   Admission Orders (From admission, onward)          Ordered         11/20/23 2045   INPATIENT ADMISSION  Once                                     Orders Placed This Encounter   Procedures    INPATIENT ADMISSION       Standing Status:   Standing       Number of Occurrences:   1       Order Specific Question:   Level of Care       Answer:   Med Surg [16]       Order Specific Question:   Estimated length of stay       Answer:   More than 2 Midnights       Order Specific Question:   Certification       Answer:   I certify that inpatient services are medically necessary for this patient for a duration of greater than two midnights. See H&P and MD Progress Notes for additional information about the patient's course of treatment. ED Arrival Information         Expected   -    Arrival   11/20/2023 16:39    Acuity   Emergent                 Means of arrival   Walk-In    Escorted by   Self    Service   Hospitalist    Admission type   Emergency                 Arrival complaint   SOB/PAIN LEGS                     Chief Complaint   Patient presents with    Shortness of Breath       Pt c/o increased sob and BL leg swelling x1 week. Changed medication end of sept to 1 lasix instead of 2. Denies chest pain         Initial Presentation: 60 y.o. male with a PMH of CHF, diabetes, mild reactive airway disease who presents with cough,worsening dyspnea, orthopnea over the last 2 weeks. Patient has been following his primary care last was visit was 1 year ago. Patient was advised to take furosemide 1 and half tablet twice a day. Over the last week and half patient has doubled furosemide to 60 mg twice a day.  Despite that he has persistent increasing swelling of his legs and increasing weight gain up to 10 pounds. He was never diagnosed with COPD before. His PCP started him on albuterol inhaler. Patient has been taking 3 times daily, prior to work, at lunch, and after work and noted significant improvement in his breathing. He ran out of his Flovent inhaler approximately 2 weeks ago, and was concerned about swelling of his legs and that is why he came for evaluation. In the ED his saturations are 86, requiring 3 L of oxygen. Plan: Inpatient admission for evaluation and treatment of acute on chronic hypoxic resp failure, CHF, elevated serum creatinine, DM: resume home albuterol and fluticasone inhalers, Pulmonology consult, IV lasix 75 mg bid, 2000 ml fluid restriction, Echo, Cardiology consult, SSI. Date: 11/21   Day 2:      Internal medicine: continue IV lasix 75 mg bid, 2000 ml fluid restriction, Echo, Cardiology consult, albuterol and fluticasone inhalers, SSI. Cardiology consult: Agree with lasix 80 IV bid, may further escalate diuresis pending response. Check BID lytes while aggressively diuresing. Keep K>4, Mg>2. Note Some edema contribution from home norvasc - can permanently DC now.              ED Triage Vitals   Temperature Pulse Respirations Blood Pressure SpO2   11/20/23 1701 11/20/23 1659 11/20/23 1659 11/20/23 1659 11/20/23 1659   98.4 °F (36.9 °C) 93 (!) 24 134/77 (!) 86 %       Temp Source Heart Rate Source Patient Position - Orthostatic VS BP Location FiO2 (%)   11/20/23 1701 11/20/23 1659 11/20/23 1659 11/20/23 1659 --   Oral Monitor Sitting Right arm         Pain Score           11/20/23 2200           No Pain                  Wt Readings from Last 1 Encounters:   11/21/23 114 kg (251 lb 3.2 oz)      Additional Vital Signs:      Date/Time Temp Pulse Resp BP MAP (mmHg) SpO2 Calculated FIO2 (%) - Nasal Cannula Nasal Cannula O2 Flow Rate (L/min) O2 Device   11/21/23 0900 -- -- -- -- -- 94 % -- -- None (Room air)   11/21/23 07:11:57 98.7 °F (37.1 °C) 96 18 114/67 83 94 % -- -- --   11/20/23 2213 98.1 °F (36.7 °C) 99 18 118/64 82 97 % -- -- --   11/20/23 2140 -- -- -- -- -- -- -- -- None (Room air)   11/20/23 2030 -- 100 18 124/63 87 100 % -- -- None (Room air)   11/20/23 2000 -- 101 20 109/64 82 97 % -- -- None (Room air)   11/20/23 1830 -- 98 24 Abnormal  117/59 84 99 % -- -- None (Room air)   11/20/23 1800 -- 93 24 Abnormal  116/55 77 100 % -- -- None (Room air)   11/20/23 1730 -- 90 24 Abnormal  122/62 84 99 % 28 2 L/min Nasal cannula   11/20/23 1701 98.4 °F (36.9 °C) -- -- -- -- 94 % 28 2 L/min Nasal cannula      Pertinent Labs/Diagnostic Test Results:   XR chest 1 view portable   ED Interpretation by Adan Sanches MD (11/20 1937)   Unremarkable cardiac silhouette. Mild vascular congestion and crowding in the right perihilar region. No appreciated infiltrate or effusion. Final Result by Martha Joseph MD (22/71 1872)       Suggestion of mild pulmonary vascular congestion. No focal consolidation, pleural effusion, or pneumothorax. Resident: Jessica Pace, the attending radiologist, have reviewed the images and agree with the final report above. Workstation performed: [id number]              11/21 Echo: Interpretation Summary          Left Ventricle: Left ventricular cavity size is normal. Wall thickness is mildly increased. There is concentric remodeling. The left ventricular ejection fraction is 60%. Systolic function is normal. Wall motion is normal. Diastolic function is mildly abnormal, consistent with grade I (abnormal) relaxation.     Right Ventricle: Right ventricular cavity size is normal. Systolic function is normal.     11/20 EKG:  Normal sinus rhythm  Normal ECG  When compared with ECG of 26-SEP-2023 15:25,  No significant change was found          Results from last 7 days   Lab Units 11/20/23  1734   SARS-COV-2   Negative            Results from last 7 days   Lab Units 11/21/23  0452 11/20/23  1705 WBC Thousand/uL 12.06* 13.11*   HEMOGLOBIN g/dL 10.9* 11.2*   HEMATOCRIT % 33.2* 34.3*   PLATELETS Thousands/uL 199 203                Results from last 7 days   Lab Units 11/21/23  0452 11/20/23  1705   SODIUM mmol/L 139 138   POTASSIUM mmol/L 3.7 3.6   CHLORIDE mmol/L 98 97   CO2 mmol/L 32 31   ANION GAP mmol/L 9 10   BUN mg/dL 29* 30*   CREATININE mg/dL 1.56* 1.65*   EGFR ml/min/1.73sq m 47 44   CALCIUM mg/dL 9.0 9.3   MAGNESIUM mg/dL 1.4*  --            Results from last 7 days   Lab Units 11/20/23  1705   AST U/L 17   ALT U/L 8   ALK PHOS U/L 92   TOTAL PROTEIN g/dL 7.4   ALBUMIN g/dL 3.6   TOTAL BILIRUBIN mg/dL 1.30*             Results from last 7 days   Lab Units 11/21/23  1051 11/21/23  0711 11/20/23  2241   POC GLUCOSE mg/dl 169* 117 125            Results from last 7 days   Lab Units 11/21/23  0452 11/20/23  1705   GLUCOSE RANDOM mg/dL 102 115                    BETA-HYDROXYBUTYRATE   Date Value Ref Range Status   07/06/2020 0.2 <0.6 mmol/L Final                   Results from last 7 days   Lab Units 11/20/23  2140 11/20/23  1954 11/20/23  1705   HS TNI 0HR ng/L  --   --  6   HS TNI 2HR ng/L  --  5  --    HSTNI D2 ng/L  --  -1  --    HS TNI 4HR ng/L 5  --   --    HSTNI D4 ng/L -1  --   --                  Results from last 7 days   Lab Units 11/20/23  1705   BNP pg/mL 38                 Results from last 7 days   Lab Units 11/20/23  1734   INFLUENZA A PCR   Negative   INFLUENZA B PCR   Negative   RSV PCR   Negative          ED Treatment:   Medication Administration from 11/20/2023 1639 to 11/20/2023 2206           Date/Time Order Dose Route Action       11/20/2023 1733 EST albuterol inhalation solution 5 mg 5 mg Nebulization Given       11/20/2023 1733 EST ipratropium (ATROVENT) 0.02 % inhalation solution 0.5 mg 0.5 mg Nebulization Given       11/20/2023 1920 EST albuterol inhalation solution 5 mg 5 mg Nebulization Given       11/20/2023 1920 EST ipratropium (ATROVENT) 0.02 % inhalation solution 0.5 mg 0.5 mg Nebulization Given       11/20/2023 1954 EST furosemide (LASIX) 150 mg in dextrose 5 % 50 mL IVPB 150 mg Intravenous New Bag             Medical History        Past Medical History:   Diagnosis Date    Acute kidney injury ([facility]) 9/5/2020    Diabetes mellitus ([address] [place] [address])      Hypertension      Inguinal hernia       3/25/15    Onychomycosis       5/24/17    Type 2 diabetes mellitus ([address] [place] [address]) 05/01/2012         Present on Admission:   Diabetes mellitus ([address] [place] [address])        Admitting Diagnosis: Acute respiratory insufficiency [R06.89]  SOB (shortness of breath) [R06.02]  Reactive airway disease [J45.909]  CHF exacerbation (HCC) [I50.9]  Volume overload [E87.70]  Difficulty obtaining medication [Z91.148]  Age/Sex: 60 y.o. male  Admission Orders:  Scheduled Medications:  fluticasone, 1 puff, Inhalation, Daily  furosemide, 75 mg, Intravenous, BID (diuretic)  heparin (porcine), 5,000 Units, Subcutaneous, Q8H [facility]  insulin lispro, 1-6 Units, Subcutaneous, 4x Daily (PC & HS)  ipratropium, 0.5 mg, Nebulization, TID  levalbuterol, 1.25 mg, Nebulization, TID  magnesium sulfate, 2 g, Intravenous, Q2H  methocarbamol, 500 mg, Oral, BID        Continuous IV Infusions:  PRN Meds:  albuterol, 2 puff, Inhalation, Q6H PRN  benzonatate, 100 mg, Oral, TID PRN           IP CONSULT TO NUTRITION SERVICES  IP CONSULT TO CARDIOLOGY     Network Utilization Review Department  ATTENTION: Please call with any questions or concerns to [phone] and carefully listen to the prompts so that you are directed to the right person. All voicemails are confidential.   For Discharge needs, contact Care Management DC Support Team at [phone] opt. 2  Send all requests for admission clinical reviews, approved or denied determinations and any other requests to dedicated fax number below belonging to the campus where the patient is receiving treatment.  List of dedicated fax numbers for the Facilities:  FACILITY NAME UR FAX NUMBER   ADMISSION DENIALS (Administrative/Medical Necessity) [phone]   DISCHARGE SUPPORT TEAM (NETWORK) [id number] [facility] (Maternity/NICU/Pediatrics) [address] [facility] [address] [facility] [phone]   [facility] [address] [place] [address] [phone]   [address] [place] [address]  [phone]                  Revision History

## 2023-11-24 NOTE — PROGRESS NOTES
Progress Note - Julianrichie Frias 60 y.o. male MRN: [id number]    Unit/Bed#: S -01 Encounter: [id number]    Assessment and Plan:     54-year-old male with history of CHF, diabetes admitted for CHF exacerbation with GI consulted for food regurgitation and coughing with globus sensation. Patient was seen by speech for evaluation and cleared for regular diet. Regurgitation of food with globus sensation and coughing  Patient planned for barium swallow today for further evaluation. He reports tolerating a roast beef sandwich and baked potato for dinner. He is able to get the food down however does need to cough a few minutes after completing it. Reports sensation of mucus in the back of his throat. Possible in the setting of uncontrolled GERD, assess for underlying motility disorder or esophagitis. -Patient planned for barium swallow today. We will follow-up results with consideration for EGD inpatient or outpatient.  -Continue PPI daily.  -We will add on Pepcid at bedtime due to possible nighttime awakening secondary to cough  -Consider addition of decongestant per primary team for sensation of mucus in the throat. -Follow further cardiology recommendations to determine when cleared for EGD. -Diet per speech following barium swallow  -If barium swallow and EGD are unremarkable with persistent coughing/globus/regurgitation after eating, consider barium swallow with speech +/- further manometry testing in the outpatient setting  -Continue tobacco and alcohol avoidance. ----------------------------------------------------------------------------------------------------------------    Subjective:     Patient reports doing okay today. He reports he was able to tolerate a roast beef sandwich and a baked potato yesterday for dinner. He reports the food feels like it goes down however he does have to cough a few minutes after finishing. He also reports persistent sensation of mucus in his throat.   Reports having a regular bowel movement this morning. Reports lower extremity edema is somewhat stable. Objective:     Vitals: Blood pressure 145/71, pulse (!) 115, temperature 98.5 °F (36.9 °C), resp. rate 16, height 5' 9" (1.753 m), weight 115 kg (254 lb 3.1 oz), SpO2 90 %. ,Body mass index is 37.54 kg/m². Intake/Output Summary (Last 24 hours) at 11/24/2023 1028  Last data filed at 11/24/2023 0846  Gross per 24 hour   Intake 940 ml   Output 2925 ml   Net -1985 ml       Physical Exam:     General Appearance: Sitting comfortably in bed. Does not appear in distress. Lungs: Clear to auscultation bilaterally, no rales or rhonchi, no labored breathing/accessory muscle use  Heart: Regular rate and rhythm, S1, S2 normal, no murmur, click, rub or gallop  Abdomen: Soft, non-tender, non-distended; bowel sounds normal; no masses or no organomegaly. Benign abdomen  Extremities: Mild lower extremity pitting edema bilaterally. Invasive Devices       Peripheral Intravenous Line  Duration             Peripheral IV 11/20/23 Distal;Right;Upper;Ventral (anterior) Arm 4 days                    Lab Results:  Results from last 7 days   Lab Units 11/24/23  0539 11/23/23  0443 11/22/23  0555   WBC Thousand/uL 9.79 9.92 9.78   HEMOGLOBIN g/dL 10.1* 10.1* 9.4*   HEMATOCRIT % 32.5* 32.3* 29.2*   PLATELETS Thousands/uL 262 254 211   NEUTROS PCT %  --   --  56   LYMPHS PCT %  --   --  23   LYMPHO PCT %  --  31  --    MONOS PCT %  --   --  11   MONO PCT %  --  8  --    EOS PCT %  --  13* 10*     Results from last 7 days   Lab Units 11/24/23  0539 11/21/23  0452 11/20/23  1705   POTASSIUM mmol/L 3.9   < > 3.6   CHLORIDE mmol/L 98   < > 97   CO2 mmol/L 32   < > 31   BUN mg/dL 18   < > 30*   CREATININE mg/dL 1.16   < > 1.65*   CALCIUM mg/dL 9.6   < > 9.3   ALK PHOS U/L  --   --  92   ALT U/L  --   --  8   AST U/L  --   --  17    < > = values in this interval not displayed.      Invalid input(s): "BILI"            Imaging Studies: I have personally reviewed pertinent imaging studies. XR chest 1 view portable    Result Date: 11/21/2023  Impression: Suggestion of mild pulmonary vascular congestion. No focal consolidation, pleural effusion, or pneumothorax. Resident: Lorenza Balderas, the attending radiologist, have reviewed the images and agree with the final report above.  Workstation performed: [id number]
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
## 2023-11-24 NOTE — DISCHARGE INSTR - AVS FIRST PAGE
Dear Bruce Poon,     It was our pleasure to care for you here at [facility]. It is our hope that we were always able to exceed the expected standards for your care during your stay. You were hospitalized due to heart failure exacerbation. You were cared for on the 3rd floor by Mary Gordon MD under the service of Ena Lincoln MD with the [facility] who covers for your primary care physician (PCP), Manju Prado MD, while you were hospitalized. If you have any questions or concerns related to this hospitalization, you may contact us at [phone]. For follow up as well as any medication refills, we recommend that you follow up with your primary care physician. A registered nurse will reach out to you by phone within a few days after your discharge to answer any additional questions that you may have after going home. However, at this time we provide for you here, the most important instructions / recommendations at discharge:     Notable Medication Adjustments -   Torsemide 40mg daily  Spironolactone (Aldactone) 25mg daily  Nebs Xopenex 3 times daily  Stop taking furosemide Lasix per cardiology. Mucinex 600 mg twice daily for 7 days for cough with congestion. Start taking Protonix 40 mg twice daily. Start taking Pepcid 20 mg daily. Stop taking amlodipine, olmesartan and potassium supplements. Testing Required after Discharge -   EGD  Sleep study for AMANDO  Important follow up information -   Please follow up with GI for EGD  Please follow up with Cardiology   Please follow up with your primary care physician after discharge  Ambulatory referral to pulmonary and sleep study provided upon discharge for further evaluation and management of obstructive sleep apnea. Other Instructions -   Should your symptoms return, or you develop new concerning symptoms, please call your doctor and/or go to your nearest Emergency Department.   Lifestyle modification with diet and exercise. It is important to compliance with your home medications. Please review this entire after visit summary as additional general instructions including medication list, appointments, activity, diet, any pertinent wound care, and other additional recommendations from your care team that may be provided for you.       Sincerely,     Malia Marie MD

## 2023-11-25 VITALS
SYSTOLIC BLOOD PRESSURE: 116 MMHG | OXYGEN SATURATION: 91 % | BODY MASS INDEX: 37.55 KG/M2 | HEART RATE: 103 BPM | HEIGHT: 69 IN | RESPIRATION RATE: 18 BRPM | TEMPERATURE: 99.1 F | DIASTOLIC BLOOD PRESSURE: 73 MMHG | WEIGHT: 253.53 LBS

## 2023-11-25 LAB
ANION GAP SERPL CALCULATED.3IONS-SCNC: 8 MMOL/L
BASOPHILS # BLD MANUAL: 0 THOUSAND/UL (ref 0–0.1)
BASOPHILS NFR MAR MANUAL: 0 % (ref 0–1)
BUN SERPL-MCNC: 18 MG/DL (ref 5–25)
CALCIUM SERPL-MCNC: 9.5 MG/DL (ref 8.4–10.2)
CHLORIDE SERPL-SCNC: 97 MMOL/L (ref 96–108)
CO2 SERPL-SCNC: 32 MMOL/L (ref 21–32)
CREAT SERPL-MCNC: 1.24 MG/DL (ref 0.6–1.3)
EOSINOPHIL # BLD MANUAL: 0.7 THOUSAND/UL (ref 0–0.4)
EOSINOPHIL NFR BLD MANUAL: 7 % (ref 0–6)
ERYTHROCYTE [DISTWIDTH] IN BLOOD BY AUTOMATED COUNT: 14.6 % (ref 11.6–15.1)
GFR SERPL CREATININE-BSD FRML MDRD: 63 ML/MIN/1.73SQ M
GLUCOSE SERPL-MCNC: 137 MG/DL (ref 65–140)
GLUCOSE SERPL-MCNC: 157 MG/DL (ref 65–140)
GLUCOSE SERPL-MCNC: 220 MG/DL (ref 65–140)
HCT VFR BLD AUTO: 33 % (ref 36.5–49.3)
HGB BLD-MCNC: 10.5 G/DL (ref 12–17)
LYMPHOCYTES # BLD AUTO: 3.09 THOUSAND/UL (ref 0.6–4.47)
LYMPHOCYTES # BLD AUTO: 31 % (ref 14–44)
MAGNESIUM SERPL-MCNC: 1.8 MG/DL (ref 1.9–2.7)
MCH RBC QN AUTO: 29.8 PG (ref 26.8–34.3)
MCHC RBC AUTO-ENTMCNC: 31.8 G/DL (ref 31.4–37.4)
MCV RBC AUTO: 94 FL (ref 82–98)
MONOCYTES # BLD AUTO: 0.5 THOUSAND/UL (ref 0–1.22)
MONOCYTES NFR BLD: 5 % (ref 4–12)
NEUTROPHILS # BLD MANUAL: 5.69 THOUSAND/UL (ref 1.85–7.62)
NEUTS SEG NFR BLD AUTO: 57 % (ref 43–75)
PLATELET # BLD AUTO: 277 THOUSANDS/UL (ref 149–390)
PLATELET BLD QL SMEAR: ADEQUATE
PMV BLD AUTO: 10 FL (ref 8.9–12.7)
POTASSIUM SERPL-SCNC: 4 MMOL/L (ref 3.5–5.3)
RBC # BLD AUTO: 3.52 MILLION/UL (ref 3.88–5.62)
RBC MORPH BLD: NORMAL
SODIUM SERPL-SCNC: 137 MMOL/L (ref 135–147)
WBC # BLD AUTO: 9.98 THOUSAND/UL (ref 4.31–10.16)

## 2023-11-25 PROCEDURE — 83735 ASSAY OF MAGNESIUM: CPT

## 2023-11-25 PROCEDURE — 80048 BASIC METABOLIC PNL TOTAL CA: CPT

## 2023-11-25 PROCEDURE — 82948 REAGENT STRIP/BLOOD GLUCOSE: CPT

## 2023-11-25 PROCEDURE — BD15YZZ FLUOROSCOPY OF UPPER GI USING OTHER CONTRAST: ICD-10-PCS | Performed by: INTERNAL MEDICINE

## 2023-11-25 PROCEDURE — 94640 AIRWAY INHALATION TREATMENT: CPT

## 2023-11-25 PROCEDURE — 92526 ORAL FUNCTION THERAPY: CPT | Performed by: SPEECH-LANGUAGE PATHOLOGIST

## 2023-11-25 PROCEDURE — 99239 HOSP IP/OBS DSCHRG MGMT >30: CPT | Performed by: INTERNAL MEDICINE

## 2023-11-25 PROCEDURE — 85007 BL SMEAR W/DIFF WBC COUNT: CPT

## 2023-11-25 PROCEDURE — 94760 N-INVAS EAR/PLS OXIMETRY 1: CPT

## 2023-11-25 PROCEDURE — 85027 COMPLETE CBC AUTOMATED: CPT

## 2023-11-25 RX ORDER — AMLODIPINE BESYLATE 10 MG/1
10 TABLET ORAL DAILY
Qty: 90 TABLET | Refills: 0 | Status: SHIPPED | OUTPATIENT
Start: 2023-11-25 | End: 2023-11-25

## 2023-11-25 RX ORDER — SPIRONOLACTONE 25 MG/1
25 TABLET ORAL DAILY
Qty: 30 TABLET | Refills: 0 | Status: SHIPPED | OUTPATIENT
Start: 2023-11-26 | End: 2023-12-26

## 2023-11-25 RX ORDER — MAGNESIUM SULFATE HEPTAHYDRATE 40 MG/ML
2 INJECTION, SOLUTION INTRAVENOUS ONCE
Status: COMPLETED | OUTPATIENT
Start: 2023-11-25 | End: 2023-11-25

## 2023-11-25 RX ORDER — PANTOPRAZOLE SODIUM 40 MG/1
40 TABLET, DELAYED RELEASE ORAL
Qty: 60 TABLET | Refills: 0 | Status: SHIPPED | OUTPATIENT
Start: 2023-11-25 | End: 2023-12-25

## 2023-11-25 RX ORDER — LEVALBUTEROL INHALATION SOLUTION 1.25 MG/3ML
1.25 SOLUTION RESPIRATORY (INHALATION)
Qty: 270 ML | Refills: 0 | Status: SHIPPED | OUTPATIENT
Start: 2023-11-25 | End: 2023-12-25

## 2023-11-25 RX ORDER — GUAIFENESIN 600 MG/1
600 TABLET, EXTENDED RELEASE ORAL EVERY 12 HOURS SCHEDULED
Qty: 60 TABLET | Refills: 0 | Status: SHIPPED | OUTPATIENT
Start: 2023-11-25 | End: 2023-12-25

## 2023-11-25 RX ORDER — FAMOTIDINE 20 MG/1
20 TABLET, FILM COATED ORAL
Qty: 30 TABLET | Refills: 0 | Status: SHIPPED | OUTPATIENT
Start: 2023-11-25 | End: 2023-12-25

## 2023-11-25 RX ORDER — GUAIFENESIN 600 MG/1
600 TABLET, EXTENDED RELEASE ORAL EVERY 12 HOURS SCHEDULED
Status: DISCONTINUED | OUTPATIENT
Start: 2023-11-25 | End: 2023-11-25 | Stop reason: HOSPADM

## 2023-11-25 RX ADMIN — HEPARIN SODIUM 5000 UNITS: 5000 INJECTION INTRAVENOUS; SUBCUTANEOUS at 13:22

## 2023-11-25 RX ADMIN — TORSEMIDE 40 MG: 20 TABLET ORAL at 08:43

## 2023-11-25 RX ADMIN — INSULIN LISPRO 2 UNITS: 100 INJECTION, SOLUTION INTRAVENOUS; SUBCUTANEOUS at 12:24

## 2023-11-25 RX ADMIN — IPRATROPIUM BROMIDE 0.5 MG: 0.5 SOLUTION RESPIRATORY (INHALATION) at 07:36

## 2023-11-25 RX ADMIN — HEPARIN SODIUM 5000 UNITS: 5000 INJECTION INTRAVENOUS; SUBCUTANEOUS at 06:07

## 2023-11-25 RX ADMIN — LEVALBUTEROL HYDROCHLORIDE 1.25 MG: 1.25 SOLUTION RESPIRATORY (INHALATION) at 07:36

## 2023-11-25 RX ADMIN — PANTOPRAZOLE SODIUM 40 MG: 40 TABLET, DELAYED RELEASE ORAL at 06:07

## 2023-11-25 RX ADMIN — FLUTICASONE FUROATE 1 PUFF: 200 POWDER RESPIRATORY (INHALATION) at 08:42

## 2023-11-25 RX ADMIN — METHOCARBAMOL TABLETS 500 MG: 500 TABLET, COATED ORAL at 08:42

## 2023-11-25 RX ADMIN — IPRATROPIUM BROMIDE 0.5 MG: 0.5 SOLUTION RESPIRATORY (INHALATION) at 13:53

## 2023-11-25 RX ADMIN — SPIRONOLACTONE 25 MG: 25 TABLET ORAL at 08:43

## 2023-11-25 RX ADMIN — GUAIFENESIN 600 MG: 600 TABLET ORAL at 09:46

## 2023-11-25 RX ADMIN — MAGNESIUM SULFATE HEPTAHYDRATE 2 G: 40 INJECTION, SOLUTION INTRAVENOUS at 08:42

## 2023-11-25 RX ADMIN — LEVALBUTEROL HYDROCHLORIDE 1.25 MG: 1.25 SOLUTION RESPIRATORY (INHALATION) at 13:53

## 2023-11-25 NOTE — SPEECH THERAPY NOTE
Speech Language/Pathology    Speech/Language Pathology Progress Note    Patient Name: Jaylan Vyas  EQYWH'H Date: 11/25/2023     Subjective:  Patient reports cough after eating and occ during eating has been going on for a few months. He reports he drinks approx 4 beers a day and "some christian" with reported weight loss of 3-4lbs since being in the hospital as a result of not drinking and eating different foods then at home. He admits that he can feel when the reflux occurs. He also reports coughing without po intake as well as increased coughing at night and mucus in the mornings. He reports feeling as though swallowing requires extra effort. Per RN he is tolerating diet and meds without difficulty. Objective:  Esophagram completed yesterday with noted significant reflux during cough response. Images reviewed and oropharyngeal swallow visualized with no aspiration noted- visualization was limited however and he appears to have somewhat tight pharyngeal space and PES which may impede/slow epiglottic inversion and increase risk for penetration/aspiration. Patient consumed cookies and thin liquids. Mastication was adequate with the materials administered today. Bolus formation and transfer were functional with no significant oral residue noted. No overt s/s reduced oral control. Swallowing initiation appeared prompt. Laryngeal rise was palpated and judged to be within functional limits. No coughing, throat clearing, change in vocal quality or respiratory status noted today. Education was provided on A&P of the swallow, components of esophagram as well as reflux precautions/recommendations ( including alcohol cessation). Reciprocal comprehension was verbally expressed. Assessment:  Patient with adequate oral swallow and likely minimal/mild pharyngeal dysphagia as well as noted significant reflux on imaging.      Plan/Recommendations:  Continue regular/thin  Meds as tolerated  Reflux precautions    Aspiration precautions and compensatory swallowing strategies: upright posture(during and after intake), slow rate of feeding, small bites/sips, and small more frequent meals with use of added moisteners    F/u with GI upon d/c- can consider VBS as an outpatient     Samaritan Friday, M.S., [address] [facility]  Speech Language Pathologist   Available via [address] [place] [address] #[id number]  [place] #[id number]

## 2023-11-25 NOTE — PLAN OF CARE
Problem: Potential for Falls  Goal: Patient will remain free of falls  Description: INTERVENTIONS:  - Educate patient/family on patient safety including physical limitations  - Instruct patient to call for assistance with activity   - Consult OT/PT to assist with strengthening/mobility   - Keep Call bell within reach  - Keep bed low and locked with side rails adjusted as appropriate  - Keep care items and personal belongings within reach  - Initiate and maintain comfort rounds  - Make Fall Risk Sign visible to staff  - Offer Toileting every  Hours, in advance of need  - Initiate/Maintain alarm  - Obtain necessary fall risk management equipment:   - Apply yellow socks and bracelet for high fall risk patients  - Consider moving patient to room near nurses station  Outcome: Progressing     Problem: Prexisting or High Potential for Compromised Skin Integrity  Goal: Skin integrity is maintained or improved  Description: INTERVENTIONS:  - Identify patients at risk for skin breakdown  - Assess and monitor skin integrity  - Assess and monitor nutrition and hydration status  - Monitor labs   - Assess for incontinence   - Turn and reposition patient  - Assist with mobility/ambulation  - Relieve pressure over bony prominences  - Avoid friction and shearing  - Provide appropriate hygiene as needed including keeping skin clean and dry  - Evaluate need for skin moisturizer/barrier cream  - Collaborate with interdisciplinary team   - Patient/family teaching  - Consider wound care consult   Outcome: Progressing     Problem: PAIN - ADULT  Goal: Verbalizes/displays adequate comfort level or baseline comfort level  Description: Interventions:  - Encourage patient to monitor pain and request assistance  - Assess pain using appropriate pain scale  - Administer analgesics based on type and severity of pain and evaluate response  - Implement non-pharmacological measures as appropriate and evaluate response  - Consider cultural and social influences on pain and pain management  - Notify physician/advanced practitioner if interventions unsuccessful or patient reports new pain  Outcome: Progressing     Problem: INFECTION - ADULT  Goal: Absence or prevention of progression during hospitalization  Description: INTERVENTIONS:  - Assess and monitor for signs and symptoms of infection  - Monitor lab/diagnostic results  - Monitor all insertion sites, i.e. indwelling lines, tubes, and drains  - Monitor endotracheal if appropriate and nasal secretions for changes in amount and color  - Gantt appropriate cooling/warming therapies per order  - Administer medications as ordered  - Instruct and encourage patient and family to use good hand hygiene technique  - Identify and instruct in appropriate isolation precautions for identified infection/condition  Outcome: Progressing  Goal: Absence of fever/infection during neutropenic period  Description: INTERVENTIONS:  - Monitor WBC    Outcome: Progressing     Problem: SAFETY ADULT  Goal: Patient will remain free of falls  Description: INTERVENTIONS:  - Educate patient/family on patient safety including physical limitations  - Instruct patient to call for assistance with activity   - Consult OT/PT to assist with strengthening/mobility   - Keep Call bell within reach  - Keep bed low and locked with side rails adjusted as appropriate  - Keep care items and personal belongings within reach  - Initiate and maintain comfort rounds  - Make Fall Risk Sign visible to staff  - Offer Toileting every  Hours, in advance of need  - Initiate/Maintain alarm  - Obtain necessary fall risk management equipment:   - Apply yellow socks and bracelet for high fall risk patients  - Consider moving patient to room near nurses station  Outcome: Progressing  Goal: Maintain or return to baseline ADL function  Description: INTERVENTIONS:  -  Assess patient's ability to carry out ADLs; assess patient's baseline for ADL function and identify physical deficits which impact ability to perform ADLs (bathing, care of mouth/teeth, toileting, grooming, dressing, etc.)  - Assess/evaluate cause of self-care deficits   - Assess range of motion  - Assess patient's mobility; develop plan if impaired  - Assess patient's need for assistive devices and provide as appropriate  - Encourage maximum independence but intervene and supervise when necessary  - Involve family in performance of ADLs  - Assess for home care needs following discharge   - Consider OT consult to assist with ADL evaluation and planning for discharge  - Provide patient education as appropriate  Outcome: Progressing  Goal: Maintains/Returns to pre admission functional level  Description: INTERVENTIONS:  - Perform AM-PAC 6 Click Basic Mobility/ Daily Activity assessment daily.  - Set and communicate daily mobility goal to care team and patient/family/caregiver. - Collaborate with rehabilitation services on mobility goals if consulted  - Perform Range of Motion  times a day. - Reposition patient every  hours.   - Dangle patient  times a day  - Stand patient  times a day  - Ambulate patient  times a day  - Out of bed to chair  times a day   - Out of bed for meals  times a day  - Out of bed for toileting  - Record patient progress and toleration of activity level   Outcome: Progressing     Problem: DISCHARGE PLANNING  Goal: Discharge to home or other facility with appropriate resources  Description: INTERVENTIONS:  - Identify barriers to discharge w/patient and caregiver  - Arrange for needed discharge resources and transportation as appropriate  - Identify discharge learning needs (meds, wound care, etc.)  - Arrange for interpretive services to assist at discharge as needed  - Refer to Case Management Department for coordinating discharge planning if the patient needs post-hospital services based on physician/advanced practitioner order or complex needs related to functional status, cognitive ability, or social support system  Outcome: Progressing     Problem: Knowledge Deficit  Goal: Patient/family/caregiver demonstrates understanding of disease process, treatment plan, medications, and discharge instructions  Description: Complete learning assessment and assess knowledge base.   Interventions:  - Provide teaching at level of understanding  - Provide teaching via preferred learning methods  Outcome: Progressing

## 2023-11-25 NOTE — PLAN OF CARE
Problem: Potential for Falls  Goal: Patient will remain free of falls  Description: INTERVENTIONS:  - Educate patient/family on patient safety including physical limitations  - Instruct patient to call for assistance with activity   - Consult OT/PT to assist with strengthening/mobility   - Keep Call bell within reach  - Keep bed low and locked with side rails adjusted as appropriate  - Keep care items and personal belongings within reach  - Initiate and maintain comfort rounds  - Make Fall Risk Sign visible to staff  - Offer Toileting every  Hours, in advance of need  - Initiate/Maintain alarm  - Obtain necessary fall risk management equipment:   - Apply yellow socks and bracelet for high fall risk patients  - Consider moving patient to room near nurses station  11/25/2023 1457 by Mary Padilla RN  Outcome: Completed  11/25/2023 1139 by Mary Padilla RN  Outcome: Progressing     Problem: Prexisting or High Potential for Compromised Skin Integrity  Goal: Skin integrity is maintained or improved  Description: INTERVENTIONS:  - Identify patients at risk for skin breakdown  - Assess and monitor skin integrity  - Assess and monitor nutrition and hydration status  - Monitor labs   - Assess for incontinence   - Turn and reposition patient  - Assist with mobility/ambulation  - Relieve pressure over bony prominences  - Avoid friction and shearing  - Provide appropriate hygiene as needed including keeping skin clean and dry  - Evaluate need for skin moisturizer/barrier cream  - Collaborate with interdisciplinary team   - Patient/family teaching  - Consider wound care consult   11/25/2023 1457 by Mary Padilla RN  Outcome: Completed  11/25/2023 1139 by Mary Padilla RN  Outcome: Progressing     Problem: PAIN - ADULT  Goal: Verbalizes/displays adequate comfort level or baseline comfort level  Description: Interventions:  - Encourage patient to monitor pain and request assistance  - Assess pain using appropriate pain scale  - Administer analgesics based on type and severity of pain and evaluate response  - Implement non-pharmacological measures as appropriate and evaluate response  - Consider cultural and social influences on pain and pain management  - Notify physician/advanced practitioner if interventions unsuccessful or patient reports new pain  11/25/2023 1457 by Vin Redmond RN  Outcome: Completed  11/25/2023 1139 by Vin Redmond RN  Outcome: Progressing     Problem: INFECTION - ADULT  Goal: Absence or prevention of progression during hospitalization  Description: INTERVENTIONS:  - Assess and monitor for signs and symptoms of infection  - Monitor lab/diagnostic results  - Monitor all insertion sites, i.e. indwelling lines, tubes, and drains  - Monitor endotracheal if appropriate and nasal secretions for changes in amount and color  - [place] appropriate cooling/warming therapies per order  - Administer medications as ordered  - Instruct and encourage patient and family to use good hand hygiene technique  - Identify and instruct in appropriate isolation precautions for identified infection/condition  11/25/2023 1457 by Vin Redmond RN  Outcome: Completed  11/25/2023 1139 by Vin Redmond RN  Outcome: Progressing  Goal: Absence of fever/infection during neutropenic period  Description: INTERVENTIONS:  - Monitor WBC    11/25/2023 1457 by Vin Redmond RN  Outcome: Completed  11/25/2023 1139 by Vin Redmond RN  Outcome: Progressing     Problem: SAFETY ADULT  Goal: Patient will remain free of falls  Description: INTERVENTIONS:  - Educate patient/family on patient safety including physical limitations  - Instruct patient to call for assistance with activity   - Consult OT/PT to assist with strengthening/mobility   - Keep Call bell within reach  - Keep bed low and locked with side rails adjusted as appropriate  - Keep care items and personal belongings within reach  - Initiate and maintain comfort rounds  - Make Fall Risk Sign visible to staff  - Offer Toileting every  Hours, in advance of need  - Initiate/Maintain alarm  - Obtain necessary fall risk management equipment:   - Apply yellow socks and bracelet for high fall risk patients  - Consider moving patient to room near nurses station  11/25/2023 1457 by Nikolas Newell RN  Outcome: Completed  11/25/2023 1139 by Nikolas Newell RN  Outcome: Progressing  Goal: Maintain or return to baseline ADL function  Description: INTERVENTIONS:  -  Assess patient's ability to carry out ADLs; assess patient's baseline for ADL function and identify physical deficits which impact ability to perform ADLs (bathing, care of mouth/teeth, toileting, grooming, dressing, etc.)  - Assess/evaluate cause of self-care deficits   - Assess range of motion  - Assess patient's mobility; develop plan if impaired  - Assess patient's need for assistive devices and provide as appropriate  - Encourage maximum independence but intervene and supervise when necessary  - Involve family in performance of ADLs  - Assess for home care needs following discharge   - Consider OT consult to assist with ADL evaluation and planning for discharge  - Provide patient education as appropriate  11/25/2023 1457 by Nikolas Newell RN  Outcome: Completed  11/25/2023 1139 by Nikolas Newell RN  Outcome: Progressing  Goal: Maintains/Returns to pre admission functional level  Description: INTERVENTIONS:  - Perform AM-PAC 6 Click Basic Mobility/ Daily Activity assessment daily.  - Set and communicate daily mobility goal to care team and patient/family/caregiver. - Collaborate with rehabilitation services on mobility goals if consulted  - Perform Range of Motion  times a day. - Reposition patient every  hours.   - Dangle patient  times a day  - Stand patient  times a day  - Ambulate patient  times a day  - Out of bed to chair  times a day   - Out of bed for meals  times a day  - Out of bed for toileting  - Record patient progress and toleration of activity level   11/25/2023 1457 by Barbara Bosch RN  Outcome: Completed  11/25/2023 1139 by Barbara Bosch RN  Outcome: Progressing     Problem: DISCHARGE PLANNING  Goal: Discharge to home or other facility with appropriate resources  Description: INTERVENTIONS:  - Identify barriers to discharge w/patient and caregiver  - Arrange for needed discharge resources and transportation as appropriate  - Identify discharge learning needs (meds, wound care, etc.)  - Arrange for interpretive services to assist at discharge as needed  - Refer to Case Management Department for coordinating discharge planning if the patient needs post-hospital services based on physician/advanced practitioner order or complex needs related to functional status, cognitive ability, or social support system  11/25/2023 1457 by Barbaar Bosch RN  Outcome: Completed  11/25/2023 1139 by Barbara Bosch RN  Outcome: Progressing     Problem: Knowledge Deficit  Goal: Patient/family/caregiver demonstrates understanding of disease process, treatment plan, medications, and discharge instructions  Description: Complete learning assessment and assess knowledge base.   Interventions:  - Provide teaching at level of understanding  - Provide teaching via preferred learning methods  11/25/2023 1457 by Barbara Bosch RN  Outcome: Completed  11/25/2023 1139 by Barbara Bosch RN  Outcome: Progressing

## 2023-11-25 NOTE — PLAN OF CARE
Problem: Potential for Falls  Goal: Patient will remain free of falls  Description: INTERVENTIONS:  - Educate patient/family on patient safety including physical limitations  - Instruct patient to call for assistance with activity   - Consult OT/PT to assist with strengthening/mobility   - Keep Call bell within reach  - Keep bed low and locked with side rails adjusted as appropriate  - Keep care items and personal belongings within reach  - Initiate and maintain comfort rounds  - Make Fall Risk Sign visible to staff  - Offer Toileting every  Hours, in advance of need  - Initiate/Maintain alarm  - Obtain necessary fall risk management equipment:   - Apply yellow socks and bracelet for high fall risk patients  - Consider moving patient to room near nurses station  Outcome: Progressing     Problem: Prexisting or High Potential for Compromised Skin Integrity  Goal: Skin integrity is maintained or improved  Description: INTERVENTIONS:  - Identify patients at risk for skin breakdown  - Assess and monitor skin integrity  - Assess and monitor nutrition and hydration status  - Monitor labs   - Assess for incontinence   - Turn and reposition patient  - Assist with mobility/ambulation  - Relieve pressure over bony prominences  - Avoid friction and shearing  - Provide appropriate hygiene as needed including keeping skin clean and dry  - Evaluate need for skin moisturizer/barrier cream  - Collaborate with interdisciplinary team   - Patient/family teaching  - Consider wound care consult   Outcome: Progressing     Problem: PAIN - ADULT  Goal: Verbalizes/displays adequate comfort level or baseline comfort level  Description: Interventions:  - Encourage patient to monitor pain and request assistance  - Assess pain using appropriate pain scale  - Administer analgesics based on type and severity of pain and evaluate response  - Implement non-pharmacological measures as appropriate and evaluate response  - Consider cultural and social influences on pain and pain management  - Notify physician/advanced practitioner if interventions unsuccessful or patient reports new pain  Outcome: Progressing     Problem: INFECTION - ADULT  Goal: Absence or prevention of progression during hospitalization  Description: INTERVENTIONS:  - Assess and monitor for signs and symptoms of infection  - Monitor lab/diagnostic results  - Monitor all insertion sites, i.e. indwelling lines, tubes, and drains  - Monitor endotracheal if appropriate and nasal secretions for changes in amount and color  - [place] appropriate cooling/warming therapies per order  - Administer medications as ordered  - Instruct and encourage patient and family to use good hand hygiene technique  - Identify and instruct in appropriate isolation precautions for identified infection/condition  Outcome: Progressing  Goal: Absence of fever/infection during neutropenic period  Description: INTERVENTIONS:  - Monitor WBC    Outcome: Progressing

## 2023-11-27 ENCOUNTER — TRANSITIONAL CARE MANAGEMENT (OUTPATIENT)
Dept: FAMILY MEDICINE CLINIC | Facility: CLINIC | Age: 58
End: 2023-11-27

## 2023-11-28 NOTE — UTILIZATION REVIEW
NOTIFICATION OF ADMISSION DISCHARGE   This is a Notification of Discharge from [address] [facility]. Please be advised that this patient has been discharge from our facility. Below you will find the admission and discharge date and time including the patient’s disposition. UTILIZATION REVIEW CONTACT:  Tavo Escobedo  Utilization   Network Utilization Review Department  Phone: [phone] x carefully listen to the prompts. All voicemails are confidential.  Email: [email or web address]     ADMISSION INFORMATION  PRESENTATION DATE: 11/20/2023  4:52 PM  OBERVATION ADMISSION DATE:   INPATIENT ADMISSION DATE: 11/20/23  8:45 PM   DISCHARGE DATE: 11/25/2023  3:23 PM   DISPOSITION:Home/Self Care    Network Utilization Review Department  ATTENTION: Please call with any questions or concerns to [phone] and carefully listen to the prompts so that you are directed to the right person. All voicemails are confidential.   For Discharge needs, contact Care Management DC Support Team at [phone] opt. 2  Send all requests for admission clinical reviews, approved or denied determinations and any other requests to dedicated fax number below belonging to the campus where the patient is receiving treatment.  List of dedicated fax numbers for the Facilities:  [place] DENIALS (Administrative/Medical Necessity) [phone]   DISCHARGE SUPPORT TEAM (Network) [phone] [address] [facility] (Maternity/NICU/Pediatrics) [phone]   [address] [facility] [address] [phone] [id number] [facility] [phone]   [address] [place] [address]  [address]  [phone]

## 2023-11-29 ENCOUNTER — OFFICE VISIT (OUTPATIENT)
Dept: FAMILY MEDICINE CLINIC | Facility: CLINIC | Age: 58
End: 2023-11-29
Payer: COMMERCIAL

## 2023-11-29 VITALS
BODY MASS INDEX: 37.18 KG/M2 | HEIGHT: 69 IN | DIASTOLIC BLOOD PRESSURE: 80 MMHG | WEIGHT: 251 LBS | RESPIRATION RATE: 18 BRPM | HEART RATE: 97 BPM | SYSTOLIC BLOOD PRESSURE: 128 MMHG | OXYGEN SATURATION: 97 % | TEMPERATURE: 97.6 F

## 2023-11-29 DIAGNOSIS — I50.31 ACUTE DIASTOLIC CONGESTIVE HEART FAILURE (HCC): ICD-10-CM

## 2023-11-29 DIAGNOSIS — I10 ESSENTIAL HYPERTENSION: ICD-10-CM

## 2023-11-29 DIAGNOSIS — R06.02 SOB (SHORTNESS OF BREATH): ICD-10-CM

## 2023-11-29 DIAGNOSIS — E11.65 UNCONTROLLED TYPE 2 DIABETES MELLITUS WITH HYPERGLYCEMIA (HCC): Primary | ICD-10-CM

## 2023-11-29 LAB
CREAT UR-MCNC: 213.2 MG/DL
MICROALBUMIN UR-MCNC: 7.4 MG/L
MICROALBUMIN/CREAT 24H UR: 3 MG/G CREATININE (ref 0–30)

## 2023-11-29 PROCEDURE — 99495 TRANSJ CARE MGMT MOD F2F 14D: CPT | Performed by: FAMILY MEDICINE

## 2023-11-29 PROCEDURE — 82043 UR ALBUMIN QUANTITATIVE: CPT | Performed by: FAMILY MEDICINE

## 2023-11-29 PROCEDURE — 82570 ASSAY OF URINE CREATININE: CPT | Performed by: FAMILY MEDICINE

## 2023-11-29 NOTE — ASSESSMENT & PLAN NOTE
Wt Readings from Last 3 Encounters:   11/29/23 114 kg (251 lb)   11/25/23 115 kg (253 lb 8.5 oz)   10/31/23 113 kg (249 lb 9 oz)     Congestive heart failure. Patient appears to be euvolemic at this time.   He will continue with current regimen of diuretic therapy as prescribed by cardiologist.  Patient is aware to monitor his weight as an outpatient or if he notices any increase peripheral edema or shortness of breath that he should report to our office

## 2023-11-29 NOTE — ASSESSMENT & PLAN NOTE
Diabetes. Latest A1c was still elevated at 8.8. Patient is not always compliant with taking medications on a regular basis. He was made aware of the consequences of being noncompliant with treating his diabetes appropriately.   He will repeat blood work in 6 months for further evaluation  Lab Results   Component Value Date    HGBA1C 8.8 (A) 10/02/2023

## 2023-11-29 NOTE — PATIENT INSTRUCTIONS

## 2023-11-29 NOTE — PROGRESS NOTES
FAMILY PRACTICE OFFICE VISIT       NAME: Bruce Poon  AGE: 60 y.o. SEX: male       : 1965        MRN: [id number]    DATE: 2023  TIME: 12:18 PM    Assessment and Plan     Problem List Items Addressed This Visit       Essential hypertension     Hypertension. The patient's blood pressure is stable at this time and he will continue current regimen of medications         Relevant Orders    Hemoglobin A1C    Comprehensive metabolic panel    Lipid Panel with Direct LDL reflex    TSH, 3rd generation with Free T4 reflex    Uncontrolled type 2 diabetes mellitus with hyperglycemia ([address]) - Primary     Diabetes. Latest A1c was still elevated at 8.8. Patient is not always compliant with taking medications on a regular basis. He was made aware of the consequences of being noncompliant with treating his diabetes appropriately. He will repeat blood work in 6 months for further evaluation  Lab Results   Component Value Date    HGBA1C 8.8 (A) 10/02/2023          Relevant Orders    Albumin / creatinine urine ratio    Hemoglobin A1C    Comprehensive metabolic panel    Lipid Panel with Direct LDL reflex    TSH, 3rd generation with Free T4 reflex    CHF (congestive heart failure) (HCC)     Wt Readings from Last 3 Encounters:   23 114 kg (251 lb)   23 115 kg (253 lb 8.5 oz)   10/31/23 113 kg (249 lb 9 oz)   Congestive heart failure. Patient appears to be euvolemic at this time.   He will continue with current regimen of diuretic therapy as prescribed by cardiologist.  Patient is aware to monitor his weight as an outpatient or if he notices any increase peripheral edema or shortness of breath that he should report to our office               Relevant Orders    Hemoglobin A1C    Comprehensive metabolic panel    Lipid Panel with Direct LDL reflex    TSH, 3rd generation with Free T4 reflex     Other Visit Diagnoses       SOB (shortness of breath)        Relevant Orders    Ambulatory Referral to Pulmonology Hemoglobin A1C    Comprehensive metabolic panel    Lipid Panel with Direct LDL reflex    TSH, 3rd generation with Free T4 reflex          TCM Call       Date and time call was made  11/27/2023  7:34 AM    Hospital care reviewed  Records reviewed    Patient was hospitialized at  [address]    Date of Admission  11/20/23    Date of discharge  11/25/23    Diagnosis  CHF    Disposition  Home    Were the patients medications reviewed and updated  No    Current Symptoms  None          TCM Call       Post hospital issues  None    Should patient be enrolled in anticoag monitoring? No    Scheduled for follow up? Yes    Did you obtain your prescribed medications  Yes    Do you need help managing your prescriptions or medications  No    Is transportation to your appointment needed  No    I have advised the patient to call PCP with any new or worsening symptoms  Tu Connolly, [address] or [facility] other    Support System  Spouse    Are you recieving any outpatient services  No    Are you recieving home care services  No    Are you using any community resources  No    Current waiver services  No    Have you fallen in the last 12 months  No    Interperter language line needed  No    Counseling  Patient    Counseling topics  Importance of RX compliance    Comments  TCM scheduled 11/30/23 @ 17:81 with Dr. Yosvany moise Coleen Clayton is a 60 y.o. male patient who originally presented to the hospital on 11/20/2023 due to heart failure exacerbation. Heart failure was consulted and echocardiogram showed ejection fraction 55%. He was diuresed with IV lasix and transitioned to Torsemide and spironolactone given his persistent hypokalemia. He also complained of a globus sensation when evaluated by speech therapy and GI was consulted. He was taken for barium swallow which revealed esophagitis and referred for outpatient EGD. He was recommended to take PPI for GERD.   He was counseled extensively on smoking cessation, medication compliance and will follow up with GI, cardiologist, and PCP outpatient. Ambulatory referral to pulmonary and sleep study was provided upon discharge for further evaluation and management of obstructive sleep apnea. Chief Complaint     Chief Complaint   Patient presents with    Transition of Care Management       History of Present Illness     I reviewed the patient's discharge summary from his latest hospitalization. He is accompanied by his wife. He states he is feeling more comfortable with his breathing since being diuresed during hospital visit and medications adjusted as an outpatient. 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 Review of Systems   Review of Systems   Constitutional: Negative. Respiratory: Negative. Cardiovascular: Negative. Gastrointestinal: Negative. Genitourinary: Negative. Musculoskeletal:  Positive for arthralgias and gait problem. Psychiatric/Behavioral: Negative.          Active Problem List     Patient Active Problem List   Diagnosis    Essential hypertension    Uncontrolled type 2 diabetes mellitus with hyperglycemia (HCC)    Elevated lipoprotein(a)    Microalbuminuria    Obesity    CHF (congestive heart failure) (HCC)    Alcohol abuse    DELFINO (acute kidney injury) ([address])    Onychomycosis    Arthritis of right wrist    Acute on chronic diastolic congestive heart failure (HCC)    Impingement syndrome of left shoulder    Diabetes mellitus ([address])    Acute gout of right hand    Encounter for immunization    Acute midline low back pain without sciatica    Reactive airway disease without complication    (HFpEF) heart failure with preserved ejection fraction (HCC)    Acute on chronic hypoxic respiratory failure ([address])    Difficulty swallowing       Past Medical History:  Past Medical History:   Diagnosis Date    Acute kidney injury ([address] [facility]) 9/5/2020    Diabetes mellitus ([address] [facility])     Hypertension     Inguinal hernia     3/25/15    Onychomycosis     5/24/17    Type 2 diabetes mellitus ([address] [facility]) 05/01/2012       Past Surgical History:  Past Surgical History:   Procedure Laterality Date    ARTHROSCOPY KNEE      with Medial and Lateral Meniscus Repair    HERNIA REPAIR      umbilical and inguinal hernia repairs (left)       Family History:  Family History   Problem Relation Age of Onset    Hypertension Mother         essential    Chronic bronchitis Father     Prostate cancer Father     Breast cancer Sister        Social History:  Social History     Socioeconomic History    Marital status: /Civil Union     Spouse name: Not on file    Number of children: Not on file    Years of education: Not on file    Highest education level: Not on file   Occupational History    Occupation: [employer] worker   Tobacco Use    Smoking status: Former     Types: Cigars     Start date: 6/22/2020    Smokeless tobacco: Never    Tobacco comments:     current every day smoker, per [employer]   Vaping Use    Vaping Use: Never used   Substance and Sexual Activity    Alcohol use:  Yes     Alcohol/week: 3.0 standard drinks of alcohol     Types: 2 Cans of beer, 1 Standard drinks or equivalent per week     Comment: weekend: 1 glass of christian or 2 beers    Drug use: No    Sexual activity: Yes   Other Topics Concern    Not on file   Social History Narrative    Alcohol dependence    Nicotine dependence    Denied, alcohol Use    Marital problems     Social Determinants of Health     Financial Resource Strain: Not on file   Food Insecurity: Not on file   Transportation Needs: Not on file   Physical Activity: Not on file   Stress: Not on file   Social Connections: Not on file   Intimate Partner Violence: Not on file   Housing Stability: Not on file       Objective     Vitals:    11/29/23 1044   BP: 128/80   Pulse: Resp:    Temp:    SpO2:      Wt Readings from Last 3 Encounters:   11/29/23 114 kg (251 lb)   11/25/23 115 kg (253 lb 8.5 oz)   10/31/23 113 kg (249 lb 9 oz)       Physical Exam  Vitals and nursing note reviewed. Constitutional:       Appearance: He is well-developed. HENT:      Right Ear: External ear normal.      Left Ear: External ear normal.      Nose: Nose normal.   Eyes:      General:         Right eye: No discharge. Left eye: No discharge. Extraocular Movements: Extraocular movements intact. Conjunctiva/sclera: Conjunctivae normal.      Pupils: Pupils are equal, round, and reactive to light. Neck:      Thyroid: No thyromegaly. Cardiovascular:      Rate and Rhythm: Normal rate and regular rhythm. Heart sounds: Normal heart sounds. No murmur heard. Pulmonary:      Effort: Pulmonary effort is normal. No respiratory distress. Breath sounds: Normal breath sounds. No wheezing, rhonchi or rales. Abdominal:      Comments: NO hepatospenomegaly   Musculoskeletal:         General: Normal range of motion. Cervical back: Normal range of motion and neck supple. Right lower leg: Edema present. Left lower leg: Edema present. Comments: Chronic trace peripheral edema bilateral lower extremities however much improved from previous office visits   Lymphadenopathy:      Cervical: No cervical adenopathy. Skin:     Comments: NO RASHES   Neurological:      Mental Status: He is alert and oriented to person, place, and time. Mental status is at baseline. Psychiatric:         Mood and Affect: Mood normal.         Behavior: Behavior normal.         Thought Content:  Thought content normal.         Judgment: Judgment normal.         Pertinent Laboratory/Diagnostic Studies:  Lab Results   Component Value Date    GLUCOSE 179 (H) 09/04/2020    BUN 18 11/25/2023    CREATININE 1.24 11/25/2023    CALCIUM 9.5 11/25/2023     12/21/2017    K 4.0 11/25/2023    CO2 32 11/25/2023 CL 97 11/25/2023     Lab Results   Component Value Date    ALT 8 11/20/2023    AST 17 11/20/2023    ALKPHOS 92 11/20/2023    BILITOT 1.3 (H) 12/21/2017       Lab Results   Component Value Date    WBC 9.98 11/25/2023    HGB 10.5 (L) 11/25/2023    HCT 33.0 (L) 11/25/2023    MCV 94 11/25/2023     11/25/2023       Lab Results   Component Value Date    TSH 3.58 03/14/2022       Lab Results   Component Value Date    CHOL 153 12/21/2017     Lab Results   Component Value Date    TRIG 69 11/21/2023     Lab Results   Component Value Date    HDL 36 (L) 11/21/2023     Lab Results   Component Value Date    LDLCALC 70 11/21/2023     Lab Results   Component Value Date    HGBA1C 8.8 (A) 10/02/2023       Results for orders placed or performed during the hospital encounter of 11/20/23   FLU/RSV/COVID - if FLU/RSV clinically relevant    Specimen: Nose; Nares   Result Value Ref Range    SARS-CoV-2 Negative Negative    INFLUENZA A PCR Negative Negative    INFLUENZA B PCR Negative Negative    RSV PCR Negative Negative   CBC and differential   Result Value Ref Range    WBC 13.11 (H) 4.31 - 10.16 Thousand/uL    RBC 3.72 (L) 3.88 - 5.62 Million/uL    Hemoglobin 11.2 (L) 12.0 - 17.0 g/dL    Hematocrit 34.3 (L) 36.5 - 49.3 %    MCV 92 82 - 98 fL    MCH 30.1 26.8 - 34.3 pg    MCHC 32.7 31.4 - 37.4 g/dL    RDW 14.8 11.6 - 15.1 %    MPV 10.1 8.9 - 12.7 fL    Platelets 268 623 - 880 Thousands/uL   Comprehensive metabolic panel   Result Value Ref Range    Sodium 138 135 - 147 mmol/L    Potassium 3.6 3.5 - 5.3 mmol/L    Chloride 97 96 - 108 mmol/L    CO2 31 21 - 32 mmol/L    ANION GAP 10 mmol/L    BUN 30 (H) 5 - 25 mg/dL    Creatinine 1.65 (H) 0.60 - 1.30 mg/dL    Glucose 115 65 - 140 mg/dL    Calcium 9.3 8.4 - 10.2 mg/dL    AST 17 13 - 39 U/L    ALT 8 7 - 52 U/L    Alkaline Phosphatase 92 34 - 104 U/L    Total Protein 7.4 6.4 - 8.4 g/dL    Albumin 3.6 3.5 - 5.0 g/dL    Total Bilirubin 1.30 (H) 0.20 - 1.00 mg/dL    eGFR 44 ml/min/1.73sq m HS Troponin 0hr (reflex protocol)   Result Value Ref Range    hs TnI 0hr 6 "Refer to ACS Flowchart"- see link ng/L   B-Type Natriuretic Peptide(BNP)   Result Value Ref Range    BNP 38 0 - 100 pg/mL   HS Troponin I 2hr   Result Value Ref Range    hs TnI 2hr 5 "Refer to ACS Flowchart"- see link ng/L    Delta 2hr hsTnI -1 <20 ng/L   HS Troponin I 4hr   Result Value Ref Range    hs TnI 4hr 5 "Refer to ACS Flowchart"- see link ng/L    Delta 4hr hsTnI -1 <20 ng/L   CBC and differential   Result Value Ref Range    WBC 12.06 (H) 4.31 - 10.16 Thousand/uL    RBC 3.49 (L) 3.88 - 5.62 Million/uL    Hemoglobin 10.9 (L) 12.0 - 17.0 g/dL    Hematocrit 33.2 (L) 36.5 - 49.3 %    MCV 95 82 - 98 fL    MCH 31.2 26.8 - 34.3 pg    MCHC 32.8 31.4 - 37.4 g/dL    RDW 14.8 11.6 - 15.1 %    MPV 10.4 8.9 - 12.7 fL    Platelets 305 957 - 531 Thousands/uL   Basic metabolic panel   Result Value Ref Range    Sodium 139 135 - 147 mmol/L    Potassium 3.7 3.5 - 5.3 mmol/L    Chloride 98 96 - 108 mmol/L    CO2 32 21 - 32 mmol/L    ANION GAP 9 mmol/L    BUN 29 (H) 5 - 25 mg/dL    Creatinine 1.56 (H) 0.60 - 1.30 mg/dL    Glucose 102 65 - 140 mg/dL    Calcium 9.0 8.4 - 10.2 mg/dL    eGFR 47 ml/min/1.73sq m   Magnesium   Result Value Ref Range    Magnesium 1.4 (L) 1.9 - 2.7 mg/dL   Lipid panel   Result Value Ref Range    Cholesterol 120 See Comment mg/dL    Triglycerides 69 See Comment mg/dL    HDL, Direct 36 (L) >=40 mg/dL    LDL Calculated 70 0 - 100 mg/dL    Non-HDL-Chol (CHOL-HDL) 84 mg/dl   Basic metabolic panel   Result Value Ref Range    Sodium 137 135 - 147 mmol/L    Potassium 3.3 (L) 3.5 - 5.3 mmol/L    Chloride 98 96 - 108 mmol/L    CO2 32 21 - 32 mmol/L    ANION GAP 7 mmol/L    BUN 31 (H) 5 - 25 mg/dL    Creatinine 1.51 (H) 0.60 - 1.30 mg/dL    Glucose 182 (H) 65 - 140 mg/dL    Calcium 9.1 8.4 - 10.2 mg/dL    eGFR 49 ml/min/1.73sq m   Magnesium   Result Value Ref Range    Magnesium 2.0 1.9 - 2.7 mg/dL   CBC and differential   Result Value Ref Range    WBC 9.78 4.31 - 10.16 Thousand/uL    RBC 3.11 (L) 3.88 - 5.62 Million/uL    Hemoglobin 9.4 (L) 12.0 - 17.0 g/dL    Hematocrit 29.2 (L) 36.5 - 49.3 %    MCV 94 82 - 98 fL    MCH 30.2 26.8 - 34.3 pg    MCHC 32.2 31.4 - 37.4 g/dL    RDW 14.7 11.6 - 15.1 %    MPV 10.4 8.9 - 12.7 fL    Platelets 827 474 - 862 Thousands/uL    nRBC 0 /100 WBCs    Neutrophils Relative 56 43 - 75 %    Immat GRANS % 0 0 - 2 %    Lymphocytes Relative 23 14 - 44 %    Monocytes Relative 11 4 - 12 %    Eosinophils Relative 10 (H) 0 - 6 %    Basophils Relative 0 0 - 1 %    Neutrophils Absolute 5.43 1.85 - 7.62 Thousands/µL    Immature Grans Absolute 0.03 0.00 - 0.20 Thousand/uL    Lymphocytes Absolute 2.21 0.60 - 4.47 Thousands/µL    Monocytes Absolute 1.09 0.17 - 1.22 Thousand/µL    Eosinophils Absolute 0.99 (H) 0.00 - 0.61 Thousand/µL    Basophils Absolute 0.03 0.00 - 0.10 Thousands/µL   Basic metabolic panel   Result Value Ref Range    Sodium 138 135 - 147 mmol/L    Potassium 3.4 (L) 3.5 - 5.3 mmol/L    Chloride 101 96 - 108 mmol/L    CO2 29 21 - 32 mmol/L    ANION GAP 8 mmol/L    BUN 30 (H) 5 - 25 mg/dL    Creatinine 1.31 (H) 0.60 - 1.30 mg/dL    Glucose 131 65 - 140 mg/dL    Calcium 8.8 8.4 - 10.2 mg/dL    eGFR 59 ml/min/1.73sq m   Magnesium   Result Value Ref Range    Magnesium 1.9 1.9 - 2.7 mg/dL   Basic metabolic panel   Result Value Ref Range    Sodium 139 135 - 147 mmol/L    Potassium 4.0 3.5 - 5.3 mmol/L    Chloride 101 96 - 108 mmol/L    CO2 31 21 - 32 mmol/L    ANION GAP 7 mmol/L    BUN 24 5 - 25 mg/dL    Creatinine 1.21 0.60 - 1.30 mg/dL    Glucose 176 (H) 65 - 140 mg/dL    Calcium 9.2 8.4 - 10.2 mg/dL    eGFR 65 ml/min/1.73sq m   Magnesium   Result Value Ref Range    Magnesium 1.8 (L) 1.9 - 2.7 mg/dL   CBC and differential   Result Value Ref Range    WBC 9.92 4.31 - 10.16 Thousand/uL    RBC 3.40 (L) 3.88 - 5.62 Million/uL    Hemoglobin 10.1 (L) 12.0 - 17.0 g/dL    Hematocrit 32.3 (L) 36.5 - 49.3 %    MCV 95 82 - 98 fL MCH 29.7 26.8 - 34.3 pg    MCHC 31.3 (L) 31.4 - 37.4 g/dL    RDW 14.9 11.6 - 15.1 %    MPV 10.2 8.9 - 12.7 fL    Platelets 999 125 - 158 Thousands/uL   Basic metabolic panel   Result Value Ref Range    Sodium 138 135 - 147 mmol/L    Potassium 4.4 3.5 - 5.3 mmol/L    Chloride 102 96 - 108 mmol/L    CO2 30 21 - 32 mmol/L    ANION GAP 6 mmol/L    BUN 22 5 - 25 mg/dL    Creatinine 1.09 0.60 - 1.30 mg/dL    Glucose 116 65 - 140 mg/dL    Calcium 9.4 8.4 - 10.2 mg/dL    eGFR 73 ml/min/1.73sq m   Magnesium   Result Value Ref Range    Magnesium 1.7 (L) 1.9 - 2.7 mg/dL   Basic metabolic panel   Result Value Ref Range    Sodium 137 135 - 147 mmol/L    Potassium 4.1 3.5 - 5.3 mmol/L    Chloride 97 96 - 108 mmol/L    CO2 33 (H) 21 - 32 mmol/L    ANION GAP 7 mmol/L    BUN 19 5 - 25 mg/dL    Creatinine 1.12 0.60 - 1.30 mg/dL    Glucose 194 (H) 65 - 140 mg/dL    Calcium 9.6 8.4 - 10.2 mg/dL    eGFR 71 ml/min/1.73sq m   Magnesium   Result Value Ref Range    Magnesium 1.7 (L) 1.9 - 2.7 mg/dL   CBC and differential   Result Value Ref Range    WBC 9.79 4.31 - 10.16 Thousand/uL    RBC 3.48 (L) 3.88 - 5.62 Million/uL    Hemoglobin 10.1 (L) 12.0 - 17.0 g/dL    Hematocrit 32.5 (L) 36.5 - 49.3 %    MCV 93 82 - 98 fL    MCH 29.0 26.8 - 34.3 pg    MCHC 31.1 (L) 31.4 - 37.4 g/dL    RDW 14.6 11.6 - 15.1 %    MPV 9.8 8.9 - 12.7 fL    Platelets 536 027 - 036 Thousands/uL   Basic metabolic panel   Result Value Ref Range    Sodium 136 135 - 147 mmol/L    Potassium 3.9 3.5 - 5.3 mmol/L    Chloride 98 96 - 108 mmol/L    CO2 32 21 - 32 mmol/L    ANION GAP 6 mmol/L    BUN 18 5 - 25 mg/dL    Creatinine 1.16 0.60 - 1.30 mg/dL    Glucose 134 65 - 140 mg/dL    Calcium 9.6 8.4 - 10.2 mg/dL    eGFR 68 ml/min/1.73sq m   Magnesium   Result Value Ref Range    Magnesium 1.7 (L) 1.9 - 2.7 mg/dL   CBC and differential   Result Value Ref Range    WBC 9.98 4.31 - 10.16 Thousand/uL    RBC 3.52 (L) 3.88 - 5.62 Million/uL    Hemoglobin 10.5 (L) 12.0 - 17.0 g/dL Hematocrit 33.0 (L) 36.5 - 49.3 %    MCV 94 82 - 98 fL    MCH 29.8 26.8 - 34.3 pg    MCHC 31.8 31.4 - 37.4 g/dL    RDW 14.6 11.6 - 15.1 %    MPV 10.0 8.9 - 12.7 fL    Platelets 646 203 - 896 Thousands/uL   Basic metabolic panel   Result Value Ref Range    Sodium 137 135 - 147 mmol/L    Potassium 4.0 3.5 - 5.3 mmol/L    Chloride 97 96 - 108 mmol/L    CO2 32 21 - 32 mmol/L    ANION GAP 8 mmol/L    BUN 18 5 - 25 mg/dL    Creatinine 1.24 0.60 - 1.30 mg/dL    Glucose 157 (H) 65 - 140 mg/dL    Calcium 9.5 8.4 - 10.2 mg/dL    eGFR 63 ml/min/1.73sq m   Magnesium   Result Value Ref Range    Magnesium 1.8 (L) 1.9 - 2.7 mg/dL   ECG 12 lead   Result Value Ref Range    Ventricular Rate 90 BPM    Atrial Rate 90 BPM    WI Interval 152 ms    QRSD Interval 84 ms    QT Interval 368 ms    QTC Interval 450 ms    P [place] 54 degrees    QRS Axis 54 degrees    T Wave Axis 46 degrees   Echo complete   Result Value Ref Range    A4C EF 64 %    LVIDd 4.30 cm    LVIDS 3.10 cm    IVSd 1.10 cm    LVPWd 1.10 cm    FS 28 28 - 44    MV E' Tissue Velocity Septal 10 cm/s    LA Volume Index (BP) 25.0 mL/m2    E/A ratio 0.93     E wave deceleration time 100 ms    MV Peak E [place] 69 cm/s    MV Peak A [place] 0.74 m/s    RVID d 3.1 cm    Tricuspid annular plane systolic excursion 2.01 cm    LA size 3.3 cm    LA length (A2C) 5.70 cm    LA volume (BP) 57 mL    RAA A4C 10.2 cm2    MV stenosis pressure 1/2 time 29 ms    MV valve area p 1/2 method 7.59     Ao root 3.30 cm    Asc Ao 3.4 cm    Left ventricular stroke volume (2D) 45.00 mL    IVS 1.1 cm    LEFT VENTRICLE SYSTOLIC VOLUME (MOD BIPLANE) 2D 38 mL    LV DIASTOLIC VOLUME (MOD BIPLANE) 2D 83 mL    Left Atrium Area-systolic Four Chamber 90.8 cm2    Left Atrium Area-systolic Apical Two Chamber 22.7 cm2    LVSV, 2D 45 mL    LV EF 60    Fingerstick Glucose (POCT)   Result Value Ref Range    POC Glucose 125 65 - 140 mg/dl   Fingerstick Glucose (POCT)   Result Value Ref Range    POC Glucose 117 65 - 140 mg/dl Fingerstick Glucose (POCT)   Result Value Ref Range    POC Glucose 169 (H) 65 - 140 mg/dl   Fingerstick Glucose (POCT)   Result Value Ref Range    POC Glucose 175 (H) 65 - 140 mg/dl   Fingerstick Glucose (POCT)   Result Value Ref Range    POC Glucose 195 (H) 65 - 140 mg/dl   Fingerstick Glucose (POCT)   Result Value Ref Range    POC Glucose 116 65 - 140 mg/dl   Fingerstick Glucose (POCT)   Result Value Ref Range    POC Glucose 103 65 - 140 mg/dl   Fingerstick Glucose (POCT)   Result Value Ref Range    POC Glucose 198 (H) 65 - 140 mg/dl   Fingerstick Glucose (POCT)   Result Value Ref Range    POC Glucose 152 (H) 65 - 140 mg/dl   Fingerstick Glucose (POCT)   Result Value Ref Range    POC Glucose 123 65 - 140 mg/dl   Fingerstick Glucose (POCT)   Result Value Ref Range    POC Glucose 161 (H) 65 - 140 mg/dl   Fingerstick Glucose (POCT)   Result Value Ref Range    POC Glucose 164 (H) 65 - 140 mg/dl   Fingerstick Glucose (POCT)   Result Value Ref Range    POC Glucose 159 (H) 65 - 140 mg/dl   Fingerstick Glucose (POCT)   Result Value Ref Range    POC Glucose 131 65 - 140 mg/dl   Fingerstick Glucose (POCT)   Result Value Ref Range    POC Glucose 121 65 - 140 mg/dl   Fingerstick Glucose (POCT)   Result Value Ref Range    POC Glucose 164 (H) 65 - 140 mg/dl   Fingerstick Glucose (POCT)   Result Value Ref Range    POC Glucose 166 (H) 65 - 140 mg/dl   Fingerstick Glucose (POCT)   Result Value Ref Range    POC Glucose 137 65 - 140 mg/dl   Fingerstick Glucose (POCT)   Result Value Ref Range    POC Glucose 220 (H) 65 - 140 mg/dl   Manual Differential(PHLEBS Do Not Order)   Result Value Ref Range    Segmented % 50 43 - 75 %    Lymphocytes % 32 14 - 44 %    Monocytes % 9 4 - 12 %    Eosinophils, % 8 (H) 0 - 6 %    Basophils % 1 0 - 1 %    Absolute Neutrophils 6.56 1.85 - 7.62 Thousand/uL    Lymphocytes Absolute 4.20 0.60 - 4.47 Thousand/uL    Monocytes Absolute 1.18 0.00 - 1.22 Thousand/uL    Eosinophils Absolute 1.05 (H) 0.00 - 0.40 Thousand/uL    Basophils Absolute 0.13 (H) 0.00 - 0.10 Thousand/uL    Total Counted      RBC Morphology Normal     Platelet Estimate Adequate Adequate   Manual Differential(PHLEBS Do Not Order)   Result Value Ref Range    Segmented % 44 43 - 75 %    Lymphocytes % 31 14 - 44 %    Monocytes % 8 4 - 12 %    Eosinophils, % 13 (H) 0 - 6 %    Basophils % 0 0 - 1 %    Atypical Lymphocytes % 4 (H) <=0 %    Absolute Neutrophils 4.36 1.85 - 7.62 Thousand/uL    Lymphocytes Absolute 3.47 0.60 - 4.47 Thousand/uL    Monocytes Absolute 0.79 0.00 - 1.22 Thousand/uL    Eosinophils Absolute 1.29 (H) 0.00 - 0.40 Thousand/uL    Basophils Absolute 0.00 0.00 - 0.10 Thousand/uL    Total Counted      RBC Morphology Present     Platelet Estimate Adequate Adequate    Large Platelet Present     Tear Drop Cells Present    Manual Differential(PHLEBS Do Not Order)   Result Value Ref Range    Segmented % 51 43 - 75 %    Lymphocytes % 34 14 - 44 %    Monocytes % 6 4 - 12 %    Eosinophils, % 9 (H) 0 - 6 %    Basophils % 0 0 - 1 %    Absolute Neutrophils 4.99 1.85 - 7.62 Thousand/uL    Lymphocytes Absolute 3.33 0.60 - 4.47 Thousand/uL    Monocytes Absolute 0.59 0.00 - 1.22 Thousand/uL    Eosinophils Absolute 0.88 (H) 0.00 - 0.40 Thousand/uL    Basophils Absolute 0.00 0.00 - 0.10 Thousand/uL    Total Counted      RBC Morphology Normal     Platelet Estimate Adequate Adequate   Manual Differential(PHLEBS Do Not Order)   Result Value Ref Range    Segmented % 57 43 - 75 %    Lymphocytes % 31 14 - 44 %    Monocytes % 5 4 - 12 %    Eosinophils, % 7 (H) 0 - 6 %    Basophils % 0 0 - 1 %    Absolute Neutrophils 5.69 1.85 - 7.62 Thousand/uL    Lymphocytes Absolute 3.09 0.60 - 4.47 Thousand/uL    Monocytes Absolute 0.50 0.00 - 1.22 Thousand/uL    Eosinophils Absolute 0.70 (H) 0.00 - 0.40 Thousand/uL    Basophils Absolute 0.00 0.00 - 0.10 Thousand/uL    Total Counted      RBC Morphology Normal     Platelet Estimate Adequate Adequate       Orders Placed This Encounter   Procedures    Albumin / creatinine urine ratio    Hemoglobin A1C    Comprehensive metabolic panel    Lipid Panel with Direct LDL reflex    TSH, 3rd generation with Free T4 reflex    Ambulatory Referral to Pulmonology       ALLERGIES:  No Known Allergies    Current Medications     Current Outpatient Medications   Medication Sig Dispense Refill    albuterol (ProAir HFA) 90 mcg/act inhaler Inhale 2 puffs every 6 (six) hours as needed for wheezing 8.5 g 0    Blood Pressure KIT Twice a day periodically 1 each 0    celecoxib (CeleBREX) 200 mg capsule Take 1 capsule (200 mg total) by mouth 2 (two) times a day 30 capsule 5    famotidine (PEPCID) 20 mg tablet Take 1 tablet (20 mg total) by mouth daily at bedtime 30 tablet 0    fluticasone (Flovent HFA) 220 mcg/act inhaler Inhale 1 puff 2 (two) times a day Rinse mouth after use.  12 g 5    glimepiride (AMARYL) 1 mg tablet Take 1 tablet (1 mg total) by mouth 2 (two) times a day 180 tablet 1    glucose blood test strip 1 each by Other route daily 180 each 5    guaiFENesin (MUCINEX) 600 mg 12 hr tablet Take 1 tablet (600 mg total) by mouth every 12 (twelve) hours 60 tablet 0    Lancets (ONETOUCH ULTRASOFT) lancets by Does not apply route      levalbuterol (XOPENEX) 1.25 mg/3 mL nebulizer solution Take 3 mL (1.25 mg total) by nebulization 3 (three) times a day 270 mL 0    LORazepam (ATIVAN) 1 mg tablet Take 1 tablet by mouth daily as needed      methocarbamol (ROBAXIN) 500 mg tablet Take 1 tablet (500 mg total) by mouth 2 (two) times a day 20 tablet 0    pantoprazole (PROTONIX) 40 mg tablet Take 1 tablet (40 mg total) by mouth 2 (two) times a day before meals 60 tablet 0    sitaGLIPtin (Januvia) 100 mg tablet Take 1 tablet (100 mg total) by mouth daily 90 tablet 1    spironolactone (ALDACTONE) 25 mg tablet Take 1 tablet (25 mg total) by mouth daily Do not start before November 26, 2023. 30 tablet 0    torsemide 40 MG TABS Take 40 mg by mouth daily Do not start before November 26, 2023. 30 tablet 0    predniSONE 20 mg tablet 3 tabs X 2 days, 2.5 tabs X 2 days, 2 tabs x 2 days, 1.5 tabs X 2 days, 1 tab X 2 days, 0.5 tabs X 2 days (Patient not taking: Reported on 11/29/2023) 21 tablet 0     No current facility-administered medications for this visit.          Health Maintenance     Health Maintenance   Topic Date Due    Pneumococcal Vaccine: Pediatrics (0 to 5 Years) and At-Risk Patients (6 to 59 Years) (1 - PCV) Never done    Hepatitis A Vaccine (1 of 2 - Risk 2-dose series) Never done    COVID-19 Vaccine (4 - Pfizer series) 02/09/2022    BMI: Followup Plan  09/08/2022    Annual Physical  12/22/2023    Kidney Health Evaluation: Albumin/Creatinine Ratio  01/23/2024    Hepatitis B Vaccine (1 of 3 - Risk 3-dose series) 03/05/2024    HEMOGLOBIN A1C  04/02/2024    Depression Screening  06/05/2024    Diabetic Foot Exam  10/02/2024    DM Eye Exam  10/09/2024    Kidney Health Evaluation: GFR  11/25/2024    BMI: Adult  11/29/2024    Colorectal Cancer Screening  05/13/2025    DTaP,Tdap,and Td Vaccines (3 - Td or Tdap) 09/05/2030    HIV Screening  Completed    Hepatitis C Screening  Completed    Influenza Vaccine  Completed    HIB Vaccine  Aged Out    IPV Vaccine  Aged Out    Meningococcal ACWY Vaccine  Aged Out    HPV Vaccine  Aged Out     Immunization History   Administered Date(s) Administered    COVID-19 PFIZER VACCINE 0.3 ML IM 04/12/2021, 05/03/2021, 12/15/2021    INFLUENZA 12/22/2022    Influenza, injectable, quadrivalent, preservative free 0.5 mL 10/27/2020, 10/31/2023    Influenza, recombinant, quadrivalent,injectable, preservative free 10/21/2019, 12/22/2022    Influenza, seasonal, injectable 09/23/2015    Tdap 10/21/2019, 02/94/7017       Clinton Cruz MD

## 2023-12-01 ENCOUNTER — TELEPHONE (OUTPATIENT)
Dept: GASTROENTEROLOGY | Facility: AMBULARY SURGERY CENTER | Age: 58
End: 2023-12-01

## 2023-12-01 NOTE — TELEPHONE ENCOUNTER
----- Message from Kory Gibbons PA-C sent at 11/24/2023  3:09 PM EST -----  Please call patient for follow-up with any PA or Dr. Fidel Arnold within 4 weeks for dysphagia, thank you!

## 2023-12-08 DIAGNOSIS — J45.909 REACTIVE AIRWAY DISEASE WITHOUT COMPLICATION: ICD-10-CM

## 2023-12-08 RX ORDER — ALBUTEROL SULFATE 90 UG/1
2 AEROSOL, METERED RESPIRATORY (INHALATION) EVERY 6 HOURS PRN
Qty: 8.5 G | Refills: 5 | Status: SHIPPED | OUTPATIENT
Start: 2023-12-08

## 2023-12-14 DIAGNOSIS — J45.909 REACTIVE AIRWAY DISEASE WITHOUT COMPLICATION: ICD-10-CM

## 2023-12-14 DIAGNOSIS — M19.031 ARTHRITIS OF RIGHT WRIST: ICD-10-CM

## 2023-12-14 RX ORDER — CELECOXIB 200 MG/1
200 CAPSULE ORAL 2 TIMES DAILY
Qty: 30 CAPSULE | Refills: 5 | Status: SHIPPED | OUTPATIENT
Start: 2023-12-14 | End: 2023-12-14 | Stop reason: SDUPTHER

## 2023-12-14 RX ORDER — ALBUTEROL SULFATE 90 UG/1
2 AEROSOL, METERED RESPIRATORY (INHALATION) EVERY 6 HOURS PRN
Qty: 8.5 G | Refills: 0 | Status: SHIPPED | OUTPATIENT
Start: 2023-12-14 | End: 2023-12-15

## 2023-12-14 RX ORDER — CELECOXIB 200 MG/1
200 CAPSULE ORAL 2 TIMES DAILY
Qty: 30 CAPSULE | Refills: 3 | Status: SHIPPED | OUTPATIENT
Start: 2023-12-14

## 2023-12-15 RX ORDER — ALBUTEROL SULFATE 90 UG/1
AEROSOL, METERED RESPIRATORY (INHALATION)
Qty: 18 G | Refills: 5 | Status: SHIPPED | OUTPATIENT
Start: 2023-12-15

## 2023-12-19 ENCOUNTER — CONSULT (OUTPATIENT)
Dept: PULMONOLOGY | Facility: CLINIC | Age: 58
End: 2023-12-19
Payer: COMMERCIAL

## 2023-12-19 VITALS
TEMPERATURE: 97.1 F | HEART RATE: 103 BPM | DIASTOLIC BLOOD PRESSURE: 78 MMHG | BODY MASS INDEX: 37.47 KG/M2 | OXYGEN SATURATION: 99 % | RESPIRATION RATE: 16 BRPM | HEIGHT: 69 IN | WEIGHT: 253 LBS | SYSTOLIC BLOOD PRESSURE: 132 MMHG

## 2023-12-19 DIAGNOSIS — R06.02 SOB (SHORTNESS OF BREATH): ICD-10-CM

## 2023-12-19 DIAGNOSIS — R13.19 ESOPHAGEAL DYSPHAGIA: Primary | ICD-10-CM

## 2023-12-19 DIAGNOSIS — J45.40 MODERATE PERSISTENT REACTIVE AIRWAY DISEASE WITHOUT COMPLICATION: ICD-10-CM

## 2023-12-19 DIAGNOSIS — G47.33 OSA (OBSTRUCTIVE SLEEP APNEA): ICD-10-CM

## 2023-12-19 PROBLEM — J96.21 ACUTE ON CHRONIC HYPOXIC RESPIRATORY FAILURE (HCC): Status: RESOLVED | Noted: 2023-11-21 | Resolved: 2023-12-19

## 2023-12-19 PROBLEM — Z23 ENCOUNTER FOR IMMUNIZATION: Status: RESOLVED | Noted: 2022-12-22 | Resolved: 2023-12-19

## 2023-12-19 PROCEDURE — 99204 OFFICE O/P NEW MOD 45 MIN: CPT | Performed by: INTERNAL MEDICINE

## 2023-12-19 RX ORDER — FLUTICASONE FUROATE AND VILANTEROL 200; 25 UG/1; UG/1
1 POWDER RESPIRATORY (INHALATION) DAILY
Qty: 180 BLISTER | Refills: 3 | Status: SHIPPED | OUTPATIENT
Start: 2023-12-19 | End: 2024-12-13

## 2023-12-19 NOTE — PROGRESS NOTES
Pulmonary Consultation   Elly Chong 58 y.o. male MRN: [id number]  12/19/2023    Assessment:    Difficulty swallowing  The patient was supposed to have GI follow up scheduled; it does not appear this was yet arranged, so I re-placed a consultation today, as he has some dysphagia and regurgitation that are possibly associated with his chronic cough and are suspicious for esophageal dysmotility or diverticula.    Reactive airway disease without complication  The relative contribution of this diagnosis to his breathlessness is not clear.  Will collect PFTs to establish.    Review of his insurance formulary suggests that Breo is his best option (Tier 3) but he reports requiring 3-month supplies; order placed.    AMANDO (obstructive sleep apnea)  The patient is known to have this diagnosis, previously treated with CPAP.  Home sleep study ordered.    Plan:    Diagnoses and all orders for this visit:    Esophageal dysphagia  -     Ambulatory Referral to Gastroenterology; Future    AMANDO (obstructive sleep apnea)  -     Ambulatory referral to Pulmonology  -     Ambulatory Referral to Sleep Medicine; Future    SOB (shortness of breath)  -     Ambulatory Referral to Pulmonology  -     Complete PFT with post bronchodilator; Future    Moderate persistent reactive airway disease without complication  -     fluticasone-vilanterol (Breo Ellipta) 200-25 mcg/actuation inhaler; Inhale 1 puff daily Rinse mouth after use.    Follow up in 3 months.    History of Present Illness   HPI:  Elly Chong is a 58 y.o. male who is referred for evaluation of shortness of breath and cough.    He reports problems with his breathing occurring for the last month or so, but this has been a recurring problem for quite a while.  He has a history of recurrent exacerbations of congestive heart failure requiring hospitalizations and IV diuresis.  He is short of breath just walking from his driveway into his house, or up a flight of stairs, or even if he  just stands for too long a period of time.  Most of the time he ends up hospitalized for CHF and recovers, but this most recent time he has not returned to his baseline.  Associated with this he also has a cough, characterized more as throat clearing, which happens more commonly after eating or drinking anything.  He produces some yellow phlegm but he also notes that he has some regurgitation of food after eating, requiring him to re-chew and re-swallow what he has previously eaten.    The cough is getting worse lately.  He coughed to the point of syncope about a month and a half ago.  It can also be provoked by talking for prolonged periods of time or laughing.    He does have albuterol prescribed and it does work, but it takes longer to kick in than he would like.  He has been prescribed maintenance inhalers but they've always been around $800 per month when he tries to get them at the pharmacy so he never fills them.  He reports he needs to have 3-month supplies sent in.      Other than activity, his dyspnea is provoked by cold weather and pollen.  He does not have a history of exacerbations of respiratory disease.  He will get occasional episodes of bronchitis but not in the recent past.    Note was also made that the patient has sleep apnea, confirmed while he was hospitalized.  He used to be on CPAP but reports losing the device during a move.  He has poor quality, fragmented sleep, excessive daytime sleepiness, excessive napping, but no witnessed apneas as he does not sleep in the same room as his wife.    SH:  Smoker - started about a year ago but stopped in the hospital.  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      Review of Systems   Respiratory:  Positive for cough and shortness of breath.    Cardiovascular:  Positive for leg swelling.       Historical Information   Past Medical History:   Diagnosis Date   • Acute kidney injury (HCC) 9/5/2020   • Diabetes mellitus (HCC)    • Hypertension    • Inguinal hernia      3/25/15   • Onychomycosis     5/24/17   • Type 2 diabetes mellitus (HCC) 05/01/2012     Past Surgical History:   Procedure Laterality Date   • ARTHROSCOPY KNEE      with Medial and Lateral Meniscus Repair   • HERNIA REPAIR      umbilical and inguinal hernia repairs (left)     Family History   Problem Relation Age of Onset   • Hypertension Mother         essential   • Chronic bronchitis Father    • Prostate cancer Father    • Breast cancer Sister        Meds/Allergies     Current Outpatient Medications:   •  albuterol (PROVENTIL HFA,VENTOLIN HFA) 90 mcg/act inhaler, INHALE 2 PUFFS EVERY 6 HOURS AS NEEDED FOR WHEEZING, Disp: 18 g, Rfl: 5  •  Blood Pressure KIT, Twice a day periodically, Disp: 1 each, Rfl: 0  •  celecoxib (CeleBREX) 200 mg capsule, Take 1 capsule (200 mg total) by mouth 2 (two) times a day, Disp: 30 capsule, Rfl: 3  •  famotidine (PEPCID) 20 mg tablet, Take 1 tablet (20 mg total) by mouth daily at bedtime, Disp: 30 tablet, Rfl: 0  •  fluticasone-vilanterol (Breo Ellipta) 200-25 mcg/actuation inhaler, Inhale 1 puff daily Rinse mouth after use., Disp: 180 blister, Rfl: 3  •  glimepiride (AMARYL) 1 mg tablet, Take 1 tablet (1 mg total) by mouth 2 (two) times a day, Disp: 180 tablet, Rfl: 1  •  glucose blood test strip, 1 each by Other route daily, Disp: 180 each, Rfl: 5  •  guaiFENesin (MUCINEX) 600 mg 12 hr tablet, Take 1 tablet (600 mg total) by mouth every 12 (twelve) hours, Disp: 60 tablet, Rfl: 0  •  Lancets (ONETOUCH ULTRASOFT) lancets, by Does not apply route, Disp: , Rfl:   •  levalbuterol (XOPENEX) 1.25 mg/3 mL nebulizer solution, Take 3 mL (1.25 mg total) by nebulization 3 (three) times a day, Disp: 270 mL, Rfl: 0  •  LORazepam (ATIVAN) 1 mg tablet, Take 1 tablet by mouth daily as needed, Disp: , Rfl:   •  methocarbamol (ROBAXIN) 500 mg tablet, Take 1 tablet (500 mg total) by mouth 2 (two) times a day, Disp: 20 tablet, Rfl: 0  •  pantoprazole (PROTONIX) 40 mg tablet, Take 1 tablet (40 mg total)  "by mouth 2 (two) times a day before meals, Disp: 60 tablet, Rfl: 0  •  sitaGLIPtin (Januvia) 100 mg tablet, Take 1 tablet (100 mg total) by mouth daily, Disp: 90 tablet, Rfl: 1  •  spironolactone (ALDACTONE) 25 mg tablet, Take 1 tablet (25 mg total) by mouth daily Do not start before November 26, 2023., Disp: 30 tablet, Rfl: 0  •  torsemide 40 MG TABS, Take 40 mg by mouth daily Do not start before November 26, 2023., Disp: 30 tablet, Rfl: 0  No Known Allergies    Vitals: Blood pressure 132/78, pulse 103, temperature (!) 97.1 °F (36.2 °C), temperature source Tympanic, resp. rate 16, height 5' 9\" (1.753 m), weight 115 kg (253 lb), SpO2 99%. Body mass index is 37.36 kg/m². Oxygen Therapy  SpO2: 99 %  Oxygen Therapy: None (Room air)    Physical Exam  Physical Exam  Vitals reviewed.   Constitutional:       General: He is not in acute distress.     Appearance: Normal appearance. He is well-developed. He is not ill-appearing.   HENT:      Head: Normocephalic and atraumatic.   Eyes:      General: No scleral icterus.     Conjunctiva/sclera: Conjunctivae normal.   Neck:      Thyroid: No thyromegaly.      Vascular: No JVD.   Cardiovascular:      Rate and Rhythm: Normal rate and regular rhythm.      Heart sounds: Normal heart sounds. No murmur heard.     No friction rub. No gallop.   Pulmonary:      Effort: Pulmonary effort is normal. No respiratory distress.      Breath sounds: Normal breath sounds. No wheezing or rales.   Musculoskeletal:      Cervical back: Neck supple.      Right lower leg: Edema present.      Left lower leg: Edema present.   Skin:     General: Skin is warm and dry.      Findings: No rash.   Neurological:      General: No focal deficit present.      Mental Status: He is alert and oriented to person, place, and time. Mental status is at baseline.   Psychiatric:         Mood and Affect: Mood normal.         Behavior: Behavior normal.     Labs: I have personally reviewed pertinent lab results.  Lab Results " "  Component Value Date    WBC 9.98 11/25/2023    HGB 10.5 (L) 11/25/2023    HCT 33.0 (L) 11/25/2023    MCV 94 11/25/2023     11/25/2023     Lab Results   Component Value Date    GLUCOSE 179 (H) 09/04/2020    CALCIUM 9.5 11/25/2023     12/21/2017    K 4.0 11/25/2023    CO2 32 11/25/2023    CL 97 11/25/2023    BUN 18 11/25/2023    CREATININE 1.24 11/25/2023     No results found for: \"IGE\"  Lab Results   Component Value Date    ALT 8 11/20/2023    AST 17 11/20/2023    ALKPHOS 92 11/20/2023    BILITOT 1.3 (H) 12/21/2017       Imaging and other studies: I have personally reviewed relevant images in PACS.    EKG, Pathology, and Other Studies: I have personally reviewed relevant reports in Epic.    Jed Pineda M.D.  [facility]  "

## 2023-12-19 NOTE — ASSESSMENT & PLAN NOTE
The relative contribution of this diagnosis to his breathlessness is not clear.  Will collect PFTs to establish.    Review of his insurance formulary suggests that Breo is his best option (Tier 3) but he reports requiring 3-month supplies; order placed.

## 2023-12-19 NOTE — ASSESSMENT & PLAN NOTE
The patient was supposed to have GI follow up scheduled; it does not appear this was yet arranged, so I re-placed a consultation today, as he has some dysphagia and regurgitation that are possibly associated with his chronic cough and are suspicious for esophageal dysmotility or diverticula.

## 2023-12-19 NOTE — ASSESSMENT & PLAN NOTE
The patient is known to have this diagnosis, previously treated with CPAP.  Home sleep study ordered.

## 2023-12-20 DIAGNOSIS — G47.33 OSA (OBSTRUCTIVE SLEEP APNEA): Primary | ICD-10-CM

## 2023-12-27 ENCOUNTER — CONSULT (OUTPATIENT)
Dept: GASTROENTEROLOGY | Facility: AMBULARY SURGERY CENTER | Age: 58
End: 2023-12-27
Payer: COMMERCIAL

## 2023-12-27 VITALS
HEIGHT: 69 IN | BODY MASS INDEX: 37.26 KG/M2 | OXYGEN SATURATION: 98 % | WEIGHT: 251.6 LBS | DIASTOLIC BLOOD PRESSURE: 98 MMHG | HEART RATE: 90 BPM | SYSTOLIC BLOOD PRESSURE: 168 MMHG

## 2023-12-27 DIAGNOSIS — Z12.11 SCREENING FOR COLON CANCER: ICD-10-CM

## 2023-12-27 DIAGNOSIS — K21.9 GERD (GASTROESOPHAGEAL REFLUX DISEASE): ICD-10-CM

## 2023-12-27 DIAGNOSIS — R13.19 ESOPHAGEAL DYSPHAGIA: Primary | ICD-10-CM

## 2023-12-27 PROCEDURE — 99204 OFFICE O/P NEW MOD 45 MIN: CPT | Performed by: INTERNAL MEDICINE

## 2023-12-27 RX ORDER — POLYETHYLENE GLYCOL 3350 17 G/17G
POWDER, FOR SOLUTION ORAL
Qty: 238 G | Refills: 0 | Status: SHIPPED | OUTPATIENT
Start: 2023-12-27

## 2023-12-27 RX ORDER — PANTOPRAZOLE SODIUM 40 MG/1
40 TABLET, DELAYED RELEASE ORAL DAILY
Qty: 90 TABLET | Refills: 3 | Status: SHIPPED | OUTPATIENT
Start: 2023-12-27

## 2023-12-27 NOTE — PROGRESS NOTES
[facility] - Outpatient Consultation  Elly Chong 58 y.o. male MRN: [id number]  Encounter: [id number]      PCP: Linden Roque MD  Referring: Jed Pineda MD  [address] [place] [address]  [place] [address]      ASSESSMENT AND PLAN:      1. Esophageal dysphagia  2. GERD (gastroesophageal reflux disease)  Given persistent symptoms of acid reflux and solid food dysphagia, concern for erosive esophagitis, or peptic stricture as etiology  Alternative diagnostic considerations also include malignancy, eosinophilic esophagitis, Schatzki's ring, or other  Evidence of significant acid reflux, particularly related to coughing and when lying supine on his barium swallow-recommend empiric continued PPI therapy  - pantoprazole (PROTONIX) 40 mg tablet; Take 1 tablet (40 mg total) by mouth daily  Dispense: 90 tablet; Refill: 3  - EGD; Future    3. Screening for colon cancer  Colonoscopy in 2013 with 2 subcentimeter sigmoid colon polyps, pathology not available for my review  - Colonoscopy; Future  - polyethylene glycol (GLYCOLAX) 17 GM/SCOOP powder; At 5pm take 5mgx2 dulcolax. At 6pm mix 238 g miralax in 64oz gatorade. Drink 8oz glass every 5 mins until 32oz finished. Drink remaining as rec.  Dispense: 238 g; Refill: 0    ______________________________________________________________________    CC:  Chief Complaint   Patient presents with    Advice Only       HPI:      Patient is a 58-year-old male referred for esophageal dysphagia.  He has T2DM with last HbA1c 8.8%, AMANDO, reactive airway disease, CHF, HTN, gout, BMI 37.  For the last several months he is experiencing significant coughing symptoms with itching in his throat and associated with regurgitation of acid and acid reflux.  She he is additionally experiencing dysphagia, with solid foods.  This is worse with intake of chicken and meats.  He denies associated abdominal pain.  He denies any unintentional weight loss.  He does have a  history of smoking cigars, and relates alcohol abuse in the past, but is no longer drinking.  Has bowel movements regularly.    Colonoscopy May 2013-2 subcentimeter sigmoid colon polyps, pathology not available for my review    FL barium esophagram Nov 2023-large amount of GERD when patient is supine/during coughing    Abdominal Pain  This is a recurrent problem. The current episode started more than 1 month ago. The onset quality is gradual. The problem occurs every several days. The pain is located in the generalized abdominal region. The pain is at a severity of 5/10. The quality of the pain is aching. The abdominal pain radiates to the periumbilical region. Associated symptoms include constipation, myalgias, nausea and vomiting. Pertinent negatives include no anorexia, arthralgias, belching, diarrhea, dysuria, fever, flatus, frequency, headaches, hematochezia, hematuria, melena or weight loss. The pain is aggravated by coughing and deep breathing. The pain is relieved by Being still. Prior diagnostic workup includes CT scan and ultrasound.         REVIEW OF SYSTEMS:    CONSTITUTIONAL: Denies any fever, chills, rigors, and weight loss.  HEENT: No earache or tinnitus. Denies hearing loss or visual disturbances.  CARDIOVASCULAR: No chest pain or palpitations.   RESPIRATORY: Denies any cough, hemoptysis, shortness of breath or dyspnea on exertion.  GASTROINTESTINAL: As noted in the History of Present Illness.   GENITOURINARY: No problems with urination. Denies any hematuria or dysuria.  NEUROLOGIC: No dizziness or vertigo, denies headaches.   MUSCULOSKELETAL: Denies any muscle or joint pain.   SKIN: Denies skin rashes or itching.   ENDOCRINE: Denies excessive thirst. Denies intolerance to heat or cold.  PSYCHOSOCIAL: Denies depression or anxiety. Denies any recent memory loss.       Historical Information   Past Medical History:   Diagnosis Date    Acute kidney injury (HCC) 9/5/2020    Diabetes mellitus (HCC)      "Hypertension     Inguinal hernia     3/25/15    Onychomycosis     5/24/17    Type 2 diabetes mellitus (HCC) 05/01/2012     Past Surgical History:   Procedure Laterality Date    ARTHROSCOPY KNEE      with Medial and Lateral Meniscus Repair    HERNIA REPAIR      umbilical and inguinal hernia repairs (left)     Social History   Social History     Substance and Sexual Activity   Alcohol Use Yes    Alcohol/week: 3.0 standard drinks of alcohol    Types: 2 Cans of beer, 1 Standard drinks or equivalent per week    Comment: weekend: 1 glass of christian or 2 beers     Social History     Substance and Sexual Activity   Drug Use No     Social History     Tobacco Use   Smoking Status Former    Types: Cigars    Start date: 6/22/2020   Smokeless Tobacco Never   Tobacco Comments    current every day smoker, per [employer]     Family History   Problem Relation Age of Onset    Hypertension Mother         essential    Chronic bronchitis Father     Prostate cancer Father     Breast cancer Sister        Meds/Allergies       Current Outpatient Medications:     albuterol (PROVENTIL HFA,VENTOLIN HFA) 90 mcg/act inhaler    Blood Pressure KIT    famotidine (PEPCID) 20 mg tablet    fluticasone-vilanterol (Breo Ellipta) 200-25 mcg/actuation inhaler    glimepiride (AMARYL) 1 mg tablet    glucose blood test strip    Lancets (ONETOUCH ULTRASOFT) lancets    LORazepam (ATIVAN) 1 mg tablet    methocarbamol (ROBAXIN) 500 mg tablet    sitaGLIPtin (Januvia) 100 mg tablet    spironolactone (ALDACTONE) 25 mg tablet    torsemide 40 MG TABS    celecoxib (CeleBREX) 200 mg capsule    pantoprazole (PROTONIX) 40 mg tablet    No Known Allergies        Objective     Blood pressure 168/98, pulse 90, height 5' 9\" (1.753 m), weight 114 kg (251 lb 9.6 oz), SpO2 98%. Body mass index is 37.15 kg/m².     PHYSICAL EXAM:      General Appearance:   Alert, cooperative, no distress   HEENT:   Normocephalic, atraumatic, anicteric.     Neck:  Supple, symmetrical, trachea " "midline   Lungs:   Clear to auscultation bilaterally; no rales, rhonchi or wheezing; respirations unlabored    Heart::   Regular rate and rhythm; no murmur, rub, or gallop.   Abdomen:   Soft, non-tender, non-distended; normal bowel sounds; no masses, no organomegaly    Genitalia:   Deferred    Rectal:   Deferred    Extremities:  No cyanosis, clubbing or edema    Pulses:  2+ and symmetric    Skin:  No jaundice, rashes, or lesions    Lymph nodes:  No palpable cervical lymphadenopathy        Lab Results:     Lab Results   Component Value Date    WBC 9.98 11/25/2023    HGB 10.5 (L) 11/25/2023    HCT 33.0 (L) 11/25/2023    MCV 94 11/25/2023     11/25/2023       Lab Results   Component Value Date     12/21/2017    K 4.0 11/25/2023    CL 97 11/25/2023    CO2 32 11/25/2023    ANIONGAP 6 01/16/2014    BUN 18 11/25/2023    CREATININE 1.24 11/25/2023    GLUCOSE 179 (H) 09/04/2020    CALCIUM 9.5 11/25/2023    CORRECTEDCA 8.0 (L) 09/26/2023    AST 17 11/20/2023    ALT 8 11/20/2023    ALKPHOS 92 11/20/2023    PROT 6.8 12/21/2017    BILITOT 1.3 (H) 12/21/2017    EGFR 63 11/25/2023       Lab Results   Component Value Date    INR 1.04 09/04/2020    INR 1.12 07/06/2020    INR 0.98 12/28/2017    PROTIME 13.6 09/04/2020    PROTIME 13.7 07/06/2020    PROTIME 13.3 12/28/2017         Radiology Results:   No results found.    Portions of the record may have been created with voice recognition software. Occasional wrong word or \"sound a like\" substitutions may have occurred due to the inherent limitations of voice recognition software. Read the chart carefully and recognize, using context, where substitutions have occurred.        "

## 2023-12-27 NOTE — H&P (VIEW-ONLY)
[facility] - Outpatient Consultation  Elly Chong 58 y.o. male MRN: [id number]  Encounter: [id number]      PCP: Linden Roque MD  Referring: Jed Pineda MD  [address] [place] [address]  [place] [address]      ASSESSMENT AND PLAN:      1. Esophageal dysphagia  2. GERD (gastroesophageal reflux disease)  Given persistent symptoms of acid reflux and solid food dysphagia, concern for erosive esophagitis, or peptic stricture as etiology  Alternative diagnostic considerations also include malignancy, eosinophilic esophagitis, Schatzki's ring, or other  Evidence of significant acid reflux, particularly related to coughing and when lying supine on his barium swallow-recommend empiric continued PPI therapy  - pantoprazole (PROTONIX) 40 mg tablet; Take 1 tablet (40 mg total) by mouth daily  Dispense: 90 tablet; Refill: 3  - EGD; Future    3. Screening for colon cancer  Colonoscopy in 2013 with 2 subcentimeter sigmoid colon polyps, pathology not available for my review  - Colonoscopy; Future  - polyethylene glycol (GLYCOLAX) 17 GM/SCOOP powder; At 5pm take 5mgx2 dulcolax. At 6pm mix 238 g miralax in 64oz gatorade. Drink 8oz glass every 5 mins until 32oz finished. Drink remaining as rec.  Dispense: 238 g; Refill: 0    ______________________________________________________________________    CC:  Chief Complaint   Patient presents with    Advice Only       HPI:      Patient is a 58-year-old male referred for esophageal dysphagia.  He has T2DM with last HbA1c 8.8%, AMANDO, reactive airway disease, CHF, HTN, gout, BMI 37.  For the last several months he is experiencing significant coughing symptoms with itching in his throat and associated with regurgitation of acid and acid reflux.  She he is additionally experiencing dysphagia, with solid foods.  This is worse with intake of chicken and meats.  He denies associated abdominal pain.  He denies any unintentional weight loss.  He does have a  history of smoking cigars, and relates alcohol abuse in the past, but is no longer drinking.  Has bowel movements regularly.    Colonoscopy May 2013-2 subcentimeter sigmoid colon polyps, pathology not available for my review    [place] barium esophagram Nov 2023-large amount of GERD when patient is supine/during coughing    Abdominal Pain  This is a recurrent problem. The current episode started more than 1 month ago. The onset quality is gradual. The problem occurs every several days. The pain is located in the generalized abdominal region. The pain is at a severity of 5/10. The quality of the pain is aching. The abdominal pain radiates to the periumbilical region. Associated symptoms include constipation, myalgias, nausea and vomiting. Pertinent negatives include no anorexia, arthralgias, belching, diarrhea, dysuria, fever, flatus, frequency, headaches, hematochezia, hematuria, melena or weight loss. The pain is aggravated by coughing and deep breathing. The pain is relieved by Being still. Prior diagnostic workup includes CT scan and ultrasound.         REVIEW OF SYSTEMS:    CONSTITUTIONAL: Denies any fever, chills, rigors, and weight loss.  HEENT: No earache or tinnitus. Denies hearing loss or visual disturbances.  CARDIOVASCULAR: No chest pain or palpitations.   RESPIRATORY: Denies any cough, hemoptysis, shortness of breath or dyspnea on exertion.  GASTROINTESTINAL: As noted in the History of Present Illness.   GENITOURINARY: No problems with urination. Denies any hematuria or dysuria.  NEUROLOGIC: No dizziness or vertigo, denies headaches.   MUSCULOSKELETAL: Denies any muscle or joint pain.   SKIN: Denies skin rashes or itching.   ENDOCRINE: Denies excessive thirst. Denies intolerance to heat or cold.  PSYCHOSOCIAL: Denies depression or anxiety. Denies any recent memory loss.       Historical Information   Past Medical History:   Diagnosis Date    Acute kidney injury (HCC) 9/5/2020    Diabetes mellitus (HCC)      "Hypertension     Inguinal hernia     3/25/15    Onychomycosis     5/24/17    Type 2 diabetes mellitus (HCC) 05/01/2012     Past Surgical History:   Procedure Laterality Date    ARTHROSCOPY KNEE      with Medial and Lateral Meniscus Repair    HERNIA REPAIR      umbilical and inguinal hernia repairs (left)     Social History   Social History     Substance and Sexual Activity   Alcohol Use Yes    Alcohol/week: 3.0 standard drinks of alcohol    Types: 2 Cans of beer, 1 Standard drinks or equivalent per week    Comment: weekend: 1 glass of christian or 2 beers     Social History     Substance and Sexual Activity   Drug Use No     Social History     Tobacco Use   Smoking Status Former    Types: Cigars    Start date: 6/22/2020   Smokeless Tobacco Never   Tobacco Comments    current every day smoker, per [employer]     Family History   Problem Relation Age of Onset    Hypertension Mother         essential    Chronic bronchitis Father     Prostate cancer Father     Breast cancer Sister        Meds/Allergies       Current Outpatient Medications:     albuterol (PROVENTIL HFA,VENTOLIN HFA) 90 mcg/act inhaler    Blood Pressure KIT    famotidine (PEPCID) 20 mg tablet    fluticasone-vilanterol (Breo Ellipta) 200-25 mcg/actuation inhaler    glimepiride (AMARYL) 1 mg tablet    glucose blood test strip    Lancets (ONETOUCH ULTRASOFT) lancets    LORazepam (ATIVAN) 1 mg tablet    methocarbamol (ROBAXIN) 500 mg tablet    sitaGLIPtin (Januvia) 100 mg tablet    spironolactone (ALDACTONE) 25 mg tablet    torsemide 40 MG TABS    celecoxib (CeleBREX) 200 mg capsule    pantoprazole (PROTONIX) 40 mg tablet    No Known Allergies        Objective     Blood pressure 168/98, pulse 90, height 5' 9\" (1.753 m), weight 114 kg (251 lb 9.6 oz), SpO2 98%. Body mass index is 37.15 kg/m².     PHYSICAL EXAM:      General Appearance:   Alert, cooperative, no distress   HEENT:   Normocephalic, atraumatic, anicteric.     Neck:  Supple, symmetrical, trachea " "midline   Lungs:   Clear to auscultation bilaterally; no rales, rhonchi or wheezing; respirations unlabored    Heart::   Regular rate and rhythm; no murmur, rub, or gallop.   Abdomen:   Soft, non-tender, non-distended; normal bowel sounds; no masses, no organomegaly    Genitalia:   Deferred    Rectal:   Deferred    Extremities:  No cyanosis, clubbing or edema    Pulses:  2+ and symmetric    Skin:  No jaundice, rashes, or lesions    Lymph nodes:  No palpable cervical lymphadenopathy        Lab Results:     Lab Results   Component Value Date    WBC 9.98 11/25/2023    HGB 10.5 (L) 11/25/2023    HCT 33.0 (L) 11/25/2023    MCV 94 11/25/2023     11/25/2023       Lab Results   Component Value Date     12/21/2017    K 4.0 11/25/2023    CL 97 11/25/2023    CO2 32 11/25/2023    ANIONGAP 6 01/16/2014    BUN 18 11/25/2023    CREATININE 1.24 11/25/2023    GLUCOSE 179 (H) 09/04/2020    CALCIUM 9.5 11/25/2023    CORRECTEDCA 8.0 (L) 09/26/2023    AST 17 11/20/2023    ALT 8 11/20/2023    ALKPHOS 92 11/20/2023    PROT 6.8 12/21/2017    BILITOT 1.3 (H) 12/21/2017    EGFR 63 11/25/2023       Lab Results   Component Value Date    INR 1.04 09/04/2020    INR 1.12 07/06/2020    INR 0.98 12/28/2017    PROTIME 13.6 09/04/2020    PROTIME 13.7 07/06/2020    PROTIME 13.3 12/28/2017         Radiology Results:   No results found.    Portions of the record may have been created with voice recognition software. Occasional wrong word or \"sound a like\" substitutions may have occurred due to the inherent limitations of voice recognition software. Read the chart carefully and recognize, using context, where substitutions have occurred.        "

## 2023-12-27 NOTE — PATIENT INSTRUCTIONS
Scheduled date of EGD/colonoscopy (as of today): 01/19/24  Physician performing EGD/colonoscopy: Dr. Lehman  Location of EGD/colonoscopy: [facility]  Desired bowel prep reviewed with patient: miralax  Instructions reviewed with patient by: walker  Clearances:  n/a

## 2024-01-04 ENCOUNTER — OFFICE VISIT (OUTPATIENT)
Dept: FAMILY MEDICINE CLINIC | Facility: CLINIC | Age: 59
End: 2024-01-04
Payer: COMMERCIAL

## 2024-01-04 VITALS
DIASTOLIC BLOOD PRESSURE: 80 MMHG | RESPIRATION RATE: 18 BRPM | OXYGEN SATURATION: 98 % | SYSTOLIC BLOOD PRESSURE: 140 MMHG | WEIGHT: 241.13 LBS | TEMPERATURE: 98 F | BODY MASS INDEX: 35.71 KG/M2 | HEIGHT: 69 IN | HEART RATE: 100 BPM

## 2024-01-04 DIAGNOSIS — I50.30 (HFPEF) HEART FAILURE WITH PRESERVED EJECTION FRACTION (HCC): ICD-10-CM

## 2024-01-04 DIAGNOSIS — I10 ESSENTIAL HYPERTENSION: ICD-10-CM

## 2024-01-04 DIAGNOSIS — M25.50 ARTHRALGIA, UNSPECIFIED JOINT: Primary | ICD-10-CM

## 2024-01-04 PROCEDURE — 99214 OFFICE O/P EST MOD 30 MIN: CPT | Performed by: FAMILY MEDICINE

## 2024-01-04 RX ORDER — TORSEMIDE 20 MG/1
TABLET ORAL
Qty: 60 TABLET | Refills: 5 | Status: SHIPPED | OUTPATIENT
Start: 2024-01-04

## 2024-01-04 RX ORDER — SPIRONOLACTONE 25 MG/1
25 TABLET ORAL DAILY
Qty: 30 TABLET | Refills: 5 | Status: SHIPPED | OUTPATIENT
Start: 2024-01-04 | End: 2024-07-02

## 2024-01-04 NOTE — PROGRESS NOTES
FAMILY PRACTICE OFFICE VISIT       NAME: Elly Chong  AGE: 58 y.o. SEX: male       : 1965        MRN: [id number]    DATE: 2024  TIME: 6:33 AM    Assessment and Plan     Problem List Items Addressed This Visit       Essential hypertension     Hypertension.  The patient's blood pressure is stable at this time and he will continue current regimen of medications         Relevant Medications    torsemide (DEMADEX) 20 mg tablet    spironolactone (ALDACTONE) 25 mg tablet    (HFpEF) heart failure with preserved ejection fraction (HCC)     Wt Readings from Last 3 Encounters:   24 109 kg (241 lb 2 oz)   23 114 kg (251 lb 9.6 oz)   23 115 kg (253 lb)     Congestive heart failure.  Patient was given a refill to continue with his Demadex and Aldactone as previously ordered.               Relevant Medications    torsemide (DEMADEX) 20 mg tablet    spironolactone (ALDACTONE) 25 mg tablet    Arthralgia - Primary     Arthralgia.  Patient given prescription to initiate diclofenac 50 mg 1 twice daily with food.  He was given referral to [facility] rheumatology for further evaluation.  His [place] paperwork will be filled out since patient is unable to return to work at this time         Relevant Medications    diclofenac sodium (VOLTAREN) 50 mg EC tablet    Other Relevant Orders    Ambulatory Referral to Rheumatology           Chief Complaint     Chief Complaint   Patient presents with    Well Check     PHYSICAL    BODY PAIN        History of Present Illness     Patient in the office with complaints of arthralgias specifically in his back and bilateral hands and knees.  Patient denies any recent injuries or fall.  Symptoms are worst in the mornings when trying to get out of bed or with extended periods of sitting down.    Patient states he is soon to run out of his diuretic medication and is not taking any medicine for arthritis or joint pains.  He has been unable to return to work since approximately  October 2023 due to bouts of gout, CHF, and arthralgias        Review of Systems   Review of Systems   Constitutional:  Positive for fatigue.   HENT: Negative.     Respiratory: Negative.     Cardiovascular: Negative.    Gastrointestinal: Negative.    Genitourinary: Negative.    Musculoskeletal:  Positive for arthralgias and gait problem.   Psychiatric/Behavioral: Negative.         Active Problem List     Patient Active Problem List   Diagnosis    Essential hypertension    Uncontrolled type 2 diabetes mellitus with hyperglycemia ([facility])    Elevated lipoprotein(a)    Microalbuminuria    Obesity    CHF (congestive heart failure) ([facility])    Alcohol abuse    DELFINO (acute kidney injury) ([facility])    Onychomycosis    Arthritis of right wrist    Acute on chronic diastolic congestive heart failure (HCC)    Impingement syndrome of left shoulder    Diabetes mellitus ([facility])    Acute gout of right hand    Acute midline low back pain without sciatica    Reactive airway disease without complication    (HFpEF) heart failure with preserved ejection fraction ([facility])    Difficulty swallowing    AMANDO (obstructive sleep apnea)    Arthralgia       Past Medical History:  Past Medical History:   Diagnosis Date    Acute kidney injury (HCC) 9/5/2020    Diabetes mellitus ([facility])     Hypertension     Inguinal hernia     3/25/15    Onychomycosis     5/24/17    Type 2 diabetes mellitus ([facility]) 05/01/2012       Past Surgical History:  Past Surgical History:   Procedure Laterality Date    ARTHROSCOPY KNEE      with Medial and Lateral Meniscus Repair    HERNIA REPAIR      umbilical and inguinal hernia repairs (left)       Family History:  Family History   Problem Relation Age of Onset    Hypertension Mother         essential    Chronic bronchitis Father     Prostate cancer Father     Breast cancer Sister        Social History:  Social History     Socioeconomic History    Marital status: /Civil Union     Spouse name: Not on file    Number of children: Not on  file    Years of education: Not on file    Highest education level: Not on file   Occupational History    Occupation: [employer]    Tobacco Use    Smoking status: Former     Types: Cigars     Start date: 6/22/2020    Smokeless tobacco: Never    Tobacco comments:     current every day smoker, per [employer]   Vaping Use    Vaping status: Never Used   Substance and Sexual Activity    Alcohol use: Yes     Alcohol/week: 3.0 standard drinks of alcohol     Types: 2 Cans of beer, 1 Standard drinks or equivalent per week     Comment: weekend: 1 glass of christian or 2 beers    Drug use: No    Sexual activity: Yes   Other Topics Concern    Not on file   Social History Narrative    Alcohol dependence    Nicotine dependence    Denied, alcohol Use    Marital problems     Social Determinants of Health     Financial Resource Strain: Not on file   Food Insecurity: Not on file   Transportation Needs: Not on file   Physical Activity: Not on file   Stress: Not on file   Social Connections: Not on file   Intimate Partner Violence: Not on file   Housing Stability: Not on file       Objective     Vitals:    01/04/24 1502   BP: 140/80   Pulse: 100   Resp: 18   Temp: 98 °F (36.7 °C)   SpO2: 98%     Wt Readings from Last 3 Encounters:   01/04/24 109 kg (241 lb 2 oz)   12/27/23 114 kg (251 lb 9.6 oz)   12/19/23 115 kg (253 lb)       Physical Exam  Constitutional:       General: He is not in acute distress.     Appearance: Normal appearance. He is not ill-appearing.   HENT:      Head: Normocephalic and atraumatic.   Eyes:      General:         Right eye: No discharge.         Left eye: No discharge.      Conjunctiva/sclera: Conjunctivae normal.      Pupils: Pupils are equal, round, and reactive to light.   Neck:      Vascular: No carotid bruit.   Cardiovascular:      Rate and Rhythm: Normal rate and regular rhythm.      Heart sounds: Normal heart sounds. No murmur heard.  Pulmonary:      Effort: Pulmonary effort is normal.       Breath sounds: Normal breath sounds. No wheezing, rhonchi or rales.   Musculoskeletal:         General: Swelling and tenderness present.      Right lower leg: Edema present.      Left lower leg: Edema present.      Comments: Trace peripheral edema bilateral lower extremities    Patient has +5/5 muscle strength upper and lower extremities however he has slow deliberate movements with standing and ambulating as well as with opening closing his hands to make a fist.   Lymphadenopathy:      Cervical: No cervical adenopathy.   Skin:     Findings: No rash.   Neurological:      General: No focal deficit present.      Mental Status: He is alert and oriented to person, place, and time.      Cranial Nerves: No cranial nerve deficit.   Psychiatric:         Mood and Affect: Mood normal.         Behavior: Behavior normal.         Thought Content: Thought content normal.         Judgment: Judgment normal.         Pertinent Laboratory/Diagnostic Studies:  Lab Results   Component Value Date    GLUCOSE 179 (H) 09/04/2020    BUN 18 11/25/2023    CREATININE 1.24 11/25/2023    CALCIUM 9.5 11/25/2023     12/21/2017    K 4.0 11/25/2023    CO2 32 11/25/2023    CL 97 11/25/2023     Lab Results   Component Value Date    ALT 8 11/20/2023    AST 17 11/20/2023    ALKPHOS 92 11/20/2023    BILITOT 1.3 (H) 12/21/2017       Lab Results   Component Value Date    WBC 9.98 11/25/2023    HGB 10.5 (L) 11/25/2023    HCT 33.0 (L) 11/25/2023    MCV 94 11/25/2023     11/25/2023       Lab Results   Component Value Date    TSH 3.58 03/14/2022       Lab Results   Component Value Date    CHOL 153 12/21/2017     Lab Results   Component Value Date    TRIG 69 11/21/2023     Lab Results   Component Value Date    HDL 36 (L) 11/21/2023     Lab Results   Component Value Date    LDLCALC 70 11/21/2023     Lab Results   Component Value Date    HGBA1C 8.8 (A) 10/02/2023       Results for orders placed or performed in visit on 11/29/23   Albumin / creatinine  urine ratio   Result Value Ref Range    Creatinine, Ur 213.2 Reference range not established. mg/dL    Albumin,U,Random 7.4 <20.0 mg/L    Albumin Creat Ratio 3 0 - 30 mg/g creatinine       Orders Placed This Encounter   Procedures    Ambulatory Referral to Rheumatology       ALLERGIES:  No Known Allergies    Current Medications     Current Outpatient Medications   Medication Sig Dispense Refill    albuterol (PROVENTIL HFA,VENTOLIN HFA) 90 mcg/act inhaler INHALE 2 PUFFS EVERY 6 HOURS AS NEEDED FOR WHEEZING 18 g 5    Blood Pressure KIT Twice a day periodically 1 each 0    diclofenac sodium (VOLTAREN) 50 mg EC tablet ONE PO BID WITH FOOD FOR JOINT PAIN 60 tablet 2    fluticasone-vilanterol (Breo Ellipta) 200-25 mcg/actuation inhaler Inhale 1 puff daily Rinse mouth after use. 180 blister 3    glimepiride (AMARYL) 1 mg tablet Take 1 tablet (1 mg total) by mouth 2 (two) times a day 180 tablet 1    glucose blood test strip 1 each by Other route daily 180 each 5    Lancets (ONETOUCH ULTRASOFT) lancets by Does not apply route      LORazepam (ATIVAN) 1 mg tablet Take 1 tablet by mouth daily as needed      methocarbamol (ROBAXIN) 500 mg tablet Take 1 tablet (500 mg total) by mouth 2 (two) times a day 20 tablet 0    pantoprazole (PROTONIX) 40 mg tablet Take 1 tablet (40 mg total) by mouth daily 90 tablet 3    polyethylene glycol (GLYCOLAX) 17 GM/SCOOP powder At 5pm take 5mgx2 dulcolax. At 6pm mix 238 g miralax in 64oz gatorade. Drink 8oz glass every 5 mins until 32oz finished. Drink remaining as rec. 238 g 0    sitaGLIPtin (Januvia) 100 mg tablet Take 1 tablet (100 mg total) by mouth daily 90 tablet 1    spironolactone (ALDACTONE) 25 mg tablet Take 1 tablet (25 mg total) by mouth daily 30 tablet 5    torsemide (DEMADEX) 20 mg tablet Take 2 tabs once daily in AM 60 tablet 5    famotidine (PEPCID) 20 mg tablet Take 1 tablet (20 mg total) by mouth daily at bedtime 30 tablet 0     No current facility-administered medications for  this visit.         Health Maintenance     Health Maintenance   Topic Date Due    Pneumococcal Vaccine: Pediatrics (0 to 5 Years) and At-Risk Patients (6 to 64 Years) (1 - PCV) Never done    Hepatitis A Vaccine (1 of 2 - Risk 2-dose series) Never done    COVID-19 Vaccine (4 - 2023-24 season) 09/01/2023    Annual Physical  12/22/2023    HEMOGLOBIN A1C  04/02/2024    Diabetic Foot Exam  10/02/2024    DM Eye Exam  10/09/2024    Kidney Health Evaluation: GFR  11/25/2024    Kidney Health Evaluation: Albumin/Creatinine Ratio  11/29/2024    Depression Screening  01/04/2025    Colorectal Cancer Screening  05/13/2025    DTaP,Tdap,and Td Vaccines (3 - Td or Tdap) 09/05/2030    HIV Screening  Completed    Hepatitis C Screening  Completed    Influenza Vaccine  Completed    HIB Vaccine  Aged Out    IPV Vaccine  Aged Out    Meningococcal ACWY Vaccine  Aged Out    HPV Vaccine  Aged Out     Immunization History   Administered Date(s) Administered    COVID-19 PFIZER VACCINE 0.3 ML IM 04/12/2021, 05/03/2021, 12/15/2021    INFLUENZA 12/22/2022    Influenza, injectable, quadrivalent, preservative free 0.5 mL 10/27/2020, 10/31/2023    Influenza, recombinant, quadrivalent,injectable, preservative free 10/21/2019, 12/22/2022    Influenza, seasonal, injectable 09/23/2015    Tdap 10/21/2019, 09/05/2020       Linden Roque MD      I spent 30 minutes with this patient of which greater than 50% was spent counseling or reviewing chart

## 2024-01-05 NOTE — ASSESSMENT & PLAN NOTE
Arthralgia.  Patient given prescription to initiate diclofenac 50 mg 1 twice daily with food.  He was given referral to . [facility] for further evaluation.  His FMLA paperwork will be filled out since patient is unable to return to work at this time

## 2024-01-05 NOTE — ASSESSMENT & PLAN NOTE
Hypertension.  The patient's blood pressure is stable at this time and he will continue current regimen of medications

## 2024-01-05 NOTE — ASSESSMENT & PLAN NOTE
Wt Readings from Last 3 Encounters:   01/04/24 109 kg (241 lb 2 oz)   12/27/23 114 kg (251 lb 9.6 oz)   12/19/23 115 kg (253 lb)     Congestive heart failure.  Patient was given a refill to continue with his Demadex and Aldactone as previously ordered.

## 2024-01-16 ENCOUNTER — TELEPHONE (OUTPATIENT)
Dept: GASTROENTEROLOGY | Facility: CLINIC | Age: 59
End: 2024-01-16

## 2024-01-19 ENCOUNTER — ANESTHESIA EVENT (OUTPATIENT)
Dept: GASTROENTEROLOGY | Facility: HOSPITAL | Age: 59
End: 2024-01-19

## 2024-01-19 ENCOUNTER — ANESTHESIA (OUTPATIENT)
Dept: GASTROENTEROLOGY | Facility: HOSPITAL | Age: 59
End: 2024-01-19

## 2024-01-19 ENCOUNTER — HOSPITAL ENCOUNTER (OUTPATIENT)
Dept: GASTROENTEROLOGY | Facility: HOSPITAL | Age: 59
Setting detail: OUTPATIENT SURGERY
End: 2024-01-19
Attending: INTERNAL MEDICINE
Payer: COMMERCIAL

## 2024-01-19 VITALS
HEART RATE: 104 BPM | OXYGEN SATURATION: 97 % | BODY MASS INDEX: 37.03 KG/M2 | HEIGHT: 69 IN | RESPIRATION RATE: 18 BRPM | WEIGHT: 250 LBS | DIASTOLIC BLOOD PRESSURE: 99 MMHG | SYSTOLIC BLOOD PRESSURE: 164 MMHG | TEMPERATURE: 97.2 F

## 2024-01-19 DIAGNOSIS — R13.10 DIFFICULTY SWALLOWING: ICD-10-CM

## 2024-01-19 DIAGNOSIS — Z12.11 SCREENING FOR COLON CANCER: ICD-10-CM

## 2024-01-19 DIAGNOSIS — K21.9 GERD (GASTROESOPHAGEAL REFLUX DISEASE): ICD-10-CM

## 2024-01-19 DIAGNOSIS — R13.19 ESOPHAGEAL DYSPHAGIA: ICD-10-CM

## 2024-01-19 LAB — GLUCOSE SERPL-MCNC: 82 MG/DL (ref 65–140)

## 2024-01-19 PROCEDURE — 45378 DIAGNOSTIC COLONOSCOPY: CPT | Performed by: INTERNAL MEDICINE

## 2024-01-19 PROCEDURE — 82948 REAGENT STRIP/BLOOD GLUCOSE: CPT

## 2024-01-19 PROCEDURE — 88305 TISSUE EXAM BY PATHOLOGIST: CPT | Performed by: SPECIALIST

## 2024-01-19 PROCEDURE — 43239 EGD BIOPSY SINGLE/MULTIPLE: CPT | Performed by: INTERNAL MEDICINE

## 2024-01-19 RX ORDER — PROPOFOL 10 MG/ML
INJECTION, EMULSION INTRAVENOUS AS NEEDED
Status: DISCONTINUED | OUTPATIENT
Start: 2024-01-19 | End: 2024-01-19

## 2024-01-19 RX ORDER — PANTOPRAZOLE SODIUM 40 MG/1
40 TABLET, DELAYED RELEASE ORAL
Qty: 90 TABLET | Refills: 3 | Status: SHIPPED | OUTPATIENT
Start: 2024-01-19

## 2024-01-19 RX ORDER — PHENYLEPHRINE HCL IN 0.9% NACL 1 MG/10 ML
SYRINGE (ML) INTRAVENOUS AS NEEDED
Status: DISCONTINUED | OUTPATIENT
Start: 2024-01-19 | End: 2024-01-19

## 2024-01-19 RX ORDER — FAMOTIDINE 20 MG/1
20 TABLET, FILM COATED ORAL
Qty: 90 TABLET | Refills: 3 | Status: SHIPPED | OUTPATIENT
Start: 2024-01-19

## 2024-01-19 RX ORDER — LIDOCAINE HYDROCHLORIDE 10 MG/ML
INJECTION, SOLUTION EPIDURAL; INFILTRATION; INTRACAUDAL; PERINEURAL AS NEEDED
Status: DISCONTINUED | OUTPATIENT
Start: 2024-01-19 | End: 2024-01-19

## 2024-01-19 RX ORDER — SODIUM CHLORIDE, SODIUM LACTATE, POTASSIUM CHLORIDE, CALCIUM CHLORIDE 600; 310; 30; 20 MG/100ML; MG/100ML; MG/100ML; MG/100ML
INJECTION, SOLUTION INTRAVENOUS CONTINUOUS PRN
Status: DISCONTINUED | OUTPATIENT
Start: 2024-01-19 | End: 2024-01-19

## 2024-01-19 RX ORDER — SIMETHICONE 40MG/0.6ML
SUSPENSION, DROPS(FINAL DOSAGE FORM)(ML) ORAL AS NEEDED
Status: COMPLETED | OUTPATIENT
Start: 2024-01-19 | End: 2024-01-19

## 2024-01-19 RX ADMIN — PROPOFOL 20 MG: 10 INJECTION, EMULSION INTRAVENOUS at 08:05

## 2024-01-19 RX ADMIN — LIDOCAINE HYDROCHLORIDE 50 MG: 10 INJECTION, SOLUTION EPIDURAL; INFILTRATION; INTRACAUDAL at 07:40

## 2024-01-19 RX ADMIN — PROPOFOL 20 MG: 10 INJECTION, EMULSION INTRAVENOUS at 07:57

## 2024-01-19 RX ADMIN — PROPOFOL 50 MG: 10 INJECTION, EMULSION INTRAVENOUS at 07:49

## 2024-01-19 RX ADMIN — PROPOFOL 20 MG: 10 INJECTION, EMULSION INTRAVENOUS at 07:54

## 2024-01-19 RX ADMIN — Medication 400 MCG: at 08:08

## 2024-01-19 RX ADMIN — SODIUM CHLORIDE, SODIUM LACTATE, POTASSIUM CHLORIDE, AND CALCIUM CHLORIDE: .6; .31; .03; .02 INJECTION, SOLUTION INTRAVENOUS at 07:36

## 2024-01-19 RX ADMIN — PROPOFOL 20 MG: 10 INJECTION, EMULSION INTRAVENOUS at 08:01

## 2024-01-19 RX ADMIN — PROPOFOL 50 MG: 10 INJECTION, EMULSION INTRAVENOUS at 07:46

## 2024-01-19 RX ADMIN — Medication 20 MG: at 07:56

## 2024-01-19 RX ADMIN — PROPOFOL 150 MG: 10 INJECTION, EMULSION INTRAVENOUS at 07:40

## 2024-01-19 RX ADMIN — Medication 200 MCG: at 07:57

## 2024-01-19 NOTE — H&P
History and Physical - [facility]  Elly Chong 58 y.o. male MRN: [id number]                  HPI: Elly Chong is a 58 y.o. year old male who presents for screening and dysphagia.      REVIEW OF SYSTEMS: Per the HPI, and otherwise unremarkable.    Historical Information   Past Medical History:   Diagnosis Date    Acute kidney injury (HCC) 09/05/2020    Asthma     CHF (congestive heart failure) (HCC)     Colon polyp     Diabetes mellitus (HCC)     GERD (gastroesophageal reflux disease)     Hypertension     Inguinal hernia     3/25/15    Onychomycosis     5/24/17    Sleep apnea     Type 2 diabetes mellitus (HCC) 05/01/2012     Past Surgical History:   Procedure Laterality Date    ARTHROSCOPY KNEE      with Medial and Lateral Meniscus Repair    HERNIA REPAIR      umbilical and inguinal hernia repairs (left)     Social History   Social History     Substance and Sexual Activity   Alcohol Use Yes    Alcohol/week: 3.0 standard drinks of alcohol    Types: 2 Cans of beer, 1 Standard drinks or equivalent per week    Comment: weekend: 1 glass of christian or 2 beers     Social History     Substance and Sexual Activity   Drug Use No     Social History     Tobacco Use   Smoking Status Former    Types: Cigars    Start date: 6/22/2020   Smokeless Tobacco Never   Tobacco Comments    current every day smoker, per [employer]     Family History   Problem Relation Age of Onset    Hypertension Mother         essential    Chronic bronchitis Father     Prostate cancer Father     Colon cancer Father     Breast cancer Sister        Meds/Allergies       Current Outpatient Medications:     albuterol (PROVENTIL HFA,VENTOLIN HFA) 90 mcg/act inhaler    diclofenac sodium (VOLTAREN) 50 mg EC tablet    famotidine (PEPCID) 20 mg tablet    fluticasone-vilanterol (Breo Ellipta) 200-25 mcg/actuation inhaler    glimepiride (AMARYL) 1 mg tablet    LORazepam (ATIVAN) 1 mg tablet    methocarbamol (ROBAXIN) 500 mg tablet     "pantoprazole (PROTONIX) 40 mg tablet    sitaGLIPtin (Januvia) 100 mg tablet    torsemide (DEMADEX) 20 mg tablet    Blood Pressure KIT    glucose blood test strip    Lancets (ONETOUCH ULTRASOFT) lancets    spironolactone (ALDACTONE) 25 mg tablet    No Known Allergies    Objective     /83   Pulse 104   Temp (!) 96.5 °F (35.8 °C) (Temporal)   Resp 18   Ht 5' 9\" (1.753 m)   Wt 113 kg (250 lb)   SpO2 97%   BMI 36.92 kg/m²       PHYSICAL EXAM    Gen: NAD  Head: NCAT  CV: RRR  CHEST: Clear  ABD: soft, NT/ND  EXT: no edema      ASSESSMENT/PLAN:  This is a 58 y.o. year old male here for EGD & colonoscopy, and he is stable and optimized for his procedure.        "

## 2024-01-19 NOTE — ANESTHESIA PREPROCEDURE EVALUATION
Procedure:  COLONOSCOPY  EGD    Relevant Problems   CARDIO   (+) Acute on chronic diastolic congestive heart failure (HCC)   (+) CHF (congestive heart failure) (HCC)   (+) Elevated lipoprotein(a)   (+) Essential hypertension      GI/HEPATIC   (+) Difficulty swallowing      /RENAL   (+) DELFINO (acute kidney injury) (HCC)      MUSCULOSKELETAL   (+) Acute gout of right hand   (+) Acute midline low back pain without sciatica   (+) Arthritis of right wrist   (+) Impingement syndrome of left shoulder      PULMONARY   (+) AMANDO (obstructive sleep apnea)   Stress ECG: A pharmacological stress test was performed using regadenoson.    Stress ECG: Arrhythmias during stress: occasional PVCs. Arrhythmias during recovery: rare PVCs. The ECG was not diagnostic due to pharmacological (vasodilator) stress.    Perfusion Defect Conclusion: The stress/rest perfusion ratio is 1.13. There is no evidence of transient ischemic dilation (TID).    Perfusion: There is a left ventricular perfusion defect that is small in size with mild reduction in uptake present in the basal inferior and inferolateral location(s) that is paradoxical. The defect appears to be probable artifact caused by diaphragmatic attenuation and subdiaphragmatic activity.    Stress Function: Post-stress ejection fraction is 49 %. There were no wall motion abnormalities.    Stress Combined Conclusion: No diagnositic evidence of ischemia or infarct.        Physical Exam    Airway    Mallampati score: III  TM Distance: >3 FB  Neck ROM: full     Dental        Cardiovascular      Pulmonary      Other Findings        Anesthesia Plan  ASA Score- 3     Anesthesia Type- IV sedation with anesthesia with ASA Monitors.         Additional Monitors:     Airway Plan:            Plan Factors-    Chart reviewed. EKG reviewed.  Existing labs reviewed. Patient summary reviewed.                  Induction- intravenous.    Postoperative Plan-     Informed Consent- Anesthetic plan and risks  discussed with patient.  I personally reviewed this patient with the CRNA. Discussed and agreed on the Anesthesia Plan with the CRNA..

## 2024-01-19 NOTE — INTERVAL H&P NOTE
H&P reviewed. After examining the patient I find no changes in the patients condition since the H&P had been written.    Vitals:    01/19/24 0707   BP: 158/83   Pulse: 104   Resp: 18   Temp: (!) 96.5 °F (35.8 °C)   SpO2: 97%

## 2024-01-19 NOTE — PROGRESS NOTES
Patient awaiting in unit for ride home. No distress noted at this time, sitting comfortably in bed on cellphone with belongings.

## 2024-01-19 NOTE — ANESTHESIA POSTPROCEDURE EVALUATION
Post-Op Assessment Note    CV Status:  Stable  Pain Score: 0    Pain management: adequate       Mental Status:  Alert and awake   Hydration Status:  Euvolemic   PONV Controlled:  Controlled   Airway Patency:  Patent     Post Op Vitals Reviewed: Yes      Staff: CRNA               BP   116/73   /Temp      Pulse 98   Resp 14   SpO2 98

## 2024-01-19 NOTE — ANESTHESIA PROCEDURE NOTES
Anesthesia Notable Event  No anethesia notable event occurred.    Date/Time: 1/19/2024 9:21 AM    Performed by: Huseyin Chandra MD  Authorized by: Huseyin Chandra MD

## 2024-01-22 PROCEDURE — 88305 TISSUE EXAM BY PATHOLOGIST: CPT | Performed by: SPECIALIST

## 2024-01-23 ENCOUNTER — OFFICE VISIT (OUTPATIENT)
Dept: CARDIOLOGY CLINIC | Facility: CLINIC | Age: 59
End: 2024-01-23
Payer: COMMERCIAL

## 2024-01-23 VITALS
HEIGHT: 69 IN | HEART RATE: 107 BPM | OXYGEN SATURATION: 100 % | DIASTOLIC BLOOD PRESSURE: 92 MMHG | BODY MASS INDEX: 37.92 KG/M2 | SYSTOLIC BLOOD PRESSURE: 146 MMHG | WEIGHT: 256 LBS

## 2024-01-23 DIAGNOSIS — I10 ESSENTIAL HYPERTENSION: ICD-10-CM

## 2024-01-23 DIAGNOSIS — R13.10 DYSPHAGIA, UNSPECIFIED TYPE: ICD-10-CM

## 2024-01-23 DIAGNOSIS — R06.89 ACUTE RESPIRATORY INSUFFICIENCY: ICD-10-CM

## 2024-01-23 DIAGNOSIS — I50.33 ACUTE ON CHRONIC DIASTOLIC CONGESTIVE HEART FAILURE (HCC): Primary | ICD-10-CM

## 2024-01-23 DIAGNOSIS — K21.9 GASTROESOPHAGEAL REFLUX DISEASE WITHOUT ESOPHAGITIS: ICD-10-CM

## 2024-01-23 PROCEDURE — 99214 OFFICE O/P EST MOD 30 MIN: CPT | Performed by: STUDENT IN AN ORGANIZED HEALTH CARE EDUCATION/TRAINING PROGRAM

## 2024-01-23 RX ORDER — CARVEDILOL 6.25 MG/1
6.25 TABLET ORAL 2 TIMES DAILY WITH MEALS
Qty: 180 TABLET | Refills: 5 | Status: SHIPPED | OUTPATIENT
Start: 2024-01-23 | End: 2025-07-16
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
## 2024-01-25 ENCOUNTER — OFFICE VISIT (OUTPATIENT)
Dept: FAMILY MEDICINE CLINIC | Facility: CLINIC | Age: 59
End: 2024-01-25
Payer: COMMERCIAL

## 2024-01-25 VITALS
SYSTOLIC BLOOD PRESSURE: 152 MMHG | TEMPERATURE: 98 F | RESPIRATION RATE: 18 BRPM | DIASTOLIC BLOOD PRESSURE: 80 MMHG | HEART RATE: 106 BPM | BODY MASS INDEX: 38.45 KG/M2 | WEIGHT: 259.6 LBS | HEIGHT: 69 IN | OXYGEN SATURATION: 97 %

## 2024-01-25 DIAGNOSIS — E11.9 TYPE 2 DIABETES MELLITUS WITHOUT COMPLICATION, WITHOUT LONG-TERM CURRENT USE OF INSULIN (HCC): Primary | ICD-10-CM

## 2024-01-25 DIAGNOSIS — M10.9 ACUTE GOUT OF RIGHT HAND, UNSPECIFIED CAUSE: ICD-10-CM

## 2024-01-25 DIAGNOSIS — I50.31 ACUTE DIASTOLIC CONGESTIVE HEART FAILURE (HCC): ICD-10-CM

## 2024-01-25 DIAGNOSIS — I10 ESSENTIAL HYPERTENSION: ICD-10-CM

## 2024-01-25 LAB — SL AMB POCT HEMOGLOBIN AIC: 5.9 (ref ?–6.5)

## 2024-01-25 PROCEDURE — 99214 OFFICE O/P EST MOD 30 MIN: CPT | Performed by: FAMILY MEDICINE

## 2024-01-25 PROCEDURE — 83036 HEMOGLOBIN GLYCOSYLATED A1C: CPT | Performed by: FAMILY MEDICINE

## 2024-01-25 NOTE — ASSESSMENT & PLAN NOTE
Wt Readings from Last 3 Encounters:   01/25/24 118 kg (259 lb 9.6 oz)   01/23/24 116 kg (256 lb)   01/19/24 113 kg (250 lb)     Congestive heart failure.  Patient denies any chest pain or shortness of breath.  His lung fields are clear.  He will continue on current dose of diuretic therapy

## 2024-01-25 NOTE — PROGRESS NOTES
FAMILY PRACTICE OFFICE VISIT       NAME: Elly Chong  AGE: 58 y.o. SEX: male       : 1965        MRN: [id number]    DATE: 2024  TIME: 9:54 AM    Assessment and Plan     Problem List Items Addressed This Visit       Essential hypertension     Hypertension.  The patient's blood pressure is stable at this time and he will continue current regimen of medications         CHF (congestive heart failure) (Formerly [facility])     Wt Readings from Last 3 Encounters:   24 118 kg (259 lb 9.6 oz)   24 116 kg (256 lb)   24 113 kg (250 lb)     Congestive heart failure.  Patient denies any chest pain or shortness of breath.  His lung fields are clear.  He will continue on current dose of diuretic therapy               Diabetes mellitus (HCC) - Primary     Diabetes.  A1c much improved to 5.9.  Since he has not been on Januvia he will continue with just using glimepiride.  We will recheck blood sugars in 3 months.  Lab Results   Component Value Date    HGBA1C 5.9 2024            Relevant Orders    POCT hemoglobin A1c (Completed)    Acute gout of right hand     Gout.  Patient with a history of gout however he has not had any recent attacks since being on diclofenac.  His previous right hand swelling has resolved                Chief Complaint     Chief Complaint   Patient presents with    Return to work Follow up     Patient bring form     Medication Refill     Torsemide, pantoprazole, Glimepiride and Diclofenac       History of Present Illness     Patient in the office to review chronic medical conditions.  He states he has been feeling much better lately and is scheduled to return to work 2023.  Patient required paperwork for his insurance company to be filled out.  Patient denies any recent illness.  His A1c in the office has significantly improved to 5.9.  Patient states he is on glimepiride but has been not been taking his Januvia since prior to  office visit.  He feels his  arthralgias are much better controlled on diclofenac.  He has better range of motion with his shoulders.  He has not had any hand swelling recently.  He states he has cut back significantly on his alcohol intake.  He will try to reduce the number of hours he works per shift to try and get home a little over earlier in the evenings.    Medication Refill  Associated symptoms include arthralgias.       Review of Systems   Review of Systems   Constitutional: Negative.    HENT: Negative.     Eyes: Negative.    Respiratory: Negative.     Cardiovascular: Negative.    Gastrointestinal: Negative.    Genitourinary: Negative.    Musculoskeletal:  Positive for arthralgias.   Skin: Negative.    Neurological: Negative.    Psychiatric/Behavioral: Negative.         Active Problem List     Patient Active Problem List   Diagnosis    Essential hypertension    Uncontrolled type 2 diabetes mellitus with hyperglycemia (Formerly [facility])    Elevated lipoprotein(a)    Microalbuminuria    Obesity    CHF (congestive heart failure) (Formerly [facility])    Alcohol abuse    DELFINO (acute kidney injury) (Formerly [facility])    Onychomycosis    Arthritis of right wrist    Acute on chronic diastolic congestive heart failure (Formerly [facility])    Impingement syndrome of left shoulder    Diabetes mellitus (Formerly [facility])    Acute gout of right hand    Acute midline low back pain without sciatica    Reactive airway disease without complication    (HFpEF) heart failure with preserved ejection fraction (Formerly [facility])    Difficulty swallowing    AMANDO (obstructive sleep apnea)    Arthralgia       Past Medical History:  Past Medical History:   Diagnosis Date    Acute kidney injury (Formerly [facility]) 09/05/2020    Asthma     CHF (congestive heart failure) (Formerly [facility])     Colon polyp     Diabetes mellitus ([facility])     GERD (gastroesophageal reflux disease)     Hypertension     Inguinal hernia     3/25/15    Onychomycosis     5/24/17    Sleep apnea     Type 2 diabetes mellitus ([facility]) 05/01/2012       Past Surgical History:  Past Surgical History:   Procedure  Laterality Date    ARTHROSCOPY KNEE      with Medial and Lateral Meniscus Repair    HERNIA REPAIR      umbilical and inguinal hernia repairs (left)       Family History:  Family History   Problem Relation Age of Onset    Hypertension Mother         essential    Chronic bronchitis Father     Prostate cancer Father     Colon cancer Father     Breast cancer Sister        Social History:  Social History     Socioeconomic History    Marital status: /Civil Union     Spouse name: Not on file    Number of children: Not on file    Years of education: Not on file    Highest education level: Not on file   Occupational History    Occupation: [employer]    Tobacco Use    Smoking status: Former     Types: Cigars    Smokeless tobacco: Never    Tobacco comments:     current every day smoker, per [employer]   Vaping Use    Vaping status: Never Used   Substance and Sexual Activity    Alcohol use: Yes     Alcohol/week: 3.0 standard drinks of alcohol     Types: 2 Cans of beer, 1 Standard drinks or equivalent per week     Comment: weekend: 1 glass of christian or 2 beers    Drug use: No    Sexual activity: Yes   Other Topics Concern    Not on file   Social History Narrative    Alcohol dependence    Nicotine dependence    Denied, alcohol Use    Marital problems     Social Determinants of Health     Financial Resource Strain: Not on file   Food Insecurity: Not on file   Transportation Needs: Not on file   Physical Activity: Not on file   Stress: Not on file   Social Connections: Not on file   Intimate Partner Violence: Not on file   Housing Stability: Not on file       Objective     Vitals:    01/25/24 0928   BP: 152/80   Pulse: (!) 106   Resp: 18   Temp: 98 °F (36.7 °C)   SpO2: 97%     Wt Readings from Last 3 Encounters:   01/25/24 118 kg (259 lb 9.6 oz)   01/23/24 116 kg (256 lb)   01/19/24 113 kg (250 lb)       Physical Exam  Vitals and nursing note reviewed.   Constitutional:       General: He is not in acute  distress.     Appearance: Normal appearance. He is well-developed. He is not ill-appearing.   HENT:      Head: Normocephalic and atraumatic.      Right Ear: External ear normal.      Left Ear: External ear normal.      Nose: Nose normal.   Eyes:      General:         Right eye: No discharge.         Left eye: No discharge.      Extraocular Movements: Extraocular movements intact.      Conjunctiva/sclera: Conjunctivae normal.      Pupils: Pupils are equal, round, and reactive to light.   Neck:      Thyroid: No thyromegaly.      Vascular: No carotid bruit.   Cardiovascular:      Rate and Rhythm: Normal rate and regular rhythm.      Heart sounds: Normal heart sounds. No murmur heard.  Pulmonary:      Effort: Pulmonary effort is normal.      Breath sounds: Normal breath sounds. No wheezing, rhonchi or rales.   Abdominal:      General: Abdomen is flat. Bowel sounds are normal. There is no distension.      Palpations: Abdomen is soft.      Tenderness: There is no abdominal tenderness. There is no guarding or rebound.      Comments: NO hepatospenomegaly   Musculoskeletal:         General: Normal range of motion.      Cervical back: Normal range of motion and neck supple.      Right lower leg: Edema present.      Left lower leg: Edema present.      Comments: Chronic trace peripheral edema bilateral lower extremities.   Lymphadenopathy:      Cervical: No cervical adenopathy.   Skin:     Findings: No rash.      Comments: NO RASHES   Neurological:      General: No focal deficit present.      Mental Status: He is alert and oriented to person, place, and time.      Cranial Nerves: No cranial nerve deficit.   Psychiatric:         Mood and Affect: Mood normal.         Behavior: Behavior normal.         Thought Content: Thought content normal.         Judgment: Judgment normal.         Pertinent Laboratory/Diagnostic Studies:  Lab Results   Component Value Date    GLUCOSE 179 (H) 09/04/2020    BUN 18 11/25/2023    CREATININE 1.24  11/25/2023    CALCIUM 9.5 11/25/2023     12/21/2017    K 4.0 11/25/2023    CO2 32 11/25/2023    CL 97 11/25/2023     Lab Results   Component Value Date    ALT 8 11/20/2023    AST 17 11/20/2023    ALKPHOS 92 11/20/2023    BILITOT 1.3 (H) 12/21/2017       Lab Results   Component Value Date    WBC 9.98 11/25/2023    HGB 10.5 (L) 11/25/2023    HCT 33.0 (L) 11/25/2023    MCV 94 11/25/2023     11/25/2023       Lab Results   Component Value Date    TSH 3.58 03/14/2022       Lab Results   Component Value Date    CHOL 153 12/21/2017     Lab Results   Component Value Date    TRIG 69 11/21/2023     Lab Results   Component Value Date    HDL 36 (L) 11/21/2023     Lab Results   Component Value Date    LDLCALC 70 11/21/2023     Lab Results   Component Value Date    HGBA1C 5.9 01/25/2024       Results for orders placed or performed in visit on 01/25/24   POCT hemoglobin A1c   Result Value Ref Range    Hemoglobin A1C 5.9 6.5       Orders Placed This Encounter   Procedures    POCT hemoglobin A1c       ALLERGIES:  No Known Allergies    Current Medications     Current Outpatient Medications   Medication Sig Dispense Refill    albuterol (PROVENTIL HFA,VENTOLIN HFA) 90 mcg/act inhaler INHALE 2 PUFFS EVERY 6 HOURS AS NEEDED FOR WHEEZING 18 g 5    Blood Pressure KIT Twice a day periodically 1 each 0    carvedilol (COREG) 6.25 mg tablet Take 1 tablet (6.25 mg total) by mouth 2 (two) times a day with meals 180 tablet 5    diclofenac sodium (VOLTAREN) 50 mg EC tablet ONE PO BID WITH FOOD FOR JOINT PAIN 60 tablet 2    famotidine (PEPCID) 20 mg tablet Take 1 tablet (20 mg total) by mouth daily at bedtime 90 tablet 3    fluticasone-vilanterol (Breo Ellipta) 200-25 mcg/actuation inhaler Inhale 1 puff daily Rinse mouth after use. 180 blister 3    glimepiride (AMARYL) 1 mg tablet Take 1 tablet (1 mg total) by mouth 2 (two) times a day 180 tablet 1    glucose blood test strip 1 each by Other route daily 180 each 5    Lancets (ONETOUCH  ULTRASOFT) lancets by Does not apply route      LORazepam (ATIVAN) 1 mg tablet Take 1 tablet by mouth daily as needed      methocarbamol (ROBAXIN) 500 mg tablet Take 1 tablet (500 mg total) by mouth 2 (two) times a day 20 tablet 0    pantoprazole (PROTONIX) 40 mg tablet Take 1 tablet (40 mg total) by mouth daily before breakfast 90 tablet 3    sitaGLIPtin (Januvia) 100 mg tablet Take 1 tablet (100 mg total) by mouth daily 90 tablet 1    spironolactone (ALDACTONE) 25 mg tablet Take 1 tablet (25 mg total) by mouth daily 30 tablet 5    torsemide (DEMADEX) 20 mg tablet Take 2 tabs once daily in AM 60 tablet 5     No current facility-administered medications for this visit.         Health Maintenance     Health Maintenance   Topic Date Due    Pneumococcal Vaccine: Pediatrics (0 to 5 Years) and At-Risk Patients (6 to 64 Years) (1 - PCV) Never done    Hepatitis A Vaccine (1 of 2 - Risk 2-dose series) Never done    Zoster Vaccine (1 of 2) Never done    COVID-19 Vaccine (4 - 2023-24 season) 09/01/2023    Annual Physical  12/22/2023    HEMOGLOBIN A1C  07/25/2024    Diabetic Foot Exam  10/02/2024    DM Eye Exam  10/09/2024    Kidney Health Evaluation: GFR  11/25/2024    Kidney Health Evaluation: Albumin/Creatinine Ratio  11/29/2024    Depression Screening  01/04/2025    DTaP,Tdap,and Td Vaccines (3 - Td or Tdap) 09/05/2030    Colorectal Cancer Screening  01/17/2031    HIV Screening  Completed    Hepatitis C Screening  Completed    Influenza Vaccine  Completed    HIB Vaccine  Aged Out    IPV Vaccine  Aged Out    Meningococcal ACWY Vaccine  Aged Out    HPV Vaccine  Aged Out     Immunization History   Administered Date(s) Administered    COVID-19 PFIZER VACCINE 0.3 ML IM 04/12/2021, 05/03/2021, 12/15/2021    INFLUENZA 09/23/2015, 10/21/2019, 12/22/2022, 10/31/2023    Influenza, injectable, quadrivalent, preservative free 0.5 mL 10/27/2020, 10/31/2023    Influenza, recombinant, quadrivalent,injectable, preservative free  10/21/2019, 12/22/2022    Influenza, seasonal, injectable 09/23/2015    Tdap 10/21/2019, 09/05/2020       Linden Roque MD    I spent 30 minutes with this patient of which greater than 50% was spent counseling or reviewing chart

## 2024-01-25 NOTE — ASSESSMENT & PLAN NOTE
Diabetes.  A1c much improved to 5.9.  Since he has not been on Januvia he will continue with just using glimepiride.  We will recheck blood sugars in 3 months.  Lab Results   Component Value Date    HGBA1C 5.9 01/25/2024

## 2024-01-25 NOTE — ASSESSMENT & PLAN NOTE
Gout.  Patient with a history of gout however he has not had any recent attacks since being on diclofenac.  His previous right hand swelling has resolved

## 2024-01-27 DIAGNOSIS — I50.30 (HFPEF) HEART FAILURE WITH PRESERVED EJECTION FRACTION (HCC): ICD-10-CM

## 2024-01-29 ENCOUNTER — HOSPITAL ENCOUNTER (OUTPATIENT)
Dept: PULMONOLOGY | Facility: HOSPITAL | Age: 59
Discharge: HOME | End: 2024-01-29
Attending: INTERNAL MEDICINE
Payer: COMMERCIAL

## 2024-01-29 DIAGNOSIS — R06.02 SOB (SHORTNESS OF BREATH): ICD-10-CM

## 2024-01-29 PROCEDURE — 94729 DIFFUSING CAPACITY: CPT | Performed by: INTERNAL MEDICINE

## 2024-01-29 PROCEDURE — 94726 PLETHYSMOGRAPHY LUNG VOLUMES: CPT

## 2024-01-29 PROCEDURE — 94760 N-INVAS EAR/PLS OXIMETRY 1: CPT

## 2024-01-29 PROCEDURE — 94060 EVALUATION OF WHEEZING: CPT | Performed by: INTERNAL MEDICINE

## 2024-01-29 PROCEDURE — 94060 EVALUATION OF WHEEZING: CPT

## 2024-01-29 PROCEDURE — 94726 PLETHYSMOGRAPHY LUNG VOLUMES: CPT | Performed by: INTERNAL MEDICINE

## 2024-01-29 PROCEDURE — 94729 DIFFUSING CAPACITY: CPT

## 2024-01-29 RX ORDER — TORSEMIDE 20 MG/1
TABLET ORAL
Qty: 180 TABLET | Refills: 2 | Status: SHIPPED | OUTPATIENT
Start: 2024-01-29

## 2024-01-29 RX ORDER — ALBUTEROL SULFATE 2.5 MG/3ML
2.5 SOLUTION RESPIRATORY (INHALATION) ONCE
Status: COMPLETED | OUTPATIENT
Start: 2024-01-29 | End: 2024-01-29

## 2024-01-29 RX ORDER — SPIRONOLACTONE 25 MG/1
25 TABLET ORAL DAILY
Qty: 90 TABLET | Refills: 2 | Status: SHIPPED | OUTPATIENT
Start: 2024-01-29 | End: 2024-07-27

## 2024-01-29 RX ADMIN — ALBUTEROL SULFATE 2.5 MG: 2.5 SOLUTION RESPIRATORY (INHALATION) at 08:42

## 2024-03-07 DIAGNOSIS — M25.50 ARTHRALGIA, UNSPECIFIED JOINT: ICD-10-CM

## 2024-03-12 ENCOUNTER — OFFICE VISIT (OUTPATIENT)
Dept: PULMONOLOGY | Facility: CLINIC | Age: 59
End: 2024-03-12
Payer: COMMERCIAL

## 2024-03-12 VITALS
WEIGHT: 265.6 LBS | RESPIRATION RATE: 12 BRPM | BODY MASS INDEX: 39.34 KG/M2 | TEMPERATURE: 97.8 F | OXYGEN SATURATION: 99 % | HEART RATE: 92 BPM | SYSTOLIC BLOOD PRESSURE: 128 MMHG | DIASTOLIC BLOOD PRESSURE: 84 MMHG | HEIGHT: 69 IN

## 2024-03-12 DIAGNOSIS — G47.33 OSA (OBSTRUCTIVE SLEEP APNEA): Primary | ICD-10-CM

## 2024-03-12 DIAGNOSIS — J45.40 MODERATE PERSISTENT REACTIVE AIRWAY DISEASE WITHOUT COMPLICATION: ICD-10-CM

## 2024-03-12 PROBLEM — R13.10 DIFFICULTY SWALLOWING: Status: RESOLVED | Noted: 2023-11-21 | Resolved: 2024-03-12

## 2024-03-12 PROCEDURE — 99214 OFFICE O/P EST MOD 30 MIN: CPT | Performed by: INTERNAL MEDICINE

## 2024-03-12 NOTE — PROGRESS NOTES
Pulmonary Follow Up Note   Elly Chong 59 y.o. male MRN: [id number]  3/12/2024    Assessment:    AMANDO (obstructive sleep apnea)  Mr. Chong still needs to get the home study performed to get a new device.  This was scheduled for him today.    Reactive airway disease without complication  Mr. Chong has reactive airway disease that may be primary or may be secondary to uncontrolled acid reflux.  Breo has been of marginal benefit so far, though albuterol continues to work.    Trial prescribed anti-reflux regimen of protonix with pepcid  If no significant improvement in next week, trial Trelegy  Continue albuterol PRN  Restriction seen on prior PFTs felt to be related to CHF but could consider additional imaging (HRCT) if not improving    Plan:    Diagnoses and all orders for this visit:    AMANDO (obstructive sleep apnea)   - Home sleep study    Moderate persistent reactive airway disease without complication   - Continue albuterol, Breo   - Trial Trelegy if no improvement with increased GERD control    Return in about 3 months (around 6/12/2024).    History of Present Illness   HPI:  Elly Chong is a 59 y.o. male who presents for routine follow up.  He reports using Breo consistently but being unclear if there's any benefit from it.  He states this because he doesn't feel anything immediate after using it as he does with Albuterol but he was counseled that this doesn't work quickly and instead is a maintenance medication.    GI referral after his last appointment revealed severe reflux but no obstruction or eosinophilic esophagitis.  He is on protonix 40 daily and was prescribed pepcid but isn't taking it.    Albuterol continues to be helpful at work for when he's doing things like climbing a ladder and remaining active.    Answers submitted by the patient for this visit:  Pulmonology Questionnaire (Submitted on 3/12/2024)  Chief Complaint: Primary symptoms  Chronicity: chronic  When did you first notice your  symptoms?: more than 1 month ago  How often do your symptoms occur?: constantly  Since you first noticed this problem, how has it changed?: unchanged  Do you have shortness of breath that occurs with effort or exertion?: No  Do you have ear congestion?: No  Do you have heartburn?: No  Do you have fatigue?: No  Do you have nasal congestion?: No  Do you have shortness of breath when lying flat?: Yes  Do you have shortness of breath when you wake up?: Yes  Do you have sweats?: No  Have you experienced weight loss?: No  Which of the following makes your symptoms worse?: any activity  Which of the following makes your symptoms better?: rest  Risk factors for lung disease: smoking/tobacco exposure    Review of Systems   Constitutional:  Negative for appetite change and fever.   HENT:  Negative for ear pain, postnasal drip, rhinorrhea, sneezing, sore throat and trouble swallowing.    Cardiovascular:  Negative for chest pain.   Musculoskeletal:  Positive for myalgias.   Neurological:  Negative for headaches.     Historical Information   Past Medical History:   Diagnosis Date   • Acute kidney injury ([facility]) 09/05/2020   • Asthma    • CHF (congestive heart failure) ([facility])    • Colon polyp    • Diabetes mellitus ([facility])    • GERD (gastroesophageal reflux disease)    • Hypertension    • Inguinal hernia     3/25/15   • Onychomycosis     5/24/17   • Sleep apnea    • Type 2 diabetes mellitus ([facility]) 05/01/2012     Past Surgical History:   Procedure Laterality Date   • ARTHROSCOPY KNEE      with Medial and Lateral Meniscus Repair   • HERNIA REPAIR      umbilical and inguinal hernia repairs (left)     Family History   Problem Relation Age of Onset   • Hypertension Mother         essential   • Chronic bronchitis Father    • Prostate cancer Father    • Colon cancer Father    • Breast cancer Sister        Meds/Allergies     Current Outpatient Medications:   •  albuterol (PROVENTIL HFA,VENTOLIN HFA) 90 mcg/act inhaler, INHALE 2 PUFFS EVERY 6  "HOURS AS NEEDED FOR WHEEZING, Disp: 18 g, Rfl: 5  •  Blood Pressure KIT, Twice a day periodically, Disp: 1 each, Rfl: 0  •  carvedilol (COREG) 6.25 mg tablet, Take 1 tablet (6.25 mg total) by mouth 2 (two) times a day with meals, Disp: 180 tablet, Rfl: 5  •  diclofenac sodium (VOLTAREN) 50 mg EC tablet, Take 1 tablet twice a day after food as needed for joint pain, Disp: 180 tablet, Rfl: 0  •  famotidine (PEPCID) 20 mg tablet, Take 1 tablet (20 mg total) by mouth daily at bedtime, Disp: 90 tablet, Rfl: 3  •  fluticasone-vilanterol (Breo Ellipta) 200-25 mcg/actuation inhaler, Inhale 1 puff daily Rinse mouth after use., Disp: 180 blister, Rfl: 3  •  glimepiride (AMARYL) 1 mg tablet, Take 1 tablet (1 mg total) by mouth 2 (two) times a day, Disp: 180 tablet, Rfl: 1  •  glucose blood test strip, 1 each by Other route daily, Disp: 180 each, Rfl: 5  •  Lancets (ONETOUCH ULTRASOFT) lancets, by Does not apply route, Disp: , Rfl:   •  LORazepam (ATIVAN) 1 mg tablet, Take 1 tablet by mouth daily as needed, Disp: , Rfl:   •  methocarbamol (ROBAXIN) 500 mg tablet, Take 1 tablet (500 mg total) by mouth 2 (two) times a day, Disp: 20 tablet, Rfl: 0  •  pantoprazole (PROTONIX) 40 mg tablet, Take 1 tablet (40 mg total) by mouth daily before breakfast, Disp: 90 tablet, Rfl: 3  •  sitaGLIPtin (Januvia) 100 mg tablet, Take 1 tablet (100 mg total) by mouth daily, Disp: 90 tablet, Rfl: 1  •  spironolactone (ALDACTONE) 25 mg tablet, TAKE 1 TABLET (25 MG TOTAL) BY MOUTH DAILY., Disp: 90 tablet, Rfl: 2  •  torsemide (DEMADEX) 20 mg tablet, TAKE 2 TABS ONCE DAILY IN AM, Disp: 180 tablet, Rfl: 2  No Known Allergies    Vitals: Blood pressure 128/84, pulse 92, temperature 97.8 °F (36.6 °C), temperature source Temporal, resp. rate 12, height 5' 9\" (1.753 m), weight 120 kg (265 lb 9.6 oz), SpO2 99%. Body mass index is 39.22 kg/m². Oxygen Therapy  SpO2: 99 %    Physical Exam  Physical Exam  Vitals reviewed.   Constitutional:       General: He is " "not in acute distress.     Appearance: Normal appearance. He is well-developed. He is not ill-appearing.   HENT:      Head: Normocephalic and atraumatic.   Eyes:      General: No scleral icterus.     Conjunctiva/sclera: Conjunctivae normal.   Neck:      Thyroid: No thyromegaly.      Vascular: No JVD.   Cardiovascular:      Rate and Rhythm: Normal rate and regular rhythm.      Heart sounds: Normal heart sounds. No murmur heard.     No friction rub. No gallop.   Pulmonary:      Effort: Pulmonary effort is normal. No respiratory distress.      Breath sounds: Normal breath sounds. No wheezing or rales.   Musculoskeletal:      Cervical back: Neck supple.      Right lower leg: No edema.      Left lower leg: No edema.   Skin:     General: Skin is warm and dry.      Findings: No rash.   Neurological:      General: No focal deficit present.      Mental Status: He is alert and oriented to person, place, and time. Mental status is at baseline.   Psychiatric:         Mood and Affect: Mood normal.         Behavior: Behavior normal.         Labs: I have personally reviewed pertinent lab results.  Lab Results   Component Value Date    WBC 9.98 11/25/2023    HGB 10.5 (L) 11/25/2023    HCT 33.0 (L) 11/25/2023    MCV 94 11/25/2023     11/25/2023     Lab Results   Component Value Date    GLUCOSE 179 (H) 09/04/2020    CALCIUM 9.5 11/25/2023     12/21/2017    K 4.0 11/25/2023    CO2 32 11/25/2023    CL 97 11/25/2023    BUN 18 11/25/2023    CREATININE 1.24 11/25/2023     No results found for: \"IGE\"  Lab Results   Component Value Date    ALT 8 11/20/2023    AST 17 11/20/2023    ALKPHOS 92 11/20/2023    BILITOT 1.3 (H) 12/21/2017       Imaging and other studies: I have personally reviewed relevant films in PACS.    EKG, Pathology, and Other Studies: I have personally reviewed relevant reports in Epic.    Jed Pineda M.D.  [facility]  "

## 2024-03-12 NOTE — ASSESSMENT & PLAN NOTE
Mr. Chong has reactive airway disease that may be primary or may be secondary to uncontrolled acid reflux.  Breo has been of marginal benefit so far, though albuterol continues to work.    Trial prescribed anti-reflux regimen of protonix with pepcid  If no significant improvement in next week, trial Trelegy  Continue albuterol PRN  Restriction seen on prior PFTs felt to be related to CHF but could consider additional imaging (HRCT) if not improving

## 2024-03-12 NOTE — ASSESSMENT & PLAN NOTE
Mr. Chong still needs to get the home study performed to get a new device.  This was scheduled for him today.

## 2024-03-31 DIAGNOSIS — E11.9 TYPE 2 DIABETES MELLITUS WITHOUT COMPLICATION, WITHOUT LONG-TERM CURRENT USE OF INSULIN (HCC): ICD-10-CM

## 2024-04-01 RX ORDER — GLIMEPIRIDE 1 MG/1
1 TABLET ORAL 2 TIMES DAILY
Qty: 180 TABLET | Refills: 1 | Status: SHIPPED | OUTPATIENT
Start: 2024-04-01

## 2024-04-23 ENCOUNTER — OFFICE VISIT (OUTPATIENT)
Dept: RHEUMATOLOGY | Facility: CLINIC | Age: 59
End: 2024-04-23
Payer: COMMERCIAL

## 2024-04-23 ENCOUNTER — HOSPITAL ENCOUNTER (OUTPATIENT)
Dept: RADIOLOGY | Facility: HOSPITAL | Age: 59
Discharge: HOME | End: 2024-04-23
Payer: COMMERCIAL

## 2024-04-23 ENCOUNTER — TELEPHONE (OUTPATIENT)
Dept: RHEUMATOLOGY | Facility: CLINIC | Age: 59
End: 2024-04-23

## 2024-04-23 VITALS
BODY MASS INDEX: 39.25 KG/M2 | DIASTOLIC BLOOD PRESSURE: 80 MMHG | HEART RATE: 97 BPM | SYSTOLIC BLOOD PRESSURE: 126 MMHG | HEIGHT: 69 IN | WEIGHT: 265 LBS | TEMPERATURE: 98.5 F | OXYGEN SATURATION: 100 %

## 2024-04-23 DIAGNOSIS — G89.29 CHRONIC PAIN OF BOTH KNEES: ICD-10-CM

## 2024-04-23 DIAGNOSIS — M77.9 ENTHESITIS: ICD-10-CM

## 2024-04-23 DIAGNOSIS — M15.9 PRIMARY OSTEOARTHRITIS INVOLVING MULTIPLE JOINTS: Primary | ICD-10-CM

## 2024-04-23 DIAGNOSIS — Z79.1 ENCOUNTER FOR LONG-TERM (CURRENT) USE OF NSAIDS: ICD-10-CM

## 2024-04-23 DIAGNOSIS — M25.561 CHRONIC PAIN OF BOTH KNEES: ICD-10-CM

## 2024-04-23 DIAGNOSIS — M25.462 EFFUSION OF LEFT KNEE: Primary | ICD-10-CM

## 2024-04-23 DIAGNOSIS — M25.562 CHRONIC PAIN OF BOTH KNEES: ICD-10-CM

## 2024-04-23 DIAGNOSIS — Z82.61 FAMILY HISTORY OF RHEUMATOID ARTHRITIS: ICD-10-CM

## 2024-04-23 DIAGNOSIS — M25.50 ARTHRALGIA, UNSPECIFIED JOINT: ICD-10-CM

## 2024-04-23 DIAGNOSIS — M79.642 BILATERAL HAND PAIN: ICD-10-CM

## 2024-04-23 DIAGNOSIS — M79.641 BILATERAL HAND PAIN: ICD-10-CM

## 2024-04-23 PROCEDURE — 73130 X-RAY EXAM OF HAND: CPT

## 2024-04-23 PROCEDURE — 73562 X-RAY EXAM OF KNEE 3: CPT

## 2024-04-23 PROCEDURE — 99214 OFFICE O/P EST MOD 30 MIN: CPT | Performed by: PHYSICIAN ASSISTANT

## 2024-04-23 RX ORDER — PREDNISONE 5 MG/1
TABLET ORAL
Qty: 50 TABLET | Refills: 0 | Status: SHIPPED | OUTPATIENT
Start: 2024-04-23

## 2024-04-23 NOTE — TELEPHONE ENCOUNTER
Please call the patient and let him know that there is fluid noted on the right knee on the x-ray.  I have sent a steroid taper to his local pharmacy which I would like him to trial and report back to me with how he is doing after he completes the steroids.

## 2024-04-23 NOTE — PROGRESS NOTES
Assessment and Plan:   Mr. Chong is a 59-year-old male who presents for rheumatology follow-up of arthralgia.    Elly was last seen by Dr. Stern of rheumatology in August 2021 with right wrist pain and swelling.  Labs revealed: Uric acid 7.9, CRP 17, ESR 36.  Negative FLORA, RF, CCP, HLA-B27, Sjogren's antibodies.  Right wrist MRI without contrast from September 2021 showed a full-thickness tear through the scapholunate ligament.  He was then directed to follow-up with orthopedics and received treatment with radiocarpal corticosteroid injection and brace for his right wrist SL tear, FCR tendinitis, and right carpal tunnel syndrome.  He experienced improvement in the symptoms after treatment.    Today, he returns with discomfort of a few PIP joints and in the bilateral knees which are worse with activity.  He has had a few episodes of swelling which was diuretic responsive.  Presently, he is taking diclofenac tablet 50 mg twice daily with food which is helping with his joint pains.  There is low suspicion for an inflammatory arthritis such as rheumatoid, however due to the family history of RA in his mother and history of elevated inflammation markers (although we discussed that these are very nonspecific and may have been due to other comorbidities) , will update appropriate labs including CBC, CMP, ESR, CRP.  Will also update x-rays of the bilateral hands and knees for completeness.  For now, he may continue taking the diclofenac twice daily with food.  I will be in touch regarding the results to determine if rheumatology follow-up is needed if there is any evidence of inflammatory arthritis.  If not, then his pains are more so due to osteoarthritis and he may continue conservative management and follow-up with the primary team.      Plan:  Diagnoses and all orders for this visit:    Primary osteoarthritis involving multiple joints  -     Comprehensive metabolic panel    Arthralgia, unspecified joint  -      Ambulatory Referral to Rheumatology  -     C-reactive protein  -     Sedimentation rate, automated  -     XR knee 3 vw left non injury  -     XR knee 3 vw right non injury  -     XR hand 3+ vw left  -     XR hand 3+ vw right  -     Comprehensive metabolic panel  -     CBC and differential    Bilateral hand pain  -     C-reactive protein  -     Sedimentation rate, automated  -     XR knee 3 vw left non injury  -     XR knee 3 vw right non injury  -     XR hand 3+ vw left  -     XR hand 3+ vw right  -     Comprehensive metabolic panel  -     CBC and differential    Chronic pain of both knees  -     C-reactive protein  -     Sedimentation rate, automated  -     XR knee 3 vw left non injury  -     XR knee 3 vw right non injury  -     XR hand 3+ vw left  -     XR hand 3+ vw right  -     Comprehensive metabolic panel  -     CBC and differential    Family history of rheumatoid arthritis  -     C-reactive protein  -     Sedimentation rate, automated  -     Comprehensive metabolic panel  -     CBC and differential    Encounter for long-term (current) use of NSAIDs  -     Comprehensive metabolic panel        I have personally reviewed prior notes, recent laboratory results, and pertinent films in PACS.     Activities as tolerated.   Exercise: try to maintain a low impact exercise regimen as much as possible. Walk for 30 minutes a day for at least 3 days a week.   Continue other medications as prescribed by PCP and other specialists.       RTC in    Rheumatic Disease Summary:         HPI  Mr. Chong is a 59-year-old male who presents for rheumatology follow-up of arthralgia.    Today, the patient notes discomfort of a few PIP joints (worst at the right third PIP) and in the bilateral knees which are worse with activity.  He has had a few episodes of swelling in the hands and feet which improved with taking his water pill.  Presently, he is taking diclofenac tablet 50 mg twice daily with food which is helping with his joint  pains    The following portions of the patient's history were reviewed and updated as appropriate: allergies, current medications, past family history, past medical history, past social history, past surgical history and problem list.      Review of Systems  Constitutional: Negative for weight change, fevers, chills, night sweats  ENT/Mouth: Negative for hearing changes, ear pain, nasal congestion, sinus pain, hoarseness, sore throat, rhinorrhea, swallowing difficulty.   Eyes: Negative for pain, redness, discharge, vision changes.   Cardiovascular: Negative for chest pain, SOB, palpitations.   Respiratory: Negative for cough, sputum, wheezing, dyspnea.   Gastrointestinal: Negative for nausea, vomiting, diarrhea, constipation, pain, heartburn.  Genitourinary: Negative for dysuria, urinary frequency, hematuria.   Musculoskeletal: As per HPI.  Skin: Negative for skin rash, color changes.   Neuro: Negative for weakness, numbness, tingling, loss of consciousness.   Heme/Lymph: Negative for easy bruising, bleeding, lymphadenopathy.        Past Medical History:   Diagnosis Date    Acute kidney injury (HCC) 09/05/2020    Asthma     CHF (congestive heart failure) (HCC)     Colon polyp     Diabetes mellitus (HCC)     GERD (gastroesophageal reflux disease)     Hypertension     Inguinal hernia     3/25/15    Onychomycosis     5/24/17    Sleep apnea     Type 2 diabetes mellitus ([facility]) 05/01/2012       Past Surgical History:   Procedure Laterality Date    ARTHROSCOPY KNEE      with Medial and Lateral Meniscus Repair    HERNIA REPAIR      umbilical and inguinal hernia repairs (left)       Social History     Socioeconomic History    Marital status: /Civil Union     Spouse name: Not on file    Number of children: Not on file    Years of education: Not on file    Highest education level: Not on file   Occupational History    Occupation: [employer]    Tobacco Use    Smoking status: Former     Types: Cigars     Smokeless tobacco: Never    Tobacco comments:     current every day smoker, per [employer]   Vaping Use    Vaping status: Never Used   Substance and Sexual Activity    Alcohol use: Yes     Alcohol/week: 3.0 standard drinks of alcohol     Types: 2 Cans of beer, 1 Standard drinks or equivalent per week     Comment: weekend: 1 glass of christian or 2 beers    Drug use: No    Sexual activity: Yes   Other Topics Concern    Not on file   Social History Narrative    Alcohol dependence    Nicotine dependence    Denied, alcohol Use    Marital problems     Social Determinants of Health     Financial Resource Strain: Not on file   Food Insecurity: Not on file   Transportation Needs: Not on file   Physical Activity: Not on file   Stress: Not on file   Social Connections: Not on file   Intimate Partner Violence: Not on file   Housing Stability: Not on file       Family History   Problem Relation Age of Onset    Hypertension Mother         essential    Chronic bronchitis Father     Prostate cancer Father     Colon cancer Father     Breast cancer Sister        No Known Allergies      Current Outpatient Medications:     albuterol (PROVENTIL HFA,VENTOLIN HFA) 90 mcg/act inhaler, INHALE 2 PUFFS EVERY 6 HOURS AS NEEDED FOR WHEEZING, Disp: 18 g, Rfl: 5    Blood Pressure KIT, Twice a day periodically, Disp: 1 each, Rfl: 0    carvedilol (COREG) 6.25 mg tablet, Take 1 tablet (6.25 mg total) by mouth 2 (two) times a day with meals, Disp: 180 tablet, Rfl: 5    diclofenac sodium (VOLTAREN) 50 mg EC tablet, Take 1 tablet twice a day after food as needed for joint pain, Disp: 180 tablet, Rfl: 0    famotidine (PEPCID) 20 mg tablet, Take 1 tablet (20 mg total) by mouth daily at bedtime, Disp: 90 tablet, Rfl: 3    fluticasone-vilanterol (Breo Ellipta) 200-25 mcg/actuation inhaler, Inhale 1 puff daily Rinse mouth after use., Disp: 180 blister, Rfl: 3    glimepiride (AMARYL) 1 mg tablet, TAKE 1 TABLET BY MOUTH TWICE A DAY, Disp: 180 tablet, Rfl: 1     "glucose blood test strip, 1 each by Other route daily, Disp: 180 each, Rfl: 5    Lancets (ONETOUCH ULTRASOFT) lancets, by Does not apply route, Disp: , Rfl:     LORazepam (ATIVAN) 1 mg tablet, Take 1 tablet by mouth daily as needed, Disp: , Rfl:     methocarbamol (ROBAXIN) 500 mg tablet, Take 1 tablet (500 mg total) by mouth 2 (two) times a day, Disp: 20 tablet, Rfl: 0    pantoprazole (PROTONIX) 40 mg tablet, Take 1 tablet (40 mg total) by mouth daily before breakfast, Disp: 90 tablet, Rfl: 3    sitaGLIPtin (Januvia) 100 mg tablet, Take 1 tablet (100 mg total) by mouth daily, Disp: 90 tablet, Rfl: 1    spironolactone (ALDACTONE) 25 mg tablet, TAKE 1 TABLET (25 MG TOTAL) BY MOUTH DAILY., Disp: 90 tablet, Rfl: 2    torsemide (DEMADEX) 20 mg tablet, TAKE 2 TABS ONCE DAILY IN AM, Disp: 180 tablet, Rfl: 2      Objective:    Vitals:    04/23/24 0828   BP: 126/80   Pulse: 97   Temp: 98.5 °F (36.9 °C)   SpO2: 100%   Weight: 120 kg (265 lb)   Height: 5' 9\" (1.753 m)       Physical Exam  General: Well appearing, well nourished, in no acute distress. Oriented x 3, normal mood and affect.  Ambulating without difficulty.  Skin: Good turgor, no rash, unusual bruising or prominent lesions.  Hair: Normal texture and distribution.  Nails: Normal color, no deformities.  HEENT:  Head: Normocephalic, atraumatic.  Eyes: Conjunctiva clear, sclera non-icteric, EOM intact.  Nose: No external lesions, mucosa non-inflamed.  Mouth: Mucous membranes moist, no mucosal lesions.  Neck: Supple  Musculoskeletal:   Scattered Jesús's nodes bilateral hands  No tenderness to palpation or swelling of joints bilaterally to include: shoulder, elbow, wrist, MCP I-V, PIP I-V, knee, ankle   Neurologic: Alert and oriented. No focal neurological deficits appreciated.   Psychiatric: Normal mood and affect.       Shan Donohue PA-C  Rheumatology  "

## 2024-04-23 NOTE — TELEPHONE ENCOUNTER
The patient was called and he was told his x-ray showed fluid build up on his right knee, and that a steroid taper had been called into his pharmacy for him to try. Patient was asked to call back once the taper was completed to let us know how he is feeling.

## 2024-05-04 DIAGNOSIS — M25.50 ARTHRALGIA, UNSPECIFIED JOINT: ICD-10-CM

## 2024-05-09 NOTE — TELEPHONE ENCOUNTER
Patient called rx refill line requesting a refill for his joint medication. He did not know the name or dose. I informed patient I cannot refill medication if he does not have that information. Patient verbally understands and will call back tomorrow.

## 2024-05-14 DIAGNOSIS — M25.50 ARTHRALGIA, UNSPECIFIED JOINT: ICD-10-CM

## 2024-05-14 NOTE — TELEPHONE ENCOUNTER
Elly called to have his diclofenac refilled bc the pharmacy informed hime that it needed refills from provider. Thank you

## 2024-06-27 DIAGNOSIS — J45.40 MODERATE PERSISTENT REACTIVE AIRWAY DISEASE WITHOUT COMPLICATION: ICD-10-CM

## 2024-06-27 DIAGNOSIS — M25.50 ARTHRALGIA, UNSPECIFIED JOINT: ICD-10-CM

## 2024-06-27 DIAGNOSIS — J45.909 REACTIVE AIRWAY DISEASE WITHOUT COMPLICATION: ICD-10-CM

## 2024-06-27 RX ORDER — ALBUTEROL SULFATE 90 UG/1
1 AEROSOL, METERED RESPIRATORY (INHALATION) DAILY
Qty: 18 G | Refills: 5 | Status: SHIPPED | OUTPATIENT
Start: 2024-06-27 | End: 2024-06-27 | Stop reason: SDUPTHER

## 2024-06-27 RX ORDER — FLUTICASONE FUROATE AND VILANTEROL 200; 25 UG/1; UG/1
1 POWDER RESPIRATORY (INHALATION) DAILY
Qty: 180 BLISTER | Refills: 1 | Status: SHIPPED | OUTPATIENT
Start: 2024-06-27 | End: 2025-06-22

## 2024-06-27 RX ORDER — ALBUTEROL SULFATE 90 UG/1
1 AEROSOL, METERED RESPIRATORY (INHALATION) DAILY
Qty: 18 G | Refills: 5 | Status: SHIPPED | OUTPATIENT
Start: 2024-06-27

## 2024-06-27 NOTE — TELEPHONE ENCOUNTER
Medication: Albuterol     Dose/Frequency: 2 puffs q 6 hrs    Quantity: 18g    Pharmacy: [facility]     Office:   [x] PCP/Provider -   [] Speciality/Provider -     Does the patient have enough for 3 days?   [] Yes   [x] No - Send as HP to POD    Medication: Voltaren     Dose/Frequency: 50mg 1 tab twice day    Quantity: 180    Pharmacy: [facility]    Office:   [x] PCP/Provider -   [] Speciality/Provider -     Does the patient have enough for 3 days?   [] Yes   [x] No - Send as HP to POD    Medication: Breo Ellipta    Dose/Frequency: 200-25mcg1 puff daily    Quantity: 180 blister    Pharmacy: [facility]    Office:   [x] PCP/Provider -   [] Speciality/Provider -     Does the patient have enough for 3 days?   [] Yes   [x] No - Send as HP to POD

## 2024-06-27 NOTE — TELEPHONE ENCOUNTER
Patient needs updated blood work and has previously placed orders. Please contact patient to go for labs (CBC). Courtesy refill provided.

## 2024-06-27 NOTE — TELEPHONE ENCOUNTER
Called [facility] and requested for the albuteral to cancelled-ordered under incorrect provider       Medication: Albuterol      Dose/Frequency: 2 puffs q 6 hrs     Quantity: 18g     Pharmacy: [facility]  [place]      Office:   [x] PCP/Provider - meredith Scales [facility]   [] Speciality/Provider -      Does the patient have enough for 3 days?   [] Yes   [x] No - Send as HP to POD

## 2024-07-02 DIAGNOSIS — I50.30 (HFPEF) HEART FAILURE WITH PRESERVED EJECTION FRACTION (HCC): ICD-10-CM

## 2024-07-02 DIAGNOSIS — K21.9 GERD (GASTROESOPHAGEAL REFLUX DISEASE): ICD-10-CM

## 2024-07-02 DIAGNOSIS — R13.10 DIFFICULTY SWALLOWING: ICD-10-CM

## 2024-07-02 RX ORDER — SPIRONOLACTONE 25 MG/1
25 TABLET ORAL DAILY
Qty: 90 TABLET | Refills: 1 | Status: SHIPPED | OUTPATIENT
Start: 2024-07-02 | End: 2024-12-29

## 2024-07-02 RX ORDER — FAMOTIDINE 20 MG/1
20 TABLET, FILM COATED ORAL
Qty: 90 TABLET | Refills: 1 | Status: SHIPPED | OUTPATIENT
Start: 2024-07-02

## 2024-07-02 NOTE — TELEPHONE ENCOUNTER
Medication: Famotidine    Dose/Frequency: 20mg 1 tab daily    Quantity: 90    Pharmacy: [facility] [place]    Office:   [x] PCP/Provider -   [] Speciality/Provider -     Does the patient have enough for 3 days?   [x] Yes   [] No - Send as HP to POD     Medication: Spironolactone     Dose/Frequency: 25mg 1 tab daily    Quantity: 90    Pharmacy: [facility]    Office:   [x] PCP/Provider -   [] Speciality/Provider -     Does the patient have enough for 3 days?   [x] Yes   [] No - Send as HP to POD

## 2024-07-03 NOTE — TELEPHONE ENCOUNTER
Patient called saying that he was not able to retreive this script from [facility].     Please review and follow up.

## 2024-07-25 ENCOUNTER — APPOINTMENT (EMERGENCY)
Dept: RADIOLOGY | Facility: HOSPITAL | Age: 59
End: 2024-07-25
Payer: COMMERCIAL

## 2024-07-25 ENCOUNTER — HOSPITAL ENCOUNTER (OUTPATIENT)
Facility: HOSPITAL | Age: 59
Setting detail: OBSERVATION
Discharge: HOME | End: 2024-07-27
Attending: EMERGENCY MEDICINE | Admitting: INTERNAL MEDICINE
Payer: COMMERCIAL

## 2024-07-25 ENCOUNTER — APPOINTMENT (OUTPATIENT)
Dept: ULTRASOUND IMAGING | Facility: HOSPITAL | Age: 59
End: 2024-07-25
Payer: COMMERCIAL

## 2024-07-25 DIAGNOSIS — J44.1 COPD EXACERBATION (HCC): ICD-10-CM

## 2024-07-25 DIAGNOSIS — N17.9 AKI (ACUTE KIDNEY INJURY) (HCC): Primary | ICD-10-CM

## 2024-07-25 DIAGNOSIS — E11.65 UNCONTROLLED TYPE 2 DIABETES MELLITUS WITH HYPERGLYCEMIA (HCC): ICD-10-CM

## 2024-07-25 PROBLEM — F17.200 CURRENT SMOKER: Status: ACTIVE | Noted: 2024-07-25

## 2024-07-25 PROBLEM — J45.901 MILD ASTHMA WITH ACUTE EXACERBATION: Status: ACTIVE | Noted: 2024-07-25

## 2024-07-25 PROBLEM — J45.20 INTERMITTENT ASTHMA: Status: ACTIVE | Noted: 2024-07-25

## 2024-07-25 LAB
ALBUMIN SERPL BCG-MCNC: 3.8 G/DL (ref 3.5–5)
ALP SERPL-CCNC: 74 U/L (ref 34–104)
ALT SERPL W P-5'-P-CCNC: 8 U/L (ref 7–52)
ANION GAP SERPL CALCULATED.3IONS-SCNC: 6 MMOL/L (ref 4–13)
AST SERPL W P-5'-P-CCNC: 18 U/L (ref 13–39)
BASOPHILS # BLD AUTO: 0.03 THOUSANDS/ÂΜL (ref 0–0.1)
BASOPHILS NFR BLD AUTO: 0 % (ref 0–1)
BILIRUB SERPL-MCNC: 0.83 MG/DL (ref 0.2–1)
BUN SERPL-MCNC: 19 MG/DL (ref 5–25)
CALCIUM SERPL-MCNC: 8.8 MG/DL (ref 8.4–10.2)
CARDIAC TROPONIN I PNL SERPL HS: 5 NG/L
CHLORIDE SERPL-SCNC: 103 MMOL/L (ref 96–108)
CO2 SERPL-SCNC: 27 MMOL/L (ref 21–32)
CREAT SERPL-MCNC: 2.3 MG/DL (ref 0.6–1.3)
EOSINOPHIL # BLD AUTO: 1.23 THOUSAND/ÂΜL (ref 0–0.61)
EOSINOPHIL NFR BLD AUTO: 13 % (ref 0–6)
ERYTHROCYTE [DISTWIDTH] IN BLOOD BY AUTOMATED COUNT: 13.6 % (ref 11.6–15.1)
FLUAV RNA RESP QL NAA+PROBE: NEGATIVE
FLUBV RNA RESP QL NAA+PROBE: NEGATIVE
GFR SERPL CREATININE-BSD FRML MDRD: 29 ML/MIN/1.73SQ M
GLUCOSE SERPL-MCNC: 113 MG/DL (ref 65–140)
GLUCOSE SERPL-MCNC: 169 MG/DL (ref 65–140)
GLUCOSE SERPL-MCNC: 236 MG/DL (ref 65–140)
GLUCOSE SERPL-MCNC: 257 MG/DL (ref 65–140)
GLUCOSE SERPL-MCNC: 317 MG/DL (ref 65–140)
HCT VFR BLD AUTO: 35.4 % (ref 36.5–49.3)
HGB BLD-MCNC: 11.8 G/DL (ref 12–17)
IMM GRANULOCYTES # BLD AUTO: 0.03 THOUSAND/UL (ref 0–0.2)
IMM GRANULOCYTES NFR BLD AUTO: 0 % (ref 0–2)
LYMPHOCYTES # BLD AUTO: 2.45 THOUSANDS/ÂΜL (ref 0.6–4.47)
LYMPHOCYTES NFR BLD AUTO: 26 % (ref 14–44)
MAGNESIUM SERPL-MCNC: 1.7 MG/DL (ref 1.9–2.7)
MCH RBC QN AUTO: 34.6 PG (ref 26.8–34.3)
MCHC RBC AUTO-ENTMCNC: 33.3 G/DL (ref 31.4–37.4)
MCV RBC AUTO: 104 FL (ref 82–98)
MONOCYTES # BLD AUTO: 0.85 THOUSAND/ÂΜL (ref 0.17–1.22)
MONOCYTES NFR BLD AUTO: 9 % (ref 4–12)
NEUTROPHILS # BLD AUTO: 5 THOUSANDS/ÂΜL (ref 1.85–7.62)
NEUTS SEG NFR BLD AUTO: 52 % (ref 43–75)
NRBC BLD AUTO-RTO: 0 /100 WBCS
PLATELET # BLD AUTO: 228 THOUSANDS/UL (ref 149–390)
PLATELET # BLD AUTO: 248 THOUSANDS/UL (ref 149–390)
PMV BLD AUTO: 10 FL (ref 8.9–12.7)
PMV BLD AUTO: 9.7 FL (ref 8.9–12.7)
POTASSIUM SERPL-SCNC: 4.9 MMOL/L (ref 3.5–5.3)
PROT SERPL-MCNC: 7.1 G/DL (ref 6.4–8.4)
RBC # BLD AUTO: 3.41 MILLION/UL (ref 3.88–5.62)
RSV RNA RESP QL NAA+PROBE: NEGATIVE
SARS-COV-2 RNA RESP QL NAA+PROBE: NEGATIVE
SODIUM SERPL-SCNC: 136 MMOL/L (ref 135–147)
WBC # BLD AUTO: 9.59 THOUSAND/UL (ref 4.31–10.16)

## 2024-07-25 PROCEDURE — 0241U HB NFCT DS VIR RESP RNA 4 TRGT: CPT

## 2024-07-25 PROCEDURE — 83036 HEMOGLOBIN GLYCOSYLATED A1C: CPT

## 2024-07-25 PROCEDURE — 36415 COLL VENOUS BLD VENIPUNCTURE: CPT

## 2024-07-25 PROCEDURE — 94640 AIRWAY INHALATION TREATMENT: CPT

## 2024-07-25 PROCEDURE — 85049 AUTOMATED PLATELET COUNT: CPT

## 2024-07-25 PROCEDURE — 94664 DEMO&/EVAL PT USE INHALER: CPT

## 2024-07-25 PROCEDURE — 96374 THER/PROPH/DIAG INJ IV PUSH: CPT

## 2024-07-25 PROCEDURE — 99285 EMERGENCY DEPT VISIT HI MDM: CPT

## 2024-07-25 PROCEDURE — 99223 1ST HOSP IP/OBS HIGH 75: CPT | Performed by: INTERNAL MEDICINE

## 2024-07-25 PROCEDURE — 82948 REAGENT STRIP/BLOOD GLUCOSE: CPT

## 2024-07-25 PROCEDURE — 80053 COMPREHEN METABOLIC PANEL: CPT | Performed by: EMERGENCY MEDICINE

## 2024-07-25 PROCEDURE — 83735 ASSAY OF MAGNESIUM: CPT | Performed by: EMERGENCY MEDICINE

## 2024-07-25 PROCEDURE — 99285 EMERGENCY DEPT VISIT HI MDM: CPT | Performed by: EMERGENCY MEDICINE

## 2024-07-25 PROCEDURE — 94760 N-INVAS EAR/PLS OXIMETRY 1: CPT

## 2024-07-25 PROCEDURE — 71045 X-RAY EXAM CHEST 1 VIEW: CPT

## 2024-07-25 PROCEDURE — 76775 US EXAM ABDO BACK WALL LIM: CPT

## 2024-07-25 PROCEDURE — 84484 ASSAY OF TROPONIN QUANT: CPT | Performed by: EMERGENCY MEDICINE

## 2024-07-25 PROCEDURE — 85025 COMPLETE CBC W/AUTO DIFF WBC: CPT | Performed by: EMERGENCY MEDICINE

## 2024-07-25 RX ORDER — SODIUM CHLORIDE 9 MG/ML
75 INJECTION, SOLUTION INTRAVENOUS CONTINUOUS
Status: DISPENSED | OUTPATIENT
Start: 2024-07-25 | End: 2024-07-25

## 2024-07-25 RX ORDER — INSULIN LISPRO 100 [IU]/ML
1-6 INJECTION, SOLUTION INTRAVENOUS; SUBCUTANEOUS
Status: DISCONTINUED | OUTPATIENT
Start: 2024-07-25 | End: 2024-07-26

## 2024-07-25 RX ORDER — BENZONATATE 100 MG/1
100 CAPSULE ORAL 3 TIMES DAILY PRN
Status: DISCONTINUED | OUTPATIENT
Start: 2024-07-25 | End: 2024-07-27 | Stop reason: HOSPADM

## 2024-07-25 RX ORDER — PANTOPRAZOLE SODIUM 40 MG/1
40 TABLET, DELAYED RELEASE ORAL
Status: DISCONTINUED | OUTPATIENT
Start: 2024-07-25 | End: 2024-07-27 | Stop reason: HOSPADM

## 2024-07-25 RX ORDER — ALBUTEROL SULFATE 90 UG/1
2 AEROSOL, METERED RESPIRATORY (INHALATION) EVERY 4 HOURS PRN
Status: DISCONTINUED | OUTPATIENT
Start: 2024-07-25 | End: 2024-07-27 | Stop reason: HOSPADM

## 2024-07-25 RX ORDER — FAMOTIDINE 20 MG/1
20 TABLET, FILM COATED ORAL
Status: DISCONTINUED | OUTPATIENT
Start: 2024-07-25 | End: 2024-07-27 | Stop reason: HOSPADM

## 2024-07-25 RX ORDER — IPRATROPIUM BROMIDE AND ALBUTEROL SULFATE 2.5; .5 MG/3ML; MG/3ML
3 SOLUTION RESPIRATORY (INHALATION) ONCE
Status: COMPLETED | OUTPATIENT
Start: 2024-07-25 | End: 2024-07-25

## 2024-07-25 RX ORDER — ONDANSETRON 2 MG/ML
4 INJECTION INTRAMUSCULAR; INTRAVENOUS EVERY 6 HOURS PRN
Status: DISCONTINUED | OUTPATIENT
Start: 2024-07-25 | End: 2024-07-27 | Stop reason: HOSPADM

## 2024-07-25 RX ORDER — LEVALBUTEROL INHALATION SOLUTION 1.25 MG/3ML
1.25 SOLUTION RESPIRATORY (INHALATION)
Status: DISCONTINUED | OUTPATIENT
Start: 2024-07-25 | End: 2024-07-27 | Stop reason: HOSPADM

## 2024-07-25 RX ORDER — FLUTICASONE FUROATE AND VILANTEROL 200; 25 UG/1; UG/1
1 POWDER RESPIRATORY (INHALATION) DAILY
Status: DISCONTINUED | OUTPATIENT
Start: 2024-07-25 | End: 2024-07-27 | Stop reason: HOSPADM

## 2024-07-25 RX ORDER — ACETAMINOPHEN 325 MG/1
650 TABLET ORAL EVERY 6 HOURS PRN
Status: DISCONTINUED | OUTPATIENT
Start: 2024-07-25 | End: 2024-07-27 | Stop reason: HOSPADM

## 2024-07-25 RX ORDER — PREDNISONE 20 MG/1
40 TABLET ORAL DAILY
Status: DISCONTINUED | OUTPATIENT
Start: 2024-07-26 | End: 2024-07-27 | Stop reason: HOSPADM

## 2024-07-25 RX ORDER — HEPARIN SODIUM 5000 [USP'U]/ML
5000 INJECTION, SOLUTION INTRAVENOUS; SUBCUTANEOUS EVERY 8 HOURS SCHEDULED
Status: DISCONTINUED | OUTPATIENT
Start: 2024-07-25 | End: 2024-07-27 | Stop reason: HOSPADM

## 2024-07-25 RX ORDER — CARVEDILOL 6.25 MG/1
6.25 TABLET ORAL 2 TIMES DAILY WITH MEALS
Status: DISCONTINUED | OUTPATIENT
Start: 2024-07-25 | End: 2024-07-27 | Stop reason: HOSPADM

## 2024-07-25 RX ORDER — IPRATROPIUM BROMIDE AND ALBUTEROL SULFATE 2.5; .5 MG/3ML; MG/3ML
3 SOLUTION RESPIRATORY (INHALATION) 3 TIMES DAILY
Status: DISCONTINUED | OUTPATIENT
Start: 2024-07-25 | End: 2024-07-25

## 2024-07-25 RX ORDER — GUAIFENESIN 600 MG/1
600 TABLET, EXTENDED RELEASE ORAL EVERY 12 HOURS SCHEDULED
Status: DISCONTINUED | OUTPATIENT
Start: 2024-07-25 | End: 2024-07-27 | Stop reason: HOSPADM

## 2024-07-25 RX ORDER — METHYLPREDNISOLONE SODIUM SUCCINATE 40 MG/ML
40 INJECTION, POWDER, LYOPHILIZED, FOR SOLUTION INTRAMUSCULAR; INTRAVENOUS ONCE
Status: COMPLETED | OUTPATIENT
Start: 2024-07-25 | End: 2024-07-25

## 2024-07-25 RX ORDER — ALBUTEROL SULFATE 90 UG/1
1 AEROSOL, METERED RESPIRATORY (INHALATION) DAILY
Status: DISCONTINUED | OUTPATIENT
Start: 2024-07-25 | End: 2024-07-25

## 2024-07-25 RX ORDER — MAGNESIUM SULFATE HEPTAHYDRATE 40 MG/ML
2 INJECTION, SOLUTION INTRAVENOUS ONCE
Status: COMPLETED | OUTPATIENT
Start: 2024-07-25 | End: 2024-07-25

## 2024-07-25 RX ADMIN — HEPARIN SODIUM 5000 UNITS: 5000 INJECTION INTRAVENOUS; SUBCUTANEOUS at 13:45

## 2024-07-25 RX ADMIN — IPRATROPIUM BROMIDE 0.5 MG: 0.5 SOLUTION RESPIRATORY (INHALATION) at 20:37

## 2024-07-25 RX ADMIN — CARVEDILOL 6.25 MG: 6.25 TABLET, FILM COATED ORAL at 17:53

## 2024-07-25 RX ADMIN — INSULIN LISPRO 3 UNITS: 100 INJECTION, SOLUTION INTRAVENOUS; SUBCUTANEOUS at 18:14

## 2024-07-25 RX ADMIN — BENZONATATE 100 MG: 100 CAPSULE ORAL at 14:36

## 2024-07-25 RX ADMIN — MAGNESIUM SULFATE HEPTAHYDRATE 2 G: 40 INJECTION, SOLUTION INTRAVENOUS at 11:39

## 2024-07-25 RX ADMIN — FAMOTIDINE 20 MG: 20 TABLET, FILM COATED ORAL at 21:38

## 2024-07-25 RX ADMIN — INSULIN LISPRO 5 UNITS: 100 INJECTION, SOLUTION INTRAVENOUS; SUBCUTANEOUS at 21:37

## 2024-07-25 RX ADMIN — GUAIFENESIN 600 MG: 600 TABLET ORAL at 13:44

## 2024-07-25 RX ADMIN — IPRATROPIUM BROMIDE AND ALBUTEROL SULFATE 3 ML: 2.5; .5 SOLUTION RESPIRATORY (INHALATION) at 13:49

## 2024-07-25 RX ADMIN — FLUTICASONE FUROATE AND VILANTEROL TRIFENATATE 1 PUFF: 200; 25 POWDER RESPIRATORY (INHALATION) at 13:49

## 2024-07-25 RX ADMIN — PANTOPRAZOLE SODIUM 40 MG: 20 TABLET, DELAYED RELEASE ORAL at 13:43

## 2024-07-25 RX ADMIN — IPRATROPIUM BROMIDE AND ALBUTEROL SULFATE 3 ML: 2.5; .5 SOLUTION RESPIRATORY (INHALATION) at 09:18

## 2024-07-25 RX ADMIN — LEVALBUTEROL HYDROCHLORIDE 1.25 MG: 1.25 SOLUTION RESPIRATORY (INHALATION) at 20:37

## 2024-07-25 RX ADMIN — GUAIFENESIN 600 MG: 600 TABLET ORAL at 21:37

## 2024-07-25 RX ADMIN — METHYLPREDNISOLONE SODIUM SUCCINATE 40 MG: 40 INJECTION, POWDER, FOR SOLUTION INTRAMUSCULAR; INTRAVENOUS at 09:17

## 2024-07-25 RX ADMIN — SODIUM CHLORIDE 75 ML/HR: 0.9 INJECTION, SOLUTION INTRAVENOUS at 13:44

## 2024-07-25 RX ADMIN — INSULIN LISPRO 1 UNITS: 100 INJECTION, SOLUTION INTRAVENOUS; SUBCUTANEOUS at 13:45

## 2024-07-25 NOTE — ASSESSMENT & PLAN NOTE
Wt Readings from Last 3 Encounters:   07/25/24 121 kg (266 lb 12.1 oz)   04/23/24 120 kg (265 lb)   03/12/24 120 kg (265 lb 9.6 oz)       Lab Results   Component Value Date    LVEF 60 11/21/2023    BNP 38 11/20/2023    BNP 23 09/26/2023     Home meds include spironolactone 25 mg daily and torsemide 40 mg daily.  Both being held in the setting of DELFINO.  Patient has chronic bilateral pedal edema.  Only 1 pound weight gain in a month.  Chest x-ray did not show vascular congestion.  Less likely that acute heart failure was the cause of his shortness of breath.

## 2024-07-25 NOTE — ASSESSMENT & PLAN NOTE
Mg 1.7 on admission.  Received magnesium replacement.  Repeat check 2.1.  Will continue to monitor.

## 2024-07-25 NOTE — ASSESSMENT & PLAN NOTE
Lab Results   Component Value Date    CREATININE 2.19 (H) 07/26/2024    CREATININE 2.30 (H) 07/25/2024    CREATININE 1.24 11/25/2023        Baseline creatinine 1.0-1.1  No ACEI/ARB in home meds.  DELFINO unknown etiology,   Differential includes prenal versus ATN.  However he did not receive any contrast during admission.  Renal ultrasound did not reveal any hydronephrosis or postobstructive etiology of DELFINO.  Creatinine previously checked November 2023 was 1.24, was found to be 2.3 yesterday with some improvement 2.19 today.    Plan:  We will avoid nephrotoxins.  Monitor creatinine.  IV fluid 50 mL/h over the next 10 hours.

## 2024-07-25 NOTE — H&P
[facility]  H&P  Name: Elly Chong 59 y.o. male I MRN: [id number]  Unit/Bed#: ED-24 I Date of Admission: 7/25/2024   Date of Service: 7/25/2024 I Hospital Day: 0      Assessment & Plan   * Mild asthma with acute exacerbation  Assessment & Plan  Patient with intermittent asthma following pulmonary as an outpatient and taking albuterol and Breo until last month.  Patient ran out of his inhalers for a month.  Patient presented with wheezing and shortness of breath this morning.  He smokes 1 cigar/day.  Last smoking 3 days ago.  Patient received 1 dose of DuoNeb and 1 dose of methylprednisolone in the ED and his symptom has improved.  Sat well on room air.  No SARS/sepsis on admission.  Chest x-ray unremarkable.  EKG normal sinus rhythm.  WBC 9.5.    Plans:   DuoNeb every 6 scheduled  Continue home med Breo  Albuterol as needed  Incentive spirometry  Check COVID/flu/RSV  Oral prednisone 40 mg 3 days  Monitoring off antibiotics  Follow-up with pulmonary as an outpatient        DELFINO (acute kidney injury) (HCC)  Assessment & Plan  Lab Results   Component Value Date    CREATININE 2.30 (H) 07/25/2024    CREATININE 1.24 11/25/2023    CREATININE 1.16 11/24/2023        Baseline creatinine 1.0-1.1  Denied diarrhea, nausea, vomiting, urinary symptoms.  No ACEI/ARB in home meds.  DELFINO unknown etiology,   Differential includes prenal ATN  Rule out postrenal with renal ultrasound  Avoid nephrotoxins  Gentle IV fluid NaCl 75 cc/h in the setting of HFpEF  Monitor creatinine      (HFpEF) heart failure with preserved ejection fraction (HCC)  Assessment & Plan  Wt Readings from Last 3 Encounters:   07/25/24 121 kg (266 lb 12.1 oz)   04/23/24 120 kg (265 lb)   03/12/24 120 kg (265 lb 9.6 oz)       Lab Results   Component Value Date    LVEF 60 11/21/2023    BNP 38 11/20/2023    BNP 23 09/26/2023       Presents with wheezing and shortness of breath secondary to asthma exacerbation  Euvolemic on admission  Home  "meds include spironolactone 25 mg daily and torsemide 40 mg daily.  Patient has chronic bilateral pedal edema.  Only 1 pound weight gain in a month.  Chest x-ray did not show vascular congestion.  Holding spironolactone and torsemide in the setting of DELFINO.             Current smoker  Assessment & Plan  Usually smokes 2 cigars/day.  Trying to reduce smoking 1 cigar/day.  Last smoking 3 days ago.    Plans:  Smoking cessation  Patient denied nicotine replacement.    Diabetes mellitus (HCC)  Assessment & Plan  Lab Results   Component Value Date    HGBA1C 5.9 01/25/2024       No results for input(s): \"POCGLU\" in the last 72 hours.    Blood Sugar Average: Last 72 hrs:    Daily oral meds while inpatient  Accu-Chek  Insulin sliding scale  Diabetes diet    Hypomagnesemia  Assessment & Plan  Mg 1.7 on admission.  Received magnesium replacement.  Monitor electrolytes and Mg in the morning.    Medical non-compliance  Assessment & Plan  Patient ran out of his inhalers for a month.  Presented with mild asthma exacerbation.    Obesity  Assessment & Plan  BMI 39.99.  Recommended lifestyle modification with diet and exercise.    Essential hypertension  Assessment & Plan  Continue blood pressure medicine Coreg  Avoid ACEI/ARB in the setting of DELFINO  Monitor blood pressure           VTE Pharmacologic Prophylaxis: VTE Score: 2 Low Risk (Score 0-2) - Encourage Ambulation.  Code Status: Level 1 - Full Code   Discussion with family: Patient declined call to .     Anticipated Length of Stay: Patient will be admitted on an observation basis with an anticipated length of stay of less than 2 midnights secondary to reactive airway disease responsive to bronchodilator and DELFINO.    Chief Complaint: Wheezing, shortness of breath and dyspnea    History of Present Illness:  Elly Chong is a 59 y.o. male with a PMH of mild intermittent asthma, HFpEF, type 2 diabetes, hypertension, GERD, gout and arthritis who presents with wheezing, " shortness of breath and dry cough for 2 days.  Patient has been following pulmonary for reactive airway disease and has been taking albuterol and Breo until last month.  He ran out of his inhalers for a month.  He denied fever, chills, chest pain, nausea, vomiting.  Denied sick contact.  But he admits a lot of people at work.  He admits smoking 1 cigar per day.    Patient presented with wheezing and shortness of breath to the ED.  His vitals were stable and saturation well on room air.  Received duoneb 1 dose and methylprednisolone IV 40 mg 1 dose in ED and his symptom has improved. Labs are significant with DELFINO with creatinine 2.3.  Patient took his medicines aldactone/torsemide this morning.  Did not meet SIRS/sepsis criteria on admission.  Patient is admitted for observation given mild asthma exacerbation responsive to bronchodilator and DELFINO.      Review of Systems:  Review of Systems   Constitutional:  Negative for chills and fever.   HENT:  Negative for ear pain and sore throat.    Eyes:  Negative for pain and visual disturbance.   Respiratory:  Positive for shortness of breath and wheezing. Negative for cough.         Congestion in the chest.   Cardiovascular:  Negative for chest pain and palpitations.   Gastrointestinal:  Positive for abdominal pain. Negative for diarrhea, nausea and vomiting.        Patient reports pain in the umbilical area.   Genitourinary: Negative.  Negative for dysuria and hematuria.   Musculoskeletal:  Negative for arthralgias and back pain.   Skin:  Negative for color change and rash.   Neurological:  Negative for dizziness, seizures and syncope.   Psychiatric/Behavioral:  Negative for confusion.    All other systems reviewed and are negative.      Past Medical and Surgical History:   Past Medical History:   Diagnosis Date    Acute kidney injury (HCC) 09/05/2020    Asthma     CHF (congestive heart failure) (HCC)     Colon polyp     Diabetes mellitus (HCC)     GERD (gastroesophageal  reflux disease)     Hypertension     Inguinal hernia     3/25/15    Onychomycosis     5/24/17    Sleep apnea     Type 2 diabetes mellitus (HCC) 05/01/2012       Past Surgical History:   Procedure Laterality Date    ARTHROSCOPY KNEE      with Medial and Lateral Meniscus Repair    HERNIA REPAIR      umbilical and inguinal hernia repairs (left)       Meds/Allergies:  Prior to Admission medications    Medication Sig Start Date End Date Taking? Authorizing Provider   albuterol (PROVENTIL HFA,VENTOLIN HFA) 90 mcg/act inhaler Inhale 1 puff in the morning 6/27/24   Linden Roque MD   Blood Pressure KIT Twice a day periodically 7/20/20   SHAY Bravo   carvedilol (COREG) 6.25 mg tablet Take 1 tablet (6.25 mg total) by mouth 2 (two) times a day with meals 1/23/24 7/16/25  Shukri Fletcher MD   diclofenac sodium (VOLTAREN) 50 mg EC tablet Sig: Take 1 tablet twice a day after food as needed for joint pain 6/27/24   Ira East MD   famotidine (PEPCID) 20 mg tablet Take 1 tablet (20 mg total) by mouth daily at bedtime 7/2/24   Jose Schulte,    fluticasone-vilanterol (Breo Ellipta) 200-25 mcg/actuation inhaler Inhale 1 puff daily Rinse mouth after use. 6/27/24 6/22/25  Jed Pineda MD   glimepiride (AMARYL) 1 mg tablet TAKE 1 TABLET BY MOUTH TWICE A DAY 4/1/24   Linden Roque MD   glucose blood test strip 1 each by Other route daily 11/3/20   Linden Roque MD   Lancets (ONETOUCH ULTRASOFT) lancets by Does not apply route 12/28/17   Historical Provider, MD   LORazepam (ATIVAN) 1 mg tablet Take 1 tablet by mouth daily as needed 11/8/22   Historical Provider, MD   methocarbamol (ROBAXIN) 500 mg tablet Take 1 tablet (500 mg total) by mouth 2 (two) times a day 7/9/23   Dionicio Osorio,    pantoprazole (PROTONIX) 40 mg tablet Take 1 tablet (40 mg total) by mouth daily before breakfast 1/19/24   Beth Lehman MD   predniSONE 5 mg tablet 4 tabs x5 days, then 3 tabs x5 days, then 2 tabs x5 days, then 1 tab x5  days, then stop. 4/23/24   Shan Donohue PA-C   sitaGLIPtin (Januvia) 100 mg tablet Take 1 tablet (100 mg total) by mouth daily 10/31/23   Linden Roque MD   spironolactone (ALDACTONE) 25 mg tablet Take 1 tablet (25 mg total) by mouth daily 7/2/24 12/29/24  Jose Schulte,    torsemide (DEMADEX) 20 mg tablet TAKE 2 TABS ONCE DAILY IN AM 1/29/24   Linden Roque MD     I have reviewed home medications with patient personally.    Allergies: No Known Allergies    Social History:  Marital Status: /Civil Union   Occupation:   Patient Pre-hospital Living Situation: Home  Patient Pre-hospital Level of Mobility: walks  Patient Pre-hospital Diet Restrictions: NONE  Substance Use History:   Social History     Substance and Sexual Activity   Alcohol Use Yes    Alcohol/week: 3.0 standard drinks of alcohol    Types: 2 Cans of beer, 1 Standard drinks or equivalent per week    Comment: weekend: 1 glass of christian or 2 beers     Social History     Tobacco Use   Smoking Status Former    Types: Cigars   Smokeless Tobacco Never   Tobacco Comments    current every day smoker, per [employer]     Social History     Substance and Sexual Activity   Drug Use No       Family History:  Family History   Problem Relation Age of Onset    Hypertension Mother         essential    Chronic bronchitis Father     Prostate cancer Father     Colon cancer Father     Breast cancer Sister        Physical Exam:     Vitals:   Blood Pressure: 136/74 (07/25/24 1300)  Pulse: 76 (07/25/24 1300)  Temperature: 97.9 °F (36.6 °C) (07/25/24 0838)  Temp Source: Oral (07/25/24 0838)  Respirations: 20 (07/25/24 1300)  Weight - Scale: 121 kg (266 lb 12.1 oz) (07/25/24 0838)  SpO2: 96 % (07/25/24 1300)    Physical Exam  Vitals and nursing note reviewed.   Constitutional:       General: He is not in acute distress.     Appearance: He is well-developed. He is not toxic-appearing.      Comments: Not in respiratory distress.   HENT:      Head: Normocephalic and  atraumatic.      Mouth/Throat:      Mouth: Mucous membranes are moist.      Pharynx: Oropharynx is clear. No oropharyngeal exudate or posterior oropharyngeal erythema.   Eyes:      Extraocular Movements: Extraocular movements intact.      Conjunctiva/sclera: Conjunctivae normal.      Pupils: Pupils are equal, round, and reactive to light.   Cardiovascular:      Rate and Rhythm: Normal rate and regular rhythm.      Heart sounds: Normal heart sounds. No murmur heard.  Pulmonary:      Effort: Pulmonary effort is normal. No respiratory distress.      Breath sounds: Normal breath sounds. No wheezing or rales.   Abdominal:      General: Bowel sounds are normal.      Palpations: Abdomen is soft.      Tenderness: There is no abdominal tenderness.   Musculoskeletal:         General: Swelling present.      Cervical back: Neck supple.      Right lower leg: Edema present.      Left lower leg: Edema present.      Comments: Trace Bilateral pedal edema.   Skin:     General: Skin is warm and dry.      Capillary Refill: Capillary refill takes less than 2 seconds.      Coloration: Skin is not jaundiced or pale.      Findings: No lesion or rash.   Neurological:      Mental Status: He is alert and oriented to person, place, and time.   Psychiatric:         Mood and Affect: Mood normal.          Additional Data:     Lab Results:  Results from last 7 days   Lab Units 07/25/24  0841   WBC Thousand/uL 9.59   HEMOGLOBIN g/dL 11.8*   HEMATOCRIT % 35.4*   PLATELETS Thousands/uL 228   SEGS PCT % 52   LYMPHO PCT % 26   MONO PCT % 9   EOS PCT % 13*     Results from last 7 days   Lab Units 07/25/24  0841   SODIUM mmol/L 136   POTASSIUM mmol/L 4.9   CHLORIDE mmol/L 103   CO2 mmol/L 27   BUN mg/dL 19   CREATININE mg/dL 2.30*   ANION GAP mmol/L 6   CALCIUM mg/dL 8.8   ALBUMIN g/dL 3.8   TOTAL BILIRUBIN mg/dL 0.83   ALK PHOS U/L 74   ALT U/L 8   AST U/L 18   GLUCOSE RANDOM mg/dL 113             Lab Results   Component Value Date    HGBA1C 5.9  01/25/2024    HGBA1C 8.8 (A) 10/02/2023    HGBA1C 9.0 (A) 06/05/2023           Lines/Drains:  Invasive Devices       Peripheral Intravenous Line  Duration             Peripheral IV 07/25/24 Distal;Left;Upper;Ventral (anterior) Arm <1 day                        Imaging: Reviewed radiology reports from this admission including: chest xray  XR chest 1 view portable   Final Result by Mayra Gupta MD (07/25 1142)      No acute cardiopulmonary disease.            Resident: Mann Garrett I, the attending radiologist, have reviewed the images and agree with the final report above.      Workstation performed: [id number]         US kidney and bladder    (Results Pending)       EKG and Other Studies Reviewed on Admission:   EKG:  EKG ordered on admission.    ** Please Note: This note has been constructed using a voice recognition system. **

## 2024-07-25 NOTE — ASSESSMENT & PLAN NOTE
Patient with intermittent asthma following pulmonary as an outpatient and taking albuterol and Breo until last month.  His medication compliance has been poor due to being discontinued by his PCP per patient report..  He smokes 1 cigar/day.  Last smoking 3 days ago.  Patient received 1 dose of DuoNeb and 1 dose of methylprednisolone in the ED and his symptom has improved.  Sat well on room air.  No SARS/sepsis on admission.  Chest x-ray unremarkable.  EKG normal sinus rhythm.  WBC 9.5.    Plan:  DuoNeb every 6 scheduled  Continue home med Breo  Albuterol as needed  Incentive spirometry  Oral prednisone 40 mg 3 days  Monitoring off antibiotics  Follow-up with pulmonary as an outpatient

## 2024-07-25 NOTE — ASSESSMENT & PLAN NOTE
Lab Results   Component Value Date    CREATININE 2.30 (H) 07/25/2024    CREATININE 1.24 11/25/2023    CREATININE 1.16 11/24/2023        Baseline creatinine 1.0-1.1  Denied diarrhea, nausea, vomiting, urinary symptoms.  No ACEI/ARB in home meds.  DELFINO unknown etiology,   Differential includes prenal ATN  Rule out postrenal with renal ultrasound  Avoid nephrotoxins  Gentle IV fluid NaCl 75 cc/h in the setting of HFpEF  Monitor creatinine

## 2024-07-25 NOTE — ASSESSMENT & PLAN NOTE
Usually smokes 2 cigars/day.  Trying to reduce smoking 1 cigar/day.  Last smoking 3 days ago.    Plans:  Smoking cessation  Patient denied nicotine replacement.

## 2024-07-25 NOTE — ASSESSMENT & PLAN NOTE
Continue blood pressure medicine Coreg  Avoid ACEI/ARB in the setting of DELFINO  Monitor blood pressure

## 2024-07-25 NOTE — ASSESSMENT & PLAN NOTE
Patient with intermittent asthma following pulmonary as an outpatient and taking albuterol and Breo until last month.  Patient ran out of his inhalers for a month.  Patient presented with wheezing and shortness of breath this morning.  He smokes 1 cigar/day.  Last smoking 3 days ago.  Patient received 1 dose of DuoNeb and 1 dose of methylprednisolone in the ED and his symptom has improved.

## 2024-07-25 NOTE — ED PROVIDER NOTES
History  Chief Complaint   Patient presents with    Shortness of Breath     Pt has asthma and states he takes his inhalers, but lately worsening sob. +chest tightness.        Shortness of Breath  Associated symptoms: wheezing    Associated symptoms: no abdominal pain, no chest pain, no cough, no ear pain, no fever, no rash, no sore throat and no vomiting        59-year-old male, past medical history below, presenting to the emergency department with shortness of breath and chest tightness over the last 24 to 48 hours.  Denies any chest pain.  Notes dyspnea on exertion.  States compliant on all of his medications.  Notes that he smokes a cigar daily.  Denies rhinorrhea, sore throat, fever, chills, lightheadedness, lower extremity edema, calf pain or dissymmetry.  Notes that he can appreciate that he has an audible wheeze.    Prior to Admission Medications   Prescriptions Last Dose Informant Patient Reported? Taking?   Blood Pressure KIT 7/24/2024 Self No Yes   Sig: Twice a day periodically   LORazepam (ATIVAN) 1 mg tablet Unknown Self Yes No   Sig: Take 1 tablet by mouth daily as needed   Lancets (ONETOUCH ULTRASOFT) lancets 7/24/2024 Self Yes Yes   Sig: by Does not apply route   albuterol (PROVENTIL HFA,VENTOLIN HFA) 90 mcg/act inhaler Past Week  No Yes   Sig: Inhale 1 puff in the morning   carvedilol (COREG) 6.25 mg tablet 7/25/2024 Self No Yes   Sig: Take 1 tablet (6.25 mg total) by mouth 2 (two) times a day with meals   diclofenac sodium (VOLTAREN) 50 mg EC tablet 7/25/2024  No Yes   Sig: Sig: Take 1 tablet twice a day after food as needed for joint pain   famotidine (PEPCID) 20 mg tablet 7/25/2024  No Yes   Sig: Take 1 tablet (20 mg total) by mouth daily at bedtime   fluticasone-vilanterol (Breo Ellipta) 200-25 mcg/actuation inhaler Past Month  No Yes   Sig: Inhale 1 puff daily Rinse mouth after use.   glimepiride (AMARYL) 1 mg tablet 7/25/2024  No Yes   Sig: TAKE 1 TABLET BY MOUTH TWICE A DAY   glucose blood  test strip 2024 Self No Yes   Si each by Other route daily   methocarbamol (ROBAXIN) 500 mg tablet More than a month Self No No   Sig: Take 1 tablet (500 mg total) by mouth 2 (two) times a day   pantoprazole (PROTONIX) 40 mg tablet Not Taking Self No No   Sig: Take 1 tablet (40 mg total) by mouth daily before breakfast   Patient not taking: Reported on 2024   predniSONE 5 mg tablet Not Taking  No No   Si tabs x5 days, then 3 tabs x5 days, then 2 tabs x5 days, then 1 tab x5 days, then stop.   Patient not taking: Reported on 2024   sitaGLIPtin (Januvia) 100 mg tablet 2024 Self No Yes   Sig: Take 1 tablet (100 mg total) by mouth daily   spironolactone (ALDACTONE) 25 mg tablet 2024  No Yes   Sig: Take 1 tablet (25 mg total) by mouth daily   torsemide (DEMADEX) 20 mg tablet 2024 Self No Yes   Sig: TAKE 2 TABS ONCE DAILY IN AM      Facility-Administered Medications: None       Past Medical History:   Diagnosis Date    Acute kidney injury (HCC) 2020    Asthma     CHF (congestive heart failure) (HCC)     Colon polyp     Diabetes mellitus (HCC)     GERD (gastroesophageal reflux disease)     Hypertension     Inguinal hernia     3/25/15    Onychomycosis     17    Sleep apnea     Type 2 diabetes mellitus (HCC) 2012       Past Surgical History:   Procedure Laterality Date    ARTHROSCOPY KNEE      with Medial and Lateral Meniscus Repair    HERNIA REPAIR      umbilical and inguinal hernia repairs (left)       Family History   Problem Relation Age of Onset    Hypertension Mother         essential    Chronic bronchitis Father     Prostate cancer Father     Colon cancer Father     Breast cancer Sister      I have reviewed and agree with the history as documented.    E-Cigarette/Vaping    E-Cigarette Use Never User      E-Cigarette/Vaping Substances    Nicotine No     THC No     CBD No     Flavoring No     Other No     Unknown No      Social History     Tobacco Use    Smoking  status: Former     Types: Cigars    Smokeless tobacco: Never    Tobacco comments:     current every day smoker, per [employer]   Vaping Use    Vaping status: Never Used   Substance Use Topics    Alcohol use: Yes     Alcohol/week: 3.0 standard drinks of alcohol     Types: 2 Cans of beer, 1 Standard drinks or equivalent per week     Comment: weekend: 1 glass of christian or 2 beers    Drug use: No       Review of Systems   Constitutional:  Negative for chills and fever.   HENT:  Negative for ear pain and sore throat.    Eyes:  Negative for pain and visual disturbance.   Respiratory:  Positive for chest tightness, shortness of breath and wheezing. Negative for cough.    Cardiovascular:  Negative for chest pain and palpitations.   Gastrointestinal:  Negative for abdominal pain and vomiting.   Genitourinary:  Negative for dysuria and hematuria.   Musculoskeletal:  Negative for arthralgias and back pain.   Skin:  Negative for color change and rash.   Neurological:  Negative for seizures and syncope.   All other systems reviewed and are negative.      Physical Exam  Physical Exam  Vitals and nursing note reviewed.   Constitutional:       General: He is not in acute distress.     Appearance: He is well-developed.   HENT:      Head: Normocephalic and atraumatic.   Eyes:      Conjunctiva/sclera: Conjunctivae normal.   Cardiovascular:      Rate and Rhythm: Normal rate and regular rhythm.      Heart sounds: No murmur heard.  Pulmonary:      Effort: Pulmonary effort is normal. No respiratory distress.      Breath sounds: Wheezing present.   Abdominal:      Palpations: Abdomen is soft.      Tenderness: There is no abdominal tenderness.   Musculoskeletal:         General: No swelling.      Cervical back: Neck supple.   Skin:     General: Skin is warm and dry.      Capillary Refill: Capillary refill takes less than 2 seconds.   Neurological:      Mental Status: He is alert.   Psychiatric:         Mood and Affect: Mood normal.          Vital Signs  ED Triage Vitals [07/25/24 0838]   Temperature Pulse Respirations Blood Pressure SpO2   97.9 °F (36.6 °C) 74 20 135/80 98 %      Temp Source Heart Rate Source Patient Position - Orthostatic VS BP Location FiO2 (%)   Oral Monitor Sitting Right arm --      Pain Score       No Pain           Vitals:    07/25/24 1130 07/25/24 1300 07/25/24 1430 07/25/24 1526   BP: 158/74 136/74 128/62 142/75   Pulse: 73 76 79 76   Patient Position - Orthostatic VS: Sitting Sitting  Sitting         Visual Acuity      ED Medications  Medications   carvedilol (COREG) tablet 6.25 mg (has no administration in time range)   famotidine (PEPCID) tablet 20 mg (has no administration in time range)   fluticasone-vilanterol 200-25 mcg/actuation 1 puff (1 puff Inhalation Given 7/25/24 1349)   acetaminophen (TYLENOL) tablet 650 mg (has no administration in time range)   ondansetron (ZOFRAN) injection 4 mg (has no administration in time range)   heparin (porcine) subcutaneous injection 5,000 Units (5,000 Units Subcutaneous Given 7/25/24 1345)   pantoprazole (PROTONIX) EC tablet 40 mg (40 mg Oral Given 7/25/24 1343)   benzonatate (TESSALON PERLES) capsule 100 mg (100 mg Oral Given 7/25/24 1436)   guaiFENesin (MUCINEX) 12 hr tablet 600 mg (600 mg Oral Given 7/25/24 1344)   albuterol (PROVENTIL HFA,VENTOLIN HFA) inhaler 2 puff (has no administration in time range)   predniSONE tablet 40 mg (has no administration in time range)   sodium chloride 0.9 % infusion (75 mL/hr Intravenous New Bag 7/25/24 1344)   insulin lispro (HumALOG/ADMELOG) 100 units/mL subcutaneous injection 1-6 Units (1 Units Subcutaneous Given 7/25/24 1345)   insulin lispro (HumALOG/ADMELOG) 100 units/mL subcutaneous injection 1-6 Units (has no administration in time range)   levalbuterol (XOPENEX) inhalation solution 1.25 mg (has no administration in time range)   ipratropium (ATROVENT) 0.02 % inhalation solution 0.5 mg (has no administration in time range)    ipratropium-albuterol (DUO-NEB) 0.5-2.5 mg/3 mL inhalation solution 3 mL (3 mL Nebulization Given 7/25/24 0918)   methylPREDNISolone sodium succinate (Solu-MEDROL) injection 40 mg (40 mg Intravenous Given 7/25/24 0917)   magnesium sulfate 2 g/50 mL IVPB (premix) 2 g (0 g Intravenous Stopped 7/25/24 1327)       Diagnostic Studies  Results Reviewed       Procedure Component Value Units Date/Time    Fingerstick Glucose (POCT) [[id number]]  (Abnormal) Collected: 07/25/24 1529    Lab Status: Final result Specimen: Blood Updated: 07/25/24 1531     POC Glucose 257 mg/dl     COVID/FLU/RSV [[id number]]  (Normal) Collected: 07/25/24 1440    Lab Status: Final result Specimen: Nares from Nose Updated: 07/25/24 1524     SARS-CoV-2 Negative     INFLUENZA A PCR Negative     INFLUENZA B PCR Negative     RSV PCR Negative    Narrative:      FOR PEDIATRIC PATIENTS - copy/paste COVID Guidelines URL to browser: [email or web address]    SARS-CoV-2 assay is a Nucleic Acid Amplification assay intended for the  qualitative detection of nucleic acid from SARS-CoV-2 in nasopharyngeal  swabs. Results are for the presumptive identification of SARS-CoV-2 RNA.    Positive results are indicative of infection with SARS-CoV-2, the virus  causing COVID-19, but do not rule out bacterial infection or co-infection  with other viruses. Laboratories within the [place] and its  territories are required to report all positive results to the appropriate  public health authorities. Negative results do not preclude SARS-CoV-2  infection and should not be used as the sole basis for treatment or other  patient management decisions. Negative results must be combined with  clinical observations, patient history, and epidemiological information.  This test has not been FDA cleared or approved.    This test has been authorized by FDA under an Emergency Use Authorization  (EUA). This test is only authorized  for the duration of time the  declaration that circumstances exist justifying the authorization of the  emergency use of an in vitro diagnostic tests for detection of SARS-CoV-2  virus and/or diagnosis of COVID-19 infection under section 564(b)(1) of  the Act, 21 U.S.C. 360bbb-3(b)(1), unless the authorization is terminated  or revoked sooner. The test has been validated but independent review by FDA  and CLIA is pending.    Test performed using [employer] GeneXpert: This RT-PCR assay targets N2,  a region unique to SARS-CoV-2. A conserved region in the E-gene was chosen  for pan-Sarbecovirus detection which includes SARS-CoV-2.    According to CMS-2020-01-R, this platform meets the definition of high-throughput technology.    Platelet count [[id number]]  (Normal) Collected: 07/25/24 1440    Lab Status: Final result Specimen: Blood from Arm, Right Updated: 07/25/24 1447     Platelets 248 Thousands/uL      MPV 10.0 fL     Hemoglobin A1c w/EAG Estimation (Prechecked if no A1C within 90 days) [[id number]] Collected: 07/25/24 1440    Lab Status: In process Specimen: Blood from Arm, Right Updated: 07/25/24 1445    Fingerstick Glucose (POCT) [[id number]]  (Abnormal) Collected: 07/25/24 1329    Lab Status: Final result Specimen: Blood Updated: 07/25/24 1331     POC Glucose 169 mg/dl     Magnesium [[id number]]  (Abnormal) Collected: 07/25/24 0841    Lab Status: Final result Specimen: Blood from Arm, Left Updated: 07/25/24 1014     Magnesium 1.7 mg/dL     HS Troponin 0hr (reflex protocol) [[id number]]  (Normal) Collected: 07/25/24 0841    Lab Status: Final result Specimen: Blood from Arm, Left Updated: 07/25/24 0913     hs TnI 0hr 5 ng/L     Comprehensive metabolic panel [[id number]]  (Abnormal) Collected: 07/25/24 0841    Lab Status: Final result Specimen: Blood from Arm, Left Updated: 07/25/24 0910     Sodium 136 mmol/L      Potassium 4.9 mmol/L      Chloride 103 mmol/L      CO2 27 mmol/L      ANION GAP 6 mmol/L      BUN 19 mg/dL       Creatinine 2.30 mg/dL      Glucose 113 mg/dL      Calcium 8.8 mg/dL      AST 18 U/L      ALT 8 U/L      Alkaline Phosphatase 74 U/L      Total Protein 7.1 g/dL      Albumin 3.8 g/dL      Total Bilirubin 0.83 mg/dL      eGFR 29 ml/min/1.73sq m     Narrative:      National Kidney Disease Foundation guidelines for Chronic Kidney Disease (CKD):     Stage 1 with normal or high GFR (GFR > 90 mL/min/1.73 square meters)    Stage 2 Mild CKD (GFR = 60-89 mL/min/1.73 square meters)    Stage 3A Moderate CKD (GFR = 45-59 mL/min/1.73 square meters)    Stage 3B Moderate CKD (GFR = 30-44 mL/min/1.73 square meters)    Stage 4 Severe CKD (GFR = 15-29 mL/min/1.73 square meters)    Stage 5 End Stage CKD (GFR <15 mL/min/1.73 square meters)  Note: GFR calculation is accurate only with a steady state creatinine    CBC and differential [[id number]]  (Abnormal) Collected: 07/25/24 0841    Lab Status: Final result Specimen: Blood from Arm, Left Updated: 07/25/24 0847     WBC 9.59 Thousand/uL      RBC 3.41 Million/uL      Hemoglobin 11.8 g/dL      Hematocrit 35.4 %       fL      MCH 34.6 pg      MCHC 33.3 g/dL      RDW 13.6 %      MPV 9.7 fL      Platelets 228 Thousands/uL      nRBC 0 /100 WBCs      Segmented % 52 %      Immature Grans % 0 %      Lymphocytes % 26 %      Monocytes % 9 %      Eosinophils Relative 13 %      Basophils Relative 0 %      Absolute Neutrophils 5.00 Thousands/µL      Absolute Immature Grans 0.03 Thousand/uL      Absolute Lymphocytes 2.45 Thousands/µL      Absolute Monocytes 0.85 Thousand/µL      Eosinophils Absolute 1.23 Thousand/µL      Basophils Absolute 0.03 Thousands/µL                    US kidney and bladder   Final Result by Carson Alicia DO (07/25 1602)      Limited exam without evidence of hydronephrosis or other gross sonographic abnormalities.            Workstation performed: [id number]         XR chest 1 view portable   Final Result by Mayra Gupta MD (07/25 4932)      No acute  cardiopulmonary disease.            Resident: Mann Garrett I, the attending radiologist, have reviewed the images and agree with the final report above.      Workstation performed: [id number]                    Procedures  ECG 12 Lead Documentation Only    Date/Time: 7/25/2024 4:10 PM    Performed by: Torrey Palumbo DO  Authorized by: Torrey Palumbo DO    Indications / Diagnosis:  Shortness of breath  ECG reviewed by me, the ED Provider: yes    Patient location:  ED  Previous ECG:     Previous ECG:  Compared to current  Comments:      EKG NSR without signs of ischemia, infarction, dysrhythmia           ED Course                                                 Medical Decision Making  59yoM, pmhx COPD, CHF, presenting to ER with wheeze, dyspnea, SOB. VSS. Wheeze initially. Resolved s/p duoneb x1 and prednisone 40. Primary problem likely reactive airway and continue to treat as same. Incidentally, labs note DELFINO. Baseline Cr approx 1-1.2 now 2.3. Is maintained on aldactone/torsemide. BUN nml and pt does not appear overloaded (no cardiorenal suspected). Additionally, respiratory status responding to bronchodilator further supporting overload not the issue. I'm unclear as to whether it's intrinsic renal dz with this BUN? It's still somewhat undifferentiated for me which is why I haven't provide fluids here.  [facility] medicine who accepted their care    Amount and/or Complexity of Data Reviewed  External Data Reviewed: notes.  Labs: ordered.  Radiology: ordered and independent interpretation performed.  ECG/medicine tests: ordered and independent interpretation performed.    Risk  Prescription drug management.  Decision regarding hospitalization.                 Disposition  Final diagnoses:   DELFINO (acute kidney injury) (HCC)   COPD exacerbation (HCC)     Time reflects when diagnosis was documented in both MDM as applicable and the Disposition within this note       Time User Action Codes Description Comment     7/25/2024  9:16 AM Torrey Palumbo [N17.9] DELFINO (acute kidney injury) ([facility])     7/25/2024  9:16 AM Torrey Palumbo [J44.1] COPD exacerbation ([facility])           ED Disposition       ED Disposition   Admit    Condition   Stable    Date/Time   Thu Jul 25, 2024 10:05 AM    Comment   Case was discussed with GIOVANNI and the patient's admission status was agreed to be Admission Status: observation status to the service of Dr. Pagan .               Follow-up Information    None         Current Discharge Medication List        CONTINUE these medications which have NOT CHANGED    Details   albuterol (PROVENTIL HFA,VENTOLIN HFA) 90 mcg/act inhaler Inhale 1 puff in the morning  Qty: 18 g, Refills: 5    Comments: Substitution to a formulary equivalent within the same pharmaceutical class is authorized.  Associated Diagnoses: Reactive airway disease without complication      Blood Pressure KIT Twice a day periodically  Qty: 1 each, Refills: 0    Associated Diagnoses: Benign essential hypertension      carvedilol (COREG) 6.25 mg tablet Take 1 tablet (6.25 mg total) by mouth 2 (two) times a day with meals  Qty: 180 tablet, Refills: 5    Associated Diagnoses: Acute on chronic diastolic congestive heart failure (HCC)      diclofenac sodium (VOLTAREN) 50 mg EC tablet Sig: Take 1 tablet twice a day after food as needed for joint pain  Qty: 180 tablet, Refills: 1    Associated Diagnoses: Arthralgia, unspecified joint      famotidine (PEPCID) 20 mg tablet Take 1 tablet (20 mg total) by mouth daily at bedtime  Qty: 90 tablet, Refills: 1    Associated Diagnoses: GERD (gastroesophageal reflux disease); Difficulty swallowing      fluticasone-vilanterol (Breo Ellipta) 200-25 mcg/actuation inhaler Inhale 1 puff daily Rinse mouth after use.  Qty: 180 blister, Refills: 1    Associated Diagnoses: Moderate persistent reactive airway disease without complication      glimepiride (AMARYL) 1 mg tablet TAKE 1 TABLET BY MOUTH TWICE A DAY  Qty: 180  tablet, Refills: 1    Associated Diagnoses: Type 2 diabetes mellitus without complication, without long-term current use of insulin (HCC)      glucose blood test strip 1 each by Other route daily  Qty: 180 each, Refills: 5    Associated Diagnoses: Uncontrolled type 2 diabetes mellitus with hyperglycemia (HCC)      Lancets (ONETOUCH ULTRASOFT) lancets by Does not apply route      sitaGLIPtin (Januvia) 100 mg tablet Take 1 tablet (100 mg total) by mouth daily  Qty: 90 tablet, Refills: 1    Associated Diagnoses: Uncontrolled type 2 diabetes mellitus with hyperglycemia (HCC)      spironolactone (ALDACTONE) 25 mg tablet Take 1 tablet (25 mg total) by mouth daily  Qty: 90 tablet, Refills: 1    Associated Diagnoses: (HFpEF) heart failure with preserved ejection fraction (HCC)      torsemide (DEMADEX) 20 mg tablet TAKE 2 TABS ONCE DAILY IN AM  Qty: 180 tablet, Refills: 2    Associated Diagnoses: (HFpEF) heart failure with preserved ejection fraction (HCC)      LORazepam (ATIVAN) 1 mg tablet Take 1 tablet by mouth daily as needed      methocarbamol (ROBAXIN) 500 mg tablet Take 1 tablet (500 mg total) by mouth 2 (two) times a day  Qty: 20 tablet, Refills: 0    Associated Diagnoses: Upper back pain      pantoprazole (PROTONIX) 40 mg tablet Take 1 tablet (40 mg total) by mouth daily before breakfast  Qty: 90 tablet, Refills: 3    Associated Diagnoses: Esophageal dysphagia; GERD (gastroesophageal reflux disease)      predniSONE 5 mg tablet 4 tabs x5 days, then 3 tabs x5 days, then 2 tabs x5 days, then 1 tab x5 days, then stop.  Qty: 50 tablet, Refills: 0    Associated Diagnoses: Effusion of left knee; Enthesitis; Chronic pain of both knees             No discharge procedures on file.    PDMP Review         Value Time User    PDMP Reviewed  Yes 9/4/2021  8:39 AM Natalia Krueger DO            ED Provider  Electronically Signed by             Torrey Palumbo DO  07/25/24 4708

## 2024-07-25 NOTE — RESPIRATORY THERAPY NOTE
RT Protocol Note  Elly Chong 59 y.o. male MRN: [id number]  Unit/Bed#: ED-24 Encounter: [id number]    Assessment    Principal Problem:    Mild asthma with acute exacerbation  Active Problems:    Essential hypertension    Obesity    Medical non-compliance    Hypomagnesemia    DELFINO (acute kidney injury) (HCC)    Diabetes mellitus (HCC)    (HFpEF) heart failure with preserved ejection fraction (HCC)    Current smoker      Home Pulmonary Medications:  Albuterol MDI + breo     Home Devices/Therapy: Other (Comment) (none)    Past Medical History:   Diagnosis Date    Acute kidney injury (HCC) 09/05/2020    Asthma     CHF (congestive heart failure) ([facility])     Colon polyp     Diabetes mellitus ([facility])     GERD (gastroesophageal reflux disease)     Hypertension     Inguinal hernia     3/25/15    Onychomycosis     5/24/17    Sleep apnea     Type 2 diabetes mellitus ([facility]) 05/01/2012     Social History     Socioeconomic History    Marital status: /Civil Union     Spouse name: None    Number of children: None    Years of education: None    Highest education level: None   Occupational History    Occupation: [employer]    Tobacco Use    Smoking status: Former     Types: Cigars    Smokeless tobacco: Never    Tobacco comments:     current every day smoker, per [employer]   Vaping Use    Vaping status: Never Used   Substance and Sexual Activity    Alcohol use: Yes     Alcohol/week: 3.0 standard drinks of alcohol     Types: 2 Cans of beer, 1 Standard drinks or equivalent per week     Comment: weekend: 1 glass of christian or 2 beers    Drug use: No    Sexual activity: Yes   Other Topics Concern    None   Social History Narrative    Alcohol dependence    Nicotine dependence    Denied, alcohol Use    Marital problems     Social Determinants of Health     Financial Resource Strain: Not on file   Food Insecurity: Not on file   Transportation Needs: Not on file   Physical Activity: Not on file   Stress: Not on file   Social  Connections: Not on file   Intimate Partner Violence: Not on file   Housing Stability: Not on file       Subjective         Objective    Physical Exam:   Assessment Type: Assess only  General Appearance: Alert, Awake  Respiratory Pattern: Normal  Chest Assessment: Chest expansion symmetrical  Bilateral Breath Sounds: Diminished  O2 Device: RA    Vitals:  Blood pressure 136/74, pulse 76, temperature 97.9 °F (36.6 °C), temperature source Oral, resp. rate 20, weight 121 kg (266 lb 12.1 oz), SpO2 98%.          Imaging and other studies: I have personally reviewed pertinent reports.      O2 Device: RA     Plan    Respiratory Plan: Mild Distress pathway, Home Bronchodilator Patient pathway        Resp Comments: Patient assessed per protocol, Patient takes Breo daily and albuterol x2 daily MDI. Patient out of his inhaler. Patient complains of dry unproductive cough. Denies home bipap/cpap use, and home oxygen.Patient ordered on Q6 dup. Will change TID xop/atro per prot. BBS diminished no wheezing heard. \.

## 2024-07-25 NOTE — ASSESSMENT & PLAN NOTE
"Lab Results   Component Value Date    HGBA1C 5.9 01/25/2024       No results for input(s): \"POCGLU\" in the last 72 hours.    Blood Sugar Average: Last 72 hrs:    Daily oral meds while inpatient  Accu-Chek  Insulin sliding scale  Diabetes diet  "

## 2024-07-25 NOTE — ASSESSMENT & PLAN NOTE
Lab Results   Component Value Date    HGBA1C 5.7 (H) 07/25/2024       Recent Labs     07/25/24  1617 07/25/24  2104 07/26/24  0742 07/26/24  1106   POCGLU 236* 317* 212* 249*       Blood Sugar Average: Last 72 hrs:  (P) 240  Daily oral meds while inpatient  Accu-Chek  Insulin sliding scale  Diabetes diet

## 2024-07-25 NOTE — ASSESSMENT & PLAN NOTE
Patient with intermittent asthma following pulmonary as an outpatient and taking albuterol and Breo until last month.  Patient ran out of his inhalers for a month.  Patient presented with wheezing and shortness of breath this morning.  He smokes 1 cigar/day.  Last smoking 3 days ago.  Patient received 1 dose of DuoNeb and 1 dose of methylprednisolone in the ED and his symptom has improved.  Sat well on room air.  No SARS/sepsis on admission.  Chest x-ray unremarkable.  EKG normal sinus rhythm.  WBC 9.5.    Plans:   DuoNeb every 6 scheduled  Continue home med Breo  Albuterol as needed  Incentive spirometry  Check COVID/flu/RSV  Oral prednisone 40 mg 3 days  Monitoring off antibiotics  Follow-up with pulmonary as an outpatient

## 2024-07-26 LAB
ANION GAP SERPL CALCULATED.3IONS-SCNC: 6 MMOL/L (ref 4–13)
BUN SERPL-MCNC: 30 MG/DL (ref 5–25)
CALCIUM SERPL-MCNC: 8.5 MG/DL (ref 8.4–10.2)
CHLORIDE SERPL-SCNC: 103 MMOL/L (ref 96–108)
CO2 SERPL-SCNC: 23 MMOL/L (ref 21–32)
CREAT SERPL-MCNC: 2.19 MG/DL (ref 0.6–1.3)
ERYTHROCYTE [DISTWIDTH] IN BLOOD BY AUTOMATED COUNT: 13.2 % (ref 11.6–15.1)
EST. AVERAGE GLUCOSE BLD GHB EST-MCNC: 117 MG/DL
GFR SERPL CREATININE-BSD FRML MDRD: 31 ML/MIN/1.73SQ M
GLUCOSE SERPL-MCNC: 212 MG/DL (ref 65–140)
GLUCOSE SERPL-MCNC: 217 MG/DL (ref 65–140)
GLUCOSE SERPL-MCNC: 249 MG/DL (ref 65–140)
GLUCOSE SERPL-MCNC: 250 MG/DL (ref 65–140)
GLUCOSE SERPL-MCNC: 275 MG/DL (ref 65–140)
HBA1C MFR BLD: 5.7 %
HCT VFR BLD AUTO: 35.2 % (ref 36.5–49.3)
HGB BLD-MCNC: 11.6 G/DL (ref 12–17)
MAGNESIUM SERPL-MCNC: 2.1 MG/DL (ref 1.9–2.7)
MCH RBC QN AUTO: 34.1 PG (ref 26.8–34.3)
MCHC RBC AUTO-ENTMCNC: 33 G/DL (ref 31.4–37.4)
MCV RBC AUTO: 104 FL (ref 82–98)
PLATELET # BLD AUTO: 235 THOUSANDS/UL (ref 149–390)
PMV BLD AUTO: 9.9 FL (ref 8.9–12.7)
POTASSIUM SERPL-SCNC: 5.4 MMOL/L (ref 3.5–5.3)
RBC # BLD AUTO: 3.4 MILLION/UL (ref 3.88–5.62)
SODIUM SERPL-SCNC: 132 MMOL/L (ref 135–147)
WBC # BLD AUTO: 14.79 THOUSAND/UL (ref 4.31–10.16)

## 2024-07-26 PROCEDURE — 82948 REAGENT STRIP/BLOOD GLUCOSE: CPT

## 2024-07-26 PROCEDURE — 85027 COMPLETE CBC AUTOMATED: CPT

## 2024-07-26 PROCEDURE — 99232 SBSQ HOSP IP/OBS MODERATE 35: CPT | Performed by: INTERNAL MEDICINE

## 2024-07-26 PROCEDURE — 80048 BASIC METABOLIC PNL TOTAL CA: CPT

## 2024-07-26 PROCEDURE — 94760 N-INVAS EAR/PLS OXIMETRY 1: CPT

## 2024-07-26 PROCEDURE — 83735 ASSAY OF MAGNESIUM: CPT

## 2024-07-26 PROCEDURE — 94640 AIRWAY INHALATION TREATMENT: CPT

## 2024-07-26 RX ORDER — INSULIN LISPRO 100 [IU]/ML
2-12 INJECTION, SOLUTION INTRAVENOUS; SUBCUTANEOUS
Status: DISCONTINUED | OUTPATIENT
Start: 2024-07-26 | End: 2024-07-27 | Stop reason: HOSPADM

## 2024-07-26 RX ORDER — SODIUM CHLORIDE 9 MG/ML
50 INJECTION, SOLUTION INTRAVENOUS CONTINUOUS
Status: DISPENSED | OUTPATIENT
Start: 2024-07-26 | End: 2024-07-27

## 2024-07-26 RX ORDER — SENNOSIDES 8.6 MG
1 TABLET ORAL ONCE
Status: COMPLETED | OUTPATIENT
Start: 2024-07-26 | End: 2024-07-26

## 2024-07-26 RX ADMIN — INSULIN LISPRO 2 UNITS: 100 INJECTION, SOLUTION INTRAVENOUS; SUBCUTANEOUS at 08:54

## 2024-07-26 RX ADMIN — INSULIN LISPRO 4 UNITS: 100 INJECTION, SOLUTION INTRAVENOUS; SUBCUTANEOUS at 11:18

## 2024-07-26 RX ADMIN — CARVEDILOL 6.25 MG: 6.25 TABLET, FILM COATED ORAL at 07:51

## 2024-07-26 RX ADMIN — HEPARIN SODIUM 5000 UNITS: 5000 INJECTION INTRAVENOUS; SUBCUTANEOUS at 06:00

## 2024-07-26 RX ADMIN — IPRATROPIUM BROMIDE 0.5 MG: 0.5 SOLUTION RESPIRATORY (INHALATION) at 09:13

## 2024-07-26 RX ADMIN — FAMOTIDINE 20 MG: 20 TABLET, FILM COATED ORAL at 21:39

## 2024-07-26 RX ADMIN — ACETAMINOPHEN 650 MG: 325 TABLET, FILM COATED ORAL at 14:08

## 2024-07-26 RX ADMIN — INSULIN LISPRO 2 UNITS: 100 INJECTION, SOLUTION INTRAVENOUS; SUBCUTANEOUS at 07:45

## 2024-07-26 RX ADMIN — LEVALBUTEROL HYDROCHLORIDE 1.25 MG: 1.25 SOLUTION RESPIRATORY (INHALATION) at 19:42

## 2024-07-26 RX ADMIN — LEVALBUTEROL HYDROCHLORIDE 1.25 MG: 1.25 SOLUTION RESPIRATORY (INHALATION) at 14:29

## 2024-07-26 RX ADMIN — INSULIN LISPRO 6 UNITS: 100 INJECTION, SOLUTION INTRAVENOUS; SUBCUTANEOUS at 21:39

## 2024-07-26 RX ADMIN — IPRATROPIUM BROMIDE 0.5 MG: 0.5 SOLUTION RESPIRATORY (INHALATION) at 19:42

## 2024-07-26 RX ADMIN — FLUTICASONE FUROATE AND VILANTEROL TRIFENATATE 1 PUFF: 200; 25 POWDER RESPIRATORY (INHALATION) at 08:53

## 2024-07-26 RX ADMIN — BENZONATATE 100 MG: 100 CAPSULE ORAL at 14:08

## 2024-07-26 RX ADMIN — GUAIFENESIN 600 MG: 600 TABLET ORAL at 08:53

## 2024-07-26 RX ADMIN — INSULIN LISPRO 6 UNITS: 100 INJECTION, SOLUTION INTRAVENOUS; SUBCUTANEOUS at 17:29

## 2024-07-26 RX ADMIN — IPRATROPIUM BROMIDE 0.5 MG: 0.5 SOLUTION RESPIRATORY (INHALATION) at 14:29

## 2024-07-26 RX ADMIN — HEPARIN SODIUM 5000 UNITS: 5000 INJECTION INTRAVENOUS; SUBCUTANEOUS at 21:39

## 2024-07-26 RX ADMIN — PREDNISONE 40 MG: 20 TABLET ORAL at 08:53

## 2024-07-26 RX ADMIN — SENNOSIDES 8.6 MG: 8.6 TABLET, FILM COATED ORAL at 17:29

## 2024-07-26 RX ADMIN — SODIUM CHLORIDE 50 ML/HR: 0.9 INJECTION, SOLUTION INTRAVENOUS at 14:08

## 2024-07-26 RX ADMIN — LEVALBUTEROL HYDROCHLORIDE 1.25 MG: 1.25 SOLUTION RESPIRATORY (INHALATION) at 09:13

## 2024-07-26 RX ADMIN — CARVEDILOL 6.25 MG: 6.25 TABLET, FILM COATED ORAL at 17:29

## 2024-07-26 RX ADMIN — GUAIFENESIN 600 MG: 600 TABLET ORAL at 21:39

## 2024-07-26 RX ADMIN — BENZONATATE 100 MG: 100 CAPSULE ORAL at 06:00

## 2024-07-26 RX ADMIN — PANTOPRAZOLE SODIUM 40 MG: 20 TABLET, DELAYED RELEASE ORAL at 06:00

## 2024-07-26 RX ADMIN — HEPARIN SODIUM 5000 UNITS: 5000 INJECTION INTRAVENOUS; SUBCUTANEOUS at 14:08

## 2024-07-26 NOTE — UTILIZATION REVIEW
Initial Clinical Review    Admission: Date/Time/Statement:   Admission Orders (From admission, onward)       Ordered        07/25/24 1005  Place in Observation  Once                          Orders Placed This Encounter   Procedures    Place in Observation     Standing Status:   Standing     Number of Occurrences:   1     Order Specific Question:   Level of Care     Answer:   Med Surg [16]     ED Arrival Information       Expected   -    Arrival   7/25/2024 08:30    Acuity   Urgent              Means of arrival   Walk-In    Escorted by   Self    Service   Hospitalist    Admission type   Emergency              Arrival complaint   Wheezing             Chief Complaint   Patient presents with    Shortness of Breath     Pt has asthma and states he takes his inhalers, but lately worsening sob. +chest tightness.        Initial Presentation: 59 y.o. male with a PMH of mild intermittent asthma, HFpEF, type 2 diabetes, hypertension, GERD, gout and arthritis who presents with wheezing, shortness of breath and dry cough for 2 days. Patient has been following pulmonary for reactive airway disease and has been taking albuterol and Breo until last month. He ran out of his inhalers for a month. He admits smoking 1 cigar per day. Patient presented with wheezing and shortness of breath to the ED. Plan: Observation for asthma with acute exacerbation, DELFINO with creatinine of 2.30 (base,ine 1-1.1), CHF, current smoker, DM, hypomagnesemia, medical non compliance, obesity, HTN: DuoNebs q 6 hrs, continue Breo, albuterol prn, oral prednisone 40 mg x 3 days, IV fluids, monitor creatinine, hold spironolactone and torsemide due to DELFINO, diabetic diet with SSI, replete Mg and monitor, continue Coreg.     7/26:    Internal medicine: DELFINO, baseline creatinine is around 1.2, slight improvement today, continue gentle hydration, hold off on diuretics. Ultrasound no hydronephrosis. Asthma exacerbation, now improved, continue oral prednisone. Continue  nebulizer treatments. Hyperglycemia likely in the setting of steroids, continue sliding scale insulin.    ED Triage Vitals [07/25/24 0838]   Temperature Pulse Respirations Blood Pressure SpO2 Pain Score   97.9 °F (36.6 °C) 74 20 135/80 98 % No Pain     Weight (last 2 days)       Date/Time Weight    07/25/24 1809 121 (266.76)    07/25/24 0838 121 (266.76)            Vital Signs (last 3 days)       Date/Time Temp Pulse Resp BP MAP (mmHg) SpO2 O2 Device Patient Position - Orthostatic VS [place] Coma Scale Score Pain    07/26/24 0913 -- -- -- -- -- 97 % None (Room air) -- -- --    07/26/24 0700 98.8 °F (37.1 °C) 79 18 155/80 110 98 % None (Room air) Sitting -- No Pain    07/25/24 2037 -- -- -- -- -- 94 % None (Room air) -- -- --    07/25/24 1809 98.5 °F (36.9 °C) 81 20 161/80 -- -- -- -- -- No Pain    07/25/24 1700 -- 81 20 161/80 111 97 % -- -- -- --    07/25/24 1526 98.5 °F (36.9 °C) 76 20 142/75 -- 98 % None (Room air) Sitting -- --    07/25/24 1430 -- 79 -- 128/62 85 97 % -- -- -- --    07/25/24 1426 -- -- -- -- -- 98 % None (Room air) -- -- --    07/25/24 1300 -- 76 20 136/74 99 96 % None (Room air) Sitting -- --    07/25/24 1130 -- 73 20 158/74 107 95 % None (Room air) Sitting -- --    07/25/24 0930 -- 70 20 107/62 79 100 % None (Room air) Sitting -- --    07/25/24 0850 -- -- -- -- -- -- -- -- 15 --    07/25/24 0845 -- -- -- -- -- -- None (Room air) -- -- --    07/25/24 0838 97.9 °F (36.6 °C) 74 20 135/80 103 98 % None (Room air) Sitting -- No Pain              Pertinent Labs/Diagnostic Test Results:   Radiology:  US kidney and bladder   Final Interpretation by Carson Alicia DO (07/25 1602)      Limited exam without evidence of hydronephrosis or other gross sonographic abnormalities.            Workstation performed: [id number]         XR chest 1 view portable   Final Interpretation by Mayra Gupta MD (07/25 1806)      No acute cardiopulmonary disease.            Resident: Mann Garrett I,  the attending radiologist, have reviewed the images and agree with the final report above.      Workstation performed: [id number]           Cardiology:  No orders to display     GI:  No orders to display       Results from last 7 days   Lab Units 07/25/24  1440   SARS-COV-2  Negative     Results from last 7 days   Lab Units 07/26/24  0558 07/25/24  1440 07/25/24  0841   WBC Thousand/uL 14.79*  --  9.59   HEMOGLOBIN g/dL 11.6*  --  11.8*   HEMATOCRIT % 35.2*  --  35.4*   PLATELETS Thousands/uL 235 248 228   TOTAL NEUT ABS Thousands/µL  --   --  5.00         Results from last 7 days   Lab Units 07/26/24  0558 07/25/24  0841   SODIUM mmol/L 132* 136   POTASSIUM mmol/L 5.4* 4.9   CHLORIDE mmol/L 103 103   CO2 mmol/L 23 27   ANION GAP mmol/L 6 6   BUN mg/dL 30* 19   CREATININE mg/dL 2.19* 2.30*   EGFR ml/min/1.73sq m 31 29   CALCIUM mg/dL 8.5 8.8   MAGNESIUM mg/dL 2.1 1.7*     Results from last 7 days   Lab Units 07/25/24  0841   AST U/L 18   ALT U/L 8   ALK PHOS U/L 74   TOTAL PROTEIN g/dL 7.1   ALBUMIN g/dL 3.8   TOTAL BILIRUBIN mg/dL 0.83     Results from last 7 days   Lab Units 07/26/24  0742 07/25/24  2104 07/25/24  1617 07/25/24  1529 07/25/24  1329   POC GLUCOSE mg/dl 212* 317* 236* 257* 169*     Results from last 7 days   Lab Units 07/26/24  0558 07/25/24  0841   GLUCOSE RANDOM mg/dL 217* 113         Results from last 7 days   Lab Units 07/25/24  1440   HEMOGLOBIN A1C % 5.7*   EAG mg/dl 117     BETA-HYDROXYBUTYRATE   Date Value Ref Range Status   07/06/2020 0.2 <0.6 mmol/L Final            Results from last 7 days   Lab Units 07/25/24  0841   HS TNI 0HR ng/L 5           Results from last 7 days   Lab Units 07/25/24  1440   INFLUENZA A PCR  Negative   INFLUENZA B PCR  Negative   RSV PCR  Negative         ED Treatment-Medication Administration from 07/25/2024 0830 to 07/25/2024 1609         Date/Time Order Dose Route Action     07/25/2024 0925 ipratropium-albuterol (DUO-NEB) 0.5-2.5 mg/3 mL inhalation solution 3  mL 3 mL Nebulization Given     07/25/2024 0917 methylPREDNISolone sodium succinate (Solu-MEDROL) injection 40 mg 40 mg Intravenous Given     07/25/2024 1139 magnesium sulfate 2 g/50 mL IVPB (premix) 2 g 2 g Intravenous New Bag     07/25/2024 1349 fluticasone-vilanterol 200-25 mcg/actuation 1 puff 1 puff Inhalation Given     07/25/2024 1345 heparin (porcine) subcutaneous injection 5,000 Units 5,000 Units Subcutaneous Given     07/25/2024 1343 pantoprazole (PROTONIX) EC tablet 40 mg 40 mg Oral Given     07/25/2024 1436 benzonatate (TESSALON PERLES) capsule 100 mg 100 mg Oral Given     07/25/2024 1344 guaiFENesin (MUCINEX) 12 hr tablet 600 mg 600 mg Oral Given     07/25/2024 1349 ipratropium-albuterol (DUO-NEB) 0.5-2.5 mg/3 mL inhalation solution 3 mL 3 mL Nebulization Given     07/25/2024 1344 sodium chloride 0.9 % infusion 75 mL/hr Intravenous New Bag     07/25/2024 1345 insulin lispro (HumALOG/ADMELOG) 100 units/mL subcutaneous injection 1-6 Units 1 Units Subcutaneous Given            Past Medical History:   Diagnosis Date    Acute kidney injury (HCC) 09/05/2020    Asthma     CHF (congestive heart failure) ([facility])     Colon polyp     Diabetes mellitus ([facility])     GERD (gastroesophageal reflux disease)     Hypertension     Inguinal hernia     3/25/15    Onychomycosis     5/24/17    Sleep apnea     Type 2 diabetes mellitus ([facility]) 05/01/2012     Present on Admission:   (HFpEF) heart failure with preserved ejection fraction (HCC)   DELFINO (acute kidney injury) (HCC)   Diabetes mellitus (HCC)   Essential hypertension   Obesity   Hypomagnesemia      Admitting Diagnosis: Wheezing [R06.2]  COPD exacerbation ([facility]) [J44.1]  DELFINO (acute kidney injury) ([facility]) [N17.9]  Age/Sex: 59 y.o. male  Admission Orders:  Scheduled Medications:  carvedilol, 6.25 mg, Oral, BID With Meals  famotidine, 20 mg, Oral, HS  fluticasone-vilanterol, 1 puff, Inhalation, Daily  guaiFENesin, 600 mg, Oral, Q12H ELIANE  heparin (porcine), 5,000 Units, Subcutaneous,  Q8H ELIANE  insulin lispro, 2-12 Units, Subcutaneous, 4x Daily (AC & HS)  ipratropium, 0.5 mg, Nebulization, TID  levalbuterol, 1.25 mg, Nebulization, TID  pantoprazole, 40 mg, Oral, Daily Before Breakfast  predniSONE, 40 mg, Oral, Daily      Continuous IV Infusions:     PRN Meds:  acetaminophen, 650 mg, Oral, Q6H PRN  albuterol, 2 puff, Inhalation, Q4H PRN  benzonatate, 100 mg, Oral, TID PRN  ondansetron, 4 mg, Intravenous, Q6H PRN        None    Network Utilization Review Department  ATTENTION: Please call with any questions or concerns to [phone] and carefully listen to the prompts so that you are directed to the right person. All voicemails are confidential.   For Discharge needs, contact Care Management DC Support Team at [phone] opt. 2  Send all requests for admission clinical reviews, approved or denied determinations and any other requests to dedicated fax number below belonging to the [facility] where the patient is receiving treatment. List of dedicated fax numbers for the Facilities:  FACILITY NAME UR FAX NUMBER   ADMISSION DENIALS (Administrative/Medical Necessity) [phone]   DISCHARGE SUPPORT TEAM (NETWORK) [phone]   PARENT CHILD HEALTH (Maternity/NICU/Pediatrics) [phone]   General acute hospital [phone]   [facility] [phone]   [facility] [phone]   [facility] [phone]   [facility] [phone]   [facility] [phone]   [facility] [phone]   [facility] [phone]   [facility] [phone]   [facility] [phone]   [facility] [phone]   [facility] [phone]

## 2024-07-26 NOTE — PLAN OF CARE
Problem: PAIN - ADULT  Goal: Verbalizes/displays adequate comfort level or baseline comfort level  Description: Interventions:  - Encourage patient to monitor pain and request assistance  - Assess pain using appropriate pain scale  - Administer analgesics based on type and severity of pain and evaluate response  - Implement non-pharmacological measures as appropriate and evaluate response  - Consider cultural and social influences on pain and pain management  - Notify physician/advanced practitioner if interventions unsuccessful or patient reports new pain  Outcome: Progressing     Problem: INFECTION - ADULT  Goal: Absence or prevention of progression during hospitalization  Description: INTERVENTIONS:  - Assess and monitor for signs and symptoms of infection  - Monitor lab/diagnostic results  - Monitor all insertion sites, i.e. indwelling lines, tubes, and drains  - Monitor endotracheal if appropriate and nasal secretions for changes in amount and color  - [place] appropriate cooling/warming therapies per order  - Administer medications as ordered  - Instruct and encourage patient and family to use good hand hygiene technique  - Identify and instruct in appropriate isolation precautions for identified infection/condition  Outcome: Progressing  Goal: Absence of fever/infection during neutropenic period  Description: INTERVENTIONS:  - Monitor WBC    Outcome: Progressing     Problem: SAFETY ADULT  Goal: Patient will remain free of falls  Description: INTERVENTIONS:  - Educate patient/family on patient safety including physical limitations  - Instruct patient to call for assistance with activity   - Consult OT/PT to assist with strengthening/mobility   - Keep Call bell within reach  - Keep bed low and locked with side rails adjusted as appropriate  - Keep care items and personal belongings within reach  - Initiate and maintain comfort rounds  - Make Fall Risk Sign visible to staff  - Apply yellow socks and bracelet  for high fall risk patients  - Consider moving patient to room near nurses station  Outcome: Progressing  Goal: Maintain or return to baseline ADL function  Description: INTERVENTIONS:  -  Assess patient's ability to carry out ADLs; assess patient's baseline for ADL function and identify physical deficits which impact ability to perform ADLs (bathing, care of mouth/teeth, toileting, grooming, dressing, etc.)  - Assess/evaluate cause of self-care deficits   - Assess range of motion  - Assess patient's mobility; develop plan if impaired  - Assess patient's need for assistive devices and provide as appropriate  - Encourage maximum independence but intervene and supervise when necessary  - Involve family in performance of ADLs  - Assess for home care needs following discharge   - Consider OT consult to assist with ADL evaluation and planning for discharge  - Provide patient education as appropriate  Outcome: Progressing  Goal: Maintains/Returns to pre admission functional level  Description: INTERVENTIONS:  - Perform AM-PAC 6 Click Basic Mobility/ Daily Activity assessment daily.  - Set and communicate daily mobility goal to care team and patient/family/caregiver.   - Collaborate with rehabilitation services on mobility goals if consulted  - Out of bed for toileting  - Record patient progress and toleration of activity level   Outcome: Progressing     Problem: DISCHARGE PLANNING  Goal: Discharge to home or other facility with appropriate resources  Description: INTERVENTIONS:  - Identify barriers to discharge w/patient and caregiver  - Arrange for needed discharge resources and transportation as appropriate  - Identify discharge learning needs (meds, wound care, etc.)  - Arrange for interpretive services to assist at discharge as needed  - Refer to Case Management Department for coordinating discharge planning if the patient needs post-hospital services based on physician/advanced practitioner order or complex needs  related to functional status, cognitive ability, or social support system  Outcome: Progressing     Problem: Knowledge Deficit  Goal: Patient/family/caregiver demonstrates understanding of disease process, treatment plan, medications, and discharge instructions  Description: Complete learning assessment and assess knowledge base.  Interventions:  - Provide teaching at level of understanding  - Provide teaching via preferred learning methods  Outcome: Progressing

## 2024-07-26 NOTE — DISCHARGE INSTR - AVS FIRST PAGE
Dear Elly Chong,     It was our pleasure to care for you here at [facility].  It is our hope that we were always able to exceed the expected standards for your care during your stay.  You were hospitalized due to asthma exacerbation.  You were cared for on the first floor by Elijah Anand MD under the service of Tila Pagan MD with the [facility] Hospitalist Group who covers for your primary care physician (PCP), Linden Roque MD, while you were hospitalized.  If you have any questions or concerns related to this hospitalization, you may contact us at .  For follow up as well as any medication refills, we recommend that you follow up with your primary care physician.  A registered nurse will reach out to you by phone within a few days after your discharge to answer any additional questions that you may have after going home.  However, at this time we provide for you here, the most important instructions / recommendations at discharge:       Notable Medication Adjustments -   Please take nebulization treatments as directed.  Do not take spironolactone or Demadex today.  You may restart those medications tomorrow.  Please use your albuterol inhaler as needed for asthma.  Testing Required after Discharge -   Schedule repeat lab work (BMP) in 4 to 6 weeks with your PCP.  ** Please contact your PCP to request testing orders for any of the testing recommended here **  Important follow up information -   Please follow-up with your PCP within a week of discharge  Please follow-up with your pulmonologist.  Other Instructions -   Please take all your medications regularly as directed  If symptoms return/persist contact your PCP if unable then visit to nearest ER  Please review this entire after visit summary as additional general instructions including medication list, appointments, activity, diet, any pertinent wound care, and other additional recommendations  from your care team that may be provided for you.      Sincerely,     Elijah Anand MD

## 2024-07-26 NOTE — ASSESSMENT & PLAN NOTE
Patient ran out of his inhalers for a month.  Presented with mild asthma exacerbation.  He had not been using his own nebulizer.  He was using his wife's.  We have submitted order for him to have his own as well as reordered his nebulization medications.

## 2024-07-26 NOTE — ASSESSMENT & PLAN NOTE
Lab Results   Component Value Date    HGBA1C 5.7 (H) 07/25/2024       Recent Labs     07/26/24  1543 07/26/24  2042 07/27/24  0823 07/27/24  1107   POCGLU 275* 250* 126 179*       Blood Sugar Average: Last 72 hrs:  (P) 227  Daily oral meds while inpatient  Can resume his home medical regimen on discharge.

## 2024-07-26 NOTE — PROGRESS NOTES
[facility]  Progress Note  Name: Elly Chong I  MRN: [id number]  Unit/Bed#: S -01 I Date of Admission: 7/25/2024   Date of Service: 7/26/2024 I Hospital Day: 0    Assessment & Plan   DELFINO (acute kidney injury) (HCC)  Assessment & Plan  Lab Results   Component Value Date    CREATININE 2.19 (H) 07/26/2024    CREATININE 2.30 (H) 07/25/2024    CREATININE 1.24 11/25/2023        Baseline creatinine 1.0-1.1  No ACEI/ARB in home meds.  DELFINO unknown etiology,   Differential includes prenal versus ATN.  However he did not receive any contrast during admission.  Renal ultrasound did not reveal any hydronephrosis or postobstructive etiology of DELFINO.  Creatinine previously checked November 2023 was 1.24, was found to be 2.3 yesterday with some improvement 2.19 today.    Plan:  We will avoid nephrotoxins.  Monitor creatinine.  IV fluid 50 mL/h over the next 10 hours.    * Mild asthma with acute exacerbation  Assessment & Plan  Patient with intermittent asthma following pulmonary as an outpatient and taking albuterol and Breo until last month.  His medication compliance has been poor due to being discontinued by his PCP per patient report..  He smokes 1 cigar/day.  Last smoking 3 days ago.  Patient received 1 dose of DuoNeb and 1 dose of methylprednisolone in the ED and his symptom has improved.  Sat well on room air.  No SARS/sepsis on admission.  Chest x-ray unremarkable.  EKG normal sinus rhythm.  WBC 9.5.    Plan:  DuoNeb every 6 scheduled  Continue home med Breo  Albuterol as needed  Incentive spirometry  Oral prednisone 40 mg 3 days  Monitoring off antibiotics  Follow-up with pulmonary as an outpatient    (HFpEF) heart failure with preserved ejection fraction (HCC)  Assessment & Plan  Wt Readings from Last 3 Encounters:   07/25/24 121 kg (266 lb 12.1 oz)   04/23/24 120 kg (265 lb)   03/12/24 120 kg (265 lb 9.6 oz)       Lab Results   Component Value Date    LVEF 60 11/21/2023    BNP 38  11/20/2023    BNP 23 09/26/2023     Home meds include spironolactone 25 mg daily and torsemide 40 mg daily.  Both being held in the setting of DELFINO.  Patient has chronic bilateral pedal edema.  Only 1 pound weight gain in a month.  Chest x-ray did not show vascular congestion.  Less likely that acute heart failure was the cause of his shortness of breath.    Current smoker  Assessment & Plan  Usually smokes 2 cigars/day.  Trying to reduce smoking 1 cigar/day.  Last smoking 3 days ago.    Plans:  Smoking cessation  Patient denied nicotine replacement.    Diabetes mellitus (HCC)  Assessment & Plan  Lab Results   Component Value Date    HGBA1C 5.7 (H) 07/25/2024       Recent Labs     07/25/24  1617 07/25/24  2104 07/26/24  0742 07/26/24  1106   POCGLU 236* 317* 212* 249*       Blood Sugar Average: Last 72 hrs:  (P) 240  Daily oral meds while inpatient  Accu-Chek  Insulin sliding scale  Diabetes diet    Hypomagnesemia  Assessment & Plan  Mg 1.7 on admission.  Received magnesium replacement.  Repeat check 2.1.  Will continue to monitor.    Medical non-compliance  Assessment & Plan  Patient ran out of his inhalers for a month.  Presented with mild asthma exacerbation.  Have counseled him on correct usage of his medications.  Will give him reorders of his nebulization treatment.    Obesity  Assessment & Plan  BMI 39.99.  Recommended lifestyle modification with diet and exercise.    Essential hypertension  Assessment & Plan  Continue blood pressure medicine Coreg  Avoid ACEI/ARB in the setting of DELFINO  Monitor blood pressure         VTE Pharmacologic Prophylaxis: VTE Score: 2 High Risk (Score >/= 5) - Pharmacological DVT Prophylaxis Ordered: heparin. Sequential Compression Devices Ordered.    Mobility:   Basic Mobility Inpatient Raw Score: 24  JH-HLM Goal: 8: Walk 250 feet or more  JH-HLM Achieved: 3: Sit at edge of bed  JH-HLM Goal NOT achieved. Continue with multidisciplinary rounding and encourage appropriate mobility to  improve upon [facility] goals.    Patient Centered Rounds: I performed bedside rounds with nursing staff today.  Discussions with Specialists or Other Care Team Provider: None    Education and Discussions with Family / Patient: Attempted to update  (wife) via phone. Left voicemail.     Current Length of Stay: 0 day(s)  Current Patient Status: Observation   Discharge Plan: Anticipate discharge tomorrow to home.    Code Status: Level 1 - Full Code    Subjective:   Patient reports feeling well today.  His breathing has improved.  No new symptoms.  No overnight events.  He had run out of his nebulization treatment for the past 2 weeks.  His symptoms have been well-controlled during his hospital admission.  However on labs to be incidentally discovered an DELFINO for which she is being treated.    Objective:     Vitals:   Temp (24hrs), Av.6 °F (37 °C), Min:98.5 °F (36.9 °C), Max:98.8 °F (37.1 °C)    Temp:  [98.5 °F (36.9 °C)-98.8 °F (37.1 °C)] 98.8 °F (37.1 °C)  HR:  [76-81] 79  Resp:  [18-20] 18  BP: (128-161)/(62-80) 155/80  SpO2:  [94 %-98 %] 97 %  Body mass index is 38.28 kg/m².     Input and Output Summary (last 24 hours):   No intake or output data in the 24 hours ending 24 1341    Physical Exam  Vitals and nursing note reviewed.   Constitutional:       General: He is not in acute distress.     Appearance: Normal appearance. He is well-developed. He is obese.   HENT:      Head: Normocephalic and atraumatic.   Eyes:      Conjunctiva/sclera: Conjunctivae normal.   Cardiovascular:      Rate and Rhythm: Normal rate and regular rhythm.      Heart sounds: No murmur heard.  Pulmonary:      Effort: Pulmonary effort is normal. No respiratory distress.      Breath sounds: No wheezing.      Comments: Diminished breath sounds at the bases.  Abdominal:      General: Bowel sounds are normal.      Palpations: Abdomen is soft.      Tenderness: There is no abdominal tenderness.   Musculoskeletal:         General:  Swelling (+1) present.      Cervical back: Neck supple.   Neurological:      General: No focal deficit present.      Mental Status: He is alert and oriented to person, place, and time.   Psychiatric:         Mood and Affect: Mood normal.          Additional Data:     Labs:  Results from last 7 days   Lab Units 07/26/24  0558 07/25/24  1440 07/25/24  0841   WBC Thousand/uL 14.79*  --  9.59   HEMOGLOBIN g/dL 11.6*  --  11.8*   HEMATOCRIT % 35.2*  --  35.4*   PLATELETS Thousands/uL 235   < > 228   SEGS PCT %  --   --  52   LYMPHO PCT %  --   --  26   MONO PCT %  --   --  9   EOS PCT %  --   --  13*    < > = values in this interval not displayed.     Results from last 7 days   Lab Units 07/26/24  0558 07/25/24  0841   SODIUM mmol/L 132* 136   POTASSIUM mmol/L 5.4* 4.9   CHLORIDE mmol/L 103 103   CO2 mmol/L 23 27   BUN mg/dL 30* 19   CREATININE mg/dL 2.19* 2.30*   ANION GAP mmol/L 6 6   CALCIUM mg/dL 8.5 8.8   ALBUMIN g/dL  --  3.8   TOTAL BILIRUBIN mg/dL  --  0.83   ALK PHOS U/L  --  74   ALT U/L  --  8   AST U/L  --  18   GLUCOSE RANDOM mg/dL 217* 113         Results from last 7 days   Lab Units 07/26/24  1106 07/26/24  0742 07/25/24  2104 07/25/24  1617 07/25/24  1529 07/25/24  1329   POC GLUCOSE mg/dl 249* 212* 317* 236* 257* 169*     Results from last 7 days   Lab Units 07/25/24  1440   HEMOGLOBIN A1C % 5.7*           Lines/Drains:  Invasive Devices       Peripheral Intravenous Line  Duration             Peripheral IV 07/25/24 Distal;Left;Upper;Ventral (anterior) Arm 1 day                    Imaging: Reviewed radiology reports from this admission including: chest xray    Recent Cultures (last 7 days):         Last 24 Hours Medication List:   Current Facility-Administered Medications   Medication Dose Route Frequency Provider Last Rate    acetaminophen  650 mg Oral Q6H PRN May Flory Gordon MD      albuterol  2 puff Inhalation Q4H PRN May Flory Gordon MD      benzonatate  100 mg Oral TID PRN May Flory Gordon MD      carvedilol   6.25 mg Oral BID With Meals May Flory Gordon MD      famotidine  20 mg Oral HS May Flory Gordon MD      fluticasone-vilanterol  1 puff Inhalation Daily May Flory Gordon MD      guaiFENesin  600 mg Oral Q12H [facility] May Flory Gordon MD      heparin (porcine)  5,000 Units Subcutaneous Q8H [facility] May Flory Gordon MD      insulin lispro  2-12 Units Subcutaneous 4x Daily (AC & HS) Marcel Laughlin MD      ipratropium  0.5 mg Nebulization TID Stefano Carrasquillo MD      levalbuterol  1.25 mg Nebulization TID Stefano Carrasquillo MD      ondansetron  4 mg Intravenous Q6H PRN May Flory Gordon MD      pantoprazole  40 mg Oral Daily Before Breakfast May Flory Gordon MD      predniSONE  40 mg Oral Daily May Flory Gordon MD      sodium chloride  50 mL/hr Intravenous Continuous Elijah Anand MD          Today, Patient Was Seen By: Elijah Anand MD    **Please Note: This note may have been constructed using a voice recognition system.**

## 2024-07-26 NOTE — ASSESSMENT & PLAN NOTE
Continue blood pressure medicine Coreg  Avoid ACEI/ARB in the setting of DELFINO  Monitor blood pressure at home.

## 2024-07-26 NOTE — ASSESSMENT & PLAN NOTE
Sat well on room air.  No respiratory distress.  Upon discussion with patient, discovery that he does not have his own nebulizer at home and has been using his wife's.  We have reordered his nebulization treatment as well as a new nebulizer for him.      Plan:  Continue home med Breo  Albuterol as needed  Follow-up with pulmonary as an outpatient

## 2024-07-26 NOTE — DISCHARGE SUMMARY
[facility]  Discharge- Elly Chong 1965, 59 y.o. male MRN: [id number]  Unit/Bed#: S -01 Encounter: [id number]  Primary Care Provider: Linden Roque MD   Date and time admitted to hospital: 7/25/2024  8:32 AM    DELFINO (acute kidney injury) (HCC)  Assessment & Plan  Lab Results   Component Value Date    CREATININE 1.55 (H) 07/27/2024    CREATININE 2.19 (H) 07/26/2024    CREATININE 2.30 (H) 07/25/2024        Baseline creatinine 1.0-1.1  No ACEI/ARB in home meds.  DELFINO unknown etiology,   Differential includes prenal versus ATN.  However he did not receive any contrast during admission.  Renal ultrasound did not reveal any hydronephrosis or postobstructive etiology of DELFINO.  Creatinine previously checked November 2023 was 1.24.  Creatinine elevations peaked at 2.23, began downtrending yesterday at 2.19 and now today is 1.55.  He continues to receive light IVF today.    Plan:  Avoid nephrotoxins  Monitor creatinine.  Recommend he follow-up with his PCP to have repeat BMP done as outpatient.    * Mild asthma with acute exacerbation  Assessment & Plan  Sat well on room air.  No respiratory distress.  Upon discussion with patient, discovery that he does not have his own nebulizer at home and has been using his wife's.  We have reordered his nebulization treatment as well as a new nebulizer for him.      Plan:  Continue home med Breo  Albuterol as needed  Follow-up with pulmonary as an outpatient    (HFpEF) heart failure with preserved ejection fraction (HCC)  Assessment & Plan  Wt Readings from Last 3 Encounters:   07/25/24 121 kg (266 lb 12.1 oz)   04/23/24 120 kg (265 lb)   03/12/24 120 kg (265 lb 9.6 oz)       Lab Results   Component Value Date    LVEF 60 11/21/2023    BNP 38 11/20/2023    BNP 23 09/26/2023     Home meds include spironolactone 25 mg daily and torsemide 40 mg daily.  Both being held in the setting of DELFINO.  Patient has chronic bilateral pedal edema.  Only 1 pound weight  gain in a month.  Chest x-ray did not show vascular congestion.  Less likely that acute heart failure was the cause of his shortness of breath.    Current smoker  Assessment & Plan  Usually smokes 2 cigars/day.  Trying to reduce smoking 1 cigar/day.  Last smoking 3 days ago.    Plans:  Smoking cessation  Patient denied nicotine replacement.    Diabetes mellitus (HCC)  Assessment & Plan  Lab Results   Component Value Date    HGBA1C 5.7 (H) 07/25/2024       Recent Labs     07/26/24  1543 07/26/24  2042 07/27/24  0823 07/27/24  1107   POCGLU 275* 250* 126 179*       Blood Sugar Average: Last 72 hrs:  (P) 227  Daily oral meds while inpatient  Can resume his home medical regimen on discharge.    Hypomagnesemia  Assessment & Plan  Mg 1.7 on admission.  Received magnesium replacement.  Repeat check 2.1.    Medical non-compliance  Assessment & Plan  Patient ran out of his inhalers for a month.  Presented with mild asthma exacerbation.  He had not been using his own nebulizer.  He was using his wife's.  We have submitted order for him to have his own as well as reordered his nebulization medications.    Obesity  Assessment & Plan  BMI 39.99.  Recommended lifestyle modification with diet and exercise.    Essential hypertension  Assessment & Plan  Continue blood pressure medicine Coreg  Avoid ACEI/ARB in the setting of DELFINO  Monitor blood pressure at home.      Medical Problems       Resolved Problems  Date Reviewed: 7/26/2024   None       Discharging Resident: Elijah Anand MD  Discharging Attending: Tila Pagan MD  PCP: Linden Roque MD  Admission Date:   Admission Orders (From admission, onward)       Ordered        07/25/24 1005  Place in Observation  Once                          Discharge Date: 07/27/24    Consultations During Hospital Stay:  None    Procedures Performed:   None    Significant Findings / Test Results:   None    Incidental Findings:   Normally distended.   No focal thickening or mass lesions.  "  Bilateral ureteral jets are not detected.    I reviewed the above mentioned incidental findings with the patient and/or family and they expressed understanding.    Test Results Pending at Discharge (will require follow up):  None     Outpatient Tests Requested:  None    Complications: None    Reason for Admission: Shortness of breath    Hospital Course:   59-year-old male past medical history of mild intermittent asthma, HFpEF, type 2 diabetes mellitus, hypertension, GERD presented with shortness of breath and wheezing for the past 2 days.  He follows pulmonary for reactive airway disease but has been taking albuterol and Breo.  Ran out of medications 2 weeks ago.    Symptoms improved dramatically with DuoNeb's.  Currently saturating on room air in the mid 90s.  Close counseled on medication compliance.    Upon further discussion with the patient, it was revealed that he does not have his own nebulizer but has been using his wife's.    Incidentally discovered elevated creatinine on lab work.  Was kept for further evaluation of DELFINO.  Creatinine has continued to trend down with IVF.    On 7/27 he was medically stable for discharge.  Recommend he follow-up outpatient with pulmonology and PCP.        Condition at Discharge: good    Discharge Day Visit / Exam:   Subjective:      Vitals: Blood Pressure: 126/82 (07/27/24 0810)  Pulse: 80 (07/27/24 0810)  Temperature: 98.2 °F (36.8 °C) (07/27/24 0810)  Temp Source: Oral (07/26/24 0700)  Respirations: 16 (07/27/24 0810)  Height: 5' 10\" (177.8 cm) (07/25/24 1809)  Weight - Scale: 121 kg (266 lb 12.1 oz) (07/25/24 1809)  SpO2: 97 % (07/27/24 0810)    Physical Exam  Vitals and nursing note reviewed.   Constitutional:       General: He is not in acute distress.     Appearance: Normal appearance. He is well-developed. He is obese.   HENT:      Head: Normocephalic and atraumatic.   Eyes:      Conjunctiva/sclera: Conjunctivae normal.   Cardiovascular:      Rate and Rhythm: Normal " rate and regular rhythm.      Heart sounds: No murmur heard.  Pulmonary:      Effort: Pulmonary effort is normal. No respiratory distress.      Breath sounds: No wheezing.      Comments: Diminished breath sounds at the bases.  Abdominal:      General: Bowel sounds are normal.      Palpations: Abdomen is soft.      Tenderness: There is no abdominal tenderness.   Musculoskeletal:         General: Swelling (+1) present.      Cervical back: Neck supple.   Neurological:      General: No focal deficit present.      Mental Status: He is alert and oriented to person, place, and time.   Psychiatric:         Mood and Affect: Mood normal.        Discussion with Family: Updated  (wife) via phone.    Discharge instructions/Information to patient and family:   See after visit summary for information provided to patient and family.      Provisions for Follow-Up Care:  See after visit summary for information related to follow-up care and any pertinent home health orders.      Mobility at time of Discharge:   Basic Mobility Inpatient Raw Score: 24  JH-HLM Goal: 8: Walk 250 feet or more  JH-HLM Achieved: 7: Walk 25 feet or more  HLM Goal NOT achieved. Continue to encourage mobility in post discharge setting.     Disposition:   Home    Planned Readmission: No    Discharge Medications:  See after visit summary for reconciled discharge medications provided to patient and/or family.      **Please Note: This note may have been constructed using a voice recognition system**

## 2024-07-26 NOTE — ASSESSMENT & PLAN NOTE
Lab Results   Component Value Date    CREATININE 1.55 (H) 07/27/2024    CREATININE 2.19 (H) 07/26/2024    CREATININE 2.30 (H) 07/25/2024        Baseline creatinine 1.0-1.1  No ACEI/ARB in home meds.  DELFINO unknown etiology,   Differential includes prenal versus ATN.  However he did not receive any contrast during admission.  Renal ultrasound did not reveal any hydronephrosis or postobstructive etiology of DELFINO.  Creatinine previously checked November 2023 was 1.24.  Creatinine elevations peaked at 2.23, began downtrending yesterday at 2.19 and now today is 1.55.  He continues to receive light IVF today.    Plan:  Avoid nephrotoxins  Monitor creatinine.  Recommend he follow-up with his PCP to have repeat BMP done as outpatient.

## 2024-07-26 NOTE — HOSPITAL COURSE
59-year-old male past medical history of mild intermittent asthma, HFpEF, type 2 diabetes mellitus, hypertension, GERD presented with shortness of breath and wheezing for the past 2 days.  He follows pulmonary for reactive airway disease but has been taking albuterol and Breo.  Ran out of medications 2 weeks ago.    Symptoms improved dramatically with DuoNeb's.  Currently saturating on room air in the mid 90s.  Close counseled on medication compliance.    Upon further discussion with the patient, it was revealed that he does not have his own nebulizer but has been using his wife's.    Incidentally discovered elevated creatinine on lab work.  Was kept for further evaluation of DELFINO.  Creatinine has continued to trend down with IVF.    On 7/27 he was medically stable for discharge.  Recommend he follow-up outpatient with pulmonology and PCP.

## 2024-07-27 VITALS
TEMPERATURE: 98.2 F | OXYGEN SATURATION: 97 % | RESPIRATION RATE: 16 BRPM | WEIGHT: 266.76 LBS | DIASTOLIC BLOOD PRESSURE: 82 MMHG | HEIGHT: 70 IN | BODY MASS INDEX: 38.19 KG/M2 | HEART RATE: 80 BPM | SYSTOLIC BLOOD PRESSURE: 126 MMHG

## 2024-07-27 LAB
ANION GAP SERPL CALCULATED.3IONS-SCNC: 4 MMOL/L (ref 4–13)
BASOPHILS # BLD AUTO: 0.03 THOUSANDS/ÂΜL (ref 0–0.1)
BASOPHILS NFR BLD AUTO: 0 % (ref 0–1)
BUN SERPL-MCNC: 31 MG/DL (ref 5–25)
CALCIUM SERPL-MCNC: 8.4 MG/DL (ref 8.4–10.2)
CHLORIDE SERPL-SCNC: 105 MMOL/L (ref 96–108)
CO2 SERPL-SCNC: 26 MMOL/L (ref 21–32)
CREAT SERPL-MCNC: 1.55 MG/DL (ref 0.6–1.3)
DME PARACHUTE DELIVERY DATE REQUESTED: NORMAL
DME PARACHUTE DELIVERY NOTE: NORMAL
DME PARACHUTE ITEM DESCRIPTION: NORMAL
DME PARACHUTE ORDER STATUS: NORMAL
DME PARACHUTE SUPPLIER NAME: NORMAL
DME PARACHUTE SUPPLIER PHONE: NORMAL
EOSINOPHIL # BLD AUTO: 0 THOUSAND/ÂΜL (ref 0–0.61)
EOSINOPHIL NFR BLD AUTO: 0 % (ref 0–6)
ERYTHROCYTE [DISTWIDTH] IN BLOOD BY AUTOMATED COUNT: 13.2 % (ref 11.6–15.1)
GFR SERPL CREATININE-BSD FRML MDRD: 47 ML/MIN/1.73SQ M
GLUCOSE P FAST SERPL-MCNC: 145 MG/DL (ref 65–99)
GLUCOSE SERPL-MCNC: 126 MG/DL (ref 65–140)
GLUCOSE SERPL-MCNC: 145 MG/DL (ref 65–140)
GLUCOSE SERPL-MCNC: 179 MG/DL (ref 65–140)
HCT VFR BLD AUTO: 34 % (ref 36.5–49.3)
HGB BLD-MCNC: 11.1 G/DL (ref 12–17)
IMM GRANULOCYTES # BLD AUTO: 0.19 THOUSAND/UL (ref 0–0.2)
IMM GRANULOCYTES NFR BLD AUTO: 1 % (ref 0–2)
LYMPHOCYTES # BLD AUTO: 2.21 THOUSANDS/ÂΜL (ref 0.6–4.47)
LYMPHOCYTES NFR BLD AUTO: 14 % (ref 14–44)
MCH RBC QN AUTO: 33.9 PG (ref 26.8–34.3)
MCHC RBC AUTO-ENTMCNC: 32.6 G/DL (ref 31.4–37.4)
MCV RBC AUTO: 104 FL (ref 82–98)
MONOCYTES # BLD AUTO: 1.3 THOUSAND/ÂΜL (ref 0.17–1.22)
MONOCYTES NFR BLD AUTO: 8 % (ref 4–12)
NEUTROPHILS # BLD AUTO: 11.69 THOUSANDS/ÂΜL (ref 1.85–7.62)
NEUTS SEG NFR BLD AUTO: 77 % (ref 43–75)
NRBC BLD AUTO-RTO: 0 /100 WBCS
PLATELET # BLD AUTO: 235 THOUSANDS/UL (ref 149–390)
PMV BLD AUTO: 10.3 FL (ref 8.9–12.7)
POTASSIUM SERPL-SCNC: 4.2 MMOL/L (ref 3.5–5.3)
RBC # BLD AUTO: 3.27 MILLION/UL (ref 3.88–5.62)
SODIUM SERPL-SCNC: 135 MMOL/L (ref 135–147)
WBC # BLD AUTO: 15.42 THOUSAND/UL (ref 4.31–10.16)

## 2024-07-27 PROCEDURE — 99239 HOSP IP/OBS DSCHRG MGMT >30: CPT | Performed by: INTERNAL MEDICINE

## 2024-07-27 PROCEDURE — 94640 AIRWAY INHALATION TREATMENT: CPT

## 2024-07-27 PROCEDURE — 94760 N-INVAS EAR/PLS OXIMETRY 1: CPT

## 2024-07-27 PROCEDURE — 80048 BASIC METABOLIC PNL TOTAL CA: CPT | Performed by: INTERNAL MEDICINE

## 2024-07-27 PROCEDURE — 82948 REAGENT STRIP/BLOOD GLUCOSE: CPT

## 2024-07-27 PROCEDURE — 85025 COMPLETE CBC W/AUTO DIFF WBC: CPT | Performed by: INTERNAL MEDICINE

## 2024-07-27 RX ORDER — SODIUM CHLORIDE 9 MG/ML
50 INJECTION, SOLUTION INTRAVENOUS CONTINUOUS
Status: DISCONTINUED | OUTPATIENT
Start: 2024-07-27 | End: 2024-07-27 | Stop reason: HOSPADM

## 2024-07-27 RX ORDER — ALBUTEROL SULFATE 90 UG/1
2 AEROSOL, METERED RESPIRATORY (INHALATION) EVERY 4 HOURS PRN
Qty: 6.7 G | Refills: 0 | Status: SHIPPED | OUTPATIENT
Start: 2024-07-27 | End: 2024-08-26

## 2024-07-27 RX ORDER — PREDNISONE 20 MG/1
40 TABLET ORAL DAILY
Qty: 4 TABLET | Refills: 0 | Status: CANCELLED | OUTPATIENT
Start: 2024-07-27 | End: 2024-07-29

## 2024-07-27 RX ORDER — LEVALBUTEROL INHALATION SOLUTION 1.25 MG/3ML
1.25 SOLUTION RESPIRATORY (INHALATION)
Qty: 270 ML | Refills: 0 | Status: SHIPPED | OUTPATIENT
Start: 2024-07-27

## 2024-07-27 RX ADMIN — GUAIFENESIN 600 MG: 600 TABLET ORAL at 08:41

## 2024-07-27 RX ADMIN — PANTOPRAZOLE SODIUM 40 MG: 20 TABLET, DELAYED RELEASE ORAL at 05:17

## 2024-07-27 RX ADMIN — BENZONATATE 100 MG: 100 CAPSULE ORAL at 08:42

## 2024-07-27 RX ADMIN — LEVALBUTEROL HYDROCHLORIDE 1.25 MG: 1.25 SOLUTION RESPIRATORY (INHALATION) at 07:37

## 2024-07-27 RX ADMIN — HEPARIN SODIUM 5000 UNITS: 5000 INJECTION INTRAVENOUS; SUBCUTANEOUS at 05:17

## 2024-07-27 RX ADMIN — SODIUM CHLORIDE 50 ML/HR: 0.9 INJECTION, SOLUTION INTRAVENOUS at 08:42

## 2024-07-27 RX ADMIN — CARVEDILOL 6.25 MG: 6.25 TABLET, FILM COATED ORAL at 08:41

## 2024-07-27 RX ADMIN — INSULIN LISPRO 2 UNITS: 100 INJECTION, SOLUTION INTRAVENOUS; SUBCUTANEOUS at 12:21

## 2024-07-27 RX ADMIN — PREDNISONE 40 MG: 20 TABLET ORAL at 08:41

## 2024-07-27 RX ADMIN — ACETAMINOPHEN 650 MG: 325 TABLET, FILM COATED ORAL at 08:49

## 2024-07-27 RX ADMIN — IPRATROPIUM BROMIDE 0.5 MG: 0.5 SOLUTION RESPIRATORY (INHALATION) at 07:38

## 2024-07-27 NOTE — UTILIZATION REVIEW
Continued Stay Review    Admission: Date/Time/Statement: 7/25/24 1005 observation   Admission Orders (From admission, onward)       Ordered        07/25/24 1005  Place in Observation  Once                           Orders Placed This Encounter   Procedures    Place in Observation     Standing Status:   Standing     Number of Occurrences:   1     Order Specific Question:   Level of Care     Answer:   Med Surg [16]      Date: 7/27/24                          Current Patient Class: Observation  Current Level of Care: med surg    HPI:59 y.o. male initially admitted on 7/25/24 to observation  due to asthma exacerbation/DELFINO.   PMH of mild intermittent asthma, HFpEF, type 2 diabetes, hypertension, GERD, gout and arthritis.  Presented with wheezing, shortness of breath and cough starting 2 days prior to arrival.  Ran out of Breo and albuterol last month.  Smokes 1 cigar daily.   Given Duoneb ad Solumedrol with some improvement in ED.  Creatinine 2.30 and baseline of 1 - 1.1.  given gentle IVF with history of CHF.      7/26/24 observation: has continued cough.   Creatinine 2.19.   plan is IVF.  Continue Duonebs, Home Breo, transitioned to oral prednisone.  Monitor off antibiotics    Assessment/Plan:     7/27/2024 .  Patient presents with    On exam   Abnormal labs or imaging  Diagnosis/Plan    DELFINO/Asthma with acute exacerbation/history of CHF     Vital Signs (last 3 days)       Date/Time Temp Pulse Resp BP MAP (mmHg) SpO2 O2 Device Patient Position - Orthostatic VS Tony Coma Scale Score Pain    07/26/24 2300 -- -- -- -- -- -- -- -- 15 --    07/26/24 22:27:16 98 °F (36.7 °C) 80 -- 137/82 100 98 % -- -- -- --    07/26/24 1943 -- -- -- -- -- 97 % None (Room air) -- -- --    07/26/24 15:44:01 98.5 °F (36.9 °C) 80 16 131/78 96 96 % -- -- -- --    07/26/24 1430 -- -- -- -- -- 96 % None (Room air) -- -- --    07/26/24 1417 -- -- -- -- -- -- -- -- 15 --    07/26/24 1408 -- -- -- -- -- -- -- -- -- 5    07/26/24 7620 -- -- -- -- --  -- -- -- -- 5    07/26/24 0913 -- -- -- -- -- 97 % None (Room air) -- -- --    07/26/24 0800 -- -- -- -- -- -- None (Room air) -- 15 No Pain    07/26/24 0700 98.8 °F (37.1 °C) 79 18 155/80 110 98 % None (Room air) Sitting -- No Pain    07/25/24 2037 -- -- -- -- -- 94 % None (Room air) -- -- --    07/25/24 1809 98.5 °F (36.9 °C) 81 20 161/80 -- -- -- -- -- No Pain    07/25/24 1700 -- 81 20 161/80 111 97 % -- -- -- --    07/25/24 1526 98.5 °F (36.9 °C) 76 20 142/75 -- 98 % None (Room air) Sitting -- --    07/25/24 1430 -- 79 -- 128/62 85 97 % -- -- -- --    07/25/24 1426 -- -- -- -- -- 98 % None (Room air) -- -- --    07/25/24 1300 -- 76 20 136/74 99 96 % None (Room air) Sitting -- --    07/25/24 1130 -- 73 20 158/74 107 95 % None (Room air) Sitting -- --    07/25/24 0930 -- 70 20 107/62 79 100 % None (Room air) Sitting -- --    07/25/24 0850 -- -- -- -- -- -- -- -- 15 --    07/25/24 0845 -- -- -- -- -- -- None (Room air) -- -- --    07/25/24 0838 97.9 °F (36.6 °C) 74 20 135/80 103 98 % None (Room air) Sitting -- No Pain          Weight (last 2 days)       Date/Time Weight    07/25/24 1809 121 (266.76)    07/25/24 0838 121 (266.76)              Pertinent Labs/Diagnostic Results:   Radiology:  US kidney and bladder   Final Interpretation by Carson Alicia DO (07/25 1602)      Limited exam without evidence of hydronephrosis or other gross sonographic abnormalities.            Workstation performed: [id number]         XR chest 1 view portable   Final Interpretation by Mayra Gupta MD (07/25 1142)      No acute cardiopulmonary disease.            Resident: Mann Garrett I, the attending radiologist, have reviewed the images and agree with the final report above.      Workstation performed: [id number]           Cardiology:  No orders to display   Date/Time: 7/25/2024 4:10 PM   Indications / Diagnosis:  Shortness of breath   ECG reviewed by the ED Provider: yes    Patient location:  ED   Previous  ECG:     Previous ECG:  Compared to current   Comments:      EKG NSR without signs of ischemia, infarction, dysrhythmia   GI:  No orders to display       Results from last 7 days   Lab Units 07/25/24  1440   SARS-COV-2  Negative     Results from last 7 days   Lab Units 07/27/24  0419 07/26/24  0558 07/25/24  1440 07/25/24  0841   WBC Thousand/uL 15.42* 14.79*  --  9.59   HEMOGLOBIN g/dL 11.1* 11.6*  --  11.8*   HEMATOCRIT % 34.0* 35.2*  --  35.4*   PLATELETS Thousands/uL 235 235 248 228   TOTAL NEUT ABS Thousands/µL 11.69*  --   --  5.00         Results from last 7 days   Lab Units 07/27/24  0419 07/26/24  0558 07/25/24  0841   SODIUM mmol/L 135 132* 136   POTASSIUM mmol/L 4.2 5.4* 4.9   CHLORIDE mmol/L 105 103 103   CO2 mmol/L 26 23 27   ANION GAP mmol/L 4 6 6   BUN mg/dL 31* 30* 19   CREATININE mg/dL 1.55* 2.19* 2.30*   EGFR ml/min/1.73sq m 47 31 29   CALCIUM mg/dL 8.4 8.5 8.8   MAGNESIUM mg/dL  --  2.1 1.7*     Results from last 7 days   Lab Units 07/25/24  0841   AST U/L 18   ALT U/L 8   ALK PHOS U/L 74   TOTAL PROTEIN g/dL 7.1   ALBUMIN g/dL 3.8   TOTAL BILIRUBIN mg/dL 0.83     Results from last 7 days   Lab Units 07/26/24  2042 07/26/24  1543 07/26/24  1106 07/26/24  0742 07/25/24  2104 07/25/24  1617 07/25/24  1529 07/25/24  1329   POC GLUCOSE mg/dl 250* 275* 249* 212* 317* 236* 257* 169*     Results from last 7 days   Lab Units 07/27/24  0419 07/26/24  0558 07/25/24  0841   GLUCOSE RANDOM mg/dL 145* 217* 113     Results from last 7 days   Lab Units 07/25/24  1440   HEMOGLOBIN A1C % 5.7*   EAG mg/dl 117     BETA-HYDROXYBUTYRATE   Date Value Ref Range Status   07/06/2020 0.2 <0.6 mmol/L Final      Results from last 7 days   Lab Units 07/25/24  0841   HS TNI 0HR ng/L 5     Results from last 7 days   Lab Units 07/25/24  1440   INFLUENZA A PCR  Negative   INFLUENZA B PCR  Negative   RSV PCR  Negative         Medications:   Scheduled Medications:  carvedilol, 6.25 mg, Oral, BID With Meals  famotidine, 20 mg, Oral,  HS  fluticasone-vilanterol, 1 puff, Inhalation, Daily  guaiFENesin, 600 mg, Oral, Q12H ELIANE  heparin (porcine), 5,000 Units, Subcutaneous, Q8H ELIANE  insulin lispro, 2-12 Units, Subcutaneous, 4x Daily (AC & HS)  ipratropium, 0.5 mg, Nebulization, TID  levalbuterol, 1.25 mg, Nebulization, TID  pantoprazole, 40 mg, Oral, Daily Before Breakfast  predniSONE, 40 mg, Oral, Daily      Continuous IV Infusions:  sodium chloride, 50 mL/hr, Intravenous, Continuous      PRN Meds:  acetaminophen, 650 mg, Oral, Q6H PRN x 1 7/26.   albuterol, 2 puff, Inhalation, Q4H PRN  benzonatate, 100 mg, Oral, TID PRN x 1 7/25.  X 2 7/26  ondansetron, 4 mg, Intravenous, Q6H PRN        Discharge Plan:  to be determined.     Network Utilization Review Department  ATTENTION: Please call with any questions or concerns to [phone] and carefully listen to the prompts so that you are directed to the right person. All voicemails are confidential.   For Discharge needs, contact Care Management DC Support Team at [phone] opt. 2  Send all requests for admission clinical reviews, approved or denied determinations and any other requests to dedicated fax number below belonging to the campus where the patient is receiving treatment. List of dedicated fax numbers for the Facilities:  FACILITY NAME UR FAX NUMBER   ADMISSION DENIALS (Administrative/Medical Necessity) [phone]   DISCHARGE SUPPORT TEAM (NETWORK) [phone]   PARENT CHILD HEALTH (Maternity/NICU/Pediatrics) [phone]   [facility] [phone]   [facility] [phone]   [facility] [phone]   [facility] [phone]   [facility] [phone]   [facility] [phone]   [facility] [phone]   [facility] [phone]   [facility]  [phone]   [facility] [phone]   [facility] [phone]   [facility] [phone]

## 2024-07-27 NOTE — PLAN OF CARE
Problem: PAIN - ADULT  Goal: Verbalizes/displays adequate comfort level or baseline comfort level  Description: Interventions:  - Encourage patient to monitor pain and request assistance  - Assess pain using appropriate pain scale  - Administer analgesics based on type and severity of pain and evaluate response  - Implement non-pharmacological measures as appropriate and evaluate response  - Consider cultural and social influences on pain and pain management  - Notify physician/advanced practitioner if interventions unsuccessful or patient reports new pain  7/27/2024 0315 by Sadia Valderrama RN  Outcome: Progressing  7/27/2024 0315 by Sadia Valderrama RN  Outcome: Progressing     Problem: INFECTION - ADULT  Goal: Absence or prevention of progression during hospitalization  Description: INTERVENTIONS:  - Assess and monitor for signs and symptoms of infection  - Monitor lab/diagnostic results  - Monitor all insertion sites, i.e. indwelling lines, tubes, and drains  - Monitor endotracheal if appropriate and nasal secretions for changes in amount and color  - [place] appropriate cooling/warming therapies per order  - Administer medications as ordered  - Instruct and encourage patient and family to use good hand hygiene technique  - Identify and instruct in appropriate isolation precautions for identified infection/condition  7/27/2024 0315 by Sadia Valderrama RN  Outcome: Progressing  7/27/2024 0315 by Sadia Valderrama RN  Outcome: Progressing  Goal: Absence of fever/infection during neutropenic period  Description: INTERVENTIONS:  - Monitor WBC    7/27/2024 0315 by Sadia Valderrama RN  Outcome: Progressing  7/27/2024 0315 by Sadia Valderrama RN  Outcome: Progressing     Problem: SAFETY ADULT  Goal: Patient will remain free of falls  Description: INTERVENTIONS:  - Educate patient/family on patient safety including physical limitations  - Instruct patient to call for assistance with activity   - Consult OT/PT to assist with  strengthening/mobility   - Keep Call bell within reach  - Keep bed low and locked with side rails adjusted as appropriate  - Keep care items and personal belongings within reach  - Initiate and maintain comfort rounds  - Make Fall Risk Sign visible to staff  - Offer Toileting every  Hours, in advance of need  - Initiate/Maintain alarm  - Obtain necessary fall risk management equipment:   - Apply yellow socks and bracelet for high fall risk patients  - Consider moving patient to room near nurses station  7/27/2024 0315 by Sadia Valderrama RN  Outcome: Progressing  7/27/2024 0315 by Sadia Valderrama RN  Outcome: Progressing  Goal: Maintain or return to baseline ADL function  Description: INTERVENTIONS:  -  Assess patient's ability to carry out ADLs; assess patient's baseline for ADL function and identify physical deficits which impact ability to perform ADLs (bathing, care of mouth/teeth, toileting, grooming, dressing, etc.)  - Assess/evaluate cause of self-care deficits   - Assess range of motion  - Assess patient's mobility; develop plan if impaired  - Assess patient's need for assistive devices and provide as appropriate  - Encourage maximum independence but intervene and supervise when necessary  - Involve family in performance of ADLs  - Assess for home care needs following discharge   - Consider OT consult to assist with ADL evaluation and planning for discharge  - Provide patient education as appropriate  7/27/2024 0315 by Sadia Valderrama RN  Outcome: Progressing  7/27/2024 0315 by Sadia Valderrama RN  Outcome: Progressing  Goal: Maintains/Returns to pre admission functional level  Description: INTERVENTIONS:  - Perform AM-PAC 6 Click Basic Mobility/ Daily Activity assessment daily.  - Set and communicate daily mobility goal to care team and patient/family/caregiver.   - Collaborate with rehabilitation services on mobility goals if consulted  - Perform Range of Motion  times a day.  - Reposition patient every  hours.  -  Dangle patient  times a day  - Stand patient  times a day  - Ambulate patient  times a day  - Out of bed to chair  times a day   - Out of bed for meals  times a day  - Out of bed for toileting  - Record patient progress and toleration of activity level   7/27/2024 0315 by Sadia Valderrama RN  Outcome: Progressing  7/27/2024 0315 by Sadia Valderrama RN  Outcome: Progressing     Problem: SAFETY ADULT  Goal: Maintain or return to baseline ADL function  Description: INTERVENTIONS:  -  Assess patient's ability to carry out ADLs; assess patient's baseline for ADL function and identify physical deficits which impact ability to perform ADLs (bathing, care of mouth/teeth, toileting, grooming, dressing, etc.)  - Assess/evaluate cause of self-care deficits   - Assess range of motion  - Assess patient's mobility; develop plan if impaired  - Assess patient's need for assistive devices and provide as appropriate  - Encourage maximum independence but intervene and supervise when necessary  - Involve family in performance of ADLs  - Assess for home care needs following discharge   - Consider OT consult to assist with ADL evaluation and planning for discharge  - Provide patient education as appropriate  7/27/2024 0315 by Sadia Valderrama RN  Outcome: Progressing  7/27/2024 0315 by Sadia Valderrama RN  Outcome: Progressing     Problem: SAFETY ADULT  Goal: Maintains/Returns to pre admission functional level  Description: INTERVENTIONS:  - Perform AM-PAC 6 Click Basic Mobility/ Daily Activity assessment daily.  - Set and communicate daily mobility goal to care team and patient/family/caregiver.   - Collaborate with rehabilitation services on mobility goals if consulted  - Perform Range of Motion  times a day.  - Reposition patient every  hours.  - Dangle patient  times a day  - Stand patient  times a day  - Ambulate patient  times a day  - Out of bed to chair  times a day   - Out of bed for meals  times a day  - Out of bed for toileting  - Record  patient progress and toleration of activity level   7/27/2024 0315 by Sadia Valderrama RN  Outcome: Progressing  7/27/2024 0315 by Sadia Valderrama RN  Outcome: Progressing     Problem: Knowledge Deficit  Goal: Patient/family/caregiver demonstrates understanding of disease process, treatment plan, medications, and discharge instructions  Description: Complete learning assessment and assess knowledge base.  Interventions:  - Provide teaching at level of understanding  - Provide teaching via preferred learning methods  7/27/2024 0315 by Sadia Valderrama RN  Outcome: Progressing  7/27/2024 0315 by Sadia Valderrama RN  Outcome: Progressing

## 2024-07-27 NOTE — CASE MANAGEMENT
Case Management Progress Note    Patient name Elly Chong  Location S /S -01 MRN [id number]  : 1965 Date 2024       LOS (days): 0  Geometric Mean LOS (GMLOS) (days):   Days to GMLOS:        OBJECTIVE:        Current admission status: Observation  Preferred Pharmacy:   [facility]/pharmacy #[id number] - [place] - [address]  [address] [place] [address]  Phone: [phone] Fax: [phone]    [facility] - [place], [place] - [address]  [address]  [address]  [facility] [address]  Phone: [phone] Fax: [phone]    Primary Care Provider: Linden Roque MD    Primary Insurance: [employer]  Secondary Insurance:     PROGRESS NOTE:  CM was notified by [facility] that patient needs a nebulizer and is written for discharge. CM [facility] [place] [facility].  [facility] responded that order was approved and CM could deliver from the hospital stock room.  CM delivered nebulizer to patient at bedside.

## 2024-07-29 ENCOUNTER — TRANSITIONAL CARE MANAGEMENT (OUTPATIENT)
Dept: FAMILY MEDICINE CLINIC | Facility: CLINIC | Age: 59
End: 2024-07-29

## 2024-07-30 LAB
DME PARACHUTE DELIVERY DATE ACTUAL: NORMAL
DME PARACHUTE DELIVERY DATE REQUESTED: NORMAL
DME PARACHUTE DELIVERY NOTE: NORMAL
DME PARACHUTE ITEM DESCRIPTION: NORMAL
DME PARACHUTE ORDER STATUS: NORMAL
DME PARACHUTE SUPPLIER NAME: NORMAL
DME PARACHUTE SUPPLIER PHONE: NORMAL

## 2024-08-06 ENCOUNTER — OFFICE VISIT (OUTPATIENT)
Dept: FAMILY MEDICINE CLINIC | Facility: CLINIC | Age: 59
End: 2024-08-06
Payer: COMMERCIAL

## 2024-08-06 VITALS
BODY MASS INDEX: 42.1 KG/M2 | WEIGHT: 268.25 LBS | DIASTOLIC BLOOD PRESSURE: 80 MMHG | SYSTOLIC BLOOD PRESSURE: 130 MMHG | HEIGHT: 67 IN | RESPIRATION RATE: 18 BRPM | TEMPERATURE: 98.4 F | HEART RATE: 90 BPM

## 2024-08-06 DIAGNOSIS — M25.50 ARTHRALGIA, UNSPECIFIED JOINT: ICD-10-CM

## 2024-08-06 DIAGNOSIS — I50.31 ACUTE DIASTOLIC CONGESTIVE HEART FAILURE (HCC): ICD-10-CM

## 2024-08-06 DIAGNOSIS — J45.901 MILD ASTHMA WITH ACUTE EXACERBATION, UNSPECIFIED WHETHER PERSISTENT: ICD-10-CM

## 2024-08-06 DIAGNOSIS — E11.65 UNCONTROLLED TYPE 2 DIABETES MELLITUS WITH HYPERGLYCEMIA (HCC): ICD-10-CM

## 2024-08-06 DIAGNOSIS — I10 ESSENTIAL HYPERTENSION: Primary | ICD-10-CM

## 2024-08-06 PROCEDURE — 99495 TRANSJ CARE MGMT MOD F2F 14D: CPT | Performed by: FAMILY MEDICINE

## 2024-08-06 NOTE — PROGRESS NOTES
FAMILY PRACTICE OFFICE VISIT       NAME: Elly Chong  AGE: 59 y.o. SEX: male       : 1965        MRN: [id number]    DATE: 2024  TIME: 6:26 AM    Assessment and Plan     Problem List Items Addressed This Visit       Essential hypertension - Primary     Hypertension.  The patient's blood pressure is stable at this time and he will continue current regimen of medications         Relevant Orders    Basic metabolic panel    Uncontrolled type 2 diabetes mellitus with hyperglycemia (HCC)     Diabetes.  Latest A1c was very stable at 5.7.  Patient will continue with current regimen of medications  Lab Results   Component Value Date    HGBA1C 5.7 (H) 2024            CHF (congestive heart failure) (HCC)     Wt Readings from Last 3 Encounters:   24 122 kg (268 lb 4 oz)   24 121 kg (266 lb 12.1 oz)   24 120 kg (265 lb)     Congestive heart failure.  Patient with a history of CHF however appears to be euvolemic at this time.  He will continue current regimen of medications and follow-up with cardiology as ordered               Arthralgia     Arthralgia.  Patient with history of chronic intermittent arthralgias from degenerative joint disease.  He was given paperwork to obtain handicap placard         Mild asthma with acute exacerbation     Asthma.  Symptoms are well-controlled at this time on current inhalers prescribed          TCM Call       Date and time call was made  2024  9:48 AM    Hospital care reviewed  Records reviewed    Patient was hospitialized at  [facility]    Date of Admission  24    Date of discharge  24    Diagnosis  mild asthma with acute exacerbation    Disposition  Home    Were the patients medications reviewed and updated  Yes    Current Symptoms  None          TCM Call       Post hospital issues  None    Should patient be enrolled in anticoag monitoring?  No    Scheduled for follow up?  Yes    Did you obtain your prescribed medications  Yes     Do you need help managing your prescriptions or medications  No    Is transportation to your appointment needed  No    I have advised the patient to call PCP with any new or worsening symptoms  yobany norman ma    Living Arrangements  Alone    Support System  Spouse    Are you recieving any outpatient services  No    Are you recieving home care services  No    Are you using any community resources  No    Current waiver services  No    Have you fallen in the last 12 months  No    Interperter language line needed  No    Counseling  Patient    Counseling topics  Importance of RX compliance    Comments  schedule 8/6/2024 dr vale 3.20          Hospital Course:   59-year-old male past medical history of mild intermittent asthma, HFpEF, type 2 diabetes mellitus, hypertension, GERD presented with shortness of breath and wheezing for the past 2 days.  He follows pulmonary for reactive airway disease but has been taking albuterol and Breo.  Ran out of medications 2 weeks ago.     Symptoms improved dramatically with DuoNeb's.  Currently saturating on room air in the mid 90s.  Close counseled on medication compliance.    Upon further discussion with the patient, it was revealed that he does not have his own nebulizer but has been using his wife's.     Incidentally discovered elevated creatinine on lab work.  Was kept for further evaluation of DELFINO.  Creatinine has continued to trend down with IVF.     On 7/27 he was medically stable for discharge.  Recommend he follow-up outpatient with pulmonology and PCP.      Chief Complaint     Chief Complaint   Patient presents with    Transition of Care Management       History of Present Illness     I reviewed the patient's discharge summary from his latest hospitalization.  He states he is feeling much better after his hospital stay and denies any significant coughing or shortness of breath at this time.  Patient did request a handicap parking placard to be able to park closer to  his workplace due to his intermittent shortness of breath from asthma as well as his chronic arthralgias from degenerative joint disease.        Review of Systems   Review of Systems   Constitutional:  Positive for fatigue.   HENT: Negative.     Eyes: Negative.    Respiratory:  Positive for shortness of breath.    Cardiovascular: Negative.    Gastrointestinal: Negative.    Genitourinary: Negative.    Musculoskeletal:  Positive for arthralgias and gait problem.   Skin: Negative.    Psychiatric/Behavioral: Negative.         Active Problem List     Patient Active Problem List   Diagnosis    Essential hypertension    Uncontrolled type 2 diabetes mellitus with hyperglycemia ([facility])    Elevated lipoprotein(a)    Microalbuminuria    Obesity    Medical non-compliance    Hypomagnesemia    CHF (congestive heart failure) ([facility])    Alcohol abuse    DELFINO (acute kidney injury) ([facility])    Onychomycosis    Arthritis of right wrist    Acute on chronic diastolic congestive heart failure ([facility])    Impingement syndrome of left shoulder    Diabetes mellitus ([facility])    Acute gout of right hand    Acute midline low back pain without sciatica    Reactive airway disease without complication    (HFpEF) heart failure with preserved ejection fraction ([facility])    AMADNO (obstructive sleep apnea)    Arthralgia    Intermittent asthma    Mild asthma with acute exacerbation    Current smoker       Past Medical History:  Past Medical History:   Diagnosis Date    Acute kidney injury ([facility]) 09/05/2020    Asthma     CHF (congestive heart failure) ([facility])     Colon polyp     Diabetes mellitus ([facility])     GERD (gastroesophageal reflux disease)     Hypertension     Inguinal hernia     3/25/15    Onychomycosis     5/24/17    Sleep apnea     Type 2 diabetes mellitus ([facility]) 05/01/2012       Past Surgical History:  Past Surgical History:   Procedure Laterality Date    ARTHROSCOPY KNEE      with Medial and Lateral Meniscus Repair    HERNIA REPAIR      umbilical and inguinal hernia  repairs (left)       Family History:  Family History   Problem Relation Age of Onset    Hypertension Mother         essential    Chronic bronchitis Father     Prostate cancer Father     Colon cancer Father     Breast cancer Sister        Social History:  Social History     Socioeconomic History    Marital status: /Civil Union     Spouse name: Not on file    Number of children: Not on file    Years of education: Not on file    Highest education level: Not on file   Occupational History    Occupation: [employer]    Tobacco Use    Smoking status: Former     Types: Cigars    Smokeless tobacco: Never    Tobacco comments:     current every day smoker, per [employer]   Vaping Use    Vaping status: Never Used   Substance and Sexual Activity    Alcohol use: Yes     Alcohol/week: 3.0 standard drinks of alcohol     Types: 2 Cans of beer, 1 Standard drinks or equivalent per week     Comment: weekend: 1 glass of christian or 2 beers    Drug use: No    Sexual activity: Yes   Other Topics Concern    Not on file   Social History Narrative    Alcohol dependence    Nicotine dependence    Denied, alcohol Use    Marital problems     Social Determinants of Health     Financial Resource Strain: Not on file   Food Insecurity: Not on file   Transportation Needs: Not on file   Physical Activity: Not on file   Stress: Not on file   Social Connections: Not on file   Intimate Partner Violence: Not on file   Housing Stability: Not on file       Objective     Vitals:    08/06/24 1512   BP: 130/80   Pulse: 90   Resp: 18   Temp: 98.4 °F (36.9 °C)     Wt Readings from Last 3 Encounters:   08/06/24 122 kg (268 lb 4 oz)   07/25/24 121 kg (266 lb 12.1 oz)   04/23/24 120 kg (265 lb)       Physical Exam  Constitutional:       General: He is not in acute distress.     Appearance: Normal appearance. He is not ill-appearing.   HENT:      Head: Normocephalic and atraumatic.      Right Ear: Tympanic membrane, ear canal and external ear normal.  There is no impacted cerumen.      Left Ear: Tympanic membrane, ear canal and external ear normal. There is no impacted cerumen.   Eyes:      General:         Right eye: No discharge.         Left eye: No discharge.      Extraocular Movements: Extraocular movements intact.      Conjunctiva/sclera: Conjunctivae normal.      Pupils: Pupils are equal, round, and reactive to light.   Neck:      Vascular: No carotid bruit.   Cardiovascular:      Rate and Rhythm: Normal rate and regular rhythm.      Heart sounds: Normal heart sounds. No murmur heard.  Pulmonary:      Effort: Pulmonary effort is normal.      Breath sounds: Normal breath sounds. No wheezing, rhonchi or rales.   Abdominal:      General: Abdomen is flat. Bowel sounds are normal. There is no distension.      Palpations: Abdomen is soft.      Tenderness: There is no abdominal tenderness. There is no guarding or rebound.   Musculoskeletal:      Right lower leg: No edema.      Left lower leg: No edema.   Lymphadenopathy:      Cervical: No cervical adenopathy.   Skin:     Findings: No rash.   Neurological:      General: No focal deficit present.      Mental Status: He is alert and oriented to person, place, and time.      Cranial Nerves: No cranial nerve deficit.   Psychiatric:         Mood and Affect: Mood normal.         Behavior: Behavior normal.         Thought Content: Thought content normal.         Judgment: Judgment normal.         Pertinent Laboratory/Diagnostic Studies:  Lab Results   Component Value Date    GLUCOSE 179 (H) 09/04/2020    BUN 31 (H) 07/27/2024    CREATININE 1.55 (H) 07/27/2024    CALCIUM 8.4 07/27/2024     12/21/2017    K 4.2 07/27/2024    CO2 26 07/27/2024     07/27/2024     Lab Results   Component Value Date    ALT 8 07/25/2024    AST 18 07/25/2024    ALKPHOS 74 07/25/2024    BILITOT 1.3 (H) 12/21/2017       Lab Results   Component Value Date    WBC 15.42 (H) 07/27/2024    HGB 11.1 (L) 07/27/2024    HCT 34.0 (L) 07/27/2024      (H) 07/27/2024     07/27/2024       Lab Results   Component Value Date    TSH 3.58 03/14/2022       Lab Results   Component Value Date    CHOL 153 12/21/2017     Lab Results   Component Value Date    TRIG 69 11/21/2023     Lab Results   Component Value Date    HDL 36 (L) 11/21/2023     Lab Results   Component Value Date    LDLCALC 70 11/21/2023     Lab Results   Component Value Date    HGBA1C 5.7 (H) 07/25/2024       Results for orders placed or performed during the hospital encounter of 07/25/24   Adult Nebulizer Package   Result Value Ref Range    Supplier Name [employer]     Supplier Phone Number [phone]     Order Status Delivery Successful     Delivery Note please let me know if i can give from closet     Delivery Request Date 07/27/2024     Date Delivered  07/27/2024     Item Description Nebulizer Compressor with Mouthpiece Only     Item Description Nebulizer Set, Reusable     Item Description Mouthpiece Only, N/A    COVID/FLU/RSV    Specimen: Nose; Nares   Result Value Ref Range    SARS-CoV-2 Negative Negative    INFLUENZA A PCR Negative Negative    INFLUENZA B PCR Negative Negative    RSV PCR Negative Negative   CBC and differential   Result Value Ref Range    WBC 9.59 4.31 - 10.16 Thousand/uL    RBC 3.41 (L) 3.88 - 5.62 Million/uL    Hemoglobin 11.8 (L) 12.0 - 17.0 g/dL    Hematocrit 35.4 (L) 36.5 - 49.3 %     (H) 82 - 98 fL    MCH 34.6 (H) 26.8 - 34.3 pg    MCHC 33.3 31.4 - 37.4 g/dL    RDW 13.6 11.6 - 15.1 %    MPV 9.7 8.9 - 12.7 fL    Platelets 228 149 - 390 Thousands/uL    nRBC 0 /100 WBCs    Segmented % 52 43 - 75 %    Immature Grans % 0 0 - 2 %    Lymphocytes % 26 14 - 44 %    Monocytes % 9 4 - 12 %    Eosinophils Relative 13 (H) 0 - 6 %    Basophils Relative 0 0 - 1 %    Absolute Neutrophils 5.00 1.85 - 7.62 Thousands/µL    Absolute Immature Grans 0.03 0.00 - 0.20 Thousand/uL    Absolute Lymphocytes 2.45 0.60 - 4.47 Thousands/µL    Absolute  "Monocytes 0.85 0.17 - 1.22 Thousand/µL    Eosinophils Absolute 1.23 (H) 0.00 - 0.61 Thousand/µL    Basophils Absolute 0.03 0.00 - 0.10 Thousands/µL   Comprehensive metabolic panel   Result Value Ref Range    Sodium 136 135 - 147 mmol/L    Potassium 4.9 3.5 - 5.3 mmol/L    Chloride 103 96 - 108 mmol/L    CO2 27 21 - 32 mmol/L    ANION GAP 6 4 - 13 mmol/L    BUN 19 5 - 25 mg/dL    Creatinine 2.30 (H) 0.60 - 1.30 mg/dL    Glucose 113 65 - 140 mg/dL    Calcium 8.8 8.4 - 10.2 mg/dL    AST 18 13 - 39 U/L    ALT 8 7 - 52 U/L    Alkaline Phosphatase 74 34 - 104 U/L    Total Protein 7.1 6.4 - 8.4 g/dL    Albumin 3.8 3.5 - 5.0 g/dL    Total Bilirubin 0.83 0.20 - 1.00 mg/dL    eGFR 29 ml/min/1.73sq m   HS Troponin 0hr (reflex protocol)   Result Value Ref Range    hs TnI 0hr 5 \"Refer to ACS Flowchart\"- see link ng/L   Magnesium   Result Value Ref Range    Magnesium 1.7 (L) 1.9 - 2.7 mg/dL   Platelet count   Result Value Ref Range    Platelets 248 149 - 390 Thousands/uL    MPV 10.0 8.9 - 12.7 fL   Hemoglobin A1c w/EAG Estimation (Prechecked if no A1C within 90 days)   Result Value Ref Range    Hemoglobin A1C 5.7 (H) Normal 4.0-5.6%; PreDiabetic 5.7-6.4%; Diabetic >=6.5%; Glycemic control for adults with diabetes <7.0% %     mg/dl   Basic metabolic panel   Result Value Ref Range    Sodium 132 (L) 135 - 147 mmol/L    Potassium 5.4 (H) 3.5 - 5.3 mmol/L    Chloride 103 96 - 108 mmol/L    CO2 23 21 - 32 mmol/L    ANION GAP 6 4 - 13 mmol/L    BUN 30 (H) 5 - 25 mg/dL    Creatinine 2.19 (H) 0.60 - 1.30 mg/dL    Glucose 217 (H) 65 - 140 mg/dL    Calcium 8.5 8.4 - 10.2 mg/dL    eGFR 31 ml/min/1.73sq m   Magnesium   Result Value Ref Range    Magnesium 2.1 1.9 - 2.7 mg/dL   CBC (With Platelets)   Result Value Ref Range    WBC 14.79 (H) 4.31 - 10.16 Thousand/uL    RBC 3.40 (L) 3.88 - 5.62 Million/uL    Hemoglobin 11.6 (L) 12.0 - 17.0 g/dL    Hematocrit 35.2 (L) 36.5 - 49.3 %     (H) 82 - 98 fL    MCH 34.1 26.8 - 34.3 pg    MCHC " 33.0 31.4 - 37.4 g/dL    RDW 13.2 11.6 - 15.1 %    Platelets 235 149 - 390 Thousands/uL    MPV 9.9 8.9 - 12.7 fL   Basic metabolic panel   Result Value Ref Range    Sodium 135 135 - 147 mmol/L    Potassium 4.2 3.5 - 5.3 mmol/L    Chloride 105 96 - 108 mmol/L    CO2 26 21 - 32 mmol/L    ANION GAP 4 4 - 13 mmol/L    BUN 31 (H) 5 - 25 mg/dL    Creatinine 1.55 (H) 0.60 - 1.30 mg/dL    Glucose 145 (H) 65 - 140 mg/dL    Glucose, Fasting 145 (H) 65 - 99 mg/dL    Calcium 8.4 8.4 - 10.2 mg/dL    eGFR 47 ml/min/1.73sq m   CBC and differential   Result Value Ref Range    WBC 15.42 (H) 4.31 - 10.16 Thousand/uL    RBC 3.27 (L) 3.88 - 5.62 Million/uL    Hemoglobin 11.1 (L) 12.0 - 17.0 g/dL    Hematocrit 34.0 (L) 36.5 - 49.3 %     (H) 82 - 98 fL    MCH 33.9 26.8 - 34.3 pg    MCHC 32.6 31.4 - 37.4 g/dL    RDW 13.2 11.6 - 15.1 %    MPV 10.3 8.9 - 12.7 fL    Platelets 235 149 - 390 Thousands/uL    nRBC 0 /100 WBCs    Segmented % 77 (H) 43 - 75 %    Immature Grans % 1 0 - 2 %    Lymphocytes % 14 14 - 44 %    Monocytes % 8 4 - 12 %    Eosinophils Relative 0 0 - 6 %    Basophils Relative 0 0 - 1 %    Absolute Neutrophils 11.69 (H) 1.85 - 7.62 Thousands/µL    Absolute Immature Grans 0.19 0.00 - 0.20 Thousand/uL    Absolute Lymphocytes 2.21 0.60 - 4.47 Thousands/µL    Absolute Monocytes 1.30 (H) 0.17 - 1.22 Thousand/µL    Eosinophils Absolute 0.00 0.00 - 0.61 Thousand/µL    Basophils Absolute 0.03 0.00 - 0.10 Thousands/µL   Fingerstick Glucose (POCT)   Result Value Ref Range    POC Glucose 169 (H) 65 - 140 mg/dl   Fingerstick Glucose (POCT)   Result Value Ref Range    POC Glucose 257 (H) 65 - 140 mg/dl   Fingerstick Glucose (POCT)   Result Value Ref Range    POC Glucose 236 (H) 65 - 140 mg/dl   Fingerstick Glucose (POCT)   Result Value Ref Range    POC Glucose 317 (H) 65 - 140 mg/dl   Fingerstick Glucose (POCT)   Result Value Ref Range    POC Glucose 212 (H) 65 - 140 mg/dl   Fingerstick Glucose (POCT)   Result Value Ref Range     POC Glucose 249 (H) 65 - 140 mg/dl   Fingerstick Glucose (POCT)   Result Value Ref Range    POC Glucose 275 (H) 65 - 140 mg/dl   Fingerstick Glucose (POCT)   Result Value Ref Range    POC Glucose 250 (H) 65 - 140 mg/dl   Fingerstick Glucose (POCT)   Result Value Ref Range    POC Glucose 126 65 - 140 mg/dl   Fingerstick Glucose (POCT)   Result Value Ref Range    POC Glucose 179 (H) 65 - 140 mg/dl       Orders Placed This Encounter   Procedures    Basic metabolic panel       ALLERGIES:  No Known Allergies    Current Medications     Current Outpatient Medications   Medication Sig Dispense Refill    albuterol (PROVENTIL HFA,VENTOLIN HFA) 90 mcg/act inhaler Inhale 1 puff in the morning 18 g 5    albuterol (PROVENTIL HFA,VENTOLIN HFA) 90 mcg/act inhaler Inhale 2 puffs every 4 (four) hours as needed for wheezing 6.7 g 0    Blood Pressure KIT Twice a day periodically 1 each 0    carvedilol (COREG) 6.25 mg tablet Take 1 tablet (6.25 mg total) by mouth 2 (two) times a day with meals 180 tablet 5    diclofenac sodium (VOLTAREN) 50 mg EC tablet Sig: Take 1 tablet twice a day after food as needed for joint pain 180 tablet 1    famotidine (PEPCID) 20 mg tablet Take 1 tablet (20 mg total) by mouth daily at bedtime 90 tablet 1    fluticasone-vilanterol (Breo Ellipta) 200-25 mcg/actuation inhaler Inhale 1 puff daily Rinse mouth after use. 180 blister 1    glimepiride (AMARYL) 1 mg tablet TAKE 1 TABLET BY MOUTH TWICE A  tablet 1    glucose blood test strip 1 each by Other route daily 180 each 5    ipratropium (ATROVENT) 0.02 % nebulizer solution Take 2.5 mL (0.5 mg total) by nebulization 3 (three) times a day 225 mL 0    Lancets (ONETOUCH ULTRASOFT) lancets by Does not apply route      levalbuterol (XOPENEX) 1.25 mg/3 mL nebulizer solution Take 3 mL (1.25 mg total) by nebulization 3 (three) times a day 270 mL 0    sitaGLIPtin (Januvia) 100 mg tablet Take 1 tablet (100 mg total) by mouth daily (Patient taking differently: Take  730 mg by mouth daily) 90 tablet 1    spironolactone (ALDACTONE) 25 mg tablet Take 1 tablet (25 mg total) by mouth daily 90 tablet 1    torsemide (DEMADEX) 20 mg tablet TAKE 2 TABS ONCE DAILY IN  tablet 2    LORazepam (ATIVAN) 1 mg tablet Take 1 tablet by mouth daily as needed (Patient not taking: Reported on 7/25/2024)      methocarbamol (ROBAXIN) 500 mg tablet Take 1 tablet (500 mg total) by mouth 2 (two) times a day (Patient not taking: Reported on 7/25/2024) 20 tablet 0    pantoprazole (PROTONIX) 40 mg tablet Take 1 tablet (40 mg total) by mouth daily before breakfast (Patient not taking: Reported on 7/25/2024) 90 tablet 3     No current facility-administered medications for this visit.         Health Maintenance     Health Maintenance   Topic Date Due    Pneumococcal Vaccine: Pediatrics (0 to 5 Years) and At-Risk Patients (6 to 64 Years) (1 of 2 - PCV) Never done    Hepatitis A Vaccine (1 of 2 - Risk 2-dose series) Never done    Zoster Vaccine (1 of 2) Never done    COVID-19 Vaccine (4 - 2023-24 season) 09/01/2023    Annual Physical  12/22/2023    RSV Vaccine Age 60+ Years (1 - 1-dose 60+ series) Never done    Influenza Vaccine (1) 09/01/2024    Diabetic Foot Exam  10/02/2024    DM Eye Exam  10/09/2024    Depression Screening  01/04/2025    Kidney Health Evaluation: Albumin/Creatinine Ratio  11/29/2024    HEMOGLOBIN A1C  01/25/2025    Kidney Health Evaluation: GFR  07/27/2025    DTaP,Tdap,and Td Vaccines (3 - Td or Tdap) 09/05/2030    Colorectal Cancer Screening  01/17/2031    HIV Screening  Completed    Hepatitis C Screening  Completed    RSV Vaccine age 0-20 Months  Aged Out    HIB Vaccine  Aged Out    IPV Vaccine  Aged Out    Meningococcal ACWY Vaccine  Aged Out    HPV Vaccine  Aged Out     Immunization History   Administered Date(s) Administered    COVID-19 PFIZER VACCINE 0.3 ML IM 04/12/2021, 05/03/2021, 12/15/2021    INFLUENZA 09/23/2015, 10/21/2019, 12/22/2022, 10/31/2023    Influenza, injectable,  quadrivalent, preservative free 0.5 mL 10/27/2020, 10/31/2023    Influenza, recombinant, quadrivalent,injectable, preservative free 10/21/2019, 12/22/2022    Influenza, seasonal, injectable 09/23/2015    Tdap 10/21/2019, 09/05/2020       Linden Roque MD

## 2024-08-08 NOTE — ASSESSMENT & PLAN NOTE
Wt Readings from Last 3 Encounters:   08/06/24 122 kg (268 lb 4 oz)   07/25/24 121 kg (266 lb 12.1 oz)   04/23/24 120 kg (265 lb)     Congestive heart failure.  Patient with a history of CHF however appears to be euvolemic at this time.  He will continue current regimen of medications and follow-up with cardiology as ordered

## 2024-08-08 NOTE — ASSESSMENT & PLAN NOTE
Diabetes.  Latest A1c was very stable at 5.7.  Patient will continue with current regimen of medications  Lab Results   Component Value Date    HGBA1C 5.7 (H) 07/25/2024

## 2024-08-08 NOTE — ASSESSMENT & PLAN NOTE
Arthralgia.  Patient with history of chronic intermittent arthralgias from degenerative joint disease.  He was given paperwork to obtain handicap placard

## 2024-08-31 ENCOUNTER — APPOINTMENT (EMERGENCY)
Dept: RADIOLOGY | Facility: HOSPITAL | Age: 59
End: 2024-08-31
Payer: COMMERCIAL

## 2024-08-31 ENCOUNTER — HOSPITAL ENCOUNTER (EMERGENCY)
Facility: HOSPITAL | Age: 59
Discharge: HOME | End: 2024-08-31
Attending: EMERGENCY MEDICINE | Admitting: EMERGENCY MEDICINE
Payer: COMMERCIAL

## 2024-08-31 VITALS
BODY MASS INDEX: 41.72 KG/M2 | DIASTOLIC BLOOD PRESSURE: 74 MMHG | OXYGEN SATURATION: 99 % | RESPIRATION RATE: 20 BRPM | HEART RATE: 79 BPM | SYSTOLIC BLOOD PRESSURE: 143 MMHG | TEMPERATURE: 98.4 F | WEIGHT: 268.96 LBS

## 2024-08-31 DIAGNOSIS — J44.1 COPD WITH ACUTE EXACERBATION (HCC): ICD-10-CM

## 2024-08-31 DIAGNOSIS — J40 BRONCHITIS: Primary | ICD-10-CM

## 2024-08-31 DIAGNOSIS — J45.41 MODERATE PERSISTENT ASTHMA WITH EXACERBATION: ICD-10-CM

## 2024-08-31 PROCEDURE — 99285 EMERGENCY DEPT VISIT HI MDM: CPT

## 2024-08-31 PROCEDURE — 71046 X-RAY EXAM CHEST 2 VIEWS: CPT

## 2024-08-31 PROCEDURE — 94640 AIRWAY INHALATION TREATMENT: CPT

## 2024-08-31 PROCEDURE — 93005 ELECTROCARDIOGRAM TRACING: CPT

## 2024-08-31 PROCEDURE — 96365 THER/PROPH/DIAG IV INF INIT: CPT

## 2024-08-31 PROCEDURE — 99284 EMERGENCY DEPT VISIT MOD MDM: CPT | Performed by: EMERGENCY MEDICINE

## 2024-08-31 RX ORDER — PREDNISONE 10 MG/1
TABLET ORAL
Qty: 42 TABLET | Refills: 0 | Status: SHIPPED | OUTPATIENT
Start: 2024-08-31

## 2024-08-31 RX ORDER — DOXYCYCLINE 100 MG/1
100 CAPSULE ORAL 2 TIMES DAILY
Qty: 20 CAPSULE | Refills: 0 | Status: SHIPPED | OUTPATIENT
Start: 2024-08-31 | End: 2024-09-10

## 2024-08-31 RX ORDER — ALBUTEROL SULFATE 0.83 MG/ML
2.5 SOLUTION RESPIRATORY (INHALATION) ONCE
Status: COMPLETED | OUTPATIENT
Start: 2024-08-31 | End: 2024-08-31

## 2024-08-31 RX ORDER — ALBUTEROL SULFATE 5 MG/ML
5 SOLUTION RESPIRATORY (INHALATION) ONCE
Status: COMPLETED | OUTPATIENT
Start: 2024-08-31 | End: 2024-08-31

## 2024-08-31 RX ORDER — PREDNISONE 20 MG/1
60 TABLET ORAL ONCE
Status: COMPLETED | OUTPATIENT
Start: 2024-08-31 | End: 2024-08-31

## 2024-08-31 RX ADMIN — ALBUTEROL SULFATE 5 MG: 2.5 SOLUTION RESPIRATORY (INHALATION) at 13:37

## 2024-08-31 RX ADMIN — IPRATROPIUM BROMIDE 0.5 MG: 0.5 SOLUTION RESPIRATORY (INHALATION) at 13:37

## 2024-08-31 RX ADMIN — CEFTRIAXONE SODIUM 1000 MG: 10 INJECTION, POWDER, FOR SOLUTION INTRAVENOUS at 12:51

## 2024-08-31 RX ADMIN — PREDNISONE 60 MG: 20 TABLET ORAL at 13:35

## 2024-08-31 RX ADMIN — ALBUTEROL SULFATE 2.5 MG: 2.5 SOLUTION RESPIRATORY (INHALATION) at 12:50

## 2024-08-31 NOTE — ED PROVIDER NOTES
History  Chief Complaint   Patient presents with    Shortness of Breath     Pt reports SOB and cough x 1 week. Pt has asthma hx. Denies CP. Pt reports he has used nebulizer w/o relief.      Patient is a 60 y/o M with PMH of asthma, CHF, DM, HTN, who presents with 1 week of cough, wheezing, difficulty breathing associated with runny nose and sore throat. Patient reports he feels phlegm in his throat, but is unable to cough anything up. He does report dry cough fits. He has been using his home nebulizer treatments with initial improvement, but the symptoms return. Last nebulizer 9am. He denies any fever, chest pain, nausea, vomiting, diarrhea, recent sick contacts.       Shortness of Breath  Associated symptoms: cough, sore throat and wheezing    Associated symptoms: no abdominal pain, no chest pain, no fever, no headaches, no rash and no vomiting        Prior to Admission Medications   Prescriptions Last Dose Informant Patient Reported? Taking?   Blood Pressure KIT  Self No No   Sig: Twice a day periodically   LORazepam (ATIVAN) 1 mg tablet  Self Yes No   Sig: Take 1 tablet by mouth daily as needed   Patient not taking: Reported on 7/25/2024   Lancets (ONETOUCH ULTRASOFT) lancets  Self Yes No   Sig: by Does not apply route   albuterol (PROVENTIL HFA,VENTOLIN HFA) 90 mcg/act inhaler   No No   Sig: Inhale 1 puff in the morning   carvedilol (COREG) 6.25 mg tablet  Self No No   Sig: Take 1 tablet (6.25 mg total) by mouth 2 (two) times a day with meals   diclofenac sodium (VOLTAREN) 50 mg EC tablet   No No   Sig: Sig: Take 1 tablet twice a day after food as needed for joint pain   famotidine (PEPCID) 20 mg tablet   No No   Sig: Take 1 tablet (20 mg total) by mouth daily at bedtime   fluticasone-vilanterol (Breo Ellipta) 200-25 mcg/actuation inhaler   No No   Sig: Inhale 1 puff daily Rinse mouth after use.   glimepiride (AMARYL) 1 mg tablet   No No   Sig: TAKE 1 TABLET BY MOUTH TWICE A DAY   glucose blood test strip  Self  No No   Si each by Other route daily   ipratropium (ATROVENT) 0.02 % nebulizer solution   No No   Sig: Take 2.5 mL (0.5 mg total) by nebulization 3 (three) times a day   levalbuterol (XOPENEX) 1.25 mg/3 mL nebulizer solution   No No   Sig: Take 3 mL (1.25 mg total) by nebulization 3 (three) times a day   methocarbamol (ROBAXIN) 500 mg tablet  Self No No   Sig: Take 1 tablet (500 mg total) by mouth 2 (two) times a day   Patient not taking: Reported on 2024   pantoprazole (PROTONIX) 40 mg tablet  Self No No   Sig: Take 1 tablet (40 mg total) by mouth daily before breakfast   Patient not taking: Reported on 2024   sitaGLIPtin (Januvia) 100 mg tablet  Self No No   Sig: Take 1 tablet (100 mg total) by mouth daily   Patient taking differently: Take 730 mg by mouth daily   spironolactone (ALDACTONE) 25 mg tablet   No No   Sig: Take 1 tablet (25 mg total) by mouth daily   torsemide (DEMADEX) 20 mg tablet  Self No No   Sig: TAKE 2 TABS ONCE DAILY IN AM      Facility-Administered Medications: None       Past Medical History:   Diagnosis Date    Acute kidney injury (HCC) 2020    Asthma     CHF (congestive heart failure) (HCC)     Colon polyp     Diabetes mellitus (HCC)     GERD (gastroesophageal reflux disease)     Hypertension     Inguinal hernia     3/25/15    Onychomycosis     16    Sleep apnea     Type 2 diabetes mellitus (HCC) 2012       Past Surgical History:   Procedure Laterality Date    ARTHROSCOPY KNEE      with Medial and Lateral Meniscus Repair    HERNIA REPAIR      umbilical and inguinal hernia repairs (left)       Family History   Problem Relation Age of Onset    Hypertension Mother         essential    Chronic bronchitis Father     Prostate cancer Father     Colon cancer Father     Breast cancer Sister      I have reviewed and agree with the history as documented.    E-Cigarette/Vaping    E-Cigarette Use Never User      E-Cigarette/Vaping Substances    Nicotine No     THC No      CBD No     Flavoring No     Other No     Unknown No      Social History     Tobacco Use    Smoking status: Former     Types: Cigars    Smokeless tobacco: Never    Tobacco comments:     current every day smoker, per [employer]   Vaping Use    Vaping status: Never Used   Substance Use Topics    Alcohol use: Yes     Alcohol/week: 3.0 standard drinks of alcohol     Types: 2 Cans of beer, 1 Standard drinks or equivalent per week     Comment: weekend: 1 glass of christian or 2 beers    Drug use: No        Review of Systems   Constitutional:  Negative for fever.   HENT:  Positive for rhinorrhea and sore throat.    Respiratory:  Positive for cough, shortness of breath and wheezing. Negative for chest tightness.    Cardiovascular:  Negative for chest pain.   Gastrointestinal:  Negative for abdominal pain, constipation, diarrhea, nausea and vomiting.   Musculoskeletal:  Negative for arthralgias and myalgias.   Skin:  Negative for rash.   Neurological:  Negative for weakness and headaches.       Physical Exam  ED Triage Vitals [08/31/24 1112]   Temperature Pulse Respirations Blood Pressure SpO2   98.4 °F (36.9 °C) 82 20 134/60 97 %      Temp Source Heart Rate Source Patient Position - Orthostatic VS BP Location FiO2 (%)   Oral Monitor Sitting Right arm --      Pain Score       --             Orthostatic Vital Signs  Vitals:    08/31/24 1112   BP: 134/60   Pulse: 82   Patient Position - Orthostatic VS: Sitting       Physical Exam  Vitals reviewed.   Constitutional:       Appearance: Normal appearance.   HENT:      Head: Normocephalic and atraumatic.      Mouth/Throat:      Mouth: Mucous membranes are moist.   Eyes:      General: No scleral icterus.     Extraocular Movements: Extraocular movements intact.      Conjunctiva/sclera: Conjunctivae normal.      Pupils: Pupils are equal, round, and reactive to light.   Cardiovascular:      Rate and Rhythm: Normal rate and regular rhythm.      Pulses: Normal pulses.      Heart sounds:  Normal heart sounds. No murmur heard.     No friction rub. No gallop.   Pulmonary:      Effort: Pulmonary effort is normal. No accessory muscle usage or respiratory distress.      Breath sounds: Wheezing present. No rhonchi or rales.   Abdominal:      General: Bowel sounds are normal. There is no distension.      Palpations: Abdomen is soft.      Tenderness: There is no abdominal tenderness.   Musculoskeletal:         General: No swelling or tenderness. Normal range of motion.      Cervical back: Normal range of motion.      Right lower leg: No tenderness.      Left lower leg: No tenderness.      Comments: 1+ pitting edema bilaterally   Skin:     General: Skin is warm and dry.   Neurological:      General: No focal deficit present.      Mental Status: He is alert and oriented to person, place, and time. Mental status is at baseline.   Psychiatric:         Mood and Affect: Mood normal.         Behavior: Behavior normal.         Judgment: Judgment normal.         ED Medications  Medications   albuterol inhalation solution 2.5 mg (2.5 mg Nebulization Given 8/31/24 1250)       Diagnostic Studies  Results Reviewed       None                   XR chest 2 views    (Results Pending)         Procedures  Procedures      ED Course                                       Medical Decision Making  Differential includes viral respiratory infection vs pneumonia vs bronchitis vs asthma exacerbation vs heart failure exacerbation  Albuterol nebulizer treatment x 2  CXR negative  O2 saturation 100% on RA  Will start pt on prednisone and antibiotics for bronchitis    Amount and/or Complexity of Data Reviewed  Radiology: ordered.    Risk  Prescription drug management.          Disposition  Final diagnoses:   Bronchitis   COPD with acute exacerbation (HCC)     Time reflects when diagnosis was documented in both MDM as applicable and the Disposition within this note       Time User Action Codes Description Comment    8/31/2024  1:30 PM  Christiano Howard Add [J40] Bronchitis     8/31/2024  1:30 PM Christiano Howard Add [J44.1] COPD with acute exacerbation (HCC)           ED Disposition       ED Disposition   Discharge    Condition   Stable    Date/Time   Sat Aug 31, 2024  1:31 PM    Comment   Elly Chong discharge to home/self care.                   Follow-up Information    None         Patient's Medications   Discharge Prescriptions    DOXYCYCLINE HYCLATE (VIBRAMYCIN) 100 MG CAPSULE    Take 1 capsule (100 mg total) by mouth 2 (two) times a day for 10 days       Start Date: 8/31/2024 End Date: 9/10/2024       Order Dose: 100 mg       Quantity: 20 capsule    Refills: 0    PREDNISONE 10 MG TABLET    6 tabs PO qDaily x2 days, 5 tabs PO qD x 2d, 4 tabs PO qD x 2d, 3 tabs PO qD x 2d, 2 tabs PO qD x 2d, 1 tab PO qD x 2d       Start Date: 8/31/2024 End Date: --       Order Dose: --       Quantity: 42 tablet    Refills: 0     No discharge procedures on file.    PDMP Review         Value Time User    PDMP Reviewed  Yes 9/4/2021  8:39 AM Natalia Krueger DO             ED Provider  Attending physically available and evaluated Lionsheldon Chong. I managed the patient along with the ED Attending.    Electronically Signed by           Rose Srhestha DO  08/31/24 9569

## 2024-08-31 NOTE — ED ATTENDING ATTESTATION
8/31/2024  IChristiano DO, saw and evaluated the patient. I have discussed the patient with the resident/non-physician practitioner and agree with the resident's/non-physician practitioner's findings, Plan of Care, and MDM as documented in the resident's/non-physician practitioner's note, except where noted. All available labs and Radiology studies were reviewed.  I was present for key portions of any procedure(s) performed by the resident/non-physician practitioner and I was immediately available to provide assistance.       At this point I agree with the current assessment done in the Emergency Department.  I have conducted an independent evaluation of this patient a history and physical is as follows:                    Chief Complaint   Patient presents with    Shortness of Breath     Pt reports SOB and cough x 1 week. Pt has asthma hx. Denies CP. Pt reports he has used nebulizer w/o relief.                Shortness of Breath       59-year-old male, cough, has been occurring for about 3 months, recently admitted for an asthma exacerbation feels similar but not as bad, cough changed over the past couple of days but now is productive with yellowish-greenish sputum.  No shortness of breath with exertion no associated chest pain.            Physical Exam  Vitals reviewed.   Constitutional:       General: He is not in acute distress.     Appearance: He is well-developed. He is not diaphoretic.   HENT:      Head: Normocephalic and atraumatic.      Right Ear: External ear normal.      Left Ear: External ear normal.      Nose: Nose normal.   Eyes:      General:         Right eye: No discharge.         Left eye: No discharge.      Pupils: Pupils are equal, round, and reactive to light.   Neck:      Trachea: No tracheal deviation.   Cardiovascular:      Rate and Rhythm: Normal rate and regular rhythm.      Heart sounds: Normal heart sounds. No murmur heard.  Pulmonary:      Effort: Pulmonary effort is normal. No  respiratory distress.      Breath sounds: No stridor. Wheezing present.   Abdominal:      General: There is no distension.      Palpations: Abdomen is soft.      Tenderness: There is no abdominal tenderness. There is no guarding or rebound.   Musculoskeletal:         General: Normal range of motion.      Cervical back: Normal range of motion and neck supple.   Skin:     General: Skin is warm and dry.      Coloration: Skin is not pale.      Findings: No erythema.   Neurological:      General: No focal deficit present.      Mental Status: He is alert and oriented to person, place, and time.               Medications   albuterol inhalation solution 2.5 mg (2.5 mg Nebulization Given 8/31/24 1250)   predniSONE tablet 60 mg (60 mg Oral Given 8/31/24 1335)   ipratropium (ATROVENT) 0.02 % inhalation solution 0.5 mg (0.5 mg Nebulization Given 8/31/24 1337)   albuterol inhalation solution 5 mg (5 mg Nebulization Given 8/31/24 1337)             Labs Reviewed - No data to display      XR chest 2 views    (Results Pending)                ECG 12 Lead Documentation Only    Date/Time: 8/31/2024 1:38 PM    Performed by: Christiano Howard DO  Authorized by: Christiano Howard DO    ECG reviewed by me, the ED Provider: yes    Patient location:  ED  Interpretation:     Interpretation: normal    Rate:     ECG rate:  82    ECG rate assessment: normal    Rhythm:     Rhythm: sinus rhythm    Ectopy:     Ectopy: none    QRS:     QRS axis:  Normal    QRS intervals:  Normal  Conduction:     Conduction: normal    ST segments:     ST segments:  Normal  T waves:     T waves: normal                                MDM  Number of Diagnoses or Management Options  Bronchitis  COPD with acute exacerbation (HCC)  Moderate persistent asthma with exacerbation  Diagnosis management comments:       Initial ED assessment:   59-year-old, cough, increased sputum production, history of asthma    Pathology at risk for includes but is not limited to:   Asthma  exacerbation, pneumonia, bronchitis, chronic bronchitis/COPD    Initial ED plan:   X-ray, breathing treatments, likely steroid and antibiotics due to change in sputum        Final ED summary/disposition:   After evaluation and workup in the emergency department, discharged on prednisone taper, will do 10 days as he recently was on steroids, will place on doxycycline, 1 g ceftriaxone given here in emergency department..  Strict return parameters, post 2 breathing treatments patient reports large amount of improvement.               Time reflects when diagnosis was documented in both MDM as applicable and the Disposition within this note       Time User Action Codes Description Comment    8/31/2024  1:30 PM Christiano Howard [J40] Bronchitis     8/31/2024  1:30 PM Christiano Howard [J44.1] COPD with acute exacerbation (HCC)     8/31/2024  1:39 PM Christiano Howard [J45.41] Moderate persistent asthma with exacerbation           ED Disposition       ED Disposition   Discharge    Condition   Stable    Date/Time   Sat Aug 31, 2024  1:31 PM    Comment   Elly Chong discharge to home/self care.                   Follow-up Information    None

## 2024-09-01 LAB
ATRIAL RATE: 82 BPM
P AXIS: 76 DEGREES
PR INTERVAL: 150 MS
QRS AXIS: 58 DEGREES
QRSD INTERVAL: 78 MS
QT INTERVAL: 368 MS
QTC INTERVAL: 429 MS
T WAVE AXIS: 55 DEGREES
VENTRICULAR RATE: 82 BPM

## 2024-09-01 PROCEDURE — 93010 ELECTROCARDIOGRAM REPORT: CPT | Performed by: INTERNAL MEDICINE

## 2024-09-27 ENCOUNTER — OFFICE VISIT (OUTPATIENT)
Dept: FAMILY MEDICINE CLINIC | Facility: CLINIC | Age: 59
End: 2024-09-27
Payer: COMMERCIAL

## 2024-09-27 VITALS
TEMPERATURE: 97.5 F | OXYGEN SATURATION: 97 % | RESPIRATION RATE: 18 BRPM | SYSTOLIC BLOOD PRESSURE: 150 MMHG | DIASTOLIC BLOOD PRESSURE: 80 MMHG | BODY MASS INDEX: 42.85 KG/M2 | HEART RATE: 85 BPM | WEIGHT: 273 LBS | HEIGHT: 67 IN

## 2024-09-27 DIAGNOSIS — L03.90 CELLULITIS, UNSPECIFIED CELLULITIS SITE: ICD-10-CM

## 2024-09-27 DIAGNOSIS — J45.901 MILD ASTHMA WITH ACUTE EXACERBATION, UNSPECIFIED WHETHER PERSISTENT: ICD-10-CM

## 2024-09-27 DIAGNOSIS — E11.65 UNCONTROLLED TYPE 2 DIABETES MELLITUS WITH HYPERGLYCEMIA (HCC): Primary | ICD-10-CM

## 2024-09-27 LAB
CREAT UR-MCNC: 159.9 MG/DL
MICROALBUMIN UR-MCNC: <7 MG/L

## 2024-09-27 PROCEDURE — 99214 OFFICE O/P EST MOD 30 MIN: CPT | Performed by: FAMILY MEDICINE

## 2024-09-27 PROCEDURE — 82570 ASSAY OF URINE CREATININE: CPT | Performed by: FAMILY MEDICINE

## 2024-09-27 PROCEDURE — 82043 UR ALBUMIN QUANTITATIVE: CPT | Performed by: FAMILY MEDICINE

## 2024-09-27 RX ORDER — CEFUROXIME AXETIL 500 MG/1
500 TABLET ORAL EVERY 12 HOURS SCHEDULED
Qty: 20 TABLET | Refills: 0 | Status: SHIPPED | OUTPATIENT
Start: 2024-09-27 | End: 2024-10-07

## 2024-09-27 RX ORDER — FLUTICASONE PROPIONATE AND SALMETEROL 250; 50 UG/1; UG/1
1 POWDER RESPIRATORY (INHALATION) 2 TIMES DAILY
Qty: 180 BLISTER | Refills: 3 | Status: SHIPPED | OUTPATIENT
Start: 2024-09-27 | End: 2025-09-22

## 2024-09-27 RX ORDER — PREDNISONE 20 MG/1
TABLET ORAL
Qty: 15 TABLET | Refills: 0 | Status: SHIPPED | OUTPATIENT
Start: 2024-09-27

## 2024-09-27 NOTE — ASSESSMENT & PLAN NOTE
Asthma.  Patient was given prescription to initiate Wixela 250/50 and use 1 inhalation twice daily.  He may continue with albuterol rescue inhaler as needed.

## 2024-09-27 NOTE — PROGRESS NOTES
FAMILY PRACTICE OFFICE VISIT       NAME: Elly Chong  AGE: 59 y.o. SEX: male       : 1965        MRN: [id number]    DATE: 2024  TIME: 11:50 AM    Assessment and Plan     Problem List Items Addressed This Visit       Uncontrolled type 2 diabetes mellitus with hyperglycemia (HCC) - Primary    Relevant Orders    Albumin / creatinine urine ratio    IRIS Diabetic eye exam    Mild asthma with acute exacerbation     Asthma.  Patient was given prescription to initiate Wixela 250/50 and use 1 inhalation twice daily.  He may continue with albuterol rescue inhaler as needed.         Relevant Medications    Fluticasone-Salmeterol (Advair) 250-50 mcg/dose inhaler    Cellulitis     Cellulitis.  Patient with suspected cellulitis of right arm.  He was given prescription for Ceftin 500 mg to use twice daily as ordered.  He was also given prednisone tapering dose to take for swelling 40 mg x 3 days, 30 mg for 3 days, 20 mg x 3 days, 10 mg x 3 days.  Patient will call if symptoms persist after medication completed         Relevant Medications    predniSONE 20 mg tablet    cefuroxime (CEFTIN) 500 mg tablet           Chief Complaint     Chief Complaint   Patient presents with    Cough    Diabetes     Foot exam shoes & socks remove        History of Present Illness     Patient in the office to review chronic medical conditions.  Patient states he was seen in the emergency room on 2024 for diagnosed bronchitis.  He completed course of doxycycline and prednisone.  Symptoms improved however he does still get occasional coughing spells and fatigue.  He denies any documented fevers.  Patient does experience chest tightness when he is more physical or climbing ladders during his workday however he has not been on his Breo inhaler due to lack of insurance coverage.  At times he will use albuterol inhaler which sometimes helps with symptoms.    Over the past week or so he has developed swelling and redness of his  right forearm and elbow area.  He denies any recent trauma to this area.  He had returned to work after his bronchitis was treated.    Cough    Diabetes        Review of Systems   Review of Systems   Constitutional: Negative.    HENT:  Positive for congestion.    Respiratory:  Positive for cough.    Cardiovascular: Negative.    Gastrointestinal: Negative.    Genitourinary: Negative.    Musculoskeletal:  Positive for joint swelling.   Neurological: Negative.    Psychiatric/Behavioral: Negative.         Active Problem List     Patient Active Problem List   Diagnosis    Essential hypertension    Uncontrolled type 2 diabetes mellitus with hyperglycemia ([facility])    Elevated lipoprotein(a)    Microalbuminuria    Obesity    Medical non-compliance    Hypomagnesemia    CHF (congestive heart failure) ([facility])    Alcohol abuse    DELFINO (acute kidney injury) ([facility])    Onychomycosis    Arthritis of right wrist    Acute on chronic diastolic congestive heart failure ([facility])    Impingement syndrome of left shoulder    Diabetes mellitus ([facility])    Acute gout of right hand    Acute midline low back pain without sciatica    Reactive airway disease without complication    (HFpEF) heart failure with preserved ejection fraction ([facility])    AMANDO (obstructive sleep apnea)    Arthralgia    Intermittent asthma    Mild asthma with acute exacerbation    Current smoker    Cellulitis       Past Medical History:  Past Medical History:   Diagnosis Date    Acute kidney injury ([facility]) 09/05/2020    Asthma     CHF (congestive heart failure) ([facility])     Colon polyp     Diabetes mellitus ([facility])     GERD (gastroesophageal reflux disease)     Hypertension     Inguinal hernia     3/25/15    Onychomycosis     5/24/17    Sleep apnea     Type 2 diabetes mellitus ([facility]) 05/01/2012       Past Surgical History:  Past Surgical History:   Procedure Laterality Date    ARTHROSCOPY KNEE      with Medial and Lateral Meniscus Repair    HERNIA REPAIR      umbilical and inguinal hernia  repairs (left)       Family History:  Family History   Problem Relation Age of Onset    Hypertension Mother         essential    Chronic bronchitis Father     Prostate cancer Father     Colon cancer Father     Breast cancer Sister        Social History:  Social History     Socioeconomic History    Marital status: /Civil Union     Spouse name: Not on file    Number of children: Not on file    Years of education: Not on file    Highest education level: Not on file   Occupational History    Occupation: [employer]    Tobacco Use    Smoking status: Former     Types: Cigars    Smokeless tobacco: Never    Tobacco comments:     current every day smoker, per [employer]   Vaping Use    Vaping status: Never Used   Substance and Sexual Activity    Alcohol use: Yes     Alcohol/week: 3.0 standard drinks of alcohol     Types: 2 Cans of beer, 1 Standard drinks or equivalent per week     Comment: weekend: 1 glass of christian or 2 beers    Drug use: No    Sexual activity: Yes   Other Topics Concern    Not on file   Social History Narrative    Alcohol dependence    Nicotine dependence    Denied, alcohol Use    Marital problems     Social Determinants of Health     Financial Resource Strain: Not on file   Food Insecurity: Not on file   Transportation Needs: Not on file   Physical Activity: Not on file   Stress: Not on file   Social Connections: Not on file   Intimate Partner Violence: Not on file   Housing Stability: Not on file       Objective     Vitals:    09/27/24 0829   BP: 150/80   Pulse: 85   Resp: 18   Temp: 97.5 °F (36.4 °C)   SpO2: 97%     Wt Readings from Last 3 Encounters:   09/27/24 124 kg (273 lb)   08/31/24 122 kg (268 lb 15.4 oz)   08/06/24 122 kg (268 lb 4 oz)       Physical Exam  Constitutional:       General: He is not in acute distress.     Appearance: Normal appearance. He is not ill-appearing.   HENT:      Head: Normocephalic and atraumatic.   Eyes:      General:         Right eye: No discharge.          Left eye: No discharge.      Extraocular Movements: Extraocular movements intact.      Conjunctiva/sclera: Conjunctivae normal.      Pupils: Pupils are equal, round, and reactive to light.   Neck:      Vascular: No carotid bruit.   Cardiovascular:      Rate and Rhythm: Normal rate and regular rhythm.      Pulses: no weak pulses.           Dorsalis pedis pulses are 2+ on the right side and 2+ on the left side.        Posterior tibial pulses are 2+ on the right side and 2+ on the left side.      Heart sounds: Normal heart sounds. No murmur heard.  Pulmonary:      Effort: Pulmonary effort is normal.      Breath sounds: Normal breath sounds. No wheezing, rhonchi or rales.   Musculoskeletal:         General: Swelling and tenderness present. No deformity or signs of injury.      Right lower leg: No edema.      Left lower leg: No edema.      Comments: Patient with mildly tender swelling of right forearm from wrist to elbow with some nontender swelling of his right elbow.  There is some warmth associated with elbow.  Normal range of motion of right arm.   Feet:      Right foot:      Skin integrity: Callus and dry skin present. No ulcer, skin breakdown, erythema or warmth.      Left foot:      Skin integrity: Callus and dry skin present. No ulcer, skin breakdown, erythema or warmth.   Lymphadenopathy:      Cervical: No cervical adenopathy.   Skin:     Findings: No rash.   Neurological:      General: No focal deficit present.      Mental Status: He is alert and oriented to person, place, and time.      Cranial Nerves: No cranial nerve deficit.   Psychiatric:         Mood and Affect: Mood normal.         Behavior: Behavior normal.         Thought Content: Thought content normal.         Judgment: Judgment normal.       Patient's shoes and socks removed.    Right Foot/Ankle   Right Foot Inspection  Skin Exam: skin normal, skin intact, dry skin, callus and callus. No warmth, no erythema, no maceration, no abnormal color, no  pre-ulcer and no ulcer.     Toe Exam: ROM and strength within normal limits and right toe deformity (Onychomycosis of toenails).     Sensory   Vibration: intact  Monofilament testing: intact    Vascular  The right DP pulse is 2+. The right PT pulse is 2+.     Left Foot/Ankle  Left Foot Inspection  Skin Exam: skin normal, skin intact, dry skin and callus. No warmth, no erythema, no maceration, normal color, no pre-ulcer and no ulcer.     Toe Exam: ROM and strength within normal limits and left toe deformity (Onychomycosis of toenails).     Sensory   Vibration: intact  Monofilament testing: intact    Vascular  The left DP pulse is 2+. The left PT pulse is 2+.     Assign Risk Category  No deformity present  No loss of protective sensation  No weak pulses  Risk: 0      Pertinent Laboratory/Diagnostic Studies:  Lab Results   Component Value Date    GLUCOSE 179 (H) 09/04/2020    BUN 31 (H) 07/27/2024    CREATININE 1.55 (H) 07/27/2024    CALCIUM 8.4 07/27/2024     12/21/2017    K 4.2 07/27/2024    CO2 26 07/27/2024     07/27/2024     Lab Results   Component Value Date    ALT 8 07/25/2024    AST 18 07/25/2024    ALKPHOS 74 07/25/2024    BILITOT 1.3 (H) 12/21/2017       Lab Results   Component Value Date    WBC 15.42 (H) 07/27/2024    HGB 11.1 (L) 07/27/2024    HCT 34.0 (L) 07/27/2024     (H) 07/27/2024     07/27/2024       Lab Results   Component Value Date    TSH 3.58 03/14/2022       Lab Results   Component Value Date    CHOL 153 12/21/2017     Lab Results   Component Value Date    TRIG 69 11/21/2023     Lab Results   Component Value Date    HDL 36 (L) 11/21/2023     Lab Results   Component Value Date    LDLCALC 70 11/21/2023     Lab Results   Component Value Date    HGBA1C 5.7 (H) 07/25/2024       Results for orders placed or performed during the hospital encounter of 08/31/24   ECG 12 lead    Collection Time: 08/31/24 11:13 AM   Result Value Ref Range    Ventricular Rate 82 BPM    Atrial Rate  82 BPM    KS Interval 150 ms    QRSD Interval 78 ms    QT Interval 368 ms    QTC Interval 429 ms    P Axis 76 degrees    QRS Axis 58 degrees    T Wave Axis 55 degrees       Orders Placed This Encounter   Procedures    IRIS Diabetic eye exam    Albumin / creatinine urine ratio       ALLERGIES:  No Known Allergies    Current Medications     Current Outpatient Medications   Medication Sig Dispense Refill    albuterol (PROVENTIL HFA,VENTOLIN HFA) 90 mcg/act inhaler Inhale 1 puff in the morning 18 g 5    Blood Pressure KIT Twice a day periodically 1 each 0    carvedilol (COREG) 6.25 mg tablet Take 1 tablet (6.25 mg total) by mouth 2 (two) times a day with meals 180 tablet 5    cefuroxime (CEFTIN) 500 mg tablet Take 1 tablet (500 mg total) by mouth every 12 (twelve) hours for 10 days 20 tablet 0    diclofenac sodium (VOLTAREN) 50 mg EC tablet Sig: Take 1 tablet twice a day after food as needed for joint pain 180 tablet 1    famotidine (PEPCID) 20 mg tablet Take 1 tablet (20 mg total) by mouth daily at bedtime 90 tablet 1    Fluticasone-Salmeterol (Advair) 250-50 mcg/dose inhaler Inhale 1 puff 2 (two) times a day Rinse mouth after use. 180 blister 3    glimepiride (AMARYL) 1 mg tablet TAKE 1 TABLET BY MOUTH TWICE A  tablet 1    glucose blood test strip 1 each by Other route daily 180 each 5    Lancets (ONETOUCH ULTRASOFT) lancets by Does not apply route      levalbuterol (XOPENEX) 1.25 mg/3 mL nebulizer solution Take 3 mL (1.25 mg total) by nebulization 3 (three) times a day 270 mL 0    predniSONE 10 mg tablet 6 tabs PO qDaily x2 days, 5 tabs PO qD x 2d, 4 tabs PO qD x 2d, 3 tabs PO qD x 2d, 2 tabs PO qD x 2d, 1 tab PO qD x 2d 42 tablet 0    predniSONE 20 mg tablet 2 tabs X 3 days, 1.5 tabs X 3 days, 1 tab X 3 days, 0.5 tab X 3 days 15 tablet 0    spironolactone (ALDACTONE) 25 mg tablet Take 1 tablet (25 mg total) by mouth daily 90 tablet 1    torsemide (DEMADEX) 20 mg tablet TAKE 2 TABS ONCE DAILY IN  tablet  2    ipratropium (ATROVENT) 0.02 % nebulizer solution Take 2.5 mL (0.5 mg total) by nebulization 3 (three) times a day 225 mL 0    LORazepam (ATIVAN) 1 mg tablet Take 1 tablet by mouth daily as needed (Patient not taking: Reported on 7/25/2024)      methocarbamol (ROBAXIN) 500 mg tablet Take 1 tablet (500 mg total) by mouth 2 (two) times a day (Patient not taking: Reported on 7/25/2024) 20 tablet 0    pantoprazole (PROTONIX) 40 mg tablet Take 1 tablet (40 mg total) by mouth daily before breakfast (Patient not taking: Reported on 7/25/2024) 90 tablet 3    sitaGLIPtin (Januvia) 100 mg tablet Take 1 tablet (100 mg total) by mouth daily (Patient not taking: Reported on 9/27/2024) 90 tablet 1     No current facility-administered medications for this visit.         Health Maintenance     Health Maintenance   Topic Date Due    Pneumococcal Vaccine: Pediatrics (0 to 5 Years) and At-Risk Patients (6 to 64 Years) (1 of 2 - PCV) Never done    Hepatitis A Vaccine (1 of 2 - Risk 2-dose series) Never done    Zoster Vaccine (1 of 2) Never done    Annual Physical  12/22/2023    RSV Vaccine Age 60+ Years (1 - 1-dose 60+ series) Never done    COVID-19 Vaccine (4 - 2023-24 season) 09/01/2024    Influenza Vaccine (1) 09/01/2024    DM Eye Exam  10/09/2024    Kidney Health Evaluation: Albumin/Creatinine Ratio  11/29/2024    HEMOGLOBIN A1C  01/25/2025    Kidney Health Evaluation: GFR  07/27/2025    Depression Screening  09/27/2025    Diabetic Foot Exam  09/27/2025    DTaP,Tdap,and Td Vaccines (3 - Td or Tdap) 09/05/2030    Colorectal Cancer Screening  01/17/2031    HIV Screening  Completed    Hepatitis C Screening  Completed    RSV Vaccine age 0-20 Months  Aged Out    HIB Vaccine  Aged Out    IPV Vaccine  Aged Out    Meningococcal ACWY Vaccine  Aged Out    HPV Vaccine  Aged Out     Immunization History   Administered Date(s) Administered    COVID-19 PFIZER VACCINE 0.3 ML IM 04/12/2021, 05/03/2021, 12/15/2021    INFLUENZA 09/23/2015,  10/21/2019, 12/22/2022, 10/31/2023    Influenza, injectable, quadrivalent, preservative free 0.5 mL 10/27/2020, 10/31/2023    Influenza, recombinant, quadrivalent,injectable, preservative free 10/21/2019, 12/22/2022    Influenza, seasonal, injectable 09/23/2015    Tdap 10/21/2019, 09/05/2020       Linden Roque MD  I spent 30 minutes with this patient of which greater than 50% was spent counseling or reviewing chart

## 2024-09-27 NOTE — ASSESSMENT & PLAN NOTE
Cellulitis.  Patient with suspected cellulitis of right arm.  He was given prescription for Ceftin 500 mg to use twice daily as ordered.  He was also given prednisone tapering dose to take for swelling 40 mg x 3 days, 30 mg for 3 days, 20 mg x 3 days, 10 mg x 3 days.  Patient will call if symptoms persist after medication completed

## 2024-10-16 ENCOUNTER — HOSPITAL ENCOUNTER (EMERGENCY)
Facility: HOSPITAL | Age: 59
Discharge: HOME | End: 2024-10-16
Attending: STUDENT IN AN ORGANIZED HEALTH CARE EDUCATION/TRAINING PROGRAM
Payer: COMMERCIAL

## 2024-10-16 ENCOUNTER — APPOINTMENT (EMERGENCY)
Dept: RADIOLOGY | Facility: HOSPITAL | Age: 59
End: 2024-10-16
Payer: COMMERCIAL

## 2024-10-16 VITALS
DIASTOLIC BLOOD PRESSURE: 68 MMHG | OXYGEN SATURATION: 98 % | RESPIRATION RATE: 22 BRPM | HEART RATE: 86 BPM | TEMPERATURE: 98.1 F | SYSTOLIC BLOOD PRESSURE: 125 MMHG

## 2024-10-16 DIAGNOSIS — J45.909 REACTIVE AIRWAY DISEASE WITHOUT COMPLICATION: ICD-10-CM

## 2024-10-16 DIAGNOSIS — I50.9 ACUTE ON CHRONIC CONGESTIVE HEART FAILURE, UNSPECIFIED HEART FAILURE TYPE (HCC): Primary | ICD-10-CM

## 2024-10-16 LAB
ALBUMIN SERPL BCG-MCNC: 3.8 G/DL (ref 3.5–5)
ALP SERPL-CCNC: 76 U/L (ref 34–104)
ALT SERPL W P-5'-P-CCNC: 21 U/L (ref 7–52)
ANION GAP SERPL CALCULATED.3IONS-SCNC: 5 MMOL/L (ref 4–13)
AST SERPL W P-5'-P-CCNC: 20 U/L (ref 13–39)
ATRIAL RATE: 88 BPM
BASOPHILS # BLD AUTO: 0.03 THOUSANDS/ΜL (ref 0–0.1)
BASOPHILS NFR BLD AUTO: 0 % (ref 0–1)
BILIRUB SERPL-MCNC: 0.81 MG/DL (ref 0.2–1)
BNP SERPL-MCNC: 21 PG/ML (ref 0–100)
BUN SERPL-MCNC: 25 MG/DL (ref 5–25)
CALCIUM SERPL-MCNC: 8.8 MG/DL (ref 8.4–10.2)
CARDIAC TROPONIN I PNL SERPL HS: 5 NG/L
CHLORIDE SERPL-SCNC: 100 MMOL/L (ref 96–108)
CO2 SERPL-SCNC: 30 MMOL/L (ref 21–32)
CREAT SERPL-MCNC: 1.65 MG/DL (ref 0.6–1.3)
EOSINOPHIL # BLD AUTO: 0.69 THOUSAND/ΜL (ref 0–0.61)
EOSINOPHIL NFR BLD AUTO: 7 % (ref 0–6)
ERYTHROCYTE [DISTWIDTH] IN BLOOD BY AUTOMATED COUNT: 14 % (ref 11.6–15.1)
GFR SERPL CREATININE-BSD FRML MDRD: 44 ML/MIN/1.73SQ M
GLUCOSE SERPL-MCNC: 168 MG/DL (ref 65–140)
HCT VFR BLD AUTO: 32.8 % (ref 36.5–49.3)
HGB BLD-MCNC: 11 G/DL (ref 12–17)
IMM GRANULOCYTES # BLD AUTO: 0.03 THOUSAND/UL (ref 0–0.2)
IMM GRANULOCYTES NFR BLD AUTO: 0 % (ref 0–2)
LYMPHOCYTES # BLD AUTO: 3.27 THOUSANDS/ΜL (ref 0.6–4.47)
LYMPHOCYTES NFR BLD AUTO: 34 % (ref 14–44)
MCH RBC QN AUTO: 34.1 PG (ref 26.8–34.3)
MCHC RBC AUTO-ENTMCNC: 33.5 G/DL (ref 31.4–37.4)
MCV RBC AUTO: 102 FL (ref 82–98)
MONOCYTES # BLD AUTO: 0.9 THOUSAND/ΜL (ref 0.17–1.22)
MONOCYTES NFR BLD AUTO: 9 % (ref 4–12)
NEUTROPHILS # BLD AUTO: 4.68 THOUSANDS/ΜL (ref 1.85–7.62)
NEUTS SEG NFR BLD AUTO: 50 % (ref 43–75)
NRBC BLD AUTO-RTO: 0 /100 WBCS
P AXIS: 61 DEGREES
PLATELET # BLD AUTO: 200 THOUSANDS/UL (ref 149–390)
PMV BLD AUTO: 10.2 FL (ref 8.9–12.7)
POTASSIUM SERPL-SCNC: 4.5 MMOL/L (ref 3.5–5.3)
PR INTERVAL: 142 MS
PROT SERPL-MCNC: 6.5 G/DL (ref 6.4–8.4)
QRS AXIS: 52 DEGREES
QRSD INTERVAL: 78 MS
QT INTERVAL: 358 MS
QTC INTERVAL: 433 MS
RBC # BLD AUTO: 3.23 MILLION/UL (ref 3.88–5.62)
SODIUM SERPL-SCNC: 135 MMOL/L (ref 135–147)
T WAVE AXIS: 21 DEGREES
VENTRICULAR RATE: 88 BPM
WBC # BLD AUTO: 9.6 THOUSAND/UL (ref 4.31–10.16)

## 2024-10-16 PROCEDURE — 85025 COMPLETE CBC W/AUTO DIFF WBC: CPT | Performed by: STUDENT IN AN ORGANIZED HEALTH CARE EDUCATION/TRAINING PROGRAM

## 2024-10-16 PROCEDURE — 84484 ASSAY OF TROPONIN QUANT: CPT | Performed by: STUDENT IN AN ORGANIZED HEALTH CARE EDUCATION/TRAINING PROGRAM

## 2024-10-16 PROCEDURE — 36415 COLL VENOUS BLD VENIPUNCTURE: CPT

## 2024-10-16 PROCEDURE — 71045 X-RAY EXAM CHEST 1 VIEW: CPT

## 2024-10-16 PROCEDURE — 99285 EMERGENCY DEPT VISIT HI MDM: CPT

## 2024-10-16 PROCEDURE — 83880 ASSAY OF NATRIURETIC PEPTIDE: CPT | Performed by: STUDENT IN AN ORGANIZED HEALTH CARE EDUCATION/TRAINING PROGRAM

## 2024-10-16 PROCEDURE — 93010 ELECTROCARDIOGRAM REPORT: CPT | Performed by: INTERNAL MEDICINE

## 2024-10-16 PROCEDURE — 93005 ELECTROCARDIOGRAM TRACING: CPT

## 2024-10-16 PROCEDURE — 96374 THER/PROPH/DIAG INJ IV PUSH: CPT

## 2024-10-16 PROCEDURE — 94640 AIRWAY INHALATION TREATMENT: CPT

## 2024-10-16 PROCEDURE — 80053 COMPREHEN METABOLIC PANEL: CPT | Performed by: STUDENT IN AN ORGANIZED HEALTH CARE EDUCATION/TRAINING PROGRAM

## 2024-10-16 RX ORDER — ALBUTEROL SULFATE 0.83 MG/ML
2.5 SOLUTION RESPIRATORY (INHALATION) ONCE
Status: COMPLETED | OUTPATIENT
Start: 2024-10-16 | End: 2024-10-16

## 2024-10-16 RX ORDER — ALBUTEROL SULFATE 90 UG/1
1 INHALANT RESPIRATORY (INHALATION) DAILY
Qty: 18 G | Refills: 5 | Status: SHIPPED | OUTPATIENT
Start: 2024-10-16 | End: 2024-10-21 | Stop reason: SDUPTHER

## 2024-10-16 RX ORDER — FUROSEMIDE 10 MG/ML
40 INJECTION INTRAMUSCULAR; INTRAVENOUS ONCE
Status: COMPLETED | OUTPATIENT
Start: 2024-10-16 | End: 2024-10-16

## 2024-10-16 RX ADMIN — FUROSEMIDE 40 MG: 10 INJECTION, SOLUTION INTRAMUSCULAR; INTRAVENOUS at 19:10

## 2024-10-16 RX ADMIN — ALBUTEROL SULFATE 2.5 MG: 2.5 SOLUTION RESPIRATORY (INHALATION) at 19:12

## 2024-10-16 NOTE — DISCHARGE INSTRUCTIONS
You were seen in the Emergency Department for: Shortness of breath and swelling of the legs    Your workup today showed: No evidence of heart attack.  You are given a breathing treatment and an IV diuretic to help you urinate extra fluid.    Your next steps should include: call today to make an appointment with your primary care provider in one week and ask for recommendations on changing your diuretic medications.    Reasons to RETURN IMMEDIATELY to the Emergency Department: worsening symptoms, difficulty breathing, temperature > 100.4 degrees, uncontrollable nausea/vomiting, or any other concerns.

## 2024-10-16 NOTE — ED PROVIDER NOTES
Time reflects when diagnosis was documented in both MDM as applicable and the Disposition within this note       Time User Action Codes Description Comment    10/16/2024  7:53 PM Patria Zuniga Add [I50.9] Acute on chronic congestive heart failure, unspecified heart failure type (HCC)           ED Disposition       ED Disposition   Discharge    Condition   Stable    Date/Time   Wed Oct 16, 2024  7:54 PM    Comment   Elly Chong discharge to home/self care.                   Assessment & Plan       Medical Decision Making  59-year-old male with history of hypertension, diabetes, CHF, asthma presenting with shortness of breath.  He reports 2 weeks of exertional dyspnea as well as orthopnea and lower extremity swelling without wheezing, cough/hemoptysis, fever/chills, chest pain, nausea/vomiting, or any other complaints.  He denies recent immobilization/surgery or family history of clotting diathesis.  He notes that his weight has increased of late but he is adherent to his home diuretics.    Exam with poor air movement in bilateral lungs but no wheezing/rales/rhonchi.  Bilateral lower extremities with significant pitting edema.      Differential diagnosis includes but is not limited to: Asthma exacerbation, CHF exacerbation, pulmonary hypertension, less likely ACS, pneumonia, pneumothorax, pericarditis/myocarditis.  I have a low clinical suspicion for PE.    Workup and treatment as below:    Imaging: Chest x-ray (see ED course)  EKG: See ED Course  Labs: See ED course  Meds: Albuterol, diuresis  Consults: N/A  Reassessment: Dyspnea subjectively improved s/p albuterol. Patient resting comfortably with stable vitals.    Dispo: Patient was discharged to home with strict return precautions and plan to follow up with his cardiologist regarding diuretic dosing tomorrow. Patient acknowledged understanding of plan and diagnostic results, and all their questions were answered to their satisfaction.    Amount and/or Complexity  of Data Reviewed  Labs: ordered. Decision-making details documented in ED Course.  Radiology: ordered.    Risk  Prescription drug management.        ED Course as of 10/17/24 1406   Wed Oct 16, 2024   1826 Hemoglobin(!): 11.0  Similar to prior   1826 Creatinine(!): 1.65  Increased 0.1 from 2 months ago   1827 EKG rate 88, normal sinus rhythm, normal axis/intervals, with no acute signs of ischemia and unchanged from EKG dated 8/31/2024 1832 hs TnI 0hr: 5   1832 BNP: 21   1846 Chest x-ray with pulmonary vascular congestion but no other acute intrathoracic abnormalities       Medications   furosemide (LASIX) injection 40 mg (40 mg Intravenous Given 10/16/24 1910)   albuterol inhalation solution 2.5 mg (2.5 mg Nebulization Given 10/16/24 1912)       ED Risk Strat Scores                           SBIRT 22yo+      Flowsheet Row Most Recent Value   Initial Alcohol Screen: US AUDIT-C     1. How often do you have a drink containing alcohol? 3 Filed at: 10/16/2024 1829   2. How many drinks containing alcohol do you have on a typical day you are drinking?  2 Filed at: 10/16/2024 1829   3a. Male UNDER 65: How often do you have five or more drinks on one occasion? 0 Filed at: 10/16/2024 1829   3b. FEMALE Any Age, or MALE 65+: How often do you have 4 or more drinks on one occassion? 0 Filed at: 10/16/2024 1829   Audit-C Score 5 Filed at: 10/16/2024 1829   JUMA: How many times in the past year have you...    Used an illegal drug or used a prescription medication for non-medical reasons? Never Filed at: 10/16/2024 1829                            History of Present Illness       Chief Complaint   Patient presents with    Shortness of Breath     SOB, hx of copd. Feels he is bloated w bilat feet swelling, hx of chf       Past Medical History:   Diagnosis Date    Acute kidney injury (HCC) 09/05/2020    Asthma     CHF (congestive heart failure) (HCC)     Colon polyp     Diabetes mellitus (HCC)     GERD (gastroesophageal reflux disease)      Hypertension     Inguinal hernia     3/25/15    Onychomycosis     5/24/17    Sleep apnea     Type 2 diabetes mellitus (HCC) 05/01/2012      Past Surgical History:   Procedure Laterality Date    ARTHROSCOPY KNEE      with Medial and Lateral Meniscus Repair    HERNIA REPAIR      umbilical and inguinal hernia repairs (left)      Family History   Problem Relation Age of Onset    Hypertension Mother         essential    Chronic bronchitis Father     Prostate cancer Father     Colon cancer Father     Breast cancer Sister       Social History     Tobacco Use    Smoking status: Former     Types: Cigars    Smokeless tobacco: Never    Tobacco comments:     current every day smoker, per [employer]   Vaping Use    Vaping status: Never Used   Substance Use Topics    Alcohol use: Yes     Alcohol/week: 3.0 standard drinks of alcohol     Types: 2 Cans of beer, 1 Standard drinks or equivalent per week     Comment: weekend: 1 glass of christian or 2 beers    Drug use: No      E-Cigarette/Vaping    E-Cigarette Use Never User       E-Cigarette/Vaping Substances    Nicotine No     THC No     CBD No     Flavoring No     Other No     Unknown No       I have reviewed and agree with the history as documented.     59-year-old male with history of hypertension, diabetes, CHF, asthma presenting with shortness of breath.  He reports 2 weeks of exertional dyspnea as well as orthopnea and lower extremity swelling without wheezing, cough/hemoptysis, fever/chills, chest pain, nausea/vomiting, or any other complaints.  He denies recent immobilization/surgery or family history of clotting diathesis.  He notes that his weight has increased of late but he is adherent to his home diuretics.      Shortness of Breath  Associated symptoms: no abdominal pain, no chest pain, no cough, no ear pain, no fever, no headaches, no rash, no sore throat, no vomiting and no wheezing        Review of Systems   Constitutional:  Negative for chills and fever.   HENT:   Negative for ear pain and sore throat.    Eyes:  Negative for pain and visual disturbance.   Respiratory:  Positive for shortness of breath. Negative for cough and wheezing.    Cardiovascular:  Positive for leg swelling. Negative for chest pain and palpitations.   Gastrointestinal:  Negative for abdominal pain, blood in stool, constipation, diarrhea, nausea and vomiting.   Genitourinary:  Negative for dysuria and hematuria.   Musculoskeletal:  Negative for arthralgias and back pain.   Skin:  Negative for color change and rash.   Neurological:  Negative for dizziness, seizures, syncope, facial asymmetry, speech difficulty, weakness, light-headedness, numbness and headaches.   All other systems reviewed and are negative.          Objective       ED Triage Vitals [10/16/24 1741]   Temperature Pulse Blood Pressure Respirations SpO2 Patient Position - Orthostatic VS   98.1 °F (36.7 °C) 89 135/74 22 98 % Sitting      Temp Source Heart Rate Source BP Location FiO2 (%) Pain Score    Oral Monitor Right arm -- --      Vitals      Date and Time Temp Pulse SpO2 Resp BP Pain Score FACES Pain Rating User   10/16/24 1900 -- 86 98 % 22 125/68 -- -- JMR   10/16/24 1741 98.1 °F (36.7 °C) 89 98 % 22 135/74 -- -- KM            Physical Exam  Constitutional:       General: He is not in acute distress.     Appearance: He is not ill-appearing or toxic-appearing.   HENT:      Head: Normocephalic.   Cardiovascular:      Rate and Rhythm: Normal rate and regular rhythm.      Heart sounds: Normal heart sounds.   Pulmonary:      Effort: Pulmonary effort is normal.      Comments: Poor air movement in all lung fields but no wheezing/rales/rhonchi.  Normal respiratory rate and work of breathing on room air with normal SpO2  Abdominal:      Palpations: Abdomen is soft.      Tenderness: There is no abdominal tenderness.   Musculoskeletal:      Right lower leg: Edema present.      Left lower leg: Edema present.   Skin:     General: Skin is warm.       Capillary Refill: Capillary refill takes less than 2 seconds.   Neurological:      Mental Status: He is alert and oriented to person, place, and time.   Psychiatric:         Mood and Affect: Mood normal.         Results Reviewed       Procedure Component Value Units Date/Time    B-Type Natriuretic Peptide(BNP) [[id number]]  (Normal) Collected: 10/16/24 1749    Lab Status: Final result Specimen: Blood from Arm, Right Updated: 10/16/24 1822     BNP 21 pg/mL     HS Troponin 0hr (reflex protocol) [[id number]]  (Normal) Collected: 10/16/24 1749    Lab Status: Final result Specimen: Blood from Arm, Right Updated: 10/16/24 1818     hs TnI 0hr 5 ng/L     Comprehensive metabolic panel [[id number]]  (Abnormal) Collected: 10/16/24 1749    Lab Status: Final result Specimen: Blood from Arm, Right Updated: 10/16/24 1818     Sodium 135 mmol/L      Potassium 4.5 mmol/L      Chloride 100 mmol/L      CO2 30 mmol/L      ANION GAP 5 mmol/L      BUN 25 mg/dL      Creatinine 1.65 mg/dL      Glucose 168 mg/dL      Calcium 8.8 mg/dL      AST 20 U/L      ALT 21 U/L      Alkaline Phosphatase 76 U/L      Total Protein 6.5 g/dL      Albumin 3.8 g/dL      Total Bilirubin 0.81 mg/dL      eGFR 44 ml/min/1.73sq m     Narrative:      National Kid[employer] Foundation guidelines for Chronic Kidney Disease (CKD):     Stage 1 with normal or high GFR (GFR > 90 mL/min/1.73 square meters)    Stage 2 Mild CKD (GFR = 60-89 mL/min/1.73 square meters)    Stage 3A Moderate CKD (GFR = 45-59 mL/min/1.73 square meters)    Stage 3B Moderate CKD (GFR = 30-44 mL/min/1.73 square meters)    Stage 4 Severe CKD (GFR = 15-29 mL/min/1.73 square meters)    Stage 5 End Stage CKD (GFR <15 mL/min/1.73 square meters)  Note: GFR calculation is accurate only with a steady state creatinine    CBC and differential [[id number]]  (Abnormal) Collected: 10/16/24 1749    Lab Status: Final result Specimen: Blood from Arm, Right Updated: 10/16/24 1756     WBC 9.60 Thousand/uL      RBC 3.23  Million/uL      Hemoglobin 11.0 g/dL      Hematocrit 32.8 %       fL      MCH 34.1 pg      MCHC 33.5 g/dL      RDW 14.0 %      MPV 10.2 fL      Platelets 200 Thousands/uL      nRBC 0 /100 WBCs      Segmented % 50 %      Immature Grans % 0 %      Lymphocytes % 34 %      Monocytes % 9 %      Eosinophils Relative 7 %      Basophils Relative 0 %      Absolute Neutrophils 4.68 Thousands/µL      Absolute Immature Grans 0.03 Thousand/uL      Absolute Lymphocytes 3.27 Thousands/µL      Absolute Monocytes 0.90 Thousand/µL      Eosinophils Absolute 0.69 Thousand/µL      Basophils Absolute 0.03 Thousands/µL             XR chest 1 view portable   Final Interpretation by Marcie Batres MD (10/16 2158)      No acute pulmonary pathology.                  Workstation performed: [id number]             Procedures    ED Medication and Procedure Management   Prior to Admission Medications   Prescriptions Last Dose Informant Patient Reported? Taking?   Blood Pressure KIT  Self No No   Sig: Twice a day periodically   Fluticasone-Salmeterol (Advair) 250-50 mcg/dose inhaler   No No   Sig: Inhale 1 puff 2 (two) times a day Rinse mouth after use.   LORazepam (ATIVAN) 1 mg tablet  Self Yes No   Sig: Take 1 tablet by mouth daily as needed   Patient not taking: Reported on 7/25/2024   Lancets (ONETOUCH ULTRASOFT) lancets  Self Yes No   Sig: by Does not apply route   albuterol (PROVENTIL HFA,VENTOLIN HFA) 90 mcg/act inhaler   No No   Sig: Inhale 1 puff in the morning   carvedilol (COREG) 6.25 mg tablet  Self No No   Sig: Take 1 tablet (6.25 mg total) by mouth 2 (two) times a day with meals   diclofenac sodium (VOLTAREN) 50 mg EC tablet   No No   Sig: Sig: Take 1 tablet twice a day after food as needed for joint pain   famotidine (PEPCID) 20 mg tablet   No No   Sig: Take 1 tablet (20 mg total) by mouth daily at bedtime   glimepiride (AMARYL) 1 mg tablet   No No   Sig: TAKE 1 TABLET BY MOUTH TWICE A DAY   glucose blood test strip  Self No  No   Si each by Other route daily   ipratropium (ATROVENT) 0.02 % nebulizer solution   No No   Sig: Take 2.5 mL (0.5 mg total) by nebulization 3 (three) times a day   levalbuterol (XOPENEX) 1.25 mg/3 mL nebulizer solution   No No   Sig: Take 3 mL (1.25 mg total) by nebulization 3 (three) times a day   methocarbamol (ROBAXIN) 500 mg tablet  Self No No   Sig: Take 1 tablet (500 mg total) by mouth 2 (two) times a day   Patient not taking: Reported on 2024   pantoprazole (PROTONIX) 40 mg tablet  Self No No   Sig: Take 1 tablet (40 mg total) by mouth daily before breakfast   Patient not taking: Reported on 2024   predniSONE 10 mg tablet   No No   Si tabs PO qDaily x2 days, 5 tabs PO qD x 2d, 4 tabs PO qD x 2d, 3 tabs PO qD x 2d, 2 tabs PO qD x 2d, 1 tab PO qD x 2d   predniSONE 20 mg tablet   No No   Si tabs X 3 days, 1.5 tabs X 3 days, 1 tab X 3 days, 0.5 tab X 3 days   sitaGLIPtin (Januvia) 100 mg tablet  Self No No   Sig: Take 1 tablet (100 mg total) by mouth daily   Patient not taking: Reported on 2024   spironolactone (ALDACTONE) 25 mg tablet   No No   Sig: Take 1 tablet (25 mg total) by mouth daily   torsemide (DEMADEX) 20 mg tablet  Self No No   Sig: TAKE 2 TABS ONCE DAILY IN AM      Facility-Administered Medications: None     Discharge Medication List as of 10/16/2024  7:54 PM        CONTINUE these medications which have NOT CHANGED    Details   albuterol (PROVENTIL HFA,VENTOLIN HFA) 90 mcg/act inhaler Inhale 1 puff in the morning, Starting Wed 10/16/2024, Normal      Blood Pressure KIT Twice a day periodically, Normal      carvedilol (COREG) 6.25 mg tablet Take 1 tablet (6.25 mg total) by mouth 2 (two) times a day with meals, Starting 2024, Until 2025, Normal      diclofenac sodium (VOLTAREN) 50 mg EC tablet Sig: Take 1 tablet twice a day after food as needed for joint pain, Normal      famotidine (PEPCID) 20 mg tablet Take 1 tablet (20 mg total) by mouth daily at  bedtime, Starting Tue 7/2/2024, Normal      Fluticasone-Salmeterol (Advair) 250-50 mcg/dose inhaler Inhale 1 puff 2 (two) times a day Rinse mouth after use., Starting Fri 9/27/2024, Until Mon 9/22/2025, Normal      glimepiride (AMARYL) 1 mg tablet TAKE 1 TABLET BY MOUTH TWICE A DAY, Starting Mon 4/1/2024, Normal      glucose blood test strip 1 each by Other route daily, Starting Tue 11/3/2020, Normal      ipratropium (ATROVENT) 0.02 % nebulizer solution Take 2.5 mL (0.5 mg total) by nebulization 3 (three) times a day, Starting Sat 7/27/2024, Until Mon 8/26/2024, Normal      Lancets (ONETOUCH ULTRASOFT) lancets by Does not apply route, Starting u 12/28/2017, Historical Med      levalbuterol (XOPENEX) 1.25 mg/3 mL nebulizer solution Take 3 mL (1.25 mg total) by nebulization 3 (three) times a day, Starting Sat 7/27/2024, Normal      LORazepam (ATIVAN) 1 mg tablet Take 1 tablet by mouth daily as needed, Starting Tue 11/8/2022, Historical Med      methocarbamol (ROBAXIN) 500 mg tablet Take 1 tablet (500 mg total) by mouth 2 (two) times a day, Starting Sun 7/9/2023, Normal      pantoprazole (PROTONIX) 40 mg tablet Take 1 tablet (40 mg total) by mouth daily before breakfast, Starting Fri 1/19/2024, Normal      !! predniSONE 10 mg tablet 6 tabs PO qDaily x2 days, 5 tabs PO qD x 2d, 4 tabs PO qD x 2d, 3 tabs PO qD x 2d, 2 tabs PO qD x 2d, 1 tab PO qD x 2d, Normal      !! predniSONE 20 mg tablet 2 tabs X 3 days, 1.5 tabs X 3 days, 1 tab X 3 days, 0.5 tab X 3 days, Normal      sitaGLIPtin (Januvia) 100 mg tablet Take 1 tablet (100 mg total) by mouth daily, Starting Tue 10/31/2023, Normal      spironolactone (ALDACTONE) 25 mg tablet Take 1 tablet (25 mg total) by mouth daily, Starting Tue 7/2/2024, Until Sun 12/29/2024, Normal      torsemide (DEMADEX) 20 mg tablet TAKE 2 TABS ONCE DAILY IN AM, Normal       !! - Potential duplicate medications found. Please discuss with provider.        No discharge procedures on file.  ED  SEPSIS DOCUMENTATION   Time reflects when diagnosis was documented in both MDM as applicable and the Disposition within this note       Time User Action Codes Description Comment    10/16/2024  7:53 PM Patria Zuniga Add [I50.9] Acute on chronic congestive heart failure, unspecified heart failure type (HCC)                  Patria Zuniga MD  10/17/24 2191

## 2024-10-16 NOTE — TELEPHONE ENCOUNTER
Reason for call:   [x] Refill   [] Prior Auth  [] Other:     Office:   [x] PCP/Provider - Linden Roque MD  [] Specialty/Provider -     Medication: albuterol (PROVENTIL HFA,VENTOLIN HFA) 90 mcg/act inhaler     Dose/Frequency: 1 puff, Inhalation, Daily     Quantity: 18 g    Pharmacy: [facility] #[id number] [place], [place] - [address]     Does the patient have enough for 3 days?   [] Yes   [x] No - Send as HP to POD

## 2024-10-21 ENCOUNTER — OFFICE VISIT (OUTPATIENT)
Dept: FAMILY MEDICINE CLINIC | Facility: CLINIC | Age: 59
End: 2024-10-21
Payer: COMMERCIAL

## 2024-10-21 VITALS
TEMPERATURE: 98 F | RESPIRATION RATE: 18 BRPM | HEART RATE: 91 BPM | BODY MASS INDEX: 41.3 KG/M2 | SYSTOLIC BLOOD PRESSURE: 122 MMHG | DIASTOLIC BLOOD PRESSURE: 70 MMHG | WEIGHT: 263.13 LBS | OXYGEN SATURATION: 98 % | HEIGHT: 67 IN

## 2024-10-21 DIAGNOSIS — J45.901 MILD ASTHMA WITH ACUTE EXACERBATION, UNSPECIFIED WHETHER PERSISTENT: Primary | ICD-10-CM

## 2024-10-21 DIAGNOSIS — M19.031 ARTHRITIS OF RIGHT WRIST: ICD-10-CM

## 2024-10-21 DIAGNOSIS — J45.909 REACTIVE AIRWAY DISEASE WITHOUT COMPLICATION: ICD-10-CM

## 2024-10-21 PROCEDURE — 99213 OFFICE O/P EST LOW 20 MIN: CPT | Performed by: FAMILY MEDICINE

## 2024-10-21 RX ORDER — ALBUTEROL SULFATE 90 UG/1
1 INHALANT RESPIRATORY (INHALATION) DAILY
Qty: 54 G | Refills: 5 | Status: SHIPPED | OUTPATIENT
Start: 2024-10-21

## 2024-10-21 RX ORDER — PREDNISONE 10 MG/1
TABLET ORAL
Qty: 30 TABLET | Refills: 0 | Status: SHIPPED | OUTPATIENT
Start: 2024-10-21

## 2024-10-21 NOTE — ASSESSMENT & PLAN NOTE
Hand pain.  Patient with known history of gout and arthritis of his right hand.  Hopefully being on prednisone tapering dose will improve his symptoms.

## 2024-10-21 NOTE — ASSESSMENT & PLAN NOTE
Asthma exacerbation.  Patient given prescription for prednisone tapering dose consisting of 50 mg x 2 days, 40 x 2, 30 x 2, 20 x 2, 10 x 2 days.  Patient will call if symptoms persist after medication completed.  He will continue to use his nebulizer 3 times daily as long as he is at home or until symptoms subside.

## 2024-10-21 NOTE — PROGRESS NOTES
FAMILY PRACTICE OFFICE VISIT       NAME: Elly Chong  AGE: 59 y.o. SEX: male       : 1965        MRN: [id number]    DATE: 10/21/2024  TIME: 5:12 PM    Assessment and Plan     Problem List Items Addressed This Visit       Arthritis of right wrist     Hand pain.  Patient with known history of gout and arthritis of his right hand.  Hopefully being on prednisone tapering dose will improve his symptoms.         Reactive airway disease without complication    Relevant Medications    albuterol (PROVENTIL HFA,VENTOLIN HFA) 90 mcg/act inhaler    Mild asthma with acute exacerbation - Primary     Asthma exacerbation.  Patient given prescription for prednisone tapering dose consisting of 50 mg x 2 days, 40 x 2, 30 x 2, 20 x 2, 10 x 2 days.  Patient will call if symptoms persist after medication completed.  He will continue to use his nebulizer 3 times daily as long as he is at home or until symptoms subside.         Relevant Medications    predniSONE 10 mg tablet    albuterol (PROVENTIL HFA,VENTOLIN HFA) 90 mcg/act inhaler           Chief Complaint     Chief Complaint   Patient presents with    Cough     Congestion     Follow-up       History of Present Illness     Patient in the office to review emergency room visit recently.  Patient has a history of congestive heart failure and felt he was getting more swelling around his legs.  He is feeling better at this time however he continues to experience some shortness of breath and wheezing.  He also reports unrelated right hand stiffness and pain.  He does have a history of arthritis as well as gout.    Cough  Associated symptoms include shortness of breath.       Review of Systems   Review of Systems   Constitutional: Negative.    HENT: Negative.     Respiratory:  Positive for cough and shortness of breath.    Cardiovascular: Negative.    Gastrointestinal: Negative.    Musculoskeletal:  Positive for arthralgias.       Active Problem List     Patient Active Problem  List   Diagnosis    Essential hypertension    Uncontrolled type 2 diabetes mellitus with hyperglycemia (HCC)    Elevated lipoprotein(a)    Microalbuminuria    Obesity    Medical non-compliance    Hypomagnesemia    CHF (congestive heart failure) (HCC)    Alcohol abuse    DELFINO (acute kidney injury) ([facility])    Onychomycosis    Arthritis of right wrist    Acute on chronic diastolic congestive heart failure (HCC)    Impingement syndrome of left shoulder    Diabetes mellitus ([facility])    Acute gout of right hand    Acute midline low back pain without sciatica    Reactive airway disease without complication    (HFpEF) heart failure with preserved ejection fraction ([facility])    AMANDO (obstructive sleep apnea)    Arthralgia    Intermittent asthma    Mild asthma with acute exacerbation    Current smoker    Cellulitis       Past Medical History:  Past Medical History:   Diagnosis Date    Acute kidney injury ([facility]) 09/05/2020    Asthma     CHF (congestive heart failure) ([facility])     Colon polyp     Diabetes mellitus ([facility])     GERD (gastroesophageal reflux disease)     Hypertension     Inguinal hernia     3/25/15    Onychomycosis     5/24/17    Sleep apnea     Type 2 diabetes mellitus ([facility]) 05/01/2012       Past Surgical History:  Past Surgical History:   Procedure Laterality Date    ARTHROSCOPY KNEE      with Medial and Lateral Meniscus Repair    HERNIA REPAIR      umbilical and inguinal hernia repairs (left)       Family History:  Family History   Problem Relation Age of Onset    Hypertension Mother         essential    Chronic bronchitis Father     Prostate cancer Father     Colon cancer Father     Breast cancer Sister        Social History:  Social History     Socioeconomic History    Marital status: /Civil Union     Spouse name: Not on file    Number of children: Not on file    Years of education: Not on file    Highest education level: Not on file   Occupational History    Occupation: [employer]    Tobacco Use    Smoking  status: Former     Types: Cigars    Smokeless tobacco: Never    Tobacco comments:     current every day smoker, per [employer]   Vaping Use    Vaping status: Never Used   Substance and Sexual Activity    Alcohol use: Yes     Alcohol/week: 3.0 standard drinks of alcohol     Types: 2 Cans of beer, 1 Standard drinks or equivalent per week     Comment: weekend: 1 glass of christian or 2 beers    Drug use: No    Sexual activity: Yes   Other Topics Concern    Not on file   Social History Narrative    Alcohol dependence    Nicotine dependence    Denied, alcohol Use    Marital problems     Social Determinants of Health     Financial Resource Strain: Not on file   Food Insecurity: Not on file   Transportation Needs: Not on file   Physical Activity: Not on file   Stress: Not on file   Social Connections: Not on file   Intimate Partner Violence: Not on file   Housing Stability: Not on file       Objective     Vitals:    10/21/24 1429   BP: 122/70   Pulse: 91   Resp: 18   Temp: 98 °F (36.7 °C)   SpO2: 98%     Wt Readings from Last 3 Encounters:   10/21/24 119 kg (263 lb 2 oz)   09/27/24 124 kg (273 lb)   08/31/24 122 kg (268 lb 15.4 oz)       Physical Exam  Constitutional:       General: He is not in acute distress.     Appearance: Normal appearance. He is not ill-appearing.   HENT:      Head: Normocephalic and atraumatic.   Eyes:      General:         Right eye: No discharge.         Left eye: No discharge.      Extraocular Movements: Extraocular movements intact.      Conjunctiva/sclera: Conjunctivae normal.      Pupils: Pupils are equal, round, and reactive to light.   Neck:      Vascular: No carotid bruit.   Cardiovascular:      Rate and Rhythm: Normal rate and regular rhythm.      Heart sounds: Normal heart sounds. No murmur heard.  Pulmonary:      Effort: Pulmonary effort is normal.      Breath sounds: Wheezing present. No rhonchi or rales.      Comments: Patient with scattered Tory wheezing  Musculoskeletal:          General: Swelling, tenderness and deformity present.      Right lower leg: Edema present.      Left lower leg: Edema present.      Comments: Patient with trace to +1 peripheral edema bilateral lower extremities however improved from his baseline.    Patient with dorsum of right hand mildly swollen at the MCP joint.  Patient has chronic enlargement of PIP joints of first and second finger of his right hand.  He has decreased mobility for flexion of his hand due to swelling and pain   Lymphadenopathy:      Cervical: No cervical adenopathy.   Skin:     Findings: No rash.   Neurological:      General: No focal deficit present.      Mental Status: He is alert and oriented to person, place, and time.      Cranial Nerves: No cranial nerve deficit.   Psychiatric:         Mood and Affect: Mood normal.         Behavior: Behavior normal.         Thought Content: Thought content normal.         Judgment: Judgment normal.         Pertinent Laboratory/Diagnostic Studies:  Lab Results   Component Value Date    GLUCOSE 179 (H) 09/04/2020    BUN 25 10/16/2024    CREATININE 1.65 (H) 10/16/2024    CALCIUM 8.8 10/16/2024     12/21/2017    K 4.5 10/16/2024    CO2 30 10/16/2024     10/16/2024     Lab Results   Component Value Date    ALT 21 10/16/2024    AST 20 10/16/2024    ALKPHOS 76 10/16/2024    BILITOT 1.3 (H) 12/21/2017       Lab Results   Component Value Date    WBC 9.60 10/16/2024    HGB 11.0 (L) 10/16/2024    HCT 32.8 (L) 10/16/2024     (H) 10/16/2024     10/16/2024       Lab Results   Component Value Date    TSH 3.58 03/14/2022       Lab Results   Component Value Date    CHOL 153 12/21/2017     Lab Results   Component Value Date    TRIG 69 11/21/2023     Lab Results   Component Value Date    HDL 36 (L) 11/21/2023     Lab Results   Component Value Date    LDLCALC 70 11/21/2023     Lab Results   Component Value Date    HGBA1C 5.7 (H) 07/25/2024       Results for orders placed or performed during the  hospital encounter of 10/16/24   ECG 12 lead    Collection Time: 10/16/24  5:43 PM   Result Value Ref Range    Ventricular Rate 88 BPM    Atrial Rate 88 BPM    CT Interval 142 ms    QRSD Interval 78 ms    QT Interval 358 ms    QTC Interval 433 ms    P Axis 61 degrees    QRS Axis 52 degrees    T Wave Axis 21 degrees   CBC and differential    Collection Time: 10/16/24  5:49 PM   Result Value Ref Range    WBC 9.60 4.31 - 10.16 Thousand/uL    RBC 3.23 (L) 3.88 - 5.62 Million/uL    Hemoglobin 11.0 (L) 12.0 - 17.0 g/dL    Hematocrit 32.8 (L) 36.5 - 49.3 %     (H) 82 - 98 fL    MCH 34.1 26.8 - 34.3 pg    MCHC 33.5 31.4 - 37.4 g/dL    RDW 14.0 11.6 - 15.1 %    MPV 10.2 8.9 - 12.7 fL    Platelets 200 149 - 390 Thousands/uL    nRBC 0 /100 WBCs    Segmented % 50 43 - 75 %    Immature Grans % 0 0 - 2 %    Lymphocytes % 34 14 - 44 %    Monocytes % 9 4 - 12 %    Eosinophils Relative 7 (H) 0 - 6 %    Basophils Relative 0 0 - 1 %    Absolute Neutrophils 4.68 1.85 - 7.62 Thousands/µL    Absolute Immature Grans 0.03 0.00 - 0.20 Thousand/uL    Absolute Lymphocytes 3.27 0.60 - 4.47 Thousands/µL    Absolute Monocytes 0.90 0.17 - 1.22 Thousand/µL    Eosinophils Absolute 0.69 (H) 0.00 - 0.61 Thousand/µL    Basophils Absolute 0.03 0.00 - 0.10 Thousands/µL   Comprehensive metabolic panel    Collection Time: 10/16/24  5:49 PM   Result Value Ref Range    Sodium 135 135 - 147 mmol/L    Potassium 4.5 3.5 - 5.3 mmol/L    Chloride 100 96 - 108 mmol/L    CO2 30 21 - 32 mmol/L    ANION GAP 5 4 - 13 mmol/L    BUN 25 5 - 25 mg/dL    Creatinine 1.65 (H) 0.60 - 1.30 mg/dL    Glucose 168 (H) 65 - 140 mg/dL    Calcium 8.8 8.4 - 10.2 mg/dL    AST 20 13 - 39 U/L    ALT 21 7 - 52 U/L    Alkaline Phosphatase 76 34 - 104 U/L    Total Protein 6.5 6.4 - 8.4 g/dL    Albumin 3.8 3.5 - 5.0 g/dL    Total Bilirubin 0.81 0.20 - 1.00 mg/dL    eGFR 44 ml/min/1.73sq m   HS Troponin 0hr (reflex protocol)    Collection Time: 10/16/24  5:49 PM   Result Value Ref  "Range    hs TnI 0hr 5 \"Refer to ACS Flowchart\"- see link ng/L   B-Type Natriuretic Peptide(BNP)    Collection Time: 10/16/24  5:49 PM   Result Value Ref Range    BNP 21 0 - 100 pg/mL       No orders of the defined types were placed in this encounter.      ALLERGIES:  No Known Allergies    Current Medications     Current Outpatient Medications   Medication Sig Dispense Refill    albuterol (PROVENTIL HFA,VENTOLIN HFA) 90 mcg/act inhaler Inhale 1 puff in the morning 3 inhalers per refill 54 g 5    Blood Pressure KIT Twice a day periodically 1 each 0    carvedilol (COREG) 6.25 mg tablet Take 1 tablet (6.25 mg total) by mouth 2 (two) times a day with meals 180 tablet 5    diclofenac sodium (VOLTAREN) 50 mg EC tablet Sig: Take 1 tablet twice a day after food as needed for joint pain 180 tablet 1    famotidine (PEPCID) 20 mg tablet Take 1 tablet (20 mg total) by mouth daily at bedtime 90 tablet 1    Fluticasone-Salmeterol (Advair) 250-50 mcg/dose inhaler Inhale 1 puff 2 (two) times a day Rinse mouth after use. 180 blister 3    glimepiride (AMARYL) 1 mg tablet TAKE 1 TABLET BY MOUTH TWICE A  tablet 1    glucose blood test strip 1 each by Other route daily 180 each 5    Lancets (ONETOUCH ULTRASOFT) lancets by Does not apply route      levalbuterol (XOPENEX) 1.25 mg/3 mL nebulizer solution Take 3 mL (1.25 mg total) by nebulization 3 (three) times a day 270 mL 0    predniSONE 10 mg tablet 5 tabs x 2 days, 4 tabs X 2 days, 3 tabs X 2 days, 2 tabs X 2 days, 1 tab X 2 days 30 tablet 0    spironolactone (ALDACTONE) 25 mg tablet Take 1 tablet (25 mg total) by mouth daily 90 tablet 1    torsemide (DEMADEX) 20 mg tablet TAKE 2 TABS ONCE DAILY IN  tablet 2    ipratropium (ATROVENT) 0.02 % nebulizer solution Take 2.5 mL (0.5 mg total) by nebulization 3 (three) times a day 225 mL 0    LORazepam (ATIVAN) 1 mg tablet Take 1 tablet by mouth daily as needed (Patient not taking: Reported on 7/25/2024)      methocarbamol " (ROBAXIN) 500 mg tablet Take 1 tablet (500 mg total) by mouth 2 (two) times a day (Patient not taking: Reported on 7/25/2024) 20 tablet 0    pantoprazole (PROTONIX) 40 mg tablet Take 1 tablet (40 mg total) by mouth daily before breakfast (Patient not taking: Reported on 7/25/2024) 90 tablet 3    sitaGLIPtin (Januvia) 100 mg tablet Take 1 tablet (100 mg total) by mouth daily (Patient not taking: Reported on 9/27/2024) 90 tablet 1     No current facility-administered medications for this visit.         Health Maintenance     Health Maintenance   Topic Date Due    Pneumococcal Vaccine: Pediatrics (0 to 5 Years) and At-Risk Patients (6 to 64 Years) (1 of 2 - PCV) Never done    Hepatitis A Vaccine (1 of 2 - Risk 2-dose series) Never done    Zoster Vaccine (1 of 2) Never done    Annual Physical  12/22/2023    RSV Vaccine Age 60+ Years (1 - 1-dose 60+ series) Never done    Influenza Vaccine (1) 09/01/2024    COVID-19 Vaccine (4 - 2023-24 season) 09/01/2024    DM Eye Exam  10/09/2024    HEMOGLOBIN A1C  01/25/2025    Depression Screening  09/27/2025    Kidney Health Evaluation: Albumin/Creatinine Ratio  09/27/2025    Diabetic Foot Exam  09/27/2025    Kidney Health Evaluation: GFR  10/16/2025    DTaP,Tdap,and Td Vaccines (3 - Td or Tdap) 09/05/2030    Colorectal Cancer Screening  01/17/2031    HIV Screening  Completed    Hepatitis C Screening  Completed    RSV Vaccine age 0-20 Months  Aged Out    HIB Vaccine  Aged Out    IPV Vaccine  Aged Out    Meningococcal ACWY Vaccine  Aged Out    HPV Vaccine  Aged Out     Immunization History   Administered Date(s) Administered    COVID-19 PFIZER VACCINE 0.3 ML IM 04/12/2021, 05/03/2021, 12/15/2021    INFLUENZA 09/23/2015, 10/21/2019, 12/22/2022, 10/31/2023    Influenza, injectable, quadrivalent, preservative free 0.5 mL 10/27/2020, 10/31/2023    Influenza, recombinant, quadrivalent,injectable, preservative free 10/21/2019, 12/22/2022    Influenza, seasonal, injectable 09/23/2015    Tdap  10/21/2019, 09/05/2020       Linden Roque MD

## 2024-11-13 ENCOUNTER — TELEPHONE (OUTPATIENT)
Age: 59
End: 2024-11-13

## 2024-11-13 DIAGNOSIS — J45.901 MILD ASTHMA WITH ACUTE EXACERBATION, UNSPECIFIED WHETHER PERSISTENT: ICD-10-CM

## 2024-11-13 DIAGNOSIS — J45.909 REACTIVE AIRWAY DISEASE WITHOUT COMPLICATION: ICD-10-CM

## 2024-11-13 DIAGNOSIS — J44.1 COPD EXACERBATION (HCC): ICD-10-CM

## 2024-11-13 RX ORDER — LEVALBUTEROL INHALATION SOLUTION 1.25 MG/3ML
1.25 SOLUTION RESPIRATORY (INHALATION)
Qty: 270 ML | Refills: 3 | Status: SHIPPED | OUTPATIENT
Start: 2024-11-13 | End: 2024-11-16

## 2024-11-13 RX ORDER — FLUTICASONE PROPIONATE AND SALMETEROL 500; 50 UG/1; UG/1
1 POWDER RESPIRATORY (INHALATION) 2 TIMES DAILY
Qty: 60 BLISTER | Refills: 5 | Status: SHIPPED | OUTPATIENT
Start: 2024-11-13 | End: 2025-05-12

## 2024-11-13 RX ORDER — ALBUTEROL SULFATE 90 UG/1
1 INHALANT RESPIRATORY (INHALATION) DAILY
Qty: 54 G | Refills: 1 | Status: SHIPPED | OUTPATIENT
Start: 2024-11-13

## 2024-11-13 NOTE — TELEPHONE ENCOUNTER
Elly would like to know why his refills for albuterol change from 3 month supply to just 1 month. Refill request sent out

## 2024-11-13 NOTE — TELEPHONE ENCOUNTER
Medication: levalbuterol (XOPENEX) 1.25 mg/3 mL nebulizer solution     Dose/Frequency: Take 3 mL (1.25 mg total) by nebulization 3 (three) times a day     Quantity: 270 ml    Pharmacy: [facility]/pharmacy #[id number] [facility] - [id number]     Office:   [x] PCP/Provider - Elijah Anand MD   [] Speciality/Provider -     Does the patient have enough for 3 days?   [] Yes   [x] No - Send as HP to POD    Medication: albuterol (PROVENTIL HFA,VENTOLIN HFA) 90 mcg/act inhaler     Dose/Frequency: Inhale 1 puff in the morning 3 inhalers per refill     Quantity: 54 g    Pharmacy: [facility]/pharmacy #[id number] [facility] - [address]     Office:   [x] PCP/Provider - Linden Roque MD   [] Speciality/Provider -     Does the patient have enough for 3 days?   [] Yes   [x] No - Send as HP to POD

## 2024-11-13 NOTE — TELEPHONE ENCOUNTER
Spoke with pt, he is using the rescue inhaler daily for work purposes. I did explain the difference between the rescue and maintenance inhaler.  Pt takes a puff of the Albuterol inhaler prior to climbing a ladder. He does feel his breathing is a bit heavier than it has been. So he does feel he will need a change in dose or medication.    He will need a refill on the rescue as well as he does travel for work and will need to keep with him.

## 2024-11-13 NOTE — TELEPHONE ENCOUNTER
If patient is using his maintenance Advair inhaler twice daily he should not require the use of albuterol inhaler on a regular basis.  That is why it is called a rescue inhaler and not a maintenance inhaler.  If he feels he is using a rescue inhaler regularly then we may need to make adjustments to his maintenance inhaler since obviously it is not being completely effective

## 2024-11-16 ENCOUNTER — OFFICE VISIT (OUTPATIENT)
Dept: URGENT CARE | Age: 59
End: 2024-11-16
Payer: COMMERCIAL

## 2024-11-16 ENCOUNTER — HOSPITAL ENCOUNTER (EMERGENCY)
Facility: HOSPITAL | Age: 59
Discharge: HOME | End: 2024-11-16
Attending: EMERGENCY MEDICINE
Payer: COMMERCIAL

## 2024-11-16 VITALS
RESPIRATION RATE: 18 BRPM | HEART RATE: 94 BPM | TEMPERATURE: 98.1 F | DIASTOLIC BLOOD PRESSURE: 79 MMHG | SYSTOLIC BLOOD PRESSURE: 139 MMHG | OXYGEN SATURATION: 98 %

## 2024-11-16 VITALS
OXYGEN SATURATION: 98 % | HEART RATE: 87 BPM | TEMPERATURE: 99.6 F | SYSTOLIC BLOOD PRESSURE: 134 MMHG | DIASTOLIC BLOOD PRESSURE: 74 MMHG | RESPIRATION RATE: 18 BRPM

## 2024-11-16 DIAGNOSIS — R60.0 HAND EDEMA: Primary | ICD-10-CM

## 2024-11-16 DIAGNOSIS — M25.50 ARTHRALGIA, UNSPECIFIED JOINT: ICD-10-CM

## 2024-11-16 DIAGNOSIS — J44.1 COPD EXACERBATION (HCC): ICD-10-CM

## 2024-11-16 DIAGNOSIS — Z86.79 HISTORY OF CHRONIC CHF: ICD-10-CM

## 2024-11-16 DIAGNOSIS — M10.9 ACUTE GOUT OF RIGHT HAND, UNSPECIFIED CAUSE: Primary | ICD-10-CM

## 2024-11-16 DIAGNOSIS — M79.641 HAND PAIN, RIGHT: ICD-10-CM

## 2024-11-16 LAB
ANION GAP SERPL CALCULATED.3IONS-SCNC: 8 MMOL/L (ref 4–13)
BASOPHILS # BLD AUTO: 0.04 THOUSANDS/ÂΜL (ref 0–0.1)
BASOPHILS NFR BLD AUTO: 0 % (ref 0–1)
BUN SERPL-MCNC: 18 MG/DL (ref 5–25)
CALCIUM SERPL-MCNC: 9 MG/DL (ref 8.4–10.2)
CHLORIDE SERPL-SCNC: 98 MMOL/L (ref 96–108)
CO2 SERPL-SCNC: 26 MMOL/L (ref 21–32)
CREAT SERPL-MCNC: 1.36 MG/DL (ref 0.6–1.3)
CRP SERPL HS-MCNC: 36.6 MG/L
EOSINOPHIL # BLD AUTO: 0.25 THOUSAND/ÂΜL (ref 0–0.61)
EOSINOPHIL NFR BLD AUTO: 2 % (ref 0–6)
ERYTHROCYTE [DISTWIDTH] IN BLOOD BY AUTOMATED COUNT: 14.2 % (ref 11.6–15.1)
GFR SERPL CREATININE-BSD FRML MDRD: 56 ML/MIN/1.73SQ M
GLUCOSE SERPL-MCNC: 199 MG/DL (ref 65–140)
HCT VFR BLD AUTO: 36.5 % (ref 36.5–49.3)
HGB BLD-MCNC: 11.8 G/DL (ref 12–17)
IMM GRANULOCYTES # BLD AUTO: 0.03 THOUSAND/UL (ref 0–0.2)
IMM GRANULOCYTES NFR BLD AUTO: 0 % (ref 0–2)
LYMPHOCYTES # BLD AUTO: 1.98 THOUSANDS/ÂΜL (ref 0.6–4.47)
LYMPHOCYTES NFR BLD AUTO: 17 % (ref 14–44)
MCH RBC QN AUTO: 32.4 PG (ref 26.8–34.3)
MCHC RBC AUTO-ENTMCNC: 32.3 G/DL (ref 31.4–37.4)
MCV RBC AUTO: 100 FL (ref 82–98)
MONOCYTES # BLD AUTO: 1.32 THOUSAND/ÂΜL (ref 0.17–1.22)
MONOCYTES NFR BLD AUTO: 12 % (ref 4–12)
NEUTROPHILS # BLD AUTO: 7.87 THOUSANDS/ÂΜL (ref 1.85–7.62)
NEUTS SEG NFR BLD AUTO: 69 % (ref 43–75)
NRBC BLD AUTO-RTO: 0 /100 WBCS
PLATELET # BLD AUTO: 205 THOUSANDS/UL (ref 149–390)
PMV BLD AUTO: 10.5 FL (ref 8.9–12.7)
POTASSIUM SERPL-SCNC: 5.3 MMOL/L (ref 3.5–5.3)
PROCALCITONIN SERPL-MCNC: 0.08 NG/ML
RBC # BLD AUTO: 3.64 MILLION/UL (ref 3.88–5.62)
SODIUM SERPL-SCNC: 132 MMOL/L (ref 135–147)
URATE SERPL-MCNC: 11.4 MG/DL (ref 3.5–8.5)
WBC # BLD AUTO: 11.49 THOUSAND/UL (ref 4.31–10.16)

## 2024-11-16 PROCEDURE — 96365 THER/PROPH/DIAG IV INF INIT: CPT

## 2024-11-16 PROCEDURE — 99215 OFFICE O/P EST HI 40 MIN: CPT | Performed by: NURSE PRACTITIONER

## 2024-11-16 PROCEDURE — 85025 COMPLETE CBC W/AUTO DIFF WBC: CPT | Performed by: EMERGENCY MEDICINE

## 2024-11-16 PROCEDURE — 99283 EMERGENCY DEPT VISIT LOW MDM: CPT

## 2024-11-16 PROCEDURE — 99285 EMERGENCY DEPT VISIT HI MDM: CPT | Performed by: EMERGENCY MEDICINE

## 2024-11-16 PROCEDURE — 96375 TX/PRO/DX INJ NEW DRUG ADDON: CPT

## 2024-11-16 PROCEDURE — 84550 ASSAY OF BLOOD/URIC ACID: CPT | Performed by: EMERGENCY MEDICINE

## 2024-11-16 PROCEDURE — 87040 BLOOD CULTURE FOR BACTERIA: CPT | Performed by: EMERGENCY MEDICINE

## 2024-11-16 PROCEDURE — 80048 BASIC METABOLIC PNL TOTAL CA: CPT | Performed by: EMERGENCY MEDICINE

## 2024-11-16 PROCEDURE — 86141 C-REACTIVE PROTEIN HS: CPT | Performed by: EMERGENCY MEDICINE

## 2024-11-16 PROCEDURE — 36415 COLL VENOUS BLD VENIPUNCTURE: CPT | Performed by: EMERGENCY MEDICINE

## 2024-11-16 PROCEDURE — 84145 PROCALCITONIN (PCT): CPT | Performed by: EMERGENCY MEDICINE

## 2024-11-16 RX ORDER — DOCUSATE SODIUM 100 MG/1
100 CAPSULE, LIQUID FILLED ORAL 3 TIMES DAILY PRN
Qty: 30 CAPSULE | Refills: 0 | Status: SHIPPED | OUTPATIENT
Start: 2024-11-16

## 2024-11-16 RX ORDER — CEFAZOLIN SODIUM 2 G/50ML
2000 SOLUTION INTRAVENOUS ONCE
Status: COMPLETED | OUTPATIENT
Start: 2024-11-16 | End: 2024-11-16

## 2024-11-16 RX ORDER — PREDNISONE 10 MG/1
40 TABLET ORAL DAILY
Qty: 20 TABLET | Refills: 0 | Status: SHIPPED | OUTPATIENT
Start: 2024-11-16 | End: 2024-11-21

## 2024-11-16 RX ORDER — OXYCODONE HYDROCHLORIDE 5 MG/1
5 TABLET ORAL EVERY 4 HOURS PRN
Qty: 15 TABLET | Refills: 0 | Status: SHIPPED | OUTPATIENT
Start: 2024-11-16 | End: 2024-11-26

## 2024-11-16 RX ORDER — METHYLPREDNISOLONE SODIUM SUCCINATE 125 MG/2ML
80 INJECTION, POWDER, LYOPHILIZED, FOR SOLUTION INTRAMUSCULAR; INTRAVENOUS ONCE
Status: COMPLETED | OUTPATIENT
Start: 2024-11-16 | End: 2024-11-16

## 2024-11-16 RX ORDER — FENTANYL CITRATE 50 UG/ML
50 INJECTION, SOLUTION INTRAMUSCULAR; INTRAVENOUS ONCE
Refills: 0 | Status: COMPLETED | OUTPATIENT
Start: 2024-11-16 | End: 2024-11-16

## 2024-11-16 RX ORDER — COLCHICINE 0.6 MG/1
1.2 TABLET ORAL ONCE
Status: COMPLETED | OUTPATIENT
Start: 2024-11-16 | End: 2024-11-16

## 2024-11-16 RX ORDER — LEVALBUTEROL INHALATION SOLUTION 1.25 MG/3ML
1.25 SOLUTION RESPIRATORY (INHALATION)
Qty: 270 ML | Refills: 0 | Status: SHIPPED | OUTPATIENT
Start: 2024-11-16

## 2024-11-16 RX ORDER — OXYCODONE AND ACETAMINOPHEN 5; 325 MG/1; MG/1
1 TABLET ORAL ONCE
Refills: 0 | Status: COMPLETED | OUTPATIENT
Start: 2024-11-16 | End: 2024-11-16

## 2024-11-16 RX ADMIN — COLCHICINE 1.2 MG: 0.6 TABLET ORAL at 19:02

## 2024-11-16 RX ADMIN — OXYCODONE HYDROCHLORIDE AND ACETAMINOPHEN 1 TABLET: 5; 325 TABLET ORAL at 18:33

## 2024-11-16 RX ADMIN — CEFAZOLIN SODIUM 2000 MG: 2 SOLUTION INTRAVENOUS at 18:00

## 2024-11-16 RX ADMIN — FENTANYL CITRATE 50 MCG: 50 INJECTION INTRAMUSCULAR; INTRAVENOUS at 17:57

## 2024-11-16 RX ADMIN — METHYLPREDNISOLONE SODIUM SUCCINATE 80 MG: 125 INJECTION, POWDER, FOR SOLUTION INTRAMUSCULAR; INTRAVENOUS at 17:55

## 2024-11-16 NOTE — PATIENT INSTRUCTIONS
Due to extensive/complex PMH it is recommended that you be seen in the ED as you may need labs or injection for pain  Go to the [facility] now   Follow up after ED visit

## 2024-11-16 NOTE — PROGRESS NOTES
[facility]        NAME: Elly Chong is a 59 y.o. male  : 1965    MRN: [id number]  DATE: 2024  TIME: 4:34 PM    Assessment and Plan   Hand edema [R60.0]  1. Hand edema  Transfer to other facility    right hand      2. Hand pain, right  Transfer to other facility      3. History of chronic CHF  Transfer to other facility            Patient Instructions       Follow up with PCP in 3-5 days.  Proceed to  ER if symptoms worsen.    If tests have been performed at [facility], our office will contact you with results if changes need to be made to the care plan discussed with you at the visit.  You can review your full results on [facility].    Due to extensive/complex PMH it is recommended that you be seen in the ED as you may need labs or injection for pain  Go to the [facility] now   Follow up after ED visit       Chief Complaint     Chief Complaint   Patient presents with    Pain     Right hand pain and right wrist pain x 2 days No injury   Patient states this has happen to him before          History of Present Illness       This is a 59 year old male who wife brings to care now with c/o right hand swelling and unable to flex wrist and move fingers since yesterday. He states the left hand is starting to swell as well.  He states both hands hurt.  He has extensive PMH - CHF, DM, DELFINO.  He denies any current weight gain. Denies SOB.  PMH is listed and reviewed.         Review of Systems   Review of Systems   Constitutional: Negative.    HENT: Negative.     Eyes: Negative.    Respiratory: Negative.     Cardiovascular: Negative.    Gastrointestinal: Negative.    Endocrine: Negative.    Genitourinary: Negative.    Musculoskeletal:         B/L hand swelling with pain    Skin: Negative.    Allergic/Immunologic: Negative.    Neurological: Negative.    Hematological: Negative.    Psychiatric/Behavioral: Negative.           Current Medications       Current Outpatient Medications:      albuterol (PROVENTIL HFA,VENTOLIN HFA) 90 mcg/act inhaler, Inhale 1 puff in the morning 3 inhalers per refill, Disp: 54 g, Rfl: 1    Blood Pressure KIT, Twice a day periodically, Disp: 1 each, Rfl: 0    carvedilol (COREG) 6.25 mg tablet, Take 1 tablet (6.25 mg total) by mouth 2 (two) times a day with meals, Disp: 180 tablet, Rfl: 5    diclofenac sodium (VOLTAREN) 50 mg EC tablet, Sig: Take 1 tablet twice a day after food as needed for joint pain, Disp: 180 tablet, Rfl: 1    famotidine (PEPCID) 20 mg tablet, Take 1 tablet (20 mg total) by mouth daily at bedtime, Disp: 90 tablet, Rfl: 1    Fluticasone-Salmeterol (Advair) 500-50 mcg/dose inhaler, Inhale 1 puff 2 (two) times a day Rinse mouth after use., Disp: 60 blister, Rfl: 5    glimepiride (AMARYL) 1 mg tablet, TAKE 1 TABLET BY MOUTH TWICE A DAY, Disp: 180 tablet, Rfl: 1    glucose blood test strip, 1 each by Other route daily, Disp: 180 each, Rfl: 5    ipratropium (ATROVENT) 0.02 % nebulizer solution, Take 2.5 mL (0.5 mg total) by nebulization 3 (three) times a day, Disp: 225 mL, Rfl: 0    Lancets (ONETOUCH ULTRASOFT) lancets, by Does not apply route, Disp: , Rfl:     levalbuterol (XOPENEX) 1.25 mg/3 mL nebulizer solution, Take 3 mL (1.25 mg total) by nebulization 3 (three) times a day, Disp: 270 mL, Rfl: 3    LORazepam (ATIVAN) 1 mg tablet, Take 1 tablet by mouth daily as needed (Patient not taking: Reported on 7/25/2024), Disp: , Rfl:     methocarbamol (ROBAXIN) 500 mg tablet, Take 1 tablet (500 mg total) by mouth 2 (two) times a day (Patient not taking: Reported on 7/25/2024), Disp: 20 tablet, Rfl: 0    pantoprazole (PROTONIX) 40 mg tablet, Take 1 tablet (40 mg total) by mouth daily before breakfast (Patient not taking: Reported on 7/25/2024), Disp: 90 tablet, Rfl: 3    predniSONE 10 mg tablet, 5 tabs x 2 days, 4 tabs X 2 days, 3 tabs X 2 days, 2 tabs X 2 days, 1 tab X 2 days (Patient not taking: Reported on 11/16/2024), Disp: 30 tablet, Rfl: 0    sitaGLIPtin  (Januvia) 100 mg tablet, Take 1 tablet (100 mg total) by mouth daily (Patient not taking: Reported on 9/27/2024), Disp: 90 tablet, Rfl: 1    spironolactone (ALDACTONE) 25 mg tablet, Take 1 tablet (25 mg total) by mouth daily, Disp: 90 tablet, Rfl: 1    torsemide (DEMADEX) 20 mg tablet, TAKE 2 TABS ONCE DAILY IN AM, Disp: 180 tablet, Rfl: 2    Current Allergies     Allergies as of 11/16/2024    (No Known Allergies)            The following portions of the patient's history were reviewed and updated as appropriate: allergies, current medications, past family history, past medical history, past social history, past surgical history and problem list.     Past Medical History:   Diagnosis Date    Acute kidney injury (HCC) 09/05/2020    Asthma     CHF (congestive heart failure) (HCC)     Colon polyp     Diabetes mellitus (HCC)     GERD (gastroesophageal reflux disease)     Hypertension     Inguinal hernia     3/25/15    Onychomycosis     5/24/17    Sleep apnea     Type 2 diabetes mellitus ([facility]) 05/01/2012       Past Surgical History:   Procedure Laterality Date    ARTHROSCOPY KNEE      with Medial and Lateral Meniscus Repair    HERNIA REPAIR      umbilical and inguinal hernia repairs (left)       Family History   Problem Relation Age of Onset    Hypertension Mother         essential    Chronic bronchitis Father     Prostate cancer Father     Colon cancer Father     Breast cancer Sister          Medications have been verified.        Objective   /79 (BP Location: Left arm, Patient Position: Sitting, Cuff Size: Adult)   Pulse 94   Temp 98.1 °F (36.7 °C) (Oral)   Resp 18   SpO2 98%   No LMP for male patient.       Physical Exam     Physical Exam  Vitals and nursing note reviewed.   Constitutional:       General: He is not in acute distress.     Appearance: Normal appearance. He is obese. He is ill-appearing. He is not toxic-appearing or diaphoretic.      Comments: Appears not to feel well.    HENT:      Head:  Normocephalic and atraumatic.   Eyes:      Extraocular Movements: Extraocular movements intact.   Cardiovascular:      Rate and Rhythm: Normal rate.      Comments: Right radial pulse - faint due to swelling   Pulmonary:      Effort: Pulmonary effort is normal.   Musculoskeletal:         General: Swelling and tenderness present.      Cervical back: Normal range of motion.      Comments: Right hand with fingers + 3 non pitting edema.  Unable to flex wrist and move fingers due to swelling and pain  + radial pulse but faint due to swelling   Left fingers are starting to swelling - ring on left index finger and unable to simple removal.  + radial pulse  Pt in wheel chair    Skin:     General: Skin is warm and dry.      Capillary Refill: Capillary refill takes less than 2 seconds.   Neurological:      General: No focal deficit present.      Mental Status: He is alert and oriented to person, place, and time.   Psychiatric:         Mood and Affect: Mood normal.         Behavior: Behavior normal.         Thought Content: Thought content normal.         Judgment: Judgment normal.       Dicussed with wife and patient that ED may be best for evaluation of hand swelling and pain. May need other testing.  Does need higher level of care and eval. Pt and wife agree  Will go to [facility] ed for eval    ADT 21 completed

## 2024-11-16 NOTE — ED PROVIDER NOTES
Time reflects when diagnosis was documented in both MDM as applicable and the Disposition within this note       Time User Action Codes Description Comment    11/16/2024  6:45 PM Christiano Howard Add [M10.9] Acute gout of right hand, unspecified cause     11/16/2024  6:49 PM Christiano Howard Add [J44.1] COPD exacerbation (HCC)     11/16/2024  6:49 PM Christiano Howard Add [M25.50] Arthralgia, unspecified joint           ED Disposition       ED Disposition   Discharge    Condition   Stable    Date/Time   Sat Nov 16, 2024  6:44 PM    Comment   Elly Chong discharge to home/self care.                   Assessment & Plan       Medical Decision Making        Initial ED assessment:   59-year-old male, diffuse hand swelling and pain, history of gout which is presented similarly but states that he also was diagnosed with cellulitis last time he had this    Pathology at risk for includes but is not limited to:   Gout, rheumatoid arthritis flare, patient's holding fingers and passive flexion  significant discomfort with passive extension of the fingers so flexor tenosynovitis is in the differential but extends all the way to the wrist knees had this before so I think it is less likely    Initial ED plan:    Difficult to determine exact etiology of patient's symptoms, will give steroids as I do feel this is likely gout but as the skin is overlying warm and so uncomfortable will cover for infection with antibiotics, will give IV Ancef, will monitor patient closely in the emergency department, due to significant discomfort may admit to the hospital for continued IV pain control and for resolution        Final ED summary/disposition:   After evaluation and workup in the emergency department, elevated uric acid, negative procalcitonin, patient states in the past his hand swelled up exactly like this with gout, discharged after given a dose of colchicine here discharge on prednisone and oxycodone    Amount and/or Complexity of  Data Reviewed  Labs: ordered.    Risk  OTC drugs.  Prescription drug management.        ED Course as of 11/16/24 2322   Sat Nov 16, 2024   1828 Repeat evaluation, patient feels improved but requesting another dose of pain medication, he does not want to stay in the hospital, understands risk benefits and alternatives, await remainder of blood work   1841 Significantly elevated uric acid, normal procalcitonin, strongly favor gout, renal function showing GFR of 56       Medications   methylPREDNISolone sodium succinate (Solu-MEDROL) injection 80 mg (80 mg Intravenous Given 11/16/24 1755)   ceFAZolin (ANCEF) IVPB (premix in dextrose) 2,000 mg 50 mL (0 mg Intravenous Stopped 11/16/24 1904)   fentaNYL injection 50 mcg (50 mcg Intravenous Given 11/16/24 1757)   oxyCODONE-acetaminophen (PERCOCET) 5-325 mg per tablet 1 tablet (1 tablet Oral Given 11/16/24 1833)   colchicine (COLCRYS) tablet 1.2 mg (1.2 mg Oral Given 11/16/24 1902)       ED Risk Strat Scores                                               History of Present Illness       Chief Complaint   Patient presents with    Hand Swelling     Patient to ED with c/o right hand swelling that started yesterday. Patient reports having a history of gout and arthritis.        Past Medical History:   Diagnosis Date    Acute kidney injury (HCC) 09/05/2020    Asthma     CHF (congestive heart failure) (HCC)     Colon polyp     Diabetes mellitus (HCC)     GERD (gastroesophageal reflux disease)     Hypertension     Inguinal hernia     3/25/15    Onychomycosis     5/24/17    Sleep apnea     Type 2 diabetes mellitus (HCC) 05/01/2012      Past Surgical History:   Procedure Laterality Date    ARTHROSCOPY KNEE      with Medial and Lateral Meniscus Repair    HERNIA REPAIR      umbilical and inguinal hernia repairs (left)      Family History   Problem Relation Age of Onset    Hypertension Mother         essential    Chronic bronchitis Father     Prostate cancer Father     Colon cancer  Father     Breast cancer Sister       Social History     Tobacco Use    Smoking status: Former     Types: Cigars    Smokeless tobacco: Never    Tobacco comments:     current every day smoker, per [employer]   Vaping Use    Vaping status: Never Used   Substance Use Topics    Alcohol use: Yes     Alcohol/week: 3.0 standard drinks of alcohol     Types: 2 Cans of beer, 1 Standard drinks or equivalent per week     Comment: weekend: 1 glass of christian or 2 beers    Drug use: No      E-Cigarette/Vaping    E-Cigarette Use Never User       E-Cigarette/Vaping Substances    Nicotine No     THC No     CBD No     Flavoring No     Other No     Unknown No       I have reviewed and agree with the history as documented.           59-year-old male, presents with right hand swelling and pain.,  History of gout in the past which is presented similarly but states last time he had something like this it turned out to be an infection, states his hand is diffusely swollen, unable to move any fingers, due to discomfort from swelling.,  Says he overall has some aches and pains  throughout his whole body.,  Tactile fevers.  Started yesterday at work but there was no injury, just noticed some mild swelling progressed throughout the day and then peaked last evening he was unable to sleep all night due to pain      History provided by:  Patient      Review of Systems   Constitutional:  Negative for fever.   Respiratory:  Negative for chest tightness and shortness of breath.    Cardiovascular:  Negative for chest pain.   Skin:  Negative for rash.   Neurological:  Negative for dizziness, light-headedness and headaches.           Objective       ED Triage Vitals   Temperature Pulse Blood Pressure Respirations SpO2 Patient Position - Orthostatic VS   11/16/24 1704 11/16/24 1702 11/16/24 1702 11/16/24 1702 11/16/24 1702 11/16/24 1702   99.6 °F (37.6 °C) 87 134/74 18 98 % Sitting      Temp Source Heart Rate Source BP Location FiO2 (%) Pain Score     11/16/24 1704 11/16/24 1702 11/16/24 1702 -- 11/16/24 1702    Oral Monitor Left arm  10 - Worst Possible Pain      Vitals      Date and Time Temp Pulse SpO2 Resp BP Pain Score FACES Pain Rating User   11/16/24 1833 -- -- -- -- -- 8 -- AP   11/16/24 1704 99.6 °F (37.6 °C) -- -- -- -- -- -- AP   11/16/24 1702 -- 87 98 % 18 134/74 10 - Worst Possible Pain -- AP            Physical Exam  Vitals reviewed.   Constitutional:       General: He is not in acute distress.     Appearance: He is well-developed.   HENT:      Head: Atraumatic.      Nose: Nose normal.   Eyes:      General: No scleral icterus.        Right eye: No discharge.         Left eye: No discharge.      Conjunctiva/sclera: Conjunctivae normal.   Neck:      Trachea: No tracheal deviation.   Pulmonary:      Effort: Pulmonary effort is normal. No respiratory distress.      Breath sounds: No stridor.   Musculoskeletal:         General: Swelling present. No deformity.      Cervical back: Normal range of motion.      Comments: Large amount of swelling of the right hand and right wrist, severe discomfort with any palpation in this area, holding fingers in slight passive flexion discomfort with any passive extension.  Overlying skin is warm compared to the contralateral side.  Unable to appreciate erythema but difficult as patient has dark skin   Skin:     General: Skin is warm and dry.      Coloration: Skin is not pale.      Findings: No erythema or rash.   Neurological:      Mental Status: He is alert.      Motor: No abnormal muscle tone.      Coordination: Coordination normal.         Results Reviewed       Procedure Component Value Units Date/Time    High sensitivity CRP [[id number]]  (Abnormal) Collected: 11/16/24 1745    Lab Status: Final result Specimen: Blood from Arm, Left Updated: 11/16/24 2235     CRP, High Sensitivity 36.60 mg/L     Narrative:            HsCRP Level       Relative Risk           <1.0 mg/L          Low           1.0 to 3.0 mg/L     Average           >3.0 mg/L          High        Procalcitonin [[id number]]  (Normal) Collected: 11/16/24 1745    Lab Status: Final result Specimen: Blood from Arm, Left Updated: 11/16/24 1840     Procalcitonin 0.08 ng/ml     Uric acid [[id number]]  (Abnormal) Collected: 11/16/24 1743    Lab Status: Final result Specimen: Blood Updated: 11/16/24 1838     Uric Acid 11.4 mg/dL     Narrative:      N-acetyl-p-benzoquinone imine (metabolite of Acetaminophen) will generate erroneously low results in samples for patients that have taken an overdose of Acetaminophen.    Basic metabolic panel [[id number]]  (Abnormal) Collected: 11/16/24 1745    Lab Status: Final result Specimen: Blood from Arm, Left Updated: 11/16/24 1830     Sodium 132 mmol/L      Potassium 5.3 mmol/L      Chloride 98 mmol/L      CO2 26 mmol/L      ANION GAP 8 mmol/L      BUN 18 mg/dL      Creatinine 1.36 mg/dL      Glucose 199 mg/dL      Calcium 9.0 mg/dL      eGFR 56 ml/min/1.73sq m     Narrative:      [employer] guidelines for Chronic Kidney Disease (CKD):     Stage 1 with normal or high GFR (GFR > 90 mL/min/1.73 square meters)    Stage 2 Mild CKD (GFR = 60-89 mL/min/1.73 square meters)    Stage 3A Moderate CKD (GFR = 45-59 mL/min/1.73 square meters)    Stage 3B Moderate CKD (GFR = 30-44 mL/min/1.73 square meters)    Stage 4 Severe CKD (GFR = 15-29 mL/min/1.73 square meters)    Stage 5 End Stage CKD (GFR <15 mL/min/1.73 square meters)  Note: GFR calculation is accurate only with a steady state creatinine    CBC and differential [[id number]]  (Abnormal) Collected: 11/16/24 1745    Lab Status: Final result Specimen: Blood from Arm, Left Updated: 11/16/24 1815     WBC 11.49 Thousand/uL      RBC 3.64 Million/uL      Hemoglobin 11.8 g/dL      Hematocrit 36.5 %       fL      MCH 32.4 pg      MCHC 32.3 g/dL      RDW 14.2 %      MPV 10.5 fL      Platelets 205 Thousands/uL      nRBC 0 /100 WBCs      Segmented % 69 %      Immature Grans  % 0 %      Lymphocytes % 17 %      Monocytes % 12 %      Eosinophils Relative 2 %      Basophils Relative 0 %      Absolute Neutrophils 7.87 Thousands/µL      Absolute Immature Grans 0.03 Thousand/uL      Absolute Lymphocytes 1.98 Thousands/µL      Absolute Monocytes 1.32 Thousand/µL      Eosinophils Absolute 0.25 Thousand/µL      Basophils Absolute 0.04 Thousands/µL     Blood culture #1 [[id number]] Collected: 11/16/24 1744    Lab Status: In process Specimen: Blood from Arm, Right Updated: 11/16/24 1810    Blood culture #2 [[id number]] Collected: 11/16/24 1752    Lab Status: In process Specimen: Blood from Arm, Right Updated: 11/16/24 1810            No orders to display       Procedures    ED Medication and Procedure Management   Prior to Admission Medications   Prescriptions Last Dose Informant Patient Reported? Taking?   Blood Pressure KIT  Self No No   Sig: Twice a day periodically   Fluticasone-Salmeterol (Advair) 500-50 mcg/dose inhaler   No No   Sig: Inhale 1 puff 2 (two) times a day Rinse mouth after use.   LORazepam (ATIVAN) 1 mg tablet  Self Yes No   Sig: Take 1 tablet by mouth daily as needed   Patient not taking: Reported on 7/25/2024   Lancets (ONETOUCH ULTRASOFT) lancets  Self Yes No   Sig: by Does not apply route   albuterol (PROVENTIL HFA,VENTOLIN HFA) 90 mcg/act inhaler   No No   Sig: Inhale 1 puff in the morning 3 inhalers per refill   carvedilol (COREG) 6.25 mg tablet  Self No No   Sig: Take 1 tablet (6.25 mg total) by mouth 2 (two) times a day with meals   diclofenac sodium (VOLTAREN) 50 mg EC tablet   No No   Sig: Sig: Take 1 tablet twice a day after food as needed for joint pain   diclofenac sodium (VOLTAREN) 50 mg EC tablet   No Yes   Sig: Sig: Take 1 tablet twice a day after food as needed for joint pain   famotidine (PEPCID) 20 mg tablet   No No   Sig: Take 1 tablet (20 mg total) by mouth daily at bedtime   glimepiride (AMARYL) 1 mg tablet   No No   Sig: TAKE 1 TABLET BY MOUTH TWICE A DAY    glucose blood test strip  Self No No   Si each by Other route daily   ipratropium (ATROVENT) 0.02 % nebulizer solution   No No   Sig: Take 2.5 mL (0.5 mg total) by nebulization 3 (three) times a day   levalbuterol (XOPENEX) 1.25 mg/3 mL nebulizer solution   No No   Sig: Take 3 mL (1.25 mg total) by nebulization 3 (three) times a day   levalbuterol (XOPENEX) 1.25 mg/3 mL nebulizer solution   No Yes   Sig: Take 3 mL (1.25 mg total) by nebulization 3 (three) times a day   methocarbamol (ROBAXIN) 500 mg tablet  Self No No   Sig: Take 1 tablet (500 mg total) by mouth 2 (two) times a day   Patient not taking: Reported on 2024   pantoprazole (PROTONIX) 40 mg tablet  Self No No   Sig: Take 1 tablet (40 mg total) by mouth daily before breakfast   Patient not taking: Reported on 2024   predniSONE 10 mg tablet   No No   Si tabs x 2 days, 4 tabs X 2 days, 3 tabs X 2 days, 2 tabs X 2 days, 1 tab X 2 days   Patient not taking: Reported on 2024   sitaGLIPtin (Januvia) 100 mg tablet  Self No No   Sig: Take 1 tablet (100 mg total) by mouth daily   Patient not taking: Reported on 2024   spironolactone (ALDACTONE) 25 mg tablet   No No   Sig: Take 1 tablet (25 mg total) by mouth daily   torsemide (DEMADEX) 20 mg tablet  Self No No   Sig: TAKE 2 TABS ONCE DAILY IN AM      Facility-Administered Medications: None     Discharge Medication List as of 2024  6:46 PM        START taking these medications    Details   docusate sodium (COLACE) 100 mg capsule Take 1 capsule (100 mg total) by mouth 3 (three) times a day as needed for constipation (Take with pain medication), Starting Sat 2024, Normal      oxyCODONE (Roxicodone) 5 immediate release tablet Take 1 tablet (5 mg total) by mouth every 4 (four) hours as needed for moderate pain for up to 10 days Max Daily Amount: 30 mg, Starting Sat 2024, Until 2024 at 2359, Normal      !! predniSONE 10 mg tablet Take 4 tablets (40 mg total) by  mouth daily for 5 days, Starting Sat 11/16/2024, Until Thu 11/21/2024, Normal       !! - Potential duplicate medications found. Please discuss with provider.        CONTINUE these medications which have NOT CHANGED    Details   albuterol (PROVENTIL HFA,VENTOLIN HFA) 90 mcg/act inhaler Inhale 1 puff in the morning 3 inhalers per refill, Starting Wed 11/13/2024, Normal      Blood Pressure KIT Twice a day periodically, Normal      carvedilol (COREG) 6.25 mg tablet Take 1 tablet (6.25 mg total) by mouth 2 (two) times a day with meals, Starting Tue 1/23/2024, Until Wed 7/16/2025, Normal      famotidine (PEPCID) 20 mg tablet Take 1 tablet (20 mg total) by mouth daily at bedtime, Starting Tue 7/2/2024, Normal      Fluticasone-Salmeterol (Advair) 500-50 mcg/dose inhaler Inhale 1 puff 2 (two) times a day Rinse mouth after use., Starting Wed 11/13/2024, Until Mon 5/12/2025, Normal      glimepiride (AMARYL) 1 mg tablet TAKE 1 TABLET BY MOUTH TWICE A DAY, Starting Mon 4/1/2024, Normal      glucose blood test strip 1 each by Other route daily, Starting Tue 11/3/2020, Normal      ipratropium (ATROVENT) 0.02 % nebulizer solution Take 2.5 mL (0.5 mg total) by nebulization 3 (three) times a day, Starting Sat 7/27/2024, Until Mon 8/26/2024, Normal      Lancets (ONETOUCH ULTRASOFT) lancets by Does not apply route, Starting Thu 12/28/2017, Historical Med      LORazepam (ATIVAN) 1 mg tablet Take 1 tablet by mouth daily as needed, Starting Tue 11/8/2022, Historical Med      methocarbamol (ROBAXIN) 500 mg tablet Take 1 tablet (500 mg total) by mouth 2 (two) times a day, Starting Sun 7/9/2023, Normal      pantoprazole (PROTONIX) 40 mg tablet Take 1 tablet (40 mg total) by mouth daily before breakfast, Starting Fri 1/19/2024, Normal      !! predniSONE 10 mg tablet 5 tabs x 2 days, 4 tabs X 2 days, 3 tabs X 2 days, 2 tabs X 2 days, 1 tab X 2 days, Normal      sitaGLIPtin (Januvia) 100 mg tablet Take 1 tablet (100 mg total) by mouth daily,  Starting Tue 10/31/2023, Normal      spironolactone (ALDACTONE) 25 mg tablet Take 1 tablet (25 mg total) by mouth daily, Starting Tue 7/2/2024, Until Sun 12/29/2024, Normal      torsemide (DEMADEX) 20 mg tablet TAKE 2 TABS ONCE DAILY IN AM, Normal      diclofenac sodium (VOLTAREN) 50 mg EC tablet Sig: Take 1 tablet twice a day after food as needed for joint pain, Normal      levalbuterol (XOPENEX) 1.25 mg/3 mL nebulizer solution Take 3 mL (1.25 mg total) by nebulization 3 (three) times a day, Starting Wed 11/13/2024, Normal       !! - Potential duplicate medications found. Please discuss with provider.        No discharge procedures on file.  ED SEPSIS DOCUMENTATION   Time reflects when diagnosis was documented in both MDM as applicable and the Disposition within this note       Time User Action Codes Description Comment    11/16/2024  6:45 PM Christiano Howard [M10.9] Acute gout of right hand, unspecified cause     11/16/2024  6:49 PM Christiano Howard [J44.1] COPD exacerbation (HCC)     11/16/2024  6:49 PM Christiano Howard Add [M25.50] Arthralgia, unspecified joint                  Christiano Howard DO  11/16/24 0341

## 2024-11-17 ENCOUNTER — RESULTS FOLLOW-UP (OUTPATIENT)
Dept: EMERGENCY DEPT | Facility: HOSPITAL | Age: 59
End: 2024-11-17

## 2024-11-17 NOTE — RESULT ENCOUNTER NOTE
I spoke to the patient and Dr. Howard regarding the C-reactive protein of 36.6.  Dr. Howard recommended admitting the patient to the hospital.  The patient was insisted that it was his normal recurrent gout and wanted to be discharged.  I told the patient that this elevation of the C-reactive protein being the size most likely infectious in nature if he starts to run fever, chills, shaking rigors, night sweats he is to return to the emergency room for repeat exam.  He states it is still red and swollen but it feels better.

## 2024-11-21 LAB
BACTERIA BLD CULT: NORMAL
BACTERIA BLD CULT: NORMAL

## 2024-11-27 ENCOUNTER — OFFICE VISIT (OUTPATIENT)
Dept: FAMILY MEDICINE CLINIC | Facility: CLINIC | Age: 59
End: 2024-11-27
Payer: COMMERCIAL

## 2024-11-27 VITALS
RESPIRATION RATE: 18 BRPM | WEIGHT: 258.8 LBS | BODY MASS INDEX: 40.62 KG/M2 | HEIGHT: 67 IN | TEMPERATURE: 98.5 F | SYSTOLIC BLOOD PRESSURE: 140 MMHG | HEART RATE: 89 BPM | DIASTOLIC BLOOD PRESSURE: 80 MMHG

## 2024-11-27 DIAGNOSIS — Z23 ENCOUNTER FOR IMMUNIZATION: ICD-10-CM

## 2024-11-27 DIAGNOSIS — R20.2 PARESTHESIA: ICD-10-CM

## 2024-11-27 DIAGNOSIS — M1A.9XX0 CHRONIC GOUT WITHOUT TOPHUS, UNSPECIFIED CAUSE, UNSPECIFIED SITE: Primary | ICD-10-CM

## 2024-11-27 PROCEDURE — 99213 OFFICE O/P EST LOW 20 MIN: CPT | Performed by: FAMILY MEDICINE

## 2024-11-27 PROCEDURE — 90471 IMMUNIZATION ADMIN: CPT

## 2024-11-27 PROCEDURE — 90673 RIV3 VACCINE NO PRESERV IM: CPT

## 2024-11-27 PROCEDURE — 96372 THER/PROPH/DIAG INJ SC/IM: CPT

## 2024-11-27 RX ORDER — ALLOPURINOL 100 MG/1
100 TABLET ORAL DAILY
Qty: 90 TABLET | Refills: 3 | Status: SHIPPED | OUTPATIENT
Start: 2024-11-27

## 2024-11-27 RX ORDER — METHYLPREDNISOLONE ACETATE 40 MG/ML
40 INJECTION, SUSPENSION INTRA-ARTICULAR; INTRALESIONAL; INTRAMUSCULAR; SOFT TISSUE ONCE
Status: COMPLETED | OUTPATIENT
Start: 2024-11-27 | End: 2024-11-27

## 2024-11-27 RX ORDER — COLCHICINE 0.6 MG/1
0.6 TABLET ORAL 2 TIMES DAILY
Qty: 20 TABLET | Refills: 0 | Status: SHIPPED | OUTPATIENT
Start: 2024-11-27

## 2024-11-27 RX ADMIN — METHYLPREDNISOLONE ACETATE 40 MG: 40 INJECTION, SUSPENSION INTRA-ARTICULAR; INTRALESIONAL; INTRAMUSCULAR; SOFT TISSUE at 13:31

## 2024-11-27 NOTE — ASSESSMENT & PLAN NOTE
Gout.  Patient with recurring gout symptoms and elevated uric acid level.  He was given injection of Depo-Medrol 40 mg x 1.  He will initiate colchicine 0.6 mg twice daily for up to 10 days.  He will initiate allopurinol 100 mg daily.  He was given referral to [facility]'s rheumatology for further evaluation of his symptoms.

## 2024-11-27 NOTE — PROGRESS NOTES
FAMILY PRACTICE OFFICE VISIT       NAME: Elly Chong  AGE: 59 y.o. SEX: male       : 1965        MRN: [id number]    DATE: 2024  TIME: 9:48 AM    Assessment and Plan     Problem List Items Addressed This Visit       Chronic gout without tophus - Primary    Gout.  Patient with recurring gout symptoms and elevated uric acid level.  He was given injection of Depo-Medrol 40 mg x 1.  He will initiate colchicine 0.6 mg twice daily for up to 10 days.  He will initiate allopurinol 100 mg daily.  He was given referral to [facility] rheumatology for further evaluation of his symptoms.         Relevant Medications    colchicine (COLCRYS) 0.6 mg tablet    allopurinol (ZYLOPRIM) 100 mg tablet    Other Relevant Orders    Ambulatory Referral to Rheumatology    Encounter for immunization    Relevant Orders    influenza vaccine, recombinant, PF, 0.5 mL IM (Flublok) (Completed)     Other Visit Diagnoses         Paresthesia        Relevant Medications    methylPREDNISolone acetate (DEPO-MEDROL) injection 40 mg (Start on 2024 10:00 AM)                Chief Complaint     Chief Complaint   Patient presents with    --ED Follow up--     Right hand swollen        History of Present Illness     Patient in the office after having emergency room follow-up visit for diagnosed gout of right hand.  He states symptoms started to improve slightly but ran out of medicines provided by the emergency room.  Patient has a history of intermittent gout and had elevated uric acid level in the emergency room.        Review of Systems   Review of Systems   Constitutional: Negative.    Musculoskeletal:  Positive for arthralgias.       Active Problem List     Patient Active Problem List   Diagnosis    Essential hypertension    Uncontrolled type 2 diabetes mellitus with hyperglycemia (HCC)    Elevated lipoprotein(a)    Microalbuminuria    Obesity    Medical non-compliance    Hypomagnesemia    CHF (congestive heart failure) (HCC)     Alcohol abuse    DELFINO (acute kidney injury) ([facility])    Onychomycosis    Arthritis of right wrist    Acute on chronic diastolic congestive heart failure (HCC)    Impingement syndrome of left shoulder    Diabetes mellitus ([facility])    Acute gout of right hand    Acute midline low back pain without sciatica    Reactive airway disease without complication    (HFpEF) heart failure with preserved ejection fraction (HCC)    AMANDO (obstructive sleep apnea)    Arthralgia    Intermittent asthma    Mild asthma with acute exacerbation    Current smoker    Cellulitis    Chronic gout without tophus    Encounter for immunization       Past Medical History:  Past Medical History:   Diagnosis Date    Acute kidney injury ([facility]) 09/05/2020    Asthma     CHF (congestive heart failure) ([facility])     Colon polyp     Diabetes mellitus ([facility])     GERD (gastroesophageal reflux disease)     Hypertension     Inguinal hernia     3/25/15    Onychomycosis     5/24/17    Sleep apnea     Type 2 diabetes mellitus ([facility]) 05/01/2012       Past Surgical History:  Past Surgical History:   Procedure Laterality Date    ARTHROSCOPY KNEE      with Medial and Lateral Meniscus Repair    HERNIA REPAIR      umbilical and inguinal hernia repairs (left)       Family History:  Family History   Problem Relation Age of Onset    Hypertension Mother         essential    Chronic bronchitis Father     Prostate cancer Father     Colon cancer Father     Breast cancer Sister        Social History:  Social History     Socioeconomic History    Marital status: /Civil Union     Spouse name: Not on file    Number of children: Not on file    Years of education: Not on file    Highest education level: Not on file   Occupational History    Occupation: [employer]    Tobacco Use    Smoking status: Former     Types: Cigars    Smokeless tobacco: Never    Tobacco comments:     current every day smoker, per [employer]   Vaping Use    Vaping status: Never Used   Substance and Sexual  Activity    Alcohol use: Yes     Alcohol/week: 3.0 standard drinks of alcohol     Types: 2 Cans of beer, 1 Standard drinks or equivalent per week     Comment: weekend: 1 glass of christian or 2 beers    Drug use: No    Sexual activity: Yes   Other Topics Concern    Not on file   Social History Narrative    Alcohol dependence    Nicotine dependence    Denied, alcohol Use    Marital problems     Social Drivers of Health     Financial Resource Strain: Not on file   Food Insecurity: Not on file   Transportation Needs: Not on file   Physical Activity: Not on file   Stress: Not on file   Social Connections: Not on file   Intimate Partner Violence: Not on file   Housing Stability: Not on file       Objective     Vitals:    11/27/24 0912   BP: 140/80   Pulse: 89   Resp: 18   Temp: 98.5 °F (36.9 °C)     Wt Readings from Last 3 Encounters:   11/27/24 117 kg (258 lb 12.8 oz)   10/21/24 119 kg (263 lb 2 oz)   09/27/24 124 kg (273 lb)       Physical Exam  Constitutional:       General: He is not in acute distress.     Appearance: Normal appearance. He is not ill-appearing.   Musculoskeletal:         General: Swelling and tenderness present.      Comments: Patient with significant swelling of his right hand from his wrist to his fingers.  Decreased range of motion with flexion due to swelling.  Mild tenderness to palpation of hand.   Neurological:      Mental Status: He is alert.         Pertinent Laboratory/Diagnostic Studies:  Lab Results   Component Value Date    GLUCOSE 179 (H) 09/04/2020    BUN 18 11/16/2024    CREATININE 1.36 (H) 11/16/2024    CALCIUM 9.0 11/16/2024     12/21/2017    K 5.3 11/16/2024    CO2 26 11/16/2024    CL 98 11/16/2024     Lab Results   Component Value Date    ALT 21 10/16/2024    AST 20 10/16/2024    ALKPHOS 76 10/16/2024    BILITOT 1.3 (H) 12/21/2017       Lab Results   Component Value Date    WBC 11.49 (H) 11/16/2024    HGB 11.8 (L) 11/16/2024    HCT 36.5 11/16/2024     (H) 11/16/2024      11/16/2024       Lab Results   Component Value Date    TSH 3.58 03/14/2022       Lab Results   Component Value Date    CHOL 153 12/21/2017     Lab Results   Component Value Date    TRIG 69 11/21/2023     Lab Results   Component Value Date    HDL 36 (L) 11/21/2023     Lab Results   Component Value Date    LDLCALC 70 11/21/2023     Lab Results   Component Value Date    HGBA1C 5.7 (H) 07/25/2024       Results for orders placed or performed during the hospital encounter of 11/16/24   Uric acid    Collection Time: 11/16/24  5:43 PM   Result Value Ref Range    Uric Acid 11.4 (H) 3.5 - 8.5 mg/dL   Blood culture #1    Collection Time: 11/16/24  5:44 PM    Specimen: Arm, Right; Blood   Result Value Ref Range    Blood Culture No Growth After 5 Days.    CBC and differential    Collection Time: 11/16/24  5:45 PM   Result Value Ref Range    WBC 11.49 (H) 4.31 - 10.16 Thousand/uL    RBC 3.64 (L) 3.88 - 5.62 Million/uL    Hemoglobin 11.8 (L) 12.0 - 17.0 g/dL    Hematocrit 36.5 36.5 - 49.3 %     (H) 82 - 98 fL    MCH 32.4 26.8 - 34.3 pg    MCHC 32.3 31.4 - 37.4 g/dL    RDW 14.2 11.6 - 15.1 %    MPV 10.5 8.9 - 12.7 fL    Platelets 205 149 - 390 Thousands/uL    nRBC 0 /100 WBCs    Segmented % 69 43 - 75 %    Immature Grans % 0 0 - 2 %    Lymphocytes % 17 14 - 44 %    Monocytes % 12 4 - 12 %    Eosinophils Relative 2 0 - 6 %    Basophils Relative 0 0 - 1 %    Absolute Neutrophils 7.87 (H) 1.85 - 7.62 Thousands/µL    Absolute Immature Grans 0.03 0.00 - 0.20 Thousand/uL    Absolute Lymphocytes 1.98 0.60 - 4.47 Thousands/µL    Absolute Monocytes 1.32 (H) 0.17 - 1.22 Thousand/µL    Eosinophils Absolute 0.25 0.00 - 0.61 Thousand/µL    Basophils Absolute 0.04 0.00 - 0.10 Thousands/µL   Basic metabolic panel    Collection Time: 11/16/24  5:45 PM   Result Value Ref Range    Sodium 132 (L) 135 - 147 mmol/L    Potassium 5.3 3.5 - 5.3 mmol/L    Chloride 98 96 - 108 mmol/L    CO2 26 21 - 32 mmol/L    ANION GAP 8 4 - 13 mmol/L     BUN 18 5 - 25 mg/dL    Creatinine 1.36 (H) 0.60 - 1.30 mg/dL    Glucose 199 (H) 65 - 140 mg/dL    Calcium 9.0 8.4 - 10.2 mg/dL    eGFR 56 ml/min/1.73sq m   High sensitivity CRP    Collection Time: 11/16/24  5:45 PM   Result Value Ref Range    CRP, High Sensitivity 36.60 (H) <3.00 mg/L   Procalcitonin    Collection Time: 11/16/24  5:45 PM   Result Value Ref Range    Procalcitonin 0.08 <=0.25 ng/ml   Blood culture #2    Collection Time: 11/16/24  5:52 PM    Specimen: Arm, Right; Blood   Result Value Ref Range    Blood Culture No Growth After 5 Days.        Orders Placed This Encounter   Procedures    influenza vaccine, recombinant, PF, 0.5 mL IM (Flublok)    Ambulatory Referral to Rheumatology       ALLERGIES:  No Known Allergies    Current Medications     Current Outpatient Medications   Medication Sig Dispense Refill    albuterol (PROVENTIL HFA,VENTOLIN HFA) 90 mcg/act inhaler Inhale 1 puff in the morning 3 inhalers per refill 54 g 1    allopurinol (ZYLOPRIM) 100 mg tablet Take 1 tablet (100 mg total) by mouth daily 90 tablet 3    Blood Pressure KIT Twice a day periodically 1 each 0    carvedilol (COREG) 6.25 mg tablet Take 1 tablet (6.25 mg total) by mouth 2 (two) times a day with meals 180 tablet 5    colchicine (COLCRYS) 0.6 mg tablet Take 1 tablet (0.6 mg total) by mouth 2 (two) times a day 20 tablet 0    diclofenac sodium (VOLTAREN) 50 mg EC tablet Sig: Take 1 tablet twice a day after food as needed for joint pain 30 tablet 0    docusate sodium (COLACE) 100 mg capsule Take 1 capsule (100 mg total) by mouth 3 (three) times a day as needed for constipation (Take with pain medication) 30 capsule 0    famotidine (PEPCID) 20 mg tablet Take 1 tablet (20 mg total) by mouth daily at bedtime 90 tablet 1    Fluticasone-Salmeterol (Advair) 500-50 mcg/dose inhaler Inhale 1 puff 2 (two) times a day Rinse mouth after use. 60 blister 5    glimepiride (AMARYL) 1 mg tablet TAKE 1 TABLET BY MOUTH TWICE A  tablet 1     glucose blood test strip 1 each by Other route daily 180 each 5    ipratropium (ATROVENT) 0.02 % nebulizer solution Take 2.5 mL (0.5 mg total) by nebulization 3 (three) times a day 225 mL 0    Lancets (ONETOUCH ULTRASOFT) lancets by Does not apply route      levalbuterol (XOPENEX) 1.25 mg/3 mL nebulizer solution Take 3 mL (1.25 mg total) by nebulization 3 (three) times a day 270 mL 0    methocarbamol (ROBAXIN) 500 mg tablet Take 1 tablet (500 mg total) by mouth 2 (two) times a day 20 tablet 0    oxyCODONE (Roxicodone) 5 immediate release tablet Take 1 tablet (5 mg total) by mouth every 4 (four) hours as needed for moderate pain for up to 10 days Max Daily Amount: 30 mg 15 tablet 0    pantoprazole (PROTONIX) 40 mg tablet Take 1 tablet (40 mg total) by mouth daily before breakfast 90 tablet 3    predniSONE 10 mg tablet 5 tabs x 2 days, 4 tabs X 2 days, 3 tabs X 2 days, 2 tabs X 2 days, 1 tab X 2 days 30 tablet 0    sitaGLIPtin (Januvia) 100 mg tablet Take 1 tablet (100 mg total) by mouth daily 90 tablet 1    spironolactone (ALDACTONE) 25 mg tablet Take 1 tablet (25 mg total) by mouth daily 90 tablet 1    torsemide (DEMADEX) 20 mg tablet TAKE 2 TABS ONCE DAILY IN  tablet 2    LORazepam (ATIVAN) 1 mg tablet Take 1 tablet by mouth daily as needed (Patient not taking: Reported on 7/25/2024)       Current Facility-Administered Medications   Medication Dose Route Frequency Provider Last Rate Last Admin    methylPREDNISolone acetate (DEPO-MEDROL) injection 40 mg  40 mg Intramuscular Once              Health Maintenance     Health Maintenance   Topic Date Due    Pneumococcal Vaccine: Pediatrics (0 to 5 Years) and At-Risk Patients (6 to 64 Years) (1 of 2 - PCV) Never done    Hepatitis A Vaccine (1 of 2 - Risk 2-dose series) Never done    Zoster Vaccine (1 of 2) Never done    Annual Physical  12/22/2023    RSV Vaccine Age 60+ Years (1 - Risk 60-74 years 1-dose series) Never done    COVID-19 Vaccine (4 - 2024-25 season)  09/01/2024    DM Eye Exam  10/09/2024    HEMOGLOBIN A1C  01/25/2025    Depression Screening  09/27/2025    Kidney Health Evaluation: Albumin/Creatinine Ratio  09/27/2025    Diabetic Foot Exam  09/27/2025    Kidney Health Evaluation: GFR  11/16/2025    DTaP,Tdap,and Td Vaccines (3 - Td or Tdap) 09/05/2030    Colorectal Cancer Screening  01/17/2031    HIV Screening  Completed    Hepatitis C Screening  Completed    Influenza Vaccine  Completed    RSV Vaccine age 0-20 Months  Aged Out    HIB Vaccine  Aged Out    IPV Vaccine  Aged Out    Meningococcal ACWY Vaccine  Aged Out    HPV Vaccine  Aged Out     Immunization History   Administered Date(s) Administered    COVID-19 PFIZER VACCINE 0.3 ML IM 04/12/2021, 05/03/2021, 12/15/2021    INFLUENZA 09/23/2015, 10/21/2019, 12/22/2022, 10/31/2023    Influenza Recombinant Preservative Free Im 11/27/2024    Influenza, injectable, quadrivalent, preservative free 0.5 mL 10/27/2020, 10/31/2023    Influenza, recombinant, quadrivalent,injectable, preservative free 10/21/2019, 12/22/2022    Influenza, seasonal, injectable 09/23/2015    Tdap 10/21/2019, 09/05/2020       Linden Roque MD

## 2024-12-17 DIAGNOSIS — M25.50 ARTHRALGIA, UNSPECIFIED JOINT: ICD-10-CM

## 2024-12-17 DIAGNOSIS — E11.9 TYPE 2 DIABETES MELLITUS WITHOUT COMPLICATION, WITHOUT LONG-TERM CURRENT USE OF INSULIN (HCC): ICD-10-CM

## 2024-12-18 RX ORDER — GLIMEPIRIDE 1 MG/1
1 TABLET ORAL 2 TIMES DAILY
Qty: 180 TABLET | Refills: 1 | Status: SHIPPED | OUTPATIENT
Start: 2024-12-18

## 2024-12-19 ENCOUNTER — DOCUMENTATION (OUTPATIENT)
Dept: FAMILY MEDICINE CLINIC | Facility: CLINIC | Age: 59
End: 2024-12-19

## 2024-12-19 VITALS — SYSTOLIC BLOOD PRESSURE: 138 MMHG | DIASTOLIC BLOOD PRESSURE: 80 MMHG

## 2025-01-21 DIAGNOSIS — J45.909 REACTIVE AIRWAY DISEASE WITHOUT COMPLICATION: ICD-10-CM

## 2025-01-21 DIAGNOSIS — M1A.9XX0 CHRONIC GOUT WITHOUT TOPHUS, UNSPECIFIED CAUSE, UNSPECIFIED SITE: ICD-10-CM

## 2025-01-21 RX ORDER — ALLOPURINOL 100 MG/1
100 TABLET ORAL DAILY
Qty: 90 TABLET | Refills: 1 | Status: SHIPPED | OUTPATIENT
Start: 2025-01-21

## 2025-01-21 RX ORDER — ALBUTEROL SULFATE 90 UG/1
1 INHALANT RESPIRATORY (INHALATION) DAILY
Qty: 54 G | Refills: 1 | Status: SHIPPED | OUTPATIENT
Start: 2025-01-21

## 2025-01-21 NOTE — TELEPHONE ENCOUNTER
Medication: albuterol (PROVENTIL HFA,VENTOLIN HFA) 90 mcg/act inhaler     Dose/Frequency:   Inhale 1 puff in the morning 3 inhalers per refill    Quantity: 54g    Pharmacy: [id number]  [place] - [address]     Office:   [x] PCP/Provider -   [] Speciality/Provider -     Does the patient have enough for 3 days?   [] Yes   [x] No - Send as HP to POD    Medication: allopurinol (ZYLOPRIM) 100 mg tablet     Dose/Frequency:     Take 1 tablet (100 mg total) by mouth daily        Quantity: 90    Pharmacy: [id number] - [place] - [address]     Office:   [x] PCP/Provider -   [] Speciality/Provider -     Does the patient have enough for 3 days?   [] Yes   [x] No - Send as HP to POD

## 2025-03-13 ENCOUNTER — OFFICE VISIT (OUTPATIENT)
Dept: FAMILY MEDICINE CLINIC | Facility: CLINIC | Age: 60
End: 2025-03-13
Payer: COMMERCIAL

## 2025-03-13 VITALS
BODY MASS INDEX: 43.52 KG/M2 | HEIGHT: 67 IN | OXYGEN SATURATION: 98 % | DIASTOLIC BLOOD PRESSURE: 70 MMHG | RESPIRATION RATE: 17 BRPM | SYSTOLIC BLOOD PRESSURE: 100 MMHG | TEMPERATURE: 97.8 F | HEART RATE: 83 BPM | WEIGHT: 277.25 LBS

## 2025-03-13 DIAGNOSIS — K21.9 GERD (GASTROESOPHAGEAL REFLUX DISEASE): ICD-10-CM

## 2025-03-13 DIAGNOSIS — E11.9 TYPE 2 DIABETES MELLITUS WITHOUT COMPLICATION, WITHOUT LONG-TERM CURRENT USE OF INSULIN (HCC): Primary | ICD-10-CM

## 2025-03-13 DIAGNOSIS — J45.901 MILD ASTHMA WITH ACUTE EXACERBATION, UNSPECIFIED WHETHER PERSISTENT: ICD-10-CM

## 2025-03-13 DIAGNOSIS — J44.1 COPD EXACERBATION (HCC): ICD-10-CM

## 2025-03-13 DIAGNOSIS — M25.50 ARTHRALGIA, UNSPECIFIED JOINT: ICD-10-CM

## 2025-03-13 DIAGNOSIS — R13.10 DIFFICULTY SWALLOWING: ICD-10-CM

## 2025-03-13 LAB — SL AMB POCT HEMOGLOBIN AIC: 6.4 (ref ?–6.5)

## 2025-03-13 PROCEDURE — 83036 HEMOGLOBIN GLYCOSYLATED A1C: CPT | Performed by: FAMILY MEDICINE

## 2025-03-13 PROCEDURE — 99213 OFFICE O/P EST LOW 20 MIN: CPT | Performed by: FAMILY MEDICINE

## 2025-03-13 RX ORDER — FLUTICASONE PROPIONATE AND SALMETEROL 500; 50 UG/1; UG/1
1 POWDER RESPIRATORY (INHALATION) 2 TIMES DAILY
Qty: 60 BLISTER | Refills: 5 | Status: SHIPPED | OUTPATIENT
Start: 2025-03-13 | End: 2025-09-09

## 2025-03-13 RX ORDER — FAMOTIDINE 20 MG/1
20 TABLET, FILM COATED ORAL
Qty: 90 TABLET | Refills: 1 | Status: SHIPPED | OUTPATIENT
Start: 2025-03-13

## 2025-03-13 RX ORDER — PREDNISONE 20 MG/1
TABLET ORAL
Qty: 13 TABLET | Refills: 0 | Status: SHIPPED | OUTPATIENT
Start: 2025-03-13

## 2025-03-13 NOTE — ASSESSMENT & PLAN NOTE
Diabetes.  A1c in the office was stable at 6.4.  Patient will continue current regimen of medications  Lab Results   Component Value Date    HGBA1C 6.4 03/13/2025

## 2025-03-13 NOTE — PROGRESS NOTES
FAMILY PRACTICE OFFICE VISIT       NAME: Elly Chong  AGE: 60 y.o. SEX: male       : 1965        MRN: [id number]    DATE: 3/13/2025  TIME: 3:17 PM    Assessment and Plan     Problem List Items Addressed This Visit       Diabetes mellitus (HCC) - Primary    Diabetes.  A1c in the office was stable at 6.4.  Patient will continue current regimen of medications  Lab Results   Component Value Date    HGBA1C 6.4 2025            Relevant Orders    POCT hemoglobin A1c (Completed)    Arthralgia    Arthralgia.  No specific etiology established.  Patient given prescription for prednisone tapering dose consisting of 60 mg x 2 days, 40 x 2, 20 x 2, 10 x 2 days.  Patient will call if symptoms persist after medication completed         Relevant Medications    predniSONE 20 mg tablet    Mild asthma with acute exacerbation    History of asthma.  Patient was given a refill on his inhalers as requested         Relevant Medications    Fluticasone-Salmeterol (Advair) 500-50 mcg/dose inhaler    ipratropium (ATROVENT) 0.02 % nebulizer solution     Other Visit Diagnoses         GERD (gastroesophageal reflux disease)        Relevant Medications    famotidine (PEPCID) 20 mg tablet      Difficulty swallowing        Relevant Medications    famotidine (PEPCID) 20 mg tablet      COPD exacerbation (HCC)        Relevant Medications    predniSONE 20 mg tablet    Fluticasone-Salmeterol (Advair) 500-50 mcg/dose inhaler    ipratropium (ATROVENT) 0.02 % nebulizer solution                Chief Complaint     Chief Complaint   Patient presents with    Shoulder Pain     NECK AND CHEST        History of Present Illness     Patient in the office with 1 day history of sudden onset of arthralgia including his bilateral shoulders clavicles and upper back area and neck.  He denies any acute trauma.  He was coming home from work after putting in his usual hours when he began experiencing arthralgias.  He denies any recent illness.    Shoulder  Pain         Review of Systems   Review of Systems   Constitutional: Negative.    Respiratory: Negative.     Cardiovascular: Negative.    Gastrointestinal: Negative.    Musculoskeletal:  Positive for arthralgias, back pain, neck pain and neck stiffness.       Active Problem List     Patient Active Problem List   Diagnosis    Essential hypertension    Uncontrolled type 2 diabetes mellitus with hyperglycemia (HCC)    Elevated lipoprotein(a)    Microalbuminuria    Obesity    Medical non-compliance    Hypomagnesemia    CHF (congestive heart failure) ([facility])    Alcohol abuse    DELFINO (acute kidney injury) ([facility])    Onychomycosis    Arthritis of right wrist    Acute on chronic diastolic congestive heart failure (HCC)    Impingement syndrome of left shoulder    Diabetes mellitus ([facility])    Acute gout of right hand    Acute midline low back pain without sciatica    Reactive airway disease without complication    (HFpEF) heart failure with preserved ejection fraction ([facility])    AMANDO (obstructive sleep apnea)    Arthralgia    Intermittent asthma    Mild asthma with acute exacerbation    Current smoker    Cellulitis    Chronic gout without tophus    Encounter for immunization       Past Medical History:  Past Medical History:   Diagnosis Date    Acute kidney injury ([facility]) 09/05/2020    Asthma     CHF (congestive heart failure) ([facility])     Colon polyp     Diabetes mellitus ([facility])     GERD (gastroesophageal reflux disease)     Hypertension     Inguinal hernia     3/25/15    Onychomycosis     5/24/17    Sleep apnea     Type 2 diabetes mellitus ([facility]) 05/01/2012       Past Surgical History:  Past Surgical History:   Procedure Laterality Date    ARTHROSCOPY KNEE      with Medial and Lateral Meniscus Repair    HERNIA REPAIR      umbilical and inguinal hernia repairs (left)       Family History:  Family History   Problem Relation Age of Onset    Hypertension Mother         essential    Chronic bronchitis Father     Prostate cancer Father     Colon  cancer Father     Breast cancer Sister        Social History:  Social History     Socioeconomic History    Marital status: /Civil Union     Spouse name: Not on file    Number of children: Not on file    Years of education: Not on file    Highest education level: Not on file   Occupational History    Occupation: [employer]    Tobacco Use    Smoking status: Former     Types: Cigars    Smokeless tobacco: Never    Tobacco comments:     current every day smoker, per [employer]   Vaping Use    Vaping status: Never Used   Substance and Sexual Activity    Alcohol use: Yes     Alcohol/week: 3.0 standard drinks of alcohol     Types: 2 Cans of beer, 1 Standard drinks or equivalent per week     Comment: weekend: 1 glass of christian or 2 beers    Drug use: No    Sexual activity: Yes   Other Topics Concern    Not on file   Social History Narrative    Alcohol dependence    Nicotine dependence    Denied, alcohol Use    Marital problems     Social Drivers of Health     Financial Resource Strain: Not on file   Food Insecurity: Not on file   Transportation Needs: Not on file   Physical Activity: Not on file   Stress: Not on file   Social Connections: Not on file   Intimate Partner Violence: Not on file   Housing Stability: Not on file       Objective     Vitals:    03/13/25 1152   BP: 100/70   Pulse: 83   Resp: 17   Temp: 97.8 °F (36.6 °C)   SpO2: 98%     Wt Readings from Last 3 Encounters:   03/13/25 126 kg (277 lb 4 oz)   11/27/24 117 kg (258 lb 12.8 oz)   10/21/24 119 kg (263 lb 2 oz)       Physical Exam  Constitutional:       General: He is not in acute distress.     Appearance: Normal appearance. He is not ill-appearing.   HENT:      Head: Normocephalic and atraumatic.   Eyes:      General:         Right eye: No discharge.         Left eye: No discharge.      Extraocular Movements: Extraocular movements intact.      Conjunctiva/sclera: Conjunctivae normal.      Pupils: Pupils are equal, round, and reactive to  light.   Neck:      Vascular: No carotid bruit.   Cardiovascular:      Rate and Rhythm: Normal rate and regular rhythm.      Heart sounds: Normal heart sounds. No murmur heard.  Pulmonary:      Effort: Pulmonary effort is normal.      Breath sounds: Normal breath sounds. No wheezing, rhonchi or rales.   Musculoskeletal:         General: Tenderness present.      Right lower leg: No edema.      Left lower leg: No edema.      Comments: Patient with tenderness along bilateral clavicles, trapezius muscles bilaterally.    Decreased range of motion of shoulders above 90 degrees elevation.  Muscle strength +5/5 with grasping of his hands    Mild decreased range of motion with flexion extension and lateral rotation of cervical spine due to stiffness and pain   Lymphadenopathy:      Cervical: No cervical adenopathy.   Skin:     Findings: No rash.   Neurological:      General: No focal deficit present.      Mental Status: He is alert and oriented to person, place, and time.      Cranial Nerves: No cranial nerve deficit.   Psychiatric:         Mood and Affect: Mood normal.         Behavior: Behavior normal.         Thought Content: Thought content normal.         Judgment: Judgment normal.         Pertinent Laboratory/Diagnostic Studies:  Lab Results   Component Value Date    GLUCOSE 179 (H) 09/04/2020    BUN 18 11/16/2024    CREATININE 1.36 (H) 11/16/2024    CALCIUM 9.0 11/16/2024     12/21/2017    K 5.3 11/16/2024    CO2 26 11/16/2024    CL 98 11/16/2024     Lab Results   Component Value Date    ALT 21 10/16/2024    AST 20 10/16/2024    ALKPHOS 76 10/16/2024    BILITOT 1.3 (H) 12/21/2017       Lab Results   Component Value Date    WBC 11.49 (H) 11/16/2024    HGB 11.8 (L) 11/16/2024    HCT 36.5 11/16/2024     (H) 11/16/2024     11/16/2024       Lab Results   Component Value Date    TSH 3.58 03/14/2022       Lab Results   Component Value Date    CHOL 153 12/21/2017     Lab Results   Component Value Date     TRIG 69 11/21/2023     Lab Results   Component Value Date    HDL 36 (L) 11/21/2023     Lab Results   Component Value Date    LDLCALC 70 11/21/2023     Lab Results   Component Value Date    HGBA1C 6.4 03/13/2025       Results for orders placed or performed in visit on 03/13/25   POCT hemoglobin A1c   Result Value Ref Range    Hemoglobin A1C 6.4 <=6.5       Orders Placed This Encounter   Procedures    POCT hemoglobin A1c       ALLERGIES:  No Known Allergies    Current Medications     Current Outpatient Medications   Medication Sig Dispense Refill    albuterol (PROVENTIL HFA,VENTOLIN HFA) 90 mcg/act inhaler Inhale 1 puff in the morning 3 inhalers per refill 54 g 1    allopurinol (ZYLOPRIM) 100 mg tablet Take 1 tablet (100 mg total) by mouth daily 90 tablet 1    Blood Pressure KIT Twice a day periodically 1 each 0    carvedilol (COREG) 6.25 mg tablet Take 1 tablet (6.25 mg total) by mouth 2 (two) times a day with meals 180 tablet 5    colchicine (COLCRYS) 0.6 mg tablet Take 1 tablet (0.6 mg total) by mouth 2 (two) times a day 20 tablet 0    diclofenac sodium (VOLTAREN) 50 mg EC tablet TAKE 1 TABLET TWICE A DAY AFTER FOOD AS NEEDED FOR JOINT PAIN 180 tablet 1    docusate sodium (COLACE) 100 mg capsule Take 1 capsule (100 mg total) by mouth 3 (three) times a day as needed for constipation (Take with pain medication) 30 capsule 0    famotidine (PEPCID) 20 mg tablet Take 1 tablet (20 mg total) by mouth daily at bedtime 90 tablet 1    Fluticasone-Salmeterol (Advair) 500-50 mcg/dose inhaler Inhale 1 puff 2 (two) times a day Rinse mouth after use. 60 blister 5    glimepiride (AMARYL) 1 mg tablet TAKE 1 TABLET BY MOUTH TWICE A  tablet 1    glucose blood test strip 1 each by Other route daily 180 each 5    ipratropium (ATROVENT) 0.02 % nebulizer solution Take 2.5 mL (0.5 mg total) by nebulization 3 (three) times a day 225 mL 5    Lancets (ONETOUCH ULTRASOFT) lancets by Does not apply route      levalbuterol (XOPENEX) 1.25  mg/3 mL nebulizer solution Take 3 mL (1.25 mg total) by nebulization 3 (three) times a day 270 mL 0    methocarbamol (ROBAXIN) 500 mg tablet Take 1 tablet (500 mg total) by mouth 2 (two) times a day 20 tablet 0    pantoprazole (PROTONIX) 40 mg tablet Take 1 tablet (40 mg total) by mouth daily before breakfast 90 tablet 3    predniSONE 10 mg tablet 5 tabs x 2 days, 4 tabs X 2 days, 3 tabs X 2 days, 2 tabs X 2 days, 1 tab X 2 days 30 tablet 0    predniSONE 20 mg tablet 3 TABS x 2 DAYS, 2 TABS x 2 DAYS, 1 TAB x 2 DAYS, 1/2 TAB x 2 DAYS 13 tablet 0    sitaGLIPtin (Januvia) 100 mg tablet Take 1 tablet (100 mg total) by mouth daily 90 tablet 1    torsemide (DEMADEX) 20 mg tablet TAKE 2 TABS ONCE DAILY IN  tablet 2    LORazepam (ATIVAN) 1 mg tablet Take 1 tablet by mouth daily as needed (Patient not taking: Reported on 7/25/2024)      spironolactone (ALDACTONE) 25 mg tablet Take 1 tablet (25 mg total) by mouth daily 90 tablet 1     No current facility-administered medications for this visit.         Health Maintenance     Health Maintenance   Topic Date Due    Pneumococcal Vaccine: Pediatrics (0 to 5 Years) and At-Risk Patients (6 to 64 Years) (1 of 2 - PCV) Never done    Hepatitis A Vaccine (1 of 2 - Risk 2-dose series) Never done    Zoster Vaccine (1 of 2) Never done    Annual Physical  12/22/2023    RSV Vaccine for Pregnant Patients and Patients Age 60+ Years (1 - Risk 60-74 years 1-dose series) Never done    COVID-19 Vaccine (4 - 2024-25 season) 09/01/2024    HEMOGLOBIN A1C  09/13/2025    Depression Screening  09/27/2025    Kidney Health Evaluation: Albumin/Creatinine Ratio  09/27/2025    Diabetic Foot Exam  09/27/2025    Diabetic Eye Exam  10/09/2025    Kidney Health Evaluation: GFR  11/16/2025    DTaP,Tdap,and Td Vaccines (3 - Td or Tdap) 09/05/2030    Colorectal Cancer Screening  01/17/2031    HIV Screening  Completed    Hepatitis C Screening  Completed    Influenza Vaccine  Completed    Meningococcal B  Vaccine  Aged Out    RSV Vaccine age 0-20 Months  Aged Out    HIB Vaccine  Aged Out    IPV Vaccine  Aged Out    Meningococcal ACWY Vaccine  Aged Out    HPV Vaccine  Aged Out     Immunization History   Administered Date(s) Administered    COVID-19 PFIZER VACCINE 0.3 ML IM 04/12/2021, 05/03/2021, 12/15/2021    INFLUENZA 09/23/2015, 10/21/2019, 12/22/2022, 10/31/2023    Influenza Recombinant Preservative Free Im 11/27/2024    Influenza, injectable, quadrivalent, preservative free 0.5 mL 10/27/2020, 10/31/2023    Influenza, recombinant, quadrivalent,injectable, preservative free 10/21/2019, 12/22/2022    Influenza, seasonal, injectable 09/23/2015    Tdap 10/21/2019, 09/05/2020       Linden Roque MD

## 2025-03-13 NOTE — ASSESSMENT & PLAN NOTE
Arthralgia.  No specific etiology established.  Patient given prescription for prednisone tapering dose consisting of 60 mg x 2 days, 40 x 2, 20 x 2, 10 x 2 days.  Patient will call if symptoms persist after medication completed

## 2025-03-14 ENCOUNTER — TELEPHONE (OUTPATIENT)
Dept: FAMILY MEDICINE CLINIC | Facility: CLINIC | Age: 60
End: 2025-03-14

## 2025-04-09 DIAGNOSIS — J44.1 COPD EXACERBATION (HCC): ICD-10-CM

## 2025-04-10 ENCOUNTER — TELEPHONE (OUTPATIENT)
Dept: FAMILY MEDICINE CLINIC | Facility: CLINIC | Age: 60
End: 2025-04-10

## 2025-04-10 DIAGNOSIS — J44.1 COPD EXACERBATION (HCC): ICD-10-CM

## 2025-04-10 RX ORDER — IPRATROPIUM BROMIDE AND ALBUTEROL SULFATE 2.5; .5 MG/3ML; MG/3ML
SOLUTION RESPIRATORY (INHALATION)
Refills: 0 | OUTPATIENT
Start: 2025-04-10

## 2025-04-10 NOTE — TELEPHONE ENCOUNTER
PA for ipratropium (ATROVENT) 0.02 % nebulizer solution SUBMITTED to [facility]    via    [x]CMM-KEY: [id number]  []Surescripts-Case ID #   []Availity-Auth ID # NDC #   []Faxed to plan  []Other website   []Phone call Case ID #     [x]PA sent as URGENT    All office notes, labs and other pertaining documents and studies sent. Clinical questions answered. Awaiting determination from insurance company.     Turnaround time for your insurance to make a decision on your Prior Authorization can take 7-21 business days.

## 2025-04-11 RX ORDER — IPRATROPIUM BROMIDE AND ALBUTEROL SULFATE 2.5; .5 MG/3ML; MG/3ML
SOLUTION RESPIRATORY (INHALATION)
Refills: 0 | OUTPATIENT
Start: 2025-04-11

## 2025-04-14 ENCOUNTER — APPOINTMENT (OUTPATIENT)
Dept: RADIOLOGY | Facility: HOSPITAL | Age: 60
DRG: 291 | End: 2025-04-14
Payer: COMMERCIAL

## 2025-04-14 ENCOUNTER — HOSPITAL ENCOUNTER (INPATIENT)
Facility: HOSPITAL | Age: 60
LOS: 9 days | Discharge: HOME | DRG: 291 | End: 2025-04-23
Attending: EMERGENCY MEDICINE | Admitting: INTERNAL MEDICINE
Payer: COMMERCIAL

## 2025-04-14 DIAGNOSIS — E83.42 HYPOMAGNESEMIA: ICD-10-CM

## 2025-04-14 DIAGNOSIS — I50.30 (HFPEF) HEART FAILURE WITH PRESERVED EJECTION FRACTION (HCC): ICD-10-CM

## 2025-04-14 DIAGNOSIS — I50.9 ACUTE EXACERBATION OF CHF (CONGESTIVE HEART FAILURE) (HCC): Primary | ICD-10-CM

## 2025-04-14 DIAGNOSIS — N17.9 AKI (ACUTE KIDNEY INJURY) (HCC): ICD-10-CM

## 2025-04-14 LAB
2HR DELTA HS TROPONIN: 0 NG/L
ALBUMIN SERPL BCG-MCNC: 3.5 G/DL (ref 3.5–5)
ALP SERPL-CCNC: 89 U/L (ref 34–104)
ALT SERPL W P-5'-P-CCNC: 9 U/L (ref 7–52)
ANION GAP SERPL CALCULATED.3IONS-SCNC: 9 MMOL/L (ref 4–13)
AST SERPL W P-5'-P-CCNC: 15 U/L (ref 13–39)
BASOPHILS # BLD AUTO: 0.03 THOUSANDS/ÂΜL (ref 0–0.1)
BASOPHILS NFR BLD AUTO: 0 % (ref 0–1)
BILIRUB SERPL-MCNC: 0.49 MG/DL (ref 0.2–1)
BUN SERPL-MCNC: 13 MG/DL (ref 5–25)
CALCIUM SERPL-MCNC: 8.5 MG/DL (ref 8.4–10.2)
CARDIAC TROPONIN I PNL SERPL HS: 5 NG/L (ref ?–50)
CARDIAC TROPONIN I PNL SERPL HS: 5 NG/L (ref ?–50)
CHLORIDE SERPL-SCNC: 98 MMOL/L (ref 96–108)
CO2 SERPL-SCNC: 29 MMOL/L (ref 21–32)
CREAT SERPL-MCNC: 1.91 MG/DL (ref 0.6–1.3)
EOSINOPHIL # BLD AUTO: 0.31 THOUSAND/ÂΜL (ref 0–0.61)
EOSINOPHIL NFR BLD AUTO: 4 % (ref 0–6)
ERYTHROCYTE [DISTWIDTH] IN BLOOD BY AUTOMATED COUNT: 13.4 % (ref 11.6–15.1)
GFR SERPL CREATININE-BSD FRML MDRD: 36 ML/MIN/1.73SQ M
GLUCOSE SERPL-MCNC: 171 MG/DL (ref 65–140)
HCT VFR BLD AUTO: 33.8 % (ref 36.5–49.3)
HGB BLD-MCNC: 11.2 G/DL (ref 12–17)
IMM GRANULOCYTES # BLD AUTO: 0.05 THOUSAND/UL (ref 0–0.2)
IMM GRANULOCYTES NFR BLD AUTO: 1 % (ref 0–2)
LYMPHOCYTES # BLD AUTO: 2.97 THOUSANDS/ÂΜL (ref 0.6–4.47)
LYMPHOCYTES NFR BLD AUTO: 35 % (ref 14–44)
MCH RBC QN AUTO: 33.1 PG (ref 26.8–34.3)
MCHC RBC AUTO-ENTMCNC: 33.1 G/DL (ref 31.4–37.4)
MCV RBC AUTO: 100 FL (ref 82–98)
MONOCYTES # BLD AUTO: 0.76 THOUSAND/ÂΜL (ref 0.17–1.22)
MONOCYTES NFR BLD AUTO: 9 % (ref 4–12)
NEUTROPHILS # BLD AUTO: 4.42 THOUSANDS/ÂΜL (ref 1.85–7.62)
NEUTS SEG NFR BLD AUTO: 51 % (ref 43–75)
NRBC BLD AUTO-RTO: 0 /100 WBCS
PLATELET # BLD AUTO: 243 THOUSANDS/UL (ref 149–390)
PMV BLD AUTO: 9.4 FL (ref 8.9–12.7)
POTASSIUM SERPL-SCNC: 3.4 MMOL/L (ref 3.5–5.3)
PROT SERPL-MCNC: 6.6 G/DL (ref 6.4–8.4)
RBC # BLD AUTO: 3.38 MILLION/UL (ref 3.88–5.62)
SODIUM SERPL-SCNC: 136 MMOL/L (ref 135–147)
WBC # BLD AUTO: 8.54 THOUSAND/UL (ref 4.31–10.16)

## 2025-04-14 PROCEDURE — 85379 FIBRIN DEGRADATION QUANT: CPT | Performed by: NURSE PRACTITIONER

## 2025-04-14 PROCEDURE — 71046 X-RAY EXAM CHEST 2 VIEWS: CPT

## 2025-04-14 PROCEDURE — 80053 COMPREHEN METABOLIC PANEL: CPT

## 2025-04-14 PROCEDURE — 84484 ASSAY OF TROPONIN QUANT: CPT

## 2025-04-14 PROCEDURE — 99285 EMERGENCY DEPT VISIT HI MDM: CPT

## 2025-04-14 PROCEDURE — 85025 COMPLETE CBC W/AUTO DIFF WBC: CPT

## 2025-04-14 PROCEDURE — 93005 ELECTROCARDIOGRAM TRACING: CPT

## 2025-04-14 PROCEDURE — 36415 COLL VENOUS BLD VENIPUNCTURE: CPT

## 2025-04-14 RX ADMIN — FUROSEMIDE 100 MG: 10 INJECTION, SOLUTION INTRAVENOUS at 23:16

## 2025-04-15 PROBLEM — N18.9 CKD (CHRONIC KIDNEY DISEASE): Status: ACTIVE | Noted: 2025-04-15

## 2025-04-15 LAB
4HR DELTA HS TROPONIN: 0 NG/L
ANION GAP SERPL CALCULATED.3IONS-SCNC: 9 MMOL/L (ref 4–13)
BUN SERPL-MCNC: 15 MG/DL (ref 5–25)
CALCIUM SERPL-MCNC: 8.7 MG/DL (ref 8.4–10.2)
CARDIAC TROPONIN I PNL SERPL HS: 5 NG/L (ref ?–50)
CHLORIDE SERPL-SCNC: 97 MMOL/L (ref 96–108)
CO2 SERPL-SCNC: 31 MMOL/L (ref 21–32)
CREAT SERPL-MCNC: 1.87 MG/DL (ref 0.6–1.3)
D DIMER PPP FEU-MCNC: 1.06 UG/ML FEU
ERYTHROCYTE [DISTWIDTH] IN BLOOD BY AUTOMATED COUNT: 13.4 % (ref 11.6–15.1)
GFR SERPL CREATININE-BSD FRML MDRD: 37 ML/MIN/1.73SQ M
GLUCOSE SERPL-MCNC: 119 MG/DL (ref 65–140)
GLUCOSE SERPL-MCNC: 141 MG/DL (ref 65–140)
GLUCOSE SERPL-MCNC: 190 MG/DL (ref 65–140)
GLUCOSE SERPL-MCNC: 200 MG/DL (ref 65–140)
GLUCOSE SERPL-MCNC: 204 MG/DL (ref 65–140)
GLUCOSE SERPL-MCNC: 247 MG/DL (ref 65–140)
HCT VFR BLD AUTO: 35 % (ref 36.5–49.3)
HGB BLD-MCNC: 11.8 G/DL (ref 12–17)
MAGNESIUM SERPL-MCNC: 1.2 MG/DL (ref 1.9–2.7)
MCH RBC QN AUTO: 33.5 PG (ref 26.8–34.3)
MCHC RBC AUTO-ENTMCNC: 33.7 G/DL (ref 31.4–37.4)
MCV RBC AUTO: 99 FL (ref 82–98)
PLATELET # BLD AUTO: 250 THOUSANDS/UL (ref 149–390)
PMV BLD AUTO: 9.9 FL (ref 8.9–12.7)
POTASSIUM SERPL-SCNC: 3.6 MMOL/L (ref 3.5–5.3)
RBC # BLD AUTO: 3.52 MILLION/UL (ref 3.88–5.62)
SODIUM SERPL-SCNC: 137 MMOL/L (ref 135–147)
WBC # BLD AUTO: 9.45 THOUSAND/UL (ref 4.31–10.16)

## 2025-04-15 PROCEDURE — 94762 N-INVAS EAR/PLS OXIMTRY CONT: CPT

## 2025-04-15 PROCEDURE — 80048 BASIC METABOLIC PNL TOTAL CA: CPT | Performed by: INTERNAL MEDICINE

## 2025-04-15 PROCEDURE — 94640 AIRWAY INHALATION TREATMENT: CPT

## 2025-04-15 PROCEDURE — 82948 REAGENT STRIP/BLOOD GLUCOSE: CPT

## 2025-04-15 PROCEDURE — 94760 N-INVAS EAR/PLS OXIMETRY 1: CPT

## 2025-04-15 PROCEDURE — 99223 1ST HOSP IP/OBS HIGH 75: CPT | Performed by: INTERNAL MEDICINE

## 2025-04-15 PROCEDURE — 83735 ASSAY OF MAGNESIUM: CPT | Performed by: INTERNAL MEDICINE

## 2025-04-15 PROCEDURE — 99222 1ST HOSP IP/OBS MODERATE 55: CPT | Performed by: NURSE PRACTITIONER

## 2025-04-15 PROCEDURE — 84484 ASSAY OF TROPONIN QUANT: CPT | Performed by: INTERNAL MEDICINE

## 2025-04-15 PROCEDURE — 85027 COMPLETE CBC AUTOMATED: CPT | Performed by: INTERNAL MEDICINE

## 2025-04-15 RX ORDER — MAGNESIUM SULFATE HEPTAHYDRATE 40 MG/ML
4 INJECTION, SOLUTION INTRAVENOUS ONCE
Status: COMPLETED | OUTPATIENT
Start: 2025-04-15 | End: 2025-04-15

## 2025-04-15 RX ORDER — FLUTICASONE FUROATE AND VILANTEROL 200; 25 UG/1; UG/1
1 POWDER RESPIRATORY (INHALATION)
Status: DISCONTINUED | OUTPATIENT
Start: 2025-04-15 | End: 2025-04-23 | Stop reason: HOSPADM

## 2025-04-15 RX ORDER — POTASSIUM CHLORIDE 1500 MG/1
40 TABLET, EXTENDED RELEASE ORAL ONCE
Status: COMPLETED | OUTPATIENT
Start: 2025-04-15 | End: 2025-04-15

## 2025-04-15 RX ORDER — CARVEDILOL 12.5 MG/1
12.5 TABLET ORAL 2 TIMES DAILY WITH MEALS
Status: DISCONTINUED | OUTPATIENT
Start: 2025-04-15 | End: 2025-04-23 | Stop reason: HOSPADM

## 2025-04-15 RX ORDER — COLCHICINE 0.6 MG/1
0.6 TABLET ORAL 2 TIMES DAILY
Status: DISCONTINUED | OUTPATIENT
Start: 2025-04-15 | End: 2025-04-23 | Stop reason: HOSPADM

## 2025-04-15 RX ORDER — BENZONATATE 100 MG/1
100 CAPSULE ORAL 3 TIMES DAILY
Status: DISCONTINUED | OUTPATIENT
Start: 2025-04-15 | End: 2025-04-23 | Stop reason: HOSPADM

## 2025-04-15 RX ORDER — LANOLIN ALCOHOL/MO/W.PET/CERES
100 CREAM (GRAM) TOPICAL DAILY
Status: DISCONTINUED | OUTPATIENT
Start: 2025-04-15 | End: 2025-04-23 | Stop reason: HOSPADM

## 2025-04-15 RX ORDER — INSULIN LISPRO 100 [IU]/ML
1-5 INJECTION, SOLUTION INTRAVENOUS; SUBCUTANEOUS
Status: DISCONTINUED | OUTPATIENT
Start: 2025-04-15 | End: 2025-04-23 | Stop reason: HOSPADM

## 2025-04-15 RX ORDER — ALLOPURINOL 100 MG/1
100 TABLET ORAL DAILY
Status: DISCONTINUED | OUTPATIENT
Start: 2025-04-15 | End: 2025-04-23 | Stop reason: HOSPADM

## 2025-04-15 RX ORDER — FUROSEMIDE 10 MG/ML
100 INJECTION INTRAMUSCULAR; INTRAVENOUS
Status: DISCONTINUED | OUTPATIENT
Start: 2025-04-15 | End: 2025-04-18

## 2025-04-15 RX ORDER — LEVALBUTEROL INHALATION SOLUTION 1.25 MG/3ML
1.25 SOLUTION RESPIRATORY (INHALATION)
Status: DISCONTINUED | OUTPATIENT
Start: 2025-04-15 | End: 2025-04-23

## 2025-04-15 RX ORDER — HEPARIN SODIUM 5000 [USP'U]/ML
7500 INJECTION, SOLUTION INTRAVENOUS; SUBCUTANEOUS EVERY 8 HOURS SCHEDULED
Status: DISCONTINUED | OUTPATIENT
Start: 2025-04-15 | End: 2025-04-23 | Stop reason: HOSPADM

## 2025-04-15 RX ORDER — FOLIC ACID 1 MG/1
1 TABLET ORAL DAILY
Status: DISCONTINUED | OUTPATIENT
Start: 2025-04-15 | End: 2025-04-23 | Stop reason: HOSPADM

## 2025-04-15 RX ORDER — ALBUTEROL SULFATE 90 UG/1
1 INHALANT RESPIRATORY (INHALATION) DAILY
Status: DISCONTINUED | OUTPATIENT
Start: 2025-04-15 | End: 2025-04-15

## 2025-04-15 RX ORDER — SPIRONOLACTONE 25 MG/1
25 TABLET ORAL DAILY
Status: DISCONTINUED | OUTPATIENT
Start: 2025-04-15 | End: 2025-04-23 | Stop reason: HOSPADM

## 2025-04-15 RX ORDER — FAMOTIDINE 20 MG/1
20 TABLET, FILM COATED ORAL
Status: DISCONTINUED | OUTPATIENT
Start: 2025-04-15 | End: 2025-04-23 | Stop reason: HOSPADM

## 2025-04-15 RX ORDER — CARVEDILOL 3.12 MG/1
6.25 TABLET ORAL 2 TIMES DAILY WITH MEALS
Status: DISCONTINUED | OUTPATIENT
Start: 2025-04-15 | End: 2025-04-15

## 2025-04-15 RX ORDER — PANTOPRAZOLE SODIUM 40 MG/1
40 TABLET, DELAYED RELEASE ORAL
Status: DISCONTINUED | OUTPATIENT
Start: 2025-04-15 | End: 2025-04-23 | Stop reason: HOSPADM

## 2025-04-15 RX ORDER — BENZONATATE 100 MG/1
100 CAPSULE ORAL 3 TIMES DAILY
Status: DISCONTINUED | OUTPATIENT
Start: 2025-04-15 | End: 2025-04-15

## 2025-04-15 RX ORDER — ALBUTEROL SULFATE 90 UG/1
2 INHALANT RESPIRATORY (INHALATION) EVERY 4 HOURS PRN
Status: DISCONTINUED | OUTPATIENT
Start: 2025-04-15 | End: 2025-04-23 | Stop reason: HOSPADM

## 2025-04-15 RX ORDER — CARVEDILOL 3.12 MG/1
6.25 TABLET ORAL 2 TIMES DAILY WITH MEALS
Status: COMPLETED | OUTPATIENT
Start: 2025-04-15 | End: 2025-04-15

## 2025-04-15 RX ORDER — INSULIN LISPRO 100 [IU]/ML
2-12 INJECTION, SOLUTION INTRAVENOUS; SUBCUTANEOUS
Status: DISCONTINUED | OUTPATIENT
Start: 2025-04-15 | End: 2025-04-23 | Stop reason: HOSPADM

## 2025-04-15 RX ADMIN — IPRATROPIUM BROMIDE 0.5 MG: 0.5 SOLUTION RESPIRATORY (INHALATION) at 13:57

## 2025-04-15 RX ADMIN — INSULIN LISPRO 4 UNITS: 100 INJECTION, SOLUTION INTRAVENOUS; SUBCUTANEOUS at 09:56

## 2025-04-15 RX ADMIN — FUROSEMIDE 100 MG: 10 INJECTION, SOLUTION INTRAMUSCULAR; INTRAVENOUS at 09:51

## 2025-04-15 RX ADMIN — COLCHICINE 0.6 MG: 0.6 TABLET ORAL at 09:53

## 2025-04-15 RX ADMIN — FUROSEMIDE 100 MG: 10 INJECTION, SOLUTION INTRAMUSCULAR; INTRAVENOUS at 17:25

## 2025-04-15 RX ADMIN — FAMOTIDINE 20 MG: 20 TABLET, FILM COATED ORAL at 21:56

## 2025-04-15 RX ADMIN — MULTIPLE VITAMINS W/ MINERALS TAB 1 TABLET: TAB ORAL at 09:51

## 2025-04-15 RX ADMIN — POTASSIUM CHLORIDE 40 MEQ: 1500 TABLET, EXTENDED RELEASE ORAL at 01:55

## 2025-04-15 RX ADMIN — PANTOPRAZOLE SODIUM 40 MG: 40 TABLET, DELAYED RELEASE ORAL at 06:22

## 2025-04-15 RX ADMIN — FAMOTIDINE 20 MG: 20 TABLET, FILM COATED ORAL at 01:55

## 2025-04-15 RX ADMIN — LEVALBUTEROL HYDROCHLORIDE 1.25 MG: 1.25 SOLUTION RESPIRATORY (INHALATION) at 13:57

## 2025-04-15 RX ADMIN — HEPARIN SODIUM 7500 UNITS: 5000 INJECTION INTRAVENOUS; SUBCUTANEOUS at 01:56

## 2025-04-15 RX ADMIN — FLUTICASONE FUROATE AND VILANTEROL TRIFENATATE 1 PUFF: 200; 25 POWDER RESPIRATORY (INHALATION) at 09:52

## 2025-04-15 RX ADMIN — FOLIC ACID 1 MG: 1 TABLET ORAL at 09:51

## 2025-04-15 RX ADMIN — IPRATROPIUM BROMIDE 0.5 MG: 0.5 SOLUTION RESPIRATORY (INHALATION) at 08:01

## 2025-04-15 RX ADMIN — IPRATROPIUM BROMIDE 0.5 MG: 0.5 SOLUTION RESPIRATORY (INHALATION) at 20:10

## 2025-04-15 RX ADMIN — ALLOPURINOL 100 MG: 100 TABLET ORAL at 09:51

## 2025-04-15 RX ADMIN — SPIRONOLACTONE 25 MG: 25 TABLET, FILM COATED ORAL at 09:51

## 2025-04-15 RX ADMIN — LEVALBUTEROL HYDROCHLORIDE 1.25 MG: 1.25 SOLUTION RESPIRATORY (INHALATION) at 08:01

## 2025-04-15 RX ADMIN — HEPARIN SODIUM 7500 UNITS: 5000 INJECTION INTRAVENOUS; SUBCUTANEOUS at 06:22

## 2025-04-15 RX ADMIN — POTASSIUM CHLORIDE 40 MEQ: 1500 TABLET, EXTENDED RELEASE ORAL at 09:51

## 2025-04-15 RX ADMIN — HEPARIN SODIUM 7500 UNITS: 5000 INJECTION INTRAVENOUS; SUBCUTANEOUS at 13:44

## 2025-04-15 RX ADMIN — BENZONATATE 100 MG: 100 CAPSULE ORAL at 18:09

## 2025-04-15 RX ADMIN — COLCHICINE 0.6 MG: 0.6 TABLET ORAL at 17:26

## 2025-04-15 RX ADMIN — CARVEDILOL 12.5 MG: 12.5 TABLET, FILM COATED ORAL at 17:26

## 2025-04-15 RX ADMIN — MAGNESIUM SULFATE HEPTAHYDRATE 4 G: 40 INJECTION, SOLUTION INTRAVENOUS at 09:52

## 2025-04-15 RX ADMIN — Medication 100 MG: at 09:51

## 2025-04-15 RX ADMIN — LEVALBUTEROL HYDROCHLORIDE 1.25 MG: 1.25 SOLUTION RESPIRATORY (INHALATION) at 20:10

## 2025-04-15 RX ADMIN — CARVEDILOL 6.25 MG: 3.12 TABLET, FILM COATED ORAL at 09:53

## 2025-04-15 RX ADMIN — INSULIN LISPRO 4 UNITS: 100 INJECTION, SOLUTION INTRAVENOUS; SUBCUTANEOUS at 17:25

## 2025-04-15 RX ADMIN — INSULIN LISPRO 4 UNITS: 100 INJECTION, SOLUTION INTRAVENOUS; SUBCUTANEOUS at 13:08

## 2025-04-15 NOTE — PLAN OF CARE
Problem: PAIN - ADULT  Goal: Verbalizes/displays adequate comfort level or baseline comfort level  Description: Interventions:- Encourage patient to monitor pain and request assistance- Assess pain using appropriate pain scale- Administer analgesics based on type and severity of pain and evaluate response- Implement non-pharmacological measures as appropriate and evaluate response- Consider cultural and social influences on pain and pain management- Notify physician/advanced practitioner if interventions unsuccessful or patient reports new pain  Outcome: Progressing     Problem: INFECTION - ADULT  Goal: Absence or prevention of progression during hospitalization  Description: INTERVENTIONS:- Assess and monitor for signs and symptoms of infection- Monitor lab/diagnostic results- Monitor all insertion sites, i.e. indwelling lines, tubes, and drains- Monitor endotracheal if appropriate and nasal secretions for changes in amount and color- [place] appropriate cooling/warming therapies per order- Administer medications as ordered- Instruct and encourage patient and family to use good hand hygiene technique- Identify and instruct in appropriate isolation precautions for identified infection/condition  Outcome: Progressing  Goal: Absence of fever/infection during neutropenic period  Description: INTERVENTIONS:- Monitor WBC  Outcome: Progressing     Problem: SAFETY ADULT  Goal: Patient will remain free of falls  Description: INTERVENTIONS:- Educate patient/family on patient safety including physical limitations- Instruct patient to call for assistance with activity - Consult OT/PT to assist with strengthening/mobility - Keep Call bell within reach- Keep bed low and locked with side rails adjusted as appropriate- Keep care items and personal belongings within reach- Initiate and maintain comfort rounds- Make Fall   Outcome: Progressing  Goal: Maintain or return to baseline ADL function  Description: INTERVENTIONS:-   Assess patient's ability to carry out ADLs; assess patient's baseline for ADL function and identify physical deficits which impact ability to perform ADLs (bathing, care of mouth/teeth, toileting, grooming, dressing, etc.)- Assess/evaluate cause of self-care deficits - Assess range of motion- Assess patient's mobility; develop plan if impaired- Assess patient's need for assistive devices and provide as appropriate- Encourage maximum independence but intervene and supervise when necessary- Involve family in performance of ADLs- Assess for home care needs following discharge - Consider OT consult to assist with ADL evaluation and planning for discharge- Provide patient education as appropriate  Outcome: Progressing  Goal: Maintains/Returns to pre admission functional level  Description: INTERVENTIONS:- Perform AM-PAC 6 Click Basic Mobility/ Daily Activity assessment daily.- Set and communicate daily mobility goal to care team and patient/family/caregiver. - Collaborate with rehabilitation services on mobility progress and toleration of activity level   Outcome: Progressing     Problem: DISCHARGE PLANNING  Goal: Discharge to home or other facility with appropriate resources  Description: INTERVENTIONS:- Identify barriers to discharge w/patient and caregiver- Arrange for needed discharge resources and transportation as appropriate- Identify discharge learning needs (meds, wound care, etc.)- Arrange for interpretive services to assist at discharge as needed- Refer to Case Management Department for coordinating discharge planning if the patient needs post-hospital services based on physician/advanced practitioner order or complex needs related to functional status, cognitive ability, or social support system  Outcome: Progressing     Problem: Knowledge Deficit  Goal: Patient/family/caregiver demonstrates understanding of disease process, treatment plan, medications, and discharge instructions  Description: Complete  learning assessment and assess knowledge base.Interventions:- Provide teaching at level of understanding- Provide teaching via preferred learning methods  Outcome: Progressing

## 2025-04-15 NOTE — UTILIZATION REVIEW
Initial Clinical Review    Admission: Date/Time/Statement:   Admission Orders (From admission, onward)       Ordered        04/14/25 2235  INPATIENT ADMISSION  Once                          Orders Placed This Encounter   Procedures    INPATIENT ADMISSION     Standing Status:   Standing     Number of Occurrences:   1     Level of Care:   Med Surg [16]     Estimated length of stay:   More than 2 Midnights     Certification:   I certify that inpatient services are medically necessary for this patient for a duration of greater than two midnights. See H&P and MD Progress Notes for additional information about the patient's course of treatment.     ED Arrival Information       Expected   -    Arrival   4/14/2025 18:16    Acuity   Urgent              Means of arrival   Walk-In    Escorted by   Spouse    Service   Hospitalist    Admission type   Emergency              Arrival complaint   SOB, leg swelling, back pain             Chief Complaint   Patient presents with    Shortness of Breath     Sob and b/l leg swelling that started x2 days ago. Pt reports hx of CHF and he just got back from a cruise. -CP       Initial Presentation: 60 y.o. male with a PMH of CHF, obesity, hypertension, CKD, alcohol abuse, current smoker, gout, medication nonadherence who presents with lower extremity edema, abdominal distention, shortness of breath, chest pain. Patient reports stopping his medication on approximately 4/9 as he was away on a cruise.  He complains of blister to right lateral calf which has since popped. He does not check weights daily. He reports significant dietary indiscretion especially while he was away on vacation. Patient drinks 3 beers daily and 2 shots daily. Smokes 3 cigars daily. Plan: Inpatient admission for evaluation and treatment of CHF, HTN, DM, medical non-compliance, alcohol abuse, tobacco abuse, CKD: IV lasix 100 mg bid, Cardiology consult, Echo, sodium and fluid restricted diet, check D-dimer, continue  carvedilol and aldactone, SSI, LINDA protocol, NRT.     Anticipated Length of Stay/Certification Statement: Patient will be admitted on an inpatient basis with an anticipated length of stay of greater than 2 midnights secondary to IV diuresis.     Date: 4/15   Day 2:     Heart failure consult: IV replacement of Magnesium- 1.2- etoh user. Continue IV diuresis. Increase coreg to 12.5 mg bid. Continue spironolactone 25 mg.     Internal medicine: continue IV lasix 100 mg bid, aldactone, increase Coreg to 12.5 mg bid, CIWA protocol.     ED Treatment-Medication Administration from 04/14/2025 1816 to 04/15/2025 0124         Date/Time Order Dose Route Action     04/14/2025 2316 furosemide (LASIX) 100 mg in dextrose 5 % 50 mL IVPB 100 mg Intravenous New Bag            Scheduled Medications:  allopurinol, 100 mg, Oral, Daily  carvedilol, 12.5 mg, Oral, BID With Meals  colchicine, 0.6 mg, Oral, BID  famotidine, 20 mg, Oral, HS  fluticasone-vilanterol, 1 puff, Inhalation, Daily  folic acid, 1 mg, Oral, Daily  furosemide, 100 mg, Intravenous, BID (diuretic)  heparin (porcine), 7,500 Units, Subcutaneous, Q8H ELIANE  insulin lispro, 1-5 Units, Subcutaneous, HS  insulin lispro, 2-12 Units, Subcutaneous, TID AC  ipratropium, 0.5 mg, Nebulization, TID  levalbuterol, 1.25 mg, Nebulization, TID  magnesium sulfate, 4 g, Intravenous, Once  multivitamin-minerals, 1 tablet, Oral, Daily  pantoprazole, 40 mg, Oral, Daily Before Breakfast  spironolactone, 25 mg, Oral, Daily  thiamine, 100 mg, Oral, Daily      Continuous IV Infusions:     PRN Meds:  albuterol, 2 puff, Inhalation, Q4H PRN      ED Triage Vitals   Temperature Pulse Respirations Blood Pressure SpO2 Pain Score   04/14/25 1837 04/14/25 1837 04/14/25 1837 04/14/25 1837 04/14/25 1837 04/15/25 0000   98.1 °F (36.7 °C) 85 22 136/72 99 % No Pain     Weight (last 2 days)       Date/Time Weight    04/15/25 0556 132 (290.6)            Vital Signs (last 3 days)       Date/Time Temp Pulse Resp BP  MAP (mmHg) SpO2 O2 Device Patient Position - Orthostatic VS CIWA-Ar Total Pain    04/15/25 1100 -- -- -- -- -- -- -- -- 0 --    04/15/25 1033 99.1 °F (37.3 °C) 86 17 126/65 85 94 % -- Lying -- --    04/15/25 0953 -- -- -- -- -- -- -- -- -- 4    04/15/25 0950 -- 90 -- 129/66 -- -- -- -- -- --    04/15/25 0803 -- -- -- -- -- 97 % -- -- -- --    04/15/25 0800 -- -- -- -- -- -- None (Room air) -- -- 4    04/15/25 0720 99.1 °F (37.3 °C) 88 16 118/63 84 92 % -- Lying 0 --    04/15/25 0300 98.5 °F (36.9 °C) 93 17 151/74 104 97 % None (Room air) Lying -- --    04/15/25 0232 -- -- -- -- -- 97 % None (Room air) -- -- --    04/15/25 0213 -- -- -- -- -- 98 % None (Room air) -- -- --    04/15/25 0158 -- -- -- -- -- -- -- -- -- 6    04/15/25 0129 98 °F (36.7 °C) 86 18 159/96 121 98 % -- Sitting -- --    04/15/25 0124 -- 72 19 102/54 77 95 % -- Lying 0 --    04/15/25 0052 98.4 °F (36.9 °C) 83 20 152/91 -- 96 % None (Room air) Lying -- --    04/15/25 0033 -- -- -- -- -- 97 % None (Room air) -- -- --    04/15/25 0000 -- 83 22 140/77 105 97 % None (Room air) Lying -- No Pain    04/14/25 2300 -- 81 22 172/84 120 98 % None (Room air) Lying -- --    04/14/25 2138 -- -- -- -- -- -- None (Room air) -- -- --    04/14/25 1837 98.1 °F (36.7 °C) 85 22 136/72 96 99 % None (Room air) -- -- --           CIWA-Ar Score       Row Name 04/15/25 1100 04/15/25 0720 04/15/25 0124       CIWA-Ar    Nausea and Vomiting 0 0 0    Tactile Disturbances 0 0 0    Tremor 0 0 0    Auditory Disturbances 0 0 0    Paroxysmal Sweats 0 0 0    Visual Disturbances 0 0 0    Anxiety 0 0 0    Headache, Fullness in Head 0 0 0    Agitation 0 0 0    Orientation and Clouding of Sensorium 0 0 0    CIWA-Ar Total 0 0 0                    Pertinent Labs/Diagnostic Test Results:   Radiology:  XR chest 2 views   Final Interpretation by Beth Plasencia MD (04/15 1230)      No acute cardiopulmonary disease.            Workstation performed: [id number]           Cardiology:  ECG 12  lead    by Interface, Ris Results In (04/14 1844)        GI:  No orders to display           Results from last 7 days   Lab Units 04/15/25  0429 04/14/25  1842   WBC Thousand/uL 9.45 8.54   HEMOGLOBIN g/dL 11.8* 11.2*   HEMATOCRIT % 35.0* 33.8*   PLATELETS Thousands/uL 250 243   TOTAL NEUT ABS Thousands/µL  --  4.42         Results from last 7 days   Lab Units 04/15/25  0506 04/14/25  1842   SODIUM mmol/L 137 136   POTASSIUM mmol/L 3.6 3.4*   CHLORIDE mmol/L 97 98   CO2 mmol/L 31 29   ANION GAP mmol/L 9 9   BUN mg/dL 15 13   CREATININE mg/dL 1.87* 1.91*   EGFR ml/min/1.73sq m 37 36   CALCIUM mg/dL 8.7 8.5   MAGNESIUM mg/dL 1.2*  --      Results from last 7 days   Lab Units 04/14/25  1842   AST U/L 15   ALT U/L 9   ALK PHOS U/L 89   TOTAL PROTEIN g/dL 6.6   ALBUMIN g/dL 3.5   TOTAL BILIRUBIN mg/dL 0.49     Results from last 7 days   Lab Units 04/15/25  1032 04/15/25  0722 04/15/25  0128   POC GLUCOSE mg/dl 247* 204* 119     Results from last 7 days   Lab Units 04/15/25  0506 04/14/25  1842   GLUCOSE RANDOM mg/dL 190* 171*             BETA-HYDROXYBUTYRATE   Date Value Ref Range Status   07/06/2020 0.2 <0.6 mmol/L Final            Results from last 7 days   Lab Units 04/15/25  0429 04/14/25  2213 04/14/25  1842   HS TNI 0HR ng/L  --   --  5   HS TNI 2HR ng/L  --  5  --    HSTNI D2 ng/L  --  0  --    HS TNI 4HR ng/L 5  --   --    HSTNI D4 ng/L 0  --   --      Results from last 7 days   Lab Units 04/14/25  1842   D-DIMER QUANTITATIVE ug/ml FEU 1.06*         Past Medical History:   Diagnosis Date    Acute kidney injury (HCC) 09/05/2020    Asthma     CHF (congestive heart failure) (HCC)     Colon polyp     Diabetes mellitus (HCC)     GERD (gastroesophageal reflux disease)     Hypertension     Inguinal hernia     3/25/15    Onychomycosis     5/24/17    Sleep apnea     Type 2 diabetes mellitus (HCC) 05/01/2012     Present on Admission:   Acute on chronic diastolic congestive heart failure (HCC)   Uncontrolled type 2 diabetes  mellitus with hyperglycemia (HCC)   Essential hypertension   Alcohol abuse   Current smoker      Admitting Diagnosis: Back pain [M54.9]  Leg swelling [M79.89]  Acute exacerbation of CHF (congestive heart failure) (HCC) [I50.9]  DELFINO (acute kidney injury) (HCC) [N17.9]  Age/Sex: 60 y.o. male    Network Utilization Review Department  ATTENTION: Please call with any questions or concerns to [phone] and carefully listen to the prompts so that you are directed to the right person. All voicemails are confidential.   For Discharge needs, contact Care Management DC Support Team at [phone] opt. [phone]  Send all requests for admission clinical reviews, approved or denied determinations and any other requests to dedicated fax number below belonging to the campus where the patient is receiving treatment. List of dedicated fax numbers for the Facilities:  FACILITY NAME UR FAX NUMBER   ADMISSION DENIALS (Administrative/Medical Necessity) [phone]   DISCHARGE SUPPORT TEAM (NETWORK) [phone]   PARENT CHILD HEALTH (Maternity/NICU/Pediatrics) [phone]   [facility] [phone]   [facility] [phone]   [facility] [phone]   [facility] [phone]   [facility] [phone]   [facility] [phone]   [facility] [phone]   [facility] [phone]   [facility] [phone]   [facility] [phone]   [facility] [phone]   [facility] [phone]

## 2025-04-15 NOTE — ED PROVIDER NOTES
Time reflects when diagnosis was documented in both MDM as applicable and the Disposition within this note       Time User Action Codes Description Comment    4/14/2025 10:34 PM Wisam Foster Add [I50.9] Acute exacerbation of CHF (congestive heart failure) ([facility])     4/14/2025 10:35 PM Wisam Foster Add [N17.9] DELFINO (acute kidney injury) ([facility])           ED Disposition       ED Disposition   Admit    Condition   Stable    Date/Time   Mon Apr 14, 2025 10:34 PM    Comment   Case was discussed with Becky DAY and the patient's admission status was agreed to be Admission Status: inpatient status to the service of Dr. Thakur .               Assessment & Plan       Medical Decision Making  60 year old male with a PMH of CHF, HTN, DM2, AMANDO presenting with SOB and bilateral lower extremity edema for 2 days. Patient states he just returned from a cruise. He states he normal takes 40 mg of torsemide daily and he hasn't been completely compliant during his cruise. Patient has not taken any other medications prior to arrival to ED. He states the last two days he doubled his dose of torsemide without relief. On exam, patient resting comfortably. Vitals WNL. Bilateral 2+ pitting edema noted in bilateral LE.     DDX including but not limited to: CHF exacerbation, ANDRE, PE, pnuemonia, pleural effusion    Exam consistent with CHF exacerbation. Patient given 100 mg lasix in ED. Hgb 11.2, stable. Elevated Cr at 1.91, up from 1.36 in October 24. K 3.4. EKG NSR with occasional PVCs, Initial trop 5, 2hr delta trop 0. CXR with NAD per my read in ED. Workup and diagnosis discussed with patient who expressed understanding and is agreeable to admission. Case was discussed with Becky DAY and patient was admitted to under inpatient under Dr. Thakur's service for acute CHF exacerbation and DELFINO.    Problems Addressed:  Acute exacerbation of CHF (congestive heart failure) ([facility]): acute illness or injury  DELFINO (acute kidney injury) ([facility]):  acute illness or injury    Risk  Decision regarding hospitalization.             Medications   albuterol (PROVENTIL HFA,VENTOLIN HFA) inhaler 1 puff (has no administration in time range)   allopurinol (ZYLOPRIM) tablet 100 mg (has no administration in time range)   carvedilol (COREG) tablet 6.25 mg (has no administration in time range)   colchicine (COLCRYS) tablet 0.6 mg (has no administration in time range)   famotidine (PEPCID) tablet 20 mg (has no administration in time range)   fluticasone-vilanterol 200-25 mcg/actuation 1 puff (has no administration in time range)   ipratropium (ATROVENT) 0.02 % inhalation solution 0.5 mg (has no administration in time range)   levalbuterol (XOPENEX) inhalation solution 1.25 mg (has no administration in time range)   pantoprazole (PROTONIX) EC tablet 40 mg (has no administration in time range)   spironolactone (ALDACTONE) tablet 25 mg (has no administration in time range)   furosemide (LASIX) injection 100 mg (has no administration in time range)   heparin (porcine) subcutaneous injection 7,500 Units (has no administration in time range)   insulin lispro (HumALOG/ADMELOG) 100 units/mL subcutaneous injection 2-12 Units (has no administration in time range)   insulin lispro (HumALOG/ADMELOG) 100 units/mL subcutaneous injection 1-5 Units (has no administration in time range)   thiamine tablet 100 mg (has no administration in time range)   folic acid (FOLVITE) tablet 1 mg (has no administration in time range)   multivitamin-minerals (CENTRUM) tablet 1 tablet (has no administration in time range)   furosemide (LASIX) 100 mg in dextrose 5 % 50 mL IVPB (0 mg Intravenous Stopped 4/14/25 8504)   potassium chloride (Klor-Con M20) CR tablet 40 mEq (has no administration in time range)       ED Risk Strat Scores                 CIWA-Ar Score       Row Name 04/15/25 6150 04/15/25 0124          CIWA-Ar    Nausea and Vomiting 0 0     Tactile Disturbances 0 0     Tremor 0 0     Auditory  Disturbances 0 0     Paroxysmal Sweats 0 0     Visual Disturbances 0 0     Anxiety 0 0     Headache, Fullness in Head 0 0     Agitation 0 0     Orientation and Clouding of Sensorium 0 0     CIWA-Ar Total 0 0                     No data recorded                            History of Present Illness       Chief Complaint   Patient presents with    Shortness of Breath     Sob and b/l leg swelling that started x2 days ago. Pt reports hx of CHF and he just got back from a cruise. -CP       Past Medical History:   Diagnosis Date    Acute kidney injury (HCC) 09/05/2020    Asthma     CHF (congestive heart failure) (HCC)     Colon polyp     Diabetes mellitus (HCC)     GERD (gastroesophageal reflux disease)     Hypertension     Inguinal hernia     3/25/15    Onychomycosis     5/24/17    Sleep apnea     Type 2 diabetes mellitus (HCC) 05/01/2012      Past Surgical History:   Procedure Laterality Date    ARTHROSCOPY KNEE      with Medial and Lateral Meniscus Repair    HERNIA REPAIR      umbilical and inguinal hernia repairs (left)      Family History   Problem Relation Age of Onset    Hypertension Mother         essential    Chronic bronchitis Father     Prostate cancer Father     Colon cancer Father     Breast cancer Sister       Social History     Tobacco Use    Smoking status: Former     Types: Cigars    Smokeless tobacco: Never    Tobacco comments:     current every day smoker, per [employer]   Vaping Use    Vaping status: Never Used   Substance Use Topics    Alcohol use: Yes     Alcohol/week: 3.0 standard drinks of alcohol     Types: 2 Cans of beer, 1 Standard drinks or equivalent per week     Comment: weekend: 1 glass of christian or 2 beers    Drug use: No      E-Cigarette/Vaping    E-Cigarette Use Never User       E-Cigarette/Vaping Substances    Nicotine No     THC No     CBD No     Flavoring No     Other No     Unknown No       I have reviewed and agree with the history as documented.     60 year old male with a PMH of  CHF, HTN, DM2, AMANDO presenting with SOB and bilateral lower extremity edema for 2 days. Patient states he just returned from a cruise. He states he normal takes 40 mg of torsemide daily and he hasn't been completely compliant during his cruise. Patient has not taken any other medications prior to arrival to ED. He states the last two days he doubled his dose of torsemide without relief. At this time, he denies fevers, URI symptoms, chest pain, palpitations, cough, abdominal pain, NVD, urinary symptoms, visual changes, syncope, or paresthesias.       Shortness of Breath  Associated symptoms: no abdominal pain, no chest pain, no cough, no fever, no headaches, no sore throat, no vomiting and no wheezing        Review of Systems   Constitutional:  Negative for activity change, appetite change, chills and fever.   HENT:  Negative for congestion and sore throat.    Eyes:  Negative for visual disturbance.   Respiratory:  Positive for shortness of breath. Negative for cough and wheezing.    Cardiovascular:  Positive for leg swelling (bilateral). Negative for chest pain and palpitations.   Gastrointestinal:  Negative for abdominal pain, diarrhea, nausea and vomiting.   Genitourinary:  Negative for dysuria and hematuria.   Musculoskeletal: Negative.    Skin: Negative.    Neurological:  Negative for dizziness, weakness, light-headedness, numbness and headaches.   Psychiatric/Behavioral: Negative.             Objective       ED Triage Vitals   Temperature Pulse Blood Pressure Respirations SpO2 Patient Position - Orthostatic VS   04/14/25 1837 04/14/25 1837 04/14/25 1837 04/14/25 1837 04/14/25 1837 04/14/25 2300   98.1 °F (36.7 °C) 85 136/72 22 99 % Lying      Temp Source Heart Rate Source BP Location FiO2 (%) Pain Score    04/14/25 1837 04/14/25 2300 04/14/25 2300 -- 04/15/25 0000    Oral Monitor Left arm  No Pain      Vitals      Date and Time Temp Pulse SpO2 Resp BP Pain Score FACES Pain Rating User   04/15/25 0720 99.1 °F  (37.3 °C) 88 92 % 16 118/63 -- -- ES   04/15/25 0300 98.5 °F (36.9 °C) 93 97 % 17 151/74 -- -- AE   04/15/25 0232 -- -- 97 % -- -- -- -- MO   04/15/25 0213 -- -- 98 % -- -- -- -- CLS   04/15/25 0158 -- -- -- -- -- 6 -- CLS   04/15/25 0129 98 °F (36.7 °C) 86 98 % 18 159/96 -- -- AT   04/15/25 0124 -- 72 95 % 19 102/54 -- -- AT   04/15/25 0052 98.4 °F (36.9 °C) 83 96 % 20 152/91 -- -- MW   04/15/25 0033 -- -- 97 % -- -- -- -- CLS   04/15/25 0000 -- 83 97 % 22 140/77 No Pain -- MM   04/14/25 2300 -- 81 98 % 22 172/84 -- -- MM   04/14/25 1837 98.1 °F (36.7 °C) 85 99 % 22 136/72 -- -- AK            Physical Exam  Vitals reviewed.   Constitutional:       General: He is not in acute distress.     Appearance: He is not ill-appearing, toxic-appearing or diaphoretic.   HENT:      Head: Normocephalic and atraumatic.      Mouth/Throat:      Mouth: Mucous membranes are moist.      Pharynx: Oropharynx is clear.   Eyes:      Extraocular Movements: Extraocular movements intact.   Cardiovascular:      Rate and Rhythm: Normal rate and regular rhythm.      Pulses: Normal pulses.      Heart sounds: Normal heart sounds. No murmur heard.  Pulmonary:      Effort: Pulmonary effort is normal.      Breath sounds: Normal breath sounds.   Abdominal:      General: Bowel sounds are normal.      Palpations: Abdomen is soft.   Musculoskeletal:      Cervical back: Normal range of motion.      Right lower leg: Edema (2+ pitting) present.      Left lower leg: Edema (2+ pitting edema) present.   Skin:     General: Skin is warm and dry.      Capillary Refill: Capillary refill takes less than 2 seconds.   Neurological:      General: No focal deficit present.      Mental Status: He is alert and oriented to person, place, and time.   Psychiatric:         Mood and Affect: Mood normal.         Behavior: Behavior normal.         Results Reviewed       Procedure Component Value Units Date/Time    D-dimer, quantitative [[id number]]  (Abnormal) Collected:  04/14/25 1842    Lab Status: Final result Specimen: Blood from Arm, Right Updated: 04/15/25 0041     D-Dimer, Quant 1.06 ug/ml FEU     Narrative:      In the evaluation for possible pulmonary embolism, in the appropriate (Well's Score of 4 or less) patient, the age adjusted d-dimer cutoff for this patient can be calculated as:    Age x 0.01 (in ug/mL) for Age-adjusted D-dimer exclusion threshold for a patient over 50 years.    HS Troponin I 2hr [[id number]]  (Normal) Collected: 04/14/25 2213    Lab Status: Final result Specimen: Blood from Arm, Right Updated: 04/14/25 2242     hs TnI 2hr 5 ng/L      Delta 2hr hsTnI 0 ng/L     HS Troponin 0hr (reflex protocol) [[id number]]  (Normal) Collected: 04/14/25 1842    Lab Status: Final result Specimen: Blood from Arm, Right Updated: 04/14/25 1919     hs TnI 0hr 5 ng/L     Comprehensive metabolic panel [[id number]]  (Abnormal) Collected: 04/14/25 1842    Lab Status: Final result Specimen: Blood from Arm, Right Updated: 04/14/25 1918     Sodium 136 mmol/L      Potassium 3.4 mmol/L      Chloride 98 mmol/L      CO2 29 mmol/L      ANION GAP 9 mmol/L      BUN 13 mg/dL      Creatinine 1.91 mg/dL      Glucose 171 mg/dL      Calcium 8.5 mg/dL      AST 15 U/L      ALT 9 U/L      Alkaline Phosphatase 89 U/L      Total Protein 6.6 g/dL      Albumin 3.5 g/dL      Total Bilirubin 0.49 mg/dL      eGFR 36 ml/min/1.73sq m     Narrative:      National Kid[employer] guidelines for Chronic Kidney Disease (CKD):     Stage 1 with normal or high GFR (GFR > 90 mL/min/1.73 square meters)    Stage 2 Mild CKD (GFR = 60-89 mL/min/1.73 square meters)    Stage 3A Moderate CKD (GFR = 45-59 mL/min/1.73 square meters)    Stage 3B Moderate CKD (GFR = 30-44 mL/min/1.73 square meters)    Stage 4 Severe CKD (GFR = 15-29 mL/min/1.73 square meters)    Stage 5 End Stage CKD (GFR <15 mL/min/1.73 square meters)  Note: GFR calculation is accurate only with a steady state creatinine    CBC and differential  [[id number]]  (Abnormal) Collected: 04/14/25 1842    Lab Status: Final result Specimen: Blood from Arm, Right Updated: 04/14/25 1859     WBC 8.54 Thousand/uL      RBC 3.38 Million/uL      Hemoglobin 11.2 g/dL      Hematocrit 33.8 %       fL      MCH 33.1 pg      MCHC 33.1 g/dL      RDW 13.4 %      MPV 9.4 fL      Platelets 243 Thousands/uL      nRBC 0 /100 WBCs      Segmented % 51 %      Immature Grans % 1 %      Lymphocytes % 35 %      Monocytes % 9 %      Eosinophils Relative 4 %      Basophils Relative 0 %      Absolute Neutrophils 4.42 Thousands/µL      Absolute Immature Grans 0.05 Thousand/uL      Absolute Lymphocytes 2.97 Thousands/µL      Absolute Monocytes 0.76 Thousand/µL      Eosinophils Absolute 0.31 Thousand/µL      Basophils Absolute 0.03 Thousands/µL             XR chest 2 views    (Results Pending)       ECG 12 Lead Documentation Only    Date/Time: 4/14/2025 8:00 PM    Performed by: Wisam Foster PA-C  Authorized by: Wisam Foster PA-C    Indications / Diagnosis:  SOB  ECG reviewed by me, the ED Provider: yes    Patient location:  ED  Previous ECG:     Previous ECG:  Compared to current    Similarity:  Changes noted  Interpretation:     Interpretation: non-specific    Rate:     ECG rate:  83    ECG rate assessment: normal    Rhythm:     Rhythm: sinus rhythm    Ectopy:     Ectopy: PVCs      PVCs:  Unifocal and infrequent  QRS:     QRS axis:  Normal    QRS intervals:  Normal  Conduction:     Conduction: normal    ST segments:     ST segments:  Normal  T waves:     T waves: normal    Comments:      NSR with occasional PVCs, new from previous EKG. NO acute ischemic changes.       ED Medication and Procedure Management   Prior to Admission Medications   Prescriptions Last Dose Informant Patient Reported? Taking?   Blood Pressure KIT  Self No No   Sig: Twice a day periodically   Fluticasone-Salmeterol (Advair) 500-50 mcg/dose inhaler   No No   Sig: Inhale 1 puff 2 (two) times a day Rinse mouth after  use.   Lancets (ONETOUCH ULTRASOFT) lancets  Self Yes No   Sig: by Does not apply route   albuterol (PROVENTIL HFA,VENTOLIN HFA) 90 mcg/act inhaler   No No   Sig: Inhale 1 puff in the morning 3 inhalers per refill   allopurinol (ZYLOPRIM) 100 mg tablet   No No   Sig: Take 1 tablet (100 mg total) by mouth daily   carvedilol (COREG) 6.25 mg tablet  Self No No   Sig: Take 1 tablet (6.25 mg total) by mouth 2 (two) times a day with meals   colchicine (COLCRYS) 0.6 mg tablet   No No   Sig: Take 1 tablet (0.6 mg total) by mouth 2 (two) times a day   diclofenac sodium (VOLTAREN) 50 mg EC tablet   No No   Sig: TAKE 1 TABLET TWICE A DAY AFTER FOOD AS NEEDED FOR JOINT PAIN   famotidine (PEPCID) 20 mg tablet   No No   Sig: Take 1 tablet (20 mg total) by mouth daily at bedtime   glimepiride (AMARYL) 1 mg tablet   No No   Sig: TAKE 1 TABLET BY MOUTH TWICE A DAY   glucose blood test strip  Self No No   Si each by Other route daily   ipratropium (ATROVENT) 0.02 % nebulizer solution   No No   Sig: Take 2.5 mL (0.5 mg total) by nebulization 3 (three) times a day   levalbuterol (XOPENEX) 1.25 mg/3 mL nebulizer solution  Self No No   Sig: Take 3 mL (1.25 mg total) by nebulization 3 (three) times a day   pantoprazole (PROTONIX) 40 mg tablet  Self No No   Sig: Take 1 tablet (40 mg total) by mouth daily before breakfast   sitaGLIPtin (Januvia) 100 mg tablet  Self No No   Sig: Take 1 tablet (100 mg total) by mouth daily   spironolactone (ALDACTONE) 25 mg tablet  Self No No   Sig: Take 1 tablet (25 mg total) by mouth daily   torsemide (DEMADEX) 20 mg tablet  Self No No   Sig: TAKE 2 TABS ONCE DAILY IN AM      Facility-Administered Medications: None     Current Discharge Medication List        CONTINUE these medications which have NOT CHANGED    Details   albuterol (PROVENTIL HFA,VENTOLIN HFA) 90 mcg/act inhaler Inhale 1 puff in the morning 3 inhalers per refill  Qty: 54 g, Refills: 1    Comments: Substitution to a formulary equivalent  within the same pharmaceutical class is authorized.  Associated Diagnoses: Reactive airway disease without complication      allopurinol (ZYLOPRIM) 100 mg tablet Take 1 tablet (100 mg total) by mouth daily  Qty: 90 tablet, Refills: 1    Associated Diagnoses: Chronic gout without tophus, unspecified cause, unspecified site      Blood Pressure KIT Twice a day periodically  Qty: 1 each, Refills: 0    Associated Diagnoses: Benign essential hypertension      carvedilol (COREG) 6.25 mg tablet Take 1 tablet (6.25 mg total) by mouth 2 (two) times a day with meals  Qty: 180 tablet, Refills: 5    Associated Diagnoses: Acute on chronic diastolic congestive heart failure (HCC)      colchicine (COLCRYS) 0.6 mg tablet Take 1 tablet (0.6 mg total) by mouth 2 (two) times a day  Qty: 20 tablet, Refills: 0    Associated Diagnoses: Chronic gout without tophus, unspecified cause, unspecified site      diclofenac sodium (VOLTAREN) 50 mg EC tablet TAKE 1 TABLET TWICE A DAY AFTER FOOD AS NEEDED FOR JOINT PAIN  Qty: 180 tablet, Refills: 1    Associated Diagnoses: Arthralgia, unspecified joint      famotidine (PEPCID) 20 mg tablet Take 1 tablet (20 mg total) by mouth daily at bedtime  Qty: 90 tablet, Refills: 1    Associated Diagnoses: GERD (gastroesophageal reflux disease); Difficulty swallowing      Fluticasone-Salmeterol (Advair) 500-50 mcg/dose inhaler Inhale 1 puff 2 (two) times a day Rinse mouth after use.  Qty: 60 blister, Refills: 5    Comments: Substitution to a formulary equivalent within the same pharmaceutical class is authorized.  Associated Diagnoses: Mild asthma with acute exacerbation, unspecified whether persistent      glimepiride (AMARYL) 1 mg tablet TAKE 1 TABLET BY MOUTH TWICE A DAY  Qty: 180 tablet, Refills: 1    Associated Diagnoses: Type 2 diabetes mellitus without complication, without long-term current use of insulin ([facility])      glucose blood test strip 1 each by Other route daily  Qty: 180 each, Refills: 5     Associated Diagnoses: Uncontrolled type 2 diabetes mellitus with hyperglycemia (HCC)      ipratropium (ATROVENT) 0.02 % nebulizer solution Take 2.5 mL (0.5 mg total) by nebulization 3 (three) times a day  Qty: 225 mL, Refills: 5    Associated Diagnoses: COPD exacerbation (HCC)      Lancets (ONETOUCH ULTRASOFT) lancets by Does not apply route      levalbuterol (XOPENEX) 1.25 mg/3 mL nebulizer solution Take 3 mL (1.25 mg total) by nebulization 3 (three) times a day  Qty: 270 mL, Refills: 0    Associated Diagnoses: COPD exacerbation (HCC)      pantoprazole (PROTONIX) 40 mg tablet Take 1 tablet (40 mg total) by mouth daily before breakfast  Qty: 90 tablet, Refills: 3    Associated Diagnoses: Esophageal dysphagia; GERD (gastroesophageal reflux disease)      sitaGLIPtin (Januvia) 100 mg tablet Take 1 tablet (100 mg total) by mouth daily  Qty: 90 tablet, Refills: 1    Associated Diagnoses: Uncontrolled type 2 diabetes mellitus with hyperglycemia (HCC)      spironolactone (ALDACTONE) 25 mg tablet Take 1 tablet (25 mg total) by mouth daily  Qty: 90 tablet, Refills: 1    Associated Diagnoses: (HFpEF) heart failure with preserved ejection fraction (HCC)      torsemide (DEMADEX) 20 mg tablet TAKE 2 TABS ONCE DAILY IN AM  Qty: 180 tablet, Refills: 2    Associated Diagnoses: (HFpEF) heart failure with preserved ejection fraction (HCC)           No discharge procedures on file.  ED SEPSIS DOCUMENTATION   Time reflects when diagnosis was documented in both MDM as applicable and the Disposition within this note       Time User Action Codes Description Comment    4/14/2025 10:34 PM Wisam Foster [I50.9] Acute exacerbation of CHF (congestive heart failure) ([facility])     4/14/2025 10:35 PM Wisam Foster Add [N17.9] DELFINO (acute kidney injury) ([facility])                  Wisam Foster PA-C  04/15/25 0735

## 2025-04-15 NOTE — CONSULTS
Heart Failure/ Pulmonary Hypertension Progress Note - Elly Chong 60 y.o. male MRN: [id number]    Unit/Bed#: -01 Encounter: [id number]      Assessment:    Principal Problem:    Acute on chronic diastolic congestive heart failure (HCC)  Active Problems:    Essential hypertension    Uncontrolled type 2 diabetes mellitus with hyperglycemia (HCC)    Medical non-compliance    Alcohol abuse    Current smoker    CKD (chronic kidney disease)      # Acute on Chronic HFpEF  Diuretic: Torsemide 40 mg daily  MRA: spironolactone 25 mg daily  Weight:  BNP    Studies- personally reviewed by me  EKG- SR with PVCs    Echo 11/21/23  LVEF: 60%, mild LVH  LVIDd: 4.3 cm  RV: normal    NM MPI 3/6/23: diaphragmatic artifact, no scar or ischemia, EF : 49%    Echo 3/5/23:  LVEF: 50%  Hypo inferolateral      # CKD 3 B  Cr 1.86 up from 1.36 on 11/16/24  # AMANDO  # reactive airway disease  # GERD  # ETOH- 3 beers, 2 shots daily  # DM2  # current smoker    Plan:  IV replacement of Magnesium- 1.2- etoh user  Continue IV diuresis  MAPs   mmHg  Increase coreg to 12.5 mg  BID  Continue spironolactone 25 mg daily    HPI:     61 yo male with multiple comorbidities including CKD, DM, HTN, etoh use, tobacco use and pre- established HFpEF on torsemide as outpt. Had stopped taking diuretic due to being on vacation. Presents with increased abdominal girth, shortness of breath, weight gain- but does not check weights.         Review of Systems   Constitutional:  Negative for activity change, appetite change, fatigue and unexpected weight change.   HENT:  Negative for congestion and nosebleeds.    Eyes: Negative.    Respiratory:  Negative for cough, chest tightness and shortness of breath.    Cardiovascular:  Negative for chest pain, palpitations and leg swelling.   Gastrointestinal:  Negative for abdominal distention.   Endocrine: Negative.    Genitourinary: Negative.    Musculoskeletal: Negative.    Skin: Negative.    Neurological:  Negative  for dizziness, syncope and weakness.   Hematological: Negative.    Psychiatric/Behavioral: Negative.          Central Line (day, reason):  Santiago catheter (day, reason):    Vitals: Blood pressure 118/63, pulse 88, temperature 99.1 °F (37.3 °C), temperature source Oral, resp. rate 16, weight 132 kg (290 lb 9.6 oz), SpO2 97%., Body mass index is 45.51 kg/m²., I/O last 3 completed shifts:  In: 170 [P.O.:120; IV Piggyback:50]  Out: 400 [Urine:400]  No intake/output data recorded.  Wt Readings from Last 3 Encounters:   04/15/25 132 kg (290 lb 9.6 oz)   03/13/25 126 kg (277 lb 4 oz)   11/27/24 117 kg (258 lb 12.8 oz)       Intake/Output Summary (Last 24 hours) at 4/15/2025 0838  Last data filed at 4/15/2025 0626  Gross per 24 hour   Intake 170 ml   Output 400 ml   Net -230 ml     I/O last 3 completed shifts:  In: 170 [P.O.:120; IV Piggyback:50]  Out: 400 [Urine:400]    No significant arrhythmias seen on telemetry review.       Physical Exam:  Vitals:    04/15/25 0300 04/15/25 0556 04/15/25 0720 04/15/25 0803   BP: 151/74  118/63    BP Location: Left arm  Left arm    Pulse: 93  88    Resp: 17  16    Temp: 98.5 °F (36.9 °C)  99.1 °F (37.3 °C)    TempSrc: Oral  Oral    SpO2: 97%  92% 97%   Weight:  132 kg (290 lb 9.6 oz)         GEN: Elly Chong appears well, alert and oriented x 3, pleasant and cooperative   HEENT: pupils equal, round, and reactive to light; extraocular muscles intact  NECK: supple, no carotid bruits   HEART: regular rhythm, normal S1 and S2, no murmurs, clicks, gallops or rubs, JVP is  up  LUNGS: clear to auscultation bilaterally; no wheezes, rales, or rhonchi   ABDOMEN: normal bowel sounds, soft, no tenderness, no distention  EXTREMITIES: peripheral pulses normal; no clubbing, cyanosis, ++ edema  NEURO: no focal findings   SKIN: normal without suspicious lesions on exposed skin      Current Facility-Administered Medications:     albuterol (PROVENTIL HFA,VENTOLIN HFA) inhaler 2 puff, 2 puff, Inhalation,  Q4H PRN, Ivan Mcdonnell,     allopurinol (ZYLOPRIM) tablet 100 mg, 100 mg, Oral, Daily, SHAY Sethi    carvedilol (COREG) tablet 6.25 mg, 6.25 mg, Oral, BID With Meals, SHAY Sethi    colchicine (COLCRYS) tablet 0.6 mg, 0.6 mg, Oral, BID, SHAY Sethi    famotidine (PEPCID) tablet 20 mg, 20 mg, Oral, HS, SHAY Sethi, 20 mg at 04/15/25 0155    fluticasone-vilanterol 200-25 mcg/actuation 1 puff, 1 puff, Inhalation, Daily, SHAY Sethi    folic acid (FOLVITE) tablet 1 mg, 1 mg, Oral, Daily, SHAY Sethi    furosemide (LASIX) injection 100 mg, 100 mg, Intravenous, BID (diuretic), SHAY Sethi    heparin (porcine) subcutaneous injection 7,500 Units, 7,500 Units, Subcutaneous, Q8H ELIANE, SHAY Sethi, 7,500 Units at 04/15/25 0622    insulin lispro (HumALOG/ADMELOG) 100 units/mL subcutaneous injection 1-5 Units, 1-5 Units, Subcutaneous, HS, SHAY Sethi    insulin lispro (HumALOG/ADMELOG) 100 units/mL subcutaneous injection 2-12 Units, 2-12 Units, Subcutaneous, TID AC **AND** Fingerstick Glucose (POCT), , , TID AC, SHAY Sethi    ipratropium (ATROVENT) 0.02 % inhalation solution 0.5 mg, 0.5 mg, Nebulization, TID, SHAY Sethi, 0.5 mg at 04/15/25 0801    levalbuterol (XOPENEX) inhalation solution 1.25 mg, 1.25 mg, Nebulization, TID, SHAY Sethi, 1.25 mg at 04/15/25 0801    magnesium sulfate 4 g/100 mL IVPB (premix) 4 g, 4 g, Intravenous, Once, Ivan Mcdonnell DO    multivitamin-minerals (CENTRUM) tablet 1 tablet, 1 tablet, Oral, Daily, SHAY Sethi    pantoprazole (PROTONIX) EC tablet 40 mg, 40 mg, Oral, Daily Before Breakfast, SHAY Sethi, 40 mg at 04/15/25 0622    potassium chloride (Klor-Con M20) CR tablet 40 mEq, 40 mEq, Oral, Once, Ivan Mcdonnell DO    spironolactone (ALDACTONE) tablet 25 mg, 25 mg, Oral, Daily, SHAY Sethi    thiamine tablet 100 mg, 100 mg, Oral, Daily, SHAY Sethi      Labs & Results:        Results from last 7 days   Lab Units  04/15/25  0429 04/14/25  1842   WBC Thousand/uL 9.45 8.54   HEMOGLOBIN g/dL 11.8* 11.2*   HEMATOCRIT % 35.0* 33.8*   PLATELETS Thousands/uL 250 243         Results from last 7 days   Lab Units 04/15/25  0506 04/14/25  1842   POTASSIUM mmol/L 3.6 3.4*   CHLORIDE mmol/L 97 98   CO2 mmol/L 31 29   BUN mg/dL 15 13   CREATININE mg/dL 1.87* 1.91*   CALCIUM mg/dL 8.7 8.5   ALK PHOS U/L  --  89   ALT U/L  --  9   AST U/L  --  15           Counseling / Coordination of Care  Total floor / unit time spent today 45 minutes.  Greater than 50% of total time was spent with the patient and / or family counseling and / or coordination of care.  A description of the counseling / coordination of care: 30.      Thank you for the opportunity to participate in the care of this patient.  DAVID NG D.O.  DIRECTOR OF HEART FAILURE/ PULMONARY HYPERTENSION  MEDICAL DIRECTOR OF LVAD PROGRAM  [facility]

## 2025-04-15 NOTE — H&P
H&P - Hospitalist   Name: Elly Chong 60 y.o. male I MRN: [id number]  Unit/Bed#: ED-06 I Date of Admission: 4/14/2025   Date of Service: 4/15/2025 I Hospital Day: 1     Assessment & Plan  Acute on chronic diastolic congestive heart failure (HCC)  Presents due to lower extremity edema, lower extremity pain, increased abdominal girth, shortness of breath and chest pain  Medication nonadherence contributing  Echo 11/23: EF 60%, grade 1 diastolic dysfunction  PTA: Torsemide 40 mg daily and Aldactone 25 mg daily  Stopped taking these on approximately 4/7 due to going on vacation    Plan  Diuresis: IV Lasix 100 mg twice daily  Cardiology consult  Update echocardiogram  Fluid restriction, sodium restriction  Check D-dimer.  If positive will check for DVT of lower extremities  Essential hypertension  BP stable  Continue home meds-carvedilol, Aldactone  Diurese as above  Uncontrolled type 2 diabetes mellitus with hyperglycemia ([facility])  Lab Results   Component Value Date    HGBA1C 6.4 03/13/2025     Resume p.o. meds on discharge  Accu-Chek, SSI  Medical non-compliance  Stop taking torsemide on approximately 4/7  Needs ongoing education  Alcohol abuse  Reports drinking 3 beers daily and 2 shots daily after work  CIWA  Current smoker  Reports 3 cigars daily  NRT  CKD (chronic kidney disease)  Creatinine appears at baseline (1.6-2.3)  Monitor with diuresis      VTE Pharmacologic Prophylaxis: VTE Score: 5 High Risk (Score >/= 5) - Pharmacological DVT Prophylaxis Ordered: heparin. Sequential Compression Devices Ordered.  Code Status: Level 1 - Full Code   Discussion with family: Patient declined call to .     Anticipated Length of Stay: Patient will be admitted on an inpatient basis with an anticipated length of stay of greater than 2 midnights secondary to IV diuresis.    History of Present Illness   Chief Complaint: Lower extremity edema    Elly Chong is a 60 y.o. male with a PMH of CHF, obesity, hypertension,  CKD, alcohol abuse, current smoker, gout, medication nonadherence who presents with lower extremity edema, abdominal distention, shortness of breath, chest pain.  Patient reports stopping his medication on approximately 4/9 as he was away on a cruise.  He complains of blister to right lateral calf which has since popped.  He does not check weights daily.  He reports significant dietary indiscretion especially while he was away on vacation.  Patient drinks 3 beers daily and 2 shots daily.  Smokes 3 cigars daily.    Review of Systems   Constitutional:  Negative for chills and fever.   Respiratory:  Positive for cough and shortness of breath. Negative for chest tightness and wheezing.    Cardiovascular:  Positive for chest pain and leg swelling. Negative for palpitations.   Skin:  Positive for wound.   All other systems reviewed and are negative.      Historical Information   Past Medical History:   Diagnosis Date    Acute kidney injury (HCC) 09/05/2020    Asthma     CHF (congestive heart failure) (HCC)     Colon polyp     Diabetes mellitus (HCC)     GERD (gastroesophageal reflux disease)     Hypertension     Inguinal hernia     3/25/15    Onychomycosis     5/24/17    Sleep apnea     Type 2 diabetes mellitus ([facility]) 05/01/2012     Past Surgical History:   Procedure Laterality Date    ARTHROSCOPY KNEE      with Medial and Lateral Meniscus Repair    HERNIA REPAIR      umbilical and inguinal hernia repairs (left)     Social History     Tobacco Use    Smoking status: Former     Types: Cigars    Smokeless tobacco: Never    Tobacco comments:     current every day smoker, per [employer]   Vaping Use    Vaping status: Never Used   Substance and Sexual Activity    Alcohol use: Yes     Alcohol/week: 3.0 standard drinks of alcohol     Types: 2 Cans of beer, 1 Standard drinks or equivalent per week     Comment: weekend: 1 glass of christian or 2 beers    Drug use: No    Sexual activity: Yes     E-Cigarette/Vaping    E-Cigarette Use  Never User      E-Cigarette/Vaping Substances    Nicotine No     THC No     CBD No     Flavoring No     Other No     Unknown No      Family History   Problem Relation Age of Onset    Hypertension Mother         essential    Chronic bronchitis Father     Prostate cancer Father     Colon cancer Father     Breast cancer Sister      Social History:  Marital Status: /Civil Union   Occupation:   Patient Pre-hospital Living Situation: Home  Patient Pre-hospital Level of Mobility: walks  Patient Pre-hospital Diet Restrictions:     Meds/Allergies   I have reviewed home medications with patient personally.  Prior to Admission medications    Medication Sig Start Date End Date Taking? Authorizing Provider   albuterol (PROVENTIL HFA,VENTOLIN HFA) 90 mcg/act inhaler Inhale 1 puff in the morning 3 inhalers per refill 1/21/25   Linden Roque MD   allopurinol (ZYLOPRIM) 100 mg tablet Take 1 tablet (100 mg total) by mouth daily 1/21/25   Linden Roque MD   Blood Pressure KIT Twice a day periodically 7/20/20   SHAY Bravo   carvedilol (COREG) 6.25 mg tablet Take 1 tablet (6.25 mg total) by mouth 2 (two) times a day with meals 1/23/24 7/16/25  Shukri Fletcher MD   colchicine (COLCRYS) 0.6 mg tablet Take 1 tablet (0.6 mg total) by mouth 2 (two) times a day 11/27/24   Linden Roque MD   diclofenac sodium (VOLTAREN) 50 mg EC tablet TAKE 1 TABLET TWICE A DAY AFTER FOOD AS NEEDED FOR JOINT PAIN 12/18/24   Linden Roque MD   famotidine (PEPCID) 20 mg tablet Take 1 tablet (20 mg total) by mouth daily at bedtime 3/13/25   Linden Roque MD   Fluticasone-Salmeterol (Advair) 500-50 mcg/dose inhaler Inhale 1 puff 2 (two) times a day Rinse mouth after use. 3/13/25 9/9/25  Linden Roque MD   glimepiride (AMARYL) 1 mg tablet TAKE 1 TABLET BY MOUTH TWICE A DAY 12/18/24   Linden Roque MD   glucose blood test strip 1 each by Other route daily 11/3/20   Linden Roque MD   ipratropium (ATROVENT) 0.02 % nebulizer solution Take 2.5 mL (0.5 mg  total) by nebulization 3 (three) times a day 3/13/25 4/12/25  Linden Roque MD   Lancets (ONETOUCH ULTRASOFT) lancets by Does not apply route 12/28/17   Historical Provider, MD   levalbuterol (XOPENEX) 1.25 mg/3 mL nebulizer solution Take 3 mL (1.25 mg total) by nebulization 3 (three) times a day 11/16/24   Christiano Howard DO   pantoprazole (PROTONIX) 40 mg tablet Take 1 tablet (40 mg total) by mouth daily before breakfast 1/19/24   Beth Lehman MD   sitaGLIPtin (Januvia) 100 mg tablet Take 1 tablet (100 mg total) by mouth daily 10/31/23   Linden Roque MD   spironolactone (ALDACTONE) 25 mg tablet Take 1 tablet (25 mg total) by mouth daily 7/2/24 12/29/24  Jose Schulte DO   torsemide (DEMADEX) 20 mg tablet TAKE 2 TABS ONCE DAILY IN AM 1/29/24   Linden Roque MD   docusate sodium (COLACE) 100 mg capsule Take 1 capsule (100 mg total) by mouth 3 (three) times a day as needed for constipation (Take with pain medication)  Patient not taking: Reported on 4/14/2025 11/16/24 4/15/25  Christiano Howard DO   LORazepam (ATIVAN) 1 mg tablet Take 1 tablet by mouth daily as needed  Patient not taking: Reported on 7/25/2024 11/8/22 4/15/25  Historical Provider, MD   methocarbamol (ROBAXIN) 500 mg tablet Take 1 tablet (500 mg total) by mouth 2 (two) times a day  Patient not taking: Reported on 4/15/2025 7/9/23 4/15/25  Dionicio Osorio DO   predniSONE 10 mg tablet 5 tabs x 2 days, 4 tabs X 2 days, 3 tabs X 2 days, 2 tabs X 2 days, 1 tab X 2 days  Patient not taking: Reported on 4/15/2025 10/21/24 4/15/25  Linden Roque MD   predniSONE 20 mg tablet 3 TABS x 2 DAYS, 2 TABS x 2 DAYS, 1 TAB x 2 DAYS, 1/2 TAB x 2 DAYS  Patient not taking: Reported on 4/15/2025 3/13/25 4/15/25  Linden Roque MD     No Known Allergies    Objective :  Temp:  [98.1 °F (36.7 °C)] 98.1 °F (36.7 °C)  HR:  [81-85] 83  BP: (136-172)/(72-84) 140/77  Resp:  [22] 22  SpO2:  [97 %-99 %] 97 %  O2 Device: None (Room air)    Physical Exam  Constitutional:        General: He is not in acute distress.     Appearance: He is obese. He is not ill-appearing or toxic-appearing.   HENT:      Head: Normocephalic and atraumatic.      Right Ear: External ear normal.      Left Ear: External ear normal.      Nose: Nose normal.   Eyes:      Extraocular Movements: Extraocular movements intact.      Conjunctiva/sclera: Conjunctivae normal.   Cardiovascular:      Rate and Rhythm: Normal rate and regular rhythm.      Pulses: Normal pulses.      Heart sounds: Normal heart sounds.   Pulmonary:      Effort: Pulmonary effort is normal. No respiratory distress.      Breath sounds: Normal breath sounds. No wheezing, rhonchi or rales.   Abdominal:      General: Bowel sounds are normal. There is distension.      Palpations: Abdomen is soft.      Tenderness: There is no abdominal tenderness. There is no right CVA tenderness, left CVA tenderness or guarding.   Musculoskeletal:      Cervical back: Normal range of motion.      Right lower leg: Edema present.      Left lower leg: Edema present.   Skin:     General: Skin is warm.      Capillary Refill: Capillary refill takes less than 2 seconds.   Neurological:      General: No focal deficit present.      Mental Status: He is alert and oriented to person, place, and time.   Psychiatric:         Mood and Affect: Mood normal.         Behavior: Behavior normal.          Lines/Drains:            Lab Results: I have reviewed the following results:  Results from last 7 days   Lab Units 04/14/25  1842   WBC Thousand/uL 8.54   HEMOGLOBIN g/dL 11.2*   HEMATOCRIT % 33.8*   PLATELETS Thousands/uL 243   SEGS PCT % 51   LYMPHO PCT % 35   MONO PCT % 9   EOS PCT % 4     Results from last 7 days   Lab Units 04/14/25  1842   SODIUM mmol/L 136   POTASSIUM mmol/L 3.4*   CHLORIDE mmol/L 98   CO2 mmol/L 29   BUN mg/dL 13   CREATININE mg/dL 1.91*   ANION GAP mmol/L 9   CALCIUM mg/dL 8.5   ALBUMIN g/dL 3.5   TOTAL BILIRUBIN mg/dL 0.49   ALK PHOS U/L 89   ALT U/L 9   AST U/L 15    GLUCOSE RANDOM mg/dL 171*             Lab Results   Component Value Date    HGBA1C 6.4 03/13/2025    HGBA1C 5.7 (H) 07/25/2024    HGBA1C 5.9 01/25/2024           Imaging Results Review: I reviewed radiology reports from this admission including: chest xray.  Other Study Results Review: EKG was personally reviewed and my interpretation is: NSR. HR 83, PVC..    Administrative Statements   I have spent a total time of   minutes in caring for this patient on the day of the visit/encounter including Diagnostic results, Prognosis, Risks and benefits of tx options, Instructions for management, Patient and family education, Importance of tx compliance, Risk factor reductions, Impressions, Counseling / Coordination of care, Documenting in the medical record, Reviewing/placing orders in the medical record (including tests, medications, and/or procedures), Obtaining or reviewing history  , and Communicating with other healthcare professionals .    ** Please Note: This note has been constructed using a voice recognition system. **

## 2025-04-15 NOTE — ASSESSMENT & PLAN NOTE
Lab Results   Component Value Date    HGBA1C 6.4 03/13/2025     Resume p.o. meds on discharge  Accu-Chek, SSI

## 2025-04-15 NOTE — ASSESSMENT & PLAN NOTE
Presents due to lower extremity edema, lower extremity pain, increased abdominal girth, shortness of breath and chest pain  Medication nonadherence contributing  Echo 11/23: EF 60%, grade 1 diastolic dysfunction  PTA: Torsemide 40 mg daily and Aldactone 25 mg daily  Stopped taking these on approximately 4/7 due to going on vacation    Plan  Diuresis: IV Lasix 100 mg twice daily  Cardiology consult  Update echocardiogram  Fluid restriction, sodium restriction  Check D-dimer.  If positive will check for DVT of lower extremities

## 2025-04-16 LAB
ALBUMIN SERPL BCG-MCNC: 3.5 G/DL (ref 3.5–5)
ANION GAP SERPL CALCULATED.3IONS-SCNC: 9 MMOL/L (ref 4–13)
ATRIAL RATE: 83 BPM
BUN SERPL-MCNC: 18 MG/DL (ref 5–25)
CALCIUM SERPL-MCNC: 8.8 MG/DL (ref 8.4–10.2)
CHLORIDE SERPL-SCNC: 98 MMOL/L (ref 96–108)
CO2 SERPL-SCNC: 29 MMOL/L (ref 21–32)
CREAT SERPL-MCNC: 1.5 MG/DL (ref 0.6–1.3)
ERYTHROCYTE [DISTWIDTH] IN BLOOD BY AUTOMATED COUNT: 13.5 % (ref 11.6–15.1)
GFR SERPL CREATININE-BSD FRML MDRD: 49 ML/MIN/1.73SQ M
GLUCOSE SERPL-MCNC: 133 MG/DL (ref 65–140)
GLUCOSE SERPL-MCNC: 154 MG/DL (ref 65–140)
GLUCOSE SERPL-MCNC: 175 MG/DL (ref 65–140)
GLUCOSE SERPL-MCNC: 205 MG/DL (ref 65–140)
GLUCOSE SERPL-MCNC: 231 MG/DL (ref 65–140)
HCT VFR BLD AUTO: 34.1 % (ref 36.5–49.3)
HGB BLD-MCNC: 11.3 G/DL (ref 12–17)
MAGNESIUM SERPL-MCNC: 1.6 MG/DL (ref 1.9–2.7)
MCH RBC QN AUTO: 33.2 PG (ref 26.8–34.3)
MCHC RBC AUTO-ENTMCNC: 33.1 G/DL (ref 31.4–37.4)
MCV RBC AUTO: 100 FL (ref 82–98)
P AXIS: 61 DEGREES
PHOSPHATE SERPL-MCNC: 2.9 MG/DL (ref 2.3–4.1)
PLATELET # BLD AUTO: 238 THOUSANDS/UL (ref 149–390)
PMV BLD AUTO: 10 FL (ref 8.9–12.7)
POTASSIUM SERPL-SCNC: 3.8 MMOL/L (ref 3.5–5.3)
PR INTERVAL: 150 MS
QRS AXIS: 47 DEGREES
QRSD INTERVAL: 84 MS
QT INTERVAL: 394 MS
QTC INTERVAL: 462 MS
RBC # BLD AUTO: 3.4 MILLION/UL (ref 3.88–5.62)
SODIUM SERPL-SCNC: 136 MMOL/L (ref 135–147)
T WAVE AXIS: 16 DEGREES
VENTRICULAR RATE: 83 BPM
WBC # BLD AUTO: 10.1 THOUSAND/UL (ref 4.31–10.16)

## 2025-04-16 PROCEDURE — 83735 ASSAY OF MAGNESIUM: CPT | Performed by: STUDENT IN AN ORGANIZED HEALTH CARE EDUCATION/TRAINING PROGRAM

## 2025-04-16 PROCEDURE — 93010 ELECTROCARDIOGRAM REPORT: CPT | Performed by: INTERNAL MEDICINE

## 2025-04-16 PROCEDURE — 99232 SBSQ HOSP IP/OBS MODERATE 35: CPT | Performed by: INTERNAL MEDICINE

## 2025-04-16 PROCEDURE — 80069 RENAL FUNCTION PANEL: CPT | Performed by: STUDENT IN AN ORGANIZED HEALTH CARE EDUCATION/TRAINING PROGRAM

## 2025-04-16 PROCEDURE — 85027 COMPLETE CBC AUTOMATED: CPT | Performed by: STUDENT IN AN ORGANIZED HEALTH CARE EDUCATION/TRAINING PROGRAM

## 2025-04-16 PROCEDURE — 82948 REAGENT STRIP/BLOOD GLUCOSE: CPT

## 2025-04-16 PROCEDURE — 94760 N-INVAS EAR/PLS OXIMETRY 1: CPT

## 2025-04-16 PROCEDURE — 99232 SBSQ HOSP IP/OBS MODERATE 35: CPT | Performed by: STUDENT IN AN ORGANIZED HEALTH CARE EDUCATION/TRAINING PROGRAM

## 2025-04-16 PROCEDURE — 94640 AIRWAY INHALATION TREATMENT: CPT

## 2025-04-16 RX ADMIN — BENZONATATE 100 MG: 100 CAPSULE ORAL at 16:37

## 2025-04-16 RX ADMIN — LEVALBUTEROL HYDROCHLORIDE 1.25 MG: 1.25 SOLUTION RESPIRATORY (INHALATION) at 14:02

## 2025-04-16 RX ADMIN — FLUTICASONE FUROATE AND VILANTEROL TRIFENATATE 1 PUFF: 200; 25 POWDER RESPIRATORY (INHALATION) at 10:17

## 2025-04-16 RX ADMIN — INSULIN LISPRO 2 UNITS: 100 INJECTION, SOLUTION INTRAVENOUS; SUBCUTANEOUS at 16:38

## 2025-04-16 RX ADMIN — FUROSEMIDE 100 MG: 10 INJECTION, SOLUTION INTRAMUSCULAR; INTRAVENOUS at 09:15

## 2025-04-16 RX ADMIN — CARVEDILOL 12.5 MG: 12.5 TABLET, FILM COATED ORAL at 09:15

## 2025-04-16 RX ADMIN — COLCHICINE 0.6 MG: 0.6 TABLET ORAL at 09:15

## 2025-04-16 RX ADMIN — IPRATROPIUM BROMIDE 0.5 MG: 0.5 SOLUTION RESPIRATORY (INHALATION) at 19:07

## 2025-04-16 RX ADMIN — FUROSEMIDE 100 MG: 10 INJECTION, SOLUTION INTRAMUSCULAR; INTRAVENOUS at 16:38

## 2025-04-16 RX ADMIN — MULTIPLE VITAMINS W/ MINERALS TAB 1 TABLET: TAB ORAL at 09:15

## 2025-04-16 RX ADMIN — ALLOPURINOL 100 MG: 100 TABLET ORAL at 09:15

## 2025-04-16 RX ADMIN — HEPARIN SODIUM 7500 UNITS: 5000 INJECTION INTRAVENOUS; SUBCUTANEOUS at 06:16

## 2025-04-16 RX ADMIN — CARVEDILOL 12.5 MG: 12.5 TABLET, FILM COATED ORAL at 16:37

## 2025-04-16 RX ADMIN — BENZONATATE 100 MG: 100 CAPSULE ORAL at 21:37

## 2025-04-16 RX ADMIN — COLCHICINE 0.6 MG: 0.6 TABLET ORAL at 16:38

## 2025-04-16 RX ADMIN — FAMOTIDINE 20 MG: 20 TABLET, FILM COATED ORAL at 21:37

## 2025-04-16 RX ADMIN — BENZONATATE 100 MG: 100 CAPSULE ORAL at 09:15

## 2025-04-16 RX ADMIN — FOLIC ACID 1 MG: 1 TABLET ORAL at 09:15

## 2025-04-16 RX ADMIN — INSULIN LISPRO 1 UNITS: 100 INJECTION, SOLUTION INTRAVENOUS; SUBCUTANEOUS at 21:59

## 2025-04-16 RX ADMIN — INSULIN LISPRO 4 UNITS: 100 INJECTION, SOLUTION INTRAVENOUS; SUBCUTANEOUS at 12:13

## 2025-04-16 RX ADMIN — IPRATROPIUM BROMIDE 0.5 MG: 0.5 SOLUTION RESPIRATORY (INHALATION) at 08:32

## 2025-04-16 RX ADMIN — SPIRONOLACTONE 25 MG: 25 TABLET, FILM COATED ORAL at 09:15

## 2025-04-16 RX ADMIN — LEVALBUTEROL HYDROCHLORIDE 1.25 MG: 1.25 SOLUTION RESPIRATORY (INHALATION) at 19:07

## 2025-04-16 RX ADMIN — IPRATROPIUM BROMIDE 0.5 MG: 0.5 SOLUTION RESPIRATORY (INHALATION) at 14:02

## 2025-04-16 RX ADMIN — Medication 100 MG: at 09:15

## 2025-04-16 RX ADMIN — HEPARIN SODIUM 7500 UNITS: 5000 INJECTION INTRAVENOUS; SUBCUTANEOUS at 16:38

## 2025-04-16 RX ADMIN — LEVALBUTEROL HYDROCHLORIDE 1.25 MG: 1.25 SOLUTION RESPIRATORY (INHALATION) at 08:32

## 2025-04-16 NOTE — PROGRESS NOTES
Heart Failure/ Pulmonary Hypertension Progress Note - Elly Chong 60 y.o. male MRN: [id number]    Unit/Bed#: S -01 Encounter: [id number]      Assessment:    Principal Problem:    Acute on chronic diastolic congestive heart failure (HCC)  Active Problems:    Essential hypertension    Uncontrolled type 2 diabetes mellitus with hyperglycemia (HCC)    Medical non-compliance    Alcohol abuse    Current smoker    CKD (chronic kidney disease)      Interim:  Ins/outs: not accurate  Weight: 289 lbs (291 lbs)  Tele: 80's  MAPs:  mmHg    # Acute on Chronic HFpEF, Stage C  Diuretic: Torsemide 40 mg daily  MRA: spironolactone 25 mg daily  Weight: 289 lbs  BNP    Studies- personally reviewed by me  EKG- SR with PVCs    Echo 11/21/23  LVEF: 60%, mild LVH  LVIDd: 4.3 cm  RV: normal    NM MPI 3/6/23: diaphragmatic artifact, no scar or ischemia, EF : 49%    Echo 3/5/23:  LVEF: 50%  Hypo inferolateral    # HTN- coreg 12.5 mg BID  # CKD 3 B  Cr 1.86 up from 1.36 on 11/16/24  # AMANDO  # reactive airway disease  # GERD  # ETOH- 3 beers, 2 shots daily  # DM2  # current smoker    Plan:  Continue IV diuresis  MAPs   mmHg  Increased coreg to 12.5 mg  BID  Continue spironolactone 25 mg daily    HPI:     61 yo male with multiple comorbidities including CKD, DM, HTN, etoh use, tobacco use and pre- established HFpEF on torsemide as outpt. Had stopped taking diuretic due to being on vacation. Presents with increased abdominal girth, shortness of breath, weight gain- but does not check weights.         Review of Systems   Constitutional:  Negative for activity change, appetite change, fatigue and unexpected weight change.   HENT:  Negative for congestion and nosebleeds.    Eyes: Negative.    Respiratory:  Negative for cough, chest tightness and shortness of breath.    Cardiovascular:  Negative for chest pain, palpitations and leg swelling.   Gastrointestinal:  Negative for abdominal distention.   Endocrine: Negative.     Genitourinary: Negative.    Musculoskeletal: Negative.    Skin: Negative.    Neurological:  Negative for dizziness, syncope and weakness.   Hematological: Negative.    Psychiatric/Behavioral: Negative.          Central Line (day, reason):  Santiago catheter (day, reason):    Vitals: Blood pressure 118/73, pulse 85, temperature 98.1 °F (36.7 °C), resp. rate 17, weight 131 kg (289 lb 7.4 oz), SpO2 95%., Body mass index is 45.34 kg/m²., I/O last 3 completed shifts:  In: 170 [P.O.:120; IV Piggyback:50]  Out: 1500 [Urine:1500]  No intake/output data recorded.  Wt Readings from Last 3 Encounters:   04/16/25 131 kg (289 lb 7.4 oz)   03/13/25 126 kg (277 lb 4 oz)   11/27/24 117 kg (258 lb 12.8 oz)       Intake/Output Summary (Last 24 hours) at 4/16/2025 0803  Last data filed at 4/16/2025 0537  Gross per 24 hour   Intake 0 ml   Output 1100 ml   Net -1100 ml     I/O last 3 completed shifts:  In: 170 [P.O.:120; IV Piggyback:50]  Out: 1500 [Urine:1500]    No significant arrhythmias seen on telemetry review.       Physical Exam:  Vitals:    04/15/25 2010 04/16/25 0036 04/16/25 0537 04/16/25 0710   BP:  131/81  118/73   BP Location:       Pulse:  84  85   Resp:  17     Temp:  98.3 °F (36.8 °C)  98.1 °F (36.7 °C)   TempSrc:       SpO2: 99% 96%  95%   Weight:   131 kg (289 lb 7.4 oz)        GEN: Elly Chong appears well, alert and oriented x 3, pleasant and cooperative   HEENT: pupils equal, round, and reactive to light; extraocular muscles intact  NECK: supple, no carotid bruits   HEART: regular rhythm, normal S1 and S2, no murmurs, clicks, gallops or rubs, JVP is  up  LUNGS: clear to auscultation bilaterally; no wheezes, rales, or rhonchi   ABDOMEN: normal bowel sounds, soft, no tenderness, no distention  EXTREMITIES: peripheral pulses normal; no clubbing, cyanosis, ++ edema  NEURO: no focal findings   SKIN: normal without suspicious lesions on exposed skin      Current Facility-Administered Medications:     albuterol  (PROVENTIL HFA,VENTOLIN HFA) inhaler 2 puff, 2 puff, Inhalation, Q4H PRN, Ivan Mcdonnell, DO    allopurinol (ZYLOPRIM) tablet 100 mg, 100 mg, Oral, Daily, SHAY Sethi, 100 mg at 04/15/25 0951    benzonatate (TESSALON PERLES) capsule 100 mg, 100 mg, Oral, TID, Ivan Mcdonnell DO, 100 mg at 04/15/25 1809    carvedilol (COREG) tablet 12.5 mg, 12.5 mg, Oral, BID With Meals, Paul Waterman DO, 12.5 mg at 04/15/25 1726    colchicine (COLCRYS) tablet 0.6 mg, 0.6 mg, Oral, BID, SHAY Sethi, 0.6 mg at 04/15/25 1726    famotidine (PEPCID) tablet 20 mg, 20 mg, Oral, HS, SHAY Sethi, 20 mg at 04/15/25 2156    fluticasone-vilanterol 200-25 mcg/actuation 1 puff, 1 puff, Inhalation, Daily, SHAY Sethi, 1 puff at 04/15/25 0952    folic acid (FOLVITE) tablet 1 mg, 1 mg, Oral, Daily, SHAY Sethi, 1 mg at 04/15/25 0951    furosemide (LASIX) injection 100 mg, 100 mg, Intravenous, BID (diuretic), SHAY Sethi, 100 mg at 04/15/25 1725    heparin (porcine) subcutaneous injection 7,500 Units, 7,500 Units, Subcutaneous, Q8H ELIANE, SHAY Sethi, 7,500 Units at 04/16/25 0616    insulin lispro (HumALOG/ADMELOG) 100 units/mL subcutaneous injection 1-5 Units, 1-5 Units, Subcutaneous, HS, SHAY Sethi    insulin lispro (HumALOG/ADMELOG) 100 units/mL subcutaneous injection 2-12 Units, 2-12 Units, Subcutaneous, TID AC, 4 Units at 04/15/25 1725 **AND** Fingerstick Glucose (POCT), , , TID AC, SHAY Sethi    ipratropium (ATROVENT) 0.02 % inhalation solution 0.5 mg, 0.5 mg, Nebulization, TID, SHAY Sethi, 0.5 mg at 04/15/25 2010    levalbuterol (XOPENEX) inhalation solution 1.25 mg, 1.25 mg, Nebulization, TID, SHAY Sethi, 1.25 mg at 04/15/25 2010    multivitamin-minerals (CENTRUM) tablet 1 tablet, 1 tablet, Oral, Daily, SHAY Sethi, 1 tablet at 04/15/25 0951    pantoprazole (PROTONIX) EC tablet 40 mg, 40 mg, Oral, Daily Before Breakfast, SHAY Sethi, 40 mg at 04/15/25 0622    spironolactone  (ALDACTONE) tablet 25 mg, 25 mg, Oral, Daily, Becky SHYA Schneider, 25 mg at 04/15/25 0951    thiamine tablet 100 mg, 100 mg, Oral, Daily, Becky Wyatt CRNP, 100 mg at 04/15/25 0951      Labs & Results:        Results from last 7 days   Lab Units 04/16/25  0541 04/15/25  0429 04/14/25  1842   WBC Thousand/uL 10.10 9.45 8.54   HEMOGLOBIN g/dL 11.3* 11.8* 11.2*   HEMATOCRIT % 34.1* 35.0* 33.8*   PLATELETS Thousands/uL 238 250 243         Results from last 7 days   Lab Units 04/16/25  0541 04/15/25  0506 04/14/25  1842   POTASSIUM mmol/L 3.8 3.6 3.4*   CHLORIDE mmol/L 98 97 98   CO2 mmol/L 29 31 29   BUN mg/dL 18 15 13   CREATININE mg/dL 1.50* 1.87* 1.91*   CALCIUM mg/dL 8.8 8.7 8.5   ALK PHOS U/L  --   --  89   ALT U/L  --   --  9   AST U/L  --   --  15           Counseling / Coordination of Care  Total floor / unit time spent today 45 minutes.  Greater than 50% of total time was spent with the patient and / or family counseling and / or coordination of care.  A description of the counseling / coordination of care: 30.      Thank you for the opportunity to participate in the care of this patient.  DAVID NG D.O.  DIRECTOR OF HEART FAILURE/ PULMONARY HYPERTENSION  MEDICAL DIRECTOR OF LVAD PROGRAM  [facility]

## 2025-04-16 NOTE — WOUND OSTOMY CARE
Consult Note - Wound   Elly Chong 60 y.o. male MRN: [id number]  Unit/Bed#: S -01 Encounter: [id number]        History and Present Illness:  Patient is a 59 yo male that was admitted to [facility]  for treatment of acute on chronic congestive heart failure. Patient has a PMH of CKD, diabetes melitis, hypertension, alcohol abuse, heart failure with preserved ejection fraction on torsemide. Patient is alert and oriented times three and agreeable to assessment. Patient is independent for turning and repositioning. Patient is continent of bowel and bladder, independent with meals. On assessment, patient is OOB to recliner with legs in dependent position.    Wound Care was consulted for right ankle wound.     Assessment Findings:   B/L heels are dry intact and danae with no skin loss or wounds present. Recommend preventative Hydraguard Cream and proper offloading/ repositioning.      B/L sacro-buttocks is dry, intact, pink in color and blanches. No skin loss or wounds present. Recommend preventative hydragaurd to area.     Right posterior ankle - oval shaped full thickness wound. Wound bed with 70% yellow moistly adherent slough tissue, 30% pink tissue. Sabrina wound is dry and scaly. Small amount of serosanguinous drainage present. Patient endorses that wound was large fluid filled blister that ruptured about a week ago. Likely vascular/diabetic component. Lower extremity noted to be swollen and edematous. Ambulatory referral to outpatient wound care placed. Recommend silver alginate and dry sterile dressing to wound.     No induration, fluctuance, odor, warmth/temperature differences, redness, or purulence noted to the above noted wounds and skin areas assessed. New dressings applied per orders listed below. Patient tolerated well- no s/s of non-verbal pain or discomfort observed during the encounter. Bedside nurse aware of plan of care. See flow sheets for more detailed assessment findings.      Orders listed below  and wound care will continue to follow, call or Secure Chat with questions.     Skin care plans:  1-Hydraguard to bilateral sacrum, buttock and heels BID and PRN  2-Elevate heels to offload pressure.  3-Ehob cushion in chair when out of bed.  4-Moisturize skin daily with skin nourishing cream.  5-Turn/reposition q2h for pressure re-distribution on skin.  6-R ankle: Irrigate with NSS. Pat dry. Cover with Silver Alginate and ABD. Wrap with Berna. Change every other day or PRN for soilage/dislodgement.  7-Schedule Follow-up Outpatient Wound Appointment. Ambulatory Referral Placed.       Wounds:  Wound 04/15/25 Ankle Posterior;Right (Active)   Wound Image   04/16/25 1204   Wound Description Brown;Slough;Yellow 04/16/25 1204   Non-staged Wound Description Full thickness 04/16/25 1204   Wound Length (cm) 3.5 cm 04/16/25 1204   Wound Width (cm) 5.5 cm 04/16/25 1204   Wound Depth (cm) 0.4 cm 04/16/25 1204   Wound Surface Area (cm^2) 19.25 cm^2 04/16/25 1204   Wound Volume (cm^3) 7.7 cm^3 04/16/25 1204   Calculated Wound Volume (cm^3) 7.7 cm^3 04/16/25 1204   Drainage Amount Small 04/16/25 1204   Drainage Description Yellow 04/16/25 1204   Sabrina-wound Assessment Dry;Scaly 04/16/25 1204   Treatments Cleansed;Irrigation with NSS;Site care 04/16/25 1204   Dressing Calcium Alginate with Silver;ABD;Dry dressing 04/16/25 1204   Wound packed? No 04/16/25 1204   Dressing Changed New 04/16/25 1204   Patient Tolerance Tolerated well 04/16/25 1204   Dressing Status Clean;Dry;Intact 04/16/25 1204               Leila Chavez RN, BSN, CCRN

## 2025-04-16 NOTE — PROGRESS NOTES
Progress Note - Hospitalist   Name: Elly Chong 60 y.o. male I MRN: [id number]  Unit/Bed#: S -01 I Date of Admission: 4/14/2025   Date of Service: 4/16/2025 I Hospital Day: 2    Assessment & Plan  Acute on chronic diastolic congestive heart failure (HCC)  Presents due to lower extremity edema, lower extremity pain, increased abdominal girth, shortness of breath and chest pain  Medication nonadherence contributing  Echo 11/23: EF 60%, grade 1 diastolic dysfunction  PTA: Torsemide 40 mg daily and Aldactone 25 mg daily  Stopped taking these on approximately 4/7 due to going on vacation    Plan    Cardiology consult  Continue with Lasix 100 mg IV twice daily  Continue Aldactone 25 mg daily  Cardiology consulted  Monitor ins and outs/daily weights  Essential hypertension  Coreg increased to 12.5 mg twice daily by cardiology  Continue Aldactone 25 mg shama  Uncontrolled type 2 diabetes mellitus with hyperglycemia (HCC)  Lab Results   Component Value Date    HGBA1C 6.4 03/13/2025     Resume p.o. meds on discharge  Accu-Chek, SSI  Medical non-compliance  Stop taking torsemide on approximately 4/7  Needs ongoing education  Alcohol abuse  Reports drinking 3 beers daily and 2 shots daily after work  CIWA  Current smoker  Reports 3 cigars daily  NRT  CKD (chronic kidney disease)  Creatinine appears at baseline (1.6-2.3)  Monitor with diuresis    VTE Pharmacologic Prophylaxis: VTE Score: 5 High Risk (Score >/= 5) - Pharmacological DVT Prophylaxis Ordered: heparin. Sequential Compression Devices Ordered.    Mobility:   Basic Mobility Inpatient Raw Score: 24  JH-HLM Goal: 8: Walk 250 feet or more  JH-HLM Achieved: 8: Walk 250 feet ot more  JH-HLM Goal achieved. Continue to encourage appropriate mobility.    Patient Centered Rounds: I performed bedside rounds with nursing staff today.   Discussions with Specialists or Other Care Team Provider: case management    Education and Discussions with Family / Patient: Updated   (wife) via phone.    Current Length of Stay: 2 day(s)  Current Patient Status: Inpatient   Certification Statement: The patient will continue to require additional inpatient hospital stay due to IV diuretics  Discharge Plan: Anticipate discharge in 48-72 hrs to discharge location to be determined pending rehab evaluations.    Code Status: Level 1 - Full Code    Subjective   Patient states that swelling in his legs improving currently has no acute complaints    Objective :  Temp:  [97.9 °F (36.6 °C)-98.3 °F (36.8 °C)] 98 °F (36.7 °C)  HR:  [80-85] 83  BP: (118-141)/(66-84) 122/80  Resp:  [17-18] 17  SpO2:  [95 %-99 %] 97 %  O2 Device: None (Room air)    Body mass index is 45.34 kg/m².     Input and Output Summary (last 24 hours):     Intake/Output Summary (Last 24 hours) at 4/16/2025 1617  Last data filed at 4/16/2025 1405  Gross per 24 hour   Intake 680 ml   Output 2280 ml   Net -1600 ml       Physical Exam  Vitals and nursing note reviewed.   Constitutional:       General: He is not in acute distress.     Appearance: He is well-developed.   HENT:      Head: Normocephalic and atraumatic.   Eyes:      Conjunctiva/sclera: Conjunctivae normal.   Cardiovascular:      Rate and Rhythm: Normal rate and regular rhythm.      Heart sounds: No murmur heard.  Pulmonary:      Effort: Pulmonary effort is normal. No respiratory distress.      Breath sounds: Rales present.   Abdominal:      Palpations: Abdomen is soft.      Tenderness: There is no abdominal tenderness.   Musculoskeletal:      Right lower leg: Edema present.      Left lower leg: Edema present.   Skin:     General: Skin is warm and dry.   Neurological:      Mental Status: He is alert. Mental status is at baseline.   Psychiatric:         Mood and Affect: Mood normal.           Lines/Drains:              Lab Results: I have reviewed the following results:   Results from last 7 days   Lab Units 04/16/25  0541 04/15/25  0429 04/14/25  1842   WBC  Thousand/uL 10.10   < > 8.54   HEMOGLOBIN g/dL 11.3*   < > 11.2*   HEMATOCRIT % 34.1*   < > 33.8*   PLATELETS Thousands/uL 238   < > 243   SEGS PCT %  --   --  51   LYMPHO PCT %  --   --  35   MONO PCT %  --   --  9   EOS PCT %  --   --  4    < > = values in this interval not displayed.     Results from last 7 days   Lab Units 04/16/25  0541 04/15/25  0506 04/14/25  1842   SODIUM mmol/L 136   < > 136   POTASSIUM mmol/L 3.8   < > 3.4*   CHLORIDE mmol/L 98   < > 98   CO2 mmol/L 29   < > 29   BUN mg/dL 18   < > 13   CREATININE mg/dL 1.50*   < > 1.91*   ANION GAP mmol/L 9   < > 9   CALCIUM mg/dL 8.8   < > 8.5   ALBUMIN g/dL 3.5  --  3.5   TOTAL BILIRUBIN mg/dL  --   --  0.49   ALK PHOS U/L  --   --  89   ALT U/L  --   --  9   AST U/L  --   --  15   GLUCOSE RANDOM mg/dL 133   < > 171*    < > = values in this interval not displayed.         Results from last 7 days   Lab Units 04/16/25  1557 04/16/25  1059 04/16/25  0752 04/15/25  2155 04/15/25  1555 04/15/25  1032 04/15/25  0722 04/15/25  0128   POC GLUCOSE mg/dl 175* 231* 154* 141* 200* 247* 204* 119               Recent Cultures (last 7 days):         Imaging Results Review: I reviewed radiology reports from this admission including: chest xray.  Other Study Results Review: EKG was reviewed.     Last 24 Hours Medication List:     Current Facility-Administered Medications:     albuterol (PROVENTIL HFA,VENTOLIN HFA) inhaler 2 puff, Q4H PRN    allopurinol (ZYLOPRIM) tablet 100 mg, Daily    benzonatate (TESSALON PERLES) capsule 100 mg, TID    carvedilol (COREG) tablet 12.5 mg, BID With Meals    colchicine (COLCRYS) tablet 0.6 mg, BID    famotidine (PEPCID) tablet 20 mg, HS    fluticasone-vilanterol 200-25 mcg/actuation 1 puff, Daily    folic acid (FOLVITE) tablet 1 mg, Daily    furosemide (LASIX) injection 100 mg, BID (diuretic)    heparin (porcine) subcutaneous injection 7,500 Units, Q8H ELIANE    insulin lispro (HumALOG/ADMELOG) 100 units/mL subcutaneous injection 1-5  Units, HS    insulin lispro (HumALOG/ADMELOG) 100 units/mL subcutaneous injection 2-12 Units, TID AC **AND** Fingerstick Glucose (POCT), TID AC    ipratropium (ATROVENT) 0.02 % inhalation solution 0.5 mg, TID    levalbuterol (XOPENEX) inhalation solution 1.25 mg, TID    multivitamin-minerals (CENTRUM) tablet 1 tablet, Daily    pantoprazole (PROTONIX) EC tablet 40 mg, Daily Before Breakfast    spironolactone (ALDACTONE) tablet 25 mg, Daily    thiamine tablet 100 mg, Daily    Administrative Statements   Today, Patient Was Seen By: Ivan Mcdonnell DO      **Please Note: This note may have been constructed using a voice recognition system.**

## 2025-04-16 NOTE — DISCHARGE INSTR - OTHER ORDERS
Skin care plans:  1-Hydraguard to bilateral sacrum, buttock and heels BID and PRN  2-Elevate heels to offload pressure.  3-Ehob cushion in chair when out of bed.  4-Moisturize skin daily with skin nourishing cream.  5-Turn/reposition q2h for pressure re-distribution on skin.  6-R ankle: Irrigate with NSS. Pat dry. Cover with Silver Alginate and ABD. Wrap with Berna. Change every other day or PRN for soilage/dislodgement.  7-Schedule Follow-up Outpatient Wound Appointment. Ambulatory Referral Placed.   Call Outpatient Wound and Ostomy Care Team @ [phone]   Option 1: [place], [place], [place], [place]  Option 2: [facility], [place]  8-B/L Lower Legs: Apply ACE wrap for compression. Wrap from base of toes to behind knees. Wrap Daily and keep lower extremities elevated.

## 2025-04-16 NOTE — ASSESSMENT & PLAN NOTE
Presents due to lower extremity edema, lower extremity pain, increased abdominal girth, shortness of breath and chest pain  Medication nonadherence contributing  Echo 11/23: EF 60%, grade 1 diastolic dysfunction  PTA: Torsemide 40 mg daily and Aldactone 25 mg daily  Stopped taking these on approximately 4/7 due to going on vacation    Plan    Cardiology consult  Continue with Lasix 100 mg IV twice daily  Continue Aldactone 25 mg daily  Cardiology consulted  Monitor ins and outs/daily weights

## 2025-04-17 LAB
ALBUMIN SERPL BCG-MCNC: 3.5 G/DL (ref 3.5–5)
ANION GAP SERPL CALCULATED.3IONS-SCNC: 8 MMOL/L (ref 4–13)
BUN SERPL-MCNC: 20 MG/DL (ref 5–25)
CALCIUM SERPL-MCNC: 8.9 MG/DL (ref 8.4–10.2)
CHLORIDE SERPL-SCNC: 96 MMOL/L (ref 96–108)
CO2 SERPL-SCNC: 31 MMOL/L (ref 21–32)
CREAT SERPL-MCNC: 1.59 MG/DL (ref 0.6–1.3)
ERYTHROCYTE [DISTWIDTH] IN BLOOD BY AUTOMATED COUNT: 13.3 % (ref 11.6–15.1)
FERRITIN SERPL-MCNC: 379 NG/ML (ref 30–336)
FOLATE SERPL-MCNC: 12.7 NG/ML
GFR SERPL CREATININE-BSD FRML MDRD: 46 ML/MIN/1.73SQ M
GLUCOSE SERPL-MCNC: 157 MG/DL (ref 65–140)
GLUCOSE SERPL-MCNC: 168 MG/DL (ref 65–140)
GLUCOSE SERPL-MCNC: 198 MG/DL (ref 65–140)
GLUCOSE SERPL-MCNC: 220 MG/DL (ref 65–140)
GLUCOSE SERPL-MCNC: 230 MG/DL (ref 65–140)
HCT VFR BLD AUTO: 34.3 % (ref 36.5–49.3)
HGB BLD-MCNC: 11.3 G/DL (ref 12–17)
IRON SATN MFR SERPL: 25 % (ref 15–50)
IRON SERPL-MCNC: 69 UG/DL (ref 50–212)
MAGNESIUM SERPL-MCNC: 1.3 MG/DL (ref 1.9–2.7)
MCH RBC QN AUTO: 33.1 PG (ref 26.8–34.3)
MCHC RBC AUTO-ENTMCNC: 32.9 G/DL (ref 31.4–37.4)
MCV RBC AUTO: 101 FL (ref 82–98)
PHOSPHATE SERPL-MCNC: 3.4 MG/DL (ref 2.3–4.1)
PLATELET # BLD AUTO: 251 THOUSANDS/UL (ref 149–390)
PMV BLD AUTO: 10.1 FL (ref 8.9–12.7)
POTASSIUM SERPL-SCNC: 3.8 MMOL/L (ref 3.5–5.3)
RBC # BLD AUTO: 3.41 MILLION/UL (ref 3.88–5.62)
SODIUM SERPL-SCNC: 135 MMOL/L (ref 135–147)
TIBC SERPL-MCNC: 273 UG/DL (ref 250–450)
TRANSFERRIN SERPL-MCNC: 195 MG/DL (ref 203–362)
UIBC SERPL-MCNC: 204 UG/DL (ref 155–355)
VIT B12 SERPL-MCNC: 117 PG/ML (ref 180–914)
WBC # BLD AUTO: 9.55 THOUSAND/UL (ref 4.31–10.16)

## 2025-04-17 PROCEDURE — 82948 REAGENT STRIP/BLOOD GLUCOSE: CPT

## 2025-04-17 PROCEDURE — 83735 ASSAY OF MAGNESIUM: CPT | Performed by: STUDENT IN AN ORGANIZED HEALTH CARE EDUCATION/TRAINING PROGRAM

## 2025-04-17 PROCEDURE — 83540 ASSAY OF IRON: CPT | Performed by: STUDENT IN AN ORGANIZED HEALTH CARE EDUCATION/TRAINING PROGRAM

## 2025-04-17 PROCEDURE — 82607 VITAMIN B-12: CPT | Performed by: STUDENT IN AN ORGANIZED HEALTH CARE EDUCATION/TRAINING PROGRAM

## 2025-04-17 PROCEDURE — 82746 ASSAY OF FOLIC ACID SERUM: CPT | Performed by: STUDENT IN AN ORGANIZED HEALTH CARE EDUCATION/TRAINING PROGRAM

## 2025-04-17 PROCEDURE — 83550 IRON BINDING TEST: CPT | Performed by: STUDENT IN AN ORGANIZED HEALTH CARE EDUCATION/TRAINING PROGRAM

## 2025-04-17 PROCEDURE — 85027 COMPLETE CBC AUTOMATED: CPT | Performed by: STUDENT IN AN ORGANIZED HEALTH CARE EDUCATION/TRAINING PROGRAM

## 2025-04-17 PROCEDURE — 94760 N-INVAS EAR/PLS OXIMETRY 1: CPT

## 2025-04-17 PROCEDURE — 99232 SBSQ HOSP IP/OBS MODERATE 35: CPT | Performed by: STUDENT IN AN ORGANIZED HEALTH CARE EDUCATION/TRAINING PROGRAM

## 2025-04-17 PROCEDURE — 99232 SBSQ HOSP IP/OBS MODERATE 35: CPT | Performed by: INTERNAL MEDICINE

## 2025-04-17 PROCEDURE — 80069 RENAL FUNCTION PANEL: CPT | Performed by: STUDENT IN AN ORGANIZED HEALTH CARE EDUCATION/TRAINING PROGRAM

## 2025-04-17 PROCEDURE — 94640 AIRWAY INHALATION TREATMENT: CPT

## 2025-04-17 PROCEDURE — 82728 ASSAY OF FERRITIN: CPT | Performed by: STUDENT IN AN ORGANIZED HEALTH CARE EDUCATION/TRAINING PROGRAM

## 2025-04-17 RX ORDER — INSULIN GLARGINE 100 [IU]/ML
5 INJECTION, SOLUTION SUBCUTANEOUS
Status: DISCONTINUED | OUTPATIENT
Start: 2025-04-17 | End: 2025-04-21

## 2025-04-17 RX ORDER — INSULIN LISPRO 100 [IU]/ML
3 INJECTION, SOLUTION INTRAVENOUS; SUBCUTANEOUS
Status: DISCONTINUED | OUTPATIENT
Start: 2025-04-17 | End: 2025-04-17

## 2025-04-17 RX ORDER — CYANOCOBALAMIN 1000 UG/ML
1000 INJECTION, SOLUTION INTRAMUSCULAR; SUBCUTANEOUS DAILY
Status: COMPLETED | OUTPATIENT
Start: 2025-04-17 | End: 2025-04-19

## 2025-04-17 RX ORDER — GUAIFENESIN 600 MG/1
1200 TABLET, EXTENDED RELEASE ORAL EVERY 12 HOURS SCHEDULED
Status: DISCONTINUED | OUTPATIENT
Start: 2025-04-17 | End: 2025-04-23 | Stop reason: HOSPADM

## 2025-04-17 RX ORDER — INSULIN GLARGINE 100 [IU]/ML
10 INJECTION, SOLUTION SUBCUTANEOUS
Status: DISCONTINUED | OUTPATIENT
Start: 2025-04-17 | End: 2025-04-17

## 2025-04-17 RX ORDER — MAGNESIUM SULFATE HEPTAHYDRATE 40 MG/ML
4 INJECTION, SOLUTION INTRAVENOUS ONCE
Status: COMPLETED | OUTPATIENT
Start: 2025-04-17 | End: 2025-04-17

## 2025-04-17 RX ADMIN — INSULIN LISPRO 2 UNITS: 100 INJECTION, SOLUTION INTRAVENOUS; SUBCUTANEOUS at 08:25

## 2025-04-17 RX ADMIN — PANTOPRAZOLE SODIUM 40 MG: 40 TABLET, DELAYED RELEASE ORAL at 05:03

## 2025-04-17 RX ADMIN — CARVEDILOL 12.5 MG: 12.5 TABLET, FILM COATED ORAL at 08:24

## 2025-04-17 RX ADMIN — CARVEDILOL 12.5 MG: 12.5 TABLET, FILM COATED ORAL at 16:42

## 2025-04-17 RX ADMIN — BENZONATATE 100 MG: 100 CAPSULE ORAL at 22:27

## 2025-04-17 RX ADMIN — FUROSEMIDE 100 MG: 10 INJECTION, SOLUTION INTRAMUSCULAR; INTRAVENOUS at 08:25

## 2025-04-17 RX ADMIN — IPRATROPIUM BROMIDE 0.5 MG: 0.5 SOLUTION RESPIRATORY (INHALATION) at 14:15

## 2025-04-17 RX ADMIN — FUROSEMIDE 100 MG: 10 INJECTION, SOLUTION INTRAMUSCULAR; INTRAVENOUS at 16:42

## 2025-04-17 RX ADMIN — ALLOPURINOL 100 MG: 100 TABLET ORAL at 08:24

## 2025-04-17 RX ADMIN — LEVALBUTEROL HYDROCHLORIDE 1.25 MG: 1.25 SOLUTION RESPIRATORY (INHALATION) at 07:30

## 2025-04-17 RX ADMIN — HEPARIN SODIUM 7500 UNITS: 5000 INJECTION INTRAVENOUS; SUBCUTANEOUS at 22:19

## 2025-04-17 RX ADMIN — GUAIFENESIN 1200 MG: 600 TABLET ORAL at 22:27

## 2025-04-17 RX ADMIN — FAMOTIDINE 20 MG: 20 TABLET, FILM COATED ORAL at 22:19

## 2025-04-17 RX ADMIN — CYANOCOBALAMIN 1000 MCG: 1000 INJECTION INTRAMUSCULAR; SUBCUTANEOUS at 16:42

## 2025-04-17 RX ADMIN — BENZONATATE 100 MG: 100 CAPSULE ORAL at 08:25

## 2025-04-17 RX ADMIN — INSULIN LISPRO 2 UNITS: 100 INJECTION, SOLUTION INTRAVENOUS; SUBCUTANEOUS at 22:20

## 2025-04-17 RX ADMIN — INSULIN LISPRO 4 UNITS: 100 INJECTION, SOLUTION INTRAVENOUS; SUBCUTANEOUS at 12:19

## 2025-04-17 RX ADMIN — IPRATROPIUM BROMIDE 0.5 MG: 0.5 SOLUTION RESPIRATORY (INHALATION) at 07:30

## 2025-04-17 RX ADMIN — LEVALBUTEROL HYDROCHLORIDE 1.25 MG: 1.25 SOLUTION RESPIRATORY (INHALATION) at 20:14

## 2025-04-17 RX ADMIN — HEPARIN SODIUM 7500 UNITS: 5000 INJECTION INTRAVENOUS; SUBCUTANEOUS at 13:45

## 2025-04-17 RX ADMIN — LEVALBUTEROL HYDROCHLORIDE 1.25 MG: 1.25 SOLUTION RESPIRATORY (INHALATION) at 14:15

## 2025-04-17 RX ADMIN — BENZONATATE 100 MG: 100 CAPSULE ORAL at 16:42

## 2025-04-17 RX ADMIN — INSULIN GLARGINE 5 UNITS: 100 INJECTION, SOLUTION SUBCUTANEOUS at 22:19

## 2025-04-17 RX ADMIN — INSULIN LISPRO 2 UNITS: 100 INJECTION, SOLUTION INTRAVENOUS; SUBCUTANEOUS at 16:43

## 2025-04-17 RX ADMIN — MAGNESIUM SULFATE HEPTAHYDRATE 4 G: 40 INJECTION, SOLUTION INTRAVENOUS at 12:20

## 2025-04-17 RX ADMIN — GUAIFENESIN 1200 MG: 600 TABLET ORAL at 12:18

## 2025-04-17 RX ADMIN — MULTIPLE VITAMINS W/ MINERALS TAB 1 TABLET: TAB ORAL at 08:24

## 2025-04-17 RX ADMIN — SPIRONOLACTONE 25 MG: 25 TABLET, FILM COATED ORAL at 08:24

## 2025-04-17 RX ADMIN — IPRATROPIUM BROMIDE 0.5 MG: 0.5 SOLUTION RESPIRATORY (INHALATION) at 20:14

## 2025-04-17 RX ADMIN — COLCHICINE 0.6 MG: 0.6 TABLET ORAL at 08:24

## 2025-04-17 RX ADMIN — COLCHICINE 0.6 MG: 0.6 TABLET ORAL at 17:17

## 2025-04-17 RX ADMIN — FOLIC ACID 1 MG: 1 TABLET ORAL at 08:24

## 2025-04-17 RX ADMIN — FLUTICASONE FUROATE AND VILANTEROL TRIFENATATE 1 PUFF: 200; 25 POWDER RESPIRATORY (INHALATION) at 08:25

## 2025-04-17 RX ADMIN — Medication 100 MG: at 08:24

## 2025-04-17 NOTE — ASSESSMENT & PLAN NOTE
Presents due to lower extremity edema, lower extremity pain, increased abdominal girth, shortness of breath and chest pain  Medication nonadherence contributing  Echo 11/23: EF 60%, grade 1 diastolic dysfunction  PTA: Torsemide 40 mg daily and Aldactone 25 mg daily  Stopped taking these on approximately 4/7 due to going on vacation    Plan    Cardiology consult  Continue with Lasix 100 mg IV twice daily  Continue Aldactone 25 mg daily  Cardiology consulted  Monitor ins and outs/daily weights  Continues to be volume overloaded, continue with diuretics

## 2025-04-17 NOTE — PROGRESS NOTES
Progress Note - Hospitalist   Name: Elly Chong 60 y.o. male I MRN: [id number]  Unit/Bed#: S -01 I Date of Admission: 4/14/2025   Date of Service: 4/17/2025 I Hospital Day: 3    Assessment & Plan  Acute on chronic diastolic congestive heart failure (HCC)  Presents due to lower extremity edema, lower extremity pain, increased abdominal girth, shortness of breath and chest pain  Medication nonadherence contributing  Echo 11/23: EF 60%, grade 1 diastolic dysfunction  PTA: Torsemide 40 mg daily and Aldactone 25 mg daily  Stopped taking these on approximately 4/7 due to going on vacation    Plan    Cardiology consult  Continue with Lasix 100 mg IV twice daily  Continue Aldactone 25 mg daily  Cardiology consulted  Monitor ins and outs/daily weights  Continues to be volume overloaded, continue with diuretics   Essential hypertension  Coreg increased to 12.5 mg twice daily by cardiology  Continue Aldactone 25 mg shama  Uncontrolled type 2 diabetes mellitus with hyperglycemia ([facility])  Lab Results   Component Value Date    HGBA1C 6.4 03/13/2025     Resume p.o. meds on discharge  Accu-Chek, SSI  Place on lantus 5 units qhs for tighter glucose control   Medical non-compliance  Stop taking torsemide on approximately 4/7  Needs ongoing education  Alcohol abuse  Reports drinking 3 beers daily and 2 shots daily after work  CIWA  Current smoker  Reports 3 cigars daily  NRT  CKD (chronic kidney disease)  Creatinine appears at baseline (1.6-2.3)  Monitor with diuresis  Cr at baseline     VTE Pharmacologic Prophylaxis: VTE Score: 5 High Risk (Score >/= 5) - Pharmacological DVT Prophylaxis Ordered: heparin. Sequential Compression Devices Ordered.    Mobility:   Basic Mobility Inpatient Raw Score: 24  JH-HLM Goal: 8: Walk 250 feet or more  JH-HLM Achieved: 8: Walk 250 feet ot more  JH-HLM Goal achieved. Continue to encourage appropriate mobility.    Patient Centered Rounds: I performed bedside rounds with nursing staff today.    Discussions with Specialists or Other Care Team Provider: case management    Education and Discussions with Family / Patient: Updated  (wife) via phone.    Current Length of Stay: 3 day(s)  Current Patient Status: Inpatient   Certification Statement: The patient will continue to require additional inpatient hospital stay due to IV diuretics  Discharge Plan: Anticipate discharge in 48-72 hrs to discharge location to be determined pending rehab evaluations.    Code Status: Level 1 - Full Code    Subjective   Patient states that swelling in his legs improving currently has no acute complaints.     Objective :  Temp:  [97.8 °F (36.6 °C)-98.4 °F (36.9 °C)] 97.8 °F (36.6 °C)  HR:  [84-85] 84  BP: (119-136)/(62-84) 119/62  Resp:  [18] 18  SpO2:  [96 %-98 %] 98 %  O2 Device: None (Room air)    Body mass index is 44.92 kg/m².     Input and Output Summary (last 24 hours):     Intake/Output Summary (Last 24 hours) at 4/17/2025 1506  Last data filed at 4/17/2025 1347  Gross per 24 hour   Intake 890 ml   Output 1550 ml   Net -660 ml       Physical Exam  Vitals and nursing note reviewed.   Constitutional:       General: He is not in acute distress.     Appearance: He is well-developed.   HENT:      Head: Normocephalic and atraumatic.   Eyes:      Conjunctiva/sclera: Conjunctivae normal.   Cardiovascular:      Rate and Rhythm: Normal rate and regular rhythm.      Heart sounds: No murmur heard.  Pulmonary:      Effort: Pulmonary effort is normal. No respiratory distress.      Breath sounds: Normal breath sounds.   Abdominal:      Palpations: Abdomen is soft.      Tenderness: There is no abdominal tenderness.   Musculoskeletal:      Right lower leg: Edema present.      Left lower leg: Edema present.   Skin:     General: Skin is warm and dry.   Neurological:      Mental Status: He is alert. Mental status is at baseline.   Psychiatric:         Mood and Affect: Mood normal.           Lines/Drains:              Lab  Results: I have reviewed the following results:   Results from last 7 days   Lab Units 04/17/25  0504 04/15/25  0429 04/14/25  1842   WBC Thousand/uL 9.55   < > 8.54   HEMOGLOBIN g/dL 11.3*   < > 11.2*   HEMATOCRIT % 34.3*   < > 33.8*   PLATELETS Thousands/uL 251   < > 243   SEGS PCT %  --   --  51   LYMPHO PCT %  --   --  35   MONO PCT %  --   --  9   EOS PCT %  --   --  4    < > = values in this interval not displayed.     Results from last 7 days   Lab Units 04/17/25  0504 04/15/25  0506 04/14/25  1842   SODIUM mmol/L 135   < > 136   POTASSIUM mmol/L 3.8   < > 3.4*   CHLORIDE mmol/L 96   < > 98   CO2 mmol/L 31   < > 29   BUN mg/dL 20   < > 13   CREATININE mg/dL 1.59*   < > 1.91*   ANION GAP mmol/L 8   < > 9   CALCIUM mg/dL 8.9   < > 8.5   ALBUMIN g/dL 3.5   < > 3.5   TOTAL BILIRUBIN mg/dL  --   --  0.49   ALK PHOS U/L  --   --  89   ALT U/L  --   --  9   AST U/L  --   --  15   GLUCOSE RANDOM mg/dL 168*   < > 171*    < > = values in this interval not displayed.         Results from last 7 days   Lab Units 04/17/25  1108 04/17/25  0813 04/16/25  2049 04/16/25  1557 04/16/25  1059 04/16/25  0752 04/15/25  2155 04/15/25  1555 04/15/25  1032 04/15/25  0722 04/15/25  0128   POC GLUCOSE mg/dl 220* 157* 205* 175* 231* 154* 141* 200* 247* 204* 119               Recent Cultures (last 7 days):         Imaging Results Review: I reviewed radiology reports from this admission including: chest xray.  Other Study Results Review: EKG was reviewed.     Last 24 Hours Medication List:     Current Facility-Administered Medications:     albuterol (PROVENTIL HFA,VENTOLIN HFA) inhaler 2 puff, Q4H PRN    allopurinol (ZYLOPRIM) tablet 100 mg, Daily    benzonatate (TESSALON PERLES) capsule 100 mg, TID    carvedilol (COREG) tablet 12.5 mg, BID With Meals    colchicine (COLCRYS) tablet 0.6 mg, BID    famotidine (PEPCID) tablet 20 mg, HS    fluticasone-vilanterol 200-25 mcg/actuation 1 puff, Daily    folic acid (FOLVITE) tablet 1 mg, Daily     furosemide (LASIX) injection 100 mg, BID (diuretic)    guaiFENesin (MUCINEX) 12 hr tablet 1,200 mg, Q12H ELIANE    heparin (porcine) subcutaneous injection 7,500 Units, Q8H ELIANE    insulin lispro (HumALOG/ADMELOG) 100 units/mL subcutaneous injection 1-5 Units, HS    insulin lispro (HumALOG/ADMELOG) 100 units/mL subcutaneous injection 2-12 Units, TID AC **AND** Fingerstick Glucose (POCT), TID AC    ipratropium (ATROVENT) 0.02 % inhalation solution 0.5 mg, TID    levalbuterol (XOPENEX) inhalation solution 1.25 mg, TID    magnesium sulfate 4 g/100 mL IVPB (premix) 4 g, Once, Last Rate: 4 g (04/17/25 1220)    multivitamin-minerals (CENTRUM) tablet 1 tablet, Daily    pantoprazole (PROTONIX) EC tablet 40 mg, Daily Before Breakfast    spironolactone (ALDACTONE) tablet 25 mg, Daily    thiamine tablet 100 mg, Daily    Administrative Statements   Today, Patient Was Seen By: Ivan Mcdonnell DO      **Please Note: This note may have been constructed using a voice recognition system.**

## 2025-04-17 NOTE — PLAN OF CARE
Problem: PAIN - ADULT  Goal: Verbalizes/displays adequate comfort level or baseline comfort level  Description: Interventions:- Encourage patient to monitor pain and request assistance- Assess pain using appropriate pain scale- Administer analgesics based on type and severity of pain and evaluate response- Implement non-pharmacological measures as appropriate and evaluate response- Consider cultural and social influences on pain and pain management- Notify physician/advanced practitioner if interventions unsuccessful or patient reports new pain  Outcome: Progressing     Problem: INFECTION - ADULT  Goal: Absence or prevention of progression during hospitalization  Description: INTERVENTIONS:- Assess and monitor for signs and symptoms of infection- Monitor lab/diagnostic results- Monitor all insertion sites, i.e. indwelling lines, tubes, and drains- Monitor endotracheal if appropriate and nasal secretions for changes in amount and color- [place] appropriate cooling/warming therapies per order- Administer medications as ordered- Instruct and encourage patient and family to use good hand hygiene technique- Identify and instruct in appropriate isolation precautions for identified infection/condition  Outcome: Progressing  Goal: Absence of fever/infection during neutropenic period  Description: INTERVENTIONS:- Monitor WBC  Outcome: Progressing     Problem: SAFETY ADULT  Goal: Patient will remain free of falls  Description: INTERVENTIONS:- Educate patient/family on patient safety including physical limitations- Instruct patient to call for assistance with activity - Consult OT/PT to assist with strengthening/mobility - Keep Call bell within reach- Keep bed low and locked with side rails adjusted as appropriate- Keep care items and personal belongings within reach- Initiate and maintain comfort rounds- Make Fall Risk Sign visible to staff- Offer Toileting every 2 Hours, in advance of need- Initiate/Maintain bed/chair  alarm- Obtain necessary fall risk management equipment- Apply yellow socks and bracelet for high fall risk patients- Consider moving patient to room near nurses station  Outcome: Progressing  Goal: Maintain or return to baseline ADL function  Description: INTERVENTIONS:-  Assess patient's ability to carry out ADLs; assess patient's baseline for ADL function and identify physical deficits which impact ability to perform ADLs (bathing, care of mouth/teeth, toileting, grooming, dressing, etc.)- Assess/evaluate cause of self-care deficits - Assess range of motion- Assess patient's mobility; develop plan if impaired- Assess patient's need for assistive devices and provide as appropriate- Encourage maximum independence but intervene and supervise when necessary- Involve family in performance of ADLs- Assess for home care needs following discharge - Consider OT consult to assist with ADL evaluation and planning for discharge- Provide patient education as appropriate  Outcome: Progressing  Goal: Maintains/Returns to pre admission functional level  Description: INTERVENTIONS:- Perform AM-PAC 6 Click Basic Mobility/ Daily Activity assessment daily.- Set and communicate daily mobility goal to care team and patient/family/caregiver. - Collaborate with rehabilitation services on mobility goals if consulted-  Out of bed to chair 3 times a day - Out of bed for meals 3 times a day- Out of bed for toileting- Record patient progress and toleration of activity level   Outcome: Progressing     Problem: DISCHARGE PLANNING  Goal: Discharge to home or other facility with appropriate resources  Description: INTERVENTIONS:- Identify barriers to discharge w/patient and caregiver- Arrange for needed discharge resources and transportation as appropriate- Identify discharge learning needs (meds, wound care, etc.)- Arrange for interpretive services to assist at discharge as needed- Refer to Case Management Department for coordinating discharge  planning if the patient needs post-hospital services based on physician/advanced practitioner order or complex needs related to functional status, cognitive ability, or social support system  Outcome: Progressing     Problem: Knowledge Deficit  Goal: Patient/family/caregiver demonstrates understanding of disease process, treatment plan, medications, and discharge instructions  Description: Complete learning assessment and assess knowledge base.Interventions:- Provide teaching at level of understanding- Provide teaching via preferred learning methods  Outcome: Progressing

## 2025-04-17 NOTE — ASSESSMENT & PLAN NOTE
Lab Results   Component Value Date    HGBA1C 6.4 03/13/2025     Resume p.o. meds on discharge  Accu-Chek, SSI  Place on lantus 5 units qhs for tighter glucose control

## 2025-04-17 NOTE — PROGRESS NOTES
Heart Failure/ Pulmonary Hypertension Progress Note - Elly Chong 60 y.o. male MRN: [id number]    Unit/Bed#: S -01 Encounter: [id number]      Assessment:    Principal Problem:    Acute on chronic diastolic congestive heart failure (HCC)  Active Problems:    Essential hypertension    Uncontrolled type 2 diabetes mellitus with hyperglycemia (HCC)    Medical non-compliance    Alcohol abuse    Current smoker    CKD (chronic kidney disease)      Interim:  Ins/outs: 1230/2230: -1000 with furosemide 100 mg IV BID  Weight: 286 lbs (291 lbs)  Tele: 80's  MAPs:  mmHg  Cr 1.59  Mag 1.3    # Acute on Chronic HFpEF, Stage C  Diuretic: Torsemide 40 mg daily  MRA: spironolactone 25 mg daily  Weight: 289 lbs  BNP    Studies- personally reviewed by me  EKG- SR with PVCs    Echo 11/21/23  LVEF: 60%, mild LVH  LVIDd: 4.3 cm  RV: normal    NM MPI 3/6/23: diaphragmatic artifact, no scar or ischemia, EF : 49%    Echo 3/5/23:  LVEF: 50%  Hypo inferolateral    # HTN- coreg 12.5 mg BID  # CKD 3 B  Cr 1.86 up from 1.36 on 11/16/24  # AMANDO  # reactive airway disease  # GERD  # ETOH- 3 beers, 2 shots daily  # DM2  # current smoker    Plan:  Continue IV diuresis- needs more volume off  MAPs   mmHg  Increased coreg to 12.5 mg  BID  Continue spironolactone 25 mg daily  IV Mag replacement        Review of Systems   Constitutional:  Negative for activity change, appetite change, fatigue and unexpected weight change.   HENT:  Negative for congestion and nosebleeds.    Eyes: Negative.    Respiratory:  Negative for cough, chest tightness and shortness of breath.    Cardiovascular:  Negative for chest pain, palpitations and leg swelling.   Gastrointestinal:  Negative for abdominal distention.   Endocrine: Negative.    Genitourinary: Negative.    Musculoskeletal: Negative.    Skin: Negative.    Neurological:  Negative for dizziness, syncope and weakness.   Hematological: Negative.    Psychiatric/Behavioral: Negative.           Central Line (day, reason):  [place]iago catheter (day, reason):    Vitals: Blood pressure 119/74, pulse 85, temperature 98.3 °F (36.8 °C), resp. rate 18, weight 130 kg (286 lb 13.1 oz), SpO2 97%., Body mass index is 44.92 kg/m²., I/O last 3 completed shifts:  In: 1230 [P.O.:1230]  Out: 2730 [Urine:2730]  No intake/output data recorded.  Wt Readings from Last 3 Encounters:   04/17/25 130 kg (286 lb 13.1 oz)   03/13/25 126 kg (277 lb 4 oz)   11/27/24 117 kg (258 lb 12.8 oz)       Intake/Output Summary (Last 24 hours) at 4/17/2025 0812  Last data filed at 4/17/2025 0501  Gross per 24 hour   Intake 1230 ml   Output 2230 ml   Net -1000 ml     I/O last 3 completed shifts:  In: 1230 [P.O.:1230]  Out: 2730 [Urine:2730]    No significant arrhythmias seen on telemetry review.       Physical Exam:  Vitals:    04/16/25 1909 04/17/25 0508 04/17/25 0730 04/17/25 0743   BP:    119/74   Pulse:    85   Resp:    18   Temp:    98.3 °F (36.8 °C)   TempSrc:       SpO2: 97%  97% 97%   Weight:  130 kg (286 lb 13.1 oz)         GEN: Elly Chong appears well, alert and oriented x 3, pleasant and cooperative   HEENT: pupils equal, round, and reactive to light; extraocular muscles intact  NECK: supple, no carotid bruits   HEART: regular rhythm, normal S1 and S2, no murmurs, clicks, gallops or rubs, JVP is  up  LUNGS: clear to auscultation bilaterally; no wheezes, rales, or rhonchi   ABDOMEN: normal bowel sounds, soft, no tenderness, no distention  EXTREMITIES: peripheral pulses normal; no clubbing, cyanosis, ++ edema  NEURO: no focal findings   SKIN: normal without suspicious lesions on exposed skin      Current Facility-Administered Medications:     albuterol (PROVENTIL HFA,VENTOLIN HFA) inhaler 2 puff, 2 puff, Inhalation, Q4H PRN, Ivan Mcdonnell DO    allopurinol (ZYLOPRIM) tablet 100 mg, 100 mg, Oral, Daily, SHAY Sethi, 100 mg at 04/16/25 0915    benzonatate (TESSALON PERLES) capsule 100 mg, 100 mg, Oral, TID, Ivan Mcdonnell DO, 100 mg  at 04/16/25 2137    carvedilol (COREG) tablet 12.5 mg, 12.5 mg, Oral, BID With Meals, Paul Waterman DO, 12.5 mg at 04/16/25 1637    colchicine (COLCRYS) tablet 0.6 mg, 0.6 mg, Oral, BID, SHAY Sethi, 0.6 mg at 04/16/25 1638    famotidine (PEPCID) tablet 20 mg, 20 mg, Oral, HS, SHAY Sethi, 20 mg at 04/16/25 2137    fluticasone-vilanterol 200-25 mcg/actuation 1 puff, 1 puff, Inhalation, Daily, SHAY Sethi, 1 puff at 04/16/25 1017    folic acid (FOLVITE) tablet 1 mg, 1 mg, Oral, Daily, SHAY Sethi, 1 mg at 04/16/25 0915    furosemide (LASIX) injection 100 mg, 100 mg, Intravenous, BID (diuretic), SHAY Sethi, 100 mg at 04/16/25 1638    heparin (porcine) subcutaneous injection 7,500 Units, 7,500 Units, Subcutaneous, Q8H ELIANE, SHAY Sethi, 7,500 Units at 04/16/25 1638    insulin lispro (HumALOG/ADMELOG) 100 units/mL subcutaneous injection 1-5 Units, 1-5 Units, Subcutaneous, HS, SHAY Sethi, 1 Units at 04/16/25 2159    insulin lispro (HumALOG/ADMELOG) 100 units/mL subcutaneous injection 2-12 Units, 2-12 Units, Subcutaneous, TID AC, 2 Units at 04/16/25 1638 **AND** Fingerstick Glucose (POCT), , , TID AC, SHAY Sethi    ipratropium (ATROVENT) 0.02 % inhalation solution 0.5 mg, 0.5 mg, Nebulization, TID, SHAY Sethi, 0.5 mg at 04/17/25 0730    levalbuterol (XOPENEX) inhalation solution 1.25 mg, 1.25 mg, Nebulization, TID, SHAY Sethi, 1.25 mg at 04/17/25 0730    multivitamin-minerals (CENTRUM) tablet 1 tablet, 1 tablet, Oral, Daily, SHAY Sethi, 1 tablet at 04/16/25 0915    pantoprazole (PROTONIX) EC tablet 40 mg, 40 mg, Oral, Daily Before Breakfast, SHAY Sethi, 40 mg at 04/17/25 0503    spironolactone (ALDACTONE) tablet 25 mg, 25 mg, Oral, Daily, SHAY Sethi, 25 mg at 04/16/25 0915    thiamine tablet 100 mg, 100 mg, Oral, Daily, SHAY Sethi, 100 mg at 04/16/25 0915      Labs & Results:        Results from last 7 days   Lab Units 04/17/25  050  04/16/25  0541 04/15/25  0429   WBC Thousand/uL 9.55 10.10 9.45   HEMOGLOBIN g/dL 11.3* 11.3* 11.8*   HEMATOCRIT % 34.3* 34.1* 35.0*   PLATELETS Thousands/uL 251 238 250         Results from last 7 days   Lab Units 04/17/25  0504 04/16/25  0541 04/15/25  0506 04/14/25  1842   POTASSIUM mmol/L 3.8 3.8 3.6 3.4*   CHLORIDE mmol/L 96 98 97 98   CO2 mmol/L 31 29 31 29   BUN mg/dL 20 18 15 13   CREATININE mg/dL 1.59* 1.50* 1.87* 1.91*   CALCIUM mg/dL 8.9 8.8 8.7 8.5   ALK PHOS U/L  --   --   --  89   ALT U/L  --   --   --  9   AST U/L  --   --   --  15           Counseling / Coordination of Care  Total floor / unit time spent today 45 minutes.  Greater than 50% of total time was spent with the patient and / or family counseling and / or coordination of care.  A description of the counseling / coordination of care: 30.      Thank you for the opportunity to participate in the care of this patient.  DAVID NG D.O.  DIRECTOR OF HEART FAILURE/ PULMONARY HYPERTENSION  MEDICAL DIRECTOR OF LVAD PROGRAM  [facility]

## 2025-04-18 ENCOUNTER — TELEPHONE (OUTPATIENT)
Age: 60
End: 2025-04-18

## 2025-04-18 PROBLEM — E53.8 B12 DEFICIENCY: Status: ACTIVE | Noted: 2025-04-18

## 2025-04-18 LAB
ALBUMIN SERPL BCG-MCNC: 3.2 G/DL (ref 3.5–5)
ANION GAP SERPL CALCULATED.3IONS-SCNC: 6 MMOL/L (ref 4–13)
BUN SERPL-MCNC: 22 MG/DL (ref 5–25)
CALCIUM ALBUM COR SERPL-MCNC: 9.2 MG/DL (ref 8.3–10.1)
CALCIUM SERPL-MCNC: 8.6 MG/DL (ref 8.4–10.2)
CHLORIDE SERPL-SCNC: 94 MMOL/L (ref 96–108)
CO2 SERPL-SCNC: 35 MMOL/L (ref 21–32)
CREAT SERPL-MCNC: 1.55 MG/DL (ref 0.6–1.3)
ERYTHROCYTE [DISTWIDTH] IN BLOOD BY AUTOMATED COUNT: 13.3 % (ref 11.6–15.1)
GFR SERPL CREATININE-BSD FRML MDRD: 47 ML/MIN/1.73SQ M
GLUCOSE SERPL-MCNC: 158 MG/DL (ref 65–140)
GLUCOSE SERPL-MCNC: 168 MG/DL (ref 65–140)
GLUCOSE SERPL-MCNC: 206 MG/DL (ref 65–140)
GLUCOSE SERPL-MCNC: 219 MG/DL (ref 65–140)
GLUCOSE SERPL-MCNC: 226 MG/DL (ref 65–140)
HCT VFR BLD AUTO: 33.3 % (ref 36.5–49.3)
HGB BLD-MCNC: 10.9 G/DL (ref 12–17)
MAGNESIUM SERPL-MCNC: 1.7 MG/DL (ref 1.9–2.7)
MCH RBC QN AUTO: 32.9 PG (ref 26.8–34.3)
MCHC RBC AUTO-ENTMCNC: 32.7 G/DL (ref 31.4–37.4)
MCV RBC AUTO: 101 FL (ref 82–98)
PHOSPHATE SERPL-MCNC: 3.8 MG/DL (ref 2.3–4.1)
PLATELET # BLD AUTO: 212 THOUSANDS/UL (ref 149–390)
PMV BLD AUTO: 10.2 FL (ref 8.9–12.7)
POTASSIUM SERPL-SCNC: 3.7 MMOL/L (ref 3.5–5.3)
RBC # BLD AUTO: 3.31 MILLION/UL (ref 3.88–5.62)
SODIUM SERPL-SCNC: 135 MMOL/L (ref 135–147)
WBC # BLD AUTO: 9.07 THOUSAND/UL (ref 4.31–10.16)

## 2025-04-18 PROCEDURE — 82948 REAGENT STRIP/BLOOD GLUCOSE: CPT

## 2025-04-18 PROCEDURE — 94640 AIRWAY INHALATION TREATMENT: CPT

## 2025-04-18 PROCEDURE — 99232 SBSQ HOSP IP/OBS MODERATE 35: CPT | Performed by: INTERNAL MEDICINE

## 2025-04-18 PROCEDURE — 99232 SBSQ HOSP IP/OBS MODERATE 35: CPT | Performed by: STUDENT IN AN ORGANIZED HEALTH CARE EDUCATION/TRAINING PROGRAM

## 2025-04-18 PROCEDURE — 80069 RENAL FUNCTION PANEL: CPT | Performed by: STUDENT IN AN ORGANIZED HEALTH CARE EDUCATION/TRAINING PROGRAM

## 2025-04-18 PROCEDURE — 83735 ASSAY OF MAGNESIUM: CPT | Performed by: STUDENT IN AN ORGANIZED HEALTH CARE EDUCATION/TRAINING PROGRAM

## 2025-04-18 PROCEDURE — 85027 COMPLETE CBC AUTOMATED: CPT | Performed by: STUDENT IN AN ORGANIZED HEALTH CARE EDUCATION/TRAINING PROGRAM

## 2025-04-18 PROCEDURE — 94760 N-INVAS EAR/PLS OXIMETRY 1: CPT

## 2025-04-18 RX ORDER — MAGNESIUM SULFATE HEPTAHYDRATE 40 MG/ML
4 INJECTION, SOLUTION INTRAVENOUS ONCE
Status: COMPLETED | OUTPATIENT
Start: 2025-04-18 | End: 2025-04-18

## 2025-04-18 RX ORDER — POTASSIUM CHLORIDE 1500 MG/1
20 TABLET, EXTENDED RELEASE ORAL ONCE
Status: COMPLETED | OUTPATIENT
Start: 2025-04-18 | End: 2025-04-18

## 2025-04-18 RX ORDER — BUMETANIDE 0.25 MG/ML
2 INJECTION INTRAMUSCULAR; INTRAVENOUS CONTINUOUS
Status: DISCONTINUED | OUTPATIENT
Start: 2025-04-18 | End: 2025-04-22

## 2025-04-18 RX ADMIN — HEPARIN SODIUM 7500 UNITS: 5000 INJECTION INTRAVENOUS; SUBCUTANEOUS at 05:56

## 2025-04-18 RX ADMIN — Medication 2 MG/HR: at 15:00

## 2025-04-18 RX ADMIN — GUAIFENESIN 1200 MG: 600 TABLET ORAL at 08:50

## 2025-04-18 RX ADMIN — ALLOPURINOL 100 MG: 100 TABLET ORAL at 08:50

## 2025-04-18 RX ADMIN — BENZONATATE 100 MG: 100 CAPSULE ORAL at 20:52

## 2025-04-18 RX ADMIN — FOLIC ACID 1 MG: 1 TABLET ORAL at 08:50

## 2025-04-18 RX ADMIN — COLCHICINE 0.6 MG: 0.6 TABLET ORAL at 17:47

## 2025-04-18 RX ADMIN — HEPARIN SODIUM 7500 UNITS: 5000 INJECTION INTRAVENOUS; SUBCUTANEOUS at 14:59

## 2025-04-18 RX ADMIN — FLUTICASONE FUROATE AND VILANTEROL TRIFENATATE 1 PUFF: 200; 25 POWDER RESPIRATORY (INHALATION) at 08:52

## 2025-04-18 RX ADMIN — PANTOPRAZOLE SODIUM 40 MG: 40 TABLET, DELAYED RELEASE ORAL at 05:56

## 2025-04-18 RX ADMIN — BENZONATATE 100 MG: 100 CAPSULE ORAL at 08:50

## 2025-04-18 RX ADMIN — Medication 2 MG/HR: at 10:25

## 2025-04-18 RX ADMIN — MAGNESIUM SULFATE HEPTAHYDRATE 4 G: 40 INJECTION, SOLUTION INTRAVENOUS at 10:26

## 2025-04-18 RX ADMIN — INSULIN LISPRO 4 UNITS: 100 INJECTION, SOLUTION INTRAVENOUS; SUBCUTANEOUS at 17:47

## 2025-04-18 RX ADMIN — Medication 2 MG/HR: at 20:44

## 2025-04-18 RX ADMIN — BENZONATATE 100 MG: 100 CAPSULE ORAL at 17:47

## 2025-04-18 RX ADMIN — INSULIN LISPRO 4 UNITS: 100 INJECTION, SOLUTION INTRAVENOUS; SUBCUTANEOUS at 12:38

## 2025-04-18 RX ADMIN — LEVALBUTEROL HYDROCHLORIDE 1.25 MG: 1.25 SOLUTION RESPIRATORY (INHALATION) at 07:32

## 2025-04-18 RX ADMIN — IPRATROPIUM BROMIDE 0.5 MG: 0.5 SOLUTION RESPIRATORY (INHALATION) at 07:32

## 2025-04-18 RX ADMIN — INSULIN LISPRO 2 UNITS: 100 INJECTION, SOLUTION INTRAVENOUS; SUBCUTANEOUS at 08:57

## 2025-04-18 RX ADMIN — INSULIN LISPRO 1 UNITS: 100 INJECTION, SOLUTION INTRAVENOUS; SUBCUTANEOUS at 22:15

## 2025-04-18 RX ADMIN — FAMOTIDINE 20 MG: 20 TABLET, FILM COATED ORAL at 21:00

## 2025-04-18 RX ADMIN — POTASSIUM CHLORIDE 20 MEQ: 1500 TABLET, EXTENDED RELEASE ORAL at 08:56

## 2025-04-18 RX ADMIN — COLCHICINE 0.6 MG: 0.6 TABLET ORAL at 08:51

## 2025-04-18 RX ADMIN — IPRATROPIUM BROMIDE 0.5 MG: 0.5 SOLUTION RESPIRATORY (INHALATION) at 19:41

## 2025-04-18 RX ADMIN — CARVEDILOL 12.5 MG: 12.5 TABLET, FILM COATED ORAL at 08:50

## 2025-04-18 RX ADMIN — HEPARIN SODIUM 7500 UNITS: 5000 INJECTION INTRAVENOUS; SUBCUTANEOUS at 21:00

## 2025-04-18 RX ADMIN — GUAIFENESIN 1200 MG: 600 TABLET ORAL at 20:53

## 2025-04-18 RX ADMIN — LEVALBUTEROL HYDROCHLORIDE 1.25 MG: 1.25 SOLUTION RESPIRATORY (INHALATION) at 19:41

## 2025-04-18 RX ADMIN — INSULIN GLARGINE 5 UNITS: 100 INJECTION, SOLUTION SUBCUTANEOUS at 21:00

## 2025-04-18 RX ADMIN — Medication 100 MG: at 08:50

## 2025-04-18 RX ADMIN — CARVEDILOL 12.5 MG: 12.5 TABLET, FILM COATED ORAL at 17:47

## 2025-04-18 RX ADMIN — CYANOCOBALAMIN 1000 MCG: 1000 INJECTION INTRAMUSCULAR; SUBCUTANEOUS at 08:50

## 2025-04-18 RX ADMIN — MULTIPLE VITAMINS W/ MINERALS TAB 1 TABLET: TAB ORAL at 08:50

## 2025-04-18 RX ADMIN — SPIRONOLACTONE 25 MG: 25 TABLET, FILM COATED ORAL at 08:51

## 2025-04-18 NOTE — PROGRESS NOTES
Progress Note - Hospitalist   Name: Elly Chong 60 y.o. male I MRN: [id number]  Unit/Bed#: S -01 I Date of Admission: 4/14/2025   Date of Service: 4/18/2025 I Hospital Day: 4    Assessment & Plan  Acute on chronic diastolic congestive heart failure (HCC)  Presents due to lower extremity edema, lower extremity pain, increased abdominal girth, shortness of breath and chest pain  Medication nonadherence contributing  Echo 11/23: EF 60%, grade 1 diastolic dysfunction  PTA: Torsemide 40 mg daily and Aldactone 25 mg daily  Stopped taking these on approximately 4/7 due to going on vacation    Plan    Cardiology consult  Continue Aldactone 25 mg daily  Cardiology consulted  4/18 Lasix 100 mg IV twice daily switch to IV Bumex 2 mg an hour by cardiology due to poor output  Essential hypertension  Coreg increased to 12.5 mg twice daily by cardiology  Continue Aldactone 25 mg shama  Uncontrolled type 2 diabetes mellitus with hyperglycemia (Formerly [facility])  Lab Results   Component Value Date    HGBA1C 6.4 03/13/2025     Resume p.o. meds on discharge  Accu-Chek, SSI  Place on lantus 5 units qhs for tighter glucose control   Medical non-compliance  Stop taking torsemide on approximately 4/7  Needs ongoing education  Alcohol abuse  Reports drinking 3 beers daily and 2 shots daily after work  CIWA  Current smoker  Reports 3 cigars daily  NRT  CKD (chronic kidney disease)  Creatinine appears at baseline (1.6-2.3)  Monitor with diuresis  Cr at baseline     VTE Pharmacologic Prophylaxis: VTE Score: 5 High Risk (Score >/= 5) - Pharmacological DVT Prophylaxis Ordered: heparin. Sequential Compression Devices Ordered.    Mobility:   Basic Mobility Inpatient Raw Score: 24  JH-HLM Goal: 8: Walk 250 feet or more  JH-HLM Achieved: 8: Walk 250 feet ot more  JH-HLM Goal achieved. Continue to encourage appropriate mobility.    Patient Centered Rounds: I performed bedside rounds with nursing staff today.   Discussions with Specialists or Other Care  Team Provider: case management    Education and Discussions with Family / Patient: Updated  (wife) via phone.    Current Length of Stay: 4 day(s)  Current Patient Status: Inpatient   Certification Statement: The patient will continue to require additional inpatient hospital stay due to IV diuretics  Discharge Plan: Anticipate discharge in 48-72 hrs to discharge location to be determined pending rehab evaluations.    Code Status: Level 1 - Full Code    Subjective   Patient states his legs are slightly better but still with a lot of edema    Objective :  Temp:  [98 °F (36.7 °C)-98.3 °F (36.8 °C)] 98 °F (36.7 °C)  HR:  [78-83] 83  BP: (117-136)/(67-85) 122/85  Resp:  [20] 20  SpO2:  [93 %-99 %] 94 %  O2 Device: None (Room air)    Body mass index is 44.99 kg/m².     Input and Output Summary (last 24 hours):     Intake/Output Summary (Last 24 hours) at 4/18/2025 1312  Last data filed at 4/18/2025 1241  Gross per 24 hour   Intake 1480 ml   Output 1900 ml   Net -420 ml       Physical Exam  Vitals and nursing note reviewed.   Constitutional:       General: He is not in acute distress.     Appearance: He is well-developed.   HENT:      Head: Normocephalic and atraumatic.   Eyes:      Conjunctiva/sclera: Conjunctivae normal.   Cardiovascular:      Rate and Rhythm: Normal rate and regular rhythm.      Heart sounds: No murmur heard.  Pulmonary:      Effort: Pulmonary effort is normal. No respiratory distress.      Breath sounds: Normal breath sounds.   Abdominal:      Palpations: Abdomen is soft.      Tenderness: There is no abdominal tenderness.   Musculoskeletal:      Right lower leg: Edema present.      Left lower leg: Edema present.   Skin:     General: Skin is warm and dry.   Neurological:      Mental Status: He is alert. Mental status is at baseline.   Psychiatric:         Mood and Affect: Mood normal.           Lines/Drains:              Lab Results: I have reviewed the following results:   Results from  last 7 days   Lab Units 04/18/25  0553 04/15/25  0429 04/14/25  1842   WBC Thousand/uL 9.07   < > 8.54   HEMOGLOBIN g/dL 10.9*   < > 11.2*   HEMATOCRIT % 33.3*   < > 33.8*   PLATELETS Thousands/uL 212   < > 243   SEGS PCT %  --   --  51   LYMPHO PCT %  --   --  35   MONO PCT %  --   --  9   EOS PCT %  --   --  4    < > = values in this interval not displayed.     Results from last 7 days   Lab Units 04/18/25  0553 04/15/25  0506 04/14/25  1842   SODIUM mmol/L 135   < > 136   POTASSIUM mmol/L 3.7   < > 3.4*   CHLORIDE mmol/L 94*   < > 98   CO2 mmol/L 35*   < > 29   BUN mg/dL 22   < > 13   CREATININE mg/dL 1.55*   < > 1.91*   ANION GAP mmol/L 6   < > 9   CALCIUM mg/dL 8.6   < > 8.5   ALBUMIN g/dL 3.2*   < > 3.5   TOTAL BILIRUBIN mg/dL  --   --  0.49   ALK PHOS U/L  --   --  89   ALT U/L  --   --  9   AST U/L  --   --  15   GLUCOSE RANDOM mg/dL 158*   < > 171*    < > = values in this interval not displayed.         Results from last 7 days   Lab Units 04/18/25  1130 04/18/25  0713 04/17/25  2040 04/17/25  1509 04/17/25  1108 04/17/25  0813 04/16/25  2049 04/16/25  1557 04/16/25  1059 04/16/25  0752 04/15/25  2155 04/15/25  1555   POC GLUCOSE mg/dl 206* 168* 230* 198* 220* 157* 205* 175* 231* 154* 141* 200*               Recent Cultures (last 7 days):         Imaging Results Review: I reviewed radiology reports from this admission including: chest xray.  Other Study Results Review: EKG was reviewed.     Last 24 Hours Medication List:     Current Facility-Administered Medications:     albuterol (PROVENTIL HFA,VENTOLIN HFA) inhaler 2 puff, Q4H PRN    allopurinol (ZYLOPRIM) tablet 100 mg, Daily    benzonatate (TESSALON PERLES) capsule 100 mg, TID    bumetanide (BUMEX) 12.5 mg infusion 50 mL, Continuous, Last Rate: 2 mg/hr (04/18/25 1033)    carvedilol (COREG) tablet 12.5 mg, BID With Meals    colchicine (COLCRYS) tablet 0.6 mg, BID    cyanocobalamin injection 1,000 mcg, Daily    famotidine (PEPCID) tablet 20 mg, HS     fluticasone-vilanterol 200-25 mcg/actuation 1 puff, Daily    folic acid (FOLVITE) tablet 1 mg, Daily    guaiFENesin (MUCINEX) 12 hr tablet 1,200 mg, Q12H ELIANE    heparin (porcine) subcutaneous injection 7,500 Units, Q8H ELIANE    insulin glargine (LANTUS) subcutaneous injection 5 Units 0.05 mL, HS    insulin lispro (HumALOG/ADMELOG) 100 units/mL subcutaneous injection 1-5 Units, HS    insulin lispro (HumALOG/ADMELOG) 100 units/mL subcutaneous injection 2-12 Units, TID AC **AND** Fingerstick Glucose (POCT), TID AC    ipratropium (ATROVENT) 0.02 % inhalation solution 0.5 mg, TID    levalbuterol (XOPENEX) inhalation solution 1.25 mg, TID    magnesium sulfate 4 g/100 mL IVPB (premix) 4 g, Once, Last Rate: 4 g (04/18/25 1026)    multivitamin-minerals (CENTRUM) tablet 1 tablet, Daily    pantoprazole (PROTONIX) EC tablet 40 mg, Daily Before Breakfast    spironolactone (ALDACTONE) tablet 25 mg, Daily    thiamine tablet 100 mg, Daily    Administrative Statements   Today, Patient Was Seen By: Ivan Mcdonnell DO      **Please Note: This note may have been constructed using a voice recognition system.**

## 2025-04-18 NOTE — ASSESSMENT & PLAN NOTE
Presents due to lower extremity edema, lower extremity pain, increased abdominal girth, shortness of breath and chest pain  Medication nonadherence contributing  Echo 11/23: EF 60%, grade 1 diastolic dysfunction  PTA: Torsemide 40 mg daily and Aldactone 25 mg daily  Stopped taking these on approximately 4/7 due to going on vacation    Plan    Cardiology consult  Continue Aldactone 25 mg daily  Cardiology consulted  4/18 Lasix 100 mg IV twice daily switch to IV Bumex 2 mg an hour by cardiology due to poor output

## 2025-04-18 NOTE — TELEPHONE ENCOUNTER
Patients wife is faxing a disability form that needs to be filled out and faxed to [phone]. Patient is currently in the hospital and unable to get to his phone. Please reach out to Des with any updates.

## 2025-04-18 NOTE — PLAN OF CARE
Problem: PAIN - ADULT  Goal: Verbalizes/displays adequate comfort level or baseline comfort level  Description: Interventions:- Encourage patient to monitor pain and request assistance- Assess pain using appropriate pain scale- Administer analgesics based on type and severity of pain and evaluate response- Implement non-pharmacological measures as appropriate and evaluate response- Consider cultural and social influences on pain and pain management- Notify physician/advanced practitioner if interventions unsuccessful or patient reports new pain  Outcome: Progressing     Problem: INFECTION - ADULT  Goal: Absence or prevention of progression during hospitalization  Description: INTERVENTIONS:- Assess and monitor for signs and symptoms of infection- Monitor lab/diagnostic results- Monitor all insertion sites, i.e. indwelling lines, tubes, and drains- Monitor endotracheal if appropriate and nasal secretions for changes in amount and color- [place] appropriate cooling/warming therapies per order- Administer medications as ordered- Instruct and encourage patient and family to use good hand hygiene technique- Identify and instruct in appropriate isolation precautions for identified infection/condition  Outcome: Progressing  Goal: Absence of fever/infection during neutropenic period  Description: INTERVENTIONS:- Monitor WBC  Outcome: Progressing     Problem: SAFETY ADULT  Goal: Patient will remain free of falls  Description: INTERVENTIONS:- Educate patient/family on patient safety including physical limitations- Instruct patient to call for assistance with activity - Consult OT/PT to assist with strengthening/mobility - Keep Call bell within reach- Keep bed low and locked with side rails adjusted as appropriate- Keep care items and personal belongings within reach- Initiate and maintain comfort rounds- Make Fall Risk Sign visible to staff- Offer Toileting every 2 Hours, in advance of need- Initiate/Maintain bed alarm-  Obtain necessary fall risk management equipment- Apply yellow socks and bracelet for high fall risk patients- Consider moving patient to room near nurses station  Outcome: Progressing  Goal: Maintain or return to baseline ADL function  Description: INTERVENTIONS:-  Assess patient's ability to carry out ADLs; assess patient's baseline for ADL function and identify physical deficits which impact ability to perform ADLs (bathing, care of mouth/teeth, toileting, grooming, dressing, etc.)- Assess/evaluate cause of self-care deficits - Assess range of motion- Assess patient's mobility; develop plan if impaired- Assess patient's need for assistive devices and provide as appropriate- Encourage maximum independence but intervene and supervise when necessary- Involve family in performance of ADLs- Assess for home care needs following discharge - Consider OT consult to assist with ADL evaluation and planning for discharge- Provide patient education as appropriate  Outcome: Progressing  Goal: Maintains/Returns to pre admission functional level  Description: INTERVENTIONS:- Perform AM-PAC 6 Click Basic Mobility/ Daily Activity assessment daily.- Set and communicate daily mobility goal to care team and patient/family/caregiver. - Collaborate with rehabilitation services on mobility goals if consulted-  - Out of bed for meals 3 times a day- Out of bed for toileting- Record patient progress and toleration of activity level   Outcome: Progressing     Problem: DISCHARGE PLANNING  Goal: Discharge to home or other facility with appropriate resources  Description: INTERVENTIONS:- Identify barriers to discharge w/patient and caregiver- Arrange for needed discharge resources and transportation as appropriate- Identify discharge learning needs (meds, wound care, etc.)- Arrange for interpretive services to assist at discharge as needed- Refer to Case Management Department for coordinating discharge planning if the patient needs  post-hospital services based on physician/advanced practitioner order or complex needs related to functional status, cognitive ability, or social support system  Outcome: Progressing     Problem: Knowledge Deficit  Goal: Patient/family/caregiver demonstrates understanding of disease process, treatment plan, medications, and discharge instructions  Description: Complete learning assessment and assess knowledge base.Interventions:- Provide teaching at level of understanding- Provide teaching via preferred learning methods  Outcome: Progressing     Problem: CARDIOVASCULAR - ADULT  Goal: Maintains optimal cardiac output and hemodynamic stability  Description: INTERVENTIONS:- Monitor I/O, vital signs and rhythm- Monitor for S/S and trends of decreased cardiac output- Administer and titrate ordered vasoactive medications to optimize hemodynamic stability- Assess quality of pulses, skin color and temperature- Assess for signs of decreased coronary artery perfusion- Instruct patient to report change in severity of symptoms  Outcome: Progressing  Goal: Absence of cardiac dysrhythmias or at baseline rhythm  Description: INTERVENTIONS:- Continuous cardiac monitoring, vital signs, obtain 12 lead EKG if ordered- Administer antiarrhythmic and heart rate control medications as ordered- Monitor electrolytes and administer replacement therapy as ordered  Outcome: Progressing

## 2025-04-18 NOTE — PLAN OF CARE
Problem: PAIN - ADULT  Goal: Verbalizes/displays adequate comfort level or baseline comfort level  Description: Interventions:- Encourage patient to monitor pain and request assistance- Assess pain using appropriate pain scale- Administer analgesics based on type and severity of pain and evaluate response- Implement non-pharmacological measures as appropriate and evaluate response- Consider cultural and social influences on pain and pain management- Notify physician/advanced practitioner if interventions unsuccessful or patient reports new pain  Outcome: Progressing     Problem: INFECTION - ADULT  Goal: Absence or prevention of progression during hospitalization  Description: INTERVENTIONS:- Assess and monitor for signs and symptoms of infection- Monitor lab/diagnostic results- Monitor all insertion sites, i.e. indwelling lines, tubes, and drains- Monitor endotracheal if appropriate and nasal secretions for changes in amount and color- [place] appropriate cooling/warming therapies per order- Administer medications as ordered- Instruct and encourage patient and family to use good hand hygiene technique- Identify and instruct in appropriate isolation precautions for identified infection/condition  Outcome: Progressing  Goal: Absence of fever/infection during neutropenic period  Description: INTERVENTIONS:- Monitor WBC  Outcome: Progressing     Problem: SAFETY ADULT  Goal: Patient will remain free of falls  Description: INTERVENTIONS:- Educate patient/family on patient safety including physical limitations- Instruct patient to call for assistance with activity - Consult OT/PT to assist with strengthening/mobility - Keep Call bell within reach- Keep bed low and locked with side rails adjusted as appropriate- Keep care items and personal belongings within reach- Initiate and maintain comfort rounds- Make Fall Risk Sign visible to staff- Offer Toileting every  Hours, in advance of need- Initiate/Maintain alarm- Obtain  necessary fall risk management equipment: - Apply yellow socks and bracelet for high fall risk patients- Consider moving patient to room near nurses station  Outcome: Progressing  Goal: Maintain or return to baseline ADL function  Description: INTERVENTIONS:-  Assess patient's ability to carry out ADLs; assess patient's baseline for ADL function and identify physical deficits which impact ability to perform ADLs (bathing, care of mouth/teeth, toileting, grooming, dressing, etc.)- Assess/evaluate cause of self-care deficits - Assess range of motion- Assess patient's mobility; develop plan if impaired- Assess patient's need for assistive devices and provide as appropriate- Encourage maximum independence but intervene and supervise when necessary- Involve family in performance of ADLs- Assess for home care needs following discharge - Consider OT consult to assist with ADL evaluation and planning for discharge- Provide patient education as appropriate  Outcome: Progressing  Goal: Maintains/Returns to pre admission functional level  Description: INTERVENTIONS:- Perform AM-PAC 6 Click Basic Mobility/ Daily Activity assessment daily.- Set and communicate daily mobility goal to care team and patient/family/caregiver. - Collaborate with rehabilitation services on mobility goals if consulted- Perform Range of Motion  times a day.- Reposition patient every  hours.- Dangle patient  times a day- Stand patient  times a day- Ambulate patient  times a day- Out of bed to chair  times a day - Out of bed for meals times a day- Out of bed for toileting- Record patient progress and toleration of activity level   Outcome: Progressing     Problem: DISCHARGE PLANNING  Goal: Discharge to home or other facility with appropriate resources  Description: INTERVENTIONS:- Identify barriers to discharge w/patient and caregiver- Arrange for needed discharge resources and transportation as appropriate- Identify discharge learning needs (meds, wound  care, etc.)- Arrange for interpretive services to assist at discharge as needed- Refer to Case Management Department for coordinating discharge planning if the patient needs post-hospital services based on physician/advanced practitioner order or complex needs related to functional status, cognitive ability, or social support system  Outcome: Progressing     Problem: CARDIOVASCULAR - ADULT  Goal: Maintains optimal cardiac output and hemodynamic stability  Description: INTERVENTIONS:- Monitor I/O, vital signs and rhythm- Monitor for S/S and trends of decreased cardiac output- Administer and titrate ordered vasoactive medications to optimize hemodynamic stability- Assess quality of pulses, skin color and temperature- Assess for signs of decreased coronary artery perfusion- Instruct patient to report change in severity of symptoms  Outcome: Progressing  Goal: Absence of cardiac dysrhythmias or at baseline rhythm  Description: INTERVENTIONS:- Continuous cardiac monitoring, vital signs, obtain 12 lead EKG if ordered- Administer antiarrhythmic and heart rate control medications as ordered- Monitor electrolytes and administer replacement therapy as ordered  Outcome: Progressing

## 2025-04-18 NOTE — PROGRESS NOTES
Heart Failure/ Pulmonary Hypertension Progress Note - Elly Chong 60 y.o. male MRN: [id number]    Unit/Bed#: S -01 Encounter: [id number]      Assessment:    Principal Problem:    Acute on chronic diastolic congestive heart failure (HCC)  Active Problems:    Essential hypertension    Uncontrolled type 2 diabetes mellitus with hyperglycemia (HCC)    Medical non-compliance    Alcohol abuse    Current smoker    CKD (chronic kidney disease)      Interim:  Ins/outs: 1300/1700 with lasix 100 mg IV BID  Weight: 287 lbs (291 lbs)  Tele: 80's  MAPs:  mmHg  Cr 1.55  Mag 1.3    # Acute on Chronic HFpEF, Stage C  Diuretic: Torsemide 40 mg daily  MRA: spironolactone 25 mg daily  Weight: 289 lbs  BNP    Studies- personally reviewed by me  EKG- SR with PVCs    Echo 11/21/23  LVEF: 60%, mild LVH  LVIDd: 4.3 cm  RV: normal    NM MPI 3/6/23: diaphragmatic artifact, no scar or ischemia, EF : 49%    Echo 3/5/23:  LVEF: 50%  Hypo inferolateral    # HTN- coreg 12.5 mg BID  # CKD 3 B  Cr 1.86 up from 1.36 on 11/16/24, down to 1.55 today  # AMANDO  # reactive airway disease  # GERD  # ETOH- 3 beers, 2 shots daily  # DM2  # current smoker    Plan:  Continue IV diuresis- needs more volume off  Not responding to bolus lasix dosing  Change to bumex gtt 2 mg/hr  MAPs  80-90'smmHg  Continue spironolactone 25 mg daily  IV Mag replacement        Review of Systems   Constitutional:  Negative for activity change, appetite change, fatigue and unexpected weight change.   HENT:  Negative for congestion and nosebleeds.    Eyes: Negative.    Respiratory:  Negative for cough, chest tightness and shortness of breath.    Cardiovascular:  Negative for chest pain, palpitations and leg swelling.   Gastrointestinal:  Negative for abdominal distention.   Endocrine: Negative.    Genitourinary: Negative.    Musculoskeletal: Negative.    Skin: Negative.    Neurological:  Negative for dizziness, syncope and weakness.   Hematological: Negative.     Psychiatric/Behavioral: Negative.          Central Line (day, reason):  Santiago catheter (day, reason):    Vitals: Blood pressure 122/85, pulse 83, temperature 98 °F (36.7 °C), resp. rate 20, weight 130 kg (287 lb 4.2 oz), SpO2 94%., Body mass index is 44.99 kg/m²., I/O last 3 completed shifts:  In: 1460 [P.O.:1460]  Out: 2750 [Urine:2750]  No intake/output data recorded.  Wt Readings from Last 3 Encounters:   04/18/25 130 kg (287 lb 4.2 oz)   03/13/25 126 kg (277 lb 4 oz)   11/27/24 117 kg (258 lb 12.8 oz)       Intake/Output Summary (Last 24 hours) at 4/18/2025 0752  Last data filed at 4/18/2025 0559  Gross per 24 hour   Intake 1300 ml   Output 1700 ml   Net -400 ml     I/O last 3 completed shifts:  In: 1460 [P.O.:1460]  Out: 2750 [Urine:2750]    No significant arrhythmias seen on telemetry review.       Physical Exam:  Vitals:    04/18/25 0301 04/18/25 0553 04/18/25 0707 04/18/25 0733   BP: 136/72  122/85    Pulse: 83  83    Resp:       Temp: 98.3 °F (36.8 °C)  98 °F (36.7 °C)    TempSrc:       SpO2: 95%  96% 94%   Weight:  130 kg (287 lb 4.2 oz)         GEN: Elly Chong appears well, alert and oriented x 3, pleasant and cooperative   HEENT: pupils equal, round, and reactive to light; extraocular muscles intact  NECK: supple, no carotid bruits   HEART: regular rhythm, normal S1 and S2, no murmurs, clicks, gallops or rubs, JVP is  up  LUNGS: clear to auscultation bilaterally; no wheezes, rales, or rhonchi   ABDOMEN: normal bowel sounds, soft, no tenderness, no distention  EXTREMITIES: peripheral pulses normal; no clubbing, cyanosis, ++ edema  NEURO: no focal findings   SKIN: normal without suspicious lesions on exposed skin      Current Facility-Administered Medications:     albuterol (PROVENTIL HFA,VENTOLIN HFA) inhaler 2 puff, 2 puff, Inhalation, Q4H PRN, Ivan Mcdonnell DO    allopurinol (ZYLOPRIM) tablet 100 mg, 100 mg, Oral, Daily, SHAY Sethi, 100 mg at 04/17/25 0824    benzonatate (TESSALON PERLES)  capsule 100 mg, 100 mg, Oral, TID, Ivan Mcdonnell DO, 100 mg at 04/17/25 2227    carvedilol (COREG) tablet 12.5 mg, 12.5 mg, Oral, BID With Meals, Paul Waterman DO, 12.5 mg at 04/17/25 1642    colchicine (COLCRYS) tablet 0.6 mg, 0.6 mg, Oral, BID, SHAY Sethi, 0.6 mg at 04/17/25 1717    cyanocobalamin injection 1,000 mcg, 1,000 mcg, Intramuscular, Daily, Ivan Mcdonnell DO, 1,000 mcg at 04/17/25 1642    famotidine (PEPCID) tablet 20 mg, 20 mg, Oral, HS, SHAY Sethi, 20 mg at 04/17/25 2219    fluticasone-vilanterol 200-25 mcg/actuation 1 puff, 1 puff, Inhalation, Daily, SHAY Sethi, 1 puff at 04/17/25 0825    folic acid (FOLVITE) tablet 1 mg, 1 mg, Oral, Daily, SHAY Sethi, 1 mg at 04/17/25 0824    furosemide (LASIX) injection 100 mg, 100 mg, Intravenous, BID (diuretic), SHAY Sethi, 100 mg at 04/17/25 1642    guaiFENesin (MUCINEX) 12 hr tablet 1,200 mg, 1,200 mg, Oral, Q12H ELIANE, Ivan Mcdonnell DO, 1,200 mg at 04/17/25 2227    heparin (porcine) subcutaneous injection 7,500 Units, 7,500 Units, Subcutaneous, Q8H ELIANE, SHAY Sethi, 7,500 Units at 04/18/25 0556    insulin glargine (LANTUS) subcutaneous injection 5 Units 0.05 mL, 5 Units, Subcutaneous, HS, Ivan Mcdonnell DO, 5 Units at 04/17/25 2219    insulin lispro (HumALOG/ADMELOG) 100 units/mL subcutaneous injection 1-5 Units, 1-5 Units, Subcutaneous, HS, SHAY Sethi, 2 Units at 04/17/25 2220    insulin lispro (HumALOG/ADMELOG) 100 units/mL subcutaneous injection 2-12 Units, 2-12 Units, Subcutaneous, TID AC, 2 Units at 04/17/25 1643 **AND** Fingerstick Glucose (POCT), , , TID AC, SHAY Sethi    ipratropium (ATROVENT) 0.02 % inhalation solution 0.5 mg, 0.5 mg, Nebulization, TID, SHAY Sethi, 0.5 mg at 04/18/25 0732    levalbuterol (XOPENEX) inhalation solution 1.25 mg, 1.25 mg, Nebulization, TID, SHAY Sethi, 1.25 mg at 04/18/25 0732    multivitamin-minerals (CENTRUM) tablet 1 tablet, 1 tablet, Oral, Daily, SHAY Sethi, 1  tablet at 04/17/25 0824    pantoprazole (PROTONIX) EC tablet 40 mg, 40 mg, Oral, Daily Before Breakfast, Becky JettparkerSHAY, 40 mg at 04/18/25 0556    spironolactone (ALDACTONE) tablet 25 mg, 25 mg, Oral, Daily, Becky SHAY Schneider, 25 mg at 04/17/25 0824    thiamine tablet 100 mg, 100 mg, Oral, Daily, Becky SHAY Schneider, 100 mg at 04/17/25 0824      Labs & Results:        Results from last 7 days   Lab Units 04/18/25  0553 04/17/25  0504 04/16/25  0541   WBC Thousand/uL 9.07 9.55 10.10   HEMOGLOBIN g/dL 10.9* 11.3* 11.3*   HEMATOCRIT % 33.3* 34.3* 34.1*   PLATELETS Thousands/uL 212 251 238         Results from last 7 days   Lab Units 04/18/25  0553 04/17/25  0504 04/16/25  0541 04/15/25  0506 04/14/25  1842   POTASSIUM mmol/L 3.7 3.8 3.8   < > 3.4*   CHLORIDE mmol/L 94* 96 98   < > 98   CO2 mmol/L 35* 31 29   < > 29   BUN mg/dL 22 20 18   < > 13   CREATININE mg/dL 1.55* 1.59* 1.50*   < > 1.91*   CALCIUM mg/dL 8.6 8.9 8.8   < > 8.5   ALK PHOS U/L  --   --   --   --  89   ALT U/L  --   --   --   --  9   AST U/L  --   --   --   --  15    < > = values in this interval not displayed.           Counseling / Coordination of Care  Total floor / unit time spent today 45 minutes.  Greater than 50% of total time was spent with the patient and / or family counseling and / or coordination of care.  A description of the counseling / coordination of care: 30.      Thank you for the opportunity to participate in the care of this patient.  DAVID NG D.O.  DIRECTOR OF HEART FAILURE/ PULMONARY HYPERTENSION  MEDICAL DIRECTOR OF LVAD PROGRAM  [facility]

## 2025-04-18 NOTE — ASSESSMENT & PLAN NOTE
B12 noted to be significantly low at 112  Finish 3 days of IM cyanocobalamin injections on 4/18/2025

## 2025-04-19 ENCOUNTER — APPOINTMENT (INPATIENT)
Dept: CT IMAGING | Facility: HOSPITAL | Age: 60
DRG: 291 | End: 2025-04-19
Payer: COMMERCIAL

## 2025-04-19 PROBLEM — R10.84 GENERALIZED ABDOMINAL PAIN: Status: ACTIVE | Noted: 2025-04-19

## 2025-04-19 LAB
ALBUMIN SERPL BCG-MCNC: 3.4 G/DL (ref 3.5–5)
ANION GAP SERPL CALCULATED.3IONS-SCNC: 9 MMOL/L (ref 4–13)
BUN SERPL-MCNC: 21 MG/DL (ref 5–25)
CALCIUM ALBUM COR SERPL-MCNC: 9.2 MG/DL (ref 8.3–10.1)
CALCIUM SERPL-MCNC: 8.7 MG/DL (ref 8.4–10.2)
CHLORIDE SERPL-SCNC: 92 MMOL/L (ref 96–108)
CO2 SERPL-SCNC: 34 MMOL/L (ref 21–32)
CREAT SERPL-MCNC: 1.55 MG/DL (ref 0.6–1.3)
ERYTHROCYTE [DISTWIDTH] IN BLOOD BY AUTOMATED COUNT: 13.2 % (ref 11.6–15.1)
GFR SERPL CREATININE-BSD FRML MDRD: 47 ML/MIN/1.73SQ M
GLUCOSE SERPL-MCNC: 145 MG/DL (ref 65–140)
GLUCOSE SERPL-MCNC: 168 MG/DL (ref 65–140)
GLUCOSE SERPL-MCNC: 177 MG/DL (ref 65–140)
GLUCOSE SERPL-MCNC: 234 MG/DL (ref 65–140)
GLUCOSE SERPL-MCNC: 257 MG/DL (ref 65–140)
HCT VFR BLD AUTO: 33.5 % (ref 36.5–49.3)
HGB BLD-MCNC: 11.4 G/DL (ref 12–17)
MAGNESIUM SERPL-MCNC: 1.7 MG/DL (ref 1.9–2.7)
MCH RBC QN AUTO: 33.9 PG (ref 26.8–34.3)
MCHC RBC AUTO-ENTMCNC: 34 G/DL (ref 31.4–37.4)
MCV RBC AUTO: 100 FL (ref 82–98)
PHOSPHATE SERPL-MCNC: 3.4 MG/DL (ref 2.3–4.1)
PLATELET # BLD AUTO: 208 THOUSANDS/UL (ref 149–390)
PMV BLD AUTO: 11 FL (ref 8.9–12.7)
POTASSIUM SERPL-SCNC: 3.6 MMOL/L (ref 3.5–5.3)
RBC # BLD AUTO: 3.36 MILLION/UL (ref 3.88–5.62)
SODIUM SERPL-SCNC: 135 MMOL/L (ref 135–147)
WBC # BLD AUTO: 8.93 THOUSAND/UL (ref 4.31–10.16)

## 2025-04-19 PROCEDURE — 85027 COMPLETE CBC AUTOMATED: CPT | Performed by: STUDENT IN AN ORGANIZED HEALTH CARE EDUCATION/TRAINING PROGRAM

## 2025-04-19 PROCEDURE — 94760 N-INVAS EAR/PLS OXIMETRY 1: CPT

## 2025-04-19 PROCEDURE — 99232 SBSQ HOSP IP/OBS MODERATE 35: CPT | Performed by: INTERNAL MEDICINE

## 2025-04-19 PROCEDURE — 80069 RENAL FUNCTION PANEL: CPT | Performed by: STUDENT IN AN ORGANIZED HEALTH CARE EDUCATION/TRAINING PROGRAM

## 2025-04-19 PROCEDURE — 82948 REAGENT STRIP/BLOOD GLUCOSE: CPT

## 2025-04-19 PROCEDURE — 99233 SBSQ HOSP IP/OBS HIGH 50: CPT | Performed by: STUDENT IN AN ORGANIZED HEALTH CARE EDUCATION/TRAINING PROGRAM

## 2025-04-19 PROCEDURE — 74176 CT ABD & PELVIS W/O CONTRAST: CPT

## 2025-04-19 PROCEDURE — 83735 ASSAY OF MAGNESIUM: CPT | Performed by: STUDENT IN AN ORGANIZED HEALTH CARE EDUCATION/TRAINING PROGRAM

## 2025-04-19 PROCEDURE — 94640 AIRWAY INHALATION TREATMENT: CPT

## 2025-04-19 RX ORDER — MAGNESIUM SULFATE HEPTAHYDRATE 40 MG/ML
4 INJECTION, SOLUTION INTRAVENOUS ONCE
Status: COMPLETED | OUTPATIENT
Start: 2025-04-19 | End: 2025-04-19

## 2025-04-19 RX ORDER — SIMETHICONE 80 MG
80 TABLET,CHEWABLE ORAL EVERY 6 HOURS PRN
Status: DISCONTINUED | OUTPATIENT
Start: 2025-04-19 | End: 2025-04-23 | Stop reason: HOSPADM

## 2025-04-19 RX ORDER — POTASSIUM CHLORIDE 1500 MG/1
40 TABLET, EXTENDED RELEASE ORAL ONCE
Status: COMPLETED | OUTPATIENT
Start: 2025-04-19 | End: 2025-04-19

## 2025-04-19 RX ADMIN — Medication 2 MG/HR: at 22:47

## 2025-04-19 RX ADMIN — Medication 2 MG/HR: at 04:10

## 2025-04-19 RX ADMIN — Medication 2 MG/HR: at 17:08

## 2025-04-19 RX ADMIN — BENZONATATE 100 MG: 100 CAPSULE ORAL at 21:32

## 2025-04-19 RX ADMIN — POTASSIUM CHLORIDE 40 MEQ: 1500 TABLET, EXTENDED RELEASE ORAL at 09:25

## 2025-04-19 RX ADMIN — INSULIN LISPRO 2 UNITS: 100 INJECTION, SOLUTION INTRAVENOUS; SUBCUTANEOUS at 21:32

## 2025-04-19 RX ADMIN — LEVALBUTEROL HYDROCHLORIDE 1.25 MG: 1.25 SOLUTION RESPIRATORY (INHALATION) at 07:05

## 2025-04-19 RX ADMIN — INSULIN LISPRO 2 UNITS: 100 INJECTION, SOLUTION INTRAVENOUS; SUBCUTANEOUS at 17:07

## 2025-04-19 RX ADMIN — PANTOPRAZOLE SODIUM 40 MG: 40 TABLET, DELAYED RELEASE ORAL at 06:03

## 2025-04-19 RX ADMIN — COLCHICINE 0.6 MG: 0.6 TABLET ORAL at 17:07

## 2025-04-19 RX ADMIN — FOLIC ACID 1 MG: 1 TABLET ORAL at 09:25

## 2025-04-19 RX ADMIN — SPIRONOLACTONE 25 MG: 25 TABLET, FILM COATED ORAL at 09:26

## 2025-04-19 RX ADMIN — SIMETHICONE 80 MG: 80 TABLET, CHEWABLE ORAL at 17:15

## 2025-04-19 RX ADMIN — GUAIFENESIN 1200 MG: 600 TABLET ORAL at 09:25

## 2025-04-19 RX ADMIN — LEVALBUTEROL HYDROCHLORIDE 1.25 MG: 1.25 SOLUTION RESPIRATORY (INHALATION) at 19:51

## 2025-04-19 RX ADMIN — HEPARIN SODIUM 7500 UNITS: 5000 INJECTION INTRAVENOUS; SUBCUTANEOUS at 06:03

## 2025-04-19 RX ADMIN — ALLOPURINOL 100 MG: 100 TABLET ORAL at 09:25

## 2025-04-19 RX ADMIN — Medication 100 MG: at 09:26

## 2025-04-19 RX ADMIN — IPRATROPIUM BROMIDE 0.5 MG: 0.5 SOLUTION RESPIRATORY (INHALATION) at 07:05

## 2025-04-19 RX ADMIN — Medication 2 MG/HR: at 11:22

## 2025-04-19 RX ADMIN — GUAIFENESIN 1200 MG: 600 TABLET ORAL at 21:31

## 2025-04-19 RX ADMIN — COLCHICINE 0.6 MG: 0.6 TABLET ORAL at 09:25

## 2025-04-19 RX ADMIN — IOHEXOL 50 ML: 240 INJECTION, SOLUTION INTRATHECAL; INTRAVASCULAR; INTRAVENOUS; ORAL at 11:20

## 2025-04-19 RX ADMIN — CARVEDILOL 12.5 MG: 12.5 TABLET, FILM COATED ORAL at 17:07

## 2025-04-19 RX ADMIN — CARVEDILOL 12.5 MG: 12.5 TABLET, FILM COATED ORAL at 09:26

## 2025-04-19 RX ADMIN — BENZONATATE 100 MG: 100 CAPSULE ORAL at 17:07

## 2025-04-19 RX ADMIN — IPRATROPIUM BROMIDE 0.5 MG: 0.5 SOLUTION RESPIRATORY (INHALATION) at 19:51

## 2025-04-19 RX ADMIN — IPRATROPIUM BROMIDE 0.5 MG: 0.5 SOLUTION RESPIRATORY (INHALATION) at 13:08

## 2025-04-19 RX ADMIN — INSULIN GLARGINE 5 UNITS: 100 INJECTION, SOLUTION SUBCUTANEOUS at 21:32

## 2025-04-19 RX ADMIN — MAGNESIUM SULFATE HEPTAHYDRATE 4 G: 40 INJECTION, SOLUTION INTRAVENOUS at 11:23

## 2025-04-19 RX ADMIN — CYANOCOBALAMIN 1000 MCG: 1000 INJECTION INTRAMUSCULAR; SUBCUTANEOUS at 09:26

## 2025-04-19 RX ADMIN — FAMOTIDINE 20 MG: 20 TABLET, FILM COATED ORAL at 21:31

## 2025-04-19 RX ADMIN — MULTIPLE VITAMINS W/ MINERALS TAB 1 TABLET: TAB ORAL at 09:26

## 2025-04-19 RX ADMIN — LEVALBUTEROL HYDROCHLORIDE 1.25 MG: 1.25 SOLUTION RESPIRATORY (INHALATION) at 13:07

## 2025-04-19 NOTE — PROGRESS NOTES
Progress Note - Hospitalist   Name: Elly Chong 60 y.o. male I MRN: [id number]  Unit/Bed#: S -01 I Date of Admission: 4/14/2025   Date of Service: 4/19/2025 I Hospital Day: 5    Assessment & Plan  Acute on chronic diastolic congestive heart failure (HCC)  Presents due to lower extremity edema, lower extremity pain, increased abdominal girth, shortness of breath and chest pain  Medication nonadherence contributing  Echo 11/23: EF 60%, grade 1 diastolic dysfunction  PTA: Torsemide 40 mg daily and Aldactone 25 mg daily  Stopped taking these on approximately 4/7 due to going on vacation    Plan    Cardiology consult  Continue Aldactone 25 mg daily  Cardiology consulted  4/18 Lasix 100 mg IV twice daily switch to IV Bumex 2 mg an hour by cardiology due to poor output  Essential hypertension  Coreg increased to 12.5 mg twice daily by cardiology  Continue Aldactone 25 mg shama  Uncontrolled type 2 diabetes mellitus with hyperglycemia ([facility])  Lab Results   Component Value Date    HGBA1C 6.4 03/13/2025     Resume p.o. meds on discharge  Accu-Chek, SSI  Place on lantus 5 units qhs for tighter glucose control   Medical non-compliance  Stop taking torsemide on approximately 4/7  Needs ongoing education  Alcohol abuse  Reports drinking 3 beers daily and 2 shots daily after work  CIWA  Current smoker  Reports 3 cigars daily  NRT  CKD (chronic kidney disease)  Creatinine appears at baseline (1.6-2.3)  Monitor with diuresis  Cr at baseline   B12 deficiency  B12 noted to be significantly low at 112  Finish 3 days of IM cyanocobalamin injections on 4/18/2025  Generalized abdominal pain  Patient complaining abdominal pain and abdominal distention  Generalized tenderness on palpation with no rebound or guarding  Check CT abdomen pelvis with oral contrast no IV contrast due to CKD    VTE Pharmacologic Prophylaxis: VTE Score: 5 High Risk (Score >/= 5) - Pharmacological DVT Prophylaxis Ordered: heparin. Sequential Compression  Registration at bedside, will d/c once complete.   Devices Ordered.    Mobility:   Basic Mobility Inpatient Raw Score: 24  JH-HLM Goal: 8: Walk 250 feet or more  JH-HLM Achieved: 7: Walk 25 feet or more  JH-HLM Goal NOT achieved. Continue with multidisciplinary rounding and encourage appropriate mobility to improve upon JH-HLM goals.    Patient Centered Rounds: I performed bedside rounds with nursing staff today.   Discussions with Specialists or Other Care Team Provider: case management    Education and Discussions with Family / Patient: Updated  (wife) via phone.    Current Length of Stay: 5 day(s)  Current Patient Status: Inpatient   Certification Statement: The patient will continue to require additional inpatient hospital stay due to IV diuretics  Discharge Plan: Anticipate discharge in 48-72 hrs to discharge location to be determined pending rehab evaluations.    Code Status: Level 1 - Full Code    Subjective   Patient states he's having abdominal distention/cramping. Ordered Ct abd/pelvis     Objective :  Temp:  [97.8 °F (36.6 °C)-98.1 °F (36.7 °C)] 97.8 °F (36.6 °C)  HR:  [78-87] 87  BP: (121-139)/(66-77) 121/77  Resp:  [17] 17  SpO2:  [94 %-97 %] 97 %  O2 Device: None (Room air)    Body mass index is 43.89 kg/m².     Input and Output Summary (last 24 hours):     Intake/Output Summary (Last 24 hours) at 4/19/2025 1336  Last data filed at 4/19/2025 0607  Gross per 24 hour   Intake 520 ml   Output 4490 ml   Net -3970 ml       Physical Exam  Vitals and nursing note reviewed.   Constitutional:       General: He is not in acute distress.     Appearance: He is well-developed.   HENT:      Head: Normocephalic and atraumatic.   Eyes:      Conjunctiva/sclera: Conjunctivae normal.   Cardiovascular:      Rate and Rhythm: Normal rate and regular rhythm.      Heart sounds: No murmur heard.  Pulmonary:      Effort: Pulmonary effort is normal. No respiratory distress.      Breath sounds: Normal breath sounds.   Abdominal:      General: There is distension.       Palpations: Abdomen is soft.      Tenderness: There is abdominal tenderness. There is no guarding or rebound.   Musculoskeletal:      Right lower leg: Edema present.      Left lower leg: Edema present.   Skin:     General: Skin is warm and dry.   Neurological:      Mental Status: He is alert. Mental status is at baseline.   Psychiatric:         Mood and Affect: Mood normal.           Lines/Drains:              Lab Results: I have reviewed the following results:   Results from last 7 days   Lab Units 04/19/25  0612 04/15/25  0429 04/14/25  1842   WBC Thousand/uL 8.93   < > 8.54   HEMOGLOBIN g/dL 11.4*   < > 11.2*   HEMATOCRIT % 33.5*   < > 33.8*   PLATELETS Thousands/uL 208   < > 243   SEGS PCT %  --   --  51   LYMPHO PCT %  --   --  35   MONO PCT %  --   --  9   EOS PCT %  --   --  4    < > = values in this interval not displayed.     Results from last 7 days   Lab Units 04/19/25  0614 04/15/25  0506 04/14/25  1842   SODIUM mmol/L 135   < > 136   POTASSIUM mmol/L 3.6   < > 3.4*   CHLORIDE mmol/L 92*   < > 98   CO2 mmol/L 34*   < > 29   BUN mg/dL 21   < > 13   CREATININE mg/dL 1.55*   < > 1.91*   ANION GAP mmol/L 9   < > 9   CALCIUM mg/dL 8.7   < > 8.5   ALBUMIN g/dL 3.4*   < > 3.5   TOTAL BILIRUBIN mg/dL  --   --  0.49   ALK PHOS U/L  --   --  89   ALT U/L  --   --  9   AST U/L  --   --  15   GLUCOSE RANDOM mg/dL 145*   < > 171*    < > = values in this interval not displayed.         Results from last 7 days   Lab Units 04/19/25  1134 04/19/25  0735 04/18/25  2143 04/18/25  1614 04/18/25  1130 04/18/25  0713 04/17/25  2040 04/17/25  1509 04/17/25  1108 04/17/25  0813 04/16/25  2049 04/16/25  1557   POC GLUCOSE mg/dl 234* 168* 219* 226* 206* 168* 230* 198* 220* 157* 205* 175*               Recent Cultures (last 7 days):         Imaging Results Review: I reviewed radiology reports from this admission including: chest xray.  Other Study Results Review: EKG was reviewed.     Last 24 Hours Medication List:     Current  Facility-Administered Medications:     albuterol (PROVENTIL HFA,VENTOLIN HFA) inhaler 2 puff, Q4H PRN    allopurinol (ZYLOPRIM) tablet 100 mg, Daily    benzonatate (TESSALON PERLES) capsule 100 mg, TID    bumetanide (BUMEX) 12.5 mg infusion 50 mL, Continuous, Last Rate: 2 mg/hr (04/19/25 1122)    carvedilol (COREG) tablet 12.5 mg, BID With Meals    colchicine (COLCRYS) tablet 0.6 mg, BID    famotidine (PEPCID) tablet 20 mg, HS    fluticasone-vilanterol 200-25 mcg/actuation 1 puff, Daily    folic acid (FOLVITE) tablet 1 mg, Daily    guaiFENesin (MUCINEX) 12 hr tablet 1,200 mg, Q12H ELIANE    heparin (porcine) subcutaneous injection 7,500 Units, Q8H ELIANE    insulin glargine (LANTUS) subcutaneous injection 5 Units 0.05 mL, HS    insulin lispro (HumALOG/ADMELOG) 100 units/mL subcutaneous injection 1-5 Units, HS    insulin lispro (HumALOG/ADMELOG) 100 units/mL subcutaneous injection 2-12 Units, TID AC **AND** Fingerstick Glucose (POCT), TID AC    ipratropium (ATROVENT) 0.02 % inhalation solution 0.5 mg, TID    levalbuterol (XOPENEX) inhalation solution 1.25 mg, TID    magnesium sulfate 4 g/100 mL IVPB (premix) 4 g, Once, Last Rate: 4 g (04/19/25 1123)    multivitamin-minerals (CENTRUM) tablet 1 tablet, Daily    pantoprazole (PROTONIX) EC tablet 40 mg, Daily Before Breakfast    spironolactone (ALDACTONE) tablet 25 mg, Daily    thiamine tablet 100 mg, Daily    Administrative Statements   Today, Patient Was Seen By: Ivan Mcdonnell DO      **Please Note: This note may have been constructed using a voice recognition system.**

## 2025-04-19 NOTE — PROGRESS NOTES
Cardiology Progress Note - Elly Chong 60 y.o. male MRN: [id number]    Unit/Bed#: S -01 Encounter: [id number]  Assessment and plan  #1 acute on chronic heart failure with preserved ejection fraction  #2 hypertension  #3 CKD stage III  #4 obstructive sleep apnea  #5 diabetes mellitus type 2  #6 tobacco and alcohol abuse    Recommendations: Weight is still significantly above his baseline.  He is feeling better with volume removal.  Renal function is holding.  Continue Bumex infusion today.  If he has any low blood pressure or lightheadedness decrease infusion rate to 1 mg/h.  Follow-up on renal function in the morning.  Blood pressure is acceptable.  Continue other cardiac medications.  Replace potassium.      Subjective:    No significant events overnight.  Feels well denies any chest pain or pressure.  Shortness of breath is improved.  Lower extremities feel somewhat softer to him today.  But still have a fair amount of edema.    ROS    Objective:   Vitals: Blood pressure 133/76, pulse 85, temperature 98 °F (36.7 °C), resp. rate 20, weight 127 kg (280 lb 3.3 oz), SpO2 96%., Body mass index is 43.89 kg/m².,   Orthostatic Blood Pressures      Flowsheet Row Most Recent Value   Blood Pressure 133/76 filed at 2025 2138   Patient Position - Orthostatic VS Sitting filed at 04/15/2025 1751           Systolic (24hrs), Av , Min:122 , Max:139     Diastolic (24hrs), Av, Min:66, Max:77      Intake/Output Summary (Last 24 hours) at 2025 0729  Last data filed at 2025 0607  Gross per 24 hour   Intake 1040 ml   Output 4690 ml   Net -3650 ml     Weight (last 2 days)       Date/Time Weight    25 0707 127 (280.2)    25 0553 130 (287.26)    25 0508 130 (286.82)              Telemetry Review: No significant arrhythmias seen on telemetry review.   EKG personally reviewed by Myron Love DO.     Physical Exam  Vitals and nursing note reviewed.   Constitutional:       General: He  is not in acute distress.     Appearance: He is well-developed.   HENT:      Head: Normocephalic and atraumatic.   Eyes:      Conjunctiva/sclera: Conjunctivae normal.      Pupils: Pupils are equal, round, and reactive to light.   Cardiovascular:      Rate and Rhythm: Normal rate and regular rhythm.      Pulses: Normal pulses.      Heart sounds: Normal heart sounds. No murmur heard.     No friction rub.   Pulmonary:      Effort: Pulmonary effort is normal. No respiratory distress.      Breath sounds: Decreased breath sounds present. No wheezing or rales.   Abdominal:      General: Bowel sounds are normal. There is no distension.      Palpations: Abdomen is soft.      Tenderness: There is no abdominal tenderness. There is no rebound.   Musculoskeletal:         General: No tenderness or deformity. Normal range of motion.      Cervical back: Neck supple.      Right lower leg: Edema present.      Left lower leg: Edema present.   Skin:     General: Skin is warm and dry.      Findings: No erythema.   Neurological:      Mental Status: He is alert and oriented to person, place, and time.      Cranial Nerves: No cranial nerve deficit.           Laboratory Results:        CBC with diff:   Results from last 7 days   Lab Units 04/19/25  0612 04/18/25  0553 04/17/25  0504 04/16/25  0541 04/15/25  0429 04/14/25  1842   WBC Thousand/uL 8.93 9.07 9.55 10.10 9.45 8.54   HEMOGLOBIN g/dL 11.4* 10.9* 11.3* 11.3* 11.8* 11.2*   HEMATOCRIT % 33.5* 33.3* 34.3* 34.1* 35.0* 33.8*   MCV fL 100* 101* 101* 100* 99* 100*   PLATELETS Thousands/uL 208 212 251 238 250 243   RBC Million/uL 3.36* 3.31* 3.41* 3.40* 3.52* 3.38*   MCH pg 33.9 32.9 33.1 33.2 33.5 33.1   MCHC g/dL 34.0 32.7 32.9 33.1 33.7 33.1   RDW % 13.2 13.3 13.3 13.5 13.4 13.4   MPV fL 11.0 10.2 10.1 10.0 9.9 9.4   NRBC AUTO /100 WBCs  --   --   --   --   --  0         CMP:  Results from last 7 days   Lab Units 04/19/25  0614 04/18/25  0553 04/17/25  0504 04/16/25  0541 04/15/25  0506  "25  1842   POTASSIUM mmol/L 3.6 3.7 3.8 3.8 3.6 3.4*   CHLORIDE mmol/L 92* 94* 96 98 97 98   CO2 mmol/L 34* 35* 31 29 31 29   BUN mg/dL 21 22 20 18 15 13   CREATININE mg/dL 1.55* 1.55* 1.59* 1.50* 1.87* 1.91*   CALCIUM mg/dL 8.7 8.6 8.9 8.8 8.7 8.5   AST U/L  --   --   --   --   --  15   ALT U/L  --   --   --   --   --  9   ALK PHOS U/L  --   --   --   --   --  89   EGFR ml/min/1.73sq m 47 47 46 49 37 36         BMP:  Results from last 7 days   Lab Units 25  0614 25  0553 25  0504 25  0541 04/15/25  0506 25  1842   POTASSIUM mmol/L 3.6 3.7 3.8 3.8 3.6 3.4*   CHLORIDE mmol/L 92* 94* 96 98 97 98   CO2 mmol/L 34* 35* 31 29 31 29   BUN mg/dL 21 22 20 18 15 13   CREATININE mg/dL 1.55* 1.55* 1.59* 1.50* 1.87* 1.91*   CALCIUM mg/dL 8.7 8.6 8.9 8.8 8.7 8.5       BNP: No results for input(s): \"BNP\" in the last 72 hours.    Magnesium:   Results from last 7 days   Lab Units 25  0614 25  0553 25  0504 25  0541 04/15/25  0506   MAGNESIUM mg/dL 1.7* 1.7* 1.3* 1.6* 1.2*       Coags:       TSH:        Hemoglobin A1C       Lipid Profile:       Cardiac testing:   Results for orders placed during the hospital encounter of 21    Echo complete with contrast if indicated    [facility]  [address]  [place] [address] [phone]    Transthoracic Echocardiogram  2D, M-mode, Doppler, and Color Doppler    Study date:  01-Sep-2021    Patient: MANN PULIDO  MR number: [id number]  Account number: [id number]  : 1965  Age: 56 years  Gender: Male  Status: Inpatient  Location: Bedside  Height: 69 in  Weight: 251.5 lb  BP: 136/ 79 mmHg    Indications: Assess left ventricular function.    Diagnoses: I50.9 - Heart failure, unspecified    Sonographer:  NILAY Paul  Primary Physician:  Linden Roque MD  Referring Physician:  Omid Callahan MD  Group:  [facility]  Interpreting Physician:  Ac Stephenson, " MD    SUMMARY    LEFT VENTRICLE:  Systolic function was normal. Ejection fraction was estimated to be 55 %.  GLS reduced at -12%  There were no regional wall motion abnormalities.  Doppler parameters were consistent with abnormal left ventricular relaxation (grade 1 diastolic dysfunction).    LEFT ATRIUM:  The atrium was mildly dilated.    TRICUSPID VALVE:  There was trace regurgitation.    HISTORY: PRIOR HISTORY: Heart failure, Hypertension, DMt2    PROCEDURE: The procedure was performed at the bedside. This was a routine study. The transthoracic approach was used. The study included complete 2D imaging, M-mode, complete spectral Doppler, and color Doppler. The heart rate was 95 bpm,  at the start of the study. Images were obtained from the parasternal, apical, subcostal, and suprasternal notch acoustic windows. Image quality was adequate.    LEFT VENTRICLE: Size was normal. Systolic function was normal. Ejection fraction was estimated to be 55 %.  GLS reduced at -12% There were no regional wall motion abnormalities. Wall thickness was normal. DOPPLER: Doppler parameters were consistent with abnormal left ventricular relaxation (grade 1 diastolic dysfunction).    RIGHT VENTRICLE: The size was normal. Systolic function was normal. Wall thickness was normal.    LEFT ATRIUM: The atrium was mildly dilated.    RIGHT ATRIUM: Size was normal.    MITRAL VALVE: Valve structure was normal. There was normal leaflet separation. DOPPLER: The transmitral velocity was within the normal range. There was no evidence for stenosis. There was no significant regurgitation.    AORTIC VALVE: The valve was trileaflet. Leaflets exhibited normal thickness and normal cuspal separation. DOPPLER: Transaortic velocity was within the normal range. There was no evidence for stenosis. There was no significant  regurgitation.    TRICUSPID VALVE: The valve structure was normal. There was normal leaflet separation. DOPPLER: The transtricuspid  velocity was within the normal range. There was no evidence for stenosis. There was trace regurgitation.    PULMONIC VALVE: Leaflets exhibited normal thickness, no calcification, and normal cuspal separation. DOPPLER: The transpulmonic velocity was within the normal range. There was no significant regurgitation.    PERICARDIUM: There was no pericardial effusion. The pericardium was normal in appearance.    AORTA: The root exhibited normal size.    SYSTEMIC VEINS: IVC: The inferior vena cava was normal in size.    SYSTEM MEASUREMENT TABLES    2D  %FS: 28.49 %  AA PSSL Full: -7.76 %  AAS PSSL Full: -11.79 %  AI PSSL Full: -16.42 %  AL PSSL Full: -13.88 %  AP PSSL Full: -13.39 %  AS PSSL Full: -15.51 %  AVC: 325.59 ms  Ao Diam: 3.24 cm  BA PSSL Full: -16.15 %  BAS PSSL Full: -10.88 %  BI PSSL Full: -17.53 %  BL PSSL Full: 0 %  BP PSSL Full: -12.01 %  BS PSSL Full: -14 %  EDV(Teich): 76.99 ml  EF Biplane: 58.86 %  EF(Teich): 55.36 %  ESV(Teich): 34.37 ml  G peak SL Full(A2C): -13.63 %  G peak SL Full(A4C): -9.48 %  G peak SL Full(APLAX): -12 %  G peak SL Full(Avg): -11.7 %  IVSd: 1.24 cm  LA Area: 23.57 cm2  LA Diam: 3.6 cm  LVEDV MOD A2C: 37.28 ml  LVEDV MOD A4C: 113.25 ml  LVEDV MOD BP: 77.58 ml  LVEDVInd MOD BP: 34.03 ml/m2  LVEF MOD A2C: 69.19 %  LVEF MOD A4C: 47.71 %  LVESV MOD A2C: 11.48 ml  LVESV MOD A4C: 59.22 ml  LVESV MOD BP: 31.92 ml  LVESVInd MOD BP: 14 ml/m2  LVIDd: 4.16 cm  LVIDs: 2.98 cm  LVLd A2C: 6.36 cm  LVLd A4C: 8.09 cm  LVLs A2C: 5.26 cm  LVLs A4C: 6.95 cm  LVPWd: 1.18 cm  MA PSSL Full: -5.02 %  MAS PSSL Full: -12.56 %  MI PSSL Full: -16.91 %  ML PSSL Full: -4.9 %  MP PSSL Full: -15.38 %  MS PSSL Full: -12.37 %  Peak SL Dispersion Full: 98.04 ms  RA Area: 9.94 cm2  RVIDd: 2.77 cm  SV MOD A2C: 25.79 ml  SV MOD A4C: 54.04 ml  SV(Teich): 42.62 ml    MM  TAPSE: 2.08 cm    PW  E' Sept: 0.05 m/s  E/E' Sept: 7.85  MV A Carson: 0.66 m/s  MV Dec [place]: 3 m/s2  MV DecT: 141.94 ms  MV E Carson: 0.43 m/s  MV E/A  Ratio: 0.65  MV PHT: 41.16 ms  MVA By PHT: 5.34 cm2    [facility] Accredited Echocardiography Laboratory    Prepared and electronically signed by    Ac Stephenson MD  Signed 01-Sep-2021 17:47:23    No results found for this or any previous visit.    No results found for this or any previous visit.    No results found for this or any previous visit.      Meds/Allergies   all current active meds have been reviewed    Medications Prior to Admission:     albuterol (PROVENTIL HFA,VENTOLIN HFA) 90 mcg/act inhaler    allopurinol (ZYLOPRIM) 100 mg tablet    Blood Pressure KIT    carvedilol (COREG) 6.25 mg tablet    colchicine (COLCRYS) 0.6 mg tablet    diclofenac sodium (VOLTAREN) 50 mg EC tablet    famotidine (PEPCID) 20 mg tablet    Fluticasone-Salmeterol (Advair) 500-50 mcg/dose inhaler    glimepiride (AMARYL) 1 mg tablet    glucose blood test strip    ipratropium (ATROVENT) 0.02 % nebulizer solution    Lancets (ONETOUCH ULTRASOFT) lancets    levalbuterol (XOPENEX) 1.25 mg/3 mL nebulizer solution    pantoprazole (PROTONIX) 40 mg tablet    sitaGLIPtin (Januvia) 100 mg tablet    spironolactone (ALDACTONE) 25 mg tablet    torsemide (DEMADEX) 20 mg tablet    bumetanide (BUMEX) 12.5 mg infusion 50 mL, 2 mg/hr, Last Rate: 2 mg/hr (04/19/25 0410)      Assessment:  Principal Problem:    Acute on chronic diastolic congestive heart failure (HCC)  Active Problems:    Essential hypertension    Uncontrolled type 2 diabetes mellitus with hyperglycemia (HCC)    Medical non-compliance    Alcohol abuse    Current smoker    CKD (chronic kidney disease)    B12 deficiency            Counseling / Coordination of Care  Total floor / unit time spent today 25 minutes.  Greater than 50% of total time was spent with the patient and / or family counseling and / or coordination of care.  A description of the counseling / coordination of care: .

## 2025-04-19 NOTE — ASSESSMENT & PLAN NOTE
Patient complaining abdominal pain and abdominal distention  Generalized tenderness on palpation with no rebound or guarding  Check CT abdomen pelvis with oral contrast no IV contrast due to CKD

## 2025-04-20 PROBLEM — E87.8 ELECTROLYTE ABNORMALITY: Status: ACTIVE | Noted: 2025-04-20

## 2025-04-20 LAB
ALBUMIN SERPL BCG-MCNC: 3.6 G/DL (ref 3.5–5)
ANION GAP SERPL CALCULATED.3IONS-SCNC: 9 MMOL/L (ref 4–13)
BUN SERPL-MCNC: 21 MG/DL (ref 5–25)
CALCIUM SERPL-MCNC: 9 MG/DL (ref 8.4–10.2)
CHLORIDE SERPL-SCNC: 89 MMOL/L (ref 96–108)
CO2 SERPL-SCNC: 34 MMOL/L (ref 21–32)
CREAT SERPL-MCNC: 1.66 MG/DL (ref 0.6–1.3)
ERYTHROCYTE [DISTWIDTH] IN BLOOD BY AUTOMATED COUNT: 13 % (ref 11.6–15.1)
GFR SERPL CREATININE-BSD FRML MDRD: 43 ML/MIN/1.73SQ M
GLUCOSE SERPL-MCNC: 164 MG/DL (ref 65–140)
GLUCOSE SERPL-MCNC: 193 MG/DL (ref 65–140)
GLUCOSE SERPL-MCNC: 232 MG/DL (ref 65–140)
GLUCOSE SERPL-MCNC: 243 MG/DL (ref 65–140)
HCT VFR BLD AUTO: 35.8 % (ref 36.5–49.3)
HGB BLD-MCNC: 12 G/DL (ref 12–17)
MAGNESIUM SERPL-MCNC: 1.4 MG/DL (ref 1.9–2.7)
MCH RBC QN AUTO: 33.1 PG (ref 26.8–34.3)
MCHC RBC AUTO-ENTMCNC: 33.5 G/DL (ref 31.4–37.4)
MCV RBC AUTO: 99 FL (ref 82–98)
PHOSPHATE SERPL-MCNC: 3.3 MG/DL (ref 2.3–4.1)
PLATELET # BLD AUTO: 216 THOUSANDS/UL (ref 149–390)
PMV BLD AUTO: 10.7 FL (ref 8.9–12.7)
POTASSIUM SERPL-SCNC: 3.6 MMOL/L (ref 3.5–5.3)
RBC # BLD AUTO: 3.62 MILLION/UL (ref 3.88–5.62)
SODIUM SERPL-SCNC: 132 MMOL/L (ref 135–147)
WBC # BLD AUTO: 8.91 THOUSAND/UL (ref 4.31–10.16)

## 2025-04-20 PROCEDURE — 99232 SBSQ HOSP IP/OBS MODERATE 35: CPT | Performed by: PHYSICIAN ASSISTANT

## 2025-04-20 PROCEDURE — 85027 COMPLETE CBC AUTOMATED: CPT | Performed by: STUDENT IN AN ORGANIZED HEALTH CARE EDUCATION/TRAINING PROGRAM

## 2025-04-20 PROCEDURE — 94760 N-INVAS EAR/PLS OXIMETRY 1: CPT

## 2025-04-20 PROCEDURE — 94640 AIRWAY INHALATION TREATMENT: CPT

## 2025-04-20 PROCEDURE — 83735 ASSAY OF MAGNESIUM: CPT | Performed by: STUDENT IN AN ORGANIZED HEALTH CARE EDUCATION/TRAINING PROGRAM

## 2025-04-20 PROCEDURE — 99232 SBSQ HOSP IP/OBS MODERATE 35: CPT | Performed by: STUDENT IN AN ORGANIZED HEALTH CARE EDUCATION/TRAINING PROGRAM

## 2025-04-20 PROCEDURE — 82948 REAGENT STRIP/BLOOD GLUCOSE: CPT

## 2025-04-20 PROCEDURE — 80069 RENAL FUNCTION PANEL: CPT | Performed by: STUDENT IN AN ORGANIZED HEALTH CARE EDUCATION/TRAINING PROGRAM

## 2025-04-20 RX ORDER — MAGNESIUM SULFATE HEPTAHYDRATE 40 MG/ML
2 INJECTION, SOLUTION INTRAVENOUS ONCE
Status: COMPLETED | OUTPATIENT
Start: 2025-04-20 | End: 2025-04-20

## 2025-04-20 RX ADMIN — LEVALBUTEROL HYDROCHLORIDE 1.25 MG: 1.25 SOLUTION RESPIRATORY (INHALATION) at 20:11

## 2025-04-20 RX ADMIN — BENZONATATE 100 MG: 100 CAPSULE ORAL at 17:33

## 2025-04-20 RX ADMIN — CARVEDILOL 12.5 MG: 12.5 TABLET, FILM COATED ORAL at 17:33

## 2025-04-20 RX ADMIN — FAMOTIDINE 20 MG: 20 TABLET, FILM COATED ORAL at 21:50

## 2025-04-20 RX ADMIN — INSULIN LISPRO 2 UNITS: 100 INJECTION, SOLUTION INTRAVENOUS; SUBCUTANEOUS at 09:05

## 2025-04-20 RX ADMIN — MULTIPLE VITAMINS W/ MINERALS TAB 1 TABLET: TAB ORAL at 09:05

## 2025-04-20 RX ADMIN — GUAIFENESIN 1200 MG: 600 TABLET ORAL at 21:50

## 2025-04-20 RX ADMIN — MAGNESIUM SULFATE HEPTAHYDRATE 2 G: 40 INJECTION, SOLUTION INTRAVENOUS at 11:54

## 2025-04-20 RX ADMIN — INSULIN GLARGINE 5 UNITS: 100 INJECTION, SOLUTION SUBCUTANEOUS at 21:50

## 2025-04-20 RX ADMIN — IPRATROPIUM BROMIDE 0.5 MG: 0.5 SOLUTION RESPIRATORY (INHALATION) at 07:33

## 2025-04-20 RX ADMIN — SPIRONOLACTONE 25 MG: 25 TABLET, FILM COATED ORAL at 09:05

## 2025-04-20 RX ADMIN — COLCHICINE 0.6 MG: 0.6 TABLET ORAL at 17:34

## 2025-04-20 RX ADMIN — FLUTICASONE FUROATE AND VILANTEROL TRIFENATATE 1 PUFF: 200; 25 POWDER RESPIRATORY (INHALATION) at 09:08

## 2025-04-20 RX ADMIN — PANTOPRAZOLE SODIUM 40 MG: 40 TABLET, DELAYED RELEASE ORAL at 06:21

## 2025-04-20 RX ADMIN — GUAIFENESIN 1200 MG: 600 TABLET ORAL at 09:05

## 2025-04-20 RX ADMIN — FOLIC ACID 1 MG: 1 TABLET ORAL at 09:05

## 2025-04-20 RX ADMIN — COLCHICINE 0.6 MG: 0.6 TABLET ORAL at 09:05

## 2025-04-20 RX ADMIN — BENZONATATE 100 MG: 100 CAPSULE ORAL at 09:05

## 2025-04-20 RX ADMIN — BENZONATATE 100 MG: 100 CAPSULE ORAL at 21:50

## 2025-04-20 RX ADMIN — INSULIN LISPRO 2 UNITS: 100 INJECTION, SOLUTION INTRAVENOUS; SUBCUTANEOUS at 21:50

## 2025-04-20 RX ADMIN — LEVALBUTEROL HYDROCHLORIDE 1.25 MG: 1.25 SOLUTION RESPIRATORY (INHALATION) at 13:34

## 2025-04-20 RX ADMIN — CARVEDILOL 12.5 MG: 12.5 TABLET, FILM COATED ORAL at 09:05

## 2025-04-20 RX ADMIN — LEVALBUTEROL HYDROCHLORIDE 1.25 MG: 1.25 SOLUTION RESPIRATORY (INHALATION) at 07:33

## 2025-04-20 RX ADMIN — ALLOPURINOL 100 MG: 100 TABLET ORAL at 09:05

## 2025-04-20 RX ADMIN — Medication 2 MG/HR: at 04:57

## 2025-04-20 RX ADMIN — INSULIN LISPRO 4 UNITS: 100 INJECTION, SOLUTION INTRAVENOUS; SUBCUTANEOUS at 11:54

## 2025-04-20 RX ADMIN — IPRATROPIUM BROMIDE 0.5 MG: 0.5 SOLUTION RESPIRATORY (INHALATION) at 13:34

## 2025-04-20 RX ADMIN — Medication 100 MG: at 09:05

## 2025-04-20 RX ADMIN — IPRATROPIUM BROMIDE 0.5 MG: 0.5 SOLUTION RESPIRATORY (INHALATION) at 20:11

## 2025-04-20 NOTE — PROGRESS NOTES
Progress Note - Cardiology   Name: Elly Chong 60 y.o. male I MRN: [id number]  Unit/Bed#: S -01 I Date of Admission: 4/14/2025   Date of Service: 4/20/2025 I Hospital Day: 6     Assessment & Plan  Acute on chronic diastolic congestive heart failure (HCC)  Wt Readings from Last 3 Encounters:   04/20/25 124 kg (274 lb 0.5 oz)   03/13/25 126 kg (277 lb 4 oz)   11/27/24 117 kg (258 lb 12.8 oz)   Dry weight: 260s lb  Currently on Bumex infusion at 2 mg/hr  I&O's: Output 24 hours -2.3 L.  Net output -8.6 L  Continues to appear significantly volume overloaded on exam  Essential hypertension  Blood pressure stable on carvedilol 12.5 mg BID, spironolactone 25 mg daily and IV Bumex  Uncontrolled type 2 diabetes mellitus with hyperglycemia (HCC)  Lab Results   Component Value Date    HGBA1C 6.4 03/13/2025   Management per primary team  CKD (chronic kidney disease)  Baseline creatinine 1.6-2.3  Creatinine 1.6 from 1.55 yesterday  Electrolyte abnormality  Mag 1.4, K+ 3.6 today  Medical non-compliance  Stopped diuretics while on vacation  Alcohol abuse  Cessation advised  Current smoker  Cessation advised    Plan/Recommendations:  Continue Bumex infusion at current rate  Magnesium repletion ordered  Replete to maintain potassium >4 and magnesium >2  Monitor I&O's, renal function, electrolytes and standing weight  Reorder telemetry    ___________________________________________________________    Subjective    Patient seen and examined.  No acute events overnight.    Review of Systems   Constitutional: Positive for malaise/fatigue. Negative for chills.   Cardiovascular:  Positive for leg swelling. Negative for chest pain, dyspnea on exertion, near-syncope, orthopnea, palpitations, paroxysmal nocturnal dyspnea and syncope.   Respiratory:  Negative for cough, shortness of breath and wheezing.    Endocrine: Negative.    Hematologic/Lymphatic: Negative.    Skin: Negative.    Musculoskeletal: Negative.    Gastrointestinal:   Positive for bloating. Negative for diarrhea, nausea and vomiting.   Neurological:  Negative for dizziness, light-headedness and weakness.   Psychiatric/Behavioral: Negative.  Negative for altered mental status.    All other systems reviewed and are negative.      Objective:   Vitals: Blood pressure 110/70, pulse 87, temperature 98.4 °F (36.9 °C), resp. rate 18, weight 124 kg (274 lb 0.5 oz), SpO2 93%.,     Wt Readings from Last 3 Encounters:   25 124 kg (274 lb 0.5 oz)   25 126 kg (277 lb 4 oz)   24 117 kg (258 lb 12.8 oz)        Lab Results   Component Value Date    CREATININE 1.66 (H) 2025    CREATININE 1.55 (H) 2025    CREATININE 1.55 (H) 2025         Body mass index is 42.92 kg/m².,     Systolic (24hrs), Av , Min:110 , Max:160     Diastolic (24hrs), Av, Min:70, Max:97          Intake/Output Summary (Last 24 hours) at 2025 0923  Last data filed at 2025 0448  Gross per 24 hour   Intake 750 ml   Output 3230 ml   Net -2480 ml     Weight (last 2 days)       Date/Time Weight    25 0448 124 (274.03)    25 0707 127 (280.2)    25 0553 130 (287.26)              Telemetry Review: Not on telemetry      Physical Exam  Vitals and nursing note reviewed.   Constitutional:       General: He is not in acute distress.     Appearance: He is well-developed. He is obese.      Comments: On RA in NAD   HENT:      Head: Normocephalic and atraumatic.   Neck:      Vascular: No JVD.   Cardiovascular:      Rate and Rhythm: Normal rate and regular rhythm.      Heart sounds: Normal heart sounds. No murmur heard.     No friction rub. No gallop.   Pulmonary:      Effort: Pulmonary effort is normal. No respiratory distress.      Breath sounds: No wheezing or rales.      Comments: Diminished breath sounds bilateral bases  Chest:      Chest wall: No tenderness.   Abdominal:      General: Bowel sounds are normal. There is distension.      Palpations: Abdomen is soft.       Tenderness: There is no abdominal tenderness.   Musculoskeletal:         General: No tenderness. Normal range of motion.      Cervical back: Normal range of motion and neck supple.      Right lower leg: Edema present.      Left lower leg: Edema present.      Comments: ++ Edema bilaterally to the knees   Skin:     General: Skin is warm and dry.      Coloration: Skin is not pale.      Findings: No erythema.   Neurological:      Mental Status: He is alert and oriented to person, place, and time.   Psychiatric:         Mood and Affect: Mood normal.         Behavior: Behavior normal.         Thought Content: Thought content normal.         Judgment: Judgment normal.         LABORATORY RESULTS      CBC with diff:   Results from last 7 days   Lab Units 04/20/25  0446 04/19/25  0612 04/18/25  0553 04/17/25  0504 04/16/25  0541 04/15/25  0429 04/14/25  1842   WBC Thousand/uL 8.91 8.93 9.07 9.55 10.10 9.45 8.54   HEMOGLOBIN g/dL 12.0 11.4* 10.9* 11.3* 11.3* 11.8* 11.2*   HEMATOCRIT % 35.8* 33.5* 33.3* 34.3* 34.1* 35.0* 33.8*   MCV fL 99* 100* 101* 101* 100* 99* 100*   PLATELETS Thousands/uL 216 208 212 251 238 250 243   RBC Million/uL 3.62* 3.36* 3.31* 3.41* 3.40* 3.52* 3.38*   MCH pg 33.1 33.9 32.9 33.1 33.2 33.5 33.1   MCHC g/dL 33.5 34.0 32.7 32.9 33.1 33.7 33.1   RDW % 13.0 13.2 13.3 13.3 13.5 13.4 13.4   MPV fL 10.7 11.0 10.2 10.1 10.0 9.9 9.4   NRBC AUTO /100 WBCs  --   --   --   --   --   --  0       CMP:  Results from last 7 days   Lab Units 04/20/25  0446 04/19/25  0614 04/18/25  0553 04/17/25  0504 04/16/25  0541 04/15/25  0506 04/14/25  1842   POTASSIUM mmol/L 3.6 3.6 3.7 3.8 3.8 3.6 3.4*   CHLORIDE mmol/L 89* 92* 94* 96 98 97 98   CO2 mmol/L 34* 34* 35* 31 29 31 29   BUN mg/dL 21 21 22 20 18 15 13   CREATININE mg/dL 1.66* 1.55* 1.55* 1.59* 1.50* 1.87* 1.91*   CALCIUM mg/dL 9.0 8.7 8.6 8.9 8.8 8.7 8.5   AST U/L  --   --   --   --   --   --  15   ALT U/L  --   --   --   --   --   --  9   ALK PHOS U/L  --   --   --    --   --   --  89   EGFR ml/min/1.73sq m 43 47 47 46 49 37 36       BMP:  Results from last 7 days   Lab Units 04/20/25  0446 04/19/25  0614 04/18/25  0553 04/17/25  0504 04/16/25  0541 04/15/25  0506 04/14/25  1842   POTASSIUM mmol/L 3.6 3.6 3.7 3.8 3.8 3.6 3.4*   CHLORIDE mmol/L 89* 92* 94* 96 98 97 98   CO2 mmol/L 34* 34* 35* 31 29 31 29   BUN mg/dL 21 21 22 20 18 15 13   CREATININE mg/dL 1.66* 1.55* 1.55* 1.59* 1.50* 1.87* 1.91*   CALCIUM mg/dL 9.0 8.7 8.6 8.9 8.8 8.7 8.5       Lab Results   Component Value Date    NTBNP 772 (H) 08/31/2021    NTBNP 430 (H) 07/06/2020    NTBNP 118 12/28/2017             Results from last 7 days   Lab Units 04/20/25  0446 04/19/25  0614 04/18/25  0553 04/17/25  0504 04/16/25  0541 04/15/25  0506   MAGNESIUM mg/dL 1.4* 1.7* 1.7* 1.3* 1.6* 1.2*         Lipid Profile:   Lab Results   Component Value Date    CHOL 153 12/21/2017    CHOL 151 06/05/2017    CHOL 169 02/10/2017     Lab Results   Component Value Date    HDL 36 (L) 11/21/2023    HDL 47 03/14/2022    HDL 47 07/06/2020     Lab Results   Component Value Date    LDLCALC 70 11/21/2023    LDLCALC 104 (H) 03/14/2022    LDLCALC 77 07/06/2020     Lab Results   Component Value Date    TRIG 69 11/21/2023    TRIG 86 03/14/2022    TRIG 65 07/06/2020         Meds/Allergies   all current active meds have been reviewed    Medications Prior to Admission:     albuterol (PROVENTIL HFA,VENTOLIN HFA) 90 mcg/act inhaler    allopurinol (ZYLOPRIM) 100 mg tablet    Blood Pressure KIT    carvedilol (COREG) 6.25 mg tablet    colchicine (COLCRYS) 0.6 mg tablet    diclofenac sodium (VOLTAREN) 50 mg EC tablet    famotidine (PEPCID) 20 mg tablet    Fluticasone-Salmeterol (Advair) 500-50 mcg/dose inhaler    glimepiride (AMARYL) 1 mg tablet    glucose blood test strip    ipratropium (ATROVENT) 0.02 % nebulizer solution    Lancets (ONETOUCH ULTRASOFT) lancets    levalbuterol (XOPENEX) 1.25 mg/3 mL nebulizer solution    pantoprazole (PROTONIX) 40 mg tablet     sitaGLIPtin (Januvia) 100 mg tablet    spironolactone (ALDACTONE) 25 mg tablet    torsemide (DEMADEX) 20 mg tablet    bumetanide (BUMEX) 12.5 mg infusion 50 mL, 2 mg/hr, Last Rate: 2 mg/hr (04/20/25 0457)        Counseling / Coordination of Care  Total floor / unit time spent today 20 minutes.  Greater than 50% of total time was spent with the patient and / or family counseling and / or coordination of care.      ** Please Note: Dragon 360 Dictation voice to text software may have been used in the creation of this document. **

## 2025-04-20 NOTE — PROGRESS NOTES
Progress Note - Hospitalist   Name: Elly Chong 60 y.o. male I MRN: [id number]  Unit/Bed#: S -01 I Date of Admission: 4/14/2025   Date of Service: 4/20/2025 I Hospital Day: 6    Assessment & Plan  Acute on chronic diastolic congestive heart failure (HCC)  Presents due to lower extremity edema, lower extremity pain, increased abdominal girth, shortness of breath and chest pain  Medication nonadherence contributing  Echo 11/23: EF 60%, grade 1 diastolic dysfunction  PTA: Torsemide 40 mg daily and Aldactone 25 mg daily  Stopped taking these on approximately 4/7 due to going on vacation    Plan    Cardiology consult  Continue Aldactone 25 mg daily  Cardiology consulted  4/18 Lasix 100 mg IV twice daily switch to IV Bumex 2 mg an hour by cardiology due to poor output  Continue with IV bumex gtt   Essential hypertension  Coreg increased to 12.5 mg twice daily by cardiology  Continue Aldactone 25 mg shama  Uncontrolled type 2 diabetes mellitus with hyperglycemia (HCC)  Lab Results   Component Value Date    HGBA1C 6.4 03/13/2025     Resume p.o. meds on discharge  Accu-Chek, SSI  Place on lantus 5 units qhs for tighter glucose control   Medical non-compliance  Stop taking torsemide on approximately 4/7  Needs ongoing education  Alcohol abuse  Reports drinking 3 beers daily and 2 shots daily after work  [employer]  Current smoker  Reports 3 cigars daily  NRT  CKD (chronic kidney disease)  Creatinine appears at baseline (1.6-2.3)  Monitor with diuresis  Cr at baseline   B12 deficiency  B12 noted to be significantly low at 112  Finish 3 days of IM cyanocobalamin injections on 4/18/2025  Generalized abdominal pain  Patient complaining abdominal pain and abdominal distention  Generalized tenderness on palpation with no rebound or guarding    CT abd/pelvis 4/19/25  No CT evidence of acute process within the abdomen or pelvis.  2.  Round fluid attenuated structure within the pancreatic head measuring 1.2 cm which is not evident on  prior CT of September 2020. Finding may represent pancreatic cyst versus cystic pancreatic ductal dilatation as there parenchymal calcifications which suggest a component of underlying chronic pancreatitis. Further characterization with nonemergent MRI abdomen with and without contrast is recommended.    Will need outpatient MRI abdomen which can be arranged by PCP      Electrolyte abnormality  Hypomagnesemia  Given 2 G iv magnesemia    VTE Pharmacologic Prophylaxis: VTE Score: 5 High Risk (Score >/= 5) - Pharmacological DVT Prophylaxis Ordered: heparin. Sequential Compression Devices Ordered.    Mobility:   Basic Mobility Inpatient Raw Score: 24  JH-HLM Goal: 8: Walk 250 feet or more  JH-HLM Achieved: 8: Walk 250 feet ot more  JH-HLM Goal achieved. Continue to encourage appropriate mobility.    Patient Centered Rounds: I performed bedside rounds with nursing staff today.   Discussions with Specialists or Other Care Team Provider: case management    Education and Discussions with Family / Patient: Updated  (wife) via phone.    Current Length of Stay: 6 day(s)  Current Patient Status: Inpatient   Certification Statement: The patient will continue to require additional inpatient hospital stay due to IV diuretics  Discharge Plan: Anticipate discharge in 48-72 hrs to discharge location to be determined pending rehab evaluations.    Code Status: Level 1 - Full Code    Subjective   Patient mitts to having some cramping in his legs and arms.  States abdominal distention feels better.  Reports having bowel movement yesterday     Objective :  Temp:  [98 °F (36.7 °C)-98.4 °F (36.9 °C)] 98.4 °F (36.9 °C)  HR:  [83-87] 87  BP: (110-160)/(70-97) 110/70  Resp:  [18] 18  SpO2:  [93 %-98 %] 93 %  O2 Device: None (Room air)    Body mass index is 42.92 kg/m².     Input and Output Summary (last 24 hours):     Intake/Output Summary (Last 24 hours) at 4/20/2025 1311  Last data filed at 4/20/2025 1212  Gross per 24 hour    Intake 620 ml   Output 3730 ml   Net -3110 ml       Physical Exam  Vitals and nursing note reviewed.   Constitutional:       General: He is not in acute distress.     Appearance: He is well-developed.   HENT:      Head: Normocephalic and atraumatic.   Eyes:      Conjunctiva/sclera: Conjunctivae normal.   Cardiovascular:      Rate and Rhythm: Normal rate and regular rhythm.      Heart sounds: No murmur heard.  Pulmonary:      Effort: Pulmonary effort is normal. No respiratory distress.      Breath sounds: Normal breath sounds.   Abdominal:      General: There is no distension.      Palpations: Abdomen is soft.      Tenderness: There is no abdominal tenderness. There is no guarding or rebound.   Musculoskeletal:      Right lower leg: Edema present.      Left lower leg: Edema present.   Skin:     General: Skin is warm and dry.   Neurological:      Mental Status: He is alert. Mental status is at baseline.   Psychiatric:         Mood and Affect: Mood normal.           Lines/Drains:              Lab Results: I have reviewed the following results:   Results from last 7 days   Lab Units 04/20/25  0446 04/15/25  0429 04/14/25  1842   WBC Thousand/uL 8.91   < > 8.54   HEMOGLOBIN g/dL 12.0   < > 11.2*   HEMATOCRIT % 35.8*   < > 33.8*   PLATELETS Thousands/uL 216   < > 243   SEGS PCT %  --   --  51   LYMPHO PCT %  --   --  35   MONO PCT %  --   --  9   EOS PCT %  --   --  4    < > = values in this interval not displayed.     Results from last 7 days   Lab Units 04/20/25  0446 04/15/25  0506 04/14/25  1842   SODIUM mmol/L 132*   < > 136   POTASSIUM mmol/L 3.6   < > 3.4*   CHLORIDE mmol/L 89*   < > 98   CO2 mmol/L 34*   < > 29   BUN mg/dL 21   < > 13   CREATININE mg/dL 1.66*   < > 1.91*   ANION GAP mmol/L 9   < > 9   CALCIUM mg/dL 9.0   < > 8.5   ALBUMIN g/dL 3.6   < > 3.5   TOTAL BILIRUBIN mg/dL  --   --  0.49   ALK PHOS U/L  --   --  89   ALT U/L  --   --  9   AST U/L  --   --  15   GLUCOSE RANDOM mg/dL 164*   < > 171*    < >  = values in this interval not displayed.         Results from last 7 days   Lab Units 04/20/25  1144 04/20/25  0808 04/19/25  2104 04/19/25  1649 04/19/25  1134 04/19/25  0735 04/18/25  2143 04/18/25  1614 04/18/25  1130 04/18/25  0713 04/17/25  2040 04/17/25  1509   POC GLUCOSE mg/dl 232* 193* 257* 177* 234* 168* 219* 226* 206* 168* 230* 198*               Recent Cultures (last 7 days):         Imaging Results Review: I reviewed radiology reports from this admission including: chest xray.  Other Study Results Review: EKG was reviewed.     Last 24 Hours Medication List:     Current Facility-Administered Medications:     albuterol (PROVENTIL HFA,VENTOLIN HFA) inhaler 2 puff, Q4H PRN    allopurinol (ZYLOPRIM) tablet 100 mg, Daily    benzonatate (TESSALON PERLES) capsule 100 mg, TID    bumetanide (BUMEX) 12.5 mg infusion 50 mL, Continuous, Last Rate: 2 mg/hr (04/20/25 0457)    carvedilol (COREG) tablet 12.5 mg, BID With Meals    colchicine (COLCRYS) tablet 0.6 mg, BID    famotidine (PEPCID) tablet 20 mg, HS    fluticasone-vilanterol 200-25 mcg/actuation 1 puff, Daily    folic acid (FOLVITE) tablet 1 mg, Daily    guaiFENesin (MUCINEX) 12 hr tablet 1,200 mg, Q12H ELIANE    heparin (porcine) subcutaneous injection 7,500 Units, Q8H ELIANE    insulin glargine (LANTUS) subcutaneous injection 5 Units 0.05 mL, HS    insulin lispro (HumALOG/ADMELOG) 100 units/mL subcutaneous injection 1-5 Units, HS    insulin lispro (HumALOG/ADMELOG) 100 units/mL subcutaneous injection 2-12 Units, TID AC **AND** Fingerstick Glucose (POCT), TID AC    ipratropium (ATROVENT) 0.02 % inhalation solution 0.5 mg, TID    levalbuterol (XOPENEX) inhalation solution 1.25 mg, TID    magnesium sulfate 2 g/50 mL IVPB (premix) 2 g, Once, Last Rate: 2 g (04/20/25 1154)    multivitamin-minerals (CENTRUM) tablet 1 tablet, Daily    pantoprazole (PROTONIX) EC tablet 40 mg, Daily Before Breakfast    simethicone (MYLICON) chewable tablet 80 mg, Q6H PRN    spironolactone  (ALDACTONE) tablet 25 mg, Daily    thiamine tablet 100 mg, Daily    Administrative Statements   Today, Patient Was Seen By: Ivan Mcdonnell DO      **Please Note: This note may have been constructed using a voice recognition system.**

## 2025-04-20 NOTE — ASSESSMENT & PLAN NOTE
Wt Readings from Last 3 Encounters:   04/20/25 124 kg (274 lb 0.5 oz)   03/13/25 126 kg (277 lb 4 oz)   11/27/24 117 kg (258 lb 12.8 oz)   Dry weight: 260s lb  Currently on Bumex infusion at 2 mg/hr  I&O's: Output 24 hours -2.3 L.  Net output -8.6 L  Continues to appear significantly volume overloaded on exam

## 2025-04-20 NOTE — ASSESSMENT & PLAN NOTE
Patient complaining abdominal pain and abdominal distention  Generalized tenderness on palpation with no rebound or guarding    CT abd/pelvis 4/19/25  No CT evidence of acute process within the abdomen or pelvis.  2.  Round fluid attenuated structure within the pancreatic head measuring 1.2 cm which is not evident on prior CT of September 2020. Finding may represent pancreatic cyst versus cystic pancreatic ductal dilatation as there parenchymal calcifications which suggest a component of underlying chronic pancreatitis. Further characterization with nonemergent MRI abdomen with and without contrast is recommended.    Will need outpatient MRI abdomen which can be arranged by PCP

## 2025-04-20 NOTE — ASSESSMENT & PLAN NOTE
Presents due to lower extremity edema, lower extremity pain, increased abdominal girth, shortness of breath and chest pain  Medication nonadherence contributing  Echo 11/23: EF 60%, grade 1 diastolic dysfunction  PTA: Torsemide 40 mg daily and Aldactone 25 mg daily  Stopped taking these on approximately 4/7 due to going on vacation    Plan    Cardiology consult  Continue Aldactone 25 mg daily  Cardiology consulted  4/18 Lasix 100 mg IV twice daily switch to IV Bumex 2 mg an hour by cardiology due to poor output  Continue with IV bumex gtt

## 2025-04-21 LAB
ALBUMIN SERPL BCG-MCNC: 3.5 G/DL (ref 3.5–5)
ANION GAP SERPL CALCULATED.3IONS-SCNC: 10 MMOL/L (ref 4–13)
BUN SERPL-MCNC: 23 MG/DL (ref 5–25)
CALCIUM SERPL-MCNC: 8.8 MG/DL (ref 8.4–10.2)
CHLORIDE SERPL-SCNC: 89 MMOL/L (ref 96–108)
CO2 SERPL-SCNC: 35 MMOL/L (ref 21–32)
CREAT SERPL-MCNC: 2.04 MG/DL (ref 0.6–1.3)
ERYTHROCYTE [DISTWIDTH] IN BLOOD BY AUTOMATED COUNT: 13 % (ref 11.6–15.1)
GFR SERPL CREATININE-BSD FRML MDRD: 34 ML/MIN/1.73SQ M
GLUCOSE SERPL-MCNC: 153 MG/DL (ref 65–140)
GLUCOSE SERPL-MCNC: 180 MG/DL (ref 65–140)
GLUCOSE SERPL-MCNC: 195 MG/DL (ref 65–140)
GLUCOSE SERPL-MCNC: 200 MG/DL (ref 65–140)
GLUCOSE SERPL-MCNC: 247 MG/DL (ref 65–140)
HCT VFR BLD AUTO: 34.8 % (ref 36.5–49.3)
HGB BLD-MCNC: 11.7 G/DL (ref 12–17)
MAGNESIUM SERPL-MCNC: 1.2 MG/DL (ref 1.9–2.7)
MCH RBC QN AUTO: 33.1 PG (ref 26.8–34.3)
MCHC RBC AUTO-ENTMCNC: 33.6 G/DL (ref 31.4–37.4)
MCV RBC AUTO: 99 FL (ref 82–98)
PHOSPHATE SERPL-MCNC: 4 MG/DL (ref 2.3–4.1)
PLATELET # BLD AUTO: 217 THOUSANDS/UL (ref 149–390)
PMV BLD AUTO: 10.8 FL (ref 8.9–12.7)
POTASSIUM SERPL-SCNC: 3.4 MMOL/L (ref 3.5–5.3)
RBC # BLD AUTO: 3.53 MILLION/UL (ref 3.88–5.62)
SODIUM SERPL-SCNC: 134 MMOL/L (ref 135–147)
WBC # BLD AUTO: 9.32 THOUSAND/UL (ref 4.31–10.16)

## 2025-04-21 PROCEDURE — 83735 ASSAY OF MAGNESIUM: CPT | Performed by: STUDENT IN AN ORGANIZED HEALTH CARE EDUCATION/TRAINING PROGRAM

## 2025-04-21 PROCEDURE — 99232 SBSQ HOSP IP/OBS MODERATE 35: CPT | Performed by: INTERNAL MEDICINE

## 2025-04-21 PROCEDURE — 94640 AIRWAY INHALATION TREATMENT: CPT

## 2025-04-21 PROCEDURE — 94760 N-INVAS EAR/PLS OXIMETRY 1: CPT

## 2025-04-21 PROCEDURE — 99232 SBSQ HOSP IP/OBS MODERATE 35: CPT | Performed by: STUDENT IN AN ORGANIZED HEALTH CARE EDUCATION/TRAINING PROGRAM

## 2025-04-21 PROCEDURE — 82948 REAGENT STRIP/BLOOD GLUCOSE: CPT

## 2025-04-21 PROCEDURE — 80069 RENAL FUNCTION PANEL: CPT | Performed by: STUDENT IN AN ORGANIZED HEALTH CARE EDUCATION/TRAINING PROGRAM

## 2025-04-21 PROCEDURE — 85027 COMPLETE CBC AUTOMATED: CPT | Performed by: STUDENT IN AN ORGANIZED HEALTH CARE EDUCATION/TRAINING PROGRAM

## 2025-04-21 RX ORDER — MAGNESIUM SULFATE HEPTAHYDRATE 40 MG/ML
4 INJECTION, SOLUTION INTRAVENOUS ONCE
Status: COMPLETED | OUTPATIENT
Start: 2025-04-21 | End: 2025-04-21

## 2025-04-21 RX ORDER — POTASSIUM CHLORIDE 1500 MG/1
40 TABLET, EXTENDED RELEASE ORAL ONCE
Status: COMPLETED | OUTPATIENT
Start: 2025-04-21 | End: 2025-04-21

## 2025-04-21 RX ORDER — LANOLIN ALCOHOL/MO/W.PET/CERES
800 CREAM (GRAM) TOPICAL 2 TIMES DAILY
Status: DISCONTINUED | OUTPATIENT
Start: 2025-04-21 | End: 2025-04-23 | Stop reason: HOSPADM

## 2025-04-21 RX ORDER — MAGNESIUM SULFATE HEPTAHYDRATE 40 MG/ML
2 INJECTION, SOLUTION INTRAVENOUS ONCE
Status: DISCONTINUED | OUTPATIENT
Start: 2025-04-21 | End: 2025-04-21

## 2025-04-21 RX ORDER — INSULIN GLARGINE 100 [IU]/ML
10 INJECTION, SOLUTION SUBCUTANEOUS
Status: DISCONTINUED | OUTPATIENT
Start: 2025-04-21 | End: 2025-04-23 | Stop reason: HOSPADM

## 2025-04-21 RX ORDER — INSULIN LISPRO 100 [IU]/ML
3 INJECTION, SOLUTION INTRAVENOUS; SUBCUTANEOUS
Status: DISCONTINUED | OUTPATIENT
Start: 2025-04-21 | End: 2025-04-23 | Stop reason: HOSPADM

## 2025-04-21 RX ADMIN — IPRATROPIUM BROMIDE 0.5 MG: 0.5 SOLUTION RESPIRATORY (INHALATION) at 07:44

## 2025-04-21 RX ADMIN — POTASSIUM CHLORIDE 40 MEQ: 1500 TABLET, EXTENDED RELEASE ORAL at 09:05

## 2025-04-21 RX ADMIN — MULTIPLE VITAMINS W/ MINERALS TAB 1 TABLET: TAB ORAL at 09:05

## 2025-04-21 RX ADMIN — CARVEDILOL 12.5 MG: 12.5 TABLET, FILM COATED ORAL at 16:56

## 2025-04-21 RX ADMIN — Medication 2 MG/HR: at 06:04

## 2025-04-21 RX ADMIN — LEVALBUTEROL HYDROCHLORIDE 1.25 MG: 1.25 SOLUTION RESPIRATORY (INHALATION) at 14:01

## 2025-04-21 RX ADMIN — INSULIN LISPRO 2 UNITS: 100 INJECTION, SOLUTION INTRAVENOUS; SUBCUTANEOUS at 16:57

## 2025-04-21 RX ADMIN — IPRATROPIUM BROMIDE 0.5 MG: 0.5 SOLUTION RESPIRATORY (INHALATION) at 14:01

## 2025-04-21 RX ADMIN — INSULIN LISPRO 3 UNITS: 100 INJECTION, SOLUTION INTRAVENOUS; SUBCUTANEOUS at 14:37

## 2025-04-21 RX ADMIN — GUAIFENESIN 1200 MG: 600 TABLET ORAL at 21:08

## 2025-04-21 RX ADMIN — INSULIN LISPRO 4 UNITS: 100 INJECTION, SOLUTION INTRAVENOUS; SUBCUTANEOUS at 11:39

## 2025-04-21 RX ADMIN — PANTOPRAZOLE SODIUM 40 MG: 40 TABLET, DELAYED RELEASE ORAL at 06:08

## 2025-04-21 RX ADMIN — FOLIC ACID 1 MG: 1 TABLET ORAL at 09:05

## 2025-04-21 RX ADMIN — INSULIN LISPRO 1 UNITS: 100 INJECTION, SOLUTION INTRAVENOUS; SUBCUTANEOUS at 21:09

## 2025-04-21 RX ADMIN — SPIRONOLACTONE 25 MG: 25 TABLET, FILM COATED ORAL at 09:05

## 2025-04-21 RX ADMIN — Medication 2 MG/HR: at 00:15

## 2025-04-21 RX ADMIN — Medication 100 MG: at 09:05

## 2025-04-21 RX ADMIN — FAMOTIDINE 20 MG: 20 TABLET, FILM COATED ORAL at 21:08

## 2025-04-21 RX ADMIN — CARVEDILOL 12.5 MG: 12.5 TABLET, FILM COATED ORAL at 09:05

## 2025-04-21 RX ADMIN — Medication 2 MG/HR: at 21:08

## 2025-04-21 RX ADMIN — Medication 2 MG/HR: at 14:38

## 2025-04-21 RX ADMIN — INSULIN GLARGINE 10 UNITS: 100 INJECTION, SOLUTION SUBCUTANEOUS at 21:08

## 2025-04-21 RX ADMIN — INSULIN LISPRO 2 UNITS: 100 INJECTION, SOLUTION INTRAVENOUS; SUBCUTANEOUS at 09:05

## 2025-04-21 RX ADMIN — COLCHICINE 0.6 MG: 0.6 TABLET ORAL at 16:56

## 2025-04-21 RX ADMIN — MAGNESIUM SULFATE HEPTAHYDRATE 4 G: 40 INJECTION, SOLUTION INTRAVENOUS at 11:39

## 2025-04-21 RX ADMIN — IPRATROPIUM BROMIDE 0.5 MG: 0.5 SOLUTION RESPIRATORY (INHALATION) at 19:26

## 2025-04-21 RX ADMIN — Medication 800 MG: at 16:57

## 2025-04-21 RX ADMIN — COLCHICINE 0.6 MG: 0.6 TABLET ORAL at 09:05

## 2025-04-21 RX ADMIN — BENZONATATE 100 MG: 100 CAPSULE ORAL at 21:08

## 2025-04-21 RX ADMIN — GUAIFENESIN 1200 MG: 600 TABLET ORAL at 09:05

## 2025-04-21 RX ADMIN — LEVALBUTEROL HYDROCHLORIDE 1.25 MG: 1.25 SOLUTION RESPIRATORY (INHALATION) at 07:44

## 2025-04-21 RX ADMIN — INSULIN LISPRO 3 UNITS: 100 INJECTION, SOLUTION INTRAVENOUS; SUBCUTANEOUS at 16:57

## 2025-04-21 RX ADMIN — FLUTICASONE FUROATE AND VILANTEROL TRIFENATATE 1 PUFF: 200; 25 POWDER RESPIRATORY (INHALATION) at 09:07

## 2025-04-21 RX ADMIN — LEVALBUTEROL HYDROCHLORIDE 1.25 MG: 1.25 SOLUTION RESPIRATORY (INHALATION) at 19:26

## 2025-04-21 RX ADMIN — BENZONATATE 100 MG: 100 CAPSULE ORAL at 09:06

## 2025-04-21 RX ADMIN — ALLOPURINOL 100 MG: 100 TABLET ORAL at 09:05

## 2025-04-21 RX ADMIN — BENZONATATE 100 MG: 100 CAPSULE ORAL at 16:56

## 2025-04-21 RX ADMIN — Medication 800 MG: at 09:05

## 2025-04-21 NOTE — ASSESSMENT & PLAN NOTE
Patient complaining abdominal pain and abdominal distention  Generalized tenderness on palpation with no rebound or guarding    CT abd/pelvis 4/19/25  No CT evidence of acute process within the abdomen or pelvis.  2.  Round fluid attenuated structure within the pancreatic head measuring 1.2 cm which is not evident on prior CT of September 2020. Finding may represent pancreatic cyst versus cystic pancreatic ductal dilatation as there parenchymal calcifications which suggest a component of underlying chronic pancreatitis. Further characterization with nonemergent MRI abdomen with and without contrast is recommended.    Will need outpatient MRI abdomen which can be arranged by PCP      Abdominal pain improved

## 2025-04-21 NOTE — PROGRESS NOTES
Progress Note - Hospitalist   Name: Elly Chong 60 y.o. male I MRN: [id number]  Unit/Bed#: S -01 I Date of Admission: 4/14/2025   Date of Service: 4/21/2025 I Hospital Day: 7    Assessment & Plan  Acute on chronic diastolic congestive heart failure (HCC)  Presents due to lower extremity edema, lower extremity pain, increased abdominal girth, shortness of breath and chest pain  Medication nonadherence contributing  Echo 11/23: EF 60%, grade 1 diastolic dysfunction  PTA: Torsemide 40 mg daily and Aldactone 25 mg daily  Stopped taking these on approximately 4/7 due to going on vacation    Plan    Cardiology consult  Continue Aldactone 25 mg daily  Cardiology consulted  4/18 Lasix 100 mg IV twice daily switch to IV Bumex 2 mg an hour by cardiology due to poor output  Continue with IV bumex gtt   Will need to accept higher creatinine to achieve euvolemia  Essential hypertension  Coreg increased to 12.5 mg twice daily by cardiology  Continue Aldactone 25 mg shama  Uncontrolled type 2 diabetes mellitus with hyperglycemia (HCC)  Lab Results   Component Value Date    HGBA1C 6.4 03/13/2025     Resume p.o. meds on discharge  Accu-Chek, SSI  Increased lantus 10 qhs,  add lispro 3 units with meals ISS  Medical non-compliance  Stop taking torsemide on approximately 4/7  Needs ongoing education  Alcohol abuse  Reports drinking 3 beers daily and 2 shots daily after work  [employer]  Current smoker  Reports 3 cigars daily  NRT  CKD (chronic kidney disease)  Creatinine appears at baseline (1.6-2.3)  Monitor with diuresis  Will need to accept higher Cr to achieve euvolemia  B12 deficiency  B12 noted to be significantly low at 112  Finish 3 days of IM cyanocobalamin injections on 4/18/2025  Generalized abdominal pain  Patient complaining abdominal pain and abdominal distention  Generalized tenderness on palpation with no rebound or guarding    CT abd/pelvis 4/19/25  No CT evidence of acute process within the abdomen or pelvis.  2.   Round fluid attenuated structure within the pancreatic head measuring 1.2 cm which is not evident on prior CT of September 2020. Finding may represent pancreatic cyst versus cystic pancreatic ductal dilatation as there parenchymal calcifications which suggest a component of underlying chronic pancreatitis. Further characterization with nonemergent MRI abdomen with and without contrast is recommended.    Will need outpatient MRI abdomen which can be arranged by PCP      Abdominal pain improved   Electrolyte abnormality  Hypomagnesemia  Given 4 gram of iv magnesium     VTE Pharmacologic Prophylaxis: VTE Score: 5 High Risk (Score >/= 5) - Pharmacological DVT Prophylaxis Ordered: heparin. Sequential Compression Devices Ordered.    Mobility:   Basic Mobility Inpatient Raw Score: 24  JH-HLM Goal: 8: Walk 250 feet or more  JH-HLM Achieved: 7: Walk 25 feet or more  JH-HLM Goal achieved. Continue to encourage appropriate mobility.    Patient Centered Rounds: I performed bedside rounds with nursing staff today.   Discussions with Specialists or Other Care Team Provider: case management    Education and Discussions with Family / Patient: Updated  (wife) via phone.    Current Length of Stay: 7 day(s)  Current Patient Status: Inpatient   Certification Statement: The patient will continue to require additional inpatient hospital stay due to IV diuretics  Discharge Plan: Anticipate discharge in 48-72 hrs to discharge location to be determined pending rehab evaluations.    Code Status: Level 1 - Full Code    Subjective   Patient states he continues to have lower extremity edema, over all improving and feeling better    Objective :  Temp:  [98.2 °F (36.8 °C)-98.6 °F (37 °C)] 98.3 °F (36.8 °C)  HR:  [84-92] 87  BP: ()/(64-83) 91/70  Resp:  [18-19] 18  SpO2:  [93 %-98 %] 97 %  O2 Device: None (Room air)    Body mass index is 42.29 kg/m².     Input and Output Summary (last 24 hours):     Intake/Output Summary (Last  24 hours) at 4/21/2025 1208  Last data filed at 4/21/2025 1104  Gross per 24 hour   Intake 630 ml   Output 3005 ml   Net -2375 ml       Physical Exam  Vitals and nursing note reviewed.   Constitutional:       General: He is not in acute distress.     Appearance: He is well-developed.   HENT:      Head: Normocephalic and atraumatic.   Eyes:      Conjunctiva/sclera: Conjunctivae normal.   Cardiovascular:      Rate and Rhythm: Normal rate and regular rhythm.      Heart sounds: No murmur heard.  Pulmonary:      Effort: Pulmonary effort is normal. No respiratory distress.      Breath sounds: Normal breath sounds.   Abdominal:      General: There is distension.      Palpations: Abdomen is soft.      Tenderness: There is no abdominal tenderness. There is no guarding or rebound.   Musculoskeletal:      Right lower leg: Edema present.      Left lower leg: Edema present.   Skin:     General: Skin is warm and dry.   Neurological:      Mental Status: He is alert. Mental status is at baseline.   Psychiatric:         Mood and Affect: Mood normal.           Lines/Drains:              Lab Results: I have reviewed the following results:   Results from last 7 days   Lab Units 04/21/25  0528 04/15/25  0429 04/14/25  1842   WBC Thousand/uL 9.32   < > 8.54   HEMOGLOBIN g/dL 11.7*   < > 11.2*   HEMATOCRIT % 34.8*   < > 33.8*   PLATELETS Thousands/uL 217   < > 243   SEGS PCT %  --   --  51   LYMPHO PCT %  --   --  35   MONO PCT %  --   --  9   EOS PCT %  --   --  4    < > = values in this interval not displayed.     Results from last 7 days   Lab Units 04/21/25  0528 04/15/25  0506 04/14/25  1842   SODIUM mmol/L 134*   < > 136   POTASSIUM mmol/L 3.4*   < > 3.4*   CHLORIDE mmol/L 89*   < > 98   CO2 mmol/L 35*   < > 29   BUN mg/dL 23   < > 13   CREATININE mg/dL 2.04*   < > 1.91*   ANION GAP mmol/L 10   < > 9   CALCIUM mg/dL 8.8   < > 8.5   ALBUMIN g/dL 3.5   < > 3.5   TOTAL BILIRUBIN mg/dL  --   --  0.49   ALK PHOS U/L  --   --  89   ALT  U/L  --   --  9   AST U/L  --   --  15   GLUCOSE RANDOM mg/dL 153*   < > 171*    < > = values in this interval not displayed.         Results from last 7 days   Lab Units 04/21/25  1103 04/21/25  0729 04/20/25  2125 04/20/25  1144 04/20/25  0808 04/19/25  2104 04/19/25  1649 04/19/25  1134 04/19/25  0735 04/18/25  2143 04/18/25  1614 04/18/25  1130   POC GLUCOSE mg/dl 247* 180* 243* 232* 193* 257* 177* 234* 168* 219* 226* 206*               Recent Cultures (last 7 days):         Imaging Results Review: I reviewed radiology reports from this admission including: chest xray.  Other Study Results Review: EKG was reviewed.     Last 24 Hours Medication List:     Current Facility-Administered Medications:     albuterol (PROVENTIL HFA,VENTOLIN HFA) inhaler 2 puff, Q4H PRN    allopurinol (ZYLOPRIM) tablet 100 mg, Daily    benzonatate (TESSALON PERLES) capsule 100 mg, TID    bumetanide (BUMEX) 12.5 mg infusion 50 mL, Continuous, Last Rate: 2 mg/hr (04/21/25 0604)    carvedilol (COREG) tablet 12.5 mg, BID With Meals    colchicine (COLCRYS) tablet 0.6 mg, BID    famotidine (PEPCID) tablet 20 mg, HS    fluticasone-vilanterol 200-25 mcg/actuation 1 puff, Daily    folic acid (FOLVITE) tablet 1 mg, Daily    guaiFENesin (MUCINEX) 12 hr tablet 1,200 mg, Q12H ELIANE    heparin (porcine) subcutaneous injection 7,500 Units, Q8H ELIANE    insulin glargine (LANTUS) subcutaneous injection 5 Units 0.05 mL, HS    insulin lispro (HumALOG/ADMELOG) 100 units/mL subcutaneous injection 1-5 Units, HS    insulin lispro (HumALOG/ADMELOG) 100 units/mL subcutaneous injection 2-12 Units, TID AC **AND** Fingerstick Glucose (POCT), TID AC    ipratropium (ATROVENT) 0.02 % inhalation solution 0.5 mg, TID    levalbuterol (XOPENEX) inhalation solution 1.25 mg, TID    magnesium Oxide (MAG-OX) tablet 800 mg, BID    magnesium sulfate 4 g/100 mL IVPB (premix) 4 g, Once, Last Rate: 4 g (04/21/25 1139)    multivitamin-minerals (CENTRUM) tablet 1 tablet, Daily     pantoprazole (PROTONIX) EC tablet 40 mg, Daily Before Breakfast    simethicone (MYLICON) chewable tablet 80 mg, Q6H PRN    spironolactone (ALDACTONE) tablet 25 mg, Daily    thiamine tablet 100 mg, Daily    Administrative Statements   Today, Patient Was Seen By: Ivan Mcdonnell DO      **Please Note: This note may have been constructed using a voice recognition system.**

## 2025-04-21 NOTE — PROGRESS NOTES
Heart Failure/ Pulmonary Hypertension Progress Note - Elly Chong 60 y.o. male MRN: [id number]    Unit/Bed#: S -01 Encounter: [id number]      Assessment:    Principal Problem:    Acute on chronic diastolic congestive heart failure (HCC)  Active Problems:    Essential hypertension    Uncontrolled type 2 diabetes mellitus with hyperglycemia (HCC)    Medical non-compliance    Alcohol abuse    Current smoker    CKD (chronic kidney disease)    B12 deficiency    Generalized abdominal pain    Electrolyte abnormality      Interim:  Ins/outs:  750/2980: -2230 on bumex gtt 2 mg/hr  Weight: 270 lbs (291 lbs)  Tele: 80's  MAPs:  mmHg  K 3.4  Cr 2- up from 1.6  Mag 1.2    # Acute on Chronic HFpEF, Stage C  Diuretic: Torsemide 40 mg daily  MRA: spironolactone 25 mg daily  Weight: 289 lbs  BNP    Studies- personally reviewed by me  EKG- SR with PVCs    Echo 11/21/23  LVEF: 60%, mild LVH  LVIDd: 4.3 cm  RV: normal    NM MPI 3/6/23: diaphragmatic artifact, no scar or ischemia, EF : 49%    Echo 3/5/23:  LVEF: 50%  Hypo inferolateral    # HTN- coreg 12.5 mg BID  # CKD 3 B  Cr 1.86 up from 1.36 on 11/16/24, down to 1.55 today  # AMANDO  # reactive airway disease  # GERD  # ETOH- 3 beers, 2 shots daily  # DM2  # current smoker    Plan:  Still has a god amount of fluid on board, Cr up to 2, may have to accept temporary spike in Cr with volume removal  Replace Mg    MAPs  80-90'smmHg  Continue spironolactone 25 mg daily          Review of Systems   Constitutional:  Negative for activity change, appetite change, fatigue and unexpected weight change.   HENT:  Negative for congestion and nosebleeds.    Eyes: Negative.    Respiratory:  Negative for cough, chest tightness and shortness of breath.    Cardiovascular:  Negative for chest pain, palpitations and leg swelling.   Gastrointestinal:  Negative for abdominal distention.   Endocrine: Negative.    Genitourinary: Negative.    Musculoskeletal: Negative.    Skin: Negative.     Neurological:  Negative for dizziness, syncope and weakness.   Hematological: Negative.    Psychiatric/Behavioral: Negative.          Central Line (day, reason):  Santiago catheter (day, reason):    Vitals: Blood pressure 140/83, pulse 84, temperature 98.2 °F (36.8 °C), temperature source Oral, resp. rate 18, weight 122 kg (270 lb), SpO2 98%., Body mass index is 42.29 kg/m²., I/O last 3 completed shifts:  In: 990 [P.O.:990]  Out: 5300 [Urine:5300]  No intake/output data recorded.  Wt Readings from Last 3 Encounters:   04/21/25 122 kg (270 lb)   03/13/25 126 kg (277 lb 4 oz)   11/27/24 117 kg (258 lb 12.8 oz)       Intake/Output Summary (Last 24 hours) at 4/21/2025 0801  Last data filed at 4/21/2025 0528  Gross per 24 hour   Intake 750 ml   Output 2980 ml   Net -2230 ml     I/O last 3 completed shifts:  In: 990 [P.O.:990]  Out: 5300 [Urine:5300]    No significant arrhythmias seen on telemetry review.       Physical Exam:  Vitals:    04/20/25 2100 04/21/25 0531 04/21/25 0723 04/21/25 0745   BP:   140/83    BP Location:   Left arm    Pulse:   84    Resp:   18    Temp:   98.2 °F (36.8 °C)    TempSrc:   Oral    SpO2: 96%  97% 98%   Weight:  122 kg (270 lb)         GEN: Elly Chong appears well, alert and oriented x 3, pleasant and cooperative   HEENT: pupils equal, round, and reactive to light; extraocular muscles intact  NECK: supple, no carotid bruits   HEART: regular rhythm, normal S1 and S2, no murmurs, clicks, gallops or rubs, JVP is  up  LUNGS: clear to auscultation bilaterally; no wheezes, rales, or rhonchi   ABDOMEN: normal bowel sounds, soft, no tenderness, no distention  EXTREMITIES: peripheral pulses normal; no clubbing, cyanosis, ++ edema  NEURO: no focal findings   SKIN: normal without suspicious lesions on exposed skin      Current Facility-Administered Medications:     albuterol (PROVENTIL HFA,VENTOLIN HFA) inhaler 2 puff, 2 puff, Inhalation, Q4H PRN, Ivan Mcdonnell DO    allopurinol (ZYLOPRIM) tablet 100  mg, 100 mg, Oral, Daily, SHAY Sethi, 100 mg at 04/20/25 0905    benzonatate (TESSALON PERLES) capsule 100 mg, 100 mg, Oral, TID, Ivan Mcdonnell DO, 100 mg at 04/20/25 2150    bumetanide (BUMEX) 12.5 mg infusion 50 mL, 2 mg/hr, Intravenous, Continuous, Paul Waterman DO, Last Rate: 8 mL/hr at 04/21/25 0604, 2 mg/hr at 04/21/25 0604    carvedilol (COREG) tablet 12.5 mg, 12.5 mg, Oral, BID With Meals, Paul Waterman DO, 12.5 mg at 04/20/25 1733    colchicine (COLCRYS) tablet 0.6 mg, 0.6 mg, Oral, BID, SHAY Sethi, 0.6 mg at 04/20/25 1734    famotidine (PEPCID) tablet 20 mg, 20 mg, Oral, HS, SHAY Sethi, 20 mg at 04/20/25 2150    fluticasone-vilanterol 200-25 mcg/actuation 1 puff, 1 puff, Inhalation, Daily, SHAY Sethi, 1 puff at 04/20/25 0908    folic acid (FOLVITE) tablet 1 mg, 1 mg, Oral, Daily, SHAY Sethi, 1 mg at 04/20/25 0905    guaiFENesin (MUCINEX) 12 hr tablet 1,200 mg, 1,200 mg, Oral, Q12H ELIANE, Ivan Mcdonnell DO, 1,200 mg at 04/20/25 2150    heparin (porcine) subcutaneous injection 7,500 Units, 7,500 Units, Subcutaneous, Q8H ELIANE, SHAY Sethi, 7,500 Units at 04/19/25 0603    insulin glargine (LANTUS) subcutaneous injection 5 Units 0.05 mL, 5 Units, Subcutaneous, HS, Ivan Mcdonnell DO, 5 Units at 04/20/25 2150    insulin lispro (HumALOG/ADMELOG) 100 units/mL subcutaneous injection 1-5 Units, 1-5 Units, Subcutaneous, HS, SHAY Sethi, 2 Units at 04/20/25 2150    insulin lispro (HumALOG/ADMELOG) 100 units/mL subcutaneous injection 2-12 Units, 2-12 Units, Subcutaneous, TID AC, 4 Units at 04/20/25 1154 **AND** Fingerstick Glucose (POCT), , , TID AC, SHAY Sethi    ipratropium (ATROVENT) 0.02 % inhalation solution 0.5 mg, 0.5 mg, Nebulization, TID, SHAY Sethi, 0.5 mg at 04/21/25 0744    levalbuterol (XOPENEX) inhalation solution 1.25 mg, 1.25 mg, Nebulization, TID, SHAY Sethi, 1.25 mg at 04/21/25 0744    multivitamin-minerals (CENTRUM) tablet 1 tablet, 1 tablet, Oral, Daily,  SHAY Sethi, 1 tablet at 04/20/25 0905    pantoprazole (PROTONIX) EC tablet 40 mg, 40 mg, Oral, Daily Before Breakfast, BeckySHAY Nicole, 40 mg at 04/21/25 0608    simethicone (MYLICON) chewable tablet 80 mg, 80 mg, Oral, Q6H PRN, Ivan Mcdonnell DO, 80 mg at 04/19/25 1715    spironolactone (ALDACTONE) tablet 25 mg, 25 mg, Oral, Daily, BeckySHAY Nicole, 25 mg at 04/20/25 0905    thiamine tablet 100 mg, 100 mg, Oral, Daily, SHAY Sethi, 100 mg at 04/20/25 0905      Labs & Results:        Results from last 7 days   Lab Units 04/21/25  0528 04/20/25  0446 04/19/25  0612   WBC Thousand/uL 9.32 8.91 8.93   HEMOGLOBIN g/dL 11.7* 12.0 11.4*   HEMATOCRIT % 34.8* 35.8* 33.5*   PLATELETS Thousands/uL 217 216 208         Results from last 7 days   Lab Units 04/21/25  0528 04/20/25  0446 04/19/25  0614 04/15/25  0506 04/14/25  1842   POTASSIUM mmol/L 3.4* 3.6 3.6   < > 3.4*   CHLORIDE mmol/L 89* 89* 92*   < > 98   CO2 mmol/L 35* 34* 34*   < > 29   BUN mg/dL 23 21 21   < > 13   CREATININE mg/dL 2.04* 1.66* 1.55*   < > 1.91*   CALCIUM mg/dL 8.8 9.0 8.7   < > 8.5   ALK PHOS U/L  --   --   --   --  89   ALT U/L  --   --   --   --  9   AST U/L  --   --   --   --  15    < > = values in this interval not displayed.           Counseling / Coordination of Care  Total floor / unit time spent today 25 minutes.  Greater than 50% of total time was spent with the patient and / or family counseling and / or coordination of care.  A description of the counseling / coordination of care: 15.      Thank you for the opportunity to participate in the care of this patient.  DAVID NG D.O.  DIRECTOR OF HEART FAILURE/ PULMONARY HYPERTENSION  MEDICAL DIRECTOR OF LVAD PROGRAM  [facility]

## 2025-04-21 NOTE — ASSESSMENT & PLAN NOTE
Creatinine appears at baseline (1.6-2.3)  Monitor with diuresis  Will need to accept higher Cr to achieve euvolemia

## 2025-04-21 NOTE — ASSESSMENT & PLAN NOTE
Lab Results   Component Value Date    HGBA1C 6.4 03/13/2025     Resume p.o. meds on discharge  Accu-Chek, SSI  Increased lantus 10 qhs,  add lispro 3 units with meals ISS

## 2025-04-21 NOTE — CASE MANAGEMENT
Case Management Discharge Planning Note    Patient name Elly Chong  Location S /S -01 MRN [id number]  : 1965 Date 2025       Current Admission Date: 2025  Current Admission Diagnosis:Acute on chronic diastolic congestive heart failure (HCC)   Patient Active Problem List    Diagnosis Date Noted Date Diagnosed    Electrolyte abnormality 2025     Generalized abdominal pain 2025     B12 deficiency 2025     CKD (chronic kidney disease) 04/15/2025     Chronic gout without tophus 2024     Encounter for immunization 2024     Cellulitis 2024     Intermittent asthma 2024     Mild asthma with acute exacerbation 2024     Current smoker 2024     Arthralgia 2024     AMANDO (obstructive sleep apnea) 2023     (HFpEF) heart failure with preserved ejection fraction (HCC) 2023     Reactive airway disease without complication 2023     Acute midline low back pain without sciatica 2023     Acute gout of right hand 10/17/2022     Diabetes mellitus (HCC) 2022     Impingement syndrome of left shoulder 2022     Acute on chronic diastolic congestive heart failure (HCC) 2021     Arthritis of right wrist 2021     Onychomycosis 2020     DELFINO (acute kidney injury) ([facility]) 2020     Alcohol abuse 2020     Hypomagnesemia 2020     CHF (congestive heart failure) ([facility]) 2020     Medical non-compliance 10/21/2019     Microalbuminuria 2015     Uncontrolled type 2 diabetes mellitus with hyperglycemia ([facility]) 2014     Elevated lipoprotein(a) 2013     Obesity 2013     Essential hypertension 2012       LOS (days): 7  Geometric Mean LOS (GMLOS) (days):   Days to GMLOS:     OBJECTIVE:  Risk of Unplanned Readmission Score: 15.19         Current admission status: Inpatient   Preferred Pharmacy:   [facility]/pharmacy #[id number] - [place] - [address]  [address] [facility]  [place] [address]  Phone: [phone] Fax: [phone]    [facility] - [place], [place] - [facility] [address]  [address] [address]  Phone: [phone] Fax: [phone]    Primary Care Provider: Linden Roque MD    Primary Insurance: [employer]  Secondary Insurance:     DISCHARGE DETAILS:    Additional Comments: Per [facility] provider, Pt is not yet medically ready for discharge. Pt is still on a bumex drip.  CM will continue to follow for any dispo needs.       no

## 2025-04-21 NOTE — ASSESSMENT & PLAN NOTE
Presents due to lower extremity edema, lower extremity pain, increased abdominal girth, shortness of breath and chest pain  Medication nonadherence contributing  Echo 11/23: EF 60%, grade 1 diastolic dysfunction  PTA: Torsemide 40 mg daily and Aldactone 25 mg daily  Stopped taking these on approximately 4/7 due to going on vacation    Plan    Cardiology consult  Continue Aldactone 25 mg daily  Cardiology consulted  4/18 Lasix 100 mg IV twice daily switch to IV Bumex 2 mg an hour by cardiology due to poor output  Continue with IV bumex gtt   Will need to accept higher creatinine to achieve euvolemia

## 2025-04-22 PROBLEM — R93.5 ABNORMAL CT OF THE ABDOMEN: Status: ACTIVE | Noted: 2025-04-19

## 2025-04-22 LAB
ALBUMIN SERPL BCG-MCNC: 3.3 G/DL (ref 3.5–5)
ANION GAP SERPL CALCULATED.3IONS-SCNC: 11 MMOL/L (ref 4–13)
BUN SERPL-MCNC: 27 MG/DL (ref 5–25)
CALCIUM ALBUM COR SERPL-MCNC: 9.2 MG/DL (ref 8.3–10.1)
CALCIUM SERPL-MCNC: 8.6 MG/DL (ref 8.4–10.2)
CHLORIDE SERPL-SCNC: 89 MMOL/L (ref 96–108)
CO2 SERPL-SCNC: 32 MMOL/L (ref 21–32)
CREAT SERPL-MCNC: 2.12 MG/DL (ref 0.6–1.3)
ERYTHROCYTE [DISTWIDTH] IN BLOOD BY AUTOMATED COUNT: 12.9 % (ref 11.6–15.1)
GFR SERPL CREATININE-BSD FRML MDRD: 32 ML/MIN/1.73SQ M
GLUCOSE SERPL-MCNC: 177 MG/DL (ref 65–140)
GLUCOSE SERPL-MCNC: 196 MG/DL (ref 65–140)
GLUCOSE SERPL-MCNC: 210 MG/DL (ref 65–140)
GLUCOSE SERPL-MCNC: 222 MG/DL (ref 65–140)
GLUCOSE SERPL-MCNC: 250 MG/DL (ref 65–140)
HCT VFR BLD AUTO: 34.7 % (ref 36.5–49.3)
HGB BLD-MCNC: 11.6 G/DL (ref 12–17)
MAGNESIUM SERPL-MCNC: 1.7 MG/DL (ref 1.9–2.7)
MCH RBC QN AUTO: 33.1 PG (ref 26.8–34.3)
MCHC RBC AUTO-ENTMCNC: 33.4 G/DL (ref 31.4–37.4)
MCV RBC AUTO: 99 FL (ref 82–98)
PHOSPHATE SERPL-MCNC: 3.8 MG/DL (ref 2.3–4.1)
PLATELET # BLD AUTO: 215 THOUSANDS/UL (ref 149–390)
PMV BLD AUTO: 11 FL (ref 8.9–12.7)
POTASSIUM SERPL-SCNC: 3.5 MMOL/L (ref 3.5–5.3)
RBC # BLD AUTO: 3.5 MILLION/UL (ref 3.88–5.62)
SODIUM SERPL-SCNC: 132 MMOL/L (ref 135–147)
WBC # BLD AUTO: 8.88 THOUSAND/UL (ref 4.31–10.16)

## 2025-04-22 PROCEDURE — 85027 COMPLETE CBC AUTOMATED: CPT | Performed by: STUDENT IN AN ORGANIZED HEALTH CARE EDUCATION/TRAINING PROGRAM

## 2025-04-22 PROCEDURE — 83735 ASSAY OF MAGNESIUM: CPT | Performed by: STUDENT IN AN ORGANIZED HEALTH CARE EDUCATION/TRAINING PROGRAM

## 2025-04-22 PROCEDURE — 80069 RENAL FUNCTION PANEL: CPT | Performed by: STUDENT IN AN ORGANIZED HEALTH CARE EDUCATION/TRAINING PROGRAM

## 2025-04-22 PROCEDURE — 94760 N-INVAS EAR/PLS OXIMETRY 1: CPT

## 2025-04-22 PROCEDURE — 94640 AIRWAY INHALATION TREATMENT: CPT

## 2025-04-22 PROCEDURE — 99232 SBSQ HOSP IP/OBS MODERATE 35: CPT | Performed by: INTERNAL MEDICINE

## 2025-04-22 PROCEDURE — 82948 REAGENT STRIP/BLOOD GLUCOSE: CPT

## 2025-04-22 RX ORDER — MAGNESIUM SULFATE HEPTAHYDRATE 40 MG/ML
4 INJECTION, SOLUTION INTRAVENOUS ONCE
Status: COMPLETED | OUTPATIENT
Start: 2025-04-22 | End: 2025-04-22

## 2025-04-22 RX ORDER — ACETAMINOPHEN 325 MG/1
975 TABLET ORAL EVERY 6 HOURS PRN
Status: DISCONTINUED | OUTPATIENT
Start: 2025-04-22 | End: 2025-04-23 | Stop reason: HOSPADM

## 2025-04-22 RX ORDER — BUMETANIDE 1 MG/1
2 TABLET ORAL 2 TIMES DAILY
Status: DISCONTINUED | OUTPATIENT
Start: 2025-04-22 | End: 2025-04-23

## 2025-04-22 RX ADMIN — IPRATROPIUM BROMIDE 0.5 MG: 0.5 SOLUTION RESPIRATORY (INHALATION) at 13:41

## 2025-04-22 RX ADMIN — LEVALBUTEROL HYDROCHLORIDE 1.25 MG: 1.25 SOLUTION RESPIRATORY (INHALATION) at 07:34

## 2025-04-22 RX ADMIN — IPRATROPIUM BROMIDE 0.5 MG: 0.5 SOLUTION RESPIRATORY (INHALATION) at 20:42

## 2025-04-22 RX ADMIN — BENZONATATE 100 MG: 100 CAPSULE ORAL at 17:07

## 2025-04-22 RX ADMIN — INSULIN LISPRO 2 UNITS: 100 INJECTION, SOLUTION INTRAVENOUS; SUBCUTANEOUS at 08:25

## 2025-04-22 RX ADMIN — BUMETANIDE 2 MG: 1 TABLET ORAL at 17:07

## 2025-04-22 RX ADMIN — BENZONATATE 100 MG: 100 CAPSULE ORAL at 08:25

## 2025-04-22 RX ADMIN — ALLOPURINOL 100 MG: 100 TABLET ORAL at 08:25

## 2025-04-22 RX ADMIN — PANTOPRAZOLE SODIUM 40 MG: 40 TABLET, DELAYED RELEASE ORAL at 05:36

## 2025-04-22 RX ADMIN — COLCHICINE 0.6 MG: 0.6 TABLET ORAL at 08:25

## 2025-04-22 RX ADMIN — INSULIN LISPRO 3 UNITS: 100 INJECTION, SOLUTION INTRAVENOUS; SUBCUTANEOUS at 08:26

## 2025-04-22 RX ADMIN — INSULIN LISPRO 4 UNITS: 100 INJECTION, SOLUTION INTRAVENOUS; SUBCUTANEOUS at 17:08

## 2025-04-22 RX ADMIN — GUAIFENESIN 1200 MG: 600 TABLET ORAL at 21:51

## 2025-04-22 RX ADMIN — LEVALBUTEROL HYDROCHLORIDE 1.25 MG: 1.25 SOLUTION RESPIRATORY (INHALATION) at 20:42

## 2025-04-22 RX ADMIN — INSULIN LISPRO 3 UNITS: 100 INJECTION, SOLUTION INTRAVENOUS; SUBCUTANEOUS at 12:07

## 2025-04-22 RX ADMIN — Medication 800 MG: at 17:07

## 2025-04-22 RX ADMIN — FAMOTIDINE 20 MG: 20 TABLET, FILM COATED ORAL at 21:51

## 2025-04-22 RX ADMIN — FOLIC ACID 1 MG: 1 TABLET ORAL at 08:25

## 2025-04-22 RX ADMIN — MULTIPLE VITAMINS W/ MINERALS TAB 1 TABLET: TAB ORAL at 08:25

## 2025-04-22 RX ADMIN — COLCHICINE 0.6 MG: 0.6 TABLET ORAL at 17:07

## 2025-04-22 RX ADMIN — FLUTICASONE FUROATE AND VILANTEROL TRIFENATATE 1 PUFF: 200; 25 POWDER RESPIRATORY (INHALATION) at 08:27

## 2025-04-22 RX ADMIN — SPIRONOLACTONE 25 MG: 25 TABLET, FILM COATED ORAL at 08:25

## 2025-04-22 RX ADMIN — BUMETANIDE 2 MG: 1 TABLET ORAL at 08:29

## 2025-04-22 RX ADMIN — INSULIN LISPRO 3 UNITS: 100 INJECTION, SOLUTION INTRAVENOUS; SUBCUTANEOUS at 17:08

## 2025-04-22 RX ADMIN — MAGNESIUM SULFATE HEPTAHYDRATE 4 G: 40 INJECTION, SOLUTION INTRAVENOUS at 08:26

## 2025-04-22 RX ADMIN — INSULIN GLARGINE 10 UNITS: 100 INJECTION, SOLUTION SUBCUTANEOUS at 21:51

## 2025-04-22 RX ADMIN — CARVEDILOL 12.5 MG: 12.5 TABLET, FILM COATED ORAL at 08:25

## 2025-04-22 RX ADMIN — GUAIFENESIN 1200 MG: 600 TABLET ORAL at 08:25

## 2025-04-22 RX ADMIN — CARVEDILOL 12.5 MG: 12.5 TABLET, FILM COATED ORAL at 17:07

## 2025-04-22 RX ADMIN — Medication 800 MG: at 08:25

## 2025-04-22 RX ADMIN — BENZONATATE 100 MG: 100 CAPSULE ORAL at 21:51

## 2025-04-22 RX ADMIN — INSULIN LISPRO 6 UNITS: 100 INJECTION, SOLUTION INTRAVENOUS; SUBCUTANEOUS at 12:07

## 2025-04-22 RX ADMIN — Medication 100 MG: at 08:25

## 2025-04-22 RX ADMIN — IPRATROPIUM BROMIDE 0.5 MG: 0.5 SOLUTION RESPIRATORY (INHALATION) at 07:34

## 2025-04-22 RX ADMIN — INSULIN LISPRO 1 UNITS: 100 INJECTION, SOLUTION INTRAVENOUS; SUBCUTANEOUS at 21:51

## 2025-04-22 RX ADMIN — LEVALBUTEROL HYDROCHLORIDE 1.25 MG: 1.25 SOLUTION RESPIRATORY (INHALATION) at 13:41

## 2025-04-22 NOTE — WOUND OSTOMY CARE
Progress Note - Wound   Elly Chong 60 y.o. male MRN: [id number]  Unit/Bed#: S -01 Encounter: [id number]        Assessment:   Patient is a 61 yo male that was admitted to [facility]  for treatment of acute on chronic congestive heart failure. Patient is seen for wound care follow-up.Patient is alert and oriented times three and agreeable to assessment. Patient is independent for turning and repositioning. Patient is continent of bowel and bladder, independent with meals. On assessment, patient is in bed with pillows in place for offloading.    Findings:  B/L heels are dry intact and danae with no skin loss or wounds present. Recommend preventative Hydraguard Cream and proper offloading/ repositioning.      Right posterior ankle - oval shaped full thickness wound. Wound bed with 20% yellow moistly adherent slough tissue, 80% pink tissue. Sabrina wound is dry and scaly. Small amount of yellow foul smelling drainage present. Patient endorses that wound was large fluid filled blister that ruptured about a week before admission.  Likely vascular/diabetic component. Lower extremity noted to be swollen and edematous. Ambulatory referral to outpatient wound care placed. Recommend silver alginate and dry sterile dressing to wound with ACE wrap.     No induration, fluctuance, odor, warmth/temperature differences, redness, or purulence noted to the above noted wounds and skin areas assessed. New dressings applied per orders listed below. Patient tolerated well- no s/s of non-verbal pain or discomfort observed during the encounter. Bedside nurse aware of plan of care. See flow sheets for more detailed assessment findings.      Orders listed below and wound care will continue to follow, call or Secure Chat with questions.     Skin care plans:  1-Hydraguard to bilateral sacrum, buttock and heels BID and PRN  2-Elevate heels to offload pressure.  3-Ehob cushion in chair when out of bed.  4-Moisturize skin daily with skin  nourishing cream.  5-Turn/reposition q2h for pressure re-distribution on skin.  6-R ankle: Irrigate with NSS. Pat dry. Cover with Silver Alginate and ABD. Wrap with Berna. Change every other day or PRN for soilage/dislodgement.  7-Schedule Follow-up Outpatient Wound Appointment. Ambulatory Referral Placed  8-B/L Lower Legs: Apply ACE wrap for compression. Wrap from base of toes to behind knees. Wrap Daily and keep lower extremities elevated.       WOUNDS:  Wound 04/15/25 Ankle Posterior;Right (Active)   Wound Image   04/22/25 1044   Wound Description Pink;Yellow;Slough;Drainage 04/22/25 1044   Non-staged Wound Description Partial thickness 04/22/25 1044   Wound Length (cm) 3 cm 04/22/25 1044   Wound Width (cm) 3 cm 04/22/25 1044   Wound Depth (cm) 0.2 cm 04/22/25 1044   Wound Surface Area (cm^2) 9 cm^2 04/22/25 1044   Wound Volume (cm^3) 1.8 cm^3 04/22/25 1044   Calculated Wound Volume (cm^3) 1.8 cm^3 04/22/25 1044   Change in Wound Size % 76.62 04/22/25 1044   Drainage Amount Small 04/22/25 1044   Drainage Description Yellow;Foul smelling 04/22/25 1044   Sabrina-wound Assessment Dry;Scaly 04/22/25 1044   Treatments Cleansed;Irrigation with NSS;Site care 04/22/25 1044   Dressing Calcium Alginate with Silver;ABD;Dry dressing 04/22/25 1044   Wound packed? No 04/19/25 0845   Dressing Changed New 04/22/25 1044   Patient Tolerance Tolerated well 04/22/25 1044   Dressing Status Clean;Dry;Intact 04/22/25 1044          Leila Chavez, SUNILN, RN, CCRN

## 2025-04-22 NOTE — ASSESSMENT & PLAN NOTE
B12 noted to be significantly low at 112 on admission.  Finished 3 days of IM cyanocobalamin injections on 4/18/2025  May benefit from oral vitamin B12 as an outpatient.  Alcohol cessation also advised.

## 2025-04-22 NOTE — CASE MANAGEMENT
Case Management Assessment    Patient name Elly Chong  Location S /S -01 MRN [id number]  : 1965 Date 2025       Current Admission Date: 2025  Current Admission Diagnosis:Acute on chronic diastolic congestive heart failure (HCC)   Patient Active Problem List    Diagnosis Date Noted Date Diagnosed    Electrolyte abnormality 2025     Generalized abdominal pain 2025     B12 deficiency 2025     CKD (chronic kidney disease) 04/15/2025     Chronic gout without tophus 2024     Encounter for immunization 2024     Cellulitis 2024     Intermittent asthma 2024     Mild asthma with acute exacerbation 2024     Current smoker 2024     Arthralgia 2024     AMANDO (obstructive sleep apnea) 2023     (HFpEF) heart failure with preserved ejection fraction (HCC) 2023     Reactive airway disease without complication 2023     Acute midline low back pain without sciatica 2023     Acute gout of right hand 10/17/2022     Diabetes mellitus (HCC) 2022     Impingement syndrome of left shoulder 2022     Acute on chronic diastolic congestive heart failure (HCC) 2021     Arthritis of right wrist 2021     Onychomycosis 2020     DELFINO (acute kidney injury) ([facility]) 2020     Alcohol abuse 2020     Hypomagnesemia 2020     CHF (congestive heart failure) ([facility]) 2020     Medical non-compliance 10/21/2019     Microalbuminuria 2015     Uncontrolled type 2 diabetes mellitus with hyperglycemia ([facility]) 2014     Elevated lipoprotein(a) 2013     Obesity 2013     Essential hypertension 2012       LOS (days): 8  Geometric Mean LOS (GMLOS) (days):   Days to GMLOS:     OBJECTIVE:    Risk of Unplanned Readmission Score: 17.97         Current admission status: Inpatient       Preferred Pharmacy:   [facility]/pharmacy #[id number] - [place] - [address]  [address] [facility]  [place] [address]  Phone: [phone] Fax: [phone]    [facility] - [place], [place] - [facility] [address]  [address]  [facility] [address]  [facility] [address]  Phone: [phone] Fax: [phone]    Primary Care Provider: Linden Roque MD    Primary Insurance: [employer]IG[employer]  Secondary Insurance:     ASSESSMENT:  Active Health Care Proxies    There are no active Health Care Proxies on file.                      Patient Information  Admitted from:: Home  Mental Status: Alert  During Assessment patient was accompanied by: Not accompanied during assessment  Assessment information provided by:: Patient  Primary Caregiver: Self  Support Systems: Family members  County of Residence: [place]  [place] you [place]  Home entry access options. Select all that apply.: Stairs  Number of steps to enter home.: 3 (2 ANASTASIIA front entry, 3 ANASTASIIA back entry.)  Type of Current Residence: [facility]  Living Arrangements: Lives w/ Spouse/significant other, Lives w/ Son    Activities of Daily Living Prior to Admission  Functional Status: Independent  Completes ADLs independently?: Yes  Ambulates independently?: Yes  Does patient use assisted devices?: No  Does patient currently own DME?: Yes  What DME does the patient currently own?: [employer]  Does patient have a history of Outpatient Therapy (PT/OT)?: Yes  Does the patient have a history of Short-Term Rehab?: No  Does patient have a history of HHC?: No  Does patient currently have HHC?: No         Patient Information Continued  Does patient have prescription coverage?: Yes  Can the patient afford their medications and any related supplies (such as glucometers or test strips)?: Yes  Does patient receive dialysis treatments?: No         Means of Transportation  Means of Transport to Appts:: Drives Self    CM met w/ patient at bedside re: assessment. Pt provided assessment info. Pt lives w/ wife and son in ran home, 2 ANASTASIIA front entry and 3 ANASTASIIA back entry. Pt relayed he is  independent w/ ADLS, does not use any DME at baseline, has hx of OPPT after previous knee surgery several years ago and drives self to appts. Pt confirmed PCP Mya and preferred pharmacy [facility] for immediate medication needs. Pt relayed family to provide transport to home when ready for d/c. No CM d/c needs currently identified, CM remains available.     Social Determinants of Health (SDOH)      Flowsheet Row Most Recent Value   Housing Stability    In the last 12 months, was there a time when you were not able to pay the mortgage or rent on time? N   At any time in the past 12 months, were you homeless or living in a shelter (including now)? N   Transportation Needs    In the past 12 months, has lack of transportation kept you from medical appointments or from getting medications? no   In the past 12 months, has lack of transportation kept you from meetings, work, or from getting things needed for daily living? No   Food Insecurity    Within the past 12 months, you worried that your food would run out before you got the money to buy more. Never true   Within the past 12 months, the food you bought just didn't last and you didn't have money to get more. Never true   Utilities    In the past 12 months has the electric, gas, oil, or water company threatened to shut off services in your home? No

## 2025-04-22 NOTE — PROGRESS NOTES
Heart Failure/ Pulmonary Hypertension Progress Note - Elly Chong 60 y.o. male MRN: [id number]    Unit/Bed#: S -01 Encounter: [id number]      Assessment:    Principal Problem:    Acute on chronic diastolic congestive heart failure (HCC)  Active Problems:    Essential hypertension    Uncontrolled type 2 diabetes mellitus with hyperglycemia (HCC)    Medical non-compliance    Alcohol abuse    Current smoker    CKD (chronic kidney disease)    B12 deficiency    Generalized abdominal pain    Electrolyte abnormality      Interim:  Ins/outs: 660/1450: -790 on bumex gtt 2 mg/hr  Weight: 268 lbs (291 lbs)  Tele: 80's  MAPs:  mmHg  K 3.4  Cr 2- up from 1.6  Mag 1.7    # Acute on Chronic HFpEF, Stage C  Diuretic: Torsemide 40 mg daily  MRA: spironolactone 25 mg daily  Weight: 268 lbs  BNP    Studies- personally reviewed by me  EKG- SR with PVCs    Echo 11/21/23  LVEF: 60%, mild LVH  LVIDd: 4.3 cm  RV: normal    NM MPI 3/6/23: diaphragmatic artifact, no scar or ischemia, EF : 49%    Echo 3/5/23:  LVEF: 50%  Hypo inferolateral    # HTN- coreg 12.5 mg BID  # CKD 3 B  Cr 1.86 up from 1.36 on 11/16/24, down to 1.55 today  # AMANDO  # reactive airway disease  # GERD  # ETOH- 3 beers, 2 shots daily  # DM2  # current smoker    Plan:  Change bumex gtt to oral bumex   Check response  Replace Mg    MAPs  80-90'smmHg  Continue spironolactone 25 mg daily          Review of Systems   Constitutional:  Negative for activity change, appetite change, fatigue and unexpected weight change.   HENT:  Negative for congestion and nosebleeds.    Eyes: Negative.    Respiratory:  Negative for cough, chest tightness and shortness of breath.    Cardiovascular:  Negative for chest pain, palpitations and leg swelling.   Gastrointestinal:  Negative for abdominal distention.   Endocrine: Negative.    Genitourinary: Negative.    Musculoskeletal: Negative.    Skin: Negative.    Neurological:  Negative for dizziness, syncope and weakness.    Hematological: Negative.    Psychiatric/Behavioral: Negative.          Central Line (day, reason):  Santiago catheter (day, reason):    Vitals: Blood pressure 145/84, pulse 81, temperature 98.4 °F (36.9 °C), resp. rate 18, weight 122 kg (268 lb 11.9 oz), SpO2 95%., Body mass index is 42.09 kg/m²., I/O last 3 completed shifts:  In: 1020 [P.O.:1020]  Out: 3105 [Urine:3105]  No intake/output data recorded.  Wt Readings from Last 3 Encounters:   04/22/25 122 kg (268 lb 11.9 oz)   03/13/25 126 kg (277 lb 4 oz)   11/27/24 117 kg (258 lb 12.8 oz)       Intake/Output Summary (Last 24 hours) at 4/22/2025 0815  Last data filed at 4/22/2025 0452  Gross per 24 hour   Intake 660 ml   Output 1450 ml   Net -790 ml     I/O last 3 completed shifts:  In: 1020 [P.O.:1020]  Out: 3105 [Urine:3105]    No significant arrhythmias seen on telemetry review.       Physical Exam:  Vitals:    04/21/25 2152 04/22/25 0454 04/22/25 0735 04/22/25 0806   BP: 121/79   145/84   BP Location: Right arm      Pulse: 81      Resp:       Temp: 97.6 °F (36.4 °C)   98.4 °F (36.9 °C)   TempSrc:       SpO2: 96%  95%    Weight:  122 kg (268 lb 11.9 oz)         GEN: Elly Chong appears well, alert and oriented x 3, pleasant and cooperative   HEENT: pupils equal, round, and reactive to light; extraocular muscles intact  NECK: supple, no carotid bruits   HEART: regular rhythm, normal S1 and S2, no murmurs, clicks, gallops or rubs, JVP is  up  LUNGS: clear to auscultation bilaterally; no wheezes, rales, or rhonchi   ABDOMEN: normal bowel sounds, soft, no tenderness, no distention  EXTREMITIES: peripheral pulses normal; no clubbing, cyanosis, ++ edema  NEURO: no focal findings   SKIN: normal without suspicious lesions on exposed skin      Current Facility-Administered Medications:     albuterol (PROVENTIL HFA,VENTOLIN HFA) inhaler 2 puff, 2 puff, Inhalation, Q4H PRN, Ivan Mcdonnell DO    allopurinol (ZYLOPRIM) tablet 100 mg, 100 mg, Oral, Daily, SHAY Sethi, 100  mg at 04/21/25 0905    benzonatate (TESSALON PERLES) capsule 100 mg, 100 mg, Oral, TID, Ivan Mcdonnell DO, 100 mg at 04/21/25 2108    bumetanide (BUMEX) 12.5 mg infusion 50 mL, 2 mg/hr, Intravenous, Continuous, Paul Waterman DO, Last Rate: 8 mL/hr at 04/21/25 2108, 2 mg/hr at 04/21/25 2108    carvedilol (COREG) tablet 12.5 mg, 12.5 mg, Oral, BID With Meals, Paul Waterman DO, 12.5 mg at 04/21/25 1656    colchicine (COLCRYS) tablet 0.6 mg, 0.6 mg, Oral, BID, SHAY Sethi, 0.6 mg at 04/21/25 1656    famotidine (PEPCID) tablet 20 mg, 20 mg, Oral, HS, SHAY Sethi, 20 mg at 04/21/25 2108    fluticasone-vilanterol 200-25 mcg/actuation 1 puff, 1 puff, Inhalation, Daily, SHAY Sethi, 1 puff at 04/21/25 0907    folic acid (FOLVITE) tablet 1 mg, 1 mg, Oral, Daily, SHAY Sethi, 1 mg at 04/21/25 0905    guaiFENesin (MUCINEX) 12 hr tablet 1,200 mg, 1,200 mg, Oral, Q12H ELIANE, Ivan Mcdonnell DO, 1,200 mg at 04/21/25 2108    heparin (porcine) subcutaneous injection 7,500 Units, 7,500 Units, Subcutaneous, Q8H [facility], SHAY Sethi, 7,500 Units at 04/19/25 0603    insulin glargine (LANTUS) subcutaneous injection 10 Units 0.1 mL, 10 Units, Subcutaneous, HS, Ivan Mcdonnell DO, 10 Units at 04/21/25 2108    insulin lispro (HumALOG/ADMELOG) 100 units/mL subcutaneous injection 1-5 Units, 1-5 Units, Subcutaneous, HS, SHAY Sethi, 1 Units at 04/21/25 2109    insulin lispro (HumALOG/ADMELOG) 100 units/mL subcutaneous injection 2-12 Units, 2-12 Units, Subcutaneous, TID AC, 2 Units at 04/21/25 1657 **AND** Fingerstick Glucose (POCT), , , TID AC, SHAY Sethi    insulin lispro (HumALOG/ADMELOG) 100 units/mL subcutaneous injection 3 Units, 3 Units, Subcutaneous, TID With Meals, Ivan Mcdonnell, DO, 3 Units at 04/21/25 1657    ipratropium (ATROVENT) 0.02 % inhalation solution 0.5 mg, 0.5 mg, Nebulization, TID, SHAY Sethi, 0.5 mg at 04/22/25 0734    levalbuterol (XOPENEX) inhalation solution 1.25 mg, 1.25 mg, Nebulization, TID,  SHAY Sethi, 1.25 mg at 04/22/25 0734    magnesium Oxide (MAG-OX) tablet 800 mg, 800 mg, Oral, BID, Ivan Mcdonnell DO, 800 mg at 04/21/25 1657    magnesium sulfate 4 g/100 mL IVPB (premix) 4 g, 4 g, Intravenous, Once, Walter Thomas DO    multivitamin-minerals (CENTRUM) tablet 1 tablet, 1 tablet, Oral, Daily, SHAY Sethi, 1 tablet at 04/21/25 0905    pantoprazole (PROTONIX) EC tablet 40 mg, 40 mg, Oral, Daily Before Breakfast, SHAY Sethi, 40 mg at 04/22/25 0536    simethicone (MYLICON) chewable tablet 80 mg, 80 mg, Oral, Q6H PRN, Ivan Mcdonnell DO, 80 mg at 04/19/25 1715    spironolactone (ALDACTONE) tablet 25 mg, 25 mg, Oral, Daily, SHAY Sethi, 25 mg at 04/21/25 0905    thiamine tablet 100 mg, 100 mg, Oral, Daily, SHAY Sethi, 100 mg at 04/21/25 0905      Labs & Results:        Results from last 7 days   Lab Units 04/22/25 0452 04/21/25  0528 04/20/25  0446   WBC Thousand/uL 8.88 9.32 8.91   HEMOGLOBIN g/dL 11.6* 11.7* 12.0   HEMATOCRIT % 34.7* 34.8* 35.8*   PLATELETS Thousands/uL 215 217 216         Results from last 7 days   Lab Units 04/22/25 0452 04/21/25  0528 04/20/25  0446   POTASSIUM mmol/L 3.5 3.4* 3.6   CHLORIDE mmol/L 89* 89* 89*   CO2 mmol/L 32 35* 34*   BUN mg/dL 27* 23 21   CREATININE mg/dL 2.12* 2.04* 1.66*   CALCIUM mg/dL 8.6 8.8 9.0           Counseling / Coordination of Care  Total floor / unit time spent today 25 minutes.  Greater than 50% of total time was spent with the patient and / or family counseling and / or coordination of care.  A description of the counseling / coordination of care: 15.      Thank you for the opportunity to participate in the care of this patient.  DAVID NG D.O.  DIRECTOR OF HEART FAILURE/ PULMONARY HYPERTENSION  MEDICAL DIRECTOR OF LVAD PROGRAM  [facility]

## 2025-04-22 NOTE — ASSESSMENT & PLAN NOTE
CT abd/pelvis 4/19/25 - No CT evidence of acute process within the abdomen or pelvis.  Round fluid attenuated structure within the pancreatic head measuring 1.2 cm which is not evident on prior CT of September 2020. Finding may represent pancreatic cyst versus cystic pancreatic ductal dilatation as there parenchymal calcifications which suggest a component of underlying chronic pancreatitis. Further characterization with nonemergent MRI abdomen with and without contrast is recommended.  Will need outpatient MRI abdomen which can be arranged by PCP    Monitor for any additional abdominal symptoms.

## 2025-04-22 NOTE — ASSESSMENT & PLAN NOTE
Weight loss is needed.  Outpatient follow-up with lifestyle modifications and increased exercise.  Consideration could also be made for bariatric team evaluation outpatient as well.

## 2025-04-22 NOTE — PROGRESS NOTES
Progress Note - Hospitalist   Name: Elly Chong 60 y.o. male I MRN: [id number]  Unit/Bed#: S -01 I Date of Admission: 4/14/2025   Date of Service: 4/22/2025 I Hospital Day: 8     Assessment & Plan  Acute on chronic diastolic congestive heart failure (HCC)  Background - presented due to lower extremity edema, lower extremity pain, increased abdominal girth, shortness of breath and chest pain.  He had stopped taking his outpatient diuretic on 4/7/2025 due to going on vacation.  He had previously been recommended to take torsemide 40 mg daily and Aldactone 25 mg daily.  Echo 11/23: EF 60%, grade 1 diastolic dysfunction  Diuretic -   Had significant improvement with IV Bumex infusion which was continued until the morning of 4/22/2025 when switched over to oral Bumex.  Currently, recommended for Bumex 2 mg twice daily.  Continues on spironolactone 25 mg by mouth daily.  Beta-blocker -carvedilol  ACEI / ARB -none  Other -none  2 g sodium restriction and fluid restriction of 1800 mL/day.  Monitor I's/O and daily weights.  BMP in AM.  Replace electrolytes as needed.  As he is off the Bumex infusion, no longer requiring telemetry.  Discontinue telemetry monitoring.  Cardiology heart failure team following.  Essential hypertension  Coreg increased during this admission to 12.5 mg twice daily by cardiology  Continue Aldactone 25 mg daily  Monitor blood pressures.  Uncontrolled type 2 diabetes mellitus with hyperglycemia ([facility])  Lab Results   Component Value Date    HGBA1C 6.4 03/13/2025     Inpatient regimen -   Lantus 10 units daily at bedtime  Lispro 3 units 3 times daily with meals.  Insulin sliding scale  Monitor blood sugars.  Resume oral antidiabetic medications at discharge.  Medical non-compliance  Continue providing diet and medication education.  Alcohol abuse  Reports often drinking 3 beers daily and 2 shots daily after work  CIWA protocol has been in place but as patient has been in the hospital for 8 days  we can discontinue this protocol  Continues on thiamine and folic acid supplementation and multivitamin.  Alcohol cessation recommended.  Current smoker  Reports 3 cigars daily  Nicotine replacement therapy currently declined.  Smoking cessation advised.  CKD (chronic kidney disease)  Creatinine appears at baseline (1.6-2.3)  Monitor with diuresis  May need to accept higher Cr to achieve euvolemia  Monitor I/O.  BMP in AM.  B12 deficiency  B12 noted to be significantly low at 112 on admission.  Finished 3 days of IM cyanocobalamin injections on 4/18/2025  May benefit from oral vitamin B12 as an outpatient.  Alcohol cessation also advised.  Abnormal CT of the abdomen  CT abd/pelvis 4/19/25 - No CT evidence of acute process within the abdomen or pelvis.  Round fluid attenuated structure within the pancreatic head measuring 1.2 cm which is not evident on prior CT of September 2020. Finding may represent pancreatic cyst versus cystic pancreatic ductal dilatation as there parenchymal calcifications which suggest a component of underlying chronic pancreatitis. Further characterization with nonemergent MRI abdomen with and without contrast is recommended.  Will need outpatient MRI abdomen which can be arranged by PCP    Monitor for any additional abdominal symptoms.  Electrolyte abnormality  Given 4 gram of iv magnesium on 4/22/2025.  Recheck BMP/magnesium in a.m. again.  Will likely improve now that patient is off of Bumex infusion.  Morbid obesity due to excess calories (HCC)  Weight loss is needed.  Outpatient follow-up with lifestyle modifications and increased exercise.  Consideration could also be made for bariatric team evaluation outpatient as well.    VTE Pharmacologic Prophylaxis: VTE Score: 5 High Risk (Score >/= 5) - Pharmacological DVT Prophylaxis Ordered: heparin. Sequential Compression Devices Ordered.    Mobility:   Basic Mobility Inpatient Raw Score: 24  JH-HLM Goal: 8: Walk 250 feet or more  JH-HLM  Achieved: 7: Walk 25 feet or more  JH-HLM Goal NOT achieved. Continue with multidisciplinary rounding and encourage appropriate mobility to improve upon JH-HLM goals.    Patient Centered Rounds: I performed bedside rounds with nursing staff today.   Discussions with Specialists or Other Care Team Provider: None    Education and Discussions with Family / Patient: Updated  (wife) via phone.    Current Length of Stay: 8 day(s)  Current Patient Status: Inpatient   Certification Statement: The patient will continue to require additional inpatient hospital stay due to monitoring with the transition from Bumex infusion to oral Bumex dosing.  Discharge Plan: Anticipate discharge tomorrow to home.    Code Status: Level 1 - Full Code    Subjective   The patient does state that he still has significant edema in the right lower extremity compared to the left lower extremity but states that it has been this way for at least 2 weeks or more.  He has had no injuries or surgeries to that right leg though.  Patient has no pain in the leg compared to the left leg.  The patient does feel like his breathing is doing better though.  He is on room air currently.  Even with mobility he is not becoming significantly short of breath.  He has no chest pain or palpitations.  No abdominal pain.  No nausea or vomiting.    Objective :  Temp:  [97.6 °F (36.4 °C)-99.1 °F (37.3 °C)] 98.4 °F (36.9 °C)  HR:  [80-84] 81  BP: (115-145)/(65-84) 145/84  Resp:  [18] 18  SpO2:  [94 %-96 %] 95 %  O2 Device: None (Room air)    Body mass index is 42.09 kg/m².     Input and Output Summary (last 24 hours):     Intake/Output Summary (Last 24 hours) at 4/22/2025 1434  Last data filed at 4/22/2025 0806  Gross per 24 hour   Intake 660 ml   Output 1100 ml   Net -440 ml       Physical Exam  There is a definite asymmetry in the edema of the lower extremities with the right lower extremity being 2+ edema in the left lower extremity being 1+ edema.   However, there is no temperature difference noted and no calf tenderness or varicosities in the right lower extremity.  No evidence of any rashes.  No severe ecchymoses at all.  The heart is with a regular rate and rhythm with normal S1 and S2 heart sounds.  The lungs are decreased towards the bases but otherwise clear without any wheezing, rhonchi, or rales.  Abdomen is soft, nondistended, nontender to palpation.  Patient is obese.  No rebound or guarding.  Moist oral mucosa.  Patient was sitting up at the edge of the bed eating lunch so he was in suboptimal position to allow for me to check for JVD accurately.  No cervical lymphadenopathy.  No focal neurological deficits.  Patient was awake alert and oriented x 3.    Lines/Drains:    Peripheral IV only.        Lab Results: I have reviewed the following results:   Results from last 7 days   Lab Units 04/22/25  0452   WBC Thousand/uL 8.88   HEMOGLOBIN g/dL 11.6*   HEMATOCRIT % 34.7*   PLATELETS Thousands/uL 215     Results from last 7 days   Lab Units 04/22/25  0452   SODIUM mmol/L 132*   POTASSIUM mmol/L 3.5   CHLORIDE mmol/L 89*   CO2 mmol/L 32   BUN mg/dL 27*   CREATININE mg/dL 2.12*   ANION GAP mmol/L 11   CALCIUM mg/dL 8.6   ALBUMIN g/dL 3.3*   GLUCOSE RANDOM mg/dL 177*         Results from last 7 days   Lab Units 04/22/25  1207 04/22/25  0814 04/21/25  2047 04/21/25  1643 04/21/25  1103 04/21/25  0729 04/20/25  2125 04/20/25  1144 04/20/25  0808 04/19/25  2104 04/19/25  1649 04/19/25  1134   POC GLUCOSE mg/dl 250* 196* 200* 195* 247* 180* 243* 232* 193* 257* 177* 234*               Recent Cultures (last 7 days):         Imaging Results Review: I reviewed radiology reports from this admission including: All imaging since admitted..    Last 24 Hours Medication List:     Current Facility-Administered Medications:     acetaminophen (TYLENOL) tablet 975 mg, Q6H PRN    albuterol (PROVENTIL HFA,VENTOLIN HFA) inhaler 2 puff, Q4H PRN    allopurinol (ZYLOPRIM) tablet  100 mg, Daily    benzonatate (TESSALON PERLES) capsule 100 mg, TID    bumetanide (BUMEX) tablet 2 mg, BID    carvedilol (COREG) tablet 12.5 mg, BID With Meals    colchicine (COLCRYS) tablet 0.6 mg, BID    famotidine (PEPCID) tablet 20 mg, HS    fluticasone-vilanterol 200-25 mcg/actuation 1 puff, Daily    folic acid (FOLVITE) tablet 1 mg, Daily    guaiFENesin (MUCINEX) 12 hr tablet 1,200 mg, Q12H ELIANE    heparin (porcine) subcutaneous injection 7,500 Units, Q8H ELIANE    insulin glargine (LANTUS) subcutaneous injection 10 Units 0.1 mL, HS    insulin lispro (HumALOG/ADMELOG) 100 units/mL subcutaneous injection 1-5 Units, HS    insulin lispro (HumALOG/ADMELOG) 100 units/mL subcutaneous injection 2-12 Units, TID AC **AND** Fingerstick Glucose (POCT), TID AC    insulin lispro (HumALOG/ADMELOG) 100 units/mL subcutaneous injection 3 Units, TID With Meals    ipratropium (ATROVENT) 0.02 % inhalation solution 0.5 mg, TID    levalbuterol (XOPENEX) inhalation solution 1.25 mg, TID    magnesium Oxide (MAG-OX) tablet 800 mg, BID    multivitamin-minerals (CENTRUM) tablet 1 tablet, Daily    pantoprazole (PROTONIX) EC tablet 40 mg, Daily Before Breakfast    simethicone (MYLICON) chewable tablet 80 mg, Q6H PRN    spironolactone (ALDACTONE) tablet 25 mg, Daily    thiamine tablet 100 mg, Daily    Administrative Statements   Today, Patient Was Seen By: Walter Thomas DO    **Please Note: This note may have been constructed using a voice recognition system.**

## 2025-04-22 NOTE — ASSESSMENT & PLAN NOTE
Reports 3 cigars daily  Nicotine replacement therapy currently declined.  Smoking cessation advised.

## 2025-04-22 NOTE — ASSESSMENT & PLAN NOTE
Given 4 gram of iv magnesium on 4/22/2025.  Recheck BMP/magnesium in a.m. again.  Will likely improve now that patient is off of Bumex infusion.

## 2025-04-22 NOTE — ASSESSMENT & PLAN NOTE
Reports often drinking 3 beers daily and 2 shots daily after work  CIWA protocol has been in place but as patient has been in the hospital for 8 days we can discontinue this protocol  Continues on thiamine and folic acid supplementation and multivitamin.  Alcohol cessation recommended.

## 2025-04-22 NOTE — ASSESSMENT & PLAN NOTE
Coreg increased during this admission to 12.5 mg twice daily by cardiology  Continue Aldactone 25 mg daily  Monitor blood pressures.

## 2025-04-22 NOTE — ASSESSMENT & PLAN NOTE
Creatinine appears at baseline (1.6-2.3)  Monitor with diuresis  May need to accept higher Cr to achieve euvolemia  Monitor I/O.  BMP in AM.

## 2025-04-22 NOTE — INCIDENTAL FINDINGS
The following findings require follow up:  Radiographic finding   Finding: Round fluid structure within the pancreatic head measuring 1.2 cm which is not evident on prior CT of September 2020. Finding may represent pancreatic cyst versus cystic pancreatic ductal dilatation but as there parenchymal calcifications, it may suggest a component of underlying chronic pancreatitis.    Follow up required: Further characterization with nonemergent MRI abdomen with and without contrast is recommended.    Follow up should be done within 1 month(s)    Please notify the following clinician to assist with the follow up:   Dr. Linden Roque    Incidental finding results were discussed with the Patient by Walter Thomas DO on 04/22/25.   They expressed understanding and all questions answered.

## 2025-04-22 NOTE — ASSESSMENT & PLAN NOTE
Lab Results   Component Value Date    HGBA1C 6.4 03/13/2025     Inpatient regimen -   Lantus 10 units daily at bedtime  Lispro 3 units 3 times daily with meals.  Insulin sliding scale  Monitor blood sugars.  Resume oral antidiabetic medications at discharge.

## 2025-04-22 NOTE — ASSESSMENT & PLAN NOTE
Background - presented due to lower extremity edema, lower extremity pain, increased abdominal girth, shortness of breath and chest pain.  He had stopped taking his outpatient diuretic on 4/7/2025 due to going on vacation.  He had previously been recommended to take torsemide 40 mg daily and Aldactone 25 mg daily.  Echo 11/23: EF 60%, grade 1 diastolic dysfunction  Diuretic -   Had significant improvement with IV Bumex infusion which was continued until the morning of 4/22/2025 when switched over to oral Bumex.  Currently, recommended for Bumex 2 mg twice daily.  Continues on spironolactone 25 mg by mouth daily.  Beta-blocker -carvedilol  ACEI / ARB -none  Other -none  2 g sodium restriction and fluid restriction of 1800 mL/day.  Monitor I's/O and daily weights.  BMP in AM.  Replace electrolytes as needed.  As he is off the Bumex infusion, no longer requiring telemetry.  Discontinue telemetry monitoring.  Cardiology heart failure team following.

## 2025-04-23 VITALS
DIASTOLIC BLOOD PRESSURE: 74 MMHG | OXYGEN SATURATION: 97 % | WEIGHT: 266.6 LBS | HEART RATE: 81 BPM | TEMPERATURE: 97.9 F | SYSTOLIC BLOOD PRESSURE: 119 MMHG | BODY MASS INDEX: 41.76 KG/M2 | RESPIRATION RATE: 16 BRPM

## 2025-04-23 LAB
ANION GAP SERPL CALCULATED.3IONS-SCNC: 10 MMOL/L (ref 4–13)
BUN SERPL-MCNC: 32 MG/DL (ref 5–25)
CALCIUM SERPL-MCNC: 9 MG/DL (ref 8.4–10.2)
CHLORIDE SERPL-SCNC: 88 MMOL/L (ref 96–108)
CO2 SERPL-SCNC: 35 MMOL/L (ref 21–32)
CREAT SERPL-MCNC: 2.27 MG/DL (ref 0.6–1.3)
GFR SERPL CREATININE-BSD FRML MDRD: 30 ML/MIN/1.73SQ M
GLUCOSE SERPL-MCNC: 154 MG/DL (ref 65–140)
GLUCOSE SERPL-MCNC: 166 MG/DL (ref 65–140)
GLUCOSE SERPL-MCNC: 215 MG/DL (ref 65–140)
MAGNESIUM SERPL-MCNC: 2 MG/DL (ref 1.9–2.7)
POTASSIUM SERPL-SCNC: 3.4 MMOL/L (ref 3.5–5.3)
SODIUM SERPL-SCNC: 133 MMOL/L (ref 135–147)

## 2025-04-23 PROCEDURE — 94640 AIRWAY INHALATION TREATMENT: CPT

## 2025-04-23 PROCEDURE — 99239 HOSP IP/OBS DSCHRG MGMT >30: CPT | Performed by: INTERNAL MEDICINE

## 2025-04-23 PROCEDURE — 99232 SBSQ HOSP IP/OBS MODERATE 35: CPT | Performed by: INTERNAL MEDICINE

## 2025-04-23 PROCEDURE — 82948 REAGENT STRIP/BLOOD GLUCOSE: CPT

## 2025-04-23 PROCEDURE — 80048 BASIC METABOLIC PNL TOTAL CA: CPT | Performed by: INTERNAL MEDICINE

## 2025-04-23 PROCEDURE — 94760 N-INVAS EAR/PLS OXIMETRY 1: CPT

## 2025-04-23 PROCEDURE — 83735 ASSAY OF MAGNESIUM: CPT | Performed by: INTERNAL MEDICINE

## 2025-04-23 RX ORDER — BUMETANIDE 1 MG/1
2 TABLET ORAL DAILY
Status: DISCONTINUED | OUTPATIENT
Start: 2025-04-23 | End: 2025-04-23

## 2025-04-23 RX ORDER — LANOLIN ALCOHOL/MO/W.PET/CERES
800 CREAM (GRAM) TOPICAL 2 TIMES DAILY
Qty: 28 TABLET | Refills: 0 | Status: SHIPPED | OUTPATIENT
Start: 2025-04-23 | End: 2025-04-30

## 2025-04-23 RX ORDER — CARVEDILOL 12.5 MG/1
12.5 TABLET ORAL 2 TIMES DAILY WITH MEALS
Qty: 60 TABLET | Refills: 0 | Status: SHIPPED | OUTPATIENT
Start: 2025-04-23

## 2025-04-23 RX ORDER — POTASSIUM CHLORIDE 1500 MG/1
40 TABLET, EXTENDED RELEASE ORAL ONCE
Status: COMPLETED | OUTPATIENT
Start: 2025-04-23 | End: 2025-04-23

## 2025-04-23 RX ORDER — BUMETANIDE 2 MG/1
2 TABLET ORAL 2 TIMES DAILY
Qty: 60 TABLET | Refills: 0 | Status: SHIPPED | OUTPATIENT
Start: 2025-04-24

## 2025-04-23 RX ADMIN — FLUTICASONE FUROATE AND VILANTEROL TRIFENATATE 1 PUFF: 200; 25 POWDER RESPIRATORY (INHALATION) at 08:36

## 2025-04-23 RX ADMIN — INSULIN LISPRO 3 UNITS: 100 INJECTION, SOLUTION INTRAVENOUS; SUBCUTANEOUS at 08:35

## 2025-04-23 RX ADMIN — INSULIN LISPRO 2 UNITS: 100 INJECTION, SOLUTION INTRAVENOUS; SUBCUTANEOUS at 08:35

## 2025-04-23 RX ADMIN — IPRATROPIUM BROMIDE 0.5 MG: 0.5 SOLUTION RESPIRATORY (INHALATION) at 07:53

## 2025-04-23 RX ADMIN — GUAIFENESIN 1200 MG: 600 TABLET ORAL at 08:35

## 2025-04-23 RX ADMIN — INSULIN LISPRO 4 UNITS: 100 INJECTION, SOLUTION INTRAVENOUS; SUBCUTANEOUS at 12:48

## 2025-04-23 RX ADMIN — POTASSIUM CHLORIDE 40 MEQ: 1500 TABLET, EXTENDED RELEASE ORAL at 12:48

## 2025-04-23 RX ADMIN — FOLIC ACID 1 MG: 1 TABLET ORAL at 08:35

## 2025-04-23 RX ADMIN — BENZONATATE 100 MG: 100 CAPSULE ORAL at 08:36

## 2025-04-23 RX ADMIN — COLCHICINE 0.6 MG: 0.6 TABLET ORAL at 08:36

## 2025-04-23 RX ADMIN — SPIRONOLACTONE 25 MG: 25 TABLET, FILM COATED ORAL at 08:35

## 2025-04-23 RX ADMIN — PANTOPRAZOLE SODIUM 40 MG: 40 TABLET, DELAYED RELEASE ORAL at 05:28

## 2025-04-23 RX ADMIN — Medication 800 MG: at 08:35

## 2025-04-23 RX ADMIN — ALLOPURINOL 100 MG: 100 TABLET ORAL at 08:35

## 2025-04-23 RX ADMIN — MULTIPLE VITAMINS W/ MINERALS TAB 1 TABLET: TAB ORAL at 08:35

## 2025-04-23 RX ADMIN — INSULIN LISPRO 3 UNITS: 100 INJECTION, SOLUTION INTRAVENOUS; SUBCUTANEOUS at 12:48

## 2025-04-23 RX ADMIN — Medication 100 MG: at 08:36

## 2025-04-23 RX ADMIN — LEVALBUTEROL HYDROCHLORIDE 1.25 MG: 1.25 SOLUTION RESPIRATORY (INHALATION) at 07:53

## 2025-04-23 RX ADMIN — CARVEDILOL 12.5 MG: 12.5 TABLET, FILM COATED ORAL at 08:36

## 2025-04-23 NOTE — DISCHARGE INSTR - AVS FIRST PAGE
Dear Elly Chong,     It was our pleasure to care for you here at [facility].  It is our hope that we were always able to exceed the expected standards for your care during your stay.  You were hospitalized due to heart failure exacerbation (flare up) due to missed meds / diet.  You were cared for on the south 3rd floor under the service of Walter Thomas DO with the [facility] who covers for your primary care physician (PCP), Linden Roque MD, while you were hospitalized.  If you have any questions or concerns related to this hospitalization, you may contact us at .  For follow up as well as medication refills, we recommend that you follow up with your primary care physician.  A registered nurse will reach out to you by phone within a few days after your discharge to answer any additional questions that you may have after going home.  However, at this time we provide for you here, the most important instructions / recommendations at discharge:     Notable Medication Adjustments -   Cardiologist has recommended Bumex (bumetanide) 2 mg by mouth twice daily.  We have also increased your carvedilol (Coreg) from 6.25 mg twice daily up to 12.5 mg daily.  If he still have the 6.25 mg pills at home you can take 2 of these at each dose to give a total of 12.5 mg until these are completed so that you do not waste them.  However, we did provide a prescription for the new dose as well.  To help keep the magnesium levels up for the next week while starting on the diuretic, we have provided magnesium oxide pills to be taken at a dose of 800 mg twice daily.  Do not use diclofenac (Voltaren) pills as these can worsen heart failure.  If you have pain, acetaminophen (Tylenol) would be preferred.  Testing Required after Discharge -   Blood work (basic metabolic panel and magnesium level) should be done within the next 1 week.  ** Please contact your PCP to  request testing orders for any of the testing recommended here **  Important follow up information -   It is important to follow-up with the heart doctor within the next 7 to 14 days.  Contact the heart doctor earlier, if needed, based on parameters noted below.  Other Instructions -   Weigh yourself every day with the same amount of clothing and if you have more than a 3 pound weight gain over 1 day or 5 pounds over 1 week, contact the heart failure provider for additional instructions on how to adjust your diuretic dosing.  Additionally if you have a significant and similar weight loss accompanied by dizziness or lightheadedness, this would be another reason to contact the heart failure doctor.  Please be sure to limit your sodium (salt) intake to under 2000 mg/day.  It is very important to read labels for hidden sodium inside of foods especially preprepared foods.  It is also important to limit your total fluid intake to under 1.8 L (1-1/2 quarts if this is easier) per day.  Please review this entire after visit summary as additional general instructions including medication list, appointments, activity, diet, any pertinent wound care, and other additional recommendations from your care team that may be provided for you.      Sincerely,     Walter Thomas, DO

## 2025-04-23 NOTE — ASSESSMENT & PLAN NOTE
Given 4 gram of iv magnesium on 4/22/2025.  No magnesium needed today as magnesium is 2.0.  Some of the recurrent hypomagnesemia was likely due to Bumex infusion.

## 2025-04-23 NOTE — DISCHARGE SUMMARY
Discharge Summary - Hospitalist   Name: Elly Chong 60 y.o. male I MRN: [id number]  Unit/Bed#: S -01 I Date of Admission: 4/14/2025   Date of Service: 4/23/2025 I Hospital Day: 9     Assessment & Plan  Acute on chronic diastolic congestive heart failure (HCC)  Background - presented due to lower extremity edema, lower extremity pain, increased abdominal girth, shortness of breath and chest pain.  He had stopped taking his outpatient diuretic on 4/7/2025 due to going on vacation.  He had previously been recommended to take torsemide 40 mg daily and Aldactone 25 mg daily.  Echo 11/23: EF 60%, grade 1 diastolic dysfunction  Diuretic -   Had significant improvement with IV Bumex infusion which was continued until the morning of 4/22/2025 when switched over to oral Bumex.  Currently, recommended for Bumex 2 mg twice daily.  Dosing was held today but recommended to resume at this dosing tomorrow, 4/24/2025.  Continues on spironolactone 25 mg by mouth daily.  Beta-blocker -carvedilol  ACEI / ARB -none  Other -none  2 g sodium restriction and fluid restriction of 1800 mL/day.  Monitor I's/O and daily weights.  BMP and magnesium should be repeated in the next 1 week.  Deferring to PCP to order this test as outpatient.  Replace electrolytes as needed.  Cardiology heart failure team following and approved plan for discharge today.  Essential hypertension  Coreg increased during this admission to 12.5 mg twice daily by cardiology  Continue Aldactone 25 mg daily  Monitor blood pressures.  Uncontrolled type 2 diabetes mellitus with hyperglycemia ([facility])  Lab Results   Component Value Date    HGBA1C 6.4 03/13/2025     Inpatient regimen -   Lantus 10 units daily at bedtime  Lispro 3 units 3 times daily with meals.  Insulin sliding scale  Monitor blood sugars.  Resume oral antidiabetic medications at discharge.  With baseline A1c of just 6.4%, there is no need to change the prehospital regimen.  Medical  non-compliance  Continue providing diet and medication education.  Alcohol abuse  Reports often drinking 3 beers daily and 2 shots daily after work  CIWA protocol has been in place but as patient has been in the hospital for 8 days we can discontinue this protocol  Continues on thiamine and folic acid supplementation and multivitamin.  Alcohol cessation recommended.  Current smoker  Reports 3 cigars daily  Nicotine replacement therapy currently declined.  Smoking cessation advised.  CKD (chronic kidney disease)  Creatinine appears at baseline (1.6-2.3)  Monitor with diuresis  May need to accept higher Cr to achieve euvolemia  Monitor I/O.  BMP in AM.  B12 deficiency  B12 noted to be significantly low at 112 on admission.  Finished 3 days of IM cyanocobalamin injections on 4/18/2025  May benefit from oral vitamin B12 as an outpatient.  Alcohol cessation also advised.  Abnormal CT of the abdomen  CT abd/pelvis 4/19/25 - No CT evidence of acute process within the abdomen or pelvis.  Round fluid attenuated structure within the pancreatic head measuring 1.2 cm which is not evident on prior CT of September 2020. Finding may represent pancreatic cyst versus cystic pancreatic ductal dilatation as there parenchymal calcifications which suggest a component of underlying chronic pancreatitis. Further characterization with nonemergent MRI abdomen with and without contrast is recommended.  Will need outpatient MRI abdomen which can be arranged by PCP    Monitor for any additional abdominal symptoms.  Electrolyte abnormality  Given 4 gram of iv magnesium on 4/22/2025.  No magnesium needed today as magnesium is 2.0.  Some of the recurrent hypomagnesemia was likely due to Bumex infusion.  Morbid obesity due to excess calories (HCC)  Weight loss is needed.  Outpatient follow-up with lifestyle modifications and increased exercise.  Consideration could also be made for bariatric team evaluation outpatient as well.     Medical Problems        Resolved Problems  Date Reviewed: 11/16/2024   None       Discharging Physician / Practitioner: Walter Thomas DO  PCP: Linden Roque MD  Admission Date:   Admission Orders (From admission, onward)       Ordered        04/14/25 2235  INPATIENT ADMISSION  Once                          Discharge Date: 04/23/25    Consultations During Hospital Stay:  Cardiology heart failure team    Procedures Performed:   None    Significant Findings / Test Results:   Chest x-ray 4/15/2025 -no acute cardiopulmonary disease.  CT abdomen/pelvis 4/19/2025 -no acute process of the abdomen and pelvis.  Round fluid attenuated structure within the pancreatic head measuring 1.2 cm which is not evident on prior CT of September 2020. Finding may represent pancreatic cyst versus cystic pancreatic ductal dilatation as there parenchymal calcifications which suggest a component of underlying chronic pancreatitis. Further characterization with nonemergent MRI abdomen with and without contrast is recommended. Hepatic steatosis. Cholelithiasis. Edematous changes within the subcutaneous tissues of the flanks and lower back. This finding is nonspecific but may be attributable to mild fluid overload/CHF.   Creatinine on admission 1.91 and this had decreased down to about 1.55 and then with Bumex infusion had crept back up to 1.66 then 2.04 then 2.12.  Bumex infusion was stopped and replaced with oral Bumex and the creatinine today was 2.27 which led to cardiology recommending a 1 day pause in the diuretic dosing with resumption of diuretic tomorrow.  Sodium levels during this hospitalization ranged between 132 and 137.  Sodium at discharge was 133.  Bicarbonate levels ranged between 29 and 35.  Potassium 3.4 on discharge but was replaced with 40 mEq potassium today.  Magnesium levels have been anywhere from 1.2-2.0.  The magnesium of 2.0 is on day of discharge.  Hemoglobin has ranged between 10.9 and 12 during this admission.  Most recent  hemoglobin yesterday was 11.6 with hematocrit of 34.7.  Normal WBC and platelet counts.  Iron studies during during this admission showed a ferritin of 379, iron 69, and TIBC of 273.  B12 was low at 117 and was replaced with B12 supplementation and the folate level was 12.7.  Of note, MCV did range between 99 and 101 during this admission.    Incidental Findings:   Round fluid structure within the pancreatic head measuring 1.2 cm which is not evident on prior CT of September 2020. Finding may represent pancreatic cyst versus cystic pancreatic ductal dilatation but as there parenchymal calcifications, it may suggest a component of underlying chronic pancreatitis.  Recommendation made for a follow-up MRI abdomen with and without contrast in the next 1 month.  I reviewed the above mentioned incidental findings with the patient and/or family and they expressed understanding.    Test Results Pending at Discharge (will require follow up):   None     Outpatient Tests Requested:  PT and magnesium recommended for the next 1 week to recheck electrolytes with diuretic dosing as well as renal function overall.  Defer to PCP to order this test.    Complications: None    Reason for Admission: Leg swelling    Hospital Course:   Elly Chong is a 60 y.o. male patient who originally presented to the hospital on 4/14/2025 due to an increase in lower extremity edema abdominal distention and also some shortness of breath and chest pain.  The patient had stopped using his diuretic as he went away on a cruise to [place].  The patient did eat the food on the cruise as well which potentially made things worse.  The patient was noted to have increasing fluid in the legs and actually noted a blister as well when he first arrived.  It was felt based on all evidence present that the patient had increased fluid on board and was treated as an acute on chronic diastolic heart failure.  The patient had been placed on Lasix 100 mg IV twice daily.   An echocardiogram was updated with his results noted above.  Cardiology was consulted.  The patient did not have any evidence for DVT of the lower extremities.  However, the patient was not losing fluid well enough so eventually was transitioned over to Bumex infusion which was used the last few days and then just yesterday, 4/22/2025, was transitioned over to oral Bumex.  Cardiology is currently recommending twice daily Bumex dosing as detailed above.  The patient does have a history of alcohol abuse but did not have elevated CIWA scores and no evidence of active withdrawal.  The patient has a history of diabetes but his A1c is 6.4% and his glucose levels were overall controlled during this hospitalization.  Patient did have some elevated blood pressures earlier in the hospitalization and recommendation was made to increase the patient's carvedilol dosing.  The patient did well with the carvedilol dosing and blood pressures in the last 24 hours prior to discharge have been mostly between 116/74 to 145/84.  We stressed the importance of diet and weight monitoring compliance as well as the importance of compliance with medication as instructed.  I spoke with not just the patient about this but also patient's wife who expressed that she would help to try to enforce this with her  as well.    Please see above list of diagnoses and related plan for additional information.     Condition at Discharge: stable    Discharge Day Visit / Exam:   Subjective: The patient has no dyspnea.  No chest pain or palpitations.  He does still have some leg edema but does feel like it is better compared to when he first came in.  He always has a bit more edema in the right leg compared to the left leg.  No pain in the legs though.  The patient does feel like he is ready to discharge today.  No major events overnight.  Creatinine went up a little bit yesterday but he is still making good urine outputs.  Vitals: Blood Pressure:  119/74 (04/23/25 1114)  Pulse: 81 (04/22/25 2042)  Temperature: 97.9 °F (36.6 °C) (04/23/25 1114)  Temp Source: Oral (04/21/25 1917)  Respirations: 16 (04/22/25 2042)  Weight - Scale: 121 kg (266 lb 9.6 oz) (04/23/25 0600)  SpO2: 97 % (04/22/25 2042)    Physical Exam   There is a definite asymmetry in the edema of the lower extremities with the right lower extremity being 2+ edema in the left lower extremity being 1+ edema.  However, there is no temperature difference noted and no calf tenderness or varicosities in the right lower extremity.  No evidence of any rashes.  No severe ecchymoses at all.  The heart is with a regular rate and rhythm with normal S1 and S2 heart sounds.  The lungs are decreased towards the bases but otherwise clear without any wheezing, rhonchi, or rales.  Abdomen is soft, nondistended, nontender to palpation.  Patient is obese.  No rebound or guarding.  Moist oral mucosa.  No significant JVD.  No cervical lymphadenopathy.  No focal neurological deficits.  Patient was awake alert and oriented x 3.  Overall, stable exam from prior.      Discussion with Family: Patient declined call to .  Additionally, no new major updates compared to when I spoke with patient's wife in the evening yesterday.    Discharge instructions/Information to patient and family:   See after visit summary for information provided to patient and family.      Provisions for Follow-Up Care:  See after visit summary for information related to follow-up care and any pertinent home health orders.      Mobility at time of Discharge:   Basic Mobility Inpatient Raw Score: 24  JH-HLM Goal: 8: Walk 250 feet or more  JH-HLM Achieved: 8: Walk 250 feet ot more  HLM Goal achieved. Continue to encourage appropriate mobility.     Disposition:   Home    Planned Readmission: None    Discharge Medications:  See after visit summary for reconciled discharge medications provided to patient and/or family.      Administrative  Statements   Discharge Statement:  I have spent a total time of 40 minutes in caring for this patient on the day of the visit/encounter. .    **Please Note: This note may have been constructed using a voice recognition system**

## 2025-04-23 NOTE — ASSESSMENT & PLAN NOTE
Background - presented due to lower extremity edema, lower extremity pain, increased abdominal girth, shortness of breath and chest pain.  He had stopped taking his outpatient diuretic on 4/7/2025 due to going on vacation.  He had previously been recommended to take torsemide 40 mg daily and Aldactone 25 mg daily.  Echo 11/23: EF 60%, grade 1 diastolic dysfunction  Diuretic -   Had significant improvement with IV Bumex infusion which was continued until the morning of 4/22/2025 when switched over to oral Bumex.  Currently, recommended for Bumex 2 mg twice daily.  Dosing was held today but recommended to resume at this dosing tomorrow, 4/24/2025.  Continues on spironolactone 25 mg by mouth daily.  Beta-blocker -carvedilol  ACEI / ARB -none  Other -none  2 g sodium restriction and fluid restriction of 1800 mL/day.  Monitor I's/O and daily weights.  BMP and magnesium should be repeated in the next 1 week.  Deferring to PCP to order this test as outpatient.  Replace electrolytes as needed.  Cardiology heart failure team following and approved plan for discharge today.

## 2025-04-23 NOTE — PROGRESS NOTES
Heart Failure/ Pulmonary Hypertension Progress Note - Elly Chong 60 y.o. male MRN: [id number]    Unit/Bed#: S -01 Encounter: [id number]      Assessment:    Principal Problem:    Acute on chronic diastolic congestive heart failure (HCC)  Active Problems:    Essential hypertension    Uncontrolled type 2 diabetes mellitus with hyperglycemia (HCC)    Morbid obesity due to excess calories (HCC)    Medical non-compliance    Alcohol abuse    Current smoker    CKD (chronic kidney disease)    B12 deficiency    Abnormal CT of the abdomen    Electrolyte abnormality      Interim:  Ins/outs:  960/2225 bumex 2 mg BID  Weight: 266 lbs (291 lbs)  Tele: 80's  MAPs:  mmHg  K 3.4  Cr 2.27  Mag 1.7    # Acute on Chronic HFpEF, Stage C  Diuretic: Torsemide 40 mg daily  MRA: spironolactone 25 mg daily  Weight: 268 lbs  BNP    Studies- personally reviewed by me  EKG- SR with PVCs    Echo 11/21/23  LVEF: 60%, mild LVH  LVIDd: 4.3 cm  RV: normal    NM MPI 3/6/23: diaphragmatic artifact, no scar or ischemia, EF : 49%    Echo 3/5/23:  LVEF: 50%  Hypo inferolateral    # HTN- coreg 12.5 mg BID  # CKD 3 B  Cr 1.86 up from 1.36 on 11/16/24, down to 1.55 today  # AMANDO  # reactive airway disease  # GERD  # ETOH- 3 beers, 2 shots daily  # DM2  # current smoker    Plan:  Hold oral bumex today to allow to equilabrate but feel outpt intake will be different so recommend bumex 2 mg BID  With close HF follow up  Mg up to 2  MAPs  80-90'smmHg  Continue spironolactone 25 mg daily    Ok for DC          Review of Systems   Constitutional:  Negative for activity change, appetite change, fatigue and unexpected weight change.   HENT:  Negative for congestion and nosebleeds.    Eyes: Negative.    Respiratory:  Negative for cough, chest tightness and shortness of breath.    Cardiovascular:  Negative for chest pain, palpitations and leg swelling.   Gastrointestinal:  Negative for abdominal distention.   Endocrine: Negative.    Genitourinary:  Negative.    Musculoskeletal: Negative.    Skin: Negative.    Neurological:  Negative for dizziness, syncope and weakness.   Hematological: Negative.    Psychiatric/Behavioral: Negative.          Central Line (day, reason):  Santiago catheter (day, reason):    Vitals: Blood pressure 158/91, pulse 81, temperature 98.5 °F (36.9 °C), resp. rate 16, weight 121 kg (266 lb 9.6 oz), SpO2 97%., Body mass index is 41.76 kg/m²., I/O last 3 completed shifts:  In: 1620 [P.O.:1620]  Out: 2775 [Urine:2775]  No intake/output data recorded.  Wt Readings from Last 3 Encounters:   04/23/25 121 kg (266 lb 9.6 oz)   03/13/25 126 kg (277 lb 4 oz)   11/27/24 117 kg (258 lb 12.8 oz)       Intake/Output Summary (Last 24 hours) at 4/23/2025 0817  Last data filed at 4/23/2025 0501  Gross per 24 hour   Intake 960 ml   Output 1675 ml   Net -715 ml     I/O last 3 completed shifts:  In: 1620 [P.O.:1620]  Out: 2775 [Urine:2775]    No significant arrhythmias seen on telemetry review.       Physical Exam:  Vitals:    04/22/25 2042 04/22/25 2147 04/23/25 0600 04/23/25 0720   BP:  124/78  158/91   Pulse: 81      Resp: 16      Temp:  98.7 °F (37.1 °C)  98.5 °F (36.9 °C)   TempSrc:       SpO2: 97%      Weight:   121 kg (266 lb 9.6 oz)        GEN: Elly Chong appears well, alert and oriented x 3, pleasant and cooperative   HEENT: pupils equal, round, and reactive to light; extraocular muscles intact  NECK: supple, no carotid bruits   HEART: regular rhythm, normal S1 and S2, no murmurs, clicks, gallops or rubs, JVP is  up  LUNGS: clear to auscultation bilaterally; no wheezes, rales, or rhonchi   ABDOMEN: normal bowel sounds, soft, no tenderness, no distention  EXTREMITIES: peripheral pulses normal; no clubbing, cyanosis, ++ edema  NEURO: no focal findings   SKIN: normal without suspicious lesions on exposed skin      Current Facility-Administered Medications:     acetaminophen (TYLENOL) tablet 975 mg, 975 mg, Oral, Q6H PRN, Walter Thomas DO     albuterol (PROVENTIL HFA,VENTOLIN HFA) inhaler 2 puff, 2 puff, Inhalation, Q4H PRN, Ivan Mcdonnell DO    allopurinol (ZYLOPRIM) tablet 100 mg, 100 mg, Oral, Daily, SHAY Sethi, 100 mg at 04/22/25 0825    benzonatate (TESSALON PERLES) capsule 100 mg, 100 mg, Oral, TID, Ivan Mcdonnell DO, 100 mg at 04/22/25 2151    bumetanide (BUMEX) tablet 2 mg, 2 mg, Oral, BID, Paul Waterman DO, 2 mg at 04/22/25 1707    carvedilol (COREG) tablet 12.5 mg, 12.5 mg, Oral, BID With Meals, Paul Waterman DO, 12.5 mg at 04/22/25 1707    colchicine (COLCRYS) tablet 0.6 mg, 0.6 mg, Oral, BID, SHAY Sethi, 0.6 mg at 04/22/25 1707    famotidine (PEPCID) tablet 20 mg, 20 mg, Oral, HS, SHAY Sethi, 20 mg at 04/22/25 2151    fluticasone-vilanterol 200-25 mcg/actuation 1 puff, 1 puff, Inhalation, Daily, SHAY Sethi, 1 puff at 04/22/25 0827    folic acid (FOLVITE) tablet 1 mg, 1 mg, Oral, Daily, SHAY Sethi, 1 mg at 04/22/25 0825    guaiFENesin (MUCINEX) 12 hr tablet 1,200 mg, 1,200 mg, Oral, Q12H ELIANE, Ivan Mcdonnell DO, 1,200 mg at 04/22/25 2151    heparin (porcine) subcutaneous injection 7,500 Units, 7,500 Units, Subcutaneous, Q8H ELIANE, SHAY Sethi, 7,500 Units at 04/19/25 0603    insulin glargine (LANTUS) subcutaneous injection 10 Units 0.1 mL, 10 Units, Subcutaneous, HS, Ivan Mcdonnell DO, 10 Units at 04/22/25 2151    insulin lispro (HumALOG/ADMELOG) 100 units/mL subcutaneous injection 1-5 Units, 1-5 Units, Subcutaneous, HS, SHAY Sethi, 1 Units at 04/22/25 2151    insulin lispro (HumALOG/ADMELOG) 100 units/mL subcutaneous injection 2-12 Units, 2-12 Units, Subcutaneous, TID AC, 4 Units at 04/22/25 1708 **AND** Fingerstick Glucose (POCT), , , TID ACBecky CRNP    insulin lispro (HumALOG/ADMELOG) 100 units/mL subcutaneous injection 3 Units, 3 Units, Subcutaneous, TID With Meals, Ivan Mcdonnell, DO, 3 Units at 04/22/25 1708    ipratropium (ATROVENT) 0.02 % inhalation solution 0.5 mg, 0.5 mg, Nebulization, TID, Becky  SHAY Schneider, 0.5 mg at 04/23/25 0753    levalbuterol (XOPENEX) inhalation solution 1.25 mg, 1.25 mg, Nebulization, TID, SHAY Sethi, 1.25 mg at 04/23/25 0753    magnesium Oxide (MAG-OX) tablet 800 mg, 800 mg, Oral, BID, Pandi Todhe, DO, 800 mg at 04/22/25 1707    multivitamin-minerals (CENTRUM) tablet 1 tablet, 1 tablet, Oral, Daily, SHAY Sethi, 1 tablet at 04/22/25 0825    pantoprazole (PROTONIX) EC tablet 40 mg, 40 mg, Oral, Daily Before Breakfast, SHAY Sethi, 40 mg at 04/23/25 0528    simethicone (MYLICON) chewable tablet 80 mg, 80 mg, Oral, Q6H PRN, Silvinosterling Todhe, DO, 80 mg at 04/19/25 1715    spironolactone (ALDACTONE) tablet 25 mg, 25 mg, Oral, Daily, SHAY Sethi, 25 mg at 04/22/25 0825    thiamine tablet 100 mg, 100 mg, Oral, Daily, SHAY Sethi, 100 mg at 04/22/25 0825      Labs & Results:        Results from last 7 days   Lab Units 04/22/25  0452 04/21/25  0528 04/20/25  0446   WBC Thousand/uL 8.88 9.32 8.91   HEMOGLOBIN g/dL 11.6* 11.7* 12.0   HEMATOCRIT % 34.7* 34.8* 35.8*   PLATELETS Thousands/uL 215 217 216         Results from last 7 days   Lab Units 04/23/25  0532 04/22/25  0452 04/21/25  0528   POTASSIUM mmol/L 3.4* 3.5 3.4*   CHLORIDE mmol/L 88* 89* 89*   CO2 mmol/L 35* 32 35*   BUN mg/dL 32* 27* 23   CREATININE mg/dL 2.27* 2.12* 2.04*   CALCIUM mg/dL 9.0 8.6 8.8           Counseling / Coordination of Care  Total floor / unit time spent today 25 minutes.  Greater than 50% of total time was spent with the patient and / or family counseling and / or coordination of care.  A description of the counseling / coordination of care: 15.      Thank you for the opportunity to participate in the care of this patient.  DAVID NG D.O.  DIRECTOR OF HEART FAILURE/ PULMONARY HYPERTENSION  MEDICAL DIRECTOR OF LVAD PROGRAM  [facility]

## 2025-04-23 NOTE — ASSESSMENT & PLAN NOTE
Lab Results   Component Value Date    HGBA1C 6.4 03/13/2025     Inpatient regimen -   Lantus 10 units daily at bedtime  Lispro 3 units 3 times daily with meals.  Insulin sliding scale  Monitor blood sugars.  Resume oral antidiabetic medications at discharge.  With baseline A1c of just 6.4%, there is no need to change the prehospital regimen.

## 2025-04-23 NOTE — TELEPHONE ENCOUNTER
Pt's wife Yareli calling back in regarding forms; I advised her it was put on PCP's desk to sign and she said she is going to be faxing another form as well today and to be on the lookout.     Please reach out to Yareli once form has been completed and faxed to fax # provided in previous message.

## 2025-04-24 ENCOUNTER — TRANSITIONAL CARE MANAGEMENT (OUTPATIENT)
Dept: FAMILY MEDICINE CLINIC | Facility: CLINIC | Age: 60
End: 2025-04-24

## 2025-04-24 NOTE — UTILIZATION REVIEW
NOTIFICATION OF ADMISSION DISCHARGE   This is a Notification of Discharge from [facility]. Please be advised that this patient has been discharge from our facility. Below you will find the admission and discharge date and time including the patient’s disposition.   UTILIZATION REVIEW CONTACT:  Utilization Review Assistants  Network Utilization Review Department  Phone: [phone] x carefully listen to the prompts. All voicemails are confidential.  Email: [email or web address]     ADMISSION INFORMATION  PRESENTATION DATE: 4/14/2025  9:20 PM  OBERVATION ADMISSION DATE: N/A  INPATIENT ADMISSION DATE: 4/14/25 10:36 PM   DISCHARGE DATE: 4/23/2025  5:07 PM   DISPOSITION:Home/Self Care    Network Utilization Review Department  ATTENTION: Please call with any questions or concerns to [phone] and carefully listen to the prompts so that you are directed to the right person. All voicemails are confidential.   For Discharge needs, contact Care Management DC Support Team at [phone] opt. 2  Send all requests for admission clinical reviews, approved or denied determinations and any other requests to dedicated fax number below belonging to the campus where the patient is receiving treatment. List of dedicated fax numbers for the Facilities:  FACILITY NAME UR FAX NUMBER   ADMISSION DENIALS (Administrative/Medical Necessity) [phone]   DISCHARGE SUPPORT TEAM ([facility]) [phone]   PARENT CHILD HEALTH (Maternity/NICU/Pediatrics) [phone]   [facility] [phone]   [facility] [phone]   [facility] [phone]   [facility] [phone]   [facility] [phone]   [facility] [phone]   [facility] [phone]   [facility] [phone]   [facility]  [facility] [phone]   [facility] [phone]   [facility] [phone]   [facility] [phone]          3

## 2025-04-25 PROBLEM — M10.9 ACUTE GOUT OF RIGHT HAND: Status: RESOLVED | Noted: 2022-10-17 | Resolved: 2025-04-25

## 2025-04-25 PROBLEM — I50.33 ACUTE ON CHRONIC DIASTOLIC CONGESTIVE HEART FAILURE (HCC): Status: RESOLVED | Noted: 2021-08-31 | Resolved: 2025-04-25

## 2025-04-25 PROBLEM — E87.8 ELECTROLYTE ABNORMALITY: Status: RESOLVED | Noted: 2025-04-20 | Resolved: 2025-04-25

## 2025-04-25 PROBLEM — L03.90 CELLULITIS: Status: RESOLVED | Noted: 2024-09-27 | Resolved: 2025-04-25

## 2025-04-25 PROBLEM — N17.9 AKI (ACUTE KIDNEY INJURY) (HCC): Status: RESOLVED | Noted: 2020-09-05 | Resolved: 2025-04-25

## 2025-04-25 PROBLEM — I50.32 CHRONIC HEART FAILURE WITH PRESERVED EJECTION FRACTION (HCC): Chronic | Status: ACTIVE | Noted: 2020-07-06

## 2025-04-25 PROBLEM — I50.30 (HFPEF) HEART FAILURE WITH PRESERVED EJECTION FRACTION (HCC): Status: ACTIVE | Noted: 2020-07-06

## 2025-04-25 PROBLEM — N18.30 STAGE 3 CHRONIC KIDNEY DISEASE (HCC): Chronic | Status: ACTIVE | Noted: 2025-04-15

## 2025-04-28 ENCOUNTER — TELEPHONE (OUTPATIENT)
Dept: CARDIOLOGY CLINIC | Facility: CLINIC | Age: 60
End: 2025-04-28

## 2025-04-30 ENCOUNTER — TELEPHONE (OUTPATIENT)
Dept: CARDIOLOGY CLINIC | Facility: CLINIC | Age: 60
End: 2025-04-30

## 2025-05-01 ENCOUNTER — OFFICE VISIT (OUTPATIENT)
Dept: FAMILY MEDICINE CLINIC | Facility: CLINIC | Age: 60
End: 2025-05-01
Payer: COMMERCIAL

## 2025-05-01 VITALS
BODY MASS INDEX: 41.97 KG/M2 | SYSTOLIC BLOOD PRESSURE: 146 MMHG | HEART RATE: 92 BPM | OXYGEN SATURATION: 96 % | RESPIRATION RATE: 18 BRPM | HEIGHT: 67 IN | DIASTOLIC BLOOD PRESSURE: 84 MMHG | TEMPERATURE: 98 F | WEIGHT: 267.38 LBS

## 2025-05-01 DIAGNOSIS — I50.32 CHRONIC HEART FAILURE WITH PRESERVED EJECTION FRACTION (HCC): Chronic | ICD-10-CM

## 2025-05-01 DIAGNOSIS — I10 ESSENTIAL HYPERTENSION: Chronic | ICD-10-CM

## 2025-05-01 DIAGNOSIS — L03.90 CELLULITIS, UNSPECIFIED CELLULITIS SITE: Primary | ICD-10-CM

## 2025-05-01 PROCEDURE — 99495 TRANSJ CARE MGMT MOD F2F 14D: CPT | Performed by: FAMILY MEDICINE

## 2025-05-01 RX ORDER — CEFUROXIME AXETIL 500 MG/1
500 TABLET ORAL EVERY 12 HOURS SCHEDULED
Qty: 20 TABLET | Refills: 0 | Status: SHIPPED | OUTPATIENT
Start: 2025-05-01 | End: 2025-05-11

## 2025-05-01 NOTE — PROGRESS NOTES
FAMILY PRACTICE OFFICE VISIT       NAME: Elly Chong  AGE: 60 y.o. SEX: male       : 1965        MRN: [id number]    DATE: 2025  TIME: 4:32 PM    Assessment and Plan     Problem List Items Addressed This Visit       Essential hypertension (Chronic)    Hypertension.  The patient's blood pressure is stable at this time and he will continue current regimen of medications         Chronic heart failure with preserved ejection fraction (HCC) (Chronic)    Wt Readings from Last 3 Encounters:   25 121 kg (267 lb 6 oz)   25 121 kg (266 lb 9.6 oz)   25 126 kg (277 lb 4 oz)   Chronic congestive heart failure.  I expressed the importance of being compliant with his diuretic therapy due to his history of CHF.  Patient has chronic trace to +1 peripheral edema lower extremities however he states this is much improved from his recent visit to the emergency room.                 Cellulitis - Primary    Cellulitis.  Patient with suspected cellulitis of skin of right lower extremity.  He was given prescription for Ceftin 500 mg to take twice daily for 10 days.  He will keep the area clean and dry and apply nonstick dressing if still draining.         Relevant Medications    cefuroxime (CEFTIN) 500 mg tablet     TCM Call (since 2025)       Date and time call was made  2025  9:44 AM    Hospital care reviewed  Records reviewed    Patient was hospitialized at  [facility]    Date of Admission  25    Date of discharge  25    Diagnosis  Acute on Chronic Diastolic congestive heart failure    Disposition  Home    Were the patients medications reviewed and updated  No    Current Symptoms  Leg pain - right side          TCM Call (since 2025)       Post hospital issues  Reduced activity    Scheduled for follow up?  Yes  Appt 25 10:40am     Patients specialists  Cardiologist    Cardiologist name  AMANDA Fletcher MD    Did you obtain your prescribed medications  Yes    Do  you need help managing your prescriptions or medications  No    Is transportation to your appointment needed  No    I have advised the patient to call PCP with any new or worsening symptoms  Odalys Lucia LPN    Living Arrangements  Spouse or Significiant other    Support System  Spouse    The type of support provided  Physical; Emotional    Do you have social support  Yes, as much as I need    Are you recieving home care services  No          Hospital Course:   Elly Chong is a 60 y.o. male patient who originally presented to the hospital on 4/14/2025 due to an increase in lower extremity edema abdominal distention and also some shortness of breath and chest pain.  The patient had stopped using his diuretic as he went away on a cruise to [place].  The patient did eat the food on the cruise as well which potentially made things worse.  The patient was noted to have increasing fluid in the legs and actually noted a blister as well when he first arrived.  It was felt based on all evidence present that the patient had increased fluid on board and was treated as an acute on chronic diastolic heart failure.  The patient had been placed on Lasix 100 mg IV twice daily.  An echocardiogram was updated with his results noted above.  Cardiology was consulted.  The patient did not have any evidence for DVT of the lower extremities.  However, the patient was not losing fluid well enough so eventually was transitioned over to Bumex infusion which was used the last few days and then just yesterday, 4/22/2025, was transitioned over to oral Bumex.  Cardiology is currently recommending twice daily Bumex dosing as detailed above.  The patient does have a history of alcohol abuse but did not have elevated CIWA scores and no evidence of active withdrawal.  The patient has a history of diabetes but his A1c is 6.4% and his glucose levels were overall controlled during this hospitalization.  Patient did have some elevated blood pressures  earlier in the hospitalization and recommendation was made to increase the patient's carvedilol dosing.  The patient did well with the carvedilol dosing and blood pressures in the last 24 hours prior to discharge have been mostly between 116/74 to 145/84.  We stressed the importance of diet and weight monitoring compliance as well as the importance of compliance with medication as instructed.  I spoke with not just the patient about this but also patient's wife who expressed that she would help to try to enforce this with her  as well.      Chief Complaint     Chief Complaint   Patient presents with    Transition of Care Management       History of Present Illness     Reviewed the patient's discharge summary from his latest hospitalization.  He is planning to return to work on May 12, 2025.  He has been compliant with taking his current regimen of diuretics and overall medications.  Patient states he has had 2 ulcerations on bilateral lower tibial area after scraping his lower leg with his boots while at work.  He denies any significant pain.  He has been keeping a Band-Aid on ulceration of right lower extremity.        Review of Systems   Review of Systems   Constitutional:  Positive for fatigue.   Respiratory: Negative.     Cardiovascular:  Positive for leg swelling. Negative for chest pain and palpitations.   Gastrointestinal: Negative.    Musculoskeletal:  Positive for arthralgias and gait problem.   Skin:  Positive for wound.       Active Problem List     Patient Active Problem List   Diagnosis    Essential hypertension    Uncontrolled type 2 diabetes mellitus with hyperglycemia (HCC)    Elevated lipoprotein(a)    Microalbuminuria    Morbid obesity due to excess calories (HCC)    Medical non-compliance    Hypomagnesemia    Alcohol abuse    Onychomycosis    Arthritis of right wrist    Impingement syndrome of left shoulder    Diabetes mellitus (HCC)    Acute midline low back pain without sciatica     Reactive airway disease without complication    Chronic heart failure with preserved ejection fraction ([facility])    AMANDO (obstructive sleep apnea)    Arthralgia    Intermittent asthma    Mild asthma with acute exacerbation    Current smoker    Chronic gout without tophus    Encounter for immunization    Stage 3 chronic kidney disease ([facility])    B12 deficiency    Abnormal CT of the abdomen    Cellulitis       Past Medical History:  Past Medical History:   Diagnosis Date    Acute gout of right hand 10/17/2022    Acute kidney injury ([facility]) 09/05/2020    Acute on chronic diastolic congestive heart failure ([facility]) 08/31/2021    DELFINO (acute kidney injury) ([facility]) 09/05/2020    Asthma     Cellulitis 09/27/2024    CHF (congestive heart failure) ([facility])     Chronic heart failure with preserved ejection fraction ([facility]) 07/06/2020    CKD (chronic kidney disease) 04/15/2025    Colon polyp     GERD (gastroesophageal reflux disease)     Hypertension     Inguinal hernia     3/25/15    Onychomycosis     5/24/17    Sleep apnea     Type 2 diabetes mellitus ([facility]) 05/01/2012       Past Surgical History:  Past Surgical History:   Procedure Laterality Date    ARTHROSCOPY KNEE      with Medial and Lateral Meniscus Repair    HERNIA REPAIR      umbilical and inguinal hernia repairs (left)       Family History:  Family History   Problem Relation Age of Onset    Hypertension Mother         essential    Chronic bronchitis Father     Prostate cancer Father     Colon cancer Father     Breast cancer Sister        Social History:  Social History     Socioeconomic History    Marital status: /Civil Union     Spouse name: Not on file    Number of children: Not on file    Years of education: Not on file    Highest education level: Not on file   Occupational History    Occupation: [employer]    Tobacco Use    Smoking status: Former     Types: Cigars    Smokeless tobacco: Never    Tobacco comments:     current every day smoker, per [employer]   Vaping  Use    Vaping status: Never Used   Substance and Sexual Activity    Alcohol use: Yes     Alcohol/week: 35.0 standard drinks of alcohol     Types: 21 Cans of beer, 14 Shots of liquor per week     Comment: 3 beers daily and 2 shots daily (as of 04/2025)    Drug use: No    Sexual activity: Yes   Other Topics Concern    Not on file   Social History Narrative    Alcohol dependence    Nicotine dependence    Denied, alcohol Use    Marital problems     Social Drivers of Health     Financial Resource Strain: Not on file   Food Insecurity: No Food Insecurity (4/22/2025)    Hunger Vital Sign     Worried About Running Out of Food in the Last Year: Never true     Ran Out of Food in the Last Year: Never true   Transportation Needs: No Transportation Needs (4/22/2025)    PRAPARE - Transportation     Lack of Transportation (Medical): No     Lack of Transportation (Non-Medical): No   Physical Activity: Not on file   Stress: Not on file   Social Connections: Not on file   Intimate Partner Violence: Unknown (4/18/2025)    Nursing IPS     Feels Physically and Emotionally Safe: Not on file     Physically Hurt by Someone: Not on file     Humiliated or Emotionally Abused by Someone: Not on file     Physically Hurt by Someone: No     Hurt or Threatened by Someone: No   Housing Stability: Unknown (4/22/2025)    Housing Stability Vital Sign     Unable to Pay for Housing in the Last Year: No     Number of Times Moved in the Last Year: Not on file     Homeless in the Last Year: No       Objective     Vitals:    05/01/25 1023   BP: 146/84   Pulse: 92   Resp: 18   Temp: 98 °F (36.7 °C)   SpO2: 96%     Wt Readings from Last 3 Encounters:   05/01/25 121 kg (267 lb 6 oz)   04/23/25 121 kg (266 lb 9.6 oz)   03/13/25 126 kg (277 lb 4 oz)       Physical Exam  Constitutional:       General: He is not in acute distress.     Appearance: Normal appearance. He is not ill-appearing.   HENT:      Head: Normocephalic and atraumatic.   Eyes:      General:          Right eye: No discharge.         Left eye: No discharge.      Conjunctiva/sclera: Conjunctivae normal.      Pupils: Pupils are equal, round, and reactive to light.   Neck:      Vascular: No carotid bruit.   Cardiovascular:      Rate and Rhythm: Normal rate and regular rhythm.      Heart sounds: Normal heart sounds. No murmur heard.  Pulmonary:      Effort: Pulmonary effort is normal.      Breath sounds: Normal breath sounds. No wheezing, rhonchi or rales.   Musculoskeletal:      Right lower leg: Edema present.      Left lower leg: Edema present.   Lymphadenopathy:      Cervical: No cervical adenopathy.   Skin:     Findings: Lesion present. No rash.      Comments: Patient with a 2 cm annular ulceration of skin along left lower tibial area near Achilles tendon.  The area is dry and appears to be granulating.    Patient has a similar 2 cm annular ulceration on the right lower Achilles area that has a foul smell and is damp at its center.  There is scant discharge.   Neurological:      General: No focal deficit present.      Mental Status: He is alert and oriented to person, place, and time.      Cranial Nerves: No cranial nerve deficit.   Psychiatric:         Mood and Affect: Mood normal.         Behavior: Behavior normal.         Thought Content: Thought content normal.         Judgment: Judgment normal.         Pertinent Laboratory/Diagnostic Studies:  Lab Results   Component Value Date    GLUCOSE 179 (H) 09/04/2020    BUN 32 (H) 04/23/2025    CREATININE 2.27 (H) 04/23/2025    CALCIUM 9.0 04/23/2025     12/21/2017    K 3.4 (L) 04/23/2025    CO2 35 (H) 04/23/2025    CL 88 (L) 04/23/2025     Lab Results   Component Value Date    ALT 9 04/14/2025    AST 15 04/14/2025    ALKPHOS 89 04/14/2025    BILITOT 1.3 (H) 12/21/2017       Lab Results   Component Value Date    WBC 8.88 04/22/2025    HGB 11.6 (L) 04/22/2025    HCT 34.7 (L) 04/22/2025    MCV 99 (H) 04/22/2025     04/22/2025       Lab Results    Component Value Date    TSH 3.58 03/14/2022       Lab Results   Component Value Date    CHOL 153 12/21/2017     Lab Results   Component Value Date    TRIG 69 11/21/2023     Lab Results   Component Value Date    HDL 36 (L) 11/21/2023     Lab Results   Component Value Date    LDLCALC 70 11/21/2023     Lab Results   Component Value Date    HGBA1C 6.4 03/13/2025       Results for orders placed or performed during the hospital encounter of 04/14/25   CBC and differential   Result Value Ref Range    WBC 8.54 4.31 - 10.16 Thousand/uL    RBC 3.38 (L) 3.88 - 5.62 Million/uL    Hemoglobin 11.2 (L) 12.0 - 17.0 g/dL    Hematocrit 33.8 (L) 36.5 - 49.3 %     (H) 82 - 98 fL    MCH 33.1 26.8 - 34.3 pg    MCHC 33.1 31.4 - 37.4 g/dL    RDW 13.4 11.6 - 15.1 %    MPV 9.4 8.9 - 12.7 fL    Platelets 243 149 - 390 Thousands/uL    nRBC 0 /100 WBCs    Segmented % 51 43 - 75 %    Immature Grans % 1 0 - 2 %    Lymphocytes % 35 14 - 44 %    Monocytes % 9 4 - 12 %    Eosinophils Relative 4 0 - 6 %    Basophils Relative 0 0 - 1 %    Absolute Neutrophils 4.42 1.85 - 7.62 Thousands/µL    Absolute Immature Grans 0.05 0.00 - 0.20 Thousand/uL    Absolute Lymphocytes 2.97 0.60 - 4.47 Thousands/µL    Absolute Monocytes 0.76 0.17 - 1.22 Thousand/µL    Eosinophils Absolute 0.31 0.00 - 0.61 Thousand/µL    Basophils Absolute 0.03 0.00 - 0.10 Thousands/µL   Comprehensive metabolic panel   Result Value Ref Range    Sodium 136 135 - 147 mmol/L    Potassium 3.4 (L) 3.5 - 5.3 mmol/L    Chloride 98 96 - 108 mmol/L    CO2 29 21 - 32 mmol/L    ANION GAP 9 4 - 13 mmol/L    BUN 13 5 - 25 mg/dL    Creatinine 1.91 (H) 0.60 - 1.30 mg/dL    Glucose 171 (H) 65 - 140 mg/dL    Calcium 8.5 8.4 - 10.2 mg/dL    AST 15 13 - 39 U/L    ALT 9 7 - 52 U/L    Alkaline Phosphatase 89 34 - 104 U/L    Total Protein 6.6 6.4 - 8.4 g/dL    Albumin 3.5 3.5 - 5.0 g/dL    Total Bilirubin 0.49 0.20 - 1.00 mg/dL    eGFR 36 ml/min/1.73sq m   HS Troponin 0hr (reflex protocol)   Result  "Value Ref Range    hs TnI 0hr 5 \"Refer to ACS Flowchart\"- see link ng/L   HS Troponin I 2hr   Result Value Ref Range    hs TnI 2hr 5 \"Refer to ACS Flowchart\"- see link ng/L    Delta 2hr hsTnI 0 <20 ng/L   D-dimer, quantitative   Result Value Ref Range    D-Dimer, Quant 1.06 (H) <0.50 ug/ml FEU   Basic metabolic panel   Result Value Ref Range    Sodium 137 135 - 147 mmol/L    Potassium 3.6 3.5 - 5.3 mmol/L    Chloride 97 96 - 108 mmol/L    CO2 31 21 - 32 mmol/L    ANION GAP 9 4 - 13 mmol/L    BUN 15 5 - 25 mg/dL    Creatinine 1.87 (H) 0.60 - 1.30 mg/dL    Glucose 190 (H) 65 - 140 mg/dL    Calcium 8.7 8.4 - 10.2 mg/dL    eGFR 37 ml/min/1.73sq m   CBC (With Platelets)   Result Value Ref Range    WBC 9.45 4.31 - 10.16 Thousand/uL    RBC 3.52 (L) 3.88 - 5.62 Million/uL    Hemoglobin 11.8 (L) 12.0 - 17.0 g/dL    Hematocrit 35.0 (L) 36.5 - 49.3 %    MCV 99 (H) 82 - 98 fL    MCH 33.5 26.8 - 34.3 pg    MCHC 33.7 31.4 - 37.4 g/dL    RDW 13.4 11.6 - 15.1 %    Platelets 250 149 - 390 Thousands/uL    MPV 9.9 8.9 - 12.7 fL   Magnesium   Result Value Ref Range    Magnesium 1.2 (L) 1.9 - 2.7 mg/dL   HS Troponin I 4hr   Result Value Ref Range    hs TnI 4hr 5 \"Refer to ACS Flowchart\"- see link ng/L    Delta 4hr hsTnI 0 <20 ng/L   CBC   Result Value Ref Range    WBC 10.10 4.31 - 10.16 Thousand/uL    RBC 3.40 (L) 3.88 - 5.62 Million/uL    Hemoglobin 11.3 (L) 12.0 - 17.0 g/dL    Hematocrit 34.1 (L) 36.5 - 49.3 %     (H) 82 - 98 fL    MCH 33.2 26.8 - 34.3 pg    MCHC 33.1 31.4 - 37.4 g/dL    RDW 13.5 11.6 - 15.1 %    Platelets 238 149 - 390 Thousands/uL    MPV 10.0 8.9 - 12.7 fL   Renal function panel   Result Value Ref Range    Albumin 3.5 3.5 - 5.0 g/dL    Calcium 8.8 8.4 - 10.2 mg/dL    Phosphorus 2.9 2.3 - 4.1 mg/dL    Glucose 133 65 - 140 mg/dL    BUN 18 5 - 25 mg/dL    Creatinine 1.50 (H) 0.60 - 1.30 mg/dL    Sodium 136 135 - 147 mmol/L    Potassium 3.8 3.5 - 5.3 mmol/L    Chloride 98 96 - 108 mmol/L    CO2 29 21 - 32 mmol/L "    ANION GAP 9 4 - 13 mmol/L    eGFR 49 ml/min/1.73sq m   Magnesium   Result Value Ref Range    Magnesium 1.6 (L) 1.9 - 2.7 mg/dL   CBC   Result Value Ref Range    WBC 9.55 4.31 - 10.16 Thousand/uL    RBC 3.41 (L) 3.88 - 5.62 Million/uL    Hemoglobin 11.3 (L) 12.0 - 17.0 g/dL    Hematocrit 34.3 (L) 36.5 - 49.3 %     (H) 82 - 98 fL    MCH 33.1 26.8 - 34.3 pg    MCHC 32.9 31.4 - 37.4 g/dL    RDW 13.3 11.6 - 15.1 %    Platelets 251 149 - 390 Thousands/uL    MPV 10.1 8.9 - 12.7 fL   Renal function panel   Result Value Ref Range    Albumin 3.5 3.5 - 5.0 g/dL    Calcium 8.9 8.4 - 10.2 mg/dL    Phosphorus 3.4 2.3 - 4.1 mg/dL    Glucose 168 (H) 65 - 140 mg/dL    BUN 20 5 - 25 mg/dL    Creatinine 1.59 (H) 0.60 - 1.30 mg/dL    Sodium 135 135 - 147 mmol/L    Potassium 3.8 3.5 - 5.3 mmol/L    Chloride 96 96 - 108 mmol/L    CO2 31 21 - 32 mmol/L    ANION GAP 8 4 - 13 mmol/L    eGFR 46 ml/min/1.73sq m   Magnesium   Result Value Ref Range    Magnesium 1.3 (L) 1.9 - 2.7 mg/dL   Vitamin B12   Result Value Ref Range    Vitamin B-12 117 (L) 180 - 914 pg/mL   Folate   Result Value Ref Range    Folate 12.7 >5.9 ng/mL   TIBC Panel (incl. Iron, TIBC, % Iron Saturation)   Result Value Ref Range    Iron Saturation 25 15 - 50 %    TIBC 273 250 - 450 ug/dL    Iron 69 50 - 212 ug/dL    Transferrin 195 (L) 203 - 362 mg/dL    UIBC 204 155 - 355 ug/dL   Ferritin   Result Value Ref Range    Ferritin 379 (H) 30 - 336 ng/mL   CBC   Result Value Ref Range    WBC 9.07 4.31 - 10.16 Thousand/uL    RBC 3.31 (L) 3.88 - 5.62 Million/uL    Hemoglobin 10.9 (L) 12.0 - 17.0 g/dL    Hematocrit 33.3 (L) 36.5 - 49.3 %     (H) 82 - 98 fL    MCH 32.9 26.8 - 34.3 pg    MCHC 32.7 31.4 - 37.4 g/dL    RDW 13.3 11.6 - 15.1 %    Platelets 212 149 - 390 Thousands/uL    MPV 10.2 8.9 - 12.7 fL   Renal function panel   Result Value Ref Range    Albumin 3.2 (L) 3.5 - 5.0 g/dL    Calcium 8.6 8.4 - 10.2 mg/dL    Corrected Calcium 9.2 8.3 - 10.1 mg/dL    Phosphorus  3.8 2.3 - 4.1 mg/dL    Glucose 158 (H) 65 - 140 mg/dL    BUN 22 5 - 25 mg/dL    Creatinine 1.55 (H) 0.60 - 1.30 mg/dL    Sodium 135 135 - 147 mmol/L    Potassium 3.7 3.5 - 5.3 mmol/L    Chloride 94 (L) 96 - 108 mmol/L    CO2 35 (H) 21 - 32 mmol/L    ANION GAP 6 4 - 13 mmol/L    eGFR 47 ml/min/1.73sq m   Magnesium   Result Value Ref Range    Magnesium 1.7 (L) 1.9 - 2.7 mg/dL   CBC   Result Value Ref Range    WBC 8.93 4.31 - 10.16 Thousand/uL    RBC 3.36 (L) 3.88 - 5.62 Million/uL    Hemoglobin 11.4 (L) 12.0 - 17.0 g/dL    Hematocrit 33.5 (L) 36.5 - 49.3 %     (H) 82 - 98 fL    MCH 33.9 26.8 - 34.3 pg    MCHC 34.0 31.4 - 37.4 g/dL    RDW 13.2 11.6 - 15.1 %    Platelets 208 149 - 390 Thousands/uL    MPV 11.0 8.9 - 12.7 fL   Renal function panel   Result Value Ref Range    Albumin 3.4 (L) 3.5 - 5.0 g/dL    Calcium 8.7 8.4 - 10.2 mg/dL    Corrected Calcium 9.2 8.3 - 10.1 mg/dL    Phosphorus 3.4 2.3 - 4.1 mg/dL    Glucose 145 (H) 65 - 140 mg/dL    BUN 21 5 - 25 mg/dL    Creatinine 1.55 (H) 0.60 - 1.30 mg/dL    Sodium 135 135 - 147 mmol/L    Potassium 3.6 3.5 - 5.3 mmol/L    Chloride 92 (L) 96 - 108 mmol/L    CO2 34 (H) 21 - 32 mmol/L    ANION GAP 9 4 - 13 mmol/L    eGFR 47 ml/min/1.73sq m   Magnesium   Result Value Ref Range    Magnesium 1.7 (L) 1.9 - 2.7 mg/dL   CBC   Result Value Ref Range    WBC 8.91 4.31 - 10.16 Thousand/uL    RBC 3.62 (L) 3.88 - 5.62 Million/uL    Hemoglobin 12.0 12.0 - 17.0 g/dL    Hematocrit 35.8 (L) 36.5 - 49.3 %    MCV 99 (H) 82 - 98 fL    MCH 33.1 26.8 - 34.3 pg    MCHC 33.5 31.4 - 37.4 g/dL    RDW 13.0 11.6 - 15.1 %    Platelets 216 149 - 390 Thousands/uL    MPV 10.7 8.9 - 12.7 fL   Magnesium   Result Value Ref Range    Magnesium 1.4 (L) 1.9 - 2.7 mg/dL   Renal function panel   Result Value Ref Range    Albumin 3.6 3.5 - 5.0 g/dL    Calcium 9.0 8.4 - 10.2 mg/dL    Phosphorus 3.3 2.3 - 4.1 mg/dL    Glucose 164 (H) 65 - 140 mg/dL    BUN 21 5 - 25 mg/dL    Creatinine 1.66 (H) 0.60 - 1.30  mg/dL    Sodium 132 (L) 135 - 147 mmol/L    Potassium 3.6 3.5 - 5.3 mmol/L    Chloride 89 (L) 96 - 108 mmol/L    CO2 34 (H) 21 - 32 mmol/L    ANION GAP 9 4 - 13 mmol/L    eGFR 43 ml/min/1.73sq m   CBC   Result Value Ref Range    WBC 9.32 4.31 - 10.16 Thousand/uL    RBC 3.53 (L) 3.88 - 5.62 Million/uL    Hemoglobin 11.7 (L) 12.0 - 17.0 g/dL    Hematocrit 34.8 (L) 36.5 - 49.3 %    MCV 99 (H) 82 - 98 fL    MCH 33.1 26.8 - 34.3 pg    MCHC 33.6 31.4 - 37.4 g/dL    RDW 13.0 11.6 - 15.1 %    Platelets 217 149 - 390 Thousands/uL    MPV 10.8 8.9 - 12.7 fL   Magnesium   Result Value Ref Range    Magnesium 1.2 (L) 1.9 - 2.7 mg/dL   Renal function panel   Result Value Ref Range    Albumin 3.5 3.5 - 5.0 g/dL    Calcium 8.8 8.4 - 10.2 mg/dL    Phosphorus 4.0 2.3 - 4.1 mg/dL    Glucose 153 (H) 65 - 140 mg/dL    BUN 23 5 - 25 mg/dL    Creatinine 2.04 (H) 0.60 - 1.30 mg/dL    Sodium 134 (L) 135 - 147 mmol/L    Potassium 3.4 (L) 3.5 - 5.3 mmol/L    Chloride 89 (L) 96 - 108 mmol/L    CO2 35 (H) 21 - 32 mmol/L    ANION GAP 10 4 - 13 mmol/L    eGFR 34 ml/min/1.73sq m   CBC   Result Value Ref Range    WBC 8.88 4.31 - 10.16 Thousand/uL    RBC 3.50 (L) 3.88 - 5.62 Million/uL    Hemoglobin 11.6 (L) 12.0 - 17.0 g/dL    Hematocrit 34.7 (L) 36.5 - 49.3 %    MCV 99 (H) 82 - 98 fL    MCH 33.1 26.8 - 34.3 pg    MCHC 33.4 31.4 - 37.4 g/dL    RDW 12.9 11.6 - 15.1 %    Platelets 215 149 - 390 Thousands/uL    MPV 11.0 8.9 - 12.7 fL   Magnesium   Result Value Ref Range    Magnesium 1.7 (L) 1.9 - 2.7 mg/dL   Renal function panel   Result Value Ref Range    Albumin 3.3 (L) 3.5 - 5.0 g/dL    Calcium 8.6 8.4 - 10.2 mg/dL    Corrected Calcium 9.2 8.3 - 10.1 mg/dL    Phosphorus 3.8 2.3 - 4.1 mg/dL    Glucose 177 (H) 65 - 140 mg/dL    BUN 27 (H) 5 - 25 mg/dL    Creatinine 2.12 (H) 0.60 - 1.30 mg/dL    Sodium 132 (L) 135 - 147 mmol/L    Potassium 3.5 3.5 - 5.3 mmol/L    Chloride 89 (L) 96 - 108 mmol/L    CO2 32 21 - 32 mmol/L    ANION GAP 11 4 - 13 mmol/L     eGFR 32 ml/min/1.73sq m   Magnesium   Result Value Ref Range    Magnesium 2.0 1.9 - 2.7 mg/dL   Basic metabolic panel   Result Value Ref Range    Sodium 133 (L) 135 - 147 mmol/L    Potassium 3.4 (L) 3.5 - 5.3 mmol/L    Chloride 88 (L) 96 - 108 mmol/L    CO2 35 (H) 21 - 32 mmol/L    ANION GAP 10 4 - 13 mmol/L    BUN 32 (H) 5 - 25 mg/dL    Creatinine 2.27 (H) 0.60 - 1.30 mg/dL    Glucose 154 (H) 65 - 140 mg/dL    Calcium 9.0 8.4 - 10.2 mg/dL    eGFR 30 ml/min/1.73sq m   ECG 12 lead   Result Value Ref Range    Ventricular Rate 83 BPM    Atrial Rate 83 BPM    CO Interval 150 ms    QRSD Interval 84 ms    QT Interval 394 ms    QTC Interval 462 ms    P Axis 61 degrees    QRS Axis 47 degrees    T Wave Axis 16 degrees   Fingerstick Glucose (POCT)   Result Value Ref Range    POC Glucose 119 65 - 140 mg/dl   Fingerstick Glucose (POCT)   Result Value Ref Range    POC Glucose 204 (H) 65 - 140 mg/dl   Fingerstick Glucose (POCT)   Result Value Ref Range    POC Glucose 247 (H) 65 - 140 mg/dl   Fingerstick Glucose (POCT)   Result Value Ref Range    POC Glucose 200 (H) 65 - 140 mg/dl   Fingerstick Glucose (POCT)   Result Value Ref Range    POC Glucose 141 (H) 65 - 140 mg/dl   Fingerstick Glucose (POCT)   Result Value Ref Range    POC Glucose 154 (H) 65 - 140 mg/dl   Fingerstick Glucose (POCT)   Result Value Ref Range    POC Glucose 231 (H) 65 - 140 mg/dl   Fingerstick Glucose (POCT)   Result Value Ref Range    POC Glucose 175 (H) 65 - 140 mg/dl   Fingerstick Glucose (POCT)   Result Value Ref Range    POC Glucose 205 (H) 65 - 140 mg/dl   Fingerstick Glucose (POCT)   Result Value Ref Range    POC Glucose 157 (H) 65 - 140 mg/dl   Fingerstick Glucose (POCT)   Result Value Ref Range    POC Glucose 220 (H) 65 - 140 mg/dl   Fingerstick Glucose (POCT)   Result Value Ref Range    POC Glucose 198 (H) 65 - 140 mg/dl   Fingerstick Glucose (POCT)   Result Value Ref Range    POC Glucose 230 (H) 65 - 140 mg/dl   Fingerstick Glucose (POCT)   Result  Value Ref Range    POC Glucose 168 (H) 65 - 140 mg/dl   Fingerstick Glucose (POCT)   Result Value Ref Range    POC Glucose 206 (H) 65 - 140 mg/dl   Fingerstick Glucose (POCT)   Result Value Ref Range    POC Glucose 226 (H) 65 - 140 mg/dl   Fingerstick Glucose (POCT)   Result Value Ref Range    POC Glucose 219 (H) 65 - 140 mg/dl   Fingerstick Glucose (POCT)   Result Value Ref Range    POC Glucose 168 (H) 65 - 140 mg/dl   Fingerstick Glucose (POCT)   Result Value Ref Range    POC Glucose 234 (H) 65 - 140 mg/dl   Fingerstick Glucose (POCT)   Result Value Ref Range    POC Glucose 177 (H) 65 - 140 mg/dl   Fingerstick Glucose (POCT)   Result Value Ref Range    POC Glucose 257 (H) 65 - 140 mg/dl   Fingerstick Glucose (POCT)   Result Value Ref Range    POC Glucose 193 (H) 65 - 140 mg/dl   Fingerstick Glucose (POCT)   Result Value Ref Range    POC Glucose 232 (H) 65 - 140 mg/dl   Fingerstick Glucose (POCT)   Result Value Ref Range    POC Glucose 243 (H) 65 - 140 mg/dl   Fingerstick Glucose (POCT)   Result Value Ref Range    POC Glucose 180 (H) 65 - 140 mg/dl   Fingerstick Glucose (POCT)   Result Value Ref Range    POC Glucose 247 (H) 65 - 140 mg/dl   Fingerstick Glucose (POCT)   Result Value Ref Range    POC Glucose 195 (H) 65 - 140 mg/dl   Fingerstick Glucose (POCT)   Result Value Ref Range    POC Glucose 200 (H) 65 - 140 mg/dl   Fingerstick Glucose (POCT)   Result Value Ref Range    POC Glucose 196 (H) 65 - 140 mg/dl   Fingerstick Glucose (POCT)   Result Value Ref Range    POC Glucose 250 (H) 65 - 140 mg/dl   Fingerstick Glucose (POCT)   Result Value Ref Range    POC Glucose 222 (H) 65 - 140 mg/dl   Fingerstick Glucose (POCT)   Result Value Ref Range    POC Glucose 210 (H) 65 - 140 mg/dl   Fingerstick Glucose (POCT)   Result Value Ref Range    POC Glucose 166 (H) 65 - 140 mg/dl   Fingerstick Glucose (POCT)   Result Value Ref Range    POC Glucose 215 (H) 65 - 140 mg/dl       No orders of the defined types were placed in  this encounter.      ALLERGIES:  No Known Allergies    Current Medications     Current Outpatient Medications   Medication Sig Dispense Refill    albuterol (PROVENTIL HFA,VENTOLIN HFA) 90 mcg/act inhaler Inhale 1 puff in the morning 3 inhalers per refill 54 g 1    allopurinol (ZYLOPRIM) 100 mg tablet Take 1 tablet (100 mg total) by mouth daily 90 tablet 1    Blood Pressure KIT Twice a day periodically 1 each 0    bumetanide (BUMEX) 2 mg tablet Take 1 tablet (2 mg total) by mouth 2 (two) times a day Do not start before April 24, 2025. 60 tablet 0    carvedilol (COREG) 12.5 mg tablet Take 1 tablet (12.5 mg total) by mouth 2 (two) times a day with meals 60 tablet 0    cefuroxime (CEFTIN) 500 mg tablet Take 1 tablet (500 mg total) by mouth every 12 (twelve) hours for 10 days 20 tablet 0    colchicine (COLCRYS) 0.6 mg tablet Take 1 tablet (0.6 mg total) by mouth 2 (two) times a day 20 tablet 0    famotidine (PEPCID) 20 mg tablet Take 1 tablet (20 mg total) by mouth daily at bedtime 90 tablet 1    Fluticasone-Salmeterol (Advair) 500-50 mcg/dose inhaler Inhale 1 puff 2 (two) times a day Rinse mouth after use. 60 blister 5    glimepiride (AMARYL) 1 mg tablet TAKE 1 TABLET BY MOUTH TWICE A  tablet 1    glucose blood test strip 1 each by Other route daily 180 each 5    Lancets (ONETOUCH ULTRASOFT) lancets by Does not apply route      levalbuterol (XOPENEX) 1.25 mg/3 mL nebulizer solution Take 3 mL (1.25 mg total) by nebulization 3 (three) times a day 270 mL 0    pantoprazole (PROTONIX) 40 mg tablet Take 1 tablet (40 mg total) by mouth daily before breakfast 90 tablet 3    sitaGLIPtin (Januvia) 100 mg tablet Take 1 tablet (100 mg total) by mouth daily 90 tablet 1    ipratropium (ATROVENT) 0.02 % nebulizer solution Take 2.5 mL (0.5 mg total) by nebulization 3 (three) times a day 225 mL 5    magnesium Oxide (MAG-OX) 400 mg TABS Take 2 tablets (800 mg total) by mouth 2 (two) times a day for 7 days 28 tablet 0     spironolactone (ALDACTONE) 25 mg tablet Take 1 tablet (25 mg total) by mouth daily 90 tablet 1     No current facility-administered medications for this visit.         Health Maintenance     Health Maintenance   Topic Date Due    Pneumococcal Vaccine: Pediatrics (0 to 5 Years) and At-Risk Patients (6 to 64 Years) (1 of 2 - PCV) Never done    Zoster Vaccine (1 of 2) Never done    Annual Physical  12/22/2023    RSV Vaccine for Pregnant Patients and Patients Age 60+ Years (1 - Risk 60-74 years 1-dose series) Never done    COVID-19 Vaccine (4 - 2024-25 season) 09/01/2024    HEMOGLOBIN A1C  09/13/2025    Kidney Health Evaluation: Albumin/Creatinine Ratio  09/27/2025    Diabetic Foot Exam  09/27/2025    Diabetic Eye Exam  10/09/2025    Kidney Health Evaluation: GFR  04/23/2026    Depression Screening  05/01/2026    DTaP,Tdap,and Td Vaccines (3 - Td or Tdap) 09/05/2030    Colorectal Cancer Screening  01/17/2031    HIV Screening  Completed    Hepatitis C Screening  Completed    Influenza Vaccine  Completed    Meningococcal B Vaccine  Aged Out    RSV Vaccine age 0-20 Months  Aged Out    HIB Vaccine  Aged Out    IPV Vaccine  Aged Out    Hepatitis A Vaccine  Aged Out    Meningococcal ACWY Vaccine  Aged Out    HPV Vaccine  Aged Out     Immunization History   Administered Date(s) Administered    COVID-19 PFIZER VACCINE 0.3 ML IM 04/12/2021, 05/03/2021, 12/15/2021    INFLUENZA 09/23/2015, 10/21/2019, 12/22/2022, 10/31/2023    Influenza Recombinant Preservative Free Im 11/27/2024    Influenza, injectable, quadrivalent, preservative free 0.5 mL 10/27/2020, 10/31/2023    Influenza, recombinant, quadrivalent,injectable, preservative free 10/21/2019, 12/22/2022    Influenza, seasonal, injectable 09/23/2015    Tdap 10/21/2019, 09/05/2020       Linden Roque MD

## 2025-05-01 NOTE — ASSESSMENT & PLAN NOTE
Cellulitis.  Patient with suspected cellulitis of skin of right lower extremity.  He was given prescription for Ceftin 500 mg to take twice daily for 10 days.  He will keep the area clean and dry and apply nonstick dressing if still draining.

## 2025-05-01 NOTE — ASSESSMENT & PLAN NOTE
Wt Readings from Last 3 Encounters:   05/01/25 121 kg (267 lb 6 oz)   04/23/25 121 kg (266 lb 9.6 oz)   03/13/25 126 kg (277 lb 4 oz)   Chronic congestive heart failure.  I expressed the importance of being compliant with his diuretic therapy due to his history of CHF.  Patient has chronic trace to +1 peripheral edema lower extremities however he states this is much improved from his recent visit to the emergency room.

## 2025-05-04 DIAGNOSIS — M1A.9XX0 CHRONIC GOUT WITHOUT TOPHUS, UNSPECIFIED CAUSE, UNSPECIFIED SITE: ICD-10-CM

## 2025-05-04 DIAGNOSIS — E11.65 UNCONTROLLED TYPE 2 DIABETES MELLITUS WITH HYPERGLYCEMIA (HCC): ICD-10-CM

## 2025-05-05 DIAGNOSIS — E11.65 UNCONTROLLED TYPE 2 DIABETES MELLITUS WITH HYPERGLYCEMIA (HCC): ICD-10-CM

## 2025-05-05 RX ORDER — COLCHICINE 0.6 MG/1
0.6 TABLET ORAL 2 TIMES DAILY
Qty: 20 TABLET | Refills: 0 | OUTPATIENT
Start: 2025-05-05

## 2025-05-05 RX ORDER — COLCHICINE 0.6 MG/1
0.6 TABLET ORAL 2 TIMES DAILY
Qty: 20 TABLET | Refills: 0 | Status: SHIPPED | OUTPATIENT
Start: 2025-05-05 | End: 2025-05-11

## 2025-05-06 ENCOUNTER — TELEPHONE (OUTPATIENT)
Dept: FAMILY MEDICINE CLINIC | Facility: CLINIC | Age: 60
End: 2025-05-06

## 2025-05-06 RX ORDER — SAXAGLIPTIN 5 MG/1
TABLET, FILM COATED ORAL
Refills: 0 | OUTPATIENT
Start: 2025-05-06

## 2025-05-06 RX ORDER — SITAGLIPTIN 100 MG/1
100 TABLET, FILM COATED ORAL DAILY
Qty: 30 TABLET | Refills: 5 | OUTPATIENT
Start: 2025-05-06

## 2025-05-06 NOTE — TELEPHONE ENCOUNTER
Reason for call:   [] Prior Auth  [] Other:     Caller:  [] Patient  [x] Pharmacy  Name: [id number]   Address: [address] [place] [address] [address] [address]   Callback Number: [phone]     Medication:  sitaGLIPtin (Januvia) 100 mg tablet     Dose/Frequency: Take 1 tablet (100 mg total) by mouth daily     Quantity: 90    Ordering Provider:   [x] PCP/Provider - Linden Roque MD   [] Speciality/Provider -

## 2025-05-07 ENCOUNTER — APPOINTMENT (EMERGENCY)
Dept: CT IMAGING | Facility: HOSPITAL | Age: 60
DRG: 872 | End: 2025-05-07
Payer: COMMERCIAL

## 2025-05-07 ENCOUNTER — HOSPITAL ENCOUNTER (INPATIENT)
Facility: HOSPITAL | Age: 60
LOS: 1 days | Discharge: HOME | DRG: 872 | End: 2025-05-11
Attending: EMERGENCY MEDICINE | Admitting: INTERNAL MEDICINE
Payer: COMMERCIAL

## 2025-05-07 DIAGNOSIS — E83.42 HYPOMAGNESEMIA: ICD-10-CM

## 2025-05-07 DIAGNOSIS — R65.10 SIRS (SYSTEMIC INFLAMMATORY RESPONSE SYNDROME) (HCC): ICD-10-CM

## 2025-05-07 DIAGNOSIS — E87.1 HYPONATREMIA: ICD-10-CM

## 2025-05-07 DIAGNOSIS — R17 ELEVATED BILIRUBIN: ICD-10-CM

## 2025-05-07 DIAGNOSIS — L03.90 CELLULITIS: Primary | ICD-10-CM

## 2025-05-07 DIAGNOSIS — R79.89 ELEVATED SERUM FREE T4 LEVEL: ICD-10-CM

## 2025-05-07 DIAGNOSIS — M25.50 ARTHRALGIA, UNSPECIFIED JOINT: ICD-10-CM

## 2025-05-07 DIAGNOSIS — R06.00 DYSPNEA: ICD-10-CM

## 2025-05-07 DIAGNOSIS — R79.89 ELEVATED TSH: ICD-10-CM

## 2025-05-07 DIAGNOSIS — M48.10 DISH (DIFFUSE IDIOPATHIC SKELETAL HYPEROSTOSIS): ICD-10-CM

## 2025-05-07 PROBLEM — D72.829 LEUCOCYTOSIS: Status: ACTIVE | Noted: 2025-05-07

## 2025-05-07 PROBLEM — M54.50 ACUTE ON CHRONIC LOW BACK PAIN: Status: ACTIVE | Noted: 2025-05-07

## 2025-05-07 PROBLEM — E80.6 HYPERBILIRUBINEMIA: Status: ACTIVE | Noted: 2025-05-07

## 2025-05-07 PROBLEM — F10.21 ALCOHOL DEPENDENCE IN REMISSION (HCC): Status: ACTIVE | Noted: 2020-07-08

## 2025-05-07 PROBLEM — G89.29 ACUTE ON CHRONIC LOW BACK PAIN: Status: ACTIVE | Noted: 2025-05-07

## 2025-05-07 PROBLEM — D72.829 LEUCOCYTOSIS: Status: RESOLVED | Noted: 2025-05-07 | Resolved: 2025-05-07

## 2025-05-07 PROBLEM — A41.9 SEPSIS (HCC): Status: ACTIVE | Noted: 2025-05-07

## 2025-05-07 LAB
2HR DELTA HS TROPONIN: -3 NG/L
ALBUMIN SERPL BCG-MCNC: 3.6 G/DL (ref 3.5–5)
ALP SERPL-CCNC: 122 U/L (ref 34–104)
ALT SERPL W P-5'-P-CCNC: 20 U/L (ref 7–52)
ANION GAP SERPL CALCULATED.3IONS-SCNC: 10 MMOL/L (ref 4–13)
APTT PPP: 35 SECONDS (ref 23–34)
AST SERPL W P-5'-P-CCNC: 31 U/L (ref 13–39)
ATRIAL RATE: 84 BPM
BASOPHILS # BLD AUTO: 0.05 THOUSANDS/ÂΜL (ref 0–0.1)
BASOPHILS NFR BLD AUTO: 0 % (ref 0–1)
BILIRUB SERPL-MCNC: 2.46 MG/DL (ref 0.2–1)
BILIRUB UR QL STRIP: ABNORMAL
BNP SERPL-MCNC: 148 PG/ML (ref 0–100)
BUN SERPL-MCNC: 25 MG/DL (ref 5–25)
CALCIUM SERPL-MCNC: 9.1 MG/DL (ref 8.4–10.2)
CARDIAC TROPONIN I PNL SERPL HS: 10 NG/L (ref ?–50)
CARDIAC TROPONIN I PNL SERPL HS: 7 NG/L (ref ?–50)
CHLORIDE SERPL-SCNC: 89 MMOL/L (ref 96–108)
CLARITY UR: CLEAR
CO2 SERPL-SCNC: 34 MMOL/L (ref 21–32)
COLOR UR: YELLOW
CREAT SERPL-MCNC: 1.65 MG/DL (ref 0.6–1.3)
EOSINOPHIL # BLD AUTO: 0.14 THOUSAND/ÂΜL (ref 0–0.61)
EOSINOPHIL NFR BLD AUTO: 1 % (ref 0–6)
ERYTHROCYTE [DISTWIDTH] IN BLOOD BY AUTOMATED COUNT: 13 % (ref 11.6–15.1)
FLUAV AG UPPER RESP QL IA.RAPID: NEGATIVE
FLUBV AG UPPER RESP QL IA.RAPID: NEGATIVE
GFR SERPL CREATININE-BSD FRML MDRD: 44 ML/MIN/1.73SQ M
GLUCOSE SERPL-MCNC: 183 MG/DL (ref 65–140)
GLUCOSE UR STRIP-MCNC: ABNORMAL MG/DL
HCT VFR BLD AUTO: 37.6 % (ref 36.5–49.3)
HGB BLD-MCNC: 12.5 G/DL (ref 12–17)
HGB UR QL STRIP.AUTO: NEGATIVE
IMM GRANULOCYTES # BLD AUTO: 0.06 THOUSAND/UL (ref 0–0.2)
IMM GRANULOCYTES NFR BLD AUTO: 1 % (ref 0–2)
INR PPP: 1.22 (ref 0.85–1.19)
KETONES UR STRIP-MCNC: ABNORMAL MG/DL
LACTATE SERPL-SCNC: 1.4 MMOL/L (ref 0.5–2)
LEUKOCYTE ESTERASE UR QL STRIP: NEGATIVE
LYMPHOCYTES # BLD AUTO: 2.3 THOUSANDS/ÂΜL (ref 0.6–4.47)
LYMPHOCYTES NFR BLD AUTO: 18 % (ref 14–44)
MAGNESIUM SERPL-MCNC: 1.3 MG/DL (ref 1.9–2.7)
MCH RBC QN AUTO: 31.7 PG (ref 26.8–34.3)
MCHC RBC AUTO-ENTMCNC: 33.2 G/DL (ref 31.4–37.4)
MCV RBC AUTO: 95 FL (ref 82–98)
MONOCYTES # BLD AUTO: 1.71 THOUSAND/ÂΜL (ref 0.17–1.22)
MONOCYTES NFR BLD AUTO: 13 % (ref 4–12)
NEUTROPHILS # BLD AUTO: 8.83 THOUSANDS/ÂΜL (ref 1.85–7.62)
NEUTS SEG NFR BLD AUTO: 67 % (ref 43–75)
NITRITE UR QL STRIP: NEGATIVE
NRBC BLD AUTO-RTO: 0 /100 WBCS
P AXIS: 55 DEGREES
PH UR STRIP.AUTO: 5.5 [PH]
PLATELET # BLD AUTO: 288 THOUSANDS/UL (ref 149–390)
PMV BLD AUTO: 10.1 FL (ref 8.9–12.7)
POTASSIUM SERPL-SCNC: 3.7 MMOL/L (ref 3.5–5.3)
PR INTERVAL: 144 MS
PROT SERPL-MCNC: 7.8 G/DL (ref 6.4–8.4)
PROT UR STRIP-MCNC: NEGATIVE MG/DL
PROTHROMBIN TIME: 16.1 SECONDS (ref 12.3–15)
QRS AXIS: 63 DEGREES
QRSD INTERVAL: 80 MS
QT INTERVAL: 404 MS
QTC INTERVAL: 477 MS
RBC # BLD AUTO: 3.94 MILLION/UL (ref 3.88–5.62)
SARS-COV+SARS-COV-2 AG RESP QL IA.RAPID: NEGATIVE
SODIUM SERPL-SCNC: 133 MMOL/L (ref 135–147)
SP GR UR STRIP.AUTO: 1.02 (ref 1–1.03)
T WAVE AXIS: 20 DEGREES
T4 FREE SERPL-MCNC: 1.54 NG/DL (ref 0.61–1.12)
TSH SERPL DL<=0.05 MIU/L-ACNC: 9.6 UIU/ML (ref 0.45–4.5)
URATE SERPL-MCNC: 10 MG/DL (ref 3.5–8.5)
UROBILINOGEN UR STRIP-ACNC: 2 MG/DL
VENTRICULAR RATE: 84 BPM
WBC # BLD AUTO: 13.09 THOUSAND/UL (ref 4.31–10.16)

## 2025-05-07 PROCEDURE — 96368 THER/DIAG CONCURRENT INF: CPT

## 2025-05-07 PROCEDURE — 96365 THER/PROPH/DIAG IV INF INIT: CPT

## 2025-05-07 PROCEDURE — 84550 ASSAY OF BLOOD/URIC ACID: CPT

## 2025-05-07 PROCEDURE — 74174 CTA ABD&PLVS W/CONTRAST: CPT

## 2025-05-07 PROCEDURE — 85610 PROTHROMBIN TIME: CPT

## 2025-05-07 PROCEDURE — 99285 EMERGENCY DEPT VISIT HI MDM: CPT | Performed by: EMERGENCY MEDICINE

## 2025-05-07 PROCEDURE — 87804 INFLUENZA ASSAY W/OPTIC: CPT

## 2025-05-07 PROCEDURE — 85730 THROMBOPLASTIN TIME PARTIAL: CPT

## 2025-05-07 PROCEDURE — 84484 ASSAY OF TROPONIN QUANT: CPT

## 2025-05-07 PROCEDURE — 93005 ELECTROCARDIOGRAM TRACING: CPT

## 2025-05-07 PROCEDURE — 84443 ASSAY THYROID STIM HORMONE: CPT

## 2025-05-07 PROCEDURE — 80053 COMPREHEN METABOLIC PANEL: CPT

## 2025-05-07 PROCEDURE — 72125 CT NECK SPINE W/O DYE: CPT

## 2025-05-07 PROCEDURE — 36415 COLL VENOUS BLD VENIPUNCTURE: CPT

## 2025-05-07 PROCEDURE — 83735 ASSAY OF MAGNESIUM: CPT

## 2025-05-07 PROCEDURE — 87811 SARS-COV-2 COVID19 W/OPTIC: CPT

## 2025-05-07 PROCEDURE — 83880 ASSAY OF NATRIURETIC PEPTIDE: CPT | Performed by: EMERGENCY MEDICINE

## 2025-05-07 PROCEDURE — 84439 ASSAY OF FREE THYROXINE: CPT

## 2025-05-07 PROCEDURE — 93010 ELECTROCARDIOGRAM REPORT: CPT | Performed by: STUDENT IN AN ORGANIZED HEALTH CARE EDUCATION/TRAINING PROGRAM

## 2025-05-07 PROCEDURE — 83605 ASSAY OF LACTIC ACID: CPT

## 2025-05-07 PROCEDURE — 71275 CT ANGIOGRAPHY CHEST: CPT

## 2025-05-07 PROCEDURE — 85025 COMPLETE CBC W/AUTO DIFF WBC: CPT

## 2025-05-07 PROCEDURE — 96367 TX/PROPH/DG ADDL SEQ IV INF: CPT

## 2025-05-07 PROCEDURE — 87040 BLOOD CULTURE FOR BACTERIA: CPT

## 2025-05-07 PROCEDURE — 99285 EMERGENCY DEPT VISIT HI MDM: CPT

## 2025-05-07 RX ORDER — SODIUM CHLORIDE, SODIUM GLUCONATE, SODIUM ACETATE, POTASSIUM CHLORIDE, MAGNESIUM CHLORIDE, SODIUM PHOSPHATE, DIBASIC, AND POTASSIUM PHOSPHATE .53; .5; .37; .037; .03; .012; .00082 G/100ML; G/100ML; G/100ML; G/100ML; G/100ML; G/100ML; G/100ML
100 INJECTION, SOLUTION INTRAVENOUS CONTINUOUS
Status: DISCONTINUED | OUTPATIENT
Start: 2025-05-07 | End: 2025-05-08

## 2025-05-07 RX ORDER — BUMETANIDE 1 MG/1
2 TABLET ORAL 2 TIMES DAILY
Status: DISCONTINUED | OUTPATIENT
Start: 2025-05-07 | End: 2025-05-11 | Stop reason: HOSPADM

## 2025-05-07 RX ORDER — CEFAZOLIN SODIUM 2 G/50ML
2000 SOLUTION INTRAVENOUS EVERY 8 HOURS
Status: DISCONTINUED | OUTPATIENT
Start: 2025-05-08 | End: 2025-05-11 | Stop reason: HOSPADM

## 2025-05-07 RX ORDER — PANTOPRAZOLE SODIUM 40 MG/1
40 TABLET, DELAYED RELEASE ORAL
Status: DISCONTINUED | OUTPATIENT
Start: 2025-05-08 | End: 2025-05-11 | Stop reason: HOSPADM

## 2025-05-07 RX ORDER — ALBUTEROL SULFATE 90 UG/1
1 INHALANT RESPIRATORY (INHALATION) DAILY
Status: DISCONTINUED | OUTPATIENT
Start: 2025-05-08 | End: 2025-05-11 | Stop reason: HOSPADM

## 2025-05-07 RX ORDER — COLCHICINE 0.6 MG/1
0.6 TABLET ORAL 2 TIMES DAILY
Status: DISCONTINUED | OUTPATIENT
Start: 2025-05-07 | End: 2025-05-10

## 2025-05-07 RX ORDER — ACETAMINOPHEN 325 MG/1
975 TABLET ORAL EVERY 8 HOURS SCHEDULED
Status: DISCONTINUED | OUTPATIENT
Start: 2025-05-08 | End: 2025-05-11 | Stop reason: HOSPADM

## 2025-05-07 RX ORDER — ACETAMINOPHEN 325 MG/1
975 TABLET ORAL EVERY 8 HOURS SCHEDULED
Status: DISCONTINUED | OUTPATIENT
Start: 2025-05-07 | End: 2025-05-07

## 2025-05-07 RX ORDER — ENOXAPARIN SODIUM 100 MG/ML
40 INJECTION SUBCUTANEOUS DAILY
Status: DISCONTINUED | OUTPATIENT
Start: 2025-05-08 | End: 2025-05-11 | Stop reason: HOSPADM

## 2025-05-07 RX ORDER — LIDOCAINE 50 MG/G
1 PATCH TOPICAL ONCE
Status: COMPLETED | OUTPATIENT
Start: 2025-05-07 | End: 2025-05-08

## 2025-05-07 RX ORDER — MAGNESIUM SULFATE HEPTAHYDRATE 40 MG/ML
2 INJECTION, SOLUTION INTRAVENOUS ONCE
Status: COMPLETED | OUTPATIENT
Start: 2025-05-07 | End: 2025-05-07

## 2025-05-07 RX ORDER — ACETAMINOPHEN 10 MG/ML
1000 INJECTION, SOLUTION INTRAVENOUS ONCE
Status: COMPLETED | OUTPATIENT
Start: 2025-05-07 | End: 2025-05-07

## 2025-05-07 RX ORDER — CARVEDILOL 12.5 MG/1
12.5 TABLET ORAL 2 TIMES DAILY WITH MEALS
Status: DISCONTINUED | OUTPATIENT
Start: 2025-05-08 | End: 2025-05-11 | Stop reason: HOSPADM

## 2025-05-07 RX ORDER — MAGNESIUM SULFATE HEPTAHYDRATE 40 MG/ML
4 INJECTION, SOLUTION INTRAVENOUS ONCE
Status: COMPLETED | OUTPATIENT
Start: 2025-05-07 | End: 2025-05-08

## 2025-05-07 RX ORDER — FAMOTIDINE 20 MG/1
20 TABLET, FILM COATED ORAL
Status: DISCONTINUED | OUTPATIENT
Start: 2025-05-07 | End: 2025-05-11 | Stop reason: HOSPADM

## 2025-05-07 RX ORDER — FLUTICASONE FUROATE AND VILANTEROL 200; 25 UG/1; UG/1
1 POWDER RESPIRATORY (INHALATION)
Status: DISCONTINUED | OUTPATIENT
Start: 2025-05-08 | End: 2025-05-11 | Stop reason: HOSPADM

## 2025-05-07 RX ORDER — SPIRONOLACTONE 25 MG/1
25 TABLET ORAL DAILY
Status: DISCONTINUED | OUTPATIENT
Start: 2025-05-08 | End: 2025-05-11 | Stop reason: HOSPADM

## 2025-05-07 RX ORDER — ALLOPURINOL 100 MG/1
100 TABLET ORAL DAILY
Status: DISCONTINUED | OUTPATIENT
Start: 2025-05-08 | End: 2025-05-11 | Stop reason: HOSPADM

## 2025-05-07 RX ORDER — PREDNISONE 20 MG/1
40 TABLET ORAL DAILY
Status: DISCONTINUED | OUTPATIENT
Start: 2025-05-08 | End: 2025-05-11 | Stop reason: HOSPADM

## 2025-05-07 RX ORDER — INSULIN LISPRO 100 [IU]/ML
2-12 INJECTION, SOLUTION INTRAVENOUS; SUBCUTANEOUS
Status: DISCONTINUED | OUTPATIENT
Start: 2025-05-08 | End: 2025-05-09

## 2025-05-07 RX ORDER — POLYETHYLENE GLYCOL 3350 17 G/17G
17 POWDER, FOR SOLUTION ORAL DAILY PRN
Status: DISCONTINUED | OUTPATIENT
Start: 2025-05-07 | End: 2025-05-11 | Stop reason: HOSPADM

## 2025-05-07 RX ADMIN — VANCOMYCIN HYDROCHLORIDE 2000 MG: 5 INJECTION, POWDER, LYOPHILIZED, FOR SOLUTION INTRAVENOUS at 22:39

## 2025-05-07 RX ADMIN — ACETAMINOPHEN 1000 MG: 10 INJECTION INTRAVENOUS at 20:09

## 2025-05-07 RX ADMIN — IOHEXOL 100 ML: 350 INJECTION, SOLUTION INTRAVENOUS at 19:01

## 2025-05-07 RX ADMIN — MAGNESIUM SULFATE HEPTAHYDRATE 2 G: 40 INJECTION, SOLUTION INTRAVENOUS at 18:48

## 2025-05-07 RX ADMIN — SODIUM CHLORIDE 250 ML: 0.9 INJECTION, SOLUTION INTRAVENOUS at 18:47

## 2025-05-07 RX ADMIN — FAMOTIDINE 20 MG: 20 TABLET, FILM COATED ORAL at 22:57

## 2025-05-07 RX ADMIN — CEFTRIAXONE 1000 MG: 10 INJECTION, POWDER, FOR SOLUTION INTRAVENOUS at 20:37

## 2025-05-07 RX ADMIN — COLCHICINE 0.6 MG: 0.6 TABLET ORAL at 22:57

## 2025-05-07 RX ADMIN — LIDOCAINE 1 PATCH: 50 PATCH CUTANEOUS at 17:30

## 2025-05-07 NOTE — TELEPHONE ENCOUNTER
PA for sitaGLIPtin (Januvia) 100 mg tablet DENIED    Reason:(Screenshot if applicable)        Message sent to office clinical pool Yes    Denial letter scanned into Media Yes    We can gladly do an appeal but the process can take about 30-60 days to provide determination. Please Schedule a Peer to Peer at phone [phone] . If an appeal is truly warranted please have Provider send clinical documentation to the PA department to support the appeal.     **Please follow up with your patient regarding denial and next steps**

## 2025-05-07 NOTE — TELEPHONE ENCOUNTER
PA for sitaGLIPtin (Januvia) 100 mg tablet SUBMITTED to [facility]    via    []CMM-KEY:   [x]Surescripts-Case ID # [id number]   []Availity-Auth ID # NDC #  []Faxed to plan   []Other website   []Phone call Case ID #     [x]PA sent as URGENT    All office notes, labs and other pertaining documents and studies sent. Clinical questions answered. Awaiting determination from insurance company.     Turnaround time for your insurance to make a decision on your Prior Authorization can take 7-21 business days.

## 2025-05-07 NOTE — ED ATTENDING ATTESTATION
5/7/2025  IPancho DO, saw and evaluated the patient. I have discussed the patient with the resident/non-physician practitioner and agree with the resident's/non-physician practitioner's findings, Plan of Care, and MDM as documented in the resident's/non-physician practitioner's note, except where noted. All available labs and Radiology studies were reviewed.  I was present for key portions of any procedure(s) performed by the resident/non-physician practitioner and I was immediately available to provide assistance.       At this point I agree with the current assessment done in the Emergency Department.  I have conducted an independent evaluation of this patient a history and physical is as follows:    59 yo M coming in for eval of weakness and back, neck, bilateral arm, wrist, hand pain. Recent admission for CHF exacerbation and has lost a lot of weight since he was admitted. Denies leg swelling or sob.    PE:  The patient is well appearing, non-toxic, in NAD. Head: normocephalic, atraumatic. HEENT: mucous membranes moist.  Lungs: CTA b/l, no resp distress. Heart: RRR. No M/R/G. Abdomen: NT, ND, no R/R/G. Neuro: CN2-12 intact, GCS 15. Normal strength and sensation, normal speech and gait. Cap refill < 2 sec, skin warm and dry. No rashes or lesions.    BP low - likely dehydrated, possibly from over-diuresis, vs. Infection. Labs, CT chest abd pelvis.    Admit for hypotension, dehydration, no clear source of infection, hypomagnesemia.      ED Course     Wt Readings from Last 3 Encounters:   05/07/25 115 kg (253 lb 12 oz)   05/01/25 121 kg (267 lb 6 oz)   04/23/25 121 kg (266 lb 9.6 oz)         Critical Care Time  Procedures

## 2025-05-08 ENCOUNTER — APPOINTMENT (OUTPATIENT)
Dept: RADIOLOGY | Facility: HOSPITAL | Age: 60
DRG: 872 | End: 2025-05-08
Payer: COMMERCIAL

## 2025-05-08 LAB
ALBUMIN SERPL BCG-MCNC: 3.4 G/DL (ref 3.5–5)
ALP SERPL-CCNC: 103 U/L (ref 34–104)
ALT SERPL W P-5'-P-CCNC: 16 U/L (ref 7–52)
ANION GAP SERPL CALCULATED.3IONS-SCNC: 11 MMOL/L (ref 4–13)
AST SERPL W P-5'-P-CCNC: 25 U/L (ref 13–39)
BASOPHILS # BLD AUTO: 0.04 THOUSANDS/ÂΜL (ref 0–0.1)
BASOPHILS NFR BLD AUTO: 0 % (ref 0–1)
BILIRUB DIRECT SERPL-MCNC: 0.82 MG/DL (ref 0–0.2)
BILIRUB SERPL-MCNC: 1.61 MG/DL (ref 0.2–1)
BUN SERPL-MCNC: 22 MG/DL (ref 5–25)
CALCIUM SERPL-MCNC: 8.7 MG/DL (ref 8.4–10.2)
CHLORIDE SERPL-SCNC: 93 MMOL/L (ref 96–108)
CO2 SERPL-SCNC: 28 MMOL/L (ref 21–32)
CREAT SERPL-MCNC: 1.06 MG/DL (ref 0.6–1.3)
EOSINOPHIL # BLD AUTO: 0.27 THOUSAND/ÂΜL (ref 0–0.61)
EOSINOPHIL NFR BLD AUTO: 2 % (ref 0–6)
ERYTHROCYTE [DISTWIDTH] IN BLOOD BY AUTOMATED COUNT: 12.8 % (ref 11.6–15.1)
GFR SERPL CREATININE-BSD FRML MDRD: 75 ML/MIN/1.73SQ M
GLUCOSE P FAST SERPL-MCNC: 138 MG/DL (ref 65–99)
GLUCOSE SERPL-MCNC: 134 MG/DL (ref 65–140)
GLUCOSE SERPL-MCNC: 138 MG/DL (ref 65–140)
GLUCOSE SERPL-MCNC: 183 MG/DL (ref 65–140)
GLUCOSE SERPL-MCNC: 248 MG/DL (ref 65–140)
GLUCOSE SERPL-MCNC: 342 MG/DL (ref 65–140)
HCT VFR BLD AUTO: 35.4 % (ref 36.5–49.3)
HGB BLD-MCNC: 11.9 G/DL (ref 12–17)
IMM GRANULOCYTES # BLD AUTO: 0.04 THOUSAND/UL (ref 0–0.2)
IMM GRANULOCYTES NFR BLD AUTO: 0 % (ref 0–2)
LYMPHOCYTES # BLD AUTO: 1.96 THOUSANDS/ÂΜL (ref 0.6–4.47)
LYMPHOCYTES NFR BLD AUTO: 16 % (ref 14–44)
MAGNESIUM SERPL-MCNC: 2.6 MG/DL (ref 1.9–2.7)
MCH RBC QN AUTO: 32 PG (ref 26.8–34.3)
MCHC RBC AUTO-ENTMCNC: 33.6 G/DL (ref 31.4–37.4)
MCV RBC AUTO: 95 FL (ref 82–98)
MONOCYTES # BLD AUTO: 1.35 THOUSAND/ÂΜL (ref 0.17–1.22)
MONOCYTES NFR BLD AUTO: 11 % (ref 4–12)
NEUTROPHILS # BLD AUTO: 8.73 THOUSANDS/ÂΜL (ref 1.85–7.62)
NEUTS SEG NFR BLD AUTO: 71 % (ref 43–75)
NRBC BLD AUTO-RTO: 0 /100 WBCS
PLATELET # BLD AUTO: 253 THOUSANDS/UL (ref 149–390)
PMV BLD AUTO: 10.4 FL (ref 8.9–12.7)
POTASSIUM SERPL-SCNC: 3.5 MMOL/L (ref 3.5–5.3)
PROT SERPL-MCNC: 6.8 G/DL (ref 6.4–8.4)
RBC # BLD AUTO: 3.72 MILLION/UL (ref 3.88–5.62)
SODIUM SERPL-SCNC: 132 MMOL/L (ref 135–147)
WBC # BLD AUTO: 12.39 THOUSAND/UL (ref 4.31–10.16)

## 2025-05-08 PROCEDURE — 80048 BASIC METABOLIC PNL TOTAL CA: CPT

## 2025-05-08 PROCEDURE — 83735 ASSAY OF MAGNESIUM: CPT

## 2025-05-08 PROCEDURE — 97167 OT EVAL HIGH COMPLEX 60 MIN: CPT

## 2025-05-08 PROCEDURE — 80076 HEPATIC FUNCTION PANEL: CPT

## 2025-05-08 PROCEDURE — 85025 COMPLETE CBC W/AUTO DIFF WBC: CPT

## 2025-05-08 PROCEDURE — 99223 1ST HOSP IP/OBS HIGH 75: CPT | Performed by: INTERNAL MEDICINE

## 2025-05-08 PROCEDURE — 92610 EVALUATE SWALLOWING FUNCTION: CPT

## 2025-05-08 PROCEDURE — 73610 X-RAY EXAM OF ANKLE: CPT

## 2025-05-08 PROCEDURE — 82948 REAGENT STRIP/BLOOD GLUCOSE: CPT

## 2025-05-08 PROCEDURE — 94150 VITAL CAPACITY TEST: CPT

## 2025-05-08 RX ORDER — SODIUM CHLORIDE, SODIUM GLUCONATE, SODIUM ACETATE, POTASSIUM CHLORIDE, MAGNESIUM CHLORIDE, SODIUM PHOSPHATE, DIBASIC, AND POTASSIUM PHOSPHATE .53; .5; .37; .037; .03; .012; .00082 G/100ML; G/100ML; G/100ML; G/100ML; G/100ML; G/100ML; G/100ML
100 INJECTION, SOLUTION INTRAVENOUS CONTINUOUS
Status: DISCONTINUED | OUTPATIENT
Start: 2025-05-08 | End: 2025-05-08

## 2025-05-08 RX ORDER — LIDOCAINE 50 MG/G
1 PATCH TOPICAL DAILY
Status: DISCONTINUED | OUTPATIENT
Start: 2025-05-08 | End: 2025-05-11 | Stop reason: HOSPADM

## 2025-05-08 RX ORDER — INSULIN GLARGINE 100 [IU]/ML
10 INJECTION, SOLUTION SUBCUTANEOUS
Status: DISCONTINUED | OUTPATIENT
Start: 2025-05-08 | End: 2025-05-09

## 2025-05-08 RX ORDER — METHOCARBAMOL 500 MG/1
500 TABLET, FILM COATED ORAL EVERY 6 HOURS PRN
Status: DISCONTINUED | OUTPATIENT
Start: 2025-05-08 | End: 2025-05-11 | Stop reason: HOSPADM

## 2025-05-08 RX ADMIN — METHOCARBAMOL 500 MG: 500 TABLET ORAL at 09:36

## 2025-05-08 RX ADMIN — INSULIN GLARGINE 10 UNITS: 100 INJECTION, SOLUTION SUBCUTANEOUS at 22:14

## 2025-05-08 RX ADMIN — CYANOCOBALAMIN TAB 500 MCG 1000 MCG: 500 TAB at 09:11

## 2025-05-08 RX ADMIN — ACETAMINOPHEN 975 MG: 325 TABLET, FILM COATED ORAL at 05:16

## 2025-05-08 RX ADMIN — COLCHICINE 0.6 MG: 0.6 TABLET ORAL at 17:10

## 2025-05-08 RX ADMIN — FAMOTIDINE 20 MG: 20 TABLET, FILM COATED ORAL at 22:14

## 2025-05-08 RX ADMIN — PREDNISONE 40 MG: 20 TABLET ORAL at 09:10

## 2025-05-08 RX ADMIN — MAGNESIUM SULFATE HEPTAHYDRATE 4 G: 40 INJECTION, SOLUTION INTRAVENOUS at 00:41

## 2025-05-08 RX ADMIN — COLCHICINE 0.6 MG: 0.6 TABLET ORAL at 09:11

## 2025-05-08 RX ADMIN — ALBUTEROL SULFATE 1 PUFF: 90 AEROSOL, METERED RESPIRATORY (INHALATION) at 09:12

## 2025-05-08 RX ADMIN — INSULIN LISPRO 2 UNITS: 100 INJECTION, SOLUTION INTRAVENOUS; SUBCUTANEOUS at 12:08

## 2025-05-08 RX ADMIN — CEFAZOLIN SODIUM 2000 MG: 2 SOLUTION INTRAVENOUS at 22:14

## 2025-05-08 RX ADMIN — LIDOCAINE 1 PATCH: 700 PATCH TOPICAL at 09:36

## 2025-05-08 RX ADMIN — SODIUM CHLORIDE, SODIUM GLUCONATE, SODIUM ACETATE, POTASSIUM CHLORIDE, MAGNESIUM CHLORIDE, SODIUM PHOSPHATE, DIBASIC, AND POTASSIUM PHOSPHATE 100 ML/HR: .53; .5; .37; .037; .03; .012; .00082 INJECTION, SOLUTION INTRAVENOUS at 03:48

## 2025-05-08 RX ADMIN — ACETAMINOPHEN 975 MG: 325 TABLET, FILM COATED ORAL at 13:26

## 2025-05-08 RX ADMIN — ACETAMINOPHEN 975 MG: 325 TABLET, FILM COATED ORAL at 22:14

## 2025-05-08 RX ADMIN — INSULIN LISPRO 8 UNITS: 100 INJECTION, SOLUTION INTRAVENOUS; SUBCUTANEOUS at 17:09

## 2025-05-08 RX ADMIN — PANTOPRAZOLE SODIUM 40 MG: 40 TABLET, DELAYED RELEASE ORAL at 06:12

## 2025-05-08 RX ADMIN — ENOXAPARIN SODIUM 40 MG: 40 INJECTION SUBCUTANEOUS at 09:10

## 2025-05-08 RX ADMIN — SODIUM CHLORIDE, SODIUM GLUCONATE, SODIUM ACETATE, POTASSIUM CHLORIDE, MAGNESIUM CHLORIDE, SODIUM PHOSPHATE, DIBASIC, AND POTASSIUM PHOSPHATE 100 ML/HR: .53; .5; .37; .037; .03; .012; .00082 INJECTION, SOLUTION INTRAVENOUS at 04:00

## 2025-05-08 RX ADMIN — ALLOPURINOL 100 MG: 100 TABLET ORAL at 09:10

## 2025-05-08 RX ADMIN — FLUTICASONE FUROATE AND VILANTEROL TRIFENATATE 1 PUFF: 200; 25 POWDER RESPIRATORY (INHALATION) at 09:12

## 2025-05-08 NOTE — ASSESSMENT & PLAN NOTE
Wt Readings from Last 3 Encounters:   05/08/25 115 kg (253 lb 1.6 oz)   05/01/25 121 kg (267 lb 6 oz)   04/23/25 121 kg (266 lb 9.6 oz)   Volume down on exam  PTA on Bumex 2mg BID started on last admission  Aldactone 25g QD  Coreg 12.5mg BID  Plan  Hold Bumex for now due to dry on exam and bordeline BP consider decrease Bumex 1 mg BID  Hold Aldactone resume in the next 24-48 hrs  Holding Coreg for now as above  Daily weights standing  Strict I/O  Liberalize fluids for this evening once euvolemic will next 2gm Na and fluid restriction

## 2025-05-08 NOTE — H&P
H&P - Hospitalist   Name: Elly Chong 60 y.o. male I MRN: [id number]  Unit/Bed#: S -01 I Date of Admission: 5/7/2025   Date of Service: 5/7/2025 I Hospital Day: 0     Assessment & Plan  Sepsis (HCC)  Sepsis POA due to leukocytosis 13.09 tachypnea 22 suspected cellulitis  PTA on Ceftin for wound in ankle until 5/11 now with LLE blister however the blister does not seem to be infected  CTA CAP negative for other source of infection no other associated symptoms  AT the ED started on CTX + Vanco    Plan  Hold PTA Ceftin  Continue with Ancef 2g Q 8hrs, no hx MRSA  CBC am  Monitor hemodynamics  Holding PTA hypertensives as blood pressures borderline  Checkortho BP    Cellulitis  PTA on Ceftin due to right ankle wound  Started on ceftriaxone and vancomycin at the ED due to sepsis suspected source ongoing cellulitis  Plan  Transition to Ancef 2 g every 8 hours as no history of MRSA  Hold PTA Ceftin as above  Wound care consulted appreciate assistance  Check Xray  Arthralgia  Polyarthralgia suspect multifactorial in nature suspect Acute gout given swelling Left mcp which usually gouty location vs OA vs potentially viral component such as Ch[place]unya/dengue as recent traveling to the [facility] fortunately no other organ involement no cytopenias los suspicious RA as biomarkes negative in the past.  CT Spine notable for DISH  Rapid COVID/Flu neg    Plan  Resume PTA Colchicine  Uric Acid added to labs  Started again on Allopurinol this was not taken per spouse goal uric acid <6 can be recheck outpatient by PCP  Start Prednisone 40mg QD x 5 days as due to CKD unable to try NSAIDS  Continue supportive care Tylenol    Chronic gout without tophus  Continue PTA colchicine, restart Allopurinol  Rest of plan as above  Hyponatremia  POA Na 133 corrected for glucose 193 Na 134  Likely due to poor po intake dehydration  Plan  Gentle hydration for now, encourage PO intake  Holding diuretics due to borderline  "BP      Hypomagnesemia  Hx Hypomag   Likely due to diuretic use  Plan  Repeat AM after repletion  Isolated Hyperbilirubinemia  Noted  Likely due to know pancreatic cyst vs pancreatic duct dilation  Plan  Hepatic function panel AM  Chronic heart failure with preserved ejection fraction (HCC)  Wt Readings from Last 3 Encounters:   05/07/25 115 kg (253 lb 12 oz)   05/01/25 121 kg (267 lb 6 oz)   04/23/25 121 kg (266 lb 9.6 oz)   Volume down on exam  PTA on Bumex 2mg BID started on last admission  Aldactone 25g QD  Coreg 12.5mg BID  Plan  Hold Bumex for now due to dry on exam and bordeline BP consider decrease Bumex 1 mg BID  Hold Aldactone resume in the next 24-48 hrs  Holding Coreg for now as above  Daily weights standing  Strict I/O  Liberalize fluids for this evening once euvolemic will next 2gm Na and fluid restriction    Essential hypertension  Blood Pressure: 112/69  Stable after IVF  POA bordeline P 90s  Plan  Holding antihypertensive for now at this to be resumed in the next 24 hours  Check ortho BP    Type 2 diabetes mellitus with hyperglycemia (HCC)  Lab Results   Component Value Date    HGBA1C 6.4 03/13/2025       No results for input(s): \"POCGLU\" in the last 72 hours.    Blood Sugar Average: Last 72 hrs:  PTA on glimepiride 1 mg twice daily  Plan  Continue sliding scale  Continue carbohydrate controlled diet    Alcohol dependence in remission (HCC)  Alcohol dependence in remission used to drink daily 3 beers and 2 shots stopped during last admission and we congratulated patient.  Adequate support at home  AMANDO (obstructive sleep apnea)  History of AMANDO in the past recommended to use CPAP no longer usage  O2 nasal cannula while sleeping  Stage 3 chronic kidney disease (HCC)  Lab Results   Component Value Date    EGFR 44 05/07/2025    EGFR 30 04/23/2025    EGFR 32 04/22/2025    CREATININE 1.65 (H) 05/07/2025    CREATININE 2.27 (H) 04/23/2025    CREATININE 2.12 (H) 04/22/2025   Baseline seems to be " 1.6-1.8  Stable and at baseline  Plan  BMP in a.m.  Abnormal TSH  TSH elevated  Follow up t4 pending levels consider replacement vs repeat outpatient  Repeat 4-6 weeks outpatient  Acute on chronic low back pain  No sciatica worse R>L no cauda equina  Plan  Continue Tylenol ELIANE  Unable to use NSAIDs  Hold of Robaxin due to PTA lightheadedness  Started on Steroids for acute gout  Hold off gabapentin as no sciatica present       VTE Pharmacologic Prophylaxis: VTE Score: 5 High Risk (Score >/= 5) - Pharmacological DVT Prophylaxis Ordered: enoxaparin (Lovenox). Sequential Compression Devices Ordered.  Code Status: Level 1 - Full Code Patient  Discussion with family: Updated  (wife) via phone.    Anticipated Length of Stay: Patient will be admitted on an observation basis with an anticipated length of stay of less than 2 midnights secondary to Observation Sepsis secondary to since then has been taking, hyponatremia, dehydration.    History of Present Illness   Chief Complaint: Lightheadedness, arthralgias, hypotension    Elly Chong is a 60 y.o. male with a PMH of type 2 diabetes not on insulin, hypertension, OA, HFpEF, tobacco and alcohol dependence on remission who presents with multiple complaints.  Endorsed generalized weakness, neck, back, shoulder and left arm pain that started approximately about 1 to 2 weeks ago.  Not associated with fevers or chills.  Denies any trauma, no falls.  Pain is worse upon mobility however gets better with rest and no relief with Tylenol.  Patient had a recent admission for CHF exacerbation at that time was switched from torsemide to Bumex which she has been compliant since then.  At home patient noticed blood pressures in the 70s over 50s with associated lightheadedness upon standing.  Denies any falls, presyncopal or syncopal episodes.  Denies any chest pain, shortness of breath, abdominal pain or urinary symptoms.  Oral intake has been poor as well as hydration.   Since last admission has come off alcohol and tobacco use    Of note prior to his last admission when he went on a cruise to [place] however at the time denied any acute illness other than lower extremity swelling and shortness of breath.  During that admission was noted with right lower extremity wound/cellulitis since then has been taking Ceftin.    Upon evaluation in the emergency department noted with borderline blood pressures met sepsis criteria for leukocytosis and tachypnea started on IV antibiotics due to concern of cellulitis.  Laboratory was also notable for hyponatremia now hyperbilirubinemia and abnormal TSH.      Review of Systems    Historical Information   Past Medical History:   Diagnosis Date    Acute gout of right hand 10/17/2022    Acute kidney injury (HCC) 09/05/2020    Acute on chronic diastolic congestive heart failure ([facility]) 08/31/2021    DELFINO (acute kidney injury) ([facility]) 09/05/2020    Asthma     Cellulitis 09/27/2024    CHF (congestive heart failure) ([facility])     Chronic heart failure with preserved ejection fraction ([facility]) 07/06/2020    CKD (chronic kidney disease) 04/15/2025    Colon polyp     GERD (gastroesophageal reflux disease)     Hypertension     Inguinal hernia     3/25/15    Onychomycosis     5/24/17    Sleep apnea     Type 2 diabetes mellitus ([facility]) 05/01/2012     Past Surgical History:   Procedure Laterality Date    ARTHROSCOPY KNEE      with Medial and Lateral Meniscus Repair    HERNIA REPAIR      umbilical and inguinal hernia repairs (left)     Social History     Tobacco Use    Smoking status: Former     Types: Cigars    Smokeless tobacco: Never    Tobacco comments:     current every day smoker, per [employer]   Vaping Use    Vaping status: Never Used   Substance and Sexual Activity    Alcohol use: Yes     Alcohol/week: 35.0 standard drinks of alcohol     Types: 21 Cans of beer, 14 Shots of liquor per week     Comment: 3 beers daily and 2 shots daily (as of 04/2025)    Drug use: No     Sexual activity: Yes     E-Cigarette/Vaping    E-Cigarette Use Never User      E-Cigarette/Vaping Substances    Nicotine No     THC No     CBD No     Flavoring No     Other No     Unknown No      Family history non-contributory  Social History:  Marital Status: /Civil Union   Occupation:   Patient Pre-hospital Living Situation: With spouse  Patient Pre-hospital Level of Mobility: walks  Patient Pre-hospital Diet Restrictions: Sodium, fluid and CHO control diet    Meds/Allergies   I have reviewed home medications with patient family member.  Prior to Admission medications    Medication Sig Start Date End Date Taking? Authorizing Provider   albuterol (PROVENTIL HFA,VENTOLIN HFA) 90 mcg/act inhaler Inhale 1 puff in the morning 3 inhalers per refill 1/21/25  Yes Linden Roque MD   allopurinol (ZYLOPRIM) 100 mg tablet Take 1 tablet (100 mg total) by mouth daily 1/21/25  Yes Linden Roque MD   bumetanide (BUMEX) 2 mg tablet Take 1 tablet (2 mg total) by mouth 2 (two) times a day Do not start before April 24, 2025. 4/24/25  Yes Walter Thomas DO   carvedilol (COREG) 12.5 mg tablet Take 1 tablet (12.5 mg total) by mouth 2 (two) times a day with meals 4/23/25  Yes Walter Thomas DO   cefuroxime (CEFTIN) 500 mg tablet Take 1 tablet (500 mg total) by mouth every 12 (twelve) hours for 10 days 5/1/25 5/11/25 Yes Linden Roque MD   famotidine (PEPCID) 20 mg tablet Take 1 tablet (20 mg total) by mouth daily at bedtime 3/13/25  Yes Linden Roque MD   Fluticasone-Salmeterol (Advair) 500-50 mcg/dose inhaler Inhale 1 puff 2 (two) times a day Rinse mouth after use. 3/13/25 9/9/25 Yes Linden Roque MD   glimepiride (AMARYL) 1 mg tablet TAKE 1 TABLET BY MOUTH TWICE A DAY 12/18/24  Yes Linden Roque MD   pantoprazole (PROTONIX) 40 mg tablet Take 1 tablet (40 mg total) by mouth daily before breakfast 1/19/24  Yes Beth Lehman MD   spironolactone (ALDACTONE) 25 mg tablet Take 1 tablet (25 mg total) by mouth daily 7/2/24 5/7/25  Yes Jose Schulte, DO   Blood Pressure KIT Twice a day periodically 7/20/20   SHAY Bravo   colchicine (COLCRYS) 0.6 mg tablet Take 1 tablet (0.6 mg total) by mouth 2 (two) times a day  Patient not taking: Reported on 5/7/2025 5/5/25   Linden Roque MD   glucose blood test strip 1 each by Other route daily 11/3/20   Linden Roque MD   ipratropium (ATROVENT) 0.02 % nebulizer solution Take 2.5 mL (0.5 mg total) by nebulization 3 (three) times a day 3/13/25 4/12/25  Linden Roque MD   Lancets (ONETOUCH ULTRASOFT) lancets by Does not apply route 12/28/17   Historical Provider, MD   levalbuterol (XOPENEX) 1.25 mg/3 mL nebulizer solution Take 3 mL (1.25 mg total) by nebulization 3 (three) times a day 11/16/24   Christiano Howard,    magnesium Oxide (MAG-OX) 400 mg TABS Take 2 tablets (800 mg total) by mouth 2 (two) times a day for 7 days 4/23/25 4/30/25  Walter Thomas,    sitaGLIPtin (Januvia) 100 mg tablet Take 1 tablet (100 mg total) by mouth daily  Patient not taking: Reported on 5/7/2025 5/5/25   Linden Roque MD     No Known Allergies    Objective :  Temp:  [98 °F (36.7 °C)-98.4 °F (36.9 °C)] 98.4 °F (36.9 °C)  HR:  [82-87] 82  BP: ()/(56-69) 112/69  Resp:  [15-22] 15  SpO2:  [94 %-99 %] 94 %  O2 Device: Nasal cannula  Nasal Cannula O2 Flow Rate (L/min):  [2 L/min] 2 L/min    Physical Exam  Vitals and nursing note reviewed.   Constitutional:       General: He is not in acute distress.     Appearance: He is well-developed.   HENT:      Head: Normocephalic and atraumatic.      Mouth/Throat:      Mouth: Mucous membranes are dry.   Eyes:      Conjunctiva/sclera: Conjunctivae normal.   Neck:      Comments: Tenderness to palpation along paraspinal muscles of trapezius.  Range of motion intact      Cardiovascular:      Rate and Rhythm: Normal rate and regular rhythm.      Heart sounds: No murmur heard.  Pulmonary:      Effort: Pulmonary effort is normal. No respiratory distress.      Breath sounds: Normal  breath sounds. No rales.   Abdominal:      General: Bowel sounds are normal. There is no distension.      Palpations: Abdomen is soft.      Tenderness: There is no abdominal tenderness.   Musculoskeletal:         General: Tenderness present. No swelling.      Cervical back: Neck supple.      Right lower leg: No edema.      Left lower leg: No edema.      Comments: Tender to palpation left hand mostly in the left index with some swelling    Bilateral shoulders nontender to palpation however pain seems to be intra-articular limited by motion.    No weakness appreciated    Low back tenderness to palpation around lumbar sacral area more so on the right side.   Skin:     General: Skin is warm and dry.      Capillary Refill: Capillary refill takes less than 2 seconds.      Findings: Lesion present.      Comments: Left lower extremity anterolateral portion of blister no erythema however tender to palpation    Rate ankle wound with granulation tissue no pus noted   Neurological:      Mental Status: He is alert.   Psychiatric:         Mood and Affect: Mood normal.          Lines/Drains:            Lab Results: I have reviewed the following results:  Results from last 7 days   Lab Units 05/07/25  1801   WBC Thousand/uL 13.09*   HEMOGLOBIN g/dL 12.5   HEMATOCRIT % 37.6   PLATELETS Thousands/uL 288   SEGS PCT % 67   LYMPHO PCT % 18   MONO PCT % 13*   EOS PCT % 1     Results from last 7 days   Lab Units 05/07/25  1801   SODIUM mmol/L 133*   POTASSIUM mmol/L 3.7   CHLORIDE mmol/L 89*   CO2 mmol/L 34*   BUN mg/dL 25   CREATININE mg/dL 1.65*   ANION GAP mmol/L 10   CALCIUM mg/dL 9.1   ALBUMIN g/dL 3.6   TOTAL BILIRUBIN mg/dL 2.46*   ALK PHOS U/L 122*   ALT U/L 20   AST U/L 31   GLUCOSE RANDOM mg/dL 183*     Results from last 7 days   Lab Units 05/07/25  1801   INR  1.22*         Lab Results   Component Value Date    HGBA1C 6.4 03/13/2025    HGBA1C 5.7 (H) 07/25/2024    HGBA1C 5.9 01/25/2024     Results from last 7 days   Lab Units  05/07/25  1801   LACTIC ACID mmol/L 1.4       Imaging Results Review: I reviewed radiology reports from this admission including: CT chest, CT abdomen/pelvis, and CT C-spine.  Other Study Results Review: EKG was reviewed.     Administrative Statements       ** Please Note: This note has been constructed using a voice recognition system. **

## 2025-05-08 NOTE — ASSESSMENT & PLAN NOTE
Wt Readings from Last 3 Encounters:   05/07/25 115 kg (253 lb 12 oz)   05/01/25 121 kg (267 lb 6 oz)   04/23/25 121 kg (266 lb 9.6 oz)   Volume down on exam  PTA on Bumex 2mg BID started on last admission  Aldactone 25g QD  Coreg 12.5mg BID  Plan  Hold Bumex for now due to dry on exam and bordeline BP consider decrease Bumex 1 mg BID  Hold Aldactone resume in the next 24-48 hrs  Holding Coreg for now as above  Daily weights standing  Strict I/O  Liberalize fluids for this evening once euvolemic will next 2gm Na and fluid restriction

## 2025-05-08 NOTE — SEPSIS NOTE
"  Sepsis Note   Elly Chong 60 y.o. male MRN: [id number]  Unit/Bed#: ED-04 Encounter: [id number]       Initial Sepsis Screening       Row Name 05/07/25 1832                Is the patient's history suggestive of a new or worsening infection? Yes (Proceed)  -GS        Suspected source of infection wound infection  -GS        Indicate SIRS criteria Tachypnea > 20 resp per min;Leukocytosis (WBC > 92353 IJL) OR Leukopenia (WBC <4000 IJL) OR Bandemia (WBC >10% bands)  -GS        Are two or more of the above signs & symptoms of infection both present and new to the patient? Yes (Proceed)  -GS        Assess for evidence of organ dysfunction: Are any of the below criteria present within 6 hours of suspected infection and SIRS criteria that are NOT considered to be chronic conditions? Bilirubin > 2.0  -GS        Date of presentation of severe sepsis 05/07/25  -GS        Time of presentation of severe sepsis 1835  -GS        Date of presentation of septic shock --        Time of presentation of septic shock --        Fluid Resuscitation: A lesser volume than 30 ml/kg IV fluid will be given  -GS        The 30 mL/kg fluid bolus was not given to the patient despite having hypotension, a lactate of >= 4 mmol/L, or documentation of septic shock secondary to: Heart Failure  -GS        Instead of the 30 ml/kg fluid bolus, the following volume of crystalloid fluid will be ordered: 250 ml  -GS        Of the following fluid type: NSS  -GS        Is the patient is persistently hypotensive in the hour after fluid bolus administration? If yes, patient meets criteria for vasopressor use. NO  -GS        Sepsis Note: Click \"NEXT\" below (NOT \"close\") to generate sepsis note based on above information. YES (proceed by clicking \"NEXT\")  -GS                  User Key  (r) = Recorded By, (t) = Taken By, (c) = Cosigned By      Initials Name Provider Type    GS Herlinda Santos MD Resident                        Body mass index is 39.74 " kg/m².  Wt Readings from Last 1 Encounters:   05/07/25 115 kg (253 lb 12 oz)        Ideal body weight: 66.1 kg (145 lb 11.6 oz)  Adjusted ideal body weight: 85.7 kg (188 lb 14.9 oz)

## 2025-05-08 NOTE — ASSESSMENT & PLAN NOTE
Lab Results   Component Value Date    EGFR 44 05/07/2025    EGFR 30 04/23/2025    EGFR 32 04/22/2025    CREATININE 1.65 (H) 05/07/2025    CREATININE 2.27 (H) 04/23/2025    CREATININE 2.12 (H) 04/22/2025   Baseline seems to be 1.6-1.8  Stable and at baseline  Plan  BMP in a.m.

## 2025-05-08 NOTE — ASSESSMENT & PLAN NOTE
Blood Pressure: 110/59  Stable after IVF  POA bordeline P 90s  Plan  Holding antihypertensive for now at this to be resumed in the next 24 hours  Check ortho BP

## 2025-05-08 NOTE — ASSESSMENT & PLAN NOTE
Blood Pressure: 112/69  Stable after IVF  POA bordeline P 90s  Plan  Holding antihypertensive for now at this to be resumed in the next 24 hours  Check ortho BP

## 2025-05-08 NOTE — PROGRESS NOTES
Progress Note - Hospitalist   Name: Elly Chong 60 y.o. male I MRN: [id number]  Unit/Bed#: S -01 I Date of Admission: 5/7/2025   Date of Service: 5/8/2025 I Hospital Day: 0    Assessment & Plan  Sepsis (HCC)  Sepsis POA due to leukocytosis 13.09 tachypnea 22 suspected cellulitis  PTA on Ceftin for wound in ankle until 5/11 now with LLE blister however the blister does not seem to be infected  CTA CAP negative for other source of infection no other associated symptoms  AT the ED started on CTX + Vanco    Plan  Hold PTA Ceftin  Continue with Ancef 2g Q 8hrs, no hx MRSA  CBC am  Monitor hemodynamics  Holding PTA hypertensives as blood pressures borderline  Checkortho BP    Cellulitis  PTA on Ceftin due to right ankle wound  Started on ceftriaxone and vancomycin at the ED due to sepsis suspected source ongoing cellulitis  Plan  Transition to Ancef 2 g every 8 hours as no history of MRSA  Hold PTA Ceftin as above  Wound care consulted appreciate assistance  Xray: Showing no osseous abnormality  Arthralgia  Polyarthralgia suspect multifactorial in nature suspect Acute gout given swelling Left mcp which usually gouty location vs OA vs potentially viral component such as Chikingunya/dengue as recent traveling to the [facility] fortunately no other organ involement no cytopenias los suspicious RA as biomarkes negative in the past.  CT Spine notable for DISH  Rapid COVID/Flu neg    Plan  Resume PTA Colchicine  Uric Acid added to labs  Started again on Allopurinol this was not taken per spouse goal uric acid <6 can be recheck outpatient by PCP  Start Prednisone 40mg QD x 5 days as due to CKD unable to try NSAIDS  Continue supportive care Tylenol    Chronic gout without tophus  Continue PTA colchicine, restart Allopurinol  Rest of plan as above  Hyponatremia  POA Na 133 corrected for glucose 193 Na 134  Likely due to poor po intake dehydration  Plan  Gentle hydration for now, encourage PO intake  Holding diuretics due to  borderline BP  Hypomagnesemia  Hx Hypomag   Likely due to diuretic use  Plan  Repeat AM after repletion  Isolated Hyperbilirubinemia  Noted  Likely due to know pancreatic cyst vs pancreatic duct dilation  Plan  Hepatic function panel AM  Chronic heart failure with preserved ejection fraction (HCC)  Wt Readings from Last 3 Encounters:   05/08/25 115 kg (253 lb 1.6 oz)   05/01/25 121 kg (267 lb 6 oz)   04/23/25 121 kg (266 lb 9.6 oz)   Volume down on exam  PTA on Bumex 2mg BID started on last admission  Aldactone 25g QD  Coreg 12.5mg BID  Plan  Hold Bumex for now due to dry on exam and bordeline BP consider decrease Bumex 1 mg BID  Hold Aldactone resume in the next 24-48 hrs  Holding Coreg for now as above  Daily weights standing  Strict I/O  Liberalize fluids for this evening once euvolemic will next 2gm Na and fluid restriction    Essential hypertension  Blood Pressure: 110/59  Stable after IVF  POA bordeline P 90s  Plan  Holding antihypertensive for now at this to be resumed in the next 24 hours  Check ortho BP    Type 2 diabetes mellitus with hyperglycemia (HCC)  Lab Results   Component Value Date    HGBA1C 6.4 03/13/2025       Recent Labs     05/08/25  0722 05/08/25  1052   POCGLU 134 183*       Blood Sugar Average: Last 72 hrs:  History uncontrolled diabetes with neuropathy  (P) 158.5PTA on glimepiride 1 mg twice daily  Plan  Continue sliding scale  Continue carbohydrate controlled diet  Repeat HbA1c outpatient 1-3 months acceptable ranges thus far      Alcohol dependence in remission (HCC)  Alcohol dependence in remission used to drink daily 3 beers and 2 shots stopped during last admission and we congratulated patient.  Adequate support at home  AMANDO (obstructive sleep apnea)  History of AMANDO in the past recommended to use CPAP no longer usage  O2 nasal cannula while sleeping  Stage 3 chronic kidney disease (HCC)  Lab Results   Component Value Date    EGFR 75 05/08/2025    EGFR 44 05/07/2025    EGFR 30  04/23/2025    CREATININE 1.06 05/08/2025    CREATININE 1.65 (H) 05/07/2025    CREATININE 2.27 (H) 04/23/2025   Baseline seems to be 1.6-1.8  Stable and at baseline  Plan  BMP in a.m.  Abnormal TSH  TSH elevated  Follow up t4 pending levels consider replacement vs repeat outpatient  Repeat 4-6 weeks outpatient  Acute on chronic low back pain  No sciatica worse R>L no cauda equina  Plan  Continue Tylenol ELIANE  Unable to use NSAIDs  Hold of Robaxin due to PTA lightheadedness  Started on Steroids for acute gout  Hold off gabapentin as no sciatica present     VTE Pharmacologic Prophylaxis: VTE Score: 5 High Risk (Score >/= 5) - Pharmacological DVT Prophylaxis Ordered: enoxaparin (Lovenox). Sequential Compression Devices Ordered.    Mobility:   Basic Mobility Inpatient Raw Score: 20  -HLM Goal: 6: Walk 10 steps or more  JH-HLM Achieved: 3: Sit at edge of bed  JH-HLM Goal NOT achieved. Continue with multidisciplinary rounding and encourage appropriate mobility to improve upon JH-HLM goals.    Patient Centered Rounds: I performed bedside rounds with nursing staff today.   Discussions with Specialists or Other Care Team Provider: None    Education and Discussions with Family / Patient: Updated  (wife) via phone.    Current Length of Stay: 0 day(s)  Current Patient Status: Observation   Certification Statement: The patient will continue to require additional inpatient hospital stay due to further evaluation and treatment  Discharge Plan: Anticipate discharge in 48 hrs to home.    Code Status: Level 1 - Full Code    Subjective   Patient seen resting in bed this morning.  He was noted to be in significant pain especially in his neck and shoulder.  He denies any acute symptomology such as shortness of breath, palpitations, chest pain and N/V/D.  Patient expressed concerns in obtaining a diagnosis for his neck and back pain.  Counseled on evidence of DISH in his imaging and will continue to observe for any effect  of colchicine/steroids in the event that his pain is related to gout.    Objective :  Temp:  [98 °F (36.7 °C)-98.7 °F (37.1 °C)] 98.7 °F (37.1 °C)  HR:  [81-87] 81  BP: ()/(56-84) 110/59  Resp:  [15-22] 18  SpO2:  [91 %-99 %] 91 %  O2 Device: None (Room air)  Nasal Cannula O2 Flow Rate (L/min):  [2 L/min] 2 L/min    Body mass index is 37.38 kg/m².     Input and Output Summary (last 24 hours):     Intake/Output Summary (Last 24 hours) at 5/8/2025 1456  Last data filed at 5/8/2025 0501  Gross per 24 hour   Intake 540 ml   Output 600 ml   Net -60 ml       Physical Exam  Vitals and nursing note reviewed.   Constitutional:       General: He is not in acute distress.     Appearance: He is well-developed.   HENT:      Head: Normocephalic and atraumatic.      Mouth/Throat:      Mouth: Mucous membranes are dry.   Eyes:      Conjunctiva/sclera: Conjunctivae normal.   Neck:      Comments: Tenderness to palpation along paraspinal muscles of trapezius.  Range of motion intact      Cardiovascular:      Rate and Rhythm: Normal rate and regular rhythm.      Heart sounds: No murmur heard.  Pulmonary:      Effort: Pulmonary effort is normal. No respiratory distress.      Breath sounds: Normal breath sounds. No rales.   Abdominal:      General: Bowel sounds are normal. There is no distension.      Palpations: Abdomen is soft.      Tenderness: There is no abdominal tenderness.   Musculoskeletal:         General: Tenderness present. No swelling or deformity.      Cervical back: Neck supple.      Right lower leg: No edema.      Left lower leg: No edema.      Comments: Tender to palpation left hand mostly in the left index with some swelling    Bilateral shoulders nontender to palpation however pain seems to be intra-articular limited by motion.    No weakness appreciated    Low back tenderness to palpation around lumbar sacral area more so on the right side.   Skin:     General: Skin is warm and dry.      Capillary Refill:  Capillary refill takes less than 2 seconds.      Findings: Lesion present.      Comments: Left lower extremity anterolateral portion of blister no erythema however tender to palpation    Rate ankle wound with granulation tissue no pus noted    Skin is extremely dry and lower extremity   Neurological:      Mental Status: He is alert.   Psychiatric:         Mood and Affect: Mood normal.         Thought Content: Thought content normal.           Lab Results: I have reviewed the following results:   Results from last 7 days   Lab Units 05/08/25  0448   WBC Thousand/uL 12.39*   HEMOGLOBIN g/dL 11.9*   HEMATOCRIT % 35.4*   PLATELETS Thousands/uL 253   SEGS PCT % 71   LYMPHO PCT % 16   MONO PCT % 11   EOS PCT % 2     Results from last 7 days   Lab Units 05/08/25  0448   SODIUM mmol/L 132*   POTASSIUM mmol/L 3.5   CHLORIDE mmol/L 93*   CO2 mmol/L 28   BUN mg/dL 22   CREATININE mg/dL 1.06   ANION GAP mmol/L 11   CALCIUM mg/dL 8.7   ALBUMIN g/dL 3.4*   TOTAL BILIRUBIN mg/dL 1.61*   ALK PHOS U/L 103   ALT U/L 16   AST U/L 25   GLUCOSE RANDOM mg/dL 138     Results from last 7 days   Lab Units 05/07/25  1801   INR  1.22*     Results from last 7 days   Lab Units 05/08/25  1052 05/08/25  0722   POC GLUCOSE mg/dl 183* 134         Results from last 7 days   Lab Units 05/07/25  1801   LACTIC ACID mmol/L 1.4       Recent Cultures (last 7 days):   Results from last 7 days   Lab Units 05/07/25  1801   BLOOD CULTURE  Received in Microbiology Lab. Culture in Progress.  Received in Microbiology Lab. Culture in Progress.             Last 24 Hours Medication List:     Current Facility-Administered Medications:     acetaminophen (TYLENOL) tablet 975 mg, Q8H ELIANE    albuterol (PROVENTIL HFA,VENTOLIN HFA) inhaler 1 puff, Daily    allopurinol (ZYLOPRIM) tablet 100 mg, Daily    [Held by provider] bumetanide (BUMEX) tablet 2 mg, BID    [Held by provider] carvedilol (COREG) tablet 12.5 mg, BID With Meals    ceFAZolin (ANCEF) IVPB (premix in  dextrose) 2,000 mg 50 mL, Q8H    colchicine (COLCRYS) tablet 0.6 mg, BID    cyanocobalamin (VITAMIN B-12) tablet 1,000 mcg, Daily    enoxaparin (LOVENOX) subcutaneous injection 40 mg, Daily    famotidine (PEPCID) tablet 20 mg, HS    fluticasone-vilanterol 200-25 mcg/actuation 1 puff, Daily    insulin lispro (HumALOG/ADMELOG) 100 units/mL subcutaneous injection 2-12 Units, TID AC **AND** Fingerstick Glucose (POCT), TID AC    lidocaine (LIDODERM) 5 % patch 1 patch, Daily    methocarbamol (ROBAXIN) tablet 500 mg, Q6H PRN    pantoprazole (PROTONIX) EC tablet 40 mg, Daily Before Breakfast    polyethylene glycol (MIRALAX) packet 17 g, Daily PRN    predniSONE tablet 40 mg, Daily    [Held by provider] spironolactone (ALDACTONE) tablet 25 mg, Daily    Administrative Statements   Today, Patient Was Seen By: Angelo Ribeiro MD      **Please Note: This note may have been constructed using a voice recognition system.**

## 2025-05-08 NOTE — PLAN OF CARE
Problem: PAIN - ADULT  Goal: Verbalizes/displays adequate comfort level or baseline comfort level  Description: Interventions:- Encourage patient to monitor pain and request assistance- Assess pain using appropriate pain scale- Administer analgesics based on type and severity of pain and evaluate response- Implement non-pharmacological measures as appropriate and evaluate response- Consider cultural and social influences on pain and pain management- Notify physician/advanced practitioner if interventions unsuccessful or patient reports new pain  Outcome: Progressing     Problem: Potential for Falls  Goal: Patient will remain free of falls  Description: INTERVENTIONS:- Educate patient/family on patient safety including physical limitations- Instruct patient to call for assistance with activity - Consult OT/PT to assist with strengthening/mobility - Keep Call bell within reach- Keep bed low and locked with side rails adjusted as appropriate- Keep care items and personal belongings within reach- Initiate and maintain comfort rounds- Make Fall Risk Sign visible to staff- Apply yellow socks and bracelet for high fall risk patients- Consider moving patient to room near nurses station  Outcome: Progressing

## 2025-05-08 NOTE — ASSESSMENT & PLAN NOTE
POA Na 133 corrected for glucose 193 Na 134  Likely due to poor po intake dehydration  Plan  Gentle hydration for now, encourage PO intake  Holding diuretics due to borderline BP

## 2025-05-08 NOTE — ED PROVIDER NOTES
Time reflects when diagnosis was documented in both MDM as applicable and the Disposition within this note       Time User Action Codes Description Comment    5/7/2025  8:23 PM Santos, Herlinda Add [R06.00] Dyspnea     5/7/2025  8:41 PM Santos, Herlinda Add [L03.90] Cellulitis     5/7/2025  8:41 PM Santos, Herlinda Add [E83.42] Hypomagnesemia     5/7/2025  8:41 PM Santos, Herlinda Add [E87.1] Hyponatremia     5/7/2025  8:41 PM Santos, Herlinda Add [R17] Elevated bilirubin     5/7/2025  8:41 PM Santos, Herlinda Modify [R06.00] Dyspnea     5/7/2025  8:41 PM Santos, Herlinda Modify [L03.90] Cellulitis     5/7/2025  8:49 PM Santos, Herlinda Add [R65.10] SIRS (systemic inflammatory response syndrome) (HCC)           ED Disposition       ED Disposition   Admit    Condition   Stable    Date/Time   Wed May 7, 2025 10:04 PM    Comment   Case was discussed with GIOVANNI and the patient's admission status was agreed to be Admission Status: inpatient status to the service of Dr. DAVILA .               Assessment & Plan       Medical Decision Making  Elly Chong is a 60 y.o. male presenting with multiple complaints including back pain with paresthesias, right flank/hip pain, fatigue. Vitals remarkable for intermittent borderline hypotension. Exam remarkable for paraspinal tenderness in cervical and upper thoracic spine, no midline tenderness, full ROM, intact and symmetric bilateral strength and sensation. No abdominal, chest wall pain. Heart regular rate and rhythm. Lungs clear bilaterally. Chronic wounds present in bilateral lower extremities. Left appears grossly well healed with granulation tissue and no significant discharge. Right lower extremity wound is covered. Upon removal, there is increased discharge but not specifically purulent discharge.    DDx including but not limited to: metabolic abnormality, intracranial etiology, cardiac etiology, substance abuse, infectious etiology including cellulitis, UTI,  "thyroid disease.     See ED course for further updates and interpretation of results.    Based on these results and H&P, unclear exact cause of presentation. For neck and back pain, given exam, most suspect muscular strain vs radiculopathy, CT neck negative so unlikely to be acute process requiring surgical or consult intervention. Did have some relief with lidocaine and tylenol. For fatigue and other symptoms, suspect multifactorial. Given subjective warmth at night, fatigue, decreased PO, and hypotension in setting of tachypnea and WBC elevation and chronic wound, concern for infectious etiology from patient's chronic wound. TSH was elevated, so possible thyroid component as well. Patient was given ceftriaxone and vancomycin in the ED. He would most benefit from admission for monitoring and further treatment.    Results, clinical impressions, and plan were discussed with patient and family. They expressed understanding and were in agreement with plan. Patient was given the opportunity to ask questions in ED. All questions and concerns were addressed in ED.    After evaluation and workup in the emergency department Discussed patient's case with [facility] regarding admission who accepted the patient.    Amount and/or Complexity of Data Reviewed  Independent Historian: spouse  Labs: ordered. Decision-making details documented in ED Course.  Radiology: ordered. Decision-making details documented in ED Course.  ECG/medicine tests:  Decision-making details documented in ED Course.    Risk  Prescription drug management.  Decision regarding hospitalization.        ED Course as of 05/08/25 0816   Wed May 07, 2025   1620 Triage: \"Pt recently admitted mid April for CHF, has since been having neck, shoulder, back and right hip pain. Pt also reports SOB worse with movement for about 1-2 weeks. Wife reports BP's have been low at home, lowest was 73/54 on Saturday. Pt reports lightheadedness on standing\"   1624 Blood Pressure: " 94/56  94/56, HR 85, RR 22, SpO2 97%, 98 F   1640 ECG 12 lead   1642 Delayed evaluation by emergency in other room   1830 WBC(!): 13.09  Elevated   1830 Hemoglobin: 12.5   1830 Platelet Count: 288   1830 UA w Reflex to Microscopic w Reflex to Culture(!)  Mildly elevated glucose and ketones, mild elevation in urobilinogen and bilirubin. Nonspecific. Not consistent with UTI.   1833 PTT(!): 35   1833 Protime-INR(!)  elevated   1834 Sodium(!): 133  Hyponatremia   1834 Potassium: 3.7   1834 ANION GAP: 10   1835 Creatinine(!): 1.65  At baseline   1835 Total Bilirubin(!): 2.46  Non-specific, no abdominal pain   1835 LACTIC ACID: 1.4  Normal   1835 MAGNESIUM(!): 1.3  Will replete   1851 TSH 3RD GENERATON(!): 9.598  Elevated, consistent with hypothyroidism, however free T4 pending   1851 hs TnI 0hr: 10   1945 Re-evaluated   1954 CT spine cervical without contrast  IMPRESSION:     No cervical spine fracture or traumatic malalignment.   2008 CTA chest abdomen pelvis w wo contrast  IMPRESSION:     No evidence for pulmonary embolism.     No acute intra-abdominal pathology.     Redemonstrated findings of chronic pancreatitis as well as a 1.5 cm cyst versus focal ductal dilatation regional to the pancreatic head; again, further characterization with nonemergent dedicated pancreatic protocol MRI is advised in the outpatient   setting.       Medications   lidocaine (LIDODERM) 5 % patch 1 patch (1 patch Topical Medication Applied 5/7/25 1730)   vancomycin (VANCOCIN) 2,000 mg in sodium chloride 0.9 % 500 mL IVPB (has no administration in time range)   sodium chloride 0.9 % bolus 250 mL (0 mL Intravenous Stopped 5/7/25 1947)   magnesium sulfate 2 g/50 mL IVPB (premix) 2 g (0 g Intravenous Stopped 5/7/25 2009)   iohexol (OMNIPAQUE) 350 MG/ML injection (MULTI-DOSE) 100 mL (100 mL Intravenous Given 5/7/25 1901)   acetaminophen (Ofirmev) injection 1,000 mg (0 mg Intravenous Stopped 5/7/25 2128)   ceftriaxone (ROCEPHIN) 1 g/50 mL in  dextrose IVPB (1,000 mg Intravenous New Bag 5/7/25 2037)       ED Risk Strat Scores   HEART Risk Score      Flowsheet Row Most Recent Value   Heart Score Risk Calculator    History 1 Filed at: 05/07/2025 1851   ECG 0 Filed at: 05/07/2025 1851   Age 1 Filed at: 05/07/2025 1851   Risk Factors 2 Filed at: 05/07/2025 1851   Troponin 0 Filed at: 05/07/2025 1851   HEART Score 4 Filed at: 05/07/2025 1851          HEART Risk Score      Flowsheet Row Most Recent Value   Heart Score Risk Calculator    History 1 Filed at: 05/07/2025 1851   ECG 0 Filed at: 05/07/2025 1851   Age 1 Filed at: 05/07/2025 1851   Risk Factors 2 Filed at: 05/07/2025 1851   Troponin 0 Filed at: 05/07/2025 1851   HEART Score 4 Filed at: 05/07/2025 1851                      No data recorded        SBIRT 20yo+      Flowsheet Row Most Recent Value   Initial Alcohol Screen: US AUDIT-C     1. How often do you have a drink containing alcohol? 0 Filed at: 05/07/2025 1622   2. How many drinks containing alcohol do you have on a typical day you are drinking?  0 Filed at: 05/07/2025 1622   3a. Male UNDER 65: How often do you have five or more drinks on one occasion? 0 Filed at: 05/07/2025 1622   3b. FEMALE Any Age, or MALE 65+: How often do you have 4 or more drinks on one occassion? 0 Filed at: 05/07/2025 1622   Audit-C Score 0 Filed at: 05/07/2025 1622   JUMA: How many times in the past year have you...    Used an illegal drug or used a prescription medication for non-medical reasons? Never Filed at: 05/07/2025 1622                            History of Present Illness       Chief Complaint   Patient presents with    Shortness of Breath     Pt recently admitted mid April for CHF, has since been having neck, shoulder, back and right hip pain. Pt also reports SOB worse with movement for about 1-2 weeks. Wife reports BP's have been low at home, lowest was 73/54 on Saturday. Pt reports lightheadedness on standing        Past Medical History:   Diagnosis Date     Acute gout of right hand 10/17/2022    Acute kidney injury (HCC) 09/05/2020    Acute on chronic diastolic congestive heart failure ([facility]) 08/31/2021    DELFINO (acute kidney injury) ([facility]) 09/05/2020    Asthma     Cellulitis 09/27/2024    CHF (congestive heart failure) ([facility])     Chronic heart failure with preserved ejection fraction ([facility]) 07/06/2020    CKD (chronic kidney disease) 04/15/2025    Colon polyp     GERD (gastroesophageal reflux disease)     Hypertension     Inguinal hernia     3/25/15    Onychomycosis     5/24/17    Sleep apnea     Type 2 diabetes mellitus ([facility]) 05/01/2012      Past Surgical History:   Procedure Laterality Date    ARTHROSCOPY KNEE      with Medial and Lateral Meniscus Repair    HERNIA REPAIR      umbilical and inguinal hernia repairs (left)      Family History   Problem Relation Age of Onset    Hypertension Mother         essential    Chronic bronchitis Father     Prostate cancer Father     Colon cancer Father     Breast cancer Sister       Social History     Tobacco Use    Smoking status: Former     Types: Cigars    Smokeless tobacco: Never    Tobacco comments:     current every day smoker, per [employer]   Vaping Use    Vaping status: Never Used   Substance Use Topics    Alcohol use: Yes     Alcohol/week: 35.0 standard drinks of alcohol     Types: 21 Cans of beer, 14 Shots of liquor per week     Comment: 3 beers daily and 2 shots daily (as of 04/2025)    Drug use: No      E-Cigarette/Vaping    E-Cigarette Use Never User       E-Cigarette/Vaping Substances    Nicotine No     THC No     CBD No     Flavoring No     Other No     Unknown No       I have reviewed and agree with the history as documented.     61 yo male with history of T2DM, HTN, HLD, HFpEF, tobacco use disorder, chronic lower extremity wounds presenting for multiple complaints including neck and back pain, right hip pain, fatigue.    Patient and wife at bedside reports starting on Friday and Saturday patient began experiencing  generalized fatigue, feeling warm at night, as well as progressively worsening bilateral neck and upper back pain with radiation to bilateral upper extremities and hand tingling, left greater than right. Also right hip pain. Patient also reports increasing dyspnea on exertion, as well as lightheadedness with standing. Also cough and globus sensation developing over the weekend. Patient has a chronic wound on both ankles, left improving, right still covered, overall improving but still open, no increased pain or redness that patient or wife have noticed.    Per wife, over the weekend, patient had episodes of hypotension to 70s systolic over the weekend, improved, but was once again hypotensive for her today, prompting presentation given the other symptoms. Of note, patient and wife report recent hospitalization for fluid overload, changed to Bumex from lasix, report patient has been keeping fluid off since then, reports compliance with home medications.          Review of Systems   Constitutional:  Positive for appetite change and fatigue.   HENT:  Negative for congestion, rhinorrhea and sore throat.    Eyes:  Negative for pain and visual disturbance.   Respiratory:  Negative for cough, chest tightness, shortness of breath, wheezing and stridor.    Cardiovascular:  Negative for chest pain, palpitations and leg swelling.   Gastrointestinal:  Negative for abdominal pain, constipation, diarrhea, nausea and vomiting.   Genitourinary:  Negative for dysuria and hematuria.   Musculoskeletal:  Positive for arthralgias, back pain, gait problem (due to pain), myalgias and neck pain. Negative for neck stiffness.   Skin:  Positive for wound. Negative for color change, pallor and rash.   Neurological:  Positive for weakness (generalized), light-headedness and numbness (Paresthesias in bilateral upper extremities). Negative for dizziness, syncope and headaches.   Psychiatric/Behavioral:  Negative for behavioral problems.    All  other systems reviewed and are negative.          Objective       ED Triage Vitals   Temperature Pulse Blood Pressure Respirations SpO2 Patient Position - Orthostatic VS   05/07/25 1622 05/07/25 1622 05/07/25 1622 05/07/25 1622 05/07/25 1622 05/07/25 1622   98 °F (36.7 °C) 85 94/56 22 97 % Sitting      Temp Source Heart Rate Source BP Location FiO2 (%) Pain Score    05/07/25 1622 05/07/25 1622 05/07/25 1728 -- --    Oral Monitor Right arm        Vitals      Date and Time Temp Pulse SpO2 Resp BP Pain Score FACES Pain Rating User   05/07/25 2033 -- 85 99 % 20 -- -- -- [facility]   05/07/25 1944 -- 87 96 % 20 112/69 -- -- [facility]   05/07/25 1842 -- 83 97 % 20 112/69 -- -- [facility]   05/07/25 1728 -- 83 96 % 20 96/61 -- -- [facility]   05/07/25 1622 98 °F (36.7 °C) 85 97 % 22 94/56 -- -- JK            Physical Exam  Vitals and nursing note reviewed.   Constitutional:       General: He is not in acute distress.     Appearance: Normal appearance. He is well-developed. He is ill-appearing.   HENT:      Head: Normocephalic and atraumatic.      Nose: No rhinorrhea.      Mouth/Throat:      Mouth: Mucous membranes are moist.      Pharynx: Oropharynx is clear.   Eyes:      Extraocular Movements: Extraocular movements intact.      Conjunctiva/sclera: Conjunctivae normal.   Cardiovascular:      Rate and Rhythm: Normal rate and regular rhythm.      Pulses: Normal pulses.      Heart sounds: Normal heart sounds. No murmur heard.  Pulmonary:      Effort: Pulmonary effort is normal. No respiratory distress.      Breath sounds: Normal breath sounds.   Abdominal:      General: Abdomen is flat. Bowel sounds are normal.      Palpations: Abdomen is soft.      Tenderness: There is no abdominal tenderness.   Musculoskeletal:      Cervical back: Neck supple. Tenderness (Paraspinal) present. No deformity, erythema, rigidity or bony tenderness. Normal range of motion.      Thoracic back: Tenderness present. No deformity or bony tenderness. Normal range of motion.       Right lower le+ Edema present.      Left lower le+ Edema present.   Skin:     General: Skin is warm and dry.      Capillary Refill: Capillary refill takes less than 2 seconds.      Findings: Wound present.   Neurological:      General: No focal deficit present.      Mental Status: He is alert and oriented to person, place, and time.   Psychiatric:         Mood and Affect: Mood normal.         Behavior: Behavior normal.         Results Reviewed       Procedure Component Value Units Date/Time    Uric acid [[id number]]  (Abnormal) Collected: 25    Lab Status: Final result Specimen: Blood from Arm, Right Updated: 25     Uric Acid 10.0 mg/dL     Narrative:      N-acetyl-p-benzoquinone imine (metabolite of Acetaminophen) will generate erroneously low results in samples for patients that have taken an overdose of Acetaminophen.    Blood culture #1 [[id number]] Collected: 25    Lab Status: Preliminary result Specimen: Blood from Arm, Right Updated: 25     Blood Culture Received in Microbiology Lab. Culture in Progress.    Blood culture #2 [[id number]] Collected: 25    Lab Status: Preliminary result Specimen: Blood from Arm, Left Updated: 25     Blood Culture Received in Microbiology Lab. Culture in Progress.    HS Troponin I 2hr [[id number]]  (Normal) Collected: 25    Lab Status: Final result Specimen: Blood from Arm, Right Updated: 25     hs TnI 2hr 7 ng/L      Delta 2hr hsTnI -3 ng/L     B-Type Natriuretic Peptide(BNP) [[id number]]  (Abnormal) Collected: 25    Lab Status: Final result Specimen: Blood from Arm, Right Updated: 25      pg/mL     TSH, 3rd generation with Free T4 reflex [[id number]]  (Abnormal) Collected: 25    Lab Status: Final result Specimen: Blood from Arm, Right Updated: 25 1850     TSH 3RD GENERATON 9.598 uIU/mL     T4, free [[id number]] Collected: 25    Lab  Status: In process Specimen: Blood from Arm, Right Updated: 05/07/25 1850    HS Troponin 0hr (reflex protocol) [[id number]]  (Normal) Collected: 05/07/25 1801    Lab Status: Final result Specimen: Blood from Arm, Right Updated: 05/07/25 1843     hs TnI 0hr 10 ng/L     Lactic acid, plasma (w/reflex if result > 2.0) [[id number]]  (Normal) Collected: 05/07/25 1801    Lab Status: Final result Specimen: Blood from Arm, Right Updated: 05/07/25 1835     LACTIC ACID 1.4 mmol/L     Narrative:      Result may be elevated if tourniquet was used during collection.    Comprehensive metabolic panel [[id number]]  (Abnormal) Collected: 05/07/25 1801    Lab Status: Final result Specimen: Blood from Arm, Right Updated: 05/07/25 1834     Sodium 133 mmol/L      Potassium 3.7 mmol/L      Chloride 89 mmol/L      CO2 34 mmol/L      ANION GAP 10 mmol/L      BUN 25 mg/dL      Creatinine 1.65 mg/dL      Glucose 183 mg/dL      Calcium 9.1 mg/dL      AST 31 U/L      ALT 20 U/L      Alkaline Phosphatase 122 U/L      Total Protein 7.8 g/dL      Albumin 3.6 g/dL      Total Bilirubin 2.46 mg/dL      eGFR 44 ml/min/1.73sq m     Narrative:      National Kid[employer] Foundation guidelines for Chronic Kidney Disease (CKD):     Stage 1 with normal or high GFR (GFR > 90 mL/min/1.73 square meters)    Stage 2 Mild CKD (GFR = 60-89 mL/min/1.73 square meters)    Stage 3A Moderate CKD (GFR = 45-59 mL/min/1.73 square meters)    Stage 3B Moderate CKD (GFR = 30-44 mL/min/1.73 square meters)    Stage 4 Severe CKD (GFR = 15-29 mL/min/1.73 square meters)    Stage 5 End Stage CKD (GFR <15 mL/min/1.73 square meters)  Note: GFR calculation is accurate only with a steady state creatinine    Magnesium [[id number]]  (Abnormal) Collected: 05/07/25 1801    Lab Status: Final result Specimen: Blood from Arm, Right Updated: 05/07/25 1834     Magnesium 1.3 mg/dL     Protime-INR [[id number]]  (Abnormal) Collected: 05/07/25 5977    Lab Status: Final result Specimen: Blood from  Arm, Right Updated: 05/07/25 1833     Protime 16.1 seconds      INR 1.22    Narrative:      INR Therapeutic Range    Indication                                             INR Range      Atrial Fibrillation                                               2.0-3.0  Hypercoagulable State                                    2.0.2.3  Left Ventricular Asist Device                            2.0-3.0  Mechanical Heart Valve                                  -    Aortic(with afib, MI, embolism, HF, LA enlargement,    and/or coagulopathy)                                     2.0-3.0 (2.5-3.5)     Mitral                                                             2.5-3.5  Prosthetic/Bioprosthetic Heart Valve               2.0-3.0  Venous thromboembolism (VTE: VT, PE        2.0-3.0    APTT [[id number]]  (Abnormal) Collected: 05/07/25 1801    Lab Status: Final result Specimen: Blood from Arm, Right Updated: 05/07/25 1833     PTT 35 seconds     FLU/COVID Rapid Antigen (30 min. TAT) - Preferred screening test in ED [[id number]]  (Normal) Collected: 05/07/25 1801    Lab Status: Final result Specimen: Nares from Nose Updated: 05/07/25 1829     SARS COV Rapid Antigen Negative     Influenza A Rapid Antigen Negative     Influenza B Rapid Antigen Negative    Narrative:      This test has been performed using the [facility] [facility] 2 FLU+SARS Antigen test under the Emergency Use Authorization (EUA). This test has been validated by the  and verified by the performing laboratory. The [facility] uses lateral flow immunofluorescent sandwich assay to detect SARS-COV, Influenza A and Influenza B Antigen.     The [facility] [facility] 2 SARS Antigen test does not differentiate between SARS-CoV and SARS-CoV-2.     Negative results are presumptive and may be confirmed with a molecular assay, if necessary, for patient management. Negative results do not rule out SARS-CoV-2 or influenza infection and should not be used as the sole basis for treatment or patient  management decisions. A negative test result may occur if the level of antigen in a sample is below the limit of detection of this test.     Positive results are indicative of the presence of viral antigens, but do not rule out bacterial infection or co-infection with other viruses.     All test results should be used as an adjunct to clinical observations and other information available to the provider.    FOR PEDIATRIC PATIENTS - copy/paste COVID Guidelines URL to browser: [email or web address]    UA w Reflex to Microscopic w Reflex to Culture [[id number]]  (Abnormal) Collected: 05/07/25 1805    Lab Status: Final result Specimen: Urine, Clean Catch Updated: 05/07/25 1816     Color, UA Yellow     Clarity, UA Clear     Specific Gravity, UA 1.018     pH, UA 5.5     Leukocytes, UA Negative     Nitrite, UA Negative     Protein, UA Negative mg/dl      Glucose,  (1/10%) mg/dl      Ketones, UA 10 (1+) mg/dl      Urobilinogen, UA 2.0 mg/dl      Bilirubin, UA Small     Occult Blood, UA Negative    CBC and differential [[id number]]  (Abnormal) Collected: 05/07/25 1801    Lab Status: Final result Specimen: Blood from Arm, Right Updated: 05/07/25 1814     WBC 13.09 Thousand/uL      RBC 3.94 Million/uL      Hemoglobin 12.5 g/dL      Hematocrit 37.6 %      MCV 95 fL      MCH 31.7 pg      MCHC 33.2 g/dL      RDW 13.0 %      MPV 10.1 fL      Platelets 288 Thousands/uL      nRBC 0 /100 WBCs      Segmented % 67 %      Immature Grans % 1 %      Lymphocytes % 18 %      Monocytes % 13 %      Eosinophils Relative 1 %      Basophils Relative 0 %      Absolute Neutrophils 8.83 Thousands/µL      Absolute Immature Grans 0.06 Thousand/uL      Absolute Lymphocytes 2.30 Thousands/µL      Absolute Monocytes 1.71 Thousand/µL      Eosinophils Absolute 0.14 Thousand/µL      Basophils Absolute 0.05 Thousands/µL             CT spine cervical without contrast   Final Interpretation by Tian  Vinod Hannon MD (05/07 1946)      No cervical spine fracture or traumatic malalignment.                  Workstation performed: [id number]         CTA chest abdomen pelvis w wo contrast   Final Interpretation by Johnson Escobedo MD (05/07 2001)      No evidence for pulmonary embolism.      No acute intra-abdominal pathology.      Redemonstrated findings of chronic pancreatitis as well as a 1.5 cm cyst versus focal ductal dilatation regional to the pancreatic head; again, further characterization with nonemergent dedicated pancreatic protocol MRI is advised in the outpatient    setting.               Workstation performed: [id number]             ECG 12 Lead Documentation Only    Date/Time: 5/7/2025 4:32 PM    Performed by: Herlinda Santos MD  Authorized by: Herlinda Santos MD    Indications / Diagnosis:  Back pain  ECG reviewed by me, the ED Provider: yes    Patient location:  ED  Previous ECG:     Previous ECG:  Compared to current    Comparison ECG info:  4/14/2025    Similarity:  Changes noted  Interpretation:     Interpretation: non-specific    Rate:     ECG rate:  84    ECG rate assessment: normal    Rhythm:     Rhythm: sinus rhythm    Ectopy:     Ectopy: PVCs    QRS:     QRS axis:  Normal    QRS intervals:  Normal  Conduction:     Conduction: normal    ST segments:     ST segments:  Normal  T waves:     T waves: normal    Comments:      QTc 477      ED Medication and Procedure Management   Prior to Admission Medications   Prescriptions Last Dose Informant Patient Reported? Taking?   Blood Pressure KIT  Self No No   Sig: Twice a day periodically   Fluticasone-Salmeterol (Advair) 500-50 mcg/dose inhaler 5/7/2025 Morning  No Yes   Sig: Inhale 1 puff 2 (two) times a day Rinse mouth after use.   Lancets (ONETOUCH ULTRASOFT) lancets  Self Yes No   Sig: by Does not apply route   albuterol (PROVENTIL HFA,VENTOLIN HFA) 90 mcg/act inhaler 5/7/2025 Morning  No Yes   Sig: Inhale 1 puff in the morning 3 inhalers per  refill   allopurinol (ZYLOPRIM) 100 mg tablet Past Month  No Yes   Sig: Take 1 tablet (100 mg total) by mouth daily   bumetanide (BUMEX) 2 mg tablet 2025 Morning  No Yes   Sig: Take 1 tablet (2 mg total) by mouth 2 (two) times a day Do not start before 2025.   carvedilol (COREG) 12.5 mg tablet 2025 Morning  No Yes   Sig: Take 1 tablet (12.5 mg total) by mouth 2 (two) times a day with meals   cefuroxime (CEFTIN) 500 mg tablet 2025 Morning  No Yes   Sig: Take 1 tablet (500 mg total) by mouth every 12 (twelve) hours for 10 days   colchicine (COLCRYS) 0.6 mg tablet Not Taking  No No   Sig: Take 1 tablet (0.6 mg total) by mouth 2 (two) times a day   Patient not taking: Reported on 2025   famotidine (PEPCID) 20 mg tablet Past Month  No Yes   Sig: Take 1 tablet (20 mg total) by mouth daily at bedtime   glimepiride (AMARYL) 1 mg tablet 2025 Morning  No Yes   Sig: TAKE 1 TABLET BY MOUTH TWICE A DAY   glucose blood test strip  Self No No   Si each by Other route daily   ipratropium (ATROVENT) 0.02 % nebulizer solution   No No   Sig: Take 2.5 mL (0.5 mg total) by nebulization 3 (three) times a day   levalbuterol (XOPENEX) 1.25 mg/3 mL nebulizer solution  Self No No   Sig: Take 3 mL (1.25 mg total) by nebulization 3 (three) times a day   magnesium Oxide (MAG-OX) 400 mg TABS   No No   Sig: Take 2 tablets (800 mg total) by mouth 2 (two) times a day for 7 days   pantoprazole (PROTONIX) 40 mg tablet 2025 Morning Self No Yes   Sig: Take 1 tablet (40 mg total) by mouth daily before breakfast   sitaGLIPtin (Januvia) 100 mg tablet Not Taking  No No   Sig: Take 1 tablet (100 mg total) by mouth daily   Patient not taking: Reported on 2025   spironolactone (ALDACTONE) 25 mg tablet 2025 Self No Yes   Sig: Take 1 tablet (25 mg total) by mouth daily      Facility-Administered Medications: None     Current Discharge Medication List        CONTINUE these medications which have NOT CHANGED     Details   albuterol (PROVENTIL HFA,VENTOLIN HFA) 90 mcg/act inhaler Inhale 1 puff in the morning 3 inhalers per refill  Qty: 54 g, Refills: 1    Comments: Substitution to a formulary equivalent within the same pharmaceutical class is authorized.  Associated Diagnoses: Reactive airway disease without complication      allopurinol (ZYLOPRIM) 100 mg tablet Take 1 tablet (100 mg total) by mouth daily  Qty: 90 tablet, Refills: 1    Associated Diagnoses: Chronic gout without tophus, unspecified cause, unspecified site      bumetanide (BUMEX) 2 mg tablet Take 1 tablet (2 mg total) by mouth 2 (two) times a day Do not start before April 24, 2025.  Qty: 60 tablet, Refills: 0    Associated Diagnoses: (HFpEF) heart failure with preserved ejection fraction (HCC)      carvedilol (COREG) 12.5 mg tablet Take 1 tablet (12.5 mg total) by mouth 2 (two) times a day with meals  Qty: 60 tablet, Refills: 0    Associated Diagnoses: (HFpEF) heart failure with preserved ejection fraction (HCC)      cefuroxime (CEFTIN) 500 mg tablet Take 1 tablet (500 mg total) by mouth every 12 (twelve) hours for 10 days  Qty: 20 tablet, Refills: 0    Associated Diagnoses: Cellulitis, unspecified cellulitis site      famotidine (PEPCID) 20 mg tablet Take 1 tablet (20 mg total) by mouth daily at bedtime  Qty: 90 tablet, Refills: 1    Associated Diagnoses: GERD (gastroesophageal reflux disease); Difficulty swallowing      Fluticasone-Salmeterol (Advair) 500-50 mcg/dose inhaler Inhale 1 puff 2 (two) times a day Rinse mouth after use.  Qty: 60 blister, Refills: 5    Comments: Substitution to a formulary equivalent within the same pharmaceutical class is authorized.  Associated Diagnoses: Mild asthma with acute exacerbation, unspecified whether persistent      glimepiride (AMARYL) 1 mg tablet TAKE 1 TABLET BY MOUTH TWICE A DAY  Qty: 180 tablet, Refills: 1    Associated Diagnoses: Type 2 diabetes mellitus without complication, without long-term current use of  insulin (HCC)      pantoprazole (PROTONIX) 40 mg tablet Take 1 tablet (40 mg total) by mouth daily before breakfast  Qty: 90 tablet, Refills: 3    Associated Diagnoses: Esophageal dysphagia; GERD (gastroesophageal reflux disease)      spironolactone (ALDACTONE) 25 mg tablet Take 1 tablet (25 mg total) by mouth daily  Qty: 90 tablet, Refills: 1    Associated Diagnoses: (HFpEF) heart failure with preserved ejection fraction (HCC)      Blood Pressure KIT Twice a day periodically  Qty: 1 each, Refills: 0    Associated Diagnoses: Benign essential hypertension      colchicine (COLCRYS) 0.6 mg tablet Take 1 tablet (0.6 mg total) by mouth 2 (two) times a day  Qty: 20 tablet, Refills: 0    Associated Diagnoses: Chronic gout without tophus, unspecified cause, unspecified site      glucose blood test strip 1 each by Other route daily  Qty: 180 each, Refills: 5    Associated Diagnoses: Uncontrolled type 2 diabetes mellitus with hyperglycemia (HCC)      ipratropium (ATROVENT) 0.02 % nebulizer solution Take 2.5 mL (0.5 mg total) by nebulization 3 (three) times a day  Qty: 225 mL, Refills: 5    Associated Diagnoses: COPD exacerbation (HCC)      Lancets (ONETOUCH ULTRASOFT) lancets by Does not apply route      levalbuterol (XOPENEX) 1.25 mg/3 mL nebulizer solution Take 3 mL (1.25 mg total) by nebulization 3 (three) times a day  Qty: 270 mL, Refills: 0    Associated Diagnoses: COPD exacerbation (HCC)      magnesium Oxide (MAG-OX) 400 mg TABS Take 2 tablets (800 mg total) by mouth 2 (two) times a day for 7 days  Qty: 28 tablet, Refills: 0    Associated Diagnoses: (HFpEF) heart failure with preserved ejection fraction (HCC); Hypomagnesemia      sitaGLIPtin (Januvia) 100 mg tablet Take 1 tablet (100 mg total) by mouth daily  Qty: 90 tablet, Refills: 0    Associated Diagnoses: Uncontrolled type 2 diabetes mellitus with hyperglycemia (HCC)           No discharge procedures on file.  ED SEPSIS DOCUMENTATION   Time reflects when diagnosis  was documented in both MDM as applicable and the Disposition within this note       Time User Action Codes Description Comment    5/7/2025  8:23 PM Santos, Herlinda Add [R06.00] Dyspnea     5/7/2025  8:41 PM Santos, Herlinda Add [L03.90] Cellulitis     5/7/2025  8:41 PM Santos, Herlinda Add [E83.42] Hypomagnesemia     5/7/2025  8:41 PM Santos, Herlinda Add [E87.1] Hyponatremia     5/7/2025  8:41 PM Santos, Herlinda Add [R17] Elevated bilirubin     5/7/2025  8:41 PM Santos, Herlinda Modify [R06.00] Dyspnea     5/7/2025  8:41 PM Santos, Herlinda Modify [L03.90] Cellulitis     5/7/2025  8:49 PM Santos, Herlinda Add [R65.10] SIRS (systemic inflammatory response syndrome) (HCC)            Initial Sepsis Screening       Row Name 05/07/25 1832                Is the patient's history suggestive of a new or worsening infection? Yes (Proceed)  -GS        Suspected source of infection wound infection  -GS        Indicate SIRS criteria Tachypnea > 20 resp per min;Leukocytosis (WBC > 61705 IJL) OR Leukopenia (WBC <4000 IJL) OR Bandemia (WBC >10% bands)  -GS        Are two or more of the above signs & symptoms of infection both present and new to the patient? Yes (Proceed)  -GS        Assess for evidence of organ dysfunction: Are any of the below criteria present within 6 hours of suspected infection and SIRS criteria that are NOT considered to be chronic conditions? Bilirubin > 2.0  -GS        Date of presentation of severe sepsis 05/07/25  -GS        Time of presentation of severe sepsis 1835  -GS        Date of presentation of septic shock --        Time of presentation of septic shock --        Fluid Resuscitation: A lesser volume than 30 ml/kg IV fluid will be given  -GS        The 30 mL/kg fluid bolus was not given to the patient despite having hypotension, a lactate of >= 4 mmol/L, or documentation of septic shock secondary to: Heart Failure  -GS        Instead of the 30 ml/kg fluid bolus, the following  "volume of crystalloid fluid will be ordered: 250 ml  -GS        Of the following fluid type: NSS  -GS        Is the patient is persistently hypotensive in the hour after fluid bolus administration? If yes, patient meets criteria for vasopressor use. NO  -GS        Sepsis Note: Click \"NEXT\" below (NOT \"close\") to generate sepsis note based on above information. YES (proceed by clicking \"NEXT\")  -GS                  User Key  (r) = Recorded By, (t) = Taken By, (c) = Cosigned By      Initials Name Provider Type     Herlinda Santos MD Resident                  Default Flowsheet Data (Last 720 Hours)       Sepsis Reassess       Row Name 05/07/25 2049                   Repeat Volume Status and Tissue Perfusion Assessment Performed    Date of Reassessment: 05/07/25  -        Time of Reassessment: 2049  -        Sepsis Reassessment Note: Click \"NEXT\" below (NOT \"close\") to generate sepsis reassessment note. YES (proceed by clicking \"NEXT\")  -GS        Repeat Volume Status and Tissue Perfusion Assessment Performed --                  User Key  (r) = Recorded By, (t) = Taken By, (c) = Cosigned By      Initials Name Provider Type     Herlinda Santos MD Resident                     Herlinda Santos MD  05/08/25 0817    "

## 2025-05-08 NOTE — SEPSIS NOTE
"  Sepsis Note   Elly Chong 60 y.o. male MRN: [id number]  Unit/Bed#: ED-04 Encounter: [id number]       Initial Sepsis Screening       Row Name 05/07/25 1832                Is the patient's history suggestive of a new or worsening infection? Yes (Proceed)  -GS        Suspected source of infection wound infection  -GS        Indicate SIRS criteria Tachypnea > 20 resp per min;Leukocytosis (WBC > 20232 IJL) OR Leukopenia (WBC <4000 IJL) OR Bandemia (WBC >10% bands)  -GS        Are two or more of the above signs & symptoms of infection both present and new to the patient? Yes (Proceed)  -GS        Assess for evidence of organ dysfunction: Are any of the below criteria present within 6 hours of suspected infection and SIRS criteria that are NOT considered to be chronic conditions? Bilirubin > 2.0  -GS        Date of presentation of severe sepsis 05/07/25  -GS        Time of presentation of severe sepsis 1835  -GS        Date of presentation of septic shock --        Time of presentation of septic shock --        Fluid Resuscitation: A lesser volume than 30 ml/kg IV fluid will be given  -GS        The 30 mL/kg fluid bolus was not given to the patient despite having hypotension, a lactate of >= 4 mmol/L, or documentation of septic shock secondary to: Heart Failure  -GS        Instead of the 30 ml/kg fluid bolus, the following volume of crystalloid fluid will be ordered: 250 ml  -GS        Of the following fluid type: NSS  -GS        Is the patient is persistently hypotensive in the hour after fluid bolus administration? If yes, patient meets criteria for vasopressor use. NO  -GS        Sepsis Note: Click \"NEXT\" below (NOT \"close\") to generate sepsis note based on above information. YES (proceed by clicking \"NEXT\")  -GS                  User Key  (r) = Recorded By, (t) = Taken By, (c) = Cosigned By      Initials Name Provider Type    GS Herlinda Santos MD Resident                    Default Flowsheet Data (Last 720 " "Hours)       Sepsis Reassess       Row Name 05/07/25 2049                   Repeat Volume Status and Tissue Perfusion Assessment Performed    Date of Reassessment: 05/07/25  -        Time of Reassessment: 2049  -        Sepsis Reassessment Note: Click \"NEXT\" below (NOT \"close\") to generate sepsis reassessment note. YES (proceed by clicking \"NEXT\")  -        Repeat Volume Status and Tissue Perfusion Assessment Performed --                  User Key  (r) = Recorded By, (t) = Taken By, (c) = Cosigned By      Initials Name Provider Type     Herlinda Santos MD Resident                    Body mass index is 39.74 kg/m².  Wt Readings from Last 1 Encounters:   05/07/25 115 kg (253 lb 12 oz)        Ideal body weight: 66.1 kg (145 lb 11.6 oz)  Adjusted ideal body weight: 85.7 kg (188 lb 14.9 oz)    "

## 2025-05-08 NOTE — ASSESSMENT & PLAN NOTE
Lab Results   Component Value Date    EGFR 75 05/08/2025    EGFR 44 05/07/2025    EGFR 30 04/23/2025    CREATININE 1.06 05/08/2025    CREATININE 1.65 (H) 05/07/2025    CREATININE 2.27 (H) 04/23/2025   Baseline seems to be 1.6-1.8  Stable and at baseline  Plan  BMP in a.m.

## 2025-05-08 NOTE — ASSESSMENT & PLAN NOTE
PTA on Ceftin due to right ankle wound  Started on ceftriaxone and vancomycin at the ED due to sepsis suspected source ongoing cellulitis  Plan  Transition to Ancef 2 g every 8 hours as no history of MRSA  Hold PTA Ceftin as above  Wound care consulted appreciate assistance  Xray: Showing no osseous abnormality

## 2025-05-08 NOTE — UTILIZATION REVIEW
Initial Clinical Review    Admission: Date/Time/Statement:   Admission Orders (From admission, onward)       Ordered        05/07/25 2059  Place in Observation  Once                          Orders Placed This Encounter   Procedures    Place in Observation     Standing Status:   Standing     Number of Occurrences:   1     Level of Care:   Med Surg [16]     ED Arrival Information       Expected   -    Arrival   5/7/2025 16:11    Acuity   Emergent              Means of arrival   Wheelchair    Escorted by   Spouse    Service   Hospitalist    Admission type   Emergency              Arrival complaint   pain in shoulders and neck/ dizzy SOB             Chief Complaint   Patient presents with    Shortness of Breath     Pt recently admitted mid April for CHF, has since been having neck, shoulder, back and right hip pain. Pt also reports SOB worse with movement for about 1-2 weeks. Wife reports BP's have been low at home, lowest was 73/54 on Saturday. Pt reports lightheadedness on standing        Initial Presentation: 60 y.o. male with hx DM2, HTN, , OA, HFpEF, tobacco and alcohol dependence on remission who presents to ED  from home  with multiple complaints . Endorsed generalized weakness, neck, back, shoulder and left arm pain that started approximately about 1 to 2 weeks ago. Pain is worse upon mobility however gets better with rest and no relief with Tylenol. Pt had recent admission for CHF exacerbation at that time was switched from torsemide to Bumex which he has been compliant since then.  During that admission was noted with right lower extremity wound/cellulitis since then has been taking Ceftin. At home patient noticed blood pressures in the 70s over 50s with associated lightheadedness upon standing. Oral intake has been poor as well as hydration . On exam, borderline BP's, pt tachypneic . Tenderness to palpation along paraspinal muscles of trapezius. Range of motion intact Bilateral shoulders nontender to  palpation however pain seems to be intra-articular limited by motion. Tender to palpation left hand mostly in the left index with some swelling . Low back tenderness to palpation around lumbar sacral area more so on the right side. Left lower extremity anterolateral portion , blister noted ,  no erythema is tender to palpation .  R ankle wound with granulation tissue no pus noted .+1 edema BLE  Normal breath sounds , RA sat 97 % .  Labs Elevated WBC's 13.09  , Na 133 ,T bili 2.46 , and abnormal TSH. Mag low  . CTA CAP negative for  source of infection  . CT Spine notable for DISH  . Pt given IVF, Tylenol, mag repletion , IV abx in ED .   Admitted as OBS with sepsis, cellulitis, arthralgia, acute on chronic low back pain , hyponatremia . Plan - Hold home ceftin . IV Ancef , CBC in am . Hold PTA antihypertensives . Check orthostatic BP's. Obtain XR R  ankle . Resume PTA colchicine . Obtain uric acid . Start Allopurinol ,goal uric acid <6 .  Start prednisone 40 mg daily x 5 days  .Gentle hydration for now, encourage PO intake . BMP, Mag ,  Hepatic function panel   in am .    Anticipated Length of Stay/Certification Statement: Patient will be admitted on an observation basis with an anticipated length of stay of less than 2 midnights secondary to Observation Sepsis secondary to sepsis, cellulitis,hyponatremia, dehydration.     Date: 5/8   Sepsis/ Cellulitis : WBC down to 12.39 today . Has edema RLE with known ankle wound  .Blister LLE  . On  cefazolin IV . XR R ankle w/o acute osseous abnormalities .   Pt c/o generalized pain, neck/shoulder pain 5-9/10 .Patient expressed concerns in obtaining a diagnosis for his neck and back pain. Counseled on evidence of DISH in his imaging and will continue to observe for any effect of colchicine/steroids in the event that his pain is related to gout.  Added prn Robaxin today and lido patch today  . Uric acid elevated . Pt  on PTA colchicine, po prednisone .  .    PT/OT eval . T bili  down to 1.61 today    Hyponatremia : Na 132. Chronic CHF : Hold Bumex for now due to dry on exam and bordeline BP , consider decrease Bumex 1 mg BID Bume.  Hold Aldactone resume in the next 24-48 hrs  . Holding Coreg for now - Monitor BP , check ortho BP .  Liberalize fluids for this evening . Once euvolemic will  begin fluid restriction . 2 Gnm Na diet    5/8/25 5/8/25         ED Treatment-Medication Administration from 05/07/2025 1611 to 05/07/2025 2210         Date/Time Order Dose Route Action     05/07/2025 1730 lidocaine (LIDODERM) 5 % patch 1 patch 1 patch Topical Medication Applied     05/07/2025 1847 sodium chloride 0.9 % bolus 250 mL 250 mL Intravenous New Bag     05/07/2025 1848 magnesium sulfate 2 g/50 mL IVPB (premix) 2 g 2 g Intravenous New Bag     05/07/2025 1901 iohexol (OMNIPAQUE) 350 MG/ML injection (MULTI-DOSE) 100 mL 100 mL Intravenous Given     05/07/2025 2009 acetaminophen (Ofirmev) injection 1,000 mg 1,000 mg Intravenous New Bag     05/07/2025 2037 ceftriaxone (ROCEPHIN) 1 g/50 mL in dextrose IVPB 1,000 mg Intravenous New Bag            Scheduled Medications:  acetaminophen, 975 mg, Oral, Q8H ELIANE  albuterol, 1 puff, Inhalation, Daily  allopurinol, 100 mg, Oral, Daily  [Held by provider] bumetanide, 2 mg, Oral, BID  [Held by provider] carvedilol, 12.5 mg, Oral, BID With Meals  cefazolin, 2,000 mg, Intravenous, Q8H  colchicine, 0.6 mg, Oral, BID  cyanocobalamin, 1,000 mcg, Oral, Daily  enoxaparin, 40 mg, Subcutaneous, Daily  famotidine, 20 mg, Oral, HS  fluticasone-vilanterol, 1 puff, Inhalation, Daily  insulin lispro, 2-12 Units, Subcutaneous, TID AC  lidocaine, 1 patch, Topical, Daily  pantoprazole, 40 mg, Oral, Daily Before Breakfast  predniSONE, 40 mg, Oral, Daily  [Held by provider] spironolactone, 25 mg, Oral, Daily      Continuous IV Infusions:   multi-electrolyte (Plasmalyte-A/Isolyte-S PH 7.4/Normosol-R) IV solution  Rate: 100 mL/hr Dose: 100 mL/hr  Freq: Continuous Route:  IV  Indications of Use: IV Hydration  Start: 05/07/25 2200End: 05/08/25 0518   PRN Meds:  methocarbamol, 500 mg, Oral, Q6H PRNx1 5/8   polyethylene glycol, 17 g, Oral, Daily PRN     ED Triage Vitals   Temperature Pulse Respirations Blood Pressure SpO2 Pain Score   05/07/25 1622 05/07/25 1622 05/07/25 1622 05/07/25 1622 05/07/25 1622 05/07/25 2227   98 °F (36.7 °C) 85 22 94/56 97 % 10 - Worst Possible Pain     Weight (last 2 days)       Date/Time Weight    05/08/25 0452 115 (253.1)    05/07/25 1622 115 (253.75)            Vital Signs (last 3 days)       Date/Time Temp Pulse Resp BP MAP (mmHg) SpO2 Calculated FIO2 (%) - Nasal Cannula Nasal Cannula O2 Flow Rate (L/min) O2 Device Patient Position - Orthostatic VS Pain    05/08/25 1433 -- -- -- -- -- -- -- -- -- -- 8 05/08/25 1326 -- -- -- -- -- -- -- -- -- -- 7 05/08/25 1234 -- -- -- -- -- -- -- -- None (Room air) -- 9 05/08/25 0911 -- -- -- -- -- -- -- -- -- -- 9 05/08/25 07:24:16 98.7 °F (37.1 °C) 81 18 110/59 76 91 % -- -- -- -- --    05/08/25 0516 -- -- -- -- -- -- -- -- -- -- 5 05/08/25 0300 -- -- -- -- -- 96 % -- -- None (Room air) -- No Pain    05/08/25 00:02:07 98.5 °F (36.9 °C) 84 18 149/84 106 97 % -- -- -- -- --    05/07/25 2257 -- -- -- -- -- -- -- -- -- -- 9 05/07/25 22:37:20 98.4 °F (36.9 °C) 82 15 112/69 83 94 % -- -- -- -- --    05/07/25 2227 -- -- -- -- -- -- -- -- -- -- 10 - Worst Possible Pain    05/07/25 2033 -- 85 20 -- -- 99 % 28 2 L/min Nasal cannula  Sitting --    05/07/25 1944 -- 87 20 112/69 85 96 % -- -- None (Room air) Sitting --    05/07/25 1842 -- 83 20 112/69 85 97 % -- -- None (Room air) Sitting --    05/07/25 1805 -- -- -- -- -- -- -- -- None (Room air) -- --    05/07/25 1728 -- 83 20 96/61 74 96 % -- -- None (Room air) Sitting --    05/07/25 1622 98 °F (36.7 °C) 85 22 94/56 -- 97 % -- -- None (Room air) Sitting --              Pertinent Labs/Diagnostic Test Results:   Radiology:  XR ankle 3+ vw right   Final  Interpretation by Sudheer Jasso MD (05/08 0916)      No acute osseous abnormality.         Computerized Assisted Algorithm (CAA) may have been used to analyze all applicable images.               Workstation performed: [id number]         CT spine cervical without contrast   Final Interpretation by Tian Hannon MD (05/07 1946)      No cervical spine fracture or traumatic malalignment.                  Workstation performed: [id number]         CTA chest abdomen pelvis w wo contrast   Final Interpretation by Johnson Escobedo MD (05/07 2001)      No evidence for pulmonary embolism.      No acute intra-abdominal pathology.      Redemonstrated findings of chronic pancreatitis as well as a 1.5 cm cyst versus focal ductal dilatation regional to the pancreatic head; again, further characterization with nonemergent dedicated pancreatic protocol MRI is advised in the outpatient    setting.               Workstation performed: [id number]           Cardiology:  ECG 12 lead   Final Result by Santa Greco MD (05/07 1757)   Normal sinus rhythm   Nonspecific T wave abnormality   When compared with ECG of 14-Apr-2025 18:43,   Premature ventricular complexes are no longer Present   Confirmed by Santa Greco ([id number]) on 5/7/2025 5:57:43 PM        GI:  No orders to display           Results from last 7 days   Lab Units 05/08/25  0448 05/07/25  1801   WBC Thousand/uL 12.39* 13.09*   HEMOGLOBIN g/dL 11.9* 12.5   HEMATOCRIT % 35.4* 37.6   PLATELETS Thousands/uL 253 288   TOTAL NEUT ABS Thousands/µL 8.73* 8.83*         Results from last 7 days   Lab Units 05/08/25  0448 05/07/25  1801   SODIUM mmol/L 132* 133*   POTASSIUM mmol/L 3.5 3.7   CHLORIDE mmol/L 93* 89*   CO2 mmol/L 28 34*   ANION GAP mmol/L 11 10   BUN mg/dL 22 25   CREATININE mg/dL 1.06 1.65*   EGFR ml/min/1.73sq m 75 44   CALCIUM mg/dL 8.7 9.1   MAGNESIUM mg/dL 2.6 1.3*     Results from last 7 days   Lab Units 05/08/25  0448 05/07/25  1801   AST U/L 25 31   ALT  U/L 16 20   ALK PHOS U/L 103 122*   TOTAL PROTEIN g/dL 6.8 7.8   ALBUMIN g/dL 3.4* 3.6   TOTAL BILIRUBIN mg/dL 1.61* 2.46*   BILIRUBIN DIRECT mg/dL 0.82*  --      Results from last 7 days   Lab Units 05/08/25  1052 05/08/25  0722   POC GLUCOSE mg/dl 183* 134     Results from last 7 days   Lab Units 05/08/25  0448 05/07/25  1801   GLUCOSE RANDOM mg/dL 138 183*             BETA-HYDROXYBUTYRATE   Date Value Ref Range Status   07/06/2020 0.2 <0.6 mmol/L Final                      Results from last 7 days   Lab Units 05/07/25 2010 05/07/25  1801   HS TNI 0HR ng/L  --  10   HS TNI 2HR ng/L 7  --    HSTNI D2 ng/L -3  --          Results from last 7 days   Lab Units 05/07/25  1801   PROTIME seconds 16.1*   INR  1.22*   PTT seconds 35*     Results from last 7 days   Lab Units 05/07/25  1801   TSH 3RD GENERATON uIU/mL 9.598*         Results from last 7 days   Lab Units 05/07/25  1801   LACTIC ACID mmol/L 1.4             Results from last 7 days   Lab Units 05/07/25  1802   BNP pg/mL 148*                   Results from last 7 days   Lab Units 05/07/25  1805   CLARITY UA  Clear   COLOR UA  Yellow   SPEC GRAV UA  1.018   PH UA  5.5   GLUCOSE UA mg/dl 100 (1/10%)*   KETONES UA mg/dl 10 (1+)*   BLOOD UA  Negative   PROTEIN UA mg/dl Negative   NITRITE UA  Negative   BILIRUBIN UA  Small*   UROBILINOGEN UA (BE) mg/dl 2.0*   LEUKOCYTES UA  Negative                                 Results from last 7 days   Lab Units 05/07/25  1801   BLOOD CULTURE  Received in Microbiology Lab. Culture in Progress.  Received in Microbiology Lab. Culture in Progress.                   Past Medical History:   Diagnosis Date    Acute gout of right hand 10/17/2022    Acute kidney injury (HCC) 09/05/2020    Acute on chronic diastolic congestive heart failure (HCC) 08/31/2021    DELFINO (acute kidney injury) (HCC) 09/05/2020    Asthma     Cellulitis 09/27/2024    CHF (congestive heart failure) ([facility])     Chronic heart failure with preserved ejection fraction  (HCC) 07/06/2020    CKD (chronic kidney disease) 04/15/2025    Colon polyp     GERD (gastroesophageal reflux disease)     Hypertension     Inguinal hernia     3/25/15    Onychomycosis     5/24/17    Sleep apnea     Type 2 diabetes mellitus (HCC) 05/01/2012     Present on Admission:   Essential hypertension   Type 2 diabetes mellitus with hyperglycemia (HCC)   Hypomagnesemia   Chronic heart failure with preserved ejection fraction (HCC)   Stage 3 chronic kidney disease (HCC)   Alcohol dependence in remission (HCC)   AMANDO (obstructive sleep apnea)   Arthralgia   Cellulitis   Chronic gout without tophus      Admitting Diagnosis: Hypomagnesemia [E83.42]  Cellulitis [L03.90]  Hyponatremia [E87.1]  Dyspnea [R06.00]  SIRS (systemic inflammatory response syndrome) (HCC) [R65.10]  Elevated bilirubin [R17]  Age/Sex: 60 y.o. male    Network Utilization Review Department  ATTENTION: Please call with any questions or concerns to [phone] and carefully listen to the prompts so that you are directed to the right person. All voicemails are confidential.   For Discharge needs, contact Care Management DC Support Team at [phone] opt. [phone]  Send all requests for admission clinical reviews, approved or denied determinations and any other requests to dedicated fax number below belonging to the campus where the patient is receiving treatment. List of dedicated fax numbers for the Facilities:  FACILITY NAME UR FAX NUMBER   ADMISSION DENIALS (Administrative/Medical Necessity) [phone]   DISCHARGE SUPPORT TEAM (NETWORK) [phone]   PARENT CHILD HEALTH (Maternity/NICU/Pediatrics) [phone]   [facility] [phone]   [facility] [phone]   [facility] [phone]   [facility] [phone]   [facility] [phone]   [facility] [phone]   [facility]  [place] [phone]   [facility] [phone]   [facility] [phone]   [facility] [phone]   [facility] [phone]   [facility] [phone]

## 2025-05-08 NOTE — ASSESSMENT & PLAN NOTE
Sepsis POA due to leukocytosis 13.09 tachypnea 22 suspected cellulitis  PTA on Ceftin for wound in ankle until 5/11 now with LLE blister however the blister does not seem to be infected  CTA CAP negative for other source of infection no other associated symptoms  AT the ED started on CTX + Vanco    Plan  Hold PTA Ceftin  Continue with Ancef 2g Q 8hrs, no hx MRSA  CBC am  Monitor hemodynamics  Holding PTA hypertensives as blood pressures borderline  Checkortho BP

## 2025-05-08 NOTE — SPEECH THERAPY NOTE
Speech-Language Pathology Bedside Swallow Evaluation        Patient Name: Elly Chong    Today's Date: 5/8/2025     Problem List  Principal Problem:    Sepsis (HCC)  Active Problems:    Essential hypertension    Type 2 diabetes mellitus with hyperglycemia (HCC)    Hypomagnesemia    Alcohol dependence in remission (HCC)    Chronic heart failure with preserved ejection fraction (HCC)    AMANDO (obstructive sleep apnea)    Arthralgia    Chronic gout without tophus    Stage 3 chronic kidney disease (HCC)    Cellulitis    Isolated Hyperbilirubinemia    Hyponatremia    Abnormal TSH    Acute on chronic low back pain       Summary    Pt presents with mild oral dysphagia due to slow/decreased mastication. Suspect possible pharyngeal dysphagia due to frequent coughing/throat clearing and complaints of needing multiple swallows to clear - recommend VBS to assess. Possible esophageal dysphagia/reflux component.      Recommendations:   Diet: regular diet and thin liquids   Meds: whole with liquid and whole with puree   Frequent Oral care: 2x/day  Compensatory swallowing strategies: alternating bites and sips  Other Recommendations/ considerations: ST will follow up ; Recommend VBS ; Refer to GI       Current Medical Status  Pt is a 60 y.o. male who presented to [facility]  with endorsed generalized weakness, neck, back, shoulder and left arm pain that started approximately about 1 to 2 weeks ago. Denies any trauma, no falls. Patient had a recent admission for CHF exacerbation. Denies any falls, presyncopal or syncopal episodes. Denies any chest pain, shortness of breath, abdominal pain or urinary symptoms. Oral intake has been poor as well as hydration. Upon evaluation in the emergency department noted with borderline blood pressures met sepsis criteria for leukocytosis and tachypnea started on IV antibiotics due to concern of cellulitis.     Past medical history:   Please see H&P for details    Special  "Studies:  Cervical Spine CT 5/7/25: No cervical spine fracture or traumatic malalignment.    Chest/Abdomen/Pelvis CTA 5/7/25: LUNGS: No focal airspace consolidation. No tracheal or endobronchial lesion. No evidence for pulmonary embolism.    EGD 12/27/23: The middle third of the esophagus appeared normal. Performed random biopsy to rule out eosinophilic esophagitis. Mild erythematous mucosa in the antrum; performed cold forceps biopsy. The duodenal bulb and 2nd part of the duodenum appeared normal. Performed random biopsy.    Barium Swallow 11/24/23: Large amount of gastroesophageal reflux occurs when patient is supine/during coughing. No other abnormalities were seen    Social/Education/Vocational Hx:  Pt lives at home with wife    Swallow Information   Prior speech/swallowing tx: 11/2023  Previous admission - Pt reporting globus sensation following meals. Pt reports coughing at end of meal because he feels as if \"the food is in my upper chest\" and then \"goes back up\". Pt reporting globus sensation at his clavicle to his sternum. Patient with adequate oral swallow and likely minimal/mild pharyngeal dysphagia as well as noted significant reflux on imaging. Placed on regular/thin liquid diet with reflux precautions.    Current Risks for Dysphagia & Aspiration: history of dysphagia  Current Symptoms/Concerns:  Pt reports difficulty with swallowing - feeling of globus sensation  Current Diet: regular diet and thin liquids   Baseline Diet: regular diet and thin liquids  Takes pills- whole with thin liquids    Baseline Assessment   Behavior/Cognition: alert  Speech/Language Status: able to participate in conversation and able to follow commands  Patient Positioning:  upright at edge of bed     Swallow Mechanism Exam   Facial: symmetrical  Labial: WFL  Lingual: WFL  Velum: unable to visualize  Mandible: adequate ROM  Dentition: limited dentition  Vocal quality:clear/adequate   Volitional Cough: strong/productive " "  Respiratory: RA    Consistencies Assessed and Performance   Consistencies Administered: thin liquids, nectar thick, puree, and soft solids (thin liquids/nectar thick liquids by cup, puree - applesauce, soft solid - grilled cheese)    Oral Stage:     Exhibited small bite size with soft solids. Bolus control was adequate with nectar thick and thin liquids. Bolus manipulation/formation was decreased. Decreased/slow mastication - took long period to fully break down soft solid, suspect due to limited dentition. Timely AP transfer with all consistencies. Moderate oral residue with soft solid - though multiple swallows present to clear.    Pharyngeal Stage:     Timely swallow initiation present with all consistencies. Adequate hyolaryngeal excursion upon palpation. Mild coughing/throat clearing present with all consistencies - pt reports feeling the need to cough/throat clear for globus sensation. Pt reports the need to exhibit multiple swallows with all consistencies to clear pharynx. Pt also reports feeling larger pills get stuck intermittently. Attempted chin tuck/effortful swallow with thin and nectar thick liquids - however coughing/throat clearing still present and pt reported that \"it felt like it was blocking it more\".    Esophageal Concerns: globus sensation / history of reflux - however pt and wife state that he has not been having symptoms and reportedly stopped taking reflux medication      Results Reviewed with: patient, RN, and family   Dysphagia Goals: pt will participate in S    Speech Therapy Prognosis   Prognosis: good    Prognosis Considerations: age, medical status, prior medical history, cognitive status, and therapeutic potential        "

## 2025-05-08 NOTE — ASSESSMENT & PLAN NOTE
PTA on Ceftin due to right ankle wound  Started on ceftriaxone and vancomycin at the ED due to sepsis suspected source ongoing cellulitis  Plan  Transition to Ancef 2 g every 8 hours as no history of MRSA  Hold PTA Ceftin as above  Wound care consulted appreciate assistance  Check Xray

## 2025-05-08 NOTE — OCCUPATIONAL THERAPY NOTE
Occupational Therapy Evaluation     Patient Name: Elly Chong  Today's Date: 5/8/2025  Problem List  Principal Problem:    Sepsis (HCC)  Active Problems:    Essential hypertension    Type 2 diabetes mellitus with hyperglycemia (HCC)    Hypomagnesemia    Alcohol dependence in remission (HCC)    Chronic heart failure with preserved ejection fraction (HCC)    AMANDO (obstructive sleep apnea)    Arthralgia    Chronic gout without tophus    Stage 3 chronic kidney disease (HCC)    Cellulitis    Isolated Hyperbilirubinemia    Hyponatremia    Abnormal TSH    Acute on chronic low back pain    Past Medical History  Past Medical History:   Diagnosis Date    Acute gout of right hand 10/17/2022    Acute kidney injury (HCC) 09/05/2020    Acute on chronic diastolic congestive heart failure ([facility]) 08/31/2021    DELFINO (acute kidney injury) ([facility]) 09/05/2020    Asthma     Cellulitis 09/27/2024    CHF (congestive heart failure) ([facility])     Chronic heart failure with preserved ejection fraction (HCC) 07/06/2020    CKD (chronic kidney disease) 04/15/2025    Colon polyp     GERD (gastroesophageal reflux disease)     Hypertension     Inguinal hernia     3/25/15    Onychomycosis     5/24/17    Sleep apnea     Type 2 diabetes mellitus ([facility]) 05/01/2012     Past Surgical History  Past Surgical History:   Procedure Laterality Date    ARTHROSCOPY KNEE      with Medial and Lateral Meniscus Repair    HERNIA REPAIR      umbilical and inguinal hernia repairs (left)         05/08/25 1433   OT Last Visit   OT Visit Date 05/08/25   Note Type   Note type Evaluation   Pain Assessment   Pain Assessment Tool 0-10   Pain Score 8   Pain Location/Orientation Location: Generalized   Pain Onset/Description Onset: Ongoing;Descriptor: Sore   Effect of Pain on Daily Activities limits comfort, position   Patient's Stated Pain Goal No pain   Hospital Pain Intervention(s) Medication (See MAR);Repositioned;Ambulation/increased activity;Emotional support   Multiple Pain  "Sites No   Restrictions/Precautions   Weight Bearing Precautions Per Order No   Other Precautions Chair Alarm;Bed Alarm;Fall Risk;Pain   Home Living   Type of Home House   Home Layout Stairs to enter with rails;One level;Performs ADLs on one level;Able to live on main level with bedroom/bathroom  (2 ANASTASIIA via the front 3 ANASTASIIA via the back)   Bathroom Shower/Tub Tub/shower unit   Bathroom Toilet Standard   Bathroom Accessibility Accessible   Home Equipment Cane  (SPC)   Prior Function   Level of [place] Independent with ADLs;Independent with functional mobility;Independent with IADLS   Lives With Spouse;Son  (23 y/o son)   Receives Help From Family   IADLs Independent with driving;Independent with meal prep;Independent with medication management   Falls in the last 6 months 0   Vocational Full time employment  ([employer]: alot of climbing ~60ft ladder)   Lifestyle   Autonomy Pt lives w/ family. Independent with ADLs/IADLs and fnxl mobility. (-) driving and (+) falls   Reciprocal Relationships Supportive family and friends   Service to Others Full-time employee, has been on disability the last 2 weeks   General   Additional Pertinent History Admit with generalized weakness. Dx: sepsis. PMH of arthralgia, type 2 diabetes not on insulin, hypertension, OA, HFpEF, tobacco and alcohol dependence   Family/Caregiver Present No   Subjective   Subjective \"I get a headache when I try and move my neck\"   ADL   Eating Assistance 7  Independent   Grooming Assistance 5  Supervision/Setup   UB Bathing Assistance 5  Supervision/Setup   LB Bathing Assistance 5  Supervision/Setup   UB Dressing Assistance 5  Supervision/Setup   LB Dressing Assistance 5  Supervision/Setup   Toileting Assistance  5  Supervision/Setup   Additional Comments The above levels of assistance are anticipated based on functional performance deficits with use of clinical judgement.   Bed Mobility   Supine to Sit 5  Supervision   Additional items " HOB elevated;Bedrails;Increased time required;Verbal cues   Sit to Supine   (NT: OOB to recliner chair)   Additional Comments Denies dizziness/lightheadedness w/ postural changes. Good static sitting balance displayed.   Transfers   Sit to Stand 5  Supervision   Additional items Increased time required;Verbal cues  (wide FABIANO)   Stand to Sit 5  Supervision   Additional items Increased time required;Verbal cues   Additional Comments Use of RW. Verbal cues for optimal hand placement and body mechanics for safe transfers   Functional Mobility   Functional Mobility 5  Supervision   Additional Comments for fnxl household - community distance w/ use of RW. Increased time to complete. Heavy support of BUEs on RW. No overt LOB or signs of instability with directional changes. Pt is guarded at C/Spine c/o of pain in all planes.   Additional items Rolling walker   Balance   Static Sitting Good   Dynamic Sitting Fair +   Static Standing Fair   Dynamic Standing Fair -   Activity Tolerance   Activity Tolerance (S)  Patient limited by pain   Medical Staff Made Aware Spoke with CM, PT   Nurse Made Aware Spoke with RN pre/post   RUE Assessment   RUE Assessment WFL  (Decreased shoulder ROM: MMT 4/5 grossly - fair  strength)   LUE Assessment   LUE Assessment WFL  (Decreased shoulder ROM: MMT 4/5 grossly - fair  strength)   Hand Function   Gross Motor Coordination Functional   Hand Function Comments + edema in B/L hands - pt reports increased difficulty acheiving and maintaing a grasp on objects   Sensation   Light Touch   (neuropathy in BLEs chronically)   Cognition   Overall Cognitive Status WFL   Arousal/Participation Alert;Responsive;Cooperative   Attention Within functional limits   Orientation Level Oriented X4  (oriented to month/year)   Memory Within functional limits   Following Commands Follows multistep commands without difficulty   Comments Pt ID via wristband, name and . Pt is pleasent and cooperative.    Assessment   Limitation Decreased ADL status;Decreased endurance;Decreased high-level ADLs;Decreased self-care trans   Prognosis Good   Assessment Patient is a 60 y.o. male seen for OT evaluation following admission on 5/7/2025  s/p Sepsis (HCC). Please see above for comprehensive list of comorbidities and significant PMHx impacting functional performance. Upon initial evaluation, pt appears to be performing below baseline functional status. Pt requires supervision for UB ADLs, supervision for LB ADLs, supervision for bed mobility, supervision for transfers and supervision for functional mobility community distance  with RW. The AM-PAC & Barthel Index outcome tools were used to assist in determining pt safety w/self care /mobility and appropriate d/c recommendations, see above for score. Occupational performance is affected by the following deficits: decreased functional reach , decreased activity tolerance , and (+) pain . Personal/Environmental factors impacting D/C include: steps to enter/navigate the home, Assistance needed for ADLs and functional mobility, and High fall risk . Supporting factors include: able to maintain FFSU, support system available, and attitude towards recovery . Patient would benefit from OT services within the acute care setting to maximize level of functional independence in the following areas fall prevention , self-care transfers, bed mobility , functional mobility, and ADLs.  From OT standpoint, recommendation at time of D/C would be Level III (Minimum Resource Intensity).   Goals   Patient Goals to have less pain and not walk with a walker   LT Time Frame 10-14   Long Term Goal See below   Plan   Treatment Interventions ADL retraining;Functional transfer training;Patient/family training;Equipment evaluation/education;Compensatory technique education;Energy conservation;Activityengagement   Goal Expiration Date 05/18/25   OT Treatment Day 0   OT Frequency 3-5x/wk   Discharge  Recommendation   Rehab Resource Intensity Level, OT III (Minimum Resource Intensity)   Equipment Recommended   (rolling walker)   AM-PAC Daily Activity Inpatient   Lower Body Dressing 3   Bathing 3   Toileting 3   Upper Body Dressing 3   Grooming 4   Eating 4   Daily Activity Raw Score 20   Daily Activity Standardized Score (Calc for Raw Score >=11) 42.03   AM-PAC Applied Cognition Inpatient   Following a Speech/Presentation 4   Understanding Ordinary Conversation 4   Taking Medications 4   Remembering Where Things Are Placed or Put Away 4   Remembering List of 4-5 Errands 4   Taking Care of Complicated Tasks 4   Applied Cognition Raw Score 24   Applied Cognition Standardized Score 62.21   Barthel Index   Feeding 10   Bathing 0   Grooming Score 5   Dressing Score 5   Bladder Score 10   Bowels Score 10   Toilet Use Score 5   Transfers (Bed/Chair) Score 10   Mobility (Level Surface) Score 10   Stairs Score 5   Barthel Index Score 70   End of Consult   Education Provided Yes   Patient Position at End of Consult Bedside chair;Bed/Chair alarm activated;All needs within reach   Nurse Communication Nurse aware of consult       GOALS:      -Patient will perform grooming tasks standing at sink with overall Mod I in order to increase overall independence     -Patient will be Mod I with UB dressing using AE and AD as needed in order to increase (I) with ADLs     -Patient will be Mod I with UB bathing using AE and AD as needed in order to increase (I) with ADLs     -Patient will be Mod I with LB dressing with use of AE and AD as needed in order to increase (I) with ADLs     -Patient will be Mod I with LB bathing with use of AE and AD as needed in order to increase (I) with ADLs    -Patient will complete toileting w/ Mod I w/ G hygiene/thoroughness in order to reduce caregiver burden     -Patient will demonstrate Mod I with bed mobility for ability to manage own comfort and initiate OOB tasks.      -Patient will perform  functional transfers with Mod I to/from all surfaces using DME as needed in order to increase (I) with functional tasks     -Patient will be Mod I with functional mobility to/from bathroom for increased independence with toileting tasks     -Patient will tolerate therapeutic activities for greater than 30 min, in order to increase tolerance for functional activities.      -Patient will demonstrate standing for 5 mins in order to increase active participation in functional activities    The patient's raw score on the AM-PAC Daily Activity Inpatient Short Form is 20. A raw score of greater than or equal to 19 suggests the patient may benefit from discharge to home. Please refer to the recommendation of the Occupational Therapist for safe discharge planning.    Nel Gutiérrez MS OTR/L   [place] Licensure# [id number]

## 2025-05-08 NOTE — ASSESSMENT & PLAN NOTE
No sciatica worse R>L no cauda equina  Plan  Continue Tylenol ELIANE  Unable to use NSAIDs  Hold of Robaxin due to PTA lightheadedness  Started on Steroids for acute gout  Hold off gabapentin as no sciatica present

## 2025-05-08 NOTE — DISCHARGE INSTR - OTHER ORDERS
Skin care plans:  1-Hydraguard to bilateral sacrum, buttock and heels BID and PRN  2-Elevate heels to offload pressure.  3-Ehob cushion in chair when out of bed.  4-Moisturize skin daily with skin nourishing cream.  5-Turn/reposition q2h for pressure re-distribution on skin.  6- Left posterior Pretibial Abrasion -cleanse with soap and water, pat dry, apply betadine to wound bed (allow it to dry) and cover with silicone bordered foam. Sudheer with T for treatment. Change every other day and prn   7-Right posterior Ankle Abrasion- cleanse site with soap and water, pat dry, cover with silicone bordered foam. Sudheer with T for treatment. Change every other day and prn

## 2025-05-08 NOTE — PLAN OF CARE
Problem: OCCUPATIONAL THERAPY ADULT  Goal: Performs self-care activities at highest level of function for planned discharge setting.  See evaluation for individualized goals.  Description: Treatment Interventions: ADL retraining, Functional transfer training, Patient/family training, Equipment evaluation/education, Compensatory technique education, Energy conservation, Activityengagement  Equipment Recommended:  (rolling walker)       See flowsheet documentation for full assessment, interventions and recommendations.   Note: Limitation: Decreased ADL status, Decreased endurance, Decreased high-level ADLs, Decreased self-care trans  Prognosis: Good  Assessment: Patient is a 60 y.o. male seen for OT evaluation following admission on 5/7/2025  s/p Sepsis (HCC). Please see above for comprehensive list of comorbidities and significant PMHx impacting functional performance. Upon initial evaluation, pt appears to be performing below baseline functional status. Pt requires supervision for UB ADLs, supervision for LB ADLs, supervision for bed mobility, supervision for transfers and supervision for functional mobility community distance  with RW. The AM-PAC & Barthel Index outcome tools were used to assist in determining pt safety w/self care /mobility and appropriate d/c recommendations, see above for score. Occupational performance is affected by the following deficits: decreased functional reach , decreased activity tolerance , and (+) pain . Personal/Environmental factors impacting D/C include: steps to enter/navigate the home, Assistance needed for ADLs and functional mobility, and High fall risk . Supporting factors include: able to maintain FFSU, support system available, and attitude towards recovery . Patient would benefit from OT services within the acute care setting to maximize level of functional independence in the following areas fall prevention , self-care transfers, bed mobility , functional mobility, and  ADLs.  From OT standpoint, recommendation at time of D/C would be Level III (Minimum Resource Intensity).     Rehab Resource Intensity Level, OT: III (Minimum Resource Intensity)    Nel Gutiérrez MS OTR/L   [place] Licensure# [id number]

## 2025-05-08 NOTE — ASSESSMENT & PLAN NOTE
Polyarthralgia suspect multifactorial in nature suspect Acute gout given swelling Left mcp which usually gouty location vs OA vs potentially viral component such as [place]/dengue as recent [place] fortunately no other organ involement no cytopenias los suspicious RA as biomarkes negative in the past.  CT Spine notable for DISH  Rapid COVID/Flu neg    Plan  Resume PTA Colchicine  Uric Acid added to labs  Started again on Allopurinol this was not taken per spouse goal uric acid <6 can be recheck outpatient by PCP  Start Prednisone 40mg QD x 5 days as due to CKD unable to try NSAIDS  Continue supportive care Tylenol

## 2025-05-08 NOTE — ASSESSMENT & PLAN NOTE
TSH elevated  Follow up t4 pending levels consider replacement vs repeat outpatient  Repeat 4-6 weeks outpatient

## 2025-05-08 NOTE — UTILIZATION REVIEW
NOTIFICATION OF OBSERVATION ADMISSION   AUTHORIZATION REQUEST   SERVICING FACILITY:   [place] [address]  Tax ID: [id number]  NPI: [id number]   ATTENDING PROVIDER:  Attending Name and NPI#: Virgilio Thakur Md [[id number]]  Address: [address] [place] [address]  Phone: [phone]   ADMISSION INFORMATION:  Place of Service: On [facility]  Place of Service Code: 22 CPT Code:   Admitting Diagnosis Code/Description:  Hypomagnesemia [E83.42]  Cellulitis [L03.90]  Hyponatremia [E87.1]  Dyspnea [R06.00]  SIRS (systemic inflammatory response syndrome) (HCC) [R65.10]  Elevated bilirubin [R17]  Observation Admission Date/Time: 05/07/2025 2059  Discharge Date/Time: No discharge date for patient encounter.     UTILIZATION REVIEW CONTACT:  Michelle Brar Utilization   Network Utilization Review Department  Phone: [phone]  Fax: [phone]  Email: [email or web address]  Contact for approvals/pending authorizations, clinical reviews, and discharge.     PHYSICIAN ADVISORY SERVICES:  Medical Necessity Denial & Hfsl-je-Ncwh Review  Phone: [phone]  Fax: [phone]  Email: [email or web address]     DISCHARGE SUPPORT TEAM:  For Patients Discharge Needs & Updates  Phone: [phone] opt. 2 Fax: [phone]  Email: [email or web address]

## 2025-05-08 NOTE — ASSESSMENT & PLAN NOTE
"Lab Results   Component Value Date    HGBA1C 6.4 03/13/2025       No results for input(s): \"POCGLU\" in the last 72 hours.    Blood Sugar Average: Last 72 hrs:  History uncontrolled diabetes with neuropathy  PTA on glimepiride 1 mg twice daily  Plan  Continue sliding scale  Continue carbohydrate controlled diet  Repeat HbA1c outpatient 1-3 months acceptable ranges thus far      "

## 2025-05-08 NOTE — WOUND OSTOMY CARE
Consult Note - Wound   Elly Chong 60 y.o. male MRN: [id number]  Unit/Bed#: S -01 Encounter: [id number]        History and Present Illness:  Patient is a 59 yo male that was admitted to [facility] for treatment of sepsis. Patient has a PMH of type 2 diabetes, hypertension, OA, HFpEF, tobacco and alcohol abuse. Patient is alert and oriented and agreeable to assessment. Unable to assess turning and repositioning and patient's sacro-buttocks due to patient refusal. Patient reports continent of bowel and bladder and independent for meals.     Wound Care was consulted for right lower extremity wound.      Assessment Findings:   B/L heels are dry intact and danae with no skin loss or wounds present. Recommend preventative Hydraguard Cream and proper offloading/ repositioning.        Left posterior Pretibial Abrasion- Irregular shaped dry intact well adhered brown eschar. True depth unknown. No drainage. Sabrina-wound scaly/dry intact pink tissue.  Right posterior Ankle Abrasion- Partial thickness skin loss, oval/irregular shaped. Approx. 80% of yellow/slough tissue and approx. 20% pink tissue present to wound bed, scant amount of serosanguineous drainage, Sabrina-wound dry intact pink tissue.   Patient reports both wounds are traumatic and occurred while at work.     No induration, fluctuance, odor, warmth/temperature differences, redness, or purulence noted to the above noted wounds and skin areas assessed. New dressings applied per orders listed below. Patient tolerated well- no s/s of non-verbal pain or discomfort observed during the encounter. Bedside nurse aware of plan of care. See flow sheets for more detailed assessment findings.      Orders listed below and wound care will continue to follow, call or Secure Chat with questions.     Skin care plans:  1-Hydraguard to bilateral sacrum, buttock and heels BID and PRN  2-Elevate heels to offload pressure.  3-Ehob cushion in chair when out of bed.  4-Moisturize skin daily  with skin nourishing cream.  5-Turn/reposition q2h for pressure re-distribution on skin.  6- Left posterior Pretibial Abrasion -cleanse with soap and water, pat dry, apply betadine to wound bed (allow it to dry) and cover with silicone bordered foam. Sudheer with T for treatment. Change every other day and prn   7-Right posterior Ankle Abrasion- cleanse site with soap and water, pat dry, cover with silicone bordered foam. Sudheer with T for treatment. Change every other day and prn       Wounds:  Wound 04/15/25 Traumatic Abrasion Ankle Posterior;Right (Active)   Wound Image   05/08/25 0940   Wound Description Pink;Yellow;Slough 05/08/25 0940   Non-staged Wound Description Partial thickness 05/08/25 0940   Wound Length (cm) 2 cm 05/08/25 0940   Wound Width (cm) 1 cm 05/08/25 0940   Wound Depth (cm) 0.1 cm 05/08/25 0940   Wound Surface Area (cm^2) 2 cm^2 05/08/25 0940   Wound Volume (cm^3) 0.2 cm^3 05/08/25 0940   Calculated Wound Volume (cm^3) 0.2 cm^3 05/08/25 0940   Change in Wound Size % 97.4 05/08/25 0940   Drainage Amount Scant 05/08/25 0940   Drainage Description Serosanguineous 05/08/25 0940   Sabrina-wound Assessment Dry;Intact 05/08/25 0940   Treatments Cleansed;Site care 05/08/25 0940   Dressing Foam, Silicon (eg. Allevyn, etc) 05/08/25 1500   Wound packed? No 05/08/25 0940   Dressing Changed New 05/08/25 0940   Patient Tolerance Tolerated well 05/08/25 0940   Dressing Status Clean;Dry;Intact 05/08/25 1500       Wound 05/08/25 Traumatic Abrasion Tibial Left;Posterior (Active)   Wound Image   05/08/25 0937   Wound Description Dry;Intact;Eschar 05/08/25 0940   Non-staged Wound Description Not applicable 05/08/25 0940   Wound Length (cm) 5 cm 05/08/25 0937   Wound Width (cm) 5.5 cm 05/08/25 0937   Wound Depth (cm) 0 cm 05/08/25 0937   Wound Surface Area (cm^2) 27.5 cm^2 05/08/25 0937   Wound Volume (cm^3) 0 cm^3 05/08/25 0937   Calculated Wound Volume (cm^3) 0 cm^3 05/08/25 0937   Drainage Amount None 05/08/25 0940    Sabrina-wound Assessment Dry;Intact;Scaly 05/08/25 0940   Treatments Cleansed;Site care 05/08/25 0940   Wound Site Closure None 05/08/25 0940   Dressing Other (Comment) 05/08/25 0940   Wound packed? No 05/08/25 0940   Dressing Changed New 05/08/25 0940   Patient Tolerance Tolerated well 05/08/25 0940   Dressing Status Clean;Dry;Intact 05/08/25 0940       Sarah Valencia, RN, BSN  Patient assessed with Shakira Mccain, RN, BSN, CWON

## 2025-05-08 NOTE — ASSESSMENT & PLAN NOTE
Lab Results   Component Value Date    HGBA1C 6.4 03/13/2025       Recent Labs     05/08/25  0722 05/08/25  1052   POCGLU 134 183*       Blood Sugar Average: Last 72 hrs:  History uncontrolled diabetes with neuropathy  (P) 158.5PTA on glimepiride 1 mg twice daily  Plan  Continue sliding scale  Continue carbohydrate controlled diet  Repeat HbA1c outpatient 1-3 months acceptable ranges thus far

## 2025-05-08 NOTE — ASSESSMENT & PLAN NOTE
Alcohol dependence in remission used to drink daily 3 beers and 2 shots stopped during last admission and we congratulated patient.  Adequate support at home

## 2025-05-08 NOTE — SEPSIS NOTE
"  Sepsis Note   Elly Chong 60 y.o. male MRN: [id number]  Unit/Bed#: S -01 Encounter: [id number]       Initial Sepsis Screening       Row Name 05/07/25 1832                Is the patient's history suggestive of a new or worsening infection? Yes (Proceed)  -GS        Suspected source of infection wound infection  -GS        Indicate SIRS criteria Tachypnea > 20 resp per min;Leukocytosis (WBC > 93062 IJL) OR Leukopenia (WBC <4000 IJL) OR Bandemia (WBC >10% bands)  -GS        Are two or more of the above signs & symptoms of infection both present and new to the patient? Yes (Proceed)  -GS        Assess for evidence of organ dysfunction: Are any of the below criteria present within 6 hours of suspected infection and SIRS criteria that are NOT considered to be chronic conditions? Bilirubin > 2.0  -GS        Date of presentation of severe sepsis 05/07/25  -GS        Time of presentation of severe sepsis 1835  -GS        Date of presentation of septic shock --        Time of presentation of septic shock --        Fluid Resuscitation: A lesser volume than 30 ml/kg IV fluid will be given  -GS        The 30 mL/kg fluid bolus was not given to the patient despite having hypotension, a lactate of >= 4 mmol/L, or documentation of septic shock secondary to: Heart Failure  -GS        Instead of the 30 ml/kg fluid bolus, the following volume of crystalloid fluid will be ordered: 250 ml  -GS        Of the following fluid type: NSS  -GS        Is the patient is persistently hypotensive in the hour after fluid bolus administration? If yes, patient meets criteria for vasopressor use. NO  -GS        Sepsis Note: Click \"NEXT\" below (NOT \"close\") to generate sepsis note based on above information. YES (proceed by clicking \"NEXT\")  -GS                  User Key  (r) = Recorded By, (t) = Taken By, (c) = Cosigned By      Initials Name Provider Type    GS Herlinda Santos MD Resident                    Default Flowsheet Data (Last " "720 Hours)       Sepsis Reassess       Row Name 05/07/25 2304 05/07/25 2049                Repeat Volume Status and Tissue Perfusion Assessment Performed    Date of Reassessment: 05/07/25  - 05/07/25  -       Time of Reassessment: 2237  - 2049  -       Sepsis Reassessment Note: Click \"NEXT\" below (NOT \"close\") to generate sepsis reassessment note. YES (proceed by clicking \"NEXT\")  - YES (proceed by clicking \"NEXT\")  -       Repeat Volume Status and Tissue Perfusion Assessment Performed -- --                 User Key  (r) = Recorded By, (t) = Taken By, (c) = Cosigned By      Initials Name Provider Type     Herlinda Santos MD Resident    NOEL Akhtar MD Resident                    Body mass index is 37.47 kg/m².  Wt Readings from Last 1 Encounters:   05/07/25 115 kg (253 lb 12 oz)     IBW (Ideal Body Weight): 70.7 kg    Ideal body weight: 70.7 kg (155 lb 13.8 oz)  Adjusted ideal body weight: 88.5 kg (195 lb 0.3 oz)    Reevaluated at bedside on IV fluids blood pressure improved stabilized  Blood Pressure: 112/69  Suspect cellulitis continue with Ancef 2 g every 8 hours  Continue maintenance fluids for now    "

## 2025-05-08 NOTE — ASSESSMENT & PLAN NOTE
Hx Hypomag   Likely due to diuretic use  Plan  Repeat AM after repletion   [Alert] : alert [No Acute Distress] : no acute distress [Well Developed] : well developed [Normal Sclera/Conjunctiva] : normal sclera/conjunctiva [EOMI] : extra ocular movement intact [No LAD] : no lymphadenopathy [Thyroid Not Enlarged] : the thyroid was not enlarged [No Thyroid Nodules] : no palpable thyroid nodules [No Respiratory Distress] : no respiratory distress [Normal Rate and Effort] : normal respiratory rate and effort [Clear to Auscultation] : lungs were clear to auscultation bilaterally [Normal S1, S2] : normal S1 and S2 [Normal Rate] : heart rate was normal [Regular Rhythm] : with a regular rhythm [No Edema] : no peripheral edema [Pedal Pulses Normal] : the pedal pulses are present [Normal Bowel Sounds] : normal bowel sounds [Not Tender] : non-tender [Soft] : abdomen soft [No Stigmata of Cushings Syndrome] : no stigmata of Cushings Syndrome [Normal Gait] : normal gait [No Rash] : no rash [Left Foot Was Examined] : left foot ~C was examined [Right Foot Was Examined] : right foot ~C was examined [Normal] : normal [Oriented x3] : oriented to person, place, and time [Normal Insight/Judgement] : insight and judgment were intact [Normal Mood] : the mood was normal [Acanthosis Nigricans] : no acanthosis nigricans [Foot Ulcers] : no foot ulcers [Diminished Throughout Both Feet] : normal tactile sensation with monofilament testing throughout both feet [de-identified] : right ear hearing aide

## 2025-05-08 NOTE — ASSESSMENT & PLAN NOTE
History of AMANDO in the past recommended to use CPAP no longer usage  O2 nasal cannula while sleeping

## 2025-05-08 NOTE — ASSESSMENT & PLAN NOTE
Noted  Likely due to know pancreatic cyst vs pancreatic duct dilation  Plan  Hepatic function panel AM

## 2025-05-09 PROBLEM — M48.10 DISH (DIFFUSE IDIOPATHIC SKELETAL HYPEROSTOSIS): Status: ACTIVE | Noted: 2025-05-09

## 2025-05-09 PROBLEM — E83.42 HYPOMAGNESEMIA: Status: RESOLVED | Noted: 2020-07-06 | Resolved: 2025-05-09

## 2025-05-09 LAB
ALBUMIN SERPL BCG-MCNC: 3.2 G/DL (ref 3.5–5)
ALP SERPL-CCNC: 102 U/L (ref 34–104)
ALT SERPL W P-5'-P-CCNC: 22 U/L (ref 7–52)
ANION GAP SERPL CALCULATED.3IONS-SCNC: 8 MMOL/L (ref 4–13)
AST SERPL W P-5'-P-CCNC: 24 U/L (ref 13–39)
BASOPHILS # BLD AUTO: 0.02 THOUSANDS/ÂΜL (ref 0–0.1)
BASOPHILS NFR BLD AUTO: 0 % (ref 0–1)
BILIRUB SERPL-MCNC: 0.7 MG/DL (ref 0.2–1)
BUN SERPL-MCNC: 25 MG/DL (ref 5–25)
CALCIUM ALBUM COR SERPL-MCNC: 9.7 MG/DL (ref 8.3–10.1)
CALCIUM SERPL-MCNC: 9.1 MG/DL (ref 8.4–10.2)
CHLORIDE SERPL-SCNC: 95 MMOL/L (ref 96–108)
CO2 SERPL-SCNC: 31 MMOL/L (ref 21–32)
CREAT SERPL-MCNC: 1.18 MG/DL (ref 0.6–1.3)
EOSINOPHIL # BLD AUTO: 0.01 THOUSAND/ÂΜL (ref 0–0.61)
EOSINOPHIL NFR BLD AUTO: 0 % (ref 0–6)
ERYTHROCYTE [DISTWIDTH] IN BLOOD BY AUTOMATED COUNT: 12.7 % (ref 11.6–15.1)
GFR SERPL CREATININE-BSD FRML MDRD: 66 ML/MIN/1.73SQ M
GLUCOSE P FAST SERPL-MCNC: 215 MG/DL (ref 65–99)
GLUCOSE SERPL-MCNC: 215 MG/DL (ref 65–140)
GLUCOSE SERPL-MCNC: 236 MG/DL (ref 65–140)
GLUCOSE SERPL-MCNC: 264 MG/DL (ref 65–140)
GLUCOSE SERPL-MCNC: 302 MG/DL (ref 65–140)
GLUCOSE SERPL-MCNC: 309 MG/DL (ref 65–140)
HCT VFR BLD AUTO: 35.3 % (ref 36.5–49.3)
HGB BLD-MCNC: 11.7 G/DL (ref 12–17)
IMM GRANULOCYTES # BLD AUTO: 0.06 THOUSAND/UL (ref 0–0.2)
IMM GRANULOCYTES NFR BLD AUTO: 0 % (ref 0–2)
LYMPHOCYTES # BLD AUTO: 1.61 THOUSANDS/ÂΜL (ref 0.6–4.47)
LYMPHOCYTES NFR BLD AUTO: 11 % (ref 14–44)
MCH RBC QN AUTO: 31.6 PG (ref 26.8–34.3)
MCHC RBC AUTO-ENTMCNC: 33.1 G/DL (ref 31.4–37.4)
MCV RBC AUTO: 95 FL (ref 82–98)
MONOCYTES # BLD AUTO: 0.99 THOUSAND/ÂΜL (ref 0.17–1.22)
MONOCYTES NFR BLD AUTO: 7 % (ref 4–12)
NEUTROPHILS # BLD AUTO: 11.75 THOUSANDS/ÂΜL (ref 1.85–7.62)
NEUTS SEG NFR BLD AUTO: 82 % (ref 43–75)
NRBC BLD AUTO-RTO: 0 /100 WBCS
PLATELET # BLD AUTO: 287 THOUSANDS/UL (ref 149–390)
PMV BLD AUTO: 10.5 FL (ref 8.9–12.7)
POTASSIUM SERPL-SCNC: 3.7 MMOL/L (ref 3.5–5.3)
PROT SERPL-MCNC: 7 G/DL (ref 6.4–8.4)
RBC # BLD AUTO: 3.7 MILLION/UL (ref 3.88–5.62)
SODIUM SERPL-SCNC: 134 MMOL/L (ref 135–147)
T3 SERPL-MCNC: 0.8 NG/ML (ref 0.9–1.8)
WBC # BLD AUTO: 14.44 THOUSAND/UL (ref 4.31–10.16)

## 2025-05-09 PROCEDURE — 99222 1ST HOSP IP/OBS MODERATE 55: CPT | Performed by: INTERNAL MEDICINE

## 2025-05-09 PROCEDURE — 97116 GAIT TRAINING THERAPY: CPT

## 2025-05-09 PROCEDURE — 85025 COMPLETE CBC W/AUTO DIFF WBC: CPT

## 2025-05-09 PROCEDURE — 86800 THYROGLOBULIN ANTIBODY: CPT

## 2025-05-09 PROCEDURE — 84480 ASSAY TRIIODOTHYRONINE (T3): CPT

## 2025-05-09 PROCEDURE — 86376 MICROSOMAL ANTIBODY EACH: CPT

## 2025-05-09 PROCEDURE — 84482 T3 REVERSE: CPT

## 2025-05-09 PROCEDURE — 80053 COMPREHEN METABOLIC PANEL: CPT

## 2025-05-09 PROCEDURE — 99232 SBSQ HOSP IP/OBS MODERATE 35: CPT | Performed by: INTERNAL MEDICINE

## 2025-05-09 PROCEDURE — 97163 PT EVAL HIGH COMPLEX 45 MIN: CPT

## 2025-05-09 PROCEDURE — 82948 REAGENT STRIP/BLOOD GLUCOSE: CPT

## 2025-05-09 RX ORDER — INSULIN LISPRO 100 [IU]/ML
2-12 INJECTION, SOLUTION INTRAVENOUS; SUBCUTANEOUS
Status: DISCONTINUED | OUTPATIENT
Start: 2025-05-09 | End: 2025-05-11 | Stop reason: HOSPADM

## 2025-05-09 RX ORDER — ALBUMIN HUMAN 50 G/1000ML
12.5 SOLUTION INTRAVENOUS ONCE
Status: COMPLETED | OUTPATIENT
Start: 2025-05-09 | End: 2025-05-09

## 2025-05-09 RX ORDER — INSULIN GLARGINE 100 [IU]/ML
15 INJECTION, SOLUTION SUBCUTANEOUS
Status: DISCONTINUED | OUTPATIENT
Start: 2025-05-09 | End: 2025-05-11 | Stop reason: HOSPADM

## 2025-05-09 RX ADMIN — COLCHICINE 0.6 MG: 0.6 TABLET ORAL at 08:08

## 2025-05-09 RX ADMIN — INSULIN GLARGINE 15 UNITS: 100 INJECTION, SOLUTION SUBCUTANEOUS at 22:00

## 2025-05-09 RX ADMIN — ACETAMINOPHEN 975 MG: 325 TABLET, FILM COATED ORAL at 13:26

## 2025-05-09 RX ADMIN — FAMOTIDINE 20 MG: 20 TABLET, FILM COATED ORAL at 21:58

## 2025-05-09 RX ADMIN — INSULIN LISPRO 8 UNITS: 100 INJECTION, SOLUTION INTRAVENOUS; SUBCUTANEOUS at 16:38

## 2025-05-09 RX ADMIN — PREDNISONE 40 MG: 20 TABLET ORAL at 08:08

## 2025-05-09 RX ADMIN — INSULIN LISPRO 6 UNITS: 100 INJECTION, SOLUTION INTRAVENOUS; SUBCUTANEOUS at 11:04

## 2025-05-09 RX ADMIN — CEFAZOLIN SODIUM 2000 MG: 2 SOLUTION INTRAVENOUS at 13:29

## 2025-05-09 RX ADMIN — COLCHICINE 0.6 MG: 0.6 TABLET ORAL at 17:03

## 2025-05-09 RX ADMIN — CEFAZOLIN SODIUM 2000 MG: 2 SOLUTION INTRAVENOUS at 21:55

## 2025-05-09 RX ADMIN — ACETAMINOPHEN 975 MG: 325 TABLET, FILM COATED ORAL at 21:58

## 2025-05-09 RX ADMIN — CEFAZOLIN SODIUM 2000 MG: 2 SOLUTION INTRAVENOUS at 06:26

## 2025-05-09 RX ADMIN — FLUTICASONE FUROATE AND VILANTEROL TRIFENATATE 1 PUFF: 200; 25 POWDER RESPIRATORY (INHALATION) at 08:09

## 2025-05-09 RX ADMIN — ALLOPURINOL 100 MG: 100 TABLET ORAL at 08:08

## 2025-05-09 RX ADMIN — PANTOPRAZOLE SODIUM 40 MG: 40 TABLET, DELAYED RELEASE ORAL at 06:29

## 2025-05-09 RX ADMIN — ALBUMIN (HUMAN) 12.5 G: 12.5 INJECTION, SOLUTION INTRAVENOUS at 10:32

## 2025-05-09 RX ADMIN — INSULIN LISPRO 8 UNITS: 100 INJECTION, SOLUTION INTRAVENOUS; SUBCUTANEOUS at 21:58

## 2025-05-09 RX ADMIN — LIDOCAINE 1 PATCH: 700 PATCH TOPICAL at 08:09

## 2025-05-09 RX ADMIN — ALBUTEROL SULFATE 1 PUFF: 90 AEROSOL, METERED RESPIRATORY (INHALATION) at 08:10

## 2025-05-09 RX ADMIN — CYANOCOBALAMIN TAB 500 MCG 1000 MCG: 500 TAB at 08:08

## 2025-05-09 RX ADMIN — ACETAMINOPHEN 975 MG: 325 TABLET, FILM COATED ORAL at 06:29

## 2025-05-09 RX ADMIN — INSULIN LISPRO 4 UNITS: 100 INJECTION, SOLUTION INTRAVENOUS; SUBCUTANEOUS at 08:11

## 2025-05-09 NOTE — CONSULTS
Consultation - Cardiology   Name: Elly Chong 60 y.o. male I MRN: [id number]  Unit/Bed#: S -01 I Date of Admission: 5/7/2025   Date of Service: 5/9/2025 I Hospital Day: 0   Inpatient consult to Endocrinology  Consult performed by: Catrina Laguna MD  Consult ordered by: Virgilio Thakur MD        Physician Requesting Evaluation: Virgilio Bauer*   Reason for Evaluation / Principal Problem: Elevated TSH and T4      Assessment & Plan  Abnormal TSH  60-year-old male presented to [facility] on 5/7/2025 with sepsis secondary to lower extremity cellulitis.  Patient was noted to have incidental elevated TSH as well as T4.  He did have elevated TSH in 2021 that was subsequently normal in 2023.    Plan:  Abnormal labs are likely secondary to acute illness   Will check T3, rT3, and antithyroid antibodies to rule out euthyroid sick syndrome or autoimmune.  Recommend repeating labs 4 to 6 weeks after discharge.  No need for any medication at this point..  Type 2 diabetes mellitus with hyperglycemia (HCC)  Lab Results   Component Value Date    HGBA1C 6.4 03/13/2025     Home regimen includes: Januvia 100 mg daily and Amaryl 1 mg twice daily.  Per patient's wife he has not taken Januvia in the past month as he ran out of prescription.  Hospital glucose readings are elevated between 220 and 300.    Plan:  Recommend increasing Lantus to 15 units at bedtime  Continue algorithm 4  Adjusted diet to carbohydrate controlled with no juice.  Glucose checks.    Recent Labs     05/08/25  1635 05/08/25  2124 05/09/25  0709 05/09/25  1034   POCGLU 342* 248* 236* 264*       Blood Sugar Average: Last 72 hrs:  (P) 234.5    Sepsis (HCC)  Management per primary team.      History of Present Illness     Elly Chong is a 60 y.o. male with past medical history of HFpEF (60%), hypertension, gout, diabetes type 2, alcohol use in remission, AMANDO, CKD stage III who presented to [facility] on 5/7/2025 with sepsis secondary to cellulitis of  KAREN.  He was incidentally found to have elevated TSH and T4 and endocrinology service was consulted. Patient reports had chronic constipation well as cold intolerance.  No weight or appetite change.  No chest pain, palpitation, or shortness of breath.  No polyuria or polydipsia.  Per his wife his last alcohol drink was a month ago.  She also reported that he has not taken Januvia for the past 1 month as he ran out of prescription but she will  his prescription today.  No glucose checks at home.    Review of Systems   Constitutional:  Positive for fatigue. Negative for chills and fever.   HENT:  Negative for ear pain and sore throat.    Eyes:  Negative for pain and visual disturbance.   Respiratory:  Negative for cough and shortness of breath.    Cardiovascular:  Negative for chest pain and palpitations.   Gastrointestinal:  Positive for constipation. Negative for abdominal pain and vomiting.   Genitourinary:  Negative for dysuria and hematuria.   Musculoskeletal:  Negative for arthralgias and back pain.   Skin:  Negative for color change and rash.   Neurological:  Negative for seizures and syncope.   All other systems reviewed and are negative.        Objective :  Temp:  [98.3 °F (36.8 °C)-98.6 °F (37 °C)] 98.3 °F (36.8 °C)  HR:  [74-83] 82  BP: (112-140)/(65-87) 138/87  Resp:  [18] 18  SpO2:  [93 %-99 %] 94 %  O2 Device: None (Room air)  Orthostatic Blood Pressures      Flowsheet Row Most Recent Value   Blood Pressure 138/87 filed at 05/09/2025 1106   Patient Position - Orthostatic VS Sitting filed at 05/07/2025 2033          First Weight: Weight - Scale: 115 kg (253 lb 12 oz) (05/07/25 1622)  Vitals:    05/09/25 0600 05/09/25 1109   Weight: 118 kg (259 lb 12.8 oz) 129 kg (285 lb 4.8 oz)       Physical Exam  Vitals and nursing note reviewed.   Constitutional:       General: He is not in acute distress.     Appearance: He is well-developed.   HENT:      Head: Normocephalic and atraumatic.   Eyes:       Conjunctiva/sclera: Conjunctivae normal.   Cardiovascular:      Rate and Rhythm: Normal rate and regular rhythm.      Heart sounds: No murmur heard.  Pulmonary:      Effort: Pulmonary effort is normal. No respiratory distress.      Breath sounds: Normal breath sounds.   Abdominal:      Palpations: Abdomen is soft.      Tenderness: There is no abdominal tenderness.   Musculoskeletal:         General: No swelling.      Cervical back: Neck supple.   Skin:     General: Skin is warm and dry.      Capillary Refill: Capillary refill takes less than 2 seconds.   Neurological:      Mental Status: He is alert and oriented to person, place, and time.   Psychiatric:         Mood and Affect: Mood normal.               Lab Results: I have reviewed the following results:TSH:   Results from last 7 days   Lab Units 05/07/25  1801   TSH 3RD GENERATON uIU/mL 9.598*     Results from last 7 days   Lab Units 05/09/25  0604 05/08/25  0448 05/07/25  1801   WBC Thousand/uL 14.44* 12.39* 13.09*   HEMOGLOBIN g/dL 11.7* 11.9* 12.5   HEMATOCRIT % 35.3* 35.4* 37.6   PLATELETS Thousands/uL 287 253 288     Results from last 7 days   Lab Units 05/09/25  0604 05/08/25  0448 05/07/25  1801   POTASSIUM mmol/L 3.7 3.5 3.7   CHLORIDE mmol/L 95* 93* 89*   CO2 mmol/L 31 28 34*   BUN mg/dL 25 22 25   CREATININE mg/dL 1.18 1.06 1.65*   CALCIUM mg/dL 9.1 8.7 9.1     Results from last 7 days   Lab Units 05/07/25  1801   INR  1.22*   PTT seconds 35*     Lab Results   Component Value Date    HGBA1C 6.4 03/13/2025     Lab Results   Component Value Date    TROPONINI <0.02 08/31/2021       Imaging Results Review: No pertinent imaging studies reviewed.  Other Study Results Review: No additional pertinent studies reviewed.

## 2025-05-09 NOTE — ASSESSMENT & PLAN NOTE
Lab Results   Component Value Date    HGBA1C 6.4 03/13/2025     Home regimen includes: Januvia 100 mg daily and Amaryl 1 mg twice daily.  Per patient's wife he has not taken Januvia in the past month as he ran out of prescription.  Hospital glucose readings are elevated between 220 and 300.    Plan:  Recommend increasing Lantus to 15 units at bedtime  Continue algorithm 4  Adjusted diet to carbohydrate controlled with no juice.  Glucose checks.    Recent Labs     05/08/25  1635 05/08/25  2124 05/09/25  0709 05/09/25  1034   POCGLU 342* 248* 236* 264*       Blood Sugar Average: Last 72 hrs:  (P) 234.5

## 2025-05-09 NOTE — ASSESSMENT & PLAN NOTE
60-year-old male presented to [facility] on 5/7/2025 with sepsis secondary to lower extremity cellulitis.  Patient was noted to have incidental elevated TSH as well as T4.  He did have elevated TSH in 2021 that was subsequently normal in 2023.    Plan:  Abnormal labs are likely secondary to acute illness   Will check T3, rT3, and antithyroid antibodies to rule out euthyroid sick syndrome or autoimmune.  Recommend repeating labs 4 to 6 weeks after discharge.  No need for any medication at this point..

## 2025-05-09 NOTE — PHYSICAL THERAPY NOTE
PHYSICAL THERAPY EVALUATION  NAME:  Elly Chong  DATE: 05/09/25    AGE:   60 y.o.  Mrn:   [id number]  Principal problem: Principal Problem:    Sepsis (HCC)  Active Problems:    Essential hypertension    Type 2 diabetes mellitus with hyperglycemia (HCC)    Alcohol dependence in remission (HCC)    Chronic heart failure with preserved ejection fraction (HCC)    AMANDO (obstructive sleep apnea)    Arthralgia    Chronic gout without tophus    Stage 3 chronic kidney disease (HCC)    Cellulitis    Isolated Hyperbilirubinemia    Hyponatremia    Abnormal TSH    Acute on chronic low back pain    DISH (diffuse idiopathic skeletal hyperostosis)      Vitals:    05/09/25 0808 05/09/25 1106 05/09/25 1109 05/09/25 1502   BP:  138/87  119/68   Pulse:  82  78   Resp:    18   Temp:    98.2 °F (36.8 °C)   TempSrc:       SpO2: 99% 94%  94%   Weight:   129 kg (285 lb 4.8 oz)    Height:           Length Of Stay: 0  Performed at least 2 patient identifiers during session: Name, Birthday, and Epic photo  PHYSICAL THERAPY EVALUATION :    05/09/25 1556   PT Last Visit   PT Visit Date 05/09/25   Note Type   Note type Evaluation   Pain Assessment   Pain Assessment Tool 0-10   Pain Score 7   Pain Location/Orientation Orientation: Lower;Orientation: Right;Location: Back  (lumbosacral spine; more recent over last few weeks)   Effect of Pain on Daily Activities Neck: limits rom, comfort, posture; Back limits speed of transitional movement   Patient's Stated Pain Goal No pain   Hospital Pain Intervention(s) Repositioned;Ambulation/increased activity;Emotional support;Heat applied  (to neck area s/p skin temp sensation noted intact)   Multiple Pain Sites Yes  (neck pain throughout, 6/10)   Restrictions/Precautions   Weight Bearing Precautions Per Order No   Other Precautions Bed Alarm;Chair Alarm;Fall Risk;Multiple lines;Pain   Home Living   Type of Home House   Home Layout Stairs to enter with rails;One level  (2 ANASTASIIA via the front 3 ANASTASIIA via the back  "per OT)   Home Equipment Cane  (SPC, but did not use prior to this admission)   Additional Comments Pt recently returned from cruise within the last month.   Prior Function   Level of [place] Independent with ADLs;Independent with functional mobility;Independent with IADLS   Lives With Spouse;Son  (23 y/o son)   Receives Help From Family   IADLs Independent with driving;Independent with meal prep;Independent with medication management   Falls in the last 6 months 1 to 4  (2 falls closer to 6 month timeline, loss of balance possibly due to drinking per spouse--\"issues resolved\")   Vocational Full time employment  ([email or web address]: alot of climbing ~60ft ladder. Has been out x4 weeks (some of it vacation))   General   Family/Caregiver Present Yes  (spouse)   Cognition   Overall Cognitive Status WFL   Attention Within functional limits   Orientation Level Oriented X4;Oriented to person;Oriented to situation   Memory Within functional limits   Following Commands Follows one step commands with increased time or repetition  (w/ MMT)   Subjective   Subjective Pt pleasant and cooperative, reports not lightheaded with upright change of position. Reports feeling better with the more activity he performs   RUE Assessment   RUE Assessment WFL  (limited @ shoulders above head, remainder grossly WFL)   LUE Assessment   LUE Assessment WFL  (limited @ shoulders above head, remainder grossly WFL)   RLE Assessment   RLE Assessment   (LE edema)   Strength RLE   R Hip Flexion   (tested to 3 due to increased pain @ R L/S area)   R Knee Extension 4/5   R Ankle Dorsiflexion 4/5   LLE Assessment   LLE Assessment   (LE edema)   Strength LLE   L Hip Flexion   (tested to 3 due to increased pain @ R L/S area)   L Knee Extension 4-/5   L Ankle Dorsiflexion 4/5   Light Touch   RLE Light Touch Impaired   RLE Light Touch Comments @ feet   LLE Light Touch Impaired   LLE Light Touch Comments @ feet   Bed Mobility   Supine to Sit "   (NT as pt OOB in recliner)   Transfers   Sit to Stand 5  Supervision   Additional items Increased time required;Armrests   Stand to Sit 5  Supervision   Additional items Increased time required;Armrests   Additional Comments Posture: forward flexion at trunk, rigid @ traps/neck. at least moderate limitations w/cervical rotation   Ambulation/Elevation   Gait pattern Excessively slow;Step through pattern;Forward Flexion  (forward head and trunk, slight increased walker advancement)   Gait Assistance   (close S w/o device, S w/ rolling walker)   Additional items Assist x 1;Verbal cues   Assistive Device None   Distance 5' w/o device   Stair Management Assistance Not tested   Balance   Static Sitting Good   Static Standing Fair   Ambulatory Fair -   Endurance Deficit   Endurance Deficit Yes   Endurance Deficit Description limited amb distance, self reported fatigue   Activity Tolerance   Activity Tolerance Patient limited by pain;Patient limited by fatigue   Nurse Made Aware spoke to RN pre and post, PCA post.     Assessment:   Pt is a 60 y.o. male who arrived at [facility] on 5/7/2025 w/ Sepsis, cellulitis, arthralgia, hyponaturemia, hx of Etoh.  Order placed for PT.      Prior to admission: Pt lives with family in one story 2-3 ANASTASIIA.  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 Upon evaluation: Pt needed no physical A for transfers, but Close S and slow gait speed for ambulation.      Pt's clinical presentation is currently unstable/unpredictable given the functional mobility deficits above, especially (but not limited to) decreased ROM and pain, and combined with medical complications including abnormal H&H, abnormal WBCs, readmission to hospital, and abnormal sodium values.  Pt IS NOT at his mobility baseline.       During this admission, pt would benefit from continued skilled inpatient PT in the acute care setting in order to address deficits as defined above to maximize  function and mobility.      Recommendations:    From a PT standpoint, recommend next several sessions focus on continued mobility training w/rolling walker (rad w/ nursing) and therapy to progress pt toward unilateral vs no device, stair training, Le therex for posture, C spine ROM and balance activities.      Prognosis Fair   Problem List Decreased strength;Decreased range of motion;Decreased endurance;Decreased mobility;Impaired balance;Impaired sensation;Obesity;Pain;Decreased skin integrity  (gait deviations)   Barriers to Discharge Inaccessible home environment   Goals   Patient Goals less pain, eventually get back to work   STG Expiration Date 05/19/25   Short Term Goal #1 Goals: Pt will: Perform rolling  and supine<>sit bed mobility tasks with modified I to prepare for transfers and reposition in bed. Perform transfers with modified I to promote proper hand placement and approach. Perform ambulation with LRAD as needed for at least 150' with Supervision to promote proper use of assistive device as needed in home and community. Perform at least 3 stairs w/ railing +/- DME and w/Supervision to return to home with ANASTASIIA.   PT Treatment Day 1   Plan   Treatment/Interventions Functional transfer training;LE strengthening/ROM;Therapeutic exercise;Endurance training;Cognitive reorientation;Patient/family training;Gait training;Bed mobility;Equipment eval/education;Elevations;Spoke to nursing;Family   PT Frequency 2-3x/wk   Discharge Recommendation   Rehab Resource Intensity Level, PT III (Minimum Resource Intensity)  (OPPT if able to address neck/back pain)   Equipment Recommended Walker  (with likely soon transition to SPC vs no device)   Walker Package Recommended Wheeled walker   AM-PAC Basic Mobility Inpatient   Turning in Flat Bed Without Bedrails 4   Lying on Back to Sitting on Edge of Flat Bed Without Bedrails 3   Moving Bed to Chair 3   Standing Up From Chair Using Arms 3   Walk in Room 3   Climb 3-5 Stairs  "With Railing 2   Basic Mobility Inpatient Raw Score 18   Basic Mobility Standardized Score 41.05   [facility] Highest Level Of Mobility   -HL Goal 6: Walk 10 steps or more   -HLM Achieved 7: Walk 25 feet or more   Additional Treatment Session   Start Time 1608   End Time 1623   Treatment Assessment Patient able to further distance that she is walker with longer stride and slightly faster gait pattern.  However, patient reported posterior headache pain mid ambulation, however it did not overtly affect balance nor meet physical support.  Patient verbalized understanding of Ther Ex and using heat to post C spine/traps area instructed to call nursing with negative symptoms.  ROVER message sent to SLIM and nursing to suggest K-pad.   Equipment Use rolling walker   Additional Treatment Day 1   Exercises   Neuro re-ed Transfers S, amb w/rolling walker for 120' w/S uncorrected losses of balance.  After education, patient agreeable to trial with heat to bilateral trap.  Instructed to perform gentle cervical ROM and postural Ther Ex.   End of Consult   Patient Position at End of Consult All needs within reach;Bed/Chair alarm activated;Bedside chair   (Please find full objective findings from PT assessment regarding body systems outlined above).     The patient's AM-[facility] Basic Mobility Inpatient Short Form Raw Score is 18. A Raw score of greater than 16 suggests the patient may benefit from discharge to home, which DOES coincide with CURRENT above PT recommendations.     However please refer to therapist recommendation for discharge planning given other factors that may influence destination.     Adapted from Grzegorz GARCIA, Dago J, Padmaja J, Karen WOOD. Association of -PAC “6-Clicks” Basic Mobility and Daily Activity Scores With Discharge Destination. Physical Therapy, 2021;101:1-9. DOI: [email or web address]    Portions of the record may have been created with voice recognition software.  Occasional wrong word or \"sound " "a like\" substitutions may have occurred due to the inherent limitations of voice recognition software.  Read the chart carefully and recognize, using context, where substitutions have occurred    "

## 2025-05-09 NOTE — CASE MANAGEMENT
Case Management Discharge Planning Note    Patient name Elly Chong  Location S /S -01 MRN [id number]  : 1965 Date 2025       Current Admission Date: 2025  Current Admission Diagnosis:Sepsis (HCC)   Patient Active Problem List    Diagnosis Date Noted Date Diagnosed    DISH (diffuse idiopathic skeletal hyperostosis) 2025     Isolated Hyperbilirubinemia 2025     Hyponatremia 2025     Sepsis (HCC) 2025     Abnormal TSH 2025     Acute on chronic low back pain 2025     Cellulitis 2025     Abnormal CT of the abdomen 2025     B12 deficiency 2025     Stage 3 chronic kidney disease (HCC) 04/15/2025     Chronic gout without tophus 2024     Encounter for immunization 2024     Intermittent asthma 2024     Mild asthma with acute exacerbation 2024     Current smoker 2024     Arthralgia 2024     AMANDO (obstructive sleep apnea) 2023     Reactive airway disease without complication 2023     Acute midline low back pain without sciatica 2023     Diabetes mellitus ([facility]) 2022     Impingement syndrome of left shoulder 2022     Arthritis of right wrist 2021     Onychomycosis 2020     Alcohol dependence in remission ([facility]) 2020     Chronic heart failure with preserved ejection fraction ([facility]) 2020     Medical non-compliance 10/21/2019     Microalbuminuria 2015     Type 2 diabetes mellitus with hyperglycemia ([facility]) 2014     Elevated lipoprotein(a) 2013     Morbid obesity due to excess calories ([facility]) 2013     Essential hypertension 2012       LOS (days): 0  Geometric Mean LOS (GMLOS) (days):   Days to GMLOS:     OBJECTIVE:            Current admission status: Observation   Preferred Pharmacy:   [id number] - [place] - [address]  [address] [facility] [place] [address]  Phone: [phone] Fax: [phone]    [facility]  [facility] [place], [place] - [facility] [address]  [address]  [facility] [address]  [facility] [address]  Phone: [phone] Fax: [phone]    Primary Care Provider: Linden Roque MD    Primary Insurance: ADAM  Secondary Insurance:     DISCHARGE DETAILS:    Discharge planning discussed with:: Patient  Freedom of Choice: Yes  Comments - Freedom of Choice: Home  CM contacted family/caregiver?: No- see comments (Pt A&O)  Were Treatment Team discharge recommendations reviewed with patient/caregiver?: Yes  Did patient/caregiver verbalize understanding of patient care needs?: Yes  Were patient/caregiver advised of the risks associated with not following Treatment Team discharge recommendations?: Yes    Contacts  Patient Contacts: Patient  Contact Method: In Person  Reason/Outcome: Continuity of Care, Discharge Planning    Requested Home Health Care         Is the patient interested in HHC at discharge?: No    DME Referral Provided  Referral made for DME?: No    Other Referral/Resources/Interventions Provided:  Interventions: Other (Specify)  Referral Comments: CM spoke with pt at bedside to f/u on dcp. CM reviewed OT rec OPOT and RW. Pt reported he has a RW at home, declined CM offer to order. No further CM needs noted.    Would you like to participate in our [facility] service program?  : No - Declined    Treatment Team Recommendation: Home, Outpatient Rehab  Discharge Destination Plan:: Home, Outpatient Rehab  Transport at Discharge : Family

## 2025-05-09 NOTE — PROGRESS NOTES
Progress Note - Hospitalist   Name: Elly Chong 60 y.o. male I MRN: [id number]  Unit/Bed#: S -01 I Date of Admission: 5/7/2025   Date of Service: 5/9/2025 I Hospital Day: 0    Assessment & Plan  Sepsis (HCC)  Sepsis POA due to leukocytosis 13.09 tachypnea 22 suspected cellulitis  PTA on Ceftin for wound in ankle until 5/11 now with LLE blister however the blister does not seem to be infected  CTA CAP negative for other source of infection no other associated symptoms  AT the ED started on CTX + Vanco    Plan  Hold PTA Ceftin  Continue with Ancef 2g Q 8hrs, no hx MRSA  CBC am  Monitor hemodynamics  Holding PTA hypertensives as blood pressures borderline  Give albumin 12.5 g once  Checkortho BP    Cellulitis  PTA on Ceftin due to right ankle wound  Started on ceftriaxone and vancomycin at the ED due to sepsis suspected source ongoing cellulitis    Plan  Transition to Ancef 2 g every 8 hours as no history of MRSA  Hold PTA Ceftin as above  Wound care consulted appreciate assistance  Xray: Showing no osseous abnormality  Arthralgia  Polyarthralgia suspect multifactorial in nature suspect Acute gout given swelling Left mcp which usually gouty location vs OA vs potentially viral component such as Chikingunya/dengue as recent traveling to the [facility] fortunately no other organ involement no cytopenias los suspicious RA as biomarkes negative in the past.  CT Spine notable for DISH  Rapid COVID/Flu neg    Plan  Resume PTA Colchicine  Uric Acid added to labs  Started again on Allopurinol this was not taken per spouse goal uric acid <6 can be recheck outpatient by PCP  Start Prednisone 40mg QD x 5 days as due to CKD unable to try NSAIDS  Continue supportive care Tylenol    Chronic gout without tophus  Continue PTA colchicine, restart Allopurinol  Rest of plan as above  Hyponatremia  POA Na 133 corrected for glucose 193 Na 134  Likely due to poor po intake dehydration  Plan  Gentle hydration for now, encourage PO  intake  Holding diuretics due to borderline BP  Isolated Hyperbilirubinemia  Noted  Likely due to know pancreatic cyst vs pancreatic duct dilation  Plan  Hepatic function panel AM  Chronic heart failure with preserved ejection fraction (HCC)  Wt Readings from Last 3 Encounters:   05/09/25 129 kg (285 lb 4.8 oz)   05/01/25 121 kg (267 lb 6 oz)   04/23/25 121 kg (266 lb 9.6 oz)   Volume down on exam  PTA on Bumex 2mg BID started on last admission  Aldactone 25g QD  Coreg 12.5mg BID  Plan  Hold Bumex for now due to dry on exam and bordeline BP consider decrease Bumex 1 mg BID  Hold Aldactone resume in the next 24-48 hrs  Holding Coreg for now as above  Daily weights standing  Strict I/O  Liberalize fluids for this evening once euvolemic will next 2gm Na and fluid restriction    Essential hypertension  Blood Pressure: 138/87  Stable after IVF  POA bordeline P 90s  Plan  Holding antihypertensive for now at this to be resumed as needed  Check ortho BP    Type 2 diabetes mellitus with hyperglycemia (HCC)  Lab Results   Component Value Date    HGBA1C 6.4 03/13/2025       Recent Labs     05/08/25  1635 05/08/25  2124 05/09/25  0709 05/09/25  1034   POCGLU 342* 248* 236* 264*       Blood Sugar Average: Last 72 hrs:  History uncontrolled diabetes with neuropathy  (P) 234.5PTA on glimepiride 1 mg twice daily  Plan  Continue sliding scale  Continue carbohydrate controlled diet  Repeat HbA1c outpatient 1-3 months acceptable ranges thus far      Alcohol dependence in remission (HCC)  Alcohol dependence in remission used to drink daily 3 beers and 2 shots stopped during last admission and we congratulated patient.  Adequate support at home  AMANDO (obstructive sleep apnea)  History of AMANDO in the past recommended to use CPAP no longer usage  O2 nasal cannula while sleeping  Stage 3 chronic kidney disease (HCC)  Lab Results   Component Value Date    EGFR 66 05/09/2025    EGFR 75 05/08/2025    EGFR 44 05/07/2025    CREATININE 1.18  05/09/2025    CREATININE 1.06 05/08/2025    CREATININE 1.65 (H) 05/07/2025   Baseline seems to be 1.6-1.8  Stable and at baseline  Plan  BMP in a.m.  Abnormal TSH  TSH elevated  Follow up t4 pending levels consider replacement vs repeat outpatient  Repeat 4-6 weeks outpatient  Acute on chronic low back pain  No sciatica worse R>L no cauda equina  Plan  Continue Tylenol ELIANE  Unable to use NSAIDs  Hold of Robaxin due to PTA lightheadedness  Started on Steroids for acute gout  Hold off gabapentin as no sciatica present   DISH (diffuse idiopathic skeletal hyperostosis)  Will need outpatient follow-up with orthopedics    VTE Pharmacologic Prophylaxis: VTE Score: 5 High Risk (Score >/= 5) - Pharmacological DVT Prophylaxis Ordered: enoxaparin (Lovenox). Sequential Compression Devices Ordered.    Mobility:   Basic Mobility Inpatient Raw Score: 20  JH-HLM Goal: 6: Walk 10 steps or more  JH-HLM Achieved: 6: Walk 10 steps or more  JH-HLM Goal NOT achieved. Continue with multidisciplinary rounding and encourage appropriate mobility to improve upon JH-HLM goals.    Patient Centered Rounds: I performed bedside rounds with nursing staff today.   Discussions with Specialists or Other Care Team Provider: None    Education and Discussions with Family / Patient: Updated  (wife) via phone.    Current Length of Stay: 0 day(s)  Current Patient Status: Observation   Certification Statement: The patient will continue to require additional inpatient hospital stay due to further evaluation and treatment  Discharge Plan: Anticipate discharge in 24-48 hrs to home.    Code Status: Level 1 - Full Code    Subjective   Patient seen resting in bed.  He was sitting at the edge eating breakfast.  Patient reports feeling better with less pain although pain is still significant enough to limit motion.  Patient also reports some minor improvement in swallow ability.  He reports no new acute symptomology.    Objective :  Temp:  [98.3 °F  (36.8 °C)-98.6 °F (37 °C)] 98.3 °F (36.8 °C)  HR:  [74-83] 82  BP: (112-140)/(65-87) 138/87  Resp:  [18] 18  SpO2:  [93 %-99 %] 94 %    Body mass index is 42.13 kg/m².     Input and Output Summary (last 24 hours):     Intake/Output Summary (Last 24 hours) at 5/9/2025 1419  Last data filed at 5/9/2025 1101  Gross per 24 hour   Intake 480 ml   Output 850 ml   Net -370 ml       Physical Exam  Vitals and nursing note reviewed.   Constitutional:       General: He is not in acute distress.     Appearance: He is well-developed.   HENT:      Head: Normocephalic and atraumatic.      Mouth/Throat:      Mouth: Mucous membranes are dry.   Eyes:      Conjunctiva/sclera: Conjunctivae normal.   Neck:      Comments: Improvement in tenderness    Cardiovascular:      Rate and Rhythm: Normal rate and regular rhythm.      Heart sounds: No murmur heard.  Pulmonary:      Effort: Pulmonary effort is normal. No respiratory distress.      Breath sounds: Normal breath sounds. No rales.   Abdominal:      General: Bowel sounds are normal. There is no distension.      Palpations: Abdomen is soft.      Tenderness: There is no abdominal tenderness. There is no guarding.   Musculoskeletal:         General: Tenderness present. No swelling or deformity.      Cervical back: Neck supple.      Right lower leg: No edema.      Left lower leg: No edema.      Comments: Tender to palpation left hand mostly in the left index with some swelling     Skin:     General: Skin is warm and dry.      Capillary Refill: Capillary refill takes less than 2 seconds.      Findings: Lesion present.      Comments: Dryness improved   Neurological:      Mental Status: He is alert.   Psychiatric:         Mood and Affect: Mood normal.         Thought Content: Thought content normal.           Lab Results: I have reviewed the following results:   Results from last 7 days   Lab Units 05/09/25  0604   WBC Thousand/uL 14.44*   HEMOGLOBIN g/dL 11.7*   HEMATOCRIT % 35.3*   PLATELETS  Thousands/uL 287   SEGS PCT % 82*   LYMPHO PCT % 11*   MONO PCT % 7   EOS PCT % 0     Results from last 7 days   Lab Units 05/09/25  0604   SODIUM mmol/L 134*   POTASSIUM mmol/L 3.7   CHLORIDE mmol/L 95*   CO2 mmol/L 31   BUN mg/dL 25   CREATININE mg/dL 1.18   ANION GAP mmol/L 8   CALCIUM mg/dL 9.1   ALBUMIN g/dL 3.2*   TOTAL BILIRUBIN mg/dL 0.70   ALK PHOS U/L 102   ALT U/L 22   AST U/L 24   GLUCOSE RANDOM mg/dL 215*     Results from last 7 days   Lab Units 05/07/25  1801   INR  1.22*     Results from last 7 days   Lab Units 05/09/25  1034 05/09/25  0709 05/08/25  2124 05/08/25  1635 05/08/25  1052 05/08/25  0722   POC GLUCOSE mg/dl 264* 236* 248* 342* 183* 134         Results from last 7 days   Lab Units 05/07/25  1801   LACTIC ACID mmol/L 1.4       Recent Cultures (last 7 days):   Results from last 7 days   Lab Units 05/07/25  1801   BLOOD CULTURE  No Growth at 24 hrs.  No Growth at 24 hrs.             Last 24 Hours Medication List:     Current Facility-Administered Medications:     acetaminophen (TYLENOL) tablet 975 mg, Q8H ELIANE    albuterol (PROVENTIL HFA,VENTOLIN HFA) inhaler 1 puff, Daily    allopurinol (ZYLOPRIM) tablet 100 mg, Daily    [Held by provider] bumetanide (BUMEX) tablet 2 mg, BID    [Held by provider] carvedilol (COREG) tablet 12.5 mg, BID With Meals    ceFAZolin (ANCEF) IVPB (premix in dextrose) 2,000 mg 50 mL, Q8H, Last Rate: 2,000 mg (05/09/25 1329)    colchicine (COLCRYS) tablet 0.6 mg, BID    cyanocobalamin (VITAMIN B-12) tablet 1,000 mcg, Daily    enoxaparin (LOVENOX) subcutaneous injection 40 mg, Daily    famotidine (PEPCID) tablet 20 mg, HS    fluticasone-vilanterol 200-25 mcg/actuation 1 puff, Daily    insulin glargine (LANTUS) subcutaneous injection 10 Units 0.1 mL, HS    insulin lispro (HumALOG/ADMELOG) 100 units/mL subcutaneous injection 2-12 Units, TID AC **AND** Fingerstick Glucose (POCT), TID AC    lidocaine (LIDODERM) 5 % patch 1 patch, Daily    methocarbamol (ROBAXIN) tablet 500  mg, Q6H PRN    pantoprazole (PROTONIX) EC tablet 40 mg, Daily Before Breakfast    polyethylene glycol (MIRALAX) packet 17 g, Daily PRN    predniSONE tablet 40 mg, Daily    [Held by provider] spironolactone (ALDACTONE) tablet 25 mg, Daily    Administrative Statements   Today, Patient Was Seen By: Angelo Ribeiro MD      **Please Note: This note may have been constructed using a voice recognition system.**

## 2025-05-09 NOTE — UTILIZATION REVIEW
Continued Stay Review    SEE INITIAL REVIEW AT BOTTOM              Current Patient Class: Observation  Current Level of Care: med surg    HPI:60 y.o. male initially admitted on 5/7 OBS  Current Diagnosis: Sepsis    5/9 Observation Admission: Patient reports feeling better with less pain although pain is still significant enough to limit motion. Patient also reports some minor improvement in swallow ability. Continue IV Ancef, monitor VS, order orthostatic VS, CBC and hepatic function panel in AM. Monitor blood sugars and start SSI. Supplemental O2 as needed. Started on steroids for acute gout. CM following for dispo planning.     Medications:   Scheduled Medications:  acetaminophen, 975 mg, Oral, Q8H ELIANE  albuterol, 1 puff, Inhalation, Daily  allopurinol, 100 mg, Oral, Daily  [Held by provider] bumetanide, 2 mg, Oral, BID  [Held by provider] carvedilol, 12.5 mg, Oral, BID With Meals  cefazolin, 2,000 mg, Intravenous, Q8H  colchicine, 0.6 mg, Oral, BID  cyanocobalamin, 1,000 mcg, Oral, Daily  enoxaparin, 40 mg, Subcutaneous, Daily  famotidine, 20 mg, Oral, HS  fluticasone-vilanterol, 1 puff, Inhalation, Daily  insulin glargine, 10 Units, Subcutaneous, HS  insulin lispro, 2-12 Units, Subcutaneous, TID AC  lidocaine, 1 patch, Topical, Daily  pantoprazole, 40 mg, Oral, Daily Before Breakfast  predniSONE, 40 mg, Oral, Daily  [Held by provider] spironolactone, 25 mg, Oral, Daily      Continuous IV Infusions:   multi-electrolyte (Plasmalyte-A/Isolyte-S PH 7.4/Normosol-R) IV solution  Rate: 100 mL/hr Dose: 100 mL/hr  Freq: Continuous Route: IV  Indications of Use: IV Hydration  Last Dose: Stopped (05/08/25 0524)  Start: 05/08/25 0400 End: 05/08/25 0518     multi-electrolyte (Plasmalyte-A/Isolyte-S PH 7.4/Normosol-R) IV solution  Rate: 100 mL/hr Dose: 100 mL/hr  Freq: Continuous Route: IV  Indications of Use: IV Hydration  Last Dose: Stopped (05/08/25 0400)  Start: 05/07/25 2200 End: 05/08/25 0349     PRN  Meds:  methocarbamol, 500 mg, Oral, Q6H PRN x1  polyethylene glycol, 17 g, Oral, Daily PRN      Discharge Plan: tbd    Vital Signs (last 3 days)       Date/Time Temp Pulse Resp BP MAP (mmHg) SpO2 Calculated FIO2 (%) - Nasal Cannula Nasal Cannula O2 Flow Rate (L/min) O2 Device Patient Position - Orthostatic VS Pain    05/09/25 07:10:58 98.3 °F (36.8 °C) 74 18 140/82 101 99 % -- -- -- -- --    05/09/25 0629 -- -- -- -- -- -- -- -- -- -- 6 05/09/25 0300 -- -- -- -- -- -- -- -- -- -- No Pain    05/08/25 1710 -- -- -- -- -- -- -- -- -- -- 7 05/08/25 15:33:57 98.6 °F (37 °C) 83 18 112/65 81 93 % -- -- -- -- --    05/08/25 1433 -- -- -- -- -- -- -- -- -- -- 8 05/08/25 1326 -- -- -- -- -- -- -- -- -- -- 7 05/08/25 1234 -- -- -- -- -- -- -- -- None (Room air) -- 9 05/08/25 0911 -- -- -- -- -- -- -- -- -- -- 9 05/08/25 07:24:16 98.7 °F (37.1 °C) 81 18 110/59 76 91 % -- -- -- -- --    05/08/25 0516 -- -- -- -- -- -- -- -- -- -- 5 05/08/25 0300 -- -- -- -- -- 96 % -- -- None (Room air) -- No Pain    05/08/25 00:02:07 98.5 °F (36.9 °C) 84 18 149/84 106 97 % -- -- -- -- --    05/07/25 2257 -- -- -- -- -- -- -- -- -- -- 9    05/07/25 22:37:20 98.4 °F (36.9 °C) 82 15 112/69 83 94 % -- -- -- -- --    05/07/25 2227 -- -- -- -- -- -- -- -- -- -- 10 - Worst Possible Pain    05/07/25 2033 -- 85 20 -- -- 99 % 28 2 L/min Nasal cannula  Sitting --    05/07/25 1944 -- 87 20 112/69 85 96 % -- -- None (Room air) Sitting --    05/07/25 1842 -- 83 20 112/69 85 97 % -- -- None (Room air) Sitting --    05/07/25 1805 -- -- -- -- -- -- -- -- None (Room air) -- --    05/07/25 1728 -- 83 20 96/61 74 96 % -- -- None (Room air) Sitting --    05/07/25 1622 98 °F (36.7 °C) 85 22 94/56 -- 97 % -- -- None (Room air) Sitting --          Weight (last 2 days)       Date/Time Weight    05/09/25 0600 118 (259.8)    05/08/25 0452 115 (253.1)    05/07/25 1622 115 (253.75)            Pertinent Labs/Diagnostic Results:   Radiology:  XR ankle 3+  vw right   Final Interpretation by Sudheer Jasso MD (05/08 0916)      No acute osseous abnormality.         Computerized Assisted Algorithm (CAA) may have been used to analyze all applicable images.               Workstation performed: [id number]         CT spine cervical without contrast   Final Interpretation by Tian Hannon MD (05/07 1946)      No cervical spine fracture or traumatic malalignment.                  Workstation performed: [id number]         CTA chest abdomen pelvis w wo contrast   Final Interpretation by Johnson Escobedo MD (05/07 2001)      No evidence for pulmonary embolism.      No acute intra-abdominal pathology.      Redemonstrated findings of chronic pancreatitis as well as a 1.5 cm cyst versus focal ductal dilatation regional to the pancreatic head; again, further characterization with nonemergent dedicated pancreatic protocol MRI is advised in the outpatient    setting.               Workstation performed: [id number]           Cardiology:  ECG 12 lead   Final Result by Santa Greco MD (05/07 1757)   Normal sinus rhythm   Nonspecific T wave abnormality   When compared with ECG of 14-Apr-2025 18:43,   Premature ventricular complexes are no longer Present   Confirmed by Santa Greco ([id number]) on 5/7/2025 5:57:43 PM        GI:  No orders to display           Results from last 7 days   Lab Units 05/09/25  0604 05/08/25  0448 05/07/25  1801   WBC Thousand/uL 14.44* 12.39* 13.09*   HEMOGLOBIN g/dL 11.7* 11.9* 12.5   HEMATOCRIT % 35.3* 35.4* 37.6   PLATELETS Thousands/uL 287 253 288   TOTAL NEUT ABS Thousands/µL 11.75* 8.73* 8.83*         Results from last 7 days   Lab Units 05/09/25  0604 05/08/25  0448 05/07/25  1801   SODIUM mmol/L 134* 132* 133*   POTASSIUM mmol/L 3.7 3.5 3.7   CHLORIDE mmol/L 95* 93* 89*   CO2 mmol/L 31 28 34*   ANION GAP mmol/L 8 11 10   BUN mg/dL 25 22 25   CREATININE mg/dL 1.18 1.06 1.65*   EGFR ml/min/1.73sq m 66 75 44   CALCIUM mg/dL 9.1 8.7 9.1   MAGNESIUM  mg/dL  --  2.6 1.3*     Results from last 7 days   Lab Units 05/09/25  0604 05/08/25  0448 05/07/25  1801   AST U/L 24 25 31   ALT U/L 22 16 20   ALK PHOS U/L 102 103 122*   TOTAL PROTEIN g/dL 7.0 6.8 7.8   ALBUMIN g/dL 3.2* 3.4* 3.6   TOTAL BILIRUBIN mg/dL 0.70 1.61* 2.46*   BILIRUBIN DIRECT mg/dL  --  0.82*  --      Results from last 7 days   Lab Units 05/09/25  0709 05/08/25  2124 05/08/25  1635 05/08/25  1052 05/08/25  0722   POC GLUCOSE mg/dl 236* 248* 342* 183* 134     Results from last 7 days   Lab Units 05/09/25  0604 05/08/25 0448 05/07/25  1801   GLUCOSE RANDOM mg/dL 215* 138 183*     BETA-HYDROXYBUTYRATE   Date Value Ref Range Status   07/06/2020 0.2 <0.6 mmol/L Final      Results from last 7 days   Lab Units 05/07/25 2010 05/07/25  1801   HS TNI 0HR ng/L  --  10   HS TNI 2HR ng/L 7  --    HSTNI D2 ng/L -3  --          Results from last 7 days   Lab Units 05/07/25  1801   PROTIME seconds 16.1*   INR  1.22*   PTT seconds 35*     Results from last 7 days   Lab Units 05/07/25  1801   TSH 3RD GENERATON uIU/mL 9.598*         Results from last 7 days   Lab Units 05/07/25  1801   LACTIC ACID mmol/L 1.4     Results from last 7 days   Lab Units 05/07/25  1802   BNP pg/mL 148*     Results from last 7 days   Lab Units 05/07/25  1805   CLARITY UA  Clear   COLOR UA  Yellow   SPEC GRAV UA  1.018   PH UA  5.5   GLUCOSE UA mg/dl 100 (1/10%)*   KETONES UA mg/dl 10 (1+)*   BLOOD UA  Negative   PROTEIN UA mg/dl Negative   NITRITE UA  Negative   BILIRUBIN UA  Small*   UROBILINOGEN UA (BE) mg/dl 2.0*   LEUKOCYTES UA  Negative     Results from last 7 days   Lab Units 05/07/25  1801   BLOOD CULTURE  No Growth at 24 hrs.  No Growth at 24 hrs.     Network Utilization Review Department  ATTENTION: Please call with any questions or concerns to [phone] and carefully listen to the prompts so that you are directed to the right person. All voicemails are confidential.   For Discharge needs, contact Care Management DC Support  Team at [phone] opt. 2  Send all requests for admission clinical reviews, approved or denied determinations and any other requests to dedicated fax number below belonging to the campus where the patient is receiving treatment. List of dedicated fax numbers for the Facilities:  FACILITY NAME UR FAX NUMBER   ADMISSION DENIALS (Administrative/Medical Necessity) [phone]   DISCHARGE SUPPORT TEAM (NETWORK) [phone]   PARENT CHILD HEALTH (Maternity/NICU/Pediatrics) [phone]   [facility] [phone]   [facility] [phone]   [facility] [phone]   [facility] [phone]   Counts include 234 beds at the [facility] [phone]   [facility] [phone]   [facility] [phone]   [facility] [phone]   [facility] [phone]   [facility] [phone]   [facility] [phone]   [facility] [phone]

## 2025-05-09 NOTE — ASSESSMENT & PLAN NOTE
Wt Readings from Last 3 Encounters:   05/09/25 129 kg (285 lb 4.8 oz)   05/01/25 121 kg (267 lb 6 oz)   04/23/25 121 kg (266 lb 9.6 oz)   Volume down on exam  PTA on Bumex 2mg BID started on last admission  Aldactone 25g QD  Coreg 12.5mg BID  Plan  Hold Bumex for now due to dry on exam and bordeline BP consider decrease Bumex 1 mg BID  Hold Aldactone resume in the next 24-48 hrs  Holding Coreg for now as above  Daily weights standing  Strict I/O  Liberalize fluids for this evening once euvolemic will next 2gm Na and fluid restriction

## 2025-05-09 NOTE — PLAN OF CARE
Problem: PAIN - ADULT  Goal: Verbalizes/displays adequate comfort level or baseline comfort level  Description: Interventions:- Encourage patient to monitor pain and request assistance- Assess pain using appropriate pain scale- Administer analgesics based on type and severity of pain and evaluate response- Implement non-pharmacological measures as appropriate and evaluate response- Consider cultural and social influences on pain and pain management- Notify physician/advanced practitioner if interventions unsuccessful or patient reports new pain  Outcome: Progressing     Problem: Potential for Falls  Goal: Patient will remain free of falls  Description: INTERVENTIONS:- Educate patient/family on patient safety including physical limitations- Instruct patient to call for assistance with activity - Consult OT/PT to assist with strengthening/mobility - Keep Call bell within reach- Keep bed low and locked with side rails adjusted as appropriate- Keep care items and personal belongings within reach- Initiate and maintain comfort rounds- Make Fall Risk Sign visible to staff- Offer Toileting every 2 Hours, in advance of need- Initiate/Maintain bed/chair alarm- Obtain necessary fall risk management equipment: yellow bracelet/socks- Apply yellow socks and bracelet for high fall risk patients- Consider moving patient to room near nurses station  Outcome: Progressing     Problem: Nutrition/Hydration-ADULT  Goal: Nutrient/Hydration intake appropriate for improving, restoring or maintaining nutritional needs  Description: Monitor and assess patient's nutrition/hydration status for malnutrition. Collaborate with interdisciplinary team and initiate plan and interventions as ordered.  Monitor patient's weight and dietary intake as ordered or per policy. Utilize nutrition screening tool and intervene as necessary. Determine patient's food preferences and provide high-protein, high-caloric foods as appropriate. INTERVENTIONS:-  Monitor oral intake, urinary output, labs, and treatment plans- Assess nutrition and hydration status and recommend course of action- Evaluate amount of meals eaten- Assist patient with eating if necessary - Allow adequate time for meals- Recommend/ encourage appropriate diets, oral nutritional supplements, and vitamin/mineral supplements- Order, calculate, and assess calorie counts as needed- Recommend, monitor, and adjust tube feedings and TPN/PPN based on assessed needs- Assess need for intravenous fluids- Provide specific nutrition/hydration education as appropriate- Include patient/family/caregiver in decisions related to nutrition  Outcome: Progressing

## 2025-05-09 NOTE — ASSESSMENT & PLAN NOTE
Lab Results   Component Value Date    HGBA1C 6.4 03/13/2025       Recent Labs     05/08/25  1635 05/08/25  2124 05/09/25  0709 05/09/25  1034   POCGLU 342* 248* 236* 264*       Blood Sugar Average: Last 72 hrs:  History uncontrolled diabetes with neuropathy  (P) 234.5PTA on glimepiride 1 mg twice daily  Plan  Continue sliding scale  Continue carbohydrate controlled diet  Repeat HbA1c outpatient 1-3 months acceptable ranges thus far

## 2025-05-09 NOTE — ASSESSMENT & PLAN NOTE
Lab Results   Component Value Date    EGFR 66 05/09/2025    EGFR 75 05/08/2025    EGFR 44 05/07/2025    CREATININE 1.18 05/09/2025    CREATININE 1.06 05/08/2025    CREATININE 1.65 (H) 05/07/2025   Baseline seems to be 1.6-1.8  Stable and at baseline  Plan  BMP in a.m.

## 2025-05-09 NOTE — ASSESSMENT & PLAN NOTE
Sepsis POA due to leukocytosis 13.09 tachypnea 22 suspected cellulitis  PTA on Ceftin for wound in ankle until 5/11 now with LLE blister however the blister does not seem to be infected  CTA CAP negative for other source of infection no other associated symptoms  AT the ED started on CTX + Vanco    Plan  Hold PTA Ceftin  Continue with Ancef 2g Q 8hrs, no hx MRSA  CBC am  Monitor hemodynamics  Holding PTA hypertensives as blood pressures borderline  Give albumin 12.5 g once  Checkortho BP

## 2025-05-09 NOTE — PLAN OF CARE
Problem: PHYSICAL THERAPY ADULT  Goal: Performs mobility at highest level of function for planned discharge setting.  See evaluation for individualized goals.  Description: Treatment/Interventions: Functional transfer training, LE strengthening/ROM, Therapeutic exercise, Endurance training, Cognitive reorientation, Patient/family training, Gait training, Bed mobility, Equipment eval/education, Elevations, Spoke to nursing, Family  Equipment Recommended: Walker (with likely soon transition to SPC vs no device)       See flowsheet documentation for full assessment, interventions and recommendations.  Note: Prognosis: Fair  Problem List: Decreased strength, Decreased range of motion, Decreased endurance, Decreased mobility, Impaired balance, Impaired sensation, Obesity, Pain, Decreased skin integrity (gait deviations)  Assessment: Pt is a 60 y.o. male who [facility] on [facility] w/ Sepsis, cellulitis, arthralgia, hyponaturemia, hx of Etoh.  Order placed for PT.      Prior to admission: Pt lives with family in one story [address].  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 Upon evaluation: Pt needed no physical A for transfers, but Close S and slow gait speed for ambulation.      Pt's clinical presentation is currently unstable/unpredictable given the functional mobility deficits above, especially (but not limited to) decreased ROM and pain, and combined with medical complications including abnormal H&H, abnormal WBCs, readmission to hospital, and abnormal sodium values.  Pt IS NOT at his mobility baseline.       During this admission, pt would benefit from continued skilled inpatient PT in the acute care setting in order to address deficits as defined above to maximize function and mobility.      Recommendations:     From a PT standpoint, recommend next several sessions focus on continued mobility training w/rolling walker (rad w/ nursing) and therapy to progress pt  toward unilateral vs no device, stair training, Le therex for posture, C spine ROM and balance activities.  Barriers to Discharge: Inaccessible home environment     Rehab Resource Intensity Level, PT: III (Minimum Resource Intensity) (OPPT if able to address neck/back pain)    See flowsheet documentation for full assessment.

## 2025-05-09 NOTE — ASSESSMENT & PLAN NOTE
Blood Pressure: 138/87  Stable after IVF  POA bordeline P 90s  Plan  Holding antihypertensive for now at this to be resumed as needed  Check ortho BP

## 2025-05-10 ENCOUNTER — APPOINTMENT (OUTPATIENT)
Dept: RADIOLOGY | Facility: HOSPITAL | Age: 60
DRG: 872 | End: 2025-05-10
Payer: COMMERCIAL

## 2025-05-10 LAB
ALBUMIN SERPL BCG-MCNC: 3.3 G/DL (ref 3.5–5)
ALP SERPL-CCNC: 108 U/L (ref 34–104)
ALT SERPL W P-5'-P-CCNC: 29 U/L (ref 7–52)
ANION GAP SERPL CALCULATED.3IONS-SCNC: 7 MMOL/L (ref 4–13)
AST SERPL W P-5'-P-CCNC: 53 U/L (ref 13–39)
BILIRUB SERPL-MCNC: 0.43 MG/DL (ref 0.2–1)
BUN SERPL-MCNC: 31 MG/DL (ref 5–25)
CALCIUM ALBUM COR SERPL-MCNC: 9.4 MG/DL (ref 8.3–10.1)
CALCIUM SERPL-MCNC: 8.8 MG/DL (ref 8.4–10.2)
CHLORIDE SERPL-SCNC: 96 MMOL/L (ref 96–108)
CO2 SERPL-SCNC: 31 MMOL/L (ref 21–32)
CREAT SERPL-MCNC: 1.17 MG/DL (ref 0.6–1.3)
ERYTHROCYTE [DISTWIDTH] IN BLOOD BY AUTOMATED COUNT: 12.7 % (ref 11.6–15.1)
GFR SERPL CREATININE-BSD FRML MDRD: 66 ML/MIN/1.73SQ M
GLUCOSE P FAST SERPL-MCNC: 192 MG/DL (ref 65–99)
GLUCOSE SERPL-MCNC: 173 MG/DL (ref 65–140)
GLUCOSE SERPL-MCNC: 192 MG/DL (ref 65–140)
GLUCOSE SERPL-MCNC: 258 MG/DL (ref 65–140)
GLUCOSE SERPL-MCNC: 323 MG/DL (ref 65–140)
GLUCOSE SERPL-MCNC: 332 MG/DL (ref 65–140)
HCT VFR BLD AUTO: 32.8 % (ref 36.5–49.3)
HGB BLD-MCNC: 10.9 G/DL (ref 12–17)
MCH RBC QN AUTO: 32 PG (ref 26.8–34.3)
MCHC RBC AUTO-ENTMCNC: 33.2 G/DL (ref 31.4–37.4)
MCV RBC AUTO: 96 FL (ref 82–98)
PLATELET # BLD AUTO: 310 THOUSANDS/UL (ref 149–390)
PMV BLD AUTO: 10.5 FL (ref 8.9–12.7)
POTASSIUM SERPL-SCNC: 3.6 MMOL/L (ref 3.5–5.3)
PROT SERPL-MCNC: 6.8 G/DL (ref 6.4–8.4)
RBC # BLD AUTO: 3.41 MILLION/UL (ref 3.88–5.62)
SODIUM SERPL-SCNC: 134 MMOL/L (ref 135–147)
THYROGLOB AB SERPL-ACNC: <1 IU/ML (ref 0–0.9)
THYROPEROXIDASE AB SERPL-ACNC: 18 IU/ML (ref 0–34)
WBC # BLD AUTO: 14.74 THOUSAND/UL (ref 4.31–10.16)

## 2025-05-10 PROCEDURE — 74230 X-RAY XM SWLNG FUNCJ C+: CPT

## 2025-05-10 PROCEDURE — 99232 SBSQ HOSP IP/OBS MODERATE 35: CPT | Performed by: INTERNAL MEDICINE

## 2025-05-10 PROCEDURE — 82948 REAGENT STRIP/BLOOD GLUCOSE: CPT

## 2025-05-10 PROCEDURE — 92611 MOTION FLUOROSCOPY/SWALLOW: CPT

## 2025-05-10 PROCEDURE — 80053 COMPREHEN METABOLIC PANEL: CPT

## 2025-05-10 PROCEDURE — 85027 COMPLETE CBC AUTOMATED: CPT

## 2025-05-10 RX ORDER — COLCHICINE 0.6 MG/1
0.3 TABLET ORAL DAILY
Status: DISCONTINUED | OUTPATIENT
Start: 2025-05-11 | End: 2025-05-11 | Stop reason: HOSPADM

## 2025-05-10 RX ORDER — POTASSIUM CHLORIDE 1500 MG/1
40 TABLET, EXTENDED RELEASE ORAL ONCE
Status: COMPLETED | OUTPATIENT
Start: 2025-05-10 | End: 2025-05-10

## 2025-05-10 RX ORDER — INSULIN LISPRO 100 [IU]/ML
5 INJECTION, SOLUTION INTRAVENOUS; SUBCUTANEOUS
Status: DISCONTINUED | OUTPATIENT
Start: 2025-05-10 | End: 2025-05-11 | Stop reason: HOSPADM

## 2025-05-10 RX ADMIN — INSULIN GLARGINE 15 UNITS: 100 INJECTION, SOLUTION SUBCUTANEOUS at 21:36

## 2025-05-10 RX ADMIN — POTASSIUM CHLORIDE 40 MEQ: 1500 TABLET, EXTENDED RELEASE ORAL at 07:29

## 2025-05-10 RX ADMIN — INSULIN LISPRO 5 UNITS: 100 INJECTION, SOLUTION INTRAVENOUS; SUBCUTANEOUS at 16:13

## 2025-05-10 RX ADMIN — INSULIN LISPRO 6 UNITS: 100 INJECTION, SOLUTION INTRAVENOUS; SUBCUTANEOUS at 11:32

## 2025-05-10 RX ADMIN — ENOXAPARIN SODIUM 40 MG: 40 INJECTION SUBCUTANEOUS at 08:21

## 2025-05-10 RX ADMIN — PANTOPRAZOLE SODIUM 40 MG: 40 TABLET, DELAYED RELEASE ORAL at 06:02

## 2025-05-10 RX ADMIN — ALLOPURINOL 100 MG: 100 TABLET ORAL at 08:22

## 2025-05-10 RX ADMIN — INSULIN LISPRO 8 UNITS: 100 INJECTION, SOLUTION INTRAVENOUS; SUBCUTANEOUS at 21:36

## 2025-05-10 RX ADMIN — ACETAMINOPHEN 975 MG: 325 TABLET, FILM COATED ORAL at 14:46

## 2025-05-10 RX ADMIN — CYANOCOBALAMIN TAB 500 MCG 1000 MCG: 500 TAB at 08:22

## 2025-05-10 RX ADMIN — INSULIN LISPRO 8 UNITS: 100 INJECTION, SOLUTION INTRAVENOUS; SUBCUTANEOUS at 16:13

## 2025-05-10 RX ADMIN — ACETAMINOPHEN 975 MG: 325 TABLET, FILM COATED ORAL at 06:02

## 2025-05-10 RX ADMIN — CARVEDILOL 12.5 MG: 12.5 TABLET, FILM COATED ORAL at 16:13

## 2025-05-10 RX ADMIN — CEFAZOLIN SODIUM 2000 MG: 2 SOLUTION INTRAVENOUS at 06:02

## 2025-05-10 RX ADMIN — INSULIN LISPRO 5 UNITS: 100 INJECTION, SOLUTION INTRAVENOUS; SUBCUTANEOUS at 12:02

## 2025-05-10 RX ADMIN — FLUTICASONE FUROATE AND VILANTEROL TRIFENATATE 1 PUFF: 200; 25 POWDER RESPIRATORY (INHALATION) at 07:31

## 2025-05-10 RX ADMIN — CEFAZOLIN SODIUM 2000 MG: 2 SOLUTION INTRAVENOUS at 14:46

## 2025-05-10 RX ADMIN — LIDOCAINE 1 PATCH: 700 PATCH TOPICAL at 08:21

## 2025-05-10 RX ADMIN — CEFAZOLIN SODIUM 2000 MG: 2 SOLUTION INTRAVENOUS at 21:31

## 2025-05-10 RX ADMIN — ACETAMINOPHEN 975 MG: 325 TABLET, FILM COATED ORAL at 21:36

## 2025-05-10 RX ADMIN — FAMOTIDINE 20 MG: 20 TABLET, FILM COATED ORAL at 21:36

## 2025-05-10 RX ADMIN — INSULIN LISPRO 2 UNITS: 100 INJECTION, SOLUTION INTRAVENOUS; SUBCUTANEOUS at 07:30

## 2025-05-10 RX ADMIN — PREDNISONE 40 MG: 20 TABLET ORAL at 08:22

## 2025-05-10 RX ADMIN — BUMETANIDE 2 MG: 1 TABLET ORAL at 17:59

## 2025-05-10 RX ADMIN — ALBUTEROL SULFATE 1 PUFF: 90 AEROSOL, METERED RESPIRATORY (INHALATION) at 08:24

## 2025-05-10 RX ADMIN — COLCHICINE 0.6 MG: 0.6 TABLET ORAL at 08:22

## 2025-05-10 NOTE — ASSESSMENT & PLAN NOTE
Sepsis POA due to leukocytosis 13.09 tachypnea 22 suspected cellulitis  PTA on Ceftin for wound in ankle until 5/11 now with LLE blister however the blister does not seem to be infected  CTA CAP negative for other source of infection no other associated symptoms  AT the ED started on CTX + Vanco    Plan  Hold PTA Ceftin  Continue with Ancef 2g Q 8hrs, no hx MRSA  CBC am  Monitor hemodynamics  Restarted patient's blood pressure medications as vitals have improved

## 2025-05-10 NOTE — ASSESSMENT & PLAN NOTE
Lab Results   Component Value Date    HGBA1C 6.4 03/13/2025       Recent Labs     05/09/25  0709 05/09/25  1034 05/09/25  1616 05/09/25  2114   POCGLU 236* 264* 302* 309*       Blood Sugar Average: Last 72 hrs:  History uncontrolled diabetes with neuropathy  (P) 252.25PTA on glimepiride 1 mg twice daily  Plan  Continue sliding scale  Continue carbohydrate controlled diet  Repeat HbA1c outpatient 1-3 months acceptable ranges thus far

## 2025-05-10 NOTE — UTILIZATION REVIEW
OBSERVATION ADMISSION 5/7/2025 2059  CONVERTED TO   INPATIENT ADMISSION 5/10/2025 1356 AFTER 2 MN OBS FOR MANAGEMENT OF SEPSIS DUE TO RLE CELLULITIS ON IV ANTIBX, IN THE SETTING OF IMMUNE SUPPRESSION,   MONITORING DELFINO  Admission Orders (From admission, onward)       Ordered        05/10/25 1356  INPATIENT ADMISSION  Once            05/07/25 2059  Place in Observation  Once                          Orders Placed This Encounter   Procedures    INPATIENT ADMISSION     Standing Status:   Standing     Number of Occurrences:   1     Level of Care:   Med Surg [16]     Estimated length of stay:   More than 2 Midnights     Certification:   I certify that inpatient services are medically necessary for this patient for a duration of greater than two midnights. See H&P and MD Progress Notes for additional information about the patient's course of treatment.       Current Diagnosis:sepsis due to cellulitis, chronic gout/ arthralgia, DELFINO    Assessment/Plan: sepsis, secondary to right lower extremity cellulitis, clinically improving, continue IV Ancef for now. Wound care consulted, appreciate assistance.   Chronic gout w arthralgia w elevated uric acid- continue prednisone and colchicine for now. Patient will need to eventually be placed back on allopurinol   DELFINO further BMP monitoring  5/10   reports  pain is down to a 5 out of 10. feels like walking around,  a good sign that his energy has improved. Reports regaining his appetite. BP responded well to a dose of albumin yesterday  discussed restarting all of his home medications   Cont IV Ancef,   BP control resumed patient's Bumex and carvedilol, follow BMP, I/O, daily wt  SSI per ENDO recs; repeat thyroid labs        Medications:   Scheduled Medications:  acetaminophen, 975 mg, Oral, Q8H ELIANE  albuterol, 1 puff, Inhalation, Daily  allopurinol, 100 mg, Oral, Daily  [Held by provider] bumetanide, 2 mg, Oral, BID  [Held by provider] carvedilol, 12.5 mg, Oral, BID With  Meals  cefazolin, 2,000 mg, Intravenous, Q8H  colchicine, 0.6 mg, Oral, BID  cyanocobalamin, 1,000 mcg, Oral, Daily  enoxaparin, 40 mg, Subcutaneous, Daily  famotidine, 20 mg, Oral, HS  fluticasone-vilanterol, 1 puff, Inhalation, Daily  insulin glargine, 15 Units, Subcutaneous, HS  insulin lispro, 2-12 Units, Subcutaneous, 4x Daily (AC & HS)  lidocaine, 1 patch, Topical, Daily  pantoprazole, 40 mg, Oral, Daily Before Breakfast  predniSONE, 40 mg, Oral, Daily  [Held by provider] spironolactone, 25 mg, Oral, Daily      Continuous IV Infusions:     PRN Meds:  methocarbamol, 500 mg, Oral, Q6H PRN  polyethylene glycol, 17 g, Oral, Daily PRN      Discharge Plan: TBD    Vital Signs (last 3 days)       Date/Time Temp Pulse Resp BP MAP (mmHg) SpO2 Calculated FIO2 (%) - Nasal Cannula Nasal Cannula O2 Flow Rate (L/min) O2 Device Patient Position - Orthostatic VS Pain    05/10/25 0822 -- -- -- -- -- -- -- -- -- -- Med Not Given for Pain - for MAR use only    05/10/25 07:34:51 98.2 °F (36.8 °C) 82 18 135/69 91 96 % -- -- None (Room air) Sitting --    05/10/25 0602 -- -- -- -- -- -- -- -- -- -- 6    05/10/25 0100 -- -- -- -- -- -- -- -- -- -- No Pain    05/09/25 22:40:18 98.2 °F (36.8 °C) 82 -- 141/78 99 92 % -- -- -- -- --    05/09/25 2158 -- -- -- -- -- -- -- -- -- -- 6 05/09/25 1703 -- -- -- -- -- -- -- -- -- -- 6 05/09/25 1556 -- -- -- -- -- -- -- -- -- -- 7    05/09/25 15:02:29 98.2 °F (36.8 °C) 78 18 119/68 85 94 % -- -- -- -- --    05/09/25 1326 -- -- -- -- -- -- -- -- -- -- 6 05/09/25 11:06:32 -- 82 -- 138/87 104 94 % -- -- -- -- --    05/09/25 0808 -- -- -- -- -- 99 % -- -- None (Room air) -- 6 05/09/25 07:10:58 98.3 °F (36.8 °C) 74 18 140/82 101 99 % -- -- -- -- --    05/09/25 0629 -- -- -- -- -- -- -- -- -- -- 6 05/09/25 0300 -- -- -- -- -- -- -- -- -- -- No Pain    05/08/25 1710 -- -- -- -- -- -- -- -- -- -- 7    05/08/25 15:33:57 98.6 °F (37 °C) 83 18 112/65 81 93 % -- -- -- -- --    05/08/25 1433 --  -- -- -- -- -- -- -- -- -- 8 05/08/25 1326 -- -- -- -- -- -- -- -- -- -- 7 05/08/25 1234 -- -- -- -- -- -- -- -- None (Room air) -- 9 05/08/25 0911 -- -- -- -- -- -- -- -- -- -- 9 05/08/25 07:24:16 98.7 °F (37.1 °C) 81 18 110/59 76 91 % -- -- -- -- --    05/08/25 0516 -- -- -- -- -- -- -- -- -- -- 5 05/08/25 0300 -- -- -- -- -- 96 % -- -- None (Room air) -- No Pain    05/08/25 00:02:07 98.5 °F (36.9 °C) 84 18 149/84 106 97 % -- -- -- -- --    05/07/25 2257 -- -- -- -- -- -- -- -- -- -- 9 05/07/25 22:37:20 98.4 °F (36.9 °C) 82 15 112/69 83 94 % -- -- -- -- --    05/07/25 2227 -- -- -- -- -- -- -- -- -- -- 10 - Worst Possible Pain    05/07/25 2033 -- 85 20 -- -- 99 % 28 2 L/min Nasal cannula  Sitting --    05/07/25 1944 -- 87 20 112/69 85 96 % -- -- None (Room air) Sitting --    05/07/25 1842 -- 83 20 112/69 85 97 % -- -- None (Room air) Sitting --    05/07/25 1805 -- -- -- -- -- -- -- -- None (Room air) -- --    05/07/25 1728 -- 83 20 96/61 74 96 % -- -- None (Room air) Sitting --    05/07/25 1622 98 °F (36.7 °C) 85 22 94/56 -- 97 % -- -- None (Room air) Sitting --          Weight (last 2 days)       Date/Time Weight    05/10/25 0600 116 (254.8)    05/10/25 0514 116 (254.8)    05/09/25 1109 129 (285.3)    05/09/25 0600 118 (259.8)    05/08/25 0452 115 (253.1)            Pertinent Labs/Diagnostic Results:   Radiology:  XR ankle 3+ vw right   Final Interpretation by Sudheer Jasso MD (05/08 0916)      No acute osseous abnormality.         Computerized Assisted Algorithm (CAA) may have been used to analyze all applicable images.               Workstation performed: [id number]         CT spine cervical without contrast   Final Interpretation by Tian Hannon MD (05/07 1946)      No cervical spine fracture or traumatic malalignment.                  Workstation performed: [id number]         CTA chest abdomen pelvis w wo contrast   Final Interpretation by Johnson Escobedo MD (05/07 2001)       No evidence for pulmonary embolism.      No acute intra-abdominal pathology.      Redemonstrated findings of chronic pancreatitis as well as a 1.5 cm cyst versus focal ductal dilatation regional to the pancreatic head; again, further characterization with nonemergent dedicated pancreatic protocol MRI is advised in the outpatient    setting.               Workstation performed: [id number]         FL barium swallow video w speech    (Results Pending)     Cardiology:  ECG 12 lead   Final Result by Santa Greco MD (05/07 1757)   Normal sinus rhythm   Nonspecific T wave abnormality   When compared with ECG of 14-Apr-2025 18:43,   Premature ventricular complexes are no longer Present   Confirmed by Santa Greco ([id number]) on 5/7/2025 5:57:43 PM        GI:  No orders to display           Results from last 7 days   Lab Units 05/10/25  0511 05/09/25  0604 05/08/25  0448 05/07/25  1801   WBC Thousand/uL 14.74* 14.44* 12.39* 13.09*   HEMOGLOBIN g/dL 10.9* 11.7* 11.9* 12.5   HEMATOCRIT % 32.8* 35.3* 35.4* 37.6   PLATELETS Thousands/uL 310 287 253 288   TOTAL NEUT ABS Thousands/µL  --  11.75* 8.73* 8.83*         Results from last 7 days   Lab Units 05/10/25  0511 05/09/25  0604 05/08/25  0448 05/07/25  1801   SODIUM mmol/L 134* 134* 132* 133*   POTASSIUM mmol/L 3.6 3.7 3.5 3.7   CHLORIDE mmol/L 96 95* 93* 89*   CO2 mmol/L 31 31 28 34*   ANION GAP mmol/L 7 8 11 10   BUN mg/dL 31* 25 22 25   CREATININE mg/dL 1.17 1.18 1.06 1.65*   EGFR ml/min/1.73sq m 66 66 75 44   CALCIUM mg/dL 8.8 9.1 8.7 9.1   MAGNESIUM mg/dL  --   --  2.6 1.3*     Results from last 7 days   Lab Units 05/10/25  0511 05/09/25  0604 05/08/25 0448 05/07/25  1801   AST U/L 53* 24 25 31   ALT U/L 29 22 16 20   ALK PHOS U/L 108* 102 103 122*   TOTAL PROTEIN g/dL 6.8 7.0 6.8 7.8   ALBUMIN g/dL 3.3* 3.2* 3.4* 3.6   TOTAL BILIRUBIN mg/dL 0.43 0.70 1.61* 2.46*   BILIRUBIN DIRECT mg/dL  --   --  0.82*  --      Results from last 7 days   Lab Units 05/10/25  0703  05/09/25  2114 05/09/25  1616 05/09/25  1034 05/09/25  0709 05/08/25  2124 05/08/25  1635 05/08/25  1052 05/08/25  0722   POC GLUCOSE mg/dl 173* 309* 302* 264* 236* 248* 342* 183* 134     Results from last 7 days   Lab Units 05/10/25  0511 05/09/25  0604 05/08/25  0448 05/07/25  1801   GLUCOSE RANDOM mg/dL 192* 215* 138 183*             BETA-HYDROXYBUTYRATE   Date Value Ref Range Status   07/06/2020 0.2 <0.6 mmol/L Final                      Results from last 7 days   Lab Units 05/07/25 2010 05/07/25  1801   HS TNI 0HR ng/L  --  10   HS TNI 2HR ng/L 7  --    HSTNI D2 ng/L -3  --          Results from last 7 days   Lab Units 05/07/25  1801   PROTIME seconds 16.1*   INR  1.22*   PTT seconds 35*     Results from last 7 days   Lab Units 05/07/25  1801   TSH 3RD GENERATON uIU/mL 9.598*         Results from last 7 days   Lab Units 05/07/25  1801   LACTIC ACID mmol/L 1.4             Results from last 7 days   Lab Units 05/07/25  1802   BNP pg/mL 148*                                     Results from last 7 days   Lab Units 05/07/25  1805   CLARITY UA  Clear   COLOR UA  Yellow   SPEC GRAV UA  1.018   PH UA  5.5   GLUCOSE UA mg/dl 100 (1/10%)*   KETONES UA mg/dl 10 (1+)*   BLOOD UA  Negative   PROTEIN UA mg/dl Negative   NITRITE UA  Negative   BILIRUBIN UA  Small*   UROBILINOGEN UA (BE) mg/dl 2.0*   LEUKOCYTES UA  Negative                                 Results from last 7 days   Lab Units 05/07/25  1801   BLOOD CULTURE  No Growth at 48 hrs.  No Growth at 48 hrs.                   Network Utilization Review Department  ATTENTION: Please call with any questions or concerns to [phone] and carefully listen to the prompts so that you are directed to the right person. All voicemails are confidential.   For Discharge needs, contact Care Management DC Support Team at [phone] opt. [phone]  Send all requests for admission clinical reviews, approved or denied determinations and any other requests to dedicated fax number below  belonging to the campus where the patient is receiving treatment. List of dedicated fax numbers for the Facilities:  FACILITY NAME UR FAX NUMBER   ADMISSION DENIALS (Administrative/Medical Necessity) [phone]   DISCHARGE SUPPORT TEAM (NETWORK) [phone]   PARENT CHILD HEALTH (Maternity/NICU/Pediatrics) [phone]   [facility] [phone]   [facility] [phone]   Count includes the [facility] [phone]   [facility] [phone]   [facility] [phone]   [facility] [phone]   [facility] [phone]   [facility] [phone]   [facility] [phone]   Critical access hospital [phone]   [facility] [phone]   [facility] [phone]

## 2025-05-10 NOTE — HOSPITAL COURSE
Elly Chong is a 60 y.o. male with a PMH of type 2 diabetes not on insulin, hypertension, OA, HFpEF, tobacco and alcohol dependence on remission who presents with multiple complaints.  Endorsed generalized weakness, neck, back, shoulder and left arm pain that started approximately about 1 to 2 weeks ago.  Not associated with fevers or chills.  Denies any trauma, no falls.  Pain is worse upon mobility however gets better with rest and no relief with Tylenol.  Patient had a recent admission for CHF exacerbation at that time was switched from torsemide to Bumex which she has been compliant since then.  Given hypotension concerns blood pressure medication was put on hold.  Patient had not been taking his allopurinol for gout therefore he was continued on his PTA of colchicine 0.6 mg twice daily and started on a 5-day course of prednisone.  Patient's outpatient Ceftin and was held and he was started on Ancef inpatient for his cellulitis.  Patient's pain limited his mobility initially but as he received the colchicine, prednisone and antibiotics he gradually improved.  Patient's blood pressure was still noted to be on the lower side though remained within normal range.  He was given an dose of albumin 12.5 to improve his blood pressure in the context of his CHF history.  Patient responded well to this dose and his home PTA blood pressure medications were restarted.  Following a discussion with pharmacy was noted that Coreg can interfere with colchicine therefore colchicine dose was reduced to 0.3 mg daily which is recommended for a target dose of 0.6 mg twice daily of colchicine.  Patient was evaluated by physical therapy and determined to be a good candidate for home PT.  Patient was also found to have an abnormal TSH level, endocrinology was consulted and they recommended: For diabetes, continuing with Lantus 15 and started scheduled mealtime insulin Humalog 5 units before each meal. Continuing with correctional  insulin before meals and at bedtime. For the abnormal TSH to just follow up outpatient. As patient's pain, mobility and warmth in his lower extremities improved with treatment he was deemed stable for discharge.  He will follow-up outpatient with his cardiologist, he will continue allopurinol it was emphasized to him that it is very necessary to keep his gout under control and he will also follow-up with his PCP.

## 2025-05-10 NOTE — PROCEDURES
Video Swallow Study      Patient Name: Elly Chong  Today's Date: 5/10/2025      Assessment Summary: Pt presents with a functional-mild oropharyngeal dysphagia due to slow mastication, incomplete epiglottic inversion, and requiring multiple swallows to clear. Dysphagia may be related to diagnosis of DISH disease. No aspiration, though transient penetration present. Pt is alert and knowledgeable on tolerable consistencies.        Recommendations:  Diet: regular diet and thin liquids   Meds: as tolerated   Frequent Oral care: 2x/day  Compensatory swallowing strategies: upright posture, small bites/sips, effortful swallow, and alternating bites and sips  Other Recommendations/ considerations: No further ST follow up needed       General Information: 61 yo male who is a current inpatient at [facility] and being seen by ST. Initial evaluation completed 5/8/25, which indicated mild oral dysphagia and suspected pharyngeal dysphagia. ST recommended VBS due to complaints of globus sensation and needing multiple swallows to clear all consistencies.         Cognition: alert    Speech/Swallow Mech:   Oral motor movements appeared  WNL;   Dentition was  limited;   Cough was strong/productive.   Respiratory Status: RA;     Current diet: regular/thin liquids.    Prior VBS: none    Pt was seen in radiology for a Video Barium Swallow Study, seated in the upright position and viewed laterally with the following consistencies: puree, nectar thick by tsp/cup/straw, thin liquid by tsp/cup/straw, 1/2 cookie piece, barium pill with esophageal screen. Following [facility] feeding protocol without AP view assessment.       Results are as follows:     **Images are available for review on PACS           Oral Stage: mild impairment; though functional       Lip Closure: no labial escape  Tongue Control During Bolus Hold: cohesive bolus between tongue to palatal seal  Bolus  Preparation/Mastication: slow chewing/mashing with recollection  Bolus Transport/Lingual Motion: brisk tongue motion  Oral Residue: trace residue on oral structures  Velopharyngeal Closure: no bolus between soft palate and pharyngeal wall    Pharyngeal Stage: mild impairment; though functional      Initiation of the Pharyngeal Swallow: bolus head at posterior angle of ramus (first hyoid excursion)  Laryngeal Elevation: complete superior movement  Anterior Hyoid Excursion: complete anterior movement  Epiglottic Movement/Inversion: partial inversion  Laryngeal Vestibular Closure: complete; no air/contract in laryngeal vestibule  Pharyngeal Stripping Wave: present - diminished  Pharyngeal Contraction (from AP view): not assessed  Pharyngoesophageal segment Opening: complete distension and complete duration ; no obstruction of flow  Base of Tongue Retraction: mild contrast between tongue base and posterior pharyngeal wall  Pharyngeal residue: trace residue in valleculae and pyriform sinuses    Strategies Attempted and Efficacy: None      Penetration/Aspiration:  Thin: 1  Nectar: 1  Honey: not assessed  Puree: 1  Solid: 1  Response to Aspiration: no aspiration  Strategies/Efficacy: none    8-Point Penetration-Aspiration Scale   1 Material does not enter the airway   2 Material enters the airway, remains above the vocal folds, and is ejected  from the  airway    3 Material enters the airway, remains above the vocal folds, and is not ejected from the airway   4 Material enters the airway, contacts the vocal folds, and is ejected from the airway   5 Material enters the airway, contacts the vocal folds, and is not ejected from the airway    6 Material enters the airway, passes below the vocal folds and is ejected into the larynx or out of the airway    7 Material enters the airway, passes below the vocal folds, and is not ejected from the trachea despite effort    8 Material enters the airway, passes below the vocal folds,  and no effort is made to eject                Esophageal Stage: Brief esophageal screen with barium pill and thin liquid - pill stasis at lower esophageal sphincter, cleared with second sip using nectar thick liquid

## 2025-05-10 NOTE — ASSESSMENT & PLAN NOTE
Lab Results   Component Value Date    EGFR 66 05/10/2025    EGFR 66 05/09/2025    EGFR 75 05/08/2025    CREATININE 1.17 05/10/2025    CREATININE 1.18 05/09/2025    CREATININE 1.06 05/08/2025   Baseline seems to be 1.6-1.8  Stable and at baseline  Plan  BMP in a.m.

## 2025-05-10 NOTE — CASE MANAGEMENT
Case Management Assessment & Discharge Planning Note    Patient name Elly Chong  Location S /S -01 MRN [id number]  : 1965 Date 5/10/2025       Current Admission Date: 2025  Current Admission Diagnosis:Sepsis (HCC)   Patient Active Problem List    Diagnosis Date Noted Date Diagnosed    DISH (diffuse idiopathic skeletal hyperostosis) 2025     Isolated Hyperbilirubinemia 2025     Hyponatremia 2025     Sepsis (HCC) 2025     Abnormal TSH 2025     Acute on chronic low back pain 2025     Cellulitis 2025     Abnormal CT of the abdomen 2025     B12 deficiency 2025     Stage 3 chronic kidney disease (HCC) 04/15/2025     Chronic gout without tophus 2024     Encounter for immunization 2024     Intermittent asthma 2024     Mild asthma with acute exacerbation 2024     Current smoker 2024     Arthralgia 2024     AMANDO (obstructive sleep apnea) 2023     Reactive airway disease without complication 2023     Acute midline low back pain without sciatica 2023     Diabetes mellitus ([facility]) 2022     Impingement syndrome of left shoulder 2022     Arthritis of right wrist 2021     Onychomycosis 2020     Alcohol dependence in remission ([facility]) 2020     Chronic heart failure with preserved ejection fraction ([facility]) 2020     Medical non-compliance 10/21/2019     Microalbuminuria 2015     Type 2 diabetes mellitus with hyperglycemia ([facility]) 2014     Elevated lipoprotein(a) 2013     Morbid obesity due to excess calories ([facility]) 2013     Essential hypertension 2012       LOS (days): 0  Geometric Mean LOS (GMLOS) (days):   Days to GMLOS:     OBJECTIVE:              Current admission status: Observation       Preferred Pharmacy:   [facility]/pharmacy #[id number] [facility] - [address]  [address] [place] [address]  Phone: [phone] Fax:  [phone]    [facility] [place] - [address]  [address] [address]  Phone: [phone] Fax: [phone]    Primary Care Provider: Linden Roque MD    Primary Insurance: [employer]  Secondary Insurance:     ASSESSMENT:    Readmission Root Cause  30 Day Readmission: No    Patient Information  Admitted from:: Home  Mental Status: Alert  During Assessment patient was accompanied by: Not accompanied during assessment  Assessment information provided by:: Patient  Primary Caregiver: Self  Support Systems: Spouse/significant other, Family members  County of Residence: [place]  What city do you live in?: [place]  Home entry access options. Select all that apply.: Stairs  Number of steps to enter home.: 3  Do the steps have railings?: Yes  Type of Current Residence: [facility]  Living Arrangements: Lives w/ Spouse/significant other, Lives w/ Son  Is patient a ?: No    Activities of Daily Living Prior to Admission  Functional Status: Independent  Completes ADLs independently?: Yes  Ambulates independently?: Yes  Does patient use assisted devices?: No  Does patient currently own DME?: Yes  What DME does the patient currently own?: Straight Cane, Walker  Does patient have a history of Outpatient Therapy (PT/OT)?: No  Does the patient have a history of Short-Term Rehab?: No  Does patient have a history of HHC?: No  Does patient currently have HHC?: No    Patient Information Continued  Income Source: Employed  Does patient have prescription coverage?: Yes  Can the patient afford their medications and any related supplies (such as glucometers or test strips)?: Yes  Does patient receive dialysis treatments?: No  Does patient have a history of substance abuse?: Yes  Historical substance use preference: Alcohol/ETOH  History of Withdrawal Symptoms: Denies past symptoms  Is patient currently in treatment for substance abuse?: No. Patient declined treatment information.  Does  patient have a history of Mental Health Diagnosis?: No    Means of Transportation  [facility] Self    DISCHARGE DETAILS:    Discharge planning discussed with:: Patient  Freedom of Choice: Yes     CM contacted family/caregiver?: No- see comments (declined call at this time)  Were Treatment Team discharge recommendations reviewed with patient/caregiver?: Yes  Did patient/caregiver verbalize understanding of patient care needs?: Yes  Were patient/caregiver advised of the risks associated with not following Treatment Team discharge recommendations?: Yes    Requested Home Health Care         Is the patient interested in HHC at discharge?: No    DME Referral Provided  Referral made for DME?: No    Would you like to participate in our [facility] service program?  : No - Declined    Treatment Team Recommendation: Home, Outpatient Rehab  Discharge Destination Plan:: Home, Outpatient Rehab  Transport at Discharge : Family

## 2025-05-10 NOTE — ASSESSMENT & PLAN NOTE
Polyarthralgia suspect multifactorial in nature suspect Acute gout given swelling Left mcp which usually gouty location vs OA vs potentially viral component such as Chikingunya/dengue as recent [place] fortunately no other organ involement no cytopenias los suspicious RA as biomarkes negative in the past.  CT Spine notable for DISH  Rapid COVID/Flu neg  Patient has improvement in pain rates it a 5 out of 10 today    Plan  Changed PTA colchicine from 0.6 mg twice daily to 0.3 mg daily secondary to interaction with patient's Coreg  Discussed with pharmacy  Uric Acid added to labs  Started again on Allopurinol this was not taken per spouse goal uric acid <6 can be recheck outpatient by PCP  Start Prednisone 40mg QD x 5 days as due to CKD unable to try NSAIDS- on day 3/5  Continue supportive care Tylenol

## 2025-05-10 NOTE — ASSESSMENT & PLAN NOTE
Wt Readings from Last 3 Encounters:   05/10/25 116 kg (254 lb 12.8 oz)   05/01/25 121 kg (267 lb 6 oz)   04/23/25 121 kg (266 lb 9.6 oz)   Volume down on exam  PTA on Bumex 2mg BID started on last admission  Aldactone 25g QD  Coreg 12.5mg BID  Plan  Restarted Bumex for now due to dry on exam and bordeline BP consider decrease Bumex 1 mg BID  Restarted Aldactone resume in the next 24-48 hrs  H restarted Coreg for now as above  Daily weights standing  Strict I/O

## 2025-05-10 NOTE — QUICK NOTE
Patient chart reviewed, not seen:  24-hour glucose log reviewed-fasting glucose within inpatient goal.  Significant postprandial hyperglycemia.  No hypoglycemic events documented.    For Diabetes mellitus type 2:  Continuing Lantus 15 at bedtime  Start scheduled mealtime insulin Humalog 5 units before each meal.  Continuing with correctional insulin before meals and at bedtime via algorithm 4.   Carb controlled diet  Accu-Cheks 4 times daily    For abnormal TSH:  Unsuppressed TSH, with inappropriately elevated free T4, below normal total T3 are consistent with euthyroid sick syndrome.  At this time, reverse T3 is pending.  TPO and thyroglobulin antibodies are negative  We will continue to follow labs  Medical therapy is not indicated at this time  In the outpatient setting, repeating TSH, free T4, and total T3 in 6-8 weeks by PCP as recommended

## 2025-05-10 NOTE — PROGRESS NOTES
Progress Note - Hospitalist   Name: Elly Chong 60 y.o. male I MRN: [id number]  Unit/Bed#: S -01 I Date of Admission: 5/7/2025   Date of Service: 5/10/2025 I Hospital Day: 0    Assessment & Plan  Sepsis (HCC)  Sepsis POA due to leukocytosis 13.09 tachypnea 22 suspected cellulitis  PTA on Ceftin for wound in ankle until 5/11 now with LLE blister however the blister does not seem to be infected  CTA CAP negative for other source of infection no other associated symptoms  AT the ED started on CTX + Vanco    Plan  Hold PTA Ceftin  Continue with Ancef 2g Q 8hrs, no hx MRSA  CBC am  Monitor hemodynamics  Restarted patient's blood pressure medications as vitals have improved  Cellulitis  PTA on Ceftin due to right ankle wound  Started on ceftriaxone and vancomycin at the ED due to sepsis suspected source ongoing cellulitis    Plan  Transition to Ancef 2 g every 8 hours as no history of MRSA  Hold PTA Ceftin as above  Wound care consulted appreciate assistance  Xray: Showing no osseous abnormality  Arthralgia  Polyarthralgia suspect multifactorial in nature suspect Acute gout given swelling Left mcp which usually gouty location vs OA vs potentially viral component such as Chikingunya/dengue as recent traveling to the [facility] fortunately no other organ involement no cytopenias los suspicious RA as biomarkes negative in the past.  CT Spine notable for DISH  Rapid COVID/Flu neg  Patient has improvement in pain rates it a 5 out of 10 today    Plan  Changed PTA colchicine from 0.6 mg twice daily to 0.3 mg daily secondary to interaction with patient's Coreg  Discussed with pharmacy  Uric Acid added to labs  Started again on Allopurinol this was not taken per spouse goal uric acid <6 can be recheck outpatient by PCP  Start Prednisone 40mg QD x 5 days as due to CKD unable to try NSAIDS- on day 3/5  Continue supportive care Tylenol    Chronic gout without tophus  Continue PTA colchicine, restart Allopurinol  Rest of plan  as above  Hyponatremia  POA Na 133 corrected for glucose 193 Na 134  Likely due to poor po intake dehydration  Plan  Gentle hydration for now, encourage PO intake  Holding diuretics due to borderline BP  Isolated Hyperbilirubinemia  Noted  Likely due to know pancreatic cyst vs pancreatic duct dilation  Plan  Hepatic function panel AM  Chronic heart failure with preserved ejection fraction (HCC)  Wt Readings from Last 3 Encounters:   05/10/25 116 kg (254 lb 12.8 oz)   05/01/25 121 kg (267 lb 6 oz)   04/23/25 121 kg (266 lb 9.6 oz)   Volume down on exam  PTA on Bumex 2mg BID started on last admission  Aldactone 25g QD  Coreg 12.5mg BID  Plan  Restarted Bumex for now due to dry on exam and bordeline BP consider decrease Bumex 1 mg BID  Restarted Aldactone resume in the next 24-48 hrs  H restarted Coreg for now as above  Daily weights standing  Strict I/O  Essential hypertension  Blood Pressure: 141/78  Stable after IVF and further improved by a dose of albumin 12.5  POA bordeline P 90s  Plan  Holding antihypertensive for now at this to be resumed as needed  Check ortho BP    Type 2 diabetes mellitus with hyperglycemia (HCC)  Lab Results   Component Value Date    HGBA1C 6.4 03/13/2025       Recent Labs     05/09/25  0709 05/09/25  1034 05/09/25  1616 05/09/25  2114   POCGLU 236* 264* 302* 309*       Blood Sugar Average: Last 72 hrs:  History uncontrolled diabetes with neuropathy  (P) 252.25PTA on glimepiride 1 mg twice daily  Plan  Continue sliding scale  Continue carbohydrate controlled diet  Repeat HbA1c outpatient 1-3 months acceptable ranges thus far      Alcohol dependence in remission (HCC)  Alcohol dependence in remission used to drink daily 3 beers and 2 shots stopped during last admission and we congratulated patient.  Adequate support at home  AMANDO (obstructive sleep apnea)  History of AMANDO in the past recommended to use CPAP no longer usage  O2 nasal cannula while sleeping  Stage 3 chronic kidney disease  ([facility])  Lab Results   Component Value Date    EGFR 66 05/10/2025    EGFR 66 05/09/2025    EGFR 75 05/08/2025    CREATININE 1.17 05/10/2025    CREATININE 1.18 05/09/2025    CREATININE 1.06 05/08/2025   Baseline seems to be 1.6-1.8  Stable and at baseline  Plan  BMP in a.m.  Abnormal TSH  TSH elevated  Follow up t4 pending levels consider replacement vs repeat outpatient  Repeat 4-6 weeks outpatient  Acute on chronic low back pain  No sciatica worse R>L no cauda equina  Plan  Continue Tylenol ELIANE  Unable to use NSAIDs  Hold of Robaxin due to PTA lightheadedness  Started on Steroids for acute gout  Hold off gabapentin as no sciatica present   DISH (diffuse idiopathic skeletal hyperostosis)  Will need outpatient follow-up with orthopedics    VTE Pharmacologic Prophylaxis: VTE Score: 5 High Risk (Score >/= 5) - Pharmacological DVT Prophylaxis Ordered: enoxaparin (Lovenox). Sequential Compression Devices Ordered.    Mobility:   Basic Mobility Inpatient Raw Score: 18  JH-HLM Goal: 6: Walk 10 steps or more  JH-HLM Achieved: 7: Walk 25 feet or more  JH-HLM Goal NOT achieved. Continue with multidisciplinary rounding and encourage appropriate mobility to improve upon JH-HLM goals.    Patient Centered Rounds: I performed bedside rounds with nursing staff today.    Discussions with Specialists or Other Care Team Provider: None    Education and Discussions with Family / Patient: Updated  (wife) via phone.    Current Length of Stay: 0 day(s)  Current Patient Status: Observation   Certification Statement: The patient will continue to require additional inpatient hospital stay due to evaluation and treatment  Discharge Plan: Anticipate discharge tomorrow to home.    Code Status: Level 1 - Full Code    Subjective   Patient seen resting in chair this morning.  He reports feeling marginally improved from yesterday.  He reports that his pain is down to a 5 out of 10.  He also reports that he feels like walking around which  is new since he has felt ill and a good sign that his energy has improved.  He also reports regaining his appetite.  His blood pressure responded well to a dose of albumin yesterday so we discussed restarting all of his home medications.  He reports no new acute symptomology.     Objective :  Temp:  [98.2 °F (36.8 °C)-98.3 °F (36.8 °C)] 98.2 °F (36.8 °C)  HR:  [74-82] 82  BP: (119-141)/(68-87) 141/78  Resp:  [18] 18  SpO2:  [92 %-99 %] 92 %  O2 Device: None (Room air)    Body mass index is 37.63 kg/m².     Input and Output Summary (last 24 hours):     Intake/Output Summary (Last 24 hours) at 5/10/2025 0648  Last data filed at 5/10/2025 0217  Gross per 24 hour   Intake 660 ml   Output 850 ml   Net -190 ml       Physical Exam  Vitals and nursing note reviewed.   Constitutional:       General: He is not in acute distress.     Appearance: He is well-developed.   HENT:      Head: Normocephalic and atraumatic.      Mouth/Throat:      Mouth: Mucous membranes are dry.   Eyes:      Conjunctiva/sclera: Conjunctivae normal.   Neck:      Comments: Continued improvement in tenderness    Cardiovascular:      Rate and Rhythm: Normal rate and regular rhythm.      Heart sounds: No murmur heard.  Pulmonary:      Effort: Pulmonary effort is normal. No respiratory distress.      Breath sounds: Normal breath sounds. No rales.   Abdominal:      General: Bowel sounds are normal. There is no distension.      Palpations: Abdomen is soft.      Tenderness: There is no abdominal tenderness. There is no guarding.   Musculoskeletal:         General: Tenderness present. No swelling or deformity.      Cervical back: Neck supple.      Right lower leg: No edema.      Left lower leg: No edema.   Skin:     General: Skin is warm and dry.      Capillary Refill: Capillary refill takes less than 2 seconds.      Findings: Lesion present.      Comments: Dryness improved   Neurological:      Mental Status: He is alert.   Psychiatric:         Mood and Affect:  Mood normal.         Thought Content: Thought content normal.                     Lab Results: I have reviewed the following results:   Results from last 7 days   Lab Units 05/10/25  0511 05/09/25  0604   WBC Thousand/uL 14.74* 14.44*   HEMOGLOBIN g/dL 10.9* 11.7*   HEMATOCRIT % 32.8* 35.3*   PLATELETS Thousands/uL 310 287   SEGS PCT %  --  82*   LYMPHO PCT %  --  11*   MONO PCT %  --  7   EOS PCT %  --  0     Results from last 7 days   Lab Units 05/10/25  0511   SODIUM mmol/L 134*   POTASSIUM mmol/L 3.6   CHLORIDE mmol/L 96   CO2 mmol/L 31   BUN mg/dL 31*   CREATININE mg/dL 1.17   ANION GAP mmol/L 7   CALCIUM mg/dL 8.8   ALBUMIN g/dL 3.3*   TOTAL BILIRUBIN mg/dL 0.43   ALK PHOS U/L 108*   ALT U/L 29   AST U/L 53*   GLUCOSE RANDOM mg/dL 192*     Results from last 7 days   Lab Units 05/07/25  1801   INR  1.22*     Results from last 7 days   Lab Units 05/09/25  2114 05/09/25  1616 05/09/25  1034 05/09/25  0709 05/08/25  2124 05/08/25  1635 05/08/25  1052 05/08/25  0722   POC GLUCOSE mg/dl 309* 302* 264* 236* 248* 342* 183* 134         Results from last 7 days   Lab Units 05/07/25  1801   LACTIC ACID mmol/L 1.4       Recent Cultures (last 7 days):   Results from last 7 days   Lab Units 05/07/25  1801   BLOOD CULTURE  No Growth at 48 hrs.  No Growth at 48 hrs.             Last 24 Hours Medication List:     Current Facility-Administered Medications:     acetaminophen (TYLENOL) tablet 975 mg, Q8H ELIANE    albuterol (PROVENTIL HFA,VENTOLIN HFA) inhaler 1 puff, Daily    allopurinol (ZYLOPRIM) tablet 100 mg, Daily    [Held by provider] bumetanide (BUMEX) tablet 2 mg, BID    [Held by provider] carvedilol (COREG) tablet 12.5 mg, BID With Meals    ceFAZolin (ANCEF) IVPB (premix in dextrose) 2,000 mg 50 mL, Q8H, Last Rate: 2,000 mg (05/10/25 0602)    colchicine (COLCRYS) tablet 0.6 mg, BID    cyanocobalamin (VITAMIN B-12) tablet 1,000 mcg, Daily    enoxaparin (LOVENOX) subcutaneous injection 40 mg, Daily    famotidine (PEPCID)  tablet 20 mg, HS    fluticasone-vilanterol 200-25 mcg/actuation 1 puff, Daily    insulin glargine (LANTUS) subcutaneous injection 15 Units 0.15 mL, HS    insulin lispro (HumALOG/ADMELOG) 100 units/mL subcutaneous injection 2-12 Units, 4x Daily (AC & HS) **AND** Fingerstick Glucose (POCT), 4x Daily AC and at bedtime    lidocaine (LIDODERM) 5 % patch 1 patch, Daily    methocarbamol (ROBAXIN) tablet 500 mg, Q6H PRN    pantoprazole (PROTONIX) EC tablet 40 mg, Daily Before Breakfast    polyethylene glycol (MIRALAX) packet 17 g, Daily PRN    potassium chloride (Klor-Con M20) CR tablet 40 mEq, Once    predniSONE tablet 40 mg, Daily    [Held by provider] spironolactone (ALDACTONE) tablet 25 mg, Daily    Administrative Statements   Today, Patient Was Seen By: Angelo Ribeiro MD      **Please Note: This note may have been constructed using a voice recognition system.**

## 2025-05-10 NOTE — PLAN OF CARE
Problem: PAIN - ADULT  Goal: Verbalizes/displays adequate comfort level or baseline comfort level  Description: Interventions:- Encourage patient to monitor pain and request assistance- Assess pain using appropriate pain scale- Administer analgesics based on type and severity of pain and evaluate response- Implement non-pharmacological measures as appropriate and evaluate response- Consider cultural and social influences on pain and pain management- Notify physician/advanced practitioner if interventions unsuccessful or patient reports new pain  Outcome: Progressing     Problem: Potential for Falls  Goal: Patient will remain free of falls  Description: INTERVENTIONS:- Educate patient/family on patient safety including physical limitations- Instruct patient to call for assistance with activity - Consult OT/PT to assist with strengthening/mobility - Keep Call bell within reach- Keep bed low and locked with side rails adjusted as appropriate- Keep care items and personal belongings within reach- Initiate and maintain comfort rounds- Make Fall Risk Sign visible to staff- Offer Toileting every 2 Hours, in advance of need- Initiate/Maintain bed/chair alarm- Obtain necessary fall risk management equipment- Apply yellow socks and bracelet for high fall risk patients- Consider moving patient to room near nurses station  Outcome: Progressing     Problem: Nutrition/Hydration-ADULT  Goal: Nutrient/Hydration intake appropriate for improving, restoring or maintaining nutritional needs  Description: Monitor and assess patient's nutrition/hydration status for malnutrition. Collaborate with interdisciplinary team and initiate plan and interventions as ordered.  Monitor patient's weight and dietary intake as ordered or per policy. Utilize nutrition screening tool and intervene as necessary. Determine patient's food preferences and provide high-protein, high-caloric foods as appropriate. INTERVENTIONS:- Monitor oral intake, urinary  output, labs, and treatment plans- Assess nutrition and hydration status and recommend course of action- Evaluate amount of meals eaten- Assist patient with eating if necessary - Allow adequate time for meals- Recommend/ encourage appropriate diets, oral nutritional supplements, and vitamin/mineral supplements- Order, calculate, and assess calorie counts as needed- Recommend, monitor, and adjust tube feedings and TPN/PPN based on assessed needs- Assess need for intravenous fluids- Provide specific nutrition/hydration education as appropriate- Include patient/family/caregiver in decisions related to nutrition  Outcome: Progressing

## 2025-05-11 VITALS
DIASTOLIC BLOOD PRESSURE: 75 MMHG | WEIGHT: 255.5 LBS | SYSTOLIC BLOOD PRESSURE: 118 MMHG | OXYGEN SATURATION: 92 % | HEIGHT: 69 IN | TEMPERATURE: 98.2 F | BODY MASS INDEX: 37.84 KG/M2 | HEART RATE: 80 BPM | RESPIRATION RATE: 18 BRPM

## 2025-05-11 PROBLEM — D64.9 ANEMIA: Status: ACTIVE | Noted: 2025-05-11

## 2025-05-11 PROBLEM — L03.90 CELLULITIS: Status: RESOLVED | Noted: 2025-05-01 | Resolved: 2025-05-11

## 2025-05-11 PROBLEM — E80.6 HYPERBILIRUBINEMIA: Status: RESOLVED | Noted: 2025-05-07 | Resolved: 2025-05-11

## 2025-05-11 PROBLEM — A41.9 SEPSIS (HCC): Status: RESOLVED | Noted: 2025-05-07 | Resolved: 2025-05-11

## 2025-05-11 PROBLEM — E87.1 HYPONATREMIA: Status: RESOLVED | Noted: 2025-05-07 | Resolved: 2025-05-11

## 2025-05-11 LAB
ANION GAP SERPL CALCULATED.3IONS-SCNC: 6 MMOL/L (ref 4–13)
BUN SERPL-MCNC: 36 MG/DL (ref 5–25)
CALCIUM SERPL-MCNC: 8.6 MG/DL (ref 8.4–10.2)
CHLORIDE SERPL-SCNC: 97 MMOL/L (ref 96–108)
CO2 SERPL-SCNC: 34 MMOL/L (ref 21–32)
CREAT SERPL-MCNC: 1.28 MG/DL (ref 0.6–1.3)
ERYTHROCYTE [DISTWIDTH] IN BLOOD BY AUTOMATED COUNT: 12.8 % (ref 11.6–15.1)
GFR SERPL CREATININE-BSD FRML MDRD: 60 ML/MIN/1.73SQ M
GLUCOSE SERPL-MCNC: 155 MG/DL (ref 65–140)
GLUCOSE SERPL-MCNC: 163 MG/DL (ref 65–140)
GLUCOSE SERPL-MCNC: 222 MG/DL (ref 65–140)
HCT VFR BLD AUTO: 33.1 % (ref 36.5–49.3)
HGB BLD-MCNC: 10.9 G/DL (ref 12–17)
MCH RBC QN AUTO: 31.6 PG (ref 26.8–34.3)
MCHC RBC AUTO-ENTMCNC: 32.9 G/DL (ref 31.4–37.4)
MCV RBC AUTO: 96 FL (ref 82–98)
PLATELET # BLD AUTO: 302 THOUSANDS/UL (ref 149–390)
PMV BLD AUTO: 10.5 FL (ref 8.9–12.7)
POTASSIUM SERPL-SCNC: 3.5 MMOL/L (ref 3.5–5.3)
RBC # BLD AUTO: 3.45 MILLION/UL (ref 3.88–5.62)
SODIUM SERPL-SCNC: 137 MMOL/L (ref 135–147)
WBC # BLD AUTO: 12.97 THOUSAND/UL (ref 4.31–10.16)

## 2025-05-11 PROCEDURE — 85027 COMPLETE CBC AUTOMATED: CPT

## 2025-05-11 PROCEDURE — 82948 REAGENT STRIP/BLOOD GLUCOSE: CPT

## 2025-05-11 PROCEDURE — 80048 BASIC METABOLIC PNL TOTAL CA: CPT

## 2025-05-11 PROCEDURE — 99232 SBSQ HOSP IP/OBS MODERATE 35: CPT | Performed by: INTERNAL MEDICINE

## 2025-05-11 PROCEDURE — 99239 HOSP IP/OBS DSCHRG MGMT >30: CPT | Performed by: INTERNAL MEDICINE

## 2025-05-11 RX ORDER — POLYETHYLENE GLYCOL 3350 17 G/17G
17 POWDER, FOR SOLUTION ORAL DAILY PRN
Qty: 10 EACH | Refills: 0 | Status: SHIPPED | OUTPATIENT
Start: 2025-05-11

## 2025-05-11 RX ORDER — METHOCARBAMOL 500 MG/1
500 TABLET, FILM COATED ORAL EVERY 6 HOURS PRN
Qty: 10 TABLET | Refills: 0 | Status: SHIPPED | OUTPATIENT
Start: 2025-05-11

## 2025-05-11 RX ORDER — LIDOCAINE 50 MG/G
1 PATCH TOPICAL DAILY
Qty: 10 PATCH | Refills: 0 | Status: SHIPPED | OUTPATIENT
Start: 2025-05-12

## 2025-05-11 RX ADMIN — PREDNISONE 40 MG: 20 TABLET ORAL at 08:15

## 2025-05-11 RX ADMIN — INSULIN LISPRO 2 UNITS: 100 INJECTION, SOLUTION INTRAVENOUS; SUBCUTANEOUS at 08:15

## 2025-05-11 RX ADMIN — INSULIN LISPRO 5 UNITS: 100 INJECTION, SOLUTION INTRAVENOUS; SUBCUTANEOUS at 08:15

## 2025-05-11 RX ADMIN — ALBUTEROL SULFATE 1 PUFF: 90 AEROSOL, METERED RESPIRATORY (INHALATION) at 08:16

## 2025-05-11 RX ADMIN — INSULIN LISPRO 4 UNITS: 100 INJECTION, SOLUTION INTRAVENOUS; SUBCUTANEOUS at 11:42

## 2025-05-11 RX ADMIN — CARVEDILOL 12.5 MG: 12.5 TABLET, FILM COATED ORAL at 08:15

## 2025-05-11 RX ADMIN — CEFAZOLIN SODIUM 2000 MG: 2 SOLUTION INTRAVENOUS at 06:44

## 2025-05-11 RX ADMIN — FLUTICASONE FUROATE AND VILANTEROL TRIFENATATE 1 PUFF: 200; 25 POWDER RESPIRATORY (INHALATION) at 08:16

## 2025-05-11 RX ADMIN — INSULIN LISPRO 5 UNITS: 100 INJECTION, SOLUTION INTRAVENOUS; SUBCUTANEOUS at 11:43

## 2025-05-11 RX ADMIN — ACETAMINOPHEN 975 MG: 325 TABLET, FILM COATED ORAL at 06:44

## 2025-05-11 RX ADMIN — ALLOPURINOL 100 MG: 100 TABLET ORAL at 08:15

## 2025-05-11 RX ADMIN — BUMETANIDE 2 MG: 1 TABLET ORAL at 08:14

## 2025-05-11 RX ADMIN — COLCHICINE 0.3 MG: 0.6 TABLET ORAL at 08:14

## 2025-05-11 RX ADMIN — PANTOPRAZOLE SODIUM 40 MG: 40 TABLET, DELAYED RELEASE ORAL at 06:44

## 2025-05-11 RX ADMIN — LIDOCAINE 1 PATCH: 700 PATCH TOPICAL at 08:14

## 2025-05-11 RX ADMIN — CYANOCOBALAMIN TAB 500 MCG 1000 MCG: 500 TAB at 08:14

## 2025-05-11 NOTE — DISCHARGE INSTR - AVS FIRST PAGE
Dear Elly Chong,     It was our pleasure to care for you here at [facility].  It is our hope that we were always able to exceed the expected standards for your care during your stay.  You were hospitalized due to cellulitis.  You were cared for on the MedSurg floor by Teo Baird MD under the service of Virgilio Bauer* with the [facility] who covers for your primary care physician (PCP), Linden Roque MD, while you were hospitalized.  If you have any questions or concerns related to this hospitalization, you may contact us at .  For follow up as well as any medication refills, we recommend that you follow up with your primary care physician.  A registered nurse will reach out to you by phone within a few days after your discharge to answer any additional questions that you may have after going home.  However, at this time we provide for you here, the most important instructions / recommendations at discharge:     Notable Medication Adjustments -   Please discontinue taking colchicine for now.  Please have your prescriber/PCP reconvene with you on this.  Please restart taking allopurinol 100 mg daily.  Restart taking vitamin B12 daily.  You may also use lidocaine patch at the the location of tenderness.  Please use 500 mg of Robaxin (1 tablets) for muscle spasms not exceeding 4 tablets in a day.  Please restart taking Januvia along with Amaryl for your diabetes.  Testing Required after Discharge -   None  ** Please contact your PCP to request testing orders for any of the testing recommended here **  Important follow up information -   Please follow-up with the PCP within 1 week of discharge.  Please follow-up with your orthopedic surgeon.  Please follow-up with endocrinology for your thyroid issue.  They will repeat thyroid function test about 4 to 6 weeks from now.  Other Instructions -   We hope you feel better soon. If your  symptoms worsen, please call 911 immediately or go to the nearest Emergency Department.   Please review this entire after visit summary as additional general instructions including medication list, appointments, activity, diet, any pertinent wound care, and other additional recommendations from your care team that may be provided for you.      Sincerely,     Teo Baird MD

## 2025-05-11 NOTE — ASSESSMENT & PLAN NOTE
Sepsis POA due to leukocytosis 13.09 tachypnea 22 suspected cellulitis  PTA on Ceftin for wound in ankle until 5/11 now with LLE blister however the blister does not seem to be infected  CTA CAP negative for other source of infection no other associated symptoms  AT the ED started on CTX + Vanco    Plan  Patient completed antibiotic course.

## 2025-05-11 NOTE — DISCHARGE SUMMARY
Discharge Summary - Hospitalist   Name: Elly Chong 60 y.o. male I MRN: [id number]  Unit/Bed#: S -01 I Date of Admission: 5/7/2025   Date of Service: 5/11/2025 I Hospital Day: 1     Assessment & Plan  Sepsis (HCC) (Resolved: 5/11/2025)  Sepsis POA due to leukocytosis 13.09 tachypnea 22 suspected cellulitis  PTA on Ceftin for wound in ankle until 5/11 now with LLE blister however the blister does not seem to be infected  CTA CAP negative for other source of infection no other associated symptoms  AT the ED started on CTX + Vanco    Plan  Patient completed antibiotic course.  Cellulitis (Resolved: 5/11/2025)  PTA on Ceftin due to right ankle wound  Started on ceftriaxone and vancomycin at the ED due to sepsis suspected source ongoing cellulitis    Plan  Patient has completed antibiotic course.  Arthralgia  Polyarthralgia suspect multifactorial in nature suspect Acute gout given swelling Left mcp which usually gouty location vs OA vs potentially viral component such as Ch[place]unya/dengue as recent traveling to the [facility] fortunately no other organ involement no cytopenias los suspicious RA as biomarkes negative in the past.  CT Spine notable for DISH  Rapid COVID/Flu neg  Patient has improvement in pain rates it a 5 out of 10 today    Plan  Patient completed prednisone burst regimen.  To be discharged home with maintenance allopurinol 100 mg.    Chronic gout without tophus  Discontinue colchicine, restart Allopurinol  Rest of plan as above  Chronic heart failure with preserved ejection fraction (HCC)  Wt Readings from Last 3 Encounters:   05/11/25 116 kg (255 lb 8 oz)   05/01/25 121 kg (267 lb 6 oz)   04/23/25 121 kg (266 lb 9.6 oz)   Volume down on exam  PTA on Bumex 2mg BID started on last admission  Aldactone 25g QD  Coreg 12.5mg BID  Plan  Restarted Bumex for now due to dry on exam and bordeline BP consider decrease Bumex 1 mg BID  Spironolactone to be restarted.    Essential hypertension  Blood  Pressure: 118/75  Stable after IVF and further improved by a dose of albumin 12.5  POA bordeline P 90s    Type 2 diabetes mellitus with hyperglycemia (HCC)  Lab Results   Component Value Date    HGBA1C 6.4 03/13/2025       Recent Labs     05/10/25  1548 05/10/25  2039 05/11/25  0738 05/11/25  1105   POCGLU 332* 323* 155* 222*       Blood Sugar Average: Last 72 hrs:  Patient to continue home dose of Amaryl and Januvia.  Patient to follow-up with endocrinology as an outpatient.      Alcohol dependence in remission (HCC)  Alcohol dependence in remission used to drink daily 3 beers and 2 shots stopped during last admission and we congratulated patient.  Adequate support at home  AMANDO (obstructive sleep apnea)  History of AMANDO in the past recommended to use CPAP no longer usage  O2 nasal cannula while sleeping  Stage 3 chronic kidney disease (HCC)  Lab Results   Component Value Date    EGFR 60 05/11/2025    EGFR 66 05/10/2025    EGFR 66 05/09/2025    CREATININE 1.28 05/11/2025    CREATININE 1.17 05/10/2025    CREATININE 1.18 05/09/2025   Baseline seems to be 1.6-1.8  Stable and at baseline  Plan  BMP in a.m.  Abnormal TSH  TSH elevated  Follow up t4 pending levels consider replacement vs repeat outpatient  Repeat 4-6 weeks outpatient follow-up with endocrinology.  Acute on chronic low back pain  No sciatica worse R>L no cauda equina  Plan  Continue Tylenol ELIANE  Unable to use NSAIDs  Hold of Robaxin due to PTA lightheadedness  Started on Steroids for acute gout  Hold off gabapentin as no sciatica present   DISH (diffuse idiopathic skeletal hyperostosis)  Will need outpatient follow-up with orthopedics     Medical Problems       Resolved Problems  Date Reviewed: 5/11/2025          Resolved    Hypomagnesemia 5/9/2025     Resolved by  Virgilio Thakur MD    Cellulitis 5/11/2025     Resolved by  Teo Baird MD    Leucocytosis 5/7/2025     Resolved by  Ana Cristina Akhtar MD    Isolated Hyperbilirubinemia  5/11/2025     Resolved by  Virgilio Thakur MD    Hyponatremia 5/11/2025     Resolved by  Virgilio Thakur MD    * (Principal) Sepsis (HCC) 5/11/2025     Resolved by  Teo Baird MD        Discharging Physician / Practitioner: Teo Baird MD  PCP: Linden Roque MD  Admission Date:   Admission Orders (From admission, onward)       Ordered        05/10/25 1356  INPATIENT ADMISSION  Once            05/07/25 2059  Place in Observation  Once                          Discharge Date: 05/11/25    Consultations During Hospital Stay:  Endocrinology    Procedures Performed:   None    Significant Findings / Test Results:   FL barium swallow video w speech   Final Result by SYSTEMGENERATED, DOCUMENTATION (05/10 1512)      FL barium swallow video w speech   Final Result by  (05/10 1647)      XR ankle 3+ vw right   Final Result by Sudheer Jasso MD (05/08 0916)      No acute osseous abnormality.         Computerized Assisted Algorithm (CAA) may have been used to analyze all applicable images.               Workstation performed: [id number]         CT spine cervical without contrast   Final Result by Tian Hannon MD (05/07 1946)      No cervical spine fracture or traumatic malalignment.                  Workstation performed: [id number]         CTA chest abdomen pelvis w wo contrast   Final Result by Johnson Escobedo MD (05/07 2001)      No evidence for pulmonary embolism.      No acute intra-abdominal pathology.      Redemonstrated findings of chronic pancreatitis as well as a 1.5 cm cyst versus focal ductal dilatation regional to the pancreatic head; again, further characterization with nonemergent dedicated pancreatic protocol MRI is advised in the outpatient    setting.               Workstation performed: [id number]              Incidental Findings:   None    Test Results Pending at Discharge (will require follow up):   None     Outpatient Tests Requested:  None    Complications:  None    Reason for Admission: Cellulitis     Hospital Course:   Elly Chong is a 60 y.o. male patient who originally presented to the hospital on 5/7/2025 due to cellulitis    Elly Chong is a 60 y.o. male with a PMH of type 2 diabetes not on insulin, hypertension, OA, HFpEF, tobacco and alcohol dependence on remission who presents with multiple complaints.  Endorsed generalized weakness, neck, back, shoulder and left arm pain that started approximately about 1 to 2 weeks ago.  Not associated with fevers or chills.  Denies any trauma, no falls.  Pain is worse upon mobility however gets better with rest and no relief with Tylenol.  Patient had a recent admission for CHF exacerbation at that time was switched from torsemide to Bumex which she has been compliant since then.  Given hypotension concerns blood pressure medication was put on hold.  Patient had not been taking his allopurinol for gout therefore he was continued on his PTA of colchicine 0.6 mg twice daily and started on a 5-day course of prednisone.  Patient's outpatient Ceftin and was held and he was started on Ancef inpatient for his cellulitis.  Patient's pain limited his mobility initially but as he received the colchicine, prednisone and antibiotics he gradually improved.  Patient's blood pressure was still noted to be on the lower side though remained within normal range.  He was given an dose of albumin 12.5 to improve his blood pressure in the context of his CHF history.  Patient responded well to this dose and his home PTA blood pressure medications were restarted.  Following a discussion with pharmacy was noted that Coreg can interfere with colchicine therefore colchicine dose was reduced to 0.3 mg daily which is recommended for a target dose of 0.6 mg twice daily of colchicine.  Patient was evaluated by physical therapy and determined to be a good candidate for home PT.  Patient was also found to have an abnormal TSH level, endocrinology was  "consulted and they recommended: For diabetes, continuing with Lantus 15 and started scheduled mealtime insulin Humalog 5 units before each meal. Continuing with correctional insulin before meals and at bedtime. For the abnormal TSH to just follow up outpatient. As patient's pain, mobility and warmth in his lower extremities improved with treatment he was deemed stable for discharge.  He will follow-up outpatient with his cardiologist, he will continue allopurinol it was emphasized to him that it is very necessary to keep his gout under control and he will also follow-up with his PCP.    Please see above list of diagnoses and related plan for additional information.     Condition at Discharge: good    Discharge Day Visit / Exam:   Subjective: Patient endorses improvement in his symptoms apart from occasional pain in his foot.  Does not endorse any chest pain, shortness of breath, palpitations, pain abdomen, diarrhea, constipation, nausea, vomiting, symptoms of lower urinary tract infection.  Vitals: Blood Pressure: 118/75 (05/11/25 0736)  Pulse: 74 (05/11/25 1200)  Temperature: 98.2 °F (36.8 °C) (05/11/25 0736)  Temp Source: Oral (05/11/25 0736)  Respirations: 18 (05/11/25 0736)  Height: 5' 9\" (175.3 cm) (05/07/25 2227)  Weight - Scale: 116 kg (255 lb 8 oz) (05/11/25 0540)  SpO2: 95 % (05/11/25 1200)  Physical Exam  Vitals and nursing note reviewed.   Constitutional:       General: He is not in acute distress.     Appearance: He is well-developed.   HENT:      Head: Normocephalic and atraumatic.   Eyes:      Conjunctiva/sclera: Conjunctivae normal.   Neck:      Comments: Decreased range of motion of neck.  Cardiovascular:      Rate and Rhythm: Normal rate and regular rhythm.      Heart sounds: No murmur heard.  Pulmonary:      Effort: Pulmonary effort is normal. No respiratory distress.      Breath sounds: Normal breath sounds. No wheezing or rales.   Abdominal:      Palpations: Abdomen is soft.      Tenderness: " There is no abdominal tenderness.   Musculoskeletal:         General: No swelling.      Cervical back: Neck supple.   Skin:     General: Skin is warm and dry.      Capillary Refill: Capillary refill takes less than 2 seconds.   Neurological:      Mental Status: He is alert.   Psychiatric:         Mood and Affect: Mood normal.          Discussion with Family: Attempted to update  (wife) via phone. Unable to contact.    Discharge instructions/Information to patient and family:   See after visit summary for information provided to patient and family.      Provisions for Follow-Up Care:  See after visit summary for information related to follow-up care and any pertinent home health orders.      Mobility at time of Discharge:   Basic Mobility Inpatient Raw Score: 18  JH-HLM Goal: 6: Walk 10 steps or more  JH-HLM Achieved: 6: Walk 10 steps or more  HLM Goal achieved. Continue to encourage appropriate mobility.     Disposition:   Home    Planned Readmission: No    Discharge Medications:  See after visit summary for reconciled discharge medications provided to patient and/or family.      Administrative Statements   Discharge Statement:  I have spent a total time of 25 minutes in caring for this patient on the day of the visit/encounter. .    **Please Note: This note may have been constructed using a voice recognition system**

## 2025-05-11 NOTE — PROGRESS NOTES
Progress Note - Endocrinology   Name: Elly Chong 60 y.o. male I MRN: [id number]  Unit/Bed#: S -01 I Date of Admission: 5/7/2025   Date of Service: 5/11/2025 I Hospital Day: 1    Assessment & Plan  Abnormal TSH  Unsuppressed TSH, with inappropriately elevated free T4, below normal total T3 are consistent with euthyroid sick syndrome (ESS).    At this time, reverse T3 is pending. If elevated, further supports ESS.  TPO and thyroglobulin antibodies are negative.    Medical therapy is not indicated at this time  In the outpatient setting, repeating TSH, free T4, and total T3 in 6-8 weeks by PCP     Type 2 diabetes mellitus with hyperglycemia (HCC)  Lab Results   Component Value Date    HGBA1C 6.4 03/13/2025     Well-controlled type 2 diabetes mellitus, with hyperglycemia, without long-term insulin therapy use  Home regimen includes: Glimepiride 1 mg twice daily.prescribed Januvia 100 mg daily but has not been recently using due to running out of prescription  24-hour glucose review-morning glucose within inpatient goal, with postprandial hyperglycemia.  No hypoglycemic events noted.    Plan:  While inpatient, continue Lantus 15 at bedtime and Humalog 5 units before each meal.  Correctional insulin before meals and at bedtime via algorithm 4.   Carb controlled diet  Accu-Cheks 4 times daily  At discharge:  Can be discharged on PTA regimen -glimepiride and restarting Januvia  Continue outpatient follow-up with PCP    Subjective : Reports feeling well.  Denies any acute concerns.    Objective :  Temp:  [98 °F (36.7 °C)-98.2 °F (36.8 °C)] 98.2 °F (36.8 °C)  HR:  [74-80] 80  BP: (118-144)/(75-80) 118/75  Resp:  [17-18] 18  SpO2:  [92 %-95 %] 92 %  O2 Device: None (Room air)    Physical Exam  Vitals reviewed.   Constitutional:       General: He is not in acute distress.     Appearance: Normal appearance. He is morbidly obese. He is not ill-appearing.   HENT:      Head: Normocephalic and atraumatic.   Eyes:       General: No scleral icterus.     Conjunctiva/sclera: Conjunctivae normal.   Cardiovascular:      Rate and Rhythm: Normal rate and regular rhythm.   Pulmonary:      Effort: Pulmonary effort is normal. No respiratory distress.   Abdominal:      General: There is no distension.   Musculoskeletal:         General: Normal range of motion.      Cervical back: Normal range of motion.   Skin:     General: Skin is dry.      Capillary Refill: Capillary refill takes less than 2 seconds.      Coloration: Skin is not jaundiced or pale.   Neurological:      General: No focal deficit present.      Mental Status: He is alert and oriented to person, place, and time. Mental status is at baseline.   Psychiatric:         Mood and Affect: Mood normal.         Behavior: Behavior normal.         Lab Results: I have reviewed the following results:   Latest Reference Range & Units 03/13/25 11:59 05/07/25 18:01 05/09/25 16:29   Hemoglobin A1C <=6.5  6.4     TSH 3RD GENERATON 0.450 - 4.500 uIU/mL  9.598 (H)    FREE T4 0.61 - 1.12 ng/dL  1.54 (H)    T3, Total 0.9-1.8 ng/mL ng/mL   0.8 (L)   THYROGLOBULIN AB 0.0 - 0.9 IU/mL   <1.0   THYROID MICROSOMAL ANTIBODY 0 - 34 IU/mL   18   (H): Data is abnormally high  (L): Data is abnormally low   Latest Reference Range & Units 05/11/25 05:38   Potassium 3.5 - 5.3 mmol/L 3.5   Chloride 96 - 108 mmol/L 97   Carbon Dioxide 21 - 32 mmol/L 34 (H)   ANION GAP 4 - 13 mmol/L 6   BUN 5 - 25 mg/dL 36 (H)   Creatinine 0.60 - 1.30 mg/dL 1.28   GLUCOSE 65 - 140 mg/dL 163 (H)   Calcium 8.4 - 10.2 mg/dL 8.6   GFR, Calculated ml/min/1.73sq m 60   (H): Data is abnormally high      Imaging Results Review: No pertinent imaging studies reviewed.  Other Study Results Review: No additional pertinent studies reviewed.    Deepak Schwartz MD  Endocrinology fellow, PGY-4

## 2025-05-11 NOTE — ASSESSMENT & PLAN NOTE
PTA on Ceftin due to right ankle wound  Started on ceftriaxone and vancomycin at the ED due to sepsis suspected source ongoing cellulitis    Plan  Patient has completed antibiotic course.

## 2025-05-11 NOTE — ASSESSMENT & PLAN NOTE
Lab Results   Component Value Date    HGBA1C 6.4 03/13/2025     Well-controlled type 2 diabetes mellitus, with hyperglycemia, without long-term insulin therapy use  Home regimen includes: Glimepiride 1 mg twice daily.prescribed Januvia 100 mg daily but has not been recently using due to running out of prescription  24-hour glucose review-morning glucose within inpatient goal, with postprandial hyperglycemia.  No hypoglycemic events noted.    Plan:  While inpatient, continue Lantus 15 at bedtime and Humalog 5 units before each meal.  Correctional insulin before meals and at bedtime via algorithm 4.   Carb controlled diet  Accu-Cheks 4 times daily  At discharge:  Can be discharged on PTA regimen -glimepiride and restarting Januvia  Continue outpatient follow-up with PCP

## 2025-05-11 NOTE — ASSESSMENT & PLAN NOTE
Lab Results   Component Value Date    EGFR 60 05/11/2025    EGFR 66 05/10/2025    EGFR 66 05/09/2025    CREATININE 1.28 05/11/2025    CREATININE 1.17 05/10/2025    CREATININE 1.18 05/09/2025   Baseline seems to be 1.6-1.8  Stable and at baseline  Plan  BMP in a.m.

## 2025-05-11 NOTE — UTILIZATION REVIEW
Continued Stay Review  Date: 5/11/2025                          Current Patient Class: Inpatient Current Level of Care: Med surg       Assessment/Plan:   Able to walk in the hallways without much problems. Patient also told us he can now move his neck more/better as compared to the previous days. Has decreased ROM but improving & not as bad as previous  Appetite has been improving     Arthralgia / gout improvement in pain rates it a 5 out of 10 today   Completed prednisone burst regimen.  DC home with maintenance allopurinol 100 mg. Hold on colchicine for now. Follow up  with his PCP about this on follow-up. Recommending rechecking/monitoring patient's uric acid levels.    Sepsis resolved s/p IV antibx  cervical DISH continue with pain management on discharge, w/ muscle relaxant on as-needed basis. OP referral with orthopedic surgery.   DM: recs per ENDO oral hypoglycemic medications with Januvia and Amaryl on discharge. Follow up OP for repeat thyroid function test      Medications:   Scheduled Medications:  acetaminophen, 975 mg, Oral, Q8H ELINAE  albuterol, 1 puff, Inhalation, Daily  allopurinol, 100 mg, Oral, Daily  bumetanide, 2 mg, Oral, BID  carvedilol, 12.5 mg, Oral, BID With Meals  cefazolin, 2,000 mg, Intravenous, Q8H  colchicine, 0.3 mg, Oral, Daily  cyanocobalamin, 1,000 mcg, Oral, Daily  enoxaparin, 40 mg, Subcutaneous, Daily  famotidine, 20 mg, Oral, HS  fluticasone-vilanterol, 1 puff, Inhalation, Daily  insulin glargine, 15 Units, Subcutaneous, HS  insulin lispro, 2-12 Units, Subcutaneous, 4x Daily (AC & HS)  insulin lispro, 5 Units, Subcutaneous, TID With Meals  lidocaine, 1 patch, Topical, Daily  pantoprazole, 40 mg, Oral, Daily Before Breakfast  predniSONE, 40 mg, Oral, Daily  [Held by provider] spironolactone, 25 mg, Oral, Daily      Continuous IV Infusions:     PRN Meds:  barium sulfate, 1 tablet, Oral, Once in imaging  methocarbamol, 500 mg, Oral, Q6H PRN  polyethylene glycol, 17 g, Oral, Daily  PRN      Discharge Plan: TBD    Vital Signs (last 3 days)       Date/Time Temp Pulse Resp BP MAP (mmHg) SpO2 O2 Device Patient Position - Orthostatic VS Pain    05/11/25 0814 -- -- -- -- -- -- -- -- Med Not Given for Pain - for MAR use only    05/11/25 07:36:53 98.2 °F (36.8 °C) 77 18 118/75 89 92 % None (Room air) Lying --    05/11/25 0644 -- -- -- -- -- -- -- -- 4    05/10/25 2300 -- -- -- -- -- -- -- -- No Pain    05/10/25 2210 98 °F (36.7 °C) -- 17 144/80 -- -- -- -- --    05/10/25 2136 -- -- -- -- -- -- -- -- 6    05/10/25 1446 -- -- -- -- -- -- -- -- Med Not Given for Pain - for MAR use only    05/10/25 14:42:39 98.2 °F (36.8 °C) -- 18 156/97 117 -- -- -- --          Weight (last 2 days)       Date/Time Weight    05/11/25 0540 116 (255.5)    05/10/25 0600 116 (254.8)    05/10/25 0514 116 (254.8)    05/09/25 1109 129 (285.3)    05/09/25 0600 118 (259.8)            Pertinent Labs/Diagnostic Results:   Radiology:  FL barium swallow video w speech   Final Interpretation by STEVEN PETERS (05/10 1512)      FL barium swallow video w speech   Final Interpretation by  (05/10 7957)      XR ankle 3+ vw right   Final Interpretation by Sudheer Jasso MD (05/08 0916)      No acute osseous abnormality.            CT spine cervical without contrast   Final Interpretation by Tian Hannon MD (05/07 1946)      No cervical spine fracture or traumatic malalignment.         CTA chest abdomen pelvis w wo contrast   Final Interpretation by Johnson Escobedo MD (05/07 2001)      No evidence for pulmonary embolism.      No acute intra-abdominal pathology.      Redemonstrated findings of chronic pancreatitis as well as a 1.5 cm cyst versus focal ductal dilatation regional to the pancreatic head; again, further characterization with nonemergent dedicated pancreatic protocol MRI is advised in the outpatient    setting.           Cardiology:  ECG 12 lead   Final Result by Santa Greco MD (05/07 1539)   Normal  sinus rhythm   Nonspecific T wave abnormality   When compared with ECG of 14-Apr-2025 18:43,   Premature ventricular complexes are no longer Present   Confirmed by Santa Greco ([id number]) on 5/7/2025 5:57:43 PM        GI:  No orders to display           Results from last 7 days   Lab Units 05/11/25  0538 05/10/25  0511 05/09/25  0604 05/08/25 0448 05/07/25  1801   WBC Thousand/uL 12.97* 14.74* 14.44* 12.39* 13.09*   HEMOGLOBIN g/dL 10.9* 10.9* 11.7* 11.9* 12.5   HEMATOCRIT % 33.1* 32.8* 35.3* 35.4* 37.6   PLATELETS Thousands/uL 302 310 287 253 288   TOTAL NEUT ABS Thousands/µL  --   --  11.75* 8.73* 8.83*         Results from last 7 days   Lab Units 05/11/25 0538 05/10/25  0511 05/09/25  0604 05/08/25 0448 05/07/25  1801   SODIUM mmol/L 137 134* 134* 132* 133*   POTASSIUM mmol/L 3.5 3.6 3.7 3.5 3.7   CHLORIDE mmol/L 97 96 95* 93* 89*   CO2 mmol/L 34* 31 31 28 34*   ANION GAP mmol/L 6 7 8 11 10   BUN mg/dL 36* 31* 25 22 25   CREATININE mg/dL 1.28 1.17 1.18 1.06 1.65*   EGFR ml/min/1.73sq m 60 66 66 75 44   CALCIUM mg/dL 8.6 8.8 9.1 8.7 9.1   MAGNESIUM mg/dL  --   --   --  2.6 1.3*     Results from last 7 days   Lab Units 05/10/25  0511 05/09/25  0604 05/08/25 0448 05/07/25  1801   AST U/L 53* 24 25 31   ALT U/L 29 22 16 20   ALK PHOS U/L 108* 102 103 122*   TOTAL PROTEIN g/dL 6.8 7.0 6.8 7.8   ALBUMIN g/dL 3.3* 3.2* 3.4* 3.6   TOTAL BILIRUBIN mg/dL 0.43 0.70 1.61* 2.46*   BILIRUBIN DIRECT mg/dL  --   --  0.82*  --      Results from last 7 days   Lab Units 05/11/25  0738 05/10/25  2039 05/10/25  1548 05/10/25  1049 05/10/25  0703 05/09/25  2114 05/09/25  1616 05/09/25  1034 05/09/25  0709 05/08/25  2124 05/08/25  1635 05/08/25  1052   POC GLUCOSE mg/dl 155* 323* 332* 258* 173* 309* 302* 264* 236* 248* 342* 183*     Results from last 7 days   Lab Units 05/11/25  0538 05/10/25  0511 05/09/25  0604 05/08/25  0448 05/07/25  1801   GLUCOSE RANDOM mg/dL 163* 192* 215* 138 183*             BETA-HYDROXYBUTYRATE   Date  Value Ref Range Status   07/06/2020 0.2 <0.6 mmol/L Final                      Results from last 7 days   Lab Units 05/07/25 2010 05/07/25  1801   HS TNI 0HR ng/L  --  10   HS TNI 2HR ng/L 7  --    HSTNI D2 ng/L -3  --          Results from last 7 days   Lab Units 05/07/25  1801   PROTIME seconds 16.1*   INR  1.22*   PTT seconds 35*     Results from last 7 days   Lab Units 05/07/25  1801   TSH 3RD GENERATON uIU/mL 9.598*         Results from last 7 days   Lab Units 05/07/25  1801   LACTIC ACID mmol/L 1.4             Results from last 7 days   Lab Units 05/07/25  1802   BNP pg/mL 148*                                     Results from last 7 days   Lab Units 05/07/25  1805   CLARITY UA  Clear   COLOR UA  Yellow   SPEC GRAV UA  1.018   PH UA  5.5   GLUCOSE UA mg/dl 100 (1/10%)*   KETONES UA mg/dl 10 (1+)*   BLOOD UA  Negative   PROTEIN UA mg/dl Negative   NITRITE UA  Negative   BILIRUBIN UA  Small*   UROBILINOGEN UA (BE) mg/dl 2.0*   LEUKOCYTES UA  Negative                                 Results from last 7 days   Lab Units 05/07/25  1801   BLOOD CULTURE  No Growth at 72 hrs.  No Growth at 72 hrs.                   Network Utilization Review Department  ATTENTION: Please call with any questions or concerns to [phone] and carefully listen to the prompts so that you are directed to the right person. All voicemails are confidential.   For Discharge needs, contact Care Management DC Support Team at [phone] opt. 2  Send all requests for admission clinical reviews, approved or denied determinations and any other requests to dedicated fax number below belonging to the campus where the patient is receiving treatment. List of dedicated fax numbers for the Facilities:  FACILITY NAME UR FAX NUMBER   ADMISSION DENIALS (Administrative/Medical Necessity) [phone]   DISCHARGE SUPPORT TEAM (NETWORK) [phone]   PARENT CHILD HEALTH (Maternity/NICU/Pediatrics) [phone]   [facility]  [phone]   [facility] [phone]   Critical access hospital [phone]   [facility] [phone]   [facility] [phone]   [facility] [phone]   [facility] [phone]   [facility] [phone]   [facility] [phone]   [facility] [phone]   [facility] [phone]   [facility] [phone]

## 2025-05-11 NOTE — ASSESSMENT & PLAN NOTE
Wt Readings from Last 3 Encounters:   05/11/25 116 kg (255 lb 8 oz)   05/01/25 121 kg (267 lb 6 oz)   04/23/25 121 kg (266 lb 9.6 oz)   Volume down on exam  PTA on Bumex 2mg BID started on last admission  Aldactone 25g QD  Coreg 12.5mg BID  Plan  Restarted Bumex for now due to dry on exam and bordeline BP consider decrease Bumex 1 mg BID  Spironolactone to be restarted.

## 2025-05-11 NOTE — ASSESSMENT & PLAN NOTE
Lab Results   Component Value Date    HGBA1C 6.4 03/13/2025       Recent Labs     05/10/25  1548 05/10/25  2039 05/11/25  0738 05/11/25  1105   POCGLU 332* 323* 155* 222*       Blood Sugar Average: Last 72 hrs:  Patient to continue home dose of Amaryl and Januvia.  Patient to follow-up with endocrinology as an outpatient.

## 2025-05-11 NOTE — ASSESSMENT & PLAN NOTE
Polyarthralgia suspect multifactorial in nature suspect Acute gout given swelling Left mcp which usually gouty location vs OA vs potentially viral component such as [place]/dengue as recent [place] fortunately no other organ involement no cytopenias los suspicious RA as biomarkes negative in the past.  CT Spine notable for DISH  Rapid COVID/Flu neg  Patient has improvement in pain rates it a 5 out of 10 today    Plan  Patient completed prednisone burst regimen.  To be discharged home with maintenance allopurinol 100 mg.

## 2025-05-11 NOTE — ASSESSMENT & PLAN NOTE
Unsuppressed TSH, with inappropriately elevated free T4, below normal total T3 are consistent with euthyroid sick syndrome (ESS).    At this time, reverse T3 is pending. If elevated, further supports ESS.  TPO and thyroglobulin antibodies are negative.    Medical therapy is not indicated at this time  In the outpatient setting, repeating TSH, free T4, and total T3 in 6-8 weeks by PCP

## 2025-05-11 NOTE — ASSESSMENT & PLAN NOTE
TSH elevated  Follow up t4 pending levels consider replacement vs repeat outpatient  Repeat 4-6 weeks outpatient follow-up with endocrinology.

## 2025-05-11 NOTE — ASSESSMENT & PLAN NOTE
Blood Pressure: 118/75  Stable after IVF and further improved by a dose of albumin 12.5  POA bordeline P 90s

## 2025-05-12 ENCOUNTER — TRANSITIONAL CARE MANAGEMENT (OUTPATIENT)
Dept: FAMILY MEDICINE CLINIC | Facility: CLINIC | Age: 60
End: 2025-05-12

## 2025-05-12 LAB
BACTERIA BLD CULT: NORMAL
BACTERIA BLD CULT: NORMAL

## 2025-05-16 ENCOUNTER — TELEPHONE (OUTPATIENT)
Dept: FAMILY MEDICINE CLINIC | Facility: CLINIC | Age: 60
End: 2025-05-16

## 2025-05-16 ENCOUNTER — RESULTS FOLLOW-UP (OUTPATIENT)
Dept: ENDOCRINOLOGY | Facility: CLINIC | Age: 60
End: 2025-05-16

## 2025-05-16 ENCOUNTER — TELEPHONE (OUTPATIENT)
Age: 60
End: 2025-05-16

## 2025-05-16 ENCOUNTER — OFFICE VISIT (OUTPATIENT)
Dept: FAMILY MEDICINE CLINIC | Facility: CLINIC | Age: 60
End: 2025-05-16
Payer: COMMERCIAL

## 2025-05-16 VITALS
RESPIRATION RATE: 18 BRPM | WEIGHT: 252.6 LBS | HEART RATE: 87 BPM | SYSTOLIC BLOOD PRESSURE: 140 MMHG | BODY MASS INDEX: 37.3 KG/M2 | OXYGEN SATURATION: 97 % | DIASTOLIC BLOOD PRESSURE: 92 MMHG

## 2025-05-16 DIAGNOSIS — M54.41 CHRONIC BILATERAL LOW BACK PAIN WITH RIGHT-SIDED SCIATICA: Primary | ICD-10-CM

## 2025-05-16 DIAGNOSIS — I10 ESSENTIAL HYPERTENSION: Chronic | ICD-10-CM

## 2025-05-16 DIAGNOSIS — N18.30 STAGE 3 CHRONIC KIDNEY DISEASE, UNSPECIFIED WHETHER STAGE 3A OR 3B CKD (HCC): Chronic | ICD-10-CM

## 2025-05-16 DIAGNOSIS — G89.29 CHRONIC BILATERAL LOW BACK PAIN WITH RIGHT-SIDED SCIATICA: Primary | ICD-10-CM

## 2025-05-16 DIAGNOSIS — L03.119 CELLULITIS OF LOWER EXTREMITY, UNSPECIFIED LATERALITY: ICD-10-CM

## 2025-05-16 DIAGNOSIS — E11.65 UNCONTROLLED TYPE 2 DIABETES MELLITUS WITH HYPERGLYCEMIA (HCC): ICD-10-CM

## 2025-05-16 PROBLEM — L03.90 CELLULITIS: Status: ACTIVE | Noted: 2025-05-16

## 2025-05-16 LAB — T3REVERSE SERPL-MCNC: 102.3 NG/DL (ref 9.2–24.1)

## 2025-05-16 PROCEDURE — 99215 OFFICE O/P EST HI 40 MIN: CPT | Performed by: FAMILY MEDICINE

## 2025-05-16 NOTE — TELEPHONE ENCOUNTER
Dr Roque has completed form for [facility] [employer] [facility] Claims Division    It has been faxed to [phone] and scanned into Media of pts chart.    Pt called and made aware.

## 2025-05-16 NOTE — ASSESSMENT & PLAN NOTE
Diabetes.  A1c in the office has increased to 7.9 however patient's A1c was previously stable at 6.4 without the use of Januvia which was not covered by his insurance.  He will continue with glimepiride and have repeat A1c in 3 months.  Lab Results   Component Value Date    HGBA1C 6.4 03/13/2025

## 2025-05-16 NOTE — TELEPHONE ENCOUNTER
PA for TraMADol-acetaminophen (ULTRACET) 37.5-325 mg per tablet SUBMITTED to [facility]    via    []CMM-KEY:   [x]Surescripts-Case ID #: [id number]   []Availity-Auth ID #   []Faxed to plan   []Other website   []Phone call Case ID #    []PA sent as URGENT    All office notes, labs and other pertaining documents and studies sent. Clinical questions answered. Awaiting determination from insurance company.     Turnaround time for your insurance to make a decision on your Prior Authorization can take 7-21 business days.

## 2025-05-16 NOTE — PROGRESS NOTES
FAMILY PRACTICE OFFICE VISIT       NAME: Elly Chong  AGE: 60 y.o. SEX: male       : 1965        MRN: [id number]    DATE: 2025  TIME: 1:11 PM    Assessment and Plan     Problem List Items Addressed This Visit       Essential hypertension (Chronic)    Hypertension.  The patient's blood pressure is stable at this time and he will continue current regimen of medications         Type 2 diabetes mellitus with hyperglycemia (HCC)    Diabetes.  A1c in the office has increased to 7.9 however patient's A1c was previously stable at 6.4 without the use of Januvia which was not covered by his insurance.  He will continue with glimepiride and have repeat A1c in 3 months.  Lab Results   Component Value Date    HGBA1C 6.4 2025            Stage 3 chronic kidney disease (HCC) (Chronic)    Lab Results   Component Value Date    EGFR 60 2025    EGFR 66 05/10/2025    EGFR 66 2025    CREATININE 1.28 2025    CREATININE 1.17 05/10/2025    CREATININE 1.18 2025   Chronic kidney disease.  Most recent renal function test appear to be stable on current use of diuretics.         Chronic bilateral low back pain with right-sided sciatica - Primary    Back pain.  Patient given prescription for Ultracet to use twice daily as needed.  He will obtain x-rays of hip and pelvis.  We will make further recommendations pending results of test.         Relevant Medications    traMADol-acetaminophen (ULTRACET) 37.5-325 mg per tablet    Other Relevant Orders    XR hip/pelv 2-3 vws right if performed    XR spine lumbar minimum 4 views non injury    Cellulitis    Cellulitis.  Lesions on bilateral lower extremities appear to be healing well.  Leukocytosis appears to be improving.                Chief Complaint     TCM Call (since 2025)       Date and time call was made  2025 11:16 AM    Hospital care reviewed  Records reviewed    Patient was hospitialized at  [facility]    Date of Admission   05/07/25    Date of discharge  05/11/25    Diagnosis  Sepsis LLE Wound/Blister    Disposition  Home    Were the patients medications reviewed and updated  No    Current Symptoms  Back pain - right side  Left Ankle Pain          TCM Call (since 5/2/2025)       Post hospital issues  Reduced activity; Poor ADL (Activities of Daily Living) performance    Scheduled for follow up?  Yes  Appt 5/16/25 11am     Patients specialists  Endocrinologist; Other (comment)    Other specialists names  [facility]/ [facility]/ [facility]    Did you obtain your prescribed medications  Yes    Do you need help managing your prescriptions or medications  No    Is transportation to your appointment needed  No    I have advised the patient to call PCP with any new or worsening symptoms  Odalys Lucia LPN    Living Arrangements  Spouse or Significiant other    Support System  Spouse    The type of support provided  Physical; Emotional    Do you have social support  Yes, as much as I need    Are you recieving home care services  No           Hospital Course:   Elly Chong is a 60 y.o. male patient who originally presented to the hospital on 5/7/2025 due to cellulitis     Elly Chong is a 60 y.o. male with a PMH of type 2 diabetes not on insulin, hypertension, OA, HFpEF, tobacco and alcohol dependence on remission who presents with multiple complaints.  Endorsed generalized weakness, neck, back, shoulder and left arm pain that started approximately about 1 to 2 weeks ago.  Not associated with fevers or chills.  Denies any trauma, no falls.  Pain is worse upon mobility however gets better with rest and no relief with Tylenol.  Patient had a recent admission for CHF exacerbation at that time was switched from torsemide to Bumex which she has been compliant since then.  Given hypotension concerns blood pressure medication was put on hold.  Patient had not been taking his allopurinol for gout therefore he was continued on his PTA of  colchicine 0.6 mg twice daily and started on a 5-day course of prednisone.  Patient's outpatient Ceftin and was held and he was started on Ancef inpatient for his cellulitis.  Patient's pain limited his mobility initially but as he received the colchicine, prednisone and antibiotics he gradually improved.  Patient's blood pressure was still noted to be on the lower side though remained within normal range.  He was given an dose of albumin 12.5 to improve his blood pressure in the context of his CHF history.  Patient responded well to this dose and his home PTA blood pressure medications were restarted.  Following a discussion with pharmacy was noted that Coreg can interfere with colchicine therefore colchicine dose was reduced to 0.3 mg daily which is recommended for a target dose of 0.6 mg twice daily of colchicine.  Patient was evaluated by physical therapy and determined to be a good candidate for home PT.  Patient was also found to have an abnormal TSH level, endocrinology was consulted and they recommended: For diabetes, continuing with Lantus 15 and started scheduled mealtime insulin Humalog 5 units before each meal. Continuing with correctional insulin before meals and at bedtime. For the abnormal TSH to just follow up outpatient. As patient's pain, mobility and warmth in his lower extremities improved with treatment he was deemed stable for discharge.  He will follow-up outpatient with his cardiologist, he will continue allopurinol it was emphasized to him that it is very necessary to keep his gout under control and he will also follow-up with his PCP.    Chief Complaint   Patient presents with    Transition of Care Management       History of Present Illness     I reviewed the patient's discharge summary from his latest hospitalization.  He received antibiotics for possible cellulitis of his lower extremities where he had healing ulcers from prior injury at work.  He denies any chest pain or shortness of  breath.  His biggest complaint is of chronic low back and right pelvis discomfort which makes it difficult to ambulate despite using a walking cane.  Patient is unable to perform job duties with current symptoms.  He denies any documented fevers since being discharged from the hospital.        Review of Systems   Review of Systems   Constitutional:  Positive for fatigue.   Respiratory: Negative.     Cardiovascular: Negative.    Gastrointestinal: Negative.    Musculoskeletal:  Positive for arthralgias, back pain and gait problem.       Active Problem List     Problem List[1]    Past Medical History:  Past Medical History:   Diagnosis Date    Acute gout of right hand 10/17/2022    Acute kidney injury ([facility]) 09/05/2020    Acute on chronic diastolic congestive heart failure ([facility]) 08/31/2021    DELFINO (acute kidney injury) ([facility]) 09/05/2020    Asthma     Cellulitis 09/27/2024    CHF (congestive heart failure) ([facility])     Chronic heart failure with preserved ejection fraction ([facility]) 07/06/2020    CKD (chronic kidney disease) 04/15/2025    Colon polyp     GERD (gastroesophageal reflux disease)     Hypertension     Inguinal hernia     3/25/15    Onychomycosis     5/24/17    Sleep apnea     Type 2 diabetes mellitus ([facility]) 05/01/2012       Past Surgical History:  Past Surgical History:   Procedure Laterality Date    ARTHROSCOPY KNEE      with Medial and Lateral Meniscus Repair    HERNIA REPAIR      umbilical and inguinal hernia repairs (left)       Family History:  Family History   Problem Relation Age of Onset    Hypertension Mother         essential    Chronic bronchitis Father     Prostate cancer Father     Colon cancer Father     Breast cancer Sister        Social History:  Social History     Socioeconomic History    Marital status: /Civil Union     Spouse name: Not on file    Number of children: Not on file    Years of education: Not on file    Highest education level: Not on file   Occupational History    Occupation: [employer]    Tobacco Use    Smoking status: Former     Types: Cigars    Smokeless tobacco: Never    Tobacco comments:     current every day smoker, per [employer]   Vaping Use    Vaping status: Never Used   Substance and Sexual Activity    Alcohol use: Yes     Alcohol/week: 35.0 standard drinks of alcohol     Types: 21 Cans of beer, 14 Shots of liquor per week     Comment: 3 beers daily and 2 shots daily (as of 2025)    Drug use: No    Sexual activity: Yes   Other Topics Concern    Not on file   Social History Narrative    Alcohol dependence    Nicotine dependence    Denied, alcohol Use    Marital problems     Social Drivers of Health     Financial Resource Strain: Not on file   Food Insecurity: No Food Insecurity (2025)    Nursing - Inadequate Food Risk Classification     Worried About Running Out of Food in the Last Year: Never true     Ran Out of Food in the Last Year: Never true     Ran Out of Food in the Last Year: Never true   Transportation Needs: No Transportation Needs (2025)    Nursing - Transportation Risk Classification     Lack of Transportation: Not on file     Lack of Transportation: No   Physical Activity: Not on file   Stress: Not on file   Social Connections: Not on file   Intimate Partner Violence: Unknown (2025)    Nursing IPS     Feels Physically and Emotionally Safe: Not on file     Physically Hurt by Someone: Not on file     Humiliated or Emotionally Abused by Someone: Not on file     Physically Hurt by Someone: No     Hurt or Threatened by Someone: No   Housing Stability: Unknown (2025)    Nursing: Inadequate Housing Risk Classification     Has Housing: Not on file     Worried About Losing Housing: Not on file     Unable to Get Utilities: Not on file     Unable to Pay for Housing in the Last Year: No     Has Housin       Objective     Vitals:    25 1056   BP: 140/92   Pulse: 87   Resp: 18   SpO2: 97%     Wt Readings from Last 3 Encounters:   25 115 kg  (252 lb 9.6 oz)   05/11/25 116 kg (255 lb 8 oz)   05/01/25 121 kg (267 lb 6 oz)       Physical Exam  Constitutional:       General: He is not in acute distress.     Appearance: Normal appearance. He is not ill-appearing.     Eyes:      General:         Right eye: No discharge.         Left eye: No discharge.      Extraocular Movements: Extraocular movements intact.      Conjunctiva/sclera: Conjunctivae normal.      Pupils: Pupils are equal, round, and reactive to light.     Pulmonary:      Effort: Pulmonary effort is normal. No respiratory distress.      Breath sounds: Normal breath sounds. No wheezing, rhonchi or rales.     Musculoskeletal:      Right lower leg: No edema.      Left lower leg: No edema.      Comments: Patient ambulating slowly with a walking cane.     Skin:     Findings: Lesion present.      Comments: Patient with a healing quarter sized ulcerated lesion on right Achilles area that is covered with a Band-Aid.  Patient also has an open dry crusty lesion on left lateral malleolus area that appears to be healing well with no signs of infection or drainage.  Size is approximately 2 x 2 inches     Neurological:      Mental Status: He is alert. Mental status is at baseline.     Psychiatric:         Mood and Affect: Mood normal.         Behavior: Behavior normal.         Thought Content: Thought content normal.         Judgment: Judgment normal.         Pertinent Laboratory/Diagnostic Studies:  Lab Results   Component Value Date    GLUCOSE 179 (H) 09/04/2020    BUN 36 (H) 05/11/2025    CREATININE 1.28 05/11/2025    CALCIUM 8.6 05/11/2025     12/21/2017    K 3.5 05/11/2025    CO2 34 (H) 05/11/2025    CL 97 05/11/2025     Lab Results   Component Value Date    ALT 29 05/10/2025    AST 53 (H) 05/10/2025    ALKPHOS 108 (H) 05/10/2025    BILITOT 1.3 (H) 12/21/2017       Lab Results   Component Value Date    WBC 12.97 (H) 05/11/2025    HGB 10.9 (L) 05/11/2025    HCT 33.1 (L) 05/11/2025    MCV 96 05/11/2025      05/11/2025       Lab Results   Component Value Date    TSH 3.58 03/14/2022       Lab Results   Component Value Date    CHOL 153 12/21/2017     Lab Results   Component Value Date    TRIG 69 11/21/2023     Lab Results   Component Value Date    HDL 36 (L) 11/21/2023     Lab Results   Component Value Date    LDLCALC 70 11/21/2023     Lab Results   Component Value Date    HGBA1C 6.4 03/13/2025       Results for orders placed or performed during the hospital encounter of 05/07/25   FLU/COVID Rapid Antigen (30 min. TAT) - Preferred screening test in ED    Specimen: Nose; Nares   Result Value Ref Range    SARS COV Rapid Antigen Negative Negative    Influenza A Rapid Antigen Negative Negative    Influenza B Rapid Antigen Negative Negative   Blood culture #1    Specimen: Arm, Right; Blood   Result Value Ref Range    Blood Culture No Growth After 5 Days.    Blood culture #2    Specimen: Arm, Left; Blood   Result Value Ref Range    Blood Culture No Growth After 5 Days.    CBC and differential   Result Value Ref Range    WBC 13.09 (H) 4.31 - 10.16 Thousand/uL    RBC 3.94 3.88 - 5.62 Million/uL    Hemoglobin 12.5 12.0 - 17.0 g/dL    Hematocrit 37.6 36.5 - 49.3 %    MCV 95 82 - 98 fL    MCH 31.7 26.8 - 34.3 pg    MCHC 33.2 31.4 - 37.4 g/dL    RDW 13.0 11.6 - 15.1 %    MPV 10.1 8.9 - 12.7 fL    Platelets 288 149 - 390 Thousands/uL    nRBC 0 /100 WBCs    Segmented % 67 43 - 75 %    Immature Grans % 1 0 - 2 %    Lymphocytes % 18 14 - 44 %    Monocytes % 13 (H) 4 - 12 %    Eosinophils Relative 1 0 - 6 %    Basophils Relative 0 0 - 1 %    Absolute Neutrophils 8.83 (H) 1.85 - 7.62 Thousands/µL    Absolute Immature Grans 0.06 0.00 - 0.20 Thousand/uL    Absolute Lymphocytes 2.30 0.60 - 4.47 Thousands/µL    Absolute Monocytes 1.71 (H) 0.17 - 1.22 Thousand/µL    Eosinophils Absolute 0.14 0.00 - 0.61 Thousand/µL    Basophils Absolute 0.05 0.00 - 0.10 Thousands/µL   Comprehensive metabolic panel   Result Value Ref Range    Sodium  "133 (L) 135 - 147 mmol/L    Potassium 3.7 3.5 - 5.3 mmol/L    Chloride 89 (L) 96 - 108 mmol/L    CO2 34 (H) 21 - 32 mmol/L    ANION GAP 10 4 - 13 mmol/L    BUN 25 5 - 25 mg/dL    Creatinine 1.65 (H) 0.60 - 1.30 mg/dL    Glucose 183 (H) 65 - 140 mg/dL    Calcium 9.1 8.4 - 10.2 mg/dL    AST 31 13 - 39 U/L    ALT 20 7 - 52 U/L    Alkaline Phosphatase 122 (H) 34 - 104 U/L    Total Protein 7.8 6.4 - 8.4 g/dL    Albumin 3.6 3.5 - 5.0 g/dL    Total Bilirubin 2.46 (H) 0.20 - 1.00 mg/dL    eGFR 44 ml/min/1.73sq m   HS Troponin 0hr (reflex protocol)   Result Value Ref Range    hs TnI 0hr 10 \"Refer to ACS Flowchart\"- see link ng/L   TSH, 3rd generation with Free T4 reflex   Result Value Ref Range    TSH 3RD GENERATON 9.598 (H) 0.450 - 4.500 uIU/mL   Lactic acid, plasma (w/reflex if result > 2.0)   Result Value Ref Range    LACTIC ACID 1.4 0.5 - 2.0 mmol/L   Protime-INR   Result Value Ref Range    Protime 16.1 (H) 12.3 - 15.0 seconds    INR 1.22 (H) 0.85 - 1.19   APTT   Result Value Ref Range    PTT 35 (H) 23 - 34 seconds   Magnesium   Result Value Ref Range    Magnesium 1.3 (L) 1.9 - 2.7 mg/dL   UA w Reflex to Microscopic w Reflex to Culture    Specimen: Urine, Clean Catch   Result Value Ref Range    Color, UA Yellow     Clarity, UA Clear     Specific Gravity, UA 1.018 1.003 - 1.030    pH, UA 5.5 4.5, 5.0, 5.5, 6.0, 6.5, 7.0, 7.5, 8.0    Leukocytes, UA Negative Negative    Nitrite, UA Negative Negative    Protein, UA Negative Negative mg/dl    Glucose,  (1/10%) (A) Negative mg/dl    Ketones, UA 10 (1+) (A) Negative mg/dl    Urobilinogen, UA 2.0 (A) <2.0 mg/dl mg/dl    Bilirubin, UA Small (A) Negative    Occult Blood, UA Negative Negative   B-Type Natriuretic Peptide(BNP)   Result Value Ref Range     (H) 0 - 100 pg/mL   HS Troponin I 2hr   Result Value Ref Range    hs TnI 2hr 7 \"Refer to ACS Flowchart\"- see link ng/L    Delta 2hr hsTnI -3 <20 ng/L   T4, free   Result Value Ref Range    Free T4 1.54 (H) 0.61 - 1.12 " ng/dL   Uric acid   Result Value Ref Range    Uric Acid 10.0 (H) 3.5 - 8.5 mg/dL   Hepatic function panel   Result Value Ref Range    Total Bilirubin 1.61 (H) 0.20 - 1.00 mg/dL    Bilirubin, Direct 0.82 (H) 0.00 - 0.20 mg/dL    Alkaline Phosphatase 103 34 - 104 U/L    AST 25 13 - 39 U/L    ALT 16 7 - 52 U/L    Total Protein 6.8 6.4 - 8.4 g/dL    Albumin 3.4 (L) 3.5 - 5.0 g/dL   CBC and differential   Result Value Ref Range    WBC 12.39 (H) 4.31 - 10.16 Thousand/uL    RBC 3.72 (L) 3.88 - 5.62 Million/uL    Hemoglobin 11.9 (L) 12.0 - 17.0 g/dL    Hematocrit 35.4 (L) 36.5 - 49.3 %    MCV 95 82 - 98 fL    MCH 32.0 26.8 - 34.3 pg    MCHC 33.6 31.4 - 37.4 g/dL    RDW 12.8 11.6 - 15.1 %    MPV 10.4 8.9 - 12.7 fL    Platelets 253 149 - 390 Thousands/uL    nRBC 0 /100 WBCs    Segmented % 71 43 - 75 %    Immature Grans % 0 0 - 2 %    Lymphocytes % 16 14 - 44 %    Monocytes % 11 4 - 12 %    Eosinophils Relative 2 0 - 6 %    Basophils Relative 0 0 - 1 %    Absolute Neutrophils 8.73 (H) 1.85 - 7.62 Thousands/µL    Absolute Immature Grans 0.04 0.00 - 0.20 Thousand/uL    Absolute Lymphocytes 1.96 0.60 - 4.47 Thousands/µL    Absolute Monocytes 1.35 (H) 0.17 - 1.22 Thousand/µL    Eosinophils Absolute 0.27 0.00 - 0.61 Thousand/µL    Basophils Absolute 0.04 0.00 - 0.10 Thousands/µL   Basic metabolic panel   Result Value Ref Range    Sodium 132 (L) 135 - 147 mmol/L    Potassium 3.5 3.5 - 5.3 mmol/L    Chloride 93 (L) 96 - 108 mmol/L    CO2 28 21 - 32 mmol/L    ANION GAP 11 4 - 13 mmol/L    BUN 22 5 - 25 mg/dL    Creatinine 1.06 0.60 - 1.30 mg/dL    Glucose 138 65 - 140 mg/dL    Glucose, Fasting 138 (H) 65 - 99 mg/dL    Calcium 8.7 8.4 - 10.2 mg/dL    eGFR 75 ml/min/1.73sq m   Magnesium   Result Value Ref Range    Magnesium 2.6 1.9 - 2.7 mg/dL   CBC and differential   Result Value Ref Range    WBC 14.44 (H) 4.31 - 10.16 Thousand/uL    RBC 3.70 (L) 3.88 - 5.62 Million/uL    Hemoglobin 11.7 (L) 12.0 - 17.0 g/dL    Hematocrit 35.3 (L) 36.5  - 49.3 %    MCV 95 82 - 98 fL    MCH 31.6 26.8 - 34.3 pg    MCHC 33.1 31.4 - 37.4 g/dL    RDW 12.7 11.6 - 15.1 %    MPV 10.5 8.9 - 12.7 fL    Platelets 287 149 - 390 Thousands/uL    nRBC 0 /100 WBCs    Segmented % 82 (H) 43 - 75 %    Immature Grans % 0 0 - 2 %    Lymphocytes % 11 (L) 14 - 44 %    Monocytes % 7 4 - 12 %    Eosinophils Relative 0 0 - 6 %    Basophils Relative 0 0 - 1 %    Absolute Neutrophils 11.75 (H) 1.85 - 7.62 Thousands/µL    Absolute Immature Grans 0.06 0.00 - 0.20 Thousand/uL    Absolute Lymphocytes 1.61 0.60 - 4.47 Thousands/µL    Absolute Monocytes 0.99 0.17 - 1.22 Thousand/µL    Eosinophils Absolute 0.01 0.00 - 0.61 Thousand/µL    Basophils Absolute 0.02 0.00 - 0.10 Thousands/µL   Comprehensive metabolic panel   Result Value Ref Range    Sodium 134 (L) 135 - 147 mmol/L    Potassium 3.7 3.5 - 5.3 mmol/L    Chloride 95 (L) 96 - 108 mmol/L    CO2 31 21 - 32 mmol/L    ANION GAP 8 4 - 13 mmol/L    BUN 25 5 - 25 mg/dL    Creatinine 1.18 0.60 - 1.30 mg/dL    Glucose 215 (H) 65 - 140 mg/dL    Glucose, Fasting 215 (H) 65 - 99 mg/dL    Calcium 9.1 8.4 - 10.2 mg/dL    Corrected Calcium 9.7 8.3 - 10.1 mg/dL    AST 24 13 - 39 U/L    ALT 22 7 - 52 U/L    Alkaline Phosphatase 102 34 - 104 U/L    Total Protein 7.0 6.4 - 8.4 g/dL    Albumin 3.2 (L) 3.5 - 5.0 g/dL    Total Bilirubin 0.70 0.20 - 1.00 mg/dL    eGFR 66 ml/min/1.73sq m   T3, reverse   Result Value Ref Range    T3, Reverse 102.3 (H) 9.2 - 24.1 ng/dL   T3   Result Value Ref Range    T3, Total 0.8 (L) 0.9-1.8 ng/mL ng/mL   Anti-microsomal antibody   Result Value Ref Range    THYROID MICROSOMAL ANTIBODY 18 0 - 34 IU/mL   Anti-thyroglobulin antibody   Result Value Ref Range    Thyroglobulin Ab <1.0 0.0 - 0.9 IU/mL   Comprehensive metabolic panel   Result Value Ref Range    Sodium 134 (L) 135 - 147 mmol/L    Potassium 3.6 3.5 - 5.3 mmol/L    Chloride 96 96 - 108 mmol/L    CO2 31 21 - 32 mmol/L    ANION GAP 7 4 - 13 mmol/L    BUN 31 (H) 5 - 25 mg/dL     Creatinine 1.17 0.60 - 1.30 mg/dL    Glucose 192 (H) 65 - 140 mg/dL    Glucose, Fasting 192 (H) 65 - 99 mg/dL    Calcium 8.8 8.4 - 10.2 mg/dL    Corrected Calcium 9.4 8.3 - 10.1 mg/dL    AST 53 (H) 13 - 39 U/L    ALT 29 7 - 52 U/L    Alkaline Phosphatase 108 (H) 34 - 104 U/L    Total Protein 6.8 6.4 - 8.4 g/dL    Albumin 3.3 (L) 3.5 - 5.0 g/dL    Total Bilirubin 0.43 0.20 - 1.00 mg/dL    eGFR 66 ml/min/1.73sq m   CBC   Result Value Ref Range    WBC 14.74 (H) 4.31 - 10.16 Thousand/uL    RBC 3.41 (L) 3.88 - 5.62 Million/uL    Hemoglobin 10.9 (L) 12.0 - 17.0 g/dL    Hematocrit 32.8 (L) 36.5 - 49.3 %    MCV 96 82 - 98 fL    MCH 32.0 26.8 - 34.3 pg    MCHC 33.2 31.4 - 37.4 g/dL    RDW 12.7 11.6 - 15.1 %    Platelets 310 149 - 390 Thousands/uL    MPV 10.5 8.9 - 12.7 fL   CBC   Result Value Ref Range    WBC 12.97 (H) 4.31 - 10.16 Thousand/uL    RBC 3.45 (L) 3.88 - 5.62 Million/uL    Hemoglobin 10.9 (L) 12.0 - 17.0 g/dL    Hematocrit 33.1 (L) 36.5 - 49.3 %    MCV 96 82 - 98 fL    MCH 31.6 26.8 - 34.3 pg    MCHC 32.9 31.4 - 37.4 g/dL    RDW 12.8 11.6 - 15.1 %    Platelets 302 149 - 390 Thousands/uL    MPV 10.5 8.9 - 12.7 fL   Basic metabolic panel   Result Value Ref Range    Sodium 137 135 - 147 mmol/L    Potassium 3.5 3.5 - 5.3 mmol/L    Chloride 97 96 - 108 mmol/L    CO2 34 (H) 21 - 32 mmol/L    ANION GAP 6 4 - 13 mmol/L    BUN 36 (H) 5 - 25 mg/dL    Creatinine 1.28 0.60 - 1.30 mg/dL    Glucose 163 (H) 65 - 140 mg/dL    Calcium 8.6 8.4 - 10.2 mg/dL    eGFR 60 ml/min/1.73sq m   ECG 12 lead   Result Value Ref Range    Ventricular Rate 84 BPM    Atrial Rate 84 BPM    WY Interval 144 ms    QRSD Interval 80 ms    QT Interval 404 ms    QTC Interval 477 ms    P Axis 55 degrees    QRS Axis 63 degrees    T Wave Axis 20 degrees   Fingerstick Glucose (POCT)   Result Value Ref Range    POC Glucose 134 65 - 140 mg/dl   Fingerstick Glucose (POCT)   Result Value Ref Range    POC Glucose 183 (H) 65 - 140 mg/dl   Fingerstick Glucose (POCT)    Result Value Ref Range    POC Glucose 342 (H) 65 - 140 mg/dl   Fingerstick Glucose (POCT)   Result Value Ref Range    POC Glucose 248 (H) 65 - 140 mg/dl   Fingerstick Glucose (POCT)   Result Value Ref Range    POC Glucose 236 (H) 65 - 140 mg/dl   Fingerstick Glucose (POCT)   Result Value Ref Range    POC Glucose 264 (H) 65 - 140 mg/dl   Fingerstick Glucose (POCT)   Result Value Ref Range    POC Glucose 302 (H) 65 - 140 mg/dl   Fingerstick Glucose (POCT)   Result Value Ref Range    POC Glucose 309 (H) 65 - 140 mg/dl   Fingerstick Glucose (POCT)   Result Value Ref Range    POC Glucose 173 (H) 65 - 140 mg/dl   Fingerstick Glucose (POCT)   Result Value Ref Range    POC Glucose 258 (H) 65 - 140 mg/dl   Fingerstick Glucose (POCT)   Result Value Ref Range    POC Glucose 332 (H) 65 - 140 mg/dl   Fingerstick Glucose (POCT)   Result Value Ref Range    POC Glucose 323 (H) 65 - 140 mg/dl   Fingerstick Glucose (POCT)   Result Value Ref Range    POC Glucose 155 (H) 65 - 140 mg/dl   Fingerstick Glucose (POCT)   Result Value Ref Range    POC Glucose 222 (H) 65 - 140 mg/dl       Orders Placed This Encounter   Procedures    XR hip/pelv 2-3 vws right if performed    XR spine lumbar minimum 4 views non injury       ALLERGIES:  Allergies[2]    Current Medications   Current Medications[3]      Health Maintenance     Health Maintenance   Topic Date Due    Pneumococcal Vaccine: Pediatrics (0 to 5 Years) and At-Risk Patients (6 to 64 Years) (1 of 2 - PCV) Never done    Zoster Vaccine (1 of 2) Never done    Annual Physical  12/22/2023    RSV Vaccine for Pregnant Patients and Patients Age 60+ Years (1 - Risk 60-74 years 1-dose series) Never done    COVID-19 Vaccine (4 - 2024-25 season) 09/01/2024    HEMOGLOBIN A1C  09/13/2025    Kidney Health Evaluation: Albumin/Creatinine Ratio  09/27/2025    Diabetic Foot Exam  09/27/2025    Diabetic Eye Exam  10/09/2025    Depression Screening  05/01/2026    Kidney Health Evaluation: GFR  05/11/2026     DTaP,Tdap,and Td Vaccines (3 - Td or Tdap) 09/05/2030    Colorectal Cancer Screening  01/17/2031    HIV Screening  Completed    Hepatitis C Screening  Completed    Influenza Vaccine  Completed    Meningococcal B Vaccine  Aged Out    RSV Vaccine age 0-20 Months  Aged Out    HIB Vaccine  Aged Out    IPV Vaccine  Aged Out    Hepatitis A Vaccine  Aged Out    Meningococcal ACWY Vaccine  Aged Out    HPV Vaccine  Aged Out     Immunization History   Administered Date(s) Administered    COVID-19 PFIZER VACCINE 0.3 ML IM 04/12/2021, 05/03/2021, 12/15/2021    INFLUENZA 09/23/2015, 10/21/2019, 12/22/2022, 10/31/2023    Influenza Recombinant Preservative Free Im 11/27/2024    Influenza, injectable, quadrivalent, preservative free 0.5 mL 10/27/2020, 10/31/2023    Influenza, recombinant, quadrivalent,injectable, preservative free 10/21/2019, 12/22/2022    Influenza, seasonal, injectable 09/23/2015    Tdap 10/21/2019, 09/05/2020       Linden Roque MD             [1]   Patient Active Problem List  Diagnosis    Essential hypertension    Type 2 diabetes mellitus with hyperglycemia (HCC)    Elevated lipoprotein(a)    Microalbuminuria    Morbid obesity due to excess calories (HCC)    Medical non-compliance    Alcohol dependence in remission (HCC)    Onychomycosis    Arthritis of right wrist    Impingement syndrome of left shoulder    Diabetes mellitus (HCC)    Acute midline low back pain without sciatica    Reactive airway disease without complication    Chronic heart failure with preserved ejection fraction (HCC)    AMANDO (obstructive sleep apnea)    Arthralgia    Intermittent asthma    Mild asthma with acute exacerbation    Current smoker    Chronic gout without tophus    Encounter for immunization    Stage 3 chronic kidney disease (HCC)    B12 deficiency    Abnormal CT of the abdomen    Abnormal TSH    Acute on chronic low back pain    DISH (diffuse idiopathic skeletal hyperostosis)    Anemia    Chronic bilateral low back pain with  right-sided sciatica    Cellulitis   [2] No Known Allergies  [3]   Current Outpatient Medications   Medication Sig Dispense Refill    albuterol (PROVENTIL HFA,VENTOLIN HFA) 90 mcg/act inhaler Inhale 1 puff in the morning 3 inhalers per refill 54 g 1    allopurinol (ZYLOPRIM) 100 mg tablet Take 1 tablet (100 mg total) by mouth daily 90 tablet 1    Blood Pressure KIT Twice a day periodically 1 each 0    bumetanide (BUMEX) 2 mg tablet Take 1 tablet (2 mg total) by mouth 2 (two) times a day Do not start before April 24, 2025. 60 tablet 0    carvedilol (COREG) 12.5 mg tablet Take 1 tablet (12.5 mg total) by mouth 2 (two) times a day with meals 60 tablet 0    cyanocobalamin (VITAMIN B-12) 1000 MCG tablet Take 1 tablet (1,000 mcg total) by mouth daily 30 tablet 0    famotidine (PEPCID) 20 mg tablet Take 1 tablet (20 mg total) by mouth daily at bedtime 90 tablet 1    Fluticasone-Salmeterol (Advair) 500-50 mcg/dose inhaler Inhale 1 puff 2 (two) times a day Rinse mouth after use. 60 blister 5    glimepiride (AMARYL) 1 mg tablet TAKE 1 TABLET BY MOUTH TWICE A  tablet 1    glucose blood test strip 1 each by Other route daily 180 each 5    ipratropium (ATROVENT) 0.02 % nebulizer solution Take 2.5 mL (0.5 mg total) by nebulization 3 (three) times a day 225 mL 5    Lancets (ONETOUCH ULTRASOFT) lancets Use      levalbuterol (XOPENEX) 1.25 mg/3 mL nebulizer solution Take 3 mL (1.25 mg total) by nebulization 3 (three) times a day 270 mL 0    lidocaine (LIDODERM) 5 % Apply 1 patch topically over 12 hours daily Remove & Discard patch within 12 hours or as directed by MD 10 patch 0    methocarbamol (ROBAXIN) 500 mg tablet Take 1 tablet (500 mg total) by mouth every 6 (six) hours as needed for muscle spasms 10 tablet 0    pantoprazole (PROTONIX) 40 mg tablet Take 1 tablet (40 mg total) by mouth daily before breakfast 90 tablet 3    polyethylene glycol (MIRALAX) 17 g packet Take 17 g by mouth daily as needed (Constipation) 10 each  0    spironolactone (ALDACTONE) 25 mg tablet Take 1 tablet (25 mg total) by mouth daily 90 tablet 1    traMADol-acetaminophen (ULTRACET) 37.5-325 mg per tablet Take 1 tablet by mouth 2 (two) times a day as needed for moderate pain 40 tablet 0    magnesium Oxide (MAG-OX) 400 mg TABS Take 2 tablets (800 mg total) by mouth 2 (two) times a day for 7 days 28 tablet 0     No current facility-administered medications for this visit.

## 2025-05-16 NOTE — ASSESSMENT & PLAN NOTE
Back pain.  Patient given prescription for Ultracet to use twice daily as needed.  He will obtain x-rays of hip and pelvis.  We will make further recommendations pending results of test.

## 2025-05-16 NOTE — ASSESSMENT & PLAN NOTE
Lab Results   Component Value Date    EGFR 60 05/11/2025    EGFR 66 05/10/2025    EGFR 66 05/09/2025    CREATININE 1.28 05/11/2025    CREATININE 1.17 05/10/2025    CREATININE 1.18 05/09/2025   Chronic kidney disease.  Most recent renal function test appear to be stable on current use of diuretics.

## 2025-05-16 NOTE — ASSESSMENT & PLAN NOTE
Cellulitis.  Lesions on bilateral lower extremities appear to be healing well.  Leukocytosis appears to be improving.

## 2025-05-19 NOTE — TELEPHONE ENCOUNTER
PA for TraMADol-acetaminophen (ULTRACET) 37.5-325 mg per tablet APPROVED     Date(s) approved May 16, 2025 to June 16, 2025     Case #: [id number]     Patient advised by          []1Rebelhart Message  [x]Phone call   []LMOM  [x]L/M to call office as no active Communication consent on file  []Unable to leave detailed message as VM not approved on Communication consent       Pharmacy advised by    []Fax  [x]Phone call- Paid claim received  []Secure Chat      Approval letter scanned into Media Yes

## 2025-05-21 ENCOUNTER — OFFICE VISIT (OUTPATIENT)
Dept: CARDIOLOGY CLINIC | Facility: CLINIC | Age: 60
End: 2025-05-21
Payer: COMMERCIAL

## 2025-05-21 VITALS
DIASTOLIC BLOOD PRESSURE: 62 MMHG | HEART RATE: 92 BPM | OXYGEN SATURATION: 100 % | SYSTOLIC BLOOD PRESSURE: 102 MMHG | BODY MASS INDEX: 37.37 KG/M2 | WEIGHT: 252.3 LBS | HEIGHT: 69 IN

## 2025-05-21 DIAGNOSIS — I50.9 ACUTE ON CHRONIC CONGESTIVE HEART FAILURE, UNSPECIFIED HEART FAILURE TYPE (HCC): Primary | ICD-10-CM

## 2025-05-21 DIAGNOSIS — J44.1 COPD EXACERBATION (HCC): ICD-10-CM

## 2025-05-21 DIAGNOSIS — N17.9 AKI (ACUTE KIDNEY INJURY) (HCC): ICD-10-CM

## 2025-05-21 PROCEDURE — 99214 OFFICE O/P EST MOD 30 MIN: CPT | Performed by: STUDENT IN AN ORGANIZED HEALTH CARE EDUCATION/TRAINING PROGRAM
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
## 2025-06-05 ENCOUNTER — HOSPITAL ENCOUNTER (EMERGENCY)
Facility: HOSPITAL | Age: 60
Discharge: HOME | End: 2025-06-05
Attending: STUDENT IN AN ORGANIZED HEALTH CARE EDUCATION/TRAINING PROGRAM
Payer: COMMERCIAL

## 2025-06-05 VITALS
DIASTOLIC BLOOD PRESSURE: 77 MMHG | OXYGEN SATURATION: 99 % | HEART RATE: 88 BPM | TEMPERATURE: 97.8 F | SYSTOLIC BLOOD PRESSURE: 131 MMHG | RESPIRATION RATE: 18 BRPM

## 2025-06-05 DIAGNOSIS — G89.29 CHRONIC RIGHT-SIDED LOW BACK PAIN WITH RIGHT-SIDED SCIATICA: Primary | ICD-10-CM

## 2025-06-05 DIAGNOSIS — M54.41 CHRONIC RIGHT-SIDED LOW BACK PAIN WITH RIGHT-SIDED SCIATICA: Primary | ICD-10-CM

## 2025-06-05 PROCEDURE — 99284 EMERGENCY DEPT VISIT MOD MDM: CPT | Performed by: STUDENT IN AN ORGANIZED HEALTH CARE EDUCATION/TRAINING PROGRAM

## 2025-06-05 PROCEDURE — 99282 EMERGENCY DEPT VISIT SF MDM: CPT

## 2025-06-05 RX ORDER — IBUPROFEN 400 MG/1
400 TABLET, FILM COATED ORAL ONCE
Status: COMPLETED | OUTPATIENT
Start: 2025-06-05 | End: 2025-06-05

## 2025-06-05 RX ORDER — LIDOCAINE 50 MG/G
1 PATCH TOPICAL ONCE
Status: DISCONTINUED | OUTPATIENT
Start: 2025-06-05 | End: 2025-06-05 | Stop reason: HOSPADM

## 2025-06-05 RX ADMIN — IBUPROFEN 400 MG: 400 TABLET ORAL at 14:26

## 2025-06-05 RX ADMIN — LIDOCAINE 1 PATCH: 50 PATCH CUTANEOUS at 14:26

## 2025-06-05 NOTE — ED PROVIDER NOTES
"Time reflects when diagnosis was documented in both MDM as applicable and the Disposition within this note       Time User Action Codes Description Comment    6/5/2025  2:21 PM [place], Patria Add [M54.41,  G89.29] Chronic right-sided low back pain with right-sided sciatica           ED Disposition       ED Disposition   Discharge    Condition   Stable    Date/Time   Thu Jun 5, 2025  2:22 PM    Comment   Elly Lugoney discharge to home/self care.                   Assessment & Plan       Medical Decision Making  60-year-old male with history of CHF, hypertension, diabetes, CKD, AMANDO, asthma who presents with 2 concerns.  Firstly he is having pain in his right back with radiation down to his left leg that caused his \"leg to give out\" secondary to pain earlier today without new numbness/tingling/weakness.  Secondly his wife is also concerned that he has had numerous admissions to the hospital in the last few months due to falls, infection, and other concerns and that when he was recently admitted to the hospital he had \"a high white blood cell count\" and she is concerned that he could have an occult malignancy.  She states that patient occasionally slurs his speech but patient denies slurred speech today.  Patient denies any other symptoms at this time and specifically has no red flag symptoms for back pain, that is no fevers/chills, saddle anesthesia, urinary/bowel incontinence, numbness/tingling/weakness, hx of trauma, malignancy, or IVDU.    Vital signs stable and afebrile.  No midline spinal TTP/stepoffs/deformities.  Cranial nerves II through XII intact, PERRLA, EOMI, no field cuts, 5 out of 5 strength and sensation intact to light touch throughout with no dysmetria and with normal gait. No dysarthria or aphasia with intact naming and repetition.      Differential diagnosis includes but is not limited to: musculoskeletal strain, sciatica, chronic back pain.  Doubt CVA or occult malignancy    Workup and treatment as " "below:    Imaging: N/A  EKG: N/A  Labs: N/A  Meds: analgesia  Consults: N/A  Reassessment: N/A    Dispo: Patient was discharged to home with strict return precautions and referral to comprehensive spine program. Patient acknowledged understanding of plan and diagnostic results, and all their questions were answered to their satisfaction.       Risk  Prescription drug management.             Medications   ibuprofen (MOTRIN) tablet 400 mg (400 mg Oral Given 6/5/25 1426)       ED Risk Strat Scores                    No data recorded                            History of Present Illness       Chief Complaint   Patient presents with    Leg Pain     Pt c/o right buttocks pain that shoots down right leg, was seen 3 weeks ago but no relief. Pt c/o worsening pain and knee giving out. Took tylenol @0930       Past Medical History[1]   Past Surgical History[2]   Family History[3]   Social History[4]   E-Cigarette/Vaping    E-Cigarette Use Never User       E-Cigarette/Vaping Substances    Nicotine No     THC No     CBD No     Flavoring No     Other No     Unknown No       I have reviewed and agree with the history as documented.     60-year-old male with history of CHF, hypertension, diabetes, CKD, AMANDO, asthma who presents with 2 concerns.  Firstly he is having pain in his right back with radiation down to his left leg that caused his \"leg to give out\" secondary to pain earlier today without new numbness/tingling/weakness.  Secondly his wife is also concerned that he has had numerous admissions to the hospital in the last few months due to falls, infection, and other concerns and that when he was recently admitted to the hospital he had \"a high white blood cell count\" and she is concerned that he could have an occult malignancy.  She states that patient occasionally slurs his speech but patient denies slurred speech today.  Patient denies any other symptoms at this time and specifically has no red flag symptoms for back pain, " that is no fevers/chills, saddle anesthesia, urinary/bowel incontinence, numbness/tingling/weakness, hx of trauma, malignancy, or IVDU.      Leg Pain  Associated symptoms: back pain    Associated symptoms: no fever        Review of Systems   Constitutional:  Negative for chills and fever.   HENT:  Negative for ear pain and sore throat.    Eyes:  Negative for pain and visual disturbance.   Respiratory:  Negative for cough and shortness of breath.    Cardiovascular:  Negative for chest pain and palpitations.   Gastrointestinal:  Negative for abdominal pain, blood in stool, constipation, diarrhea, nausea and vomiting.   Genitourinary:  Negative for dysuria and hematuria.   Musculoskeletal:  Positive for back pain. Negative for arthralgias.   Skin:  Negative for color change and rash.   Neurological:  Positive for speech difficulty. Negative for dizziness, seizures, syncope, facial asymmetry, weakness, light-headedness, numbness and headaches.   All other systems reviewed and are negative.          Objective       ED Triage Vitals [06/05/25 1219]   Temperature Pulse Blood Pressure Respirations SpO2 Patient Position - Orthostatic VS   97.8 °F (36.6 °C) 88 131/77 18 99 % Sitting      Temp Source Heart Rate Source BP Location FiO2 (%) Pain Score    Oral Monitor Right arm -- 5      Vitals      Date and Time Temp Pulse SpO2 Resp BP Pain Score FACES Pain Rating User   06/05/25 1426 -- -- -- -- -- 10 - Worst Possible Pain -- TB   06/05/25 1219 97.8 °F (36.6 °C) 88 99 % 18 131/77 5 -- JA            Physical Exam  Vitals and nursing note reviewed.   Constitutional:       General: He is not in acute distress.     Appearance: He is not ill-appearing or toxic-appearing.   HENT:      Head: Normocephalic.     Cardiovascular:      Rate and Rhythm: Normal rate and regular rhythm.      Heart sounds: Normal heart sounds.   Pulmonary:      Effort: Pulmonary effort is normal.      Breath sounds: Normal breath sounds.   Abdominal:       Palpations: Abdomen is soft.      Tenderness: There is no abdominal tenderness.     Musculoskeletal:      Right lower leg: No edema.      Left lower leg: No edema.      Comments: No midline spinal TTP/stepoffs/deformities. 5/5 strength with SILT throughout bilateral lower extremities.     Skin:     General: Skin is warm.      Capillary Refill: Capillary refill takes less than 2 seconds.     Neurological:      Mental Status: He is alert and oriented to person, place, and time.      Comments: Cranial nerves II through XII intact, PERRLA, EOMI, no field cuts, 5 out of 5 strength and sensation intact to light touch throughout with no dysmetria and with normal gait. No dysarthria or aphasia with intact naming and repetition.   Psychiatric:         Mood and Affect: Mood normal.         Results Reviewed       None            No orders to display       Procedures    ED Medication and Procedure Management   Prior to Admission Medications   Prescriptions Last Dose Informant Patient Reported? Taking?   Blood Pressure KIT  Self No No   Sig: Twice a day periodically   Fluticasone-Salmeterol (Advair) 500-50 mcg/dose inhaler  Self No No   Sig: Inhale 1 puff 2 (two) times a day Rinse mouth after use.   Lancets (ONETOUCH ULTRASOFT) lancets  Self Yes No   Sig: Use   albuterol (PROVENTIL HFA,VENTOLIN HFA) 90 mcg/act inhaler  Self No No   Sig: Inhale 1 puff in the morning 3 inhalers per refill   allopurinol (ZYLOPRIM) 100 mg tablet  Self No No   Sig: Take 1 tablet (100 mg total) by mouth daily   bumetanide (BUMEX) 2 mg tablet  Self No No   Sig: Take 1 tablet (2 mg total) by mouth 2 (two) times a day Do not start before April 24, 2025.   carvedilol (COREG) 12.5 mg tablet  Self No No   Sig: Take 1 tablet (12.5 mg total) by mouth 2 (two) times a day with meals   cyanocobalamin (VITAMIN B-12) 1000 MCG tablet  Self No No   Sig: Take 1 tablet (1,000 mcg total) by mouth daily   famotidine (PEPCID) 20 mg tablet  Self No No   Sig: Take 1 tablet  (20 mg total) by mouth daily at bedtime   glimepiride (AMARYL) 1 mg tablet  Self No No   Sig: TAKE 1 TABLET BY MOUTH TWICE A DAY   Patient not taking: Reported on 2025   glucose blood test strip  Self No No   Si each by Other route daily   ipratropium (ATROVENT) 0.02 % nebulizer solution   No No   Sig: Take 2.5 mL (0.5 mg total) by nebulization 3 (three) times a day   levalbuterol (XOPENEX) 1.25 mg/3 mL nebulizer solution  Self No No   Sig: Take 3 mL (1.25 mg total) by nebulization 3 (three) times a day   lidocaine (LIDODERM) 5 %  Self No No   Sig: Apply 1 patch topically over 12 hours daily Remove & Discard patch within 12 hours or as directed by MD   magnesium Oxide (MAG-OX) 400 mg TABS  Self No No   Sig: Take 2 tablets (800 mg total) by mouth 2 (two) times a day for 7 days   methocarbamol (ROBAXIN) 500 mg tablet  Self No No   Sig: Take 1 tablet (500 mg total) by mouth every 6 (six) hours as needed for muscle spasms   pantoprazole (PROTONIX) 40 mg tablet  Self No No   Sig: Take 1 tablet (40 mg total) by mouth daily before breakfast   polyethylene glycol (MIRALAX) 17 g packet  Self No No   Sig: Take 17 g by mouth daily as needed (Constipation)   spironolactone (ALDACTONE) 25 mg tablet  Self No No   Sig: Take 1 tablet (25 mg total) by mouth daily   traMADol-acetaminophen (ULTRACET) 37.5-325 mg per tablet  Self No No   Sig: Take 1 tablet by mouth 2 (two) times a day as needed for moderate pain      Facility-Administered Medications: None     Discharge Medication List as of 2025  2:22 PM        CONTINUE these medications which have NOT CHANGED    Details   albuterol (PROVENTIL HFA,VENTOLIN HFA) 90 mcg/act inhaler Inhale 1 puff in the morning 3 inhalers per refill, Starting 2025, Normal      allopurinol (ZYLOPRIM) 100 mg tablet Take 1 tablet (100 mg total) by mouth daily, Starting 2025, Normal      Blood Pressure KIT Twice a day periodically, Normal      bumetanide (BUMEX) 2 mg tablet  Take 1 tablet (2 mg total) by mouth 2 (two) times a day Do not start before April 24, 2025., Starting Thu 4/24/2025, Normal      carvedilol (COREG) 12.5 mg tablet Take 1 tablet (12.5 mg total) by mouth 2 (two) times a day with meals, Starting Wed 4/23/2025, Normal      cyanocobalamin (VITAMIN B-12) 1000 MCG tablet Take 1 tablet (1,000 mcg total) by mouth daily, Starting Mon 5/12/2025, Normal      famotidine (PEPCID) 20 mg tablet Take 1 tablet (20 mg total) by mouth daily at bedtime, Starting Thu 3/13/2025, Normal      Fluticasone-Salmeterol (Advair) 500-50 mcg/dose inhaler Inhale 1 puff 2 (two) times a day Rinse mouth after use., Starting Thu 3/13/2025, Until Tue 9/9/2025, Normal      glimepiride (AMARYL) 1 mg tablet TAKE 1 TABLET BY MOUTH TWICE A DAY, Starting Wed 12/18/2024, Normal      glucose blood test strip 1 each by Other route daily, Starting Tue 11/3/2020, Normal      ipratropium (ATROVENT) 0.02 % nebulizer solution Take 2.5 mL (0.5 mg total) by nebulization 3 (three) times a day, Starting Thu 3/13/2025, Until Fri 5/16/2025, Normal      Lancets (ONETOUCH ULTRASOFT) lancets Use, Starting Thu 12/28/2017, Historical Med      levalbuterol (XOPENEX) 1.25 mg/3 mL nebulizer solution Take 3 mL (1.25 mg total) by nebulization 3 (three) times a day, Starting Sat 11/16/2024, Normal      lidocaine (LIDODERM) 5 % Apply 1 patch topically over 12 hours daily Remove & Discard patch within 12 hours or as directed by MD, Starting Mon 5/12/2025, Normal      magnesium Oxide (MAG-OX) 400 mg TABS Take 2 tablets (800 mg total) by mouth 2 (two) times a day for 7 days, Starting Wed 4/23/2025, Until Wed 5/21/2025, Normal      methocarbamol (ROBAXIN) 500 mg tablet Take 1 tablet (500 mg total) by mouth every 6 (six) hours as needed for muscle spasms, Starting Sun 5/11/2025, Normal      pantoprazole (PROTONIX) 40 mg tablet Take 1 tablet (40 mg total) by mouth daily before breakfast, Starting Fri 1/19/2024, Normal      polyethylene  glycol (MIRALAX) 17 g packet Take 17 g by mouth daily as needed (Constipation), Starting Sun 5/11/2025, Normal      spironolactone (ALDACTONE) 25 mg tablet Take 1 tablet (25 mg total) by mouth daily, Starting Tue 7/2/2024, Until Wed 5/21/2025, Normal      traMADol-acetaminophen (ULTRACET) 37.5-325 mg per tablet Take 1 tablet by mouth 2 (two) times a day as needed for moderate pain, Starting Fri 5/16/2025, Normal             ED SEPSIS DOCUMENTATION   Time reflects when diagnosis was documented in both MDM as applicable and the Disposition within this note       Time User Action Codes Description Comment    6/5/2025  2:21 PM Patria Zuniga Add [M54.41,  G89.29] Chronic right-sided low back pain with right-sided sciatica                    [1]   Past Medical History:  Diagnosis Date    Acute gout of right hand 10/17/2022    Acute kidney injury (HCC) 09/05/2020    Acute on chronic diastolic congestive heart failure (HCC) 08/31/2021    DELFINO (acute kidney injury) (HCC) 09/05/2020    Asthma     Cellulitis 09/27/2024    CHF (congestive heart failure) ([facility])     Chronic heart failure with preserved ejection fraction (HCC) 07/06/2020    CKD (chronic kidney disease) 04/15/2025    Colon polyp     GERD (gastroesophageal reflux disease)     Hypertension     Inguinal hernia     3/25/15    Onychomycosis     5/24/17    Sleep apnea     Type 2 diabetes mellitus (HCC) 05/01/2012   [2]   Past Surgical History:  Procedure Laterality Date    ARTHROSCOPY KNEE      with Medial and Lateral Meniscus Repair    HERNIA REPAIR      umbilical and inguinal hernia repairs (left)   [3]   Family History  Problem Relation Name Age of Onset    Hypertension Mother          essential    Chronic bronchitis Father      Prostate cancer Father      Colon cancer Father      Breast cancer Sister     [4]   Social History  Tobacco Use    Smoking status: Former     Types: Cigars    Smokeless tobacco: Never    Tobacco comments:     current every day smoker, per  [employer]   Vaping Use    Vaping status: Never Used   Substance Use Topics    Alcohol use: Yes     Alcohol/week: 35.0 standard drinks of alcohol     Types: 21 Cans of beer, 14 Shots of liquor per week     Comment: 3 beers daily and 2 shots daily (as of 04/2025)    Drug use: No        Patria Zuniga MD  06/05/25 4351

## 2025-06-06 ENCOUNTER — NURSE TRIAGE (OUTPATIENT)
Dept: PHYSICAL THERAPY | Facility: OTHER | Age: 60
End: 2025-06-06

## 2025-06-06 DIAGNOSIS — G89.29 ACUTE EXACERBATION OF CHRONIC LOW BACK PAIN: ICD-10-CM

## 2025-06-06 DIAGNOSIS — M54.50 ACUTE EXACERBATION OF CHRONIC LOW BACK PAIN: ICD-10-CM

## 2025-06-06 DIAGNOSIS — M54.41 ACUTE BILATERAL LOW BACK PAIN WITH RIGHT-SIDED SCIATICA: Primary | ICD-10-CM

## 2025-06-06 NOTE — TELEPHONE ENCOUNTER
Additional Information   Negative: Has the patient had unexplained weight loss?   Negative: Does the patient have a fever?   Negative: Is the patient experiencing urine retention?   Negative: Is the patient experiencing blood in sputum?   Negative: Has the patient experienced major trauma? (fall from height, high speed collision, direct blow to spine) and is also experiencing nausea, light-headedness, or loss of consciousness?   Negative: Is the patient experiencing acute drop foot or paralysis?   Negative: Is this a chronic condition?    Protocols used: [facility] Protocol  Patient stated his pain worsened after the fall on 5/11/25. New issues regarding activity restriction w/ ADL's and putting his clothes on. Nurse assured him that he can discuss any new concerns along with his chronic complaints at the evaluation. Patient understood & agreed.  Nurse completed the triage and NO RF s/s were present.  Referral entered for the [place] site and the contact/phone number information for the office was given to the patient as well.   Patients information was sent to the preferred site and pt was made aware that clerical would be calling to schedule the evaluation appointment. Nurse encouraged him to call the site if he does not hear from clerical beforehand. Patient Agreed.  Nurse also offered a call from the  counselor d/t SBT score. Patient declined. Patient was pleasant and appropriate during this encounter.   Patient did not voice any additional questions or concerns at this time.   Patient is aware current/past complaints,  any relevant dx, additional referrals and treatment/options will be discussed at the time of his evaluation/consultation appointment.   Patient is in agreement with plan.    Patient was very appreciative of the f/u call to complete his triage and referral placement.   Nurse wished him well and the  referral was closed.

## 2025-06-06 NOTE — TELEPHONE ENCOUNTER
"Additional Information   Negative: Is this related to a work injury?   Negative: Is this related to an MVA?   Negative: Are you currently recieving homecare services?    Background - Initial Assessment  Clinical complaint: Pain is bilat low back R>L, radiates down right buttock and leg to the knee. NKI Pt states he has had back pain \"for awhile now\", but does not get treated normally, just deals with the pain. States this pain worsened approx 2 weeks ago. Pt was admitted to the hospital 5/7/25 for various issues. Followed up after discharge with his PCP on 5/16/25. Pt feels like his pain worsened and his knee gave out. Did fall on his right side in his yard on Mother's Day(5/11/25). Pt has no new numbness, does have neuropathy. No prior back SX. Pain is constant and feels \"like a muscle pull that won't go away\".Pt states no positions help with the pain. And he is having difficulty sleeping.  Seen in ED 6/5/25  Date of onset: May 2025, worse last 2 weeks  Frequency of pain: constant  Quality of pain: muscle pull    Protocols used: [facility] Protocol    " "Chief Complaint   Patient presents with     Urinary Problem     /80 (Cuff Size: Adult Large)   Pulse 65   Temp 97.4  F (36.3  C) (Tympanic)   Resp 18   Ht 1.842 m (6' 0.5\")   Wt 114.3 kg (252 lb)   SpO2 97%   BMI 33.71 kg/m   Estimated body mass index is 33.71 kg/m  as calculated from the following:    Height as of this encounter: 1.842 m (6' 0.5\").    Weight as of this encounter: 114.3 kg (252 lb).  Patient presents to the clinic using No DME      Health Maintenance that is potentially due pending provider review:    Health Maintenance Due   Topic Date Due     HEPATITIS B IMMUNIZATION (1 of 3 - 3-dose series) Never done     COVID-19 Vaccine (1) Never done     ZOSTER IMMUNIZATION (1 of 2) Never done                "

## 2025-06-18 ENCOUNTER — EVALUATION (OUTPATIENT)
Dept: PHYSICAL THERAPY | Facility: CLINIC | Age: 60
End: 2025-06-18
Attending: PHYSICAL THERAPIST
Payer: COMMERCIAL

## 2025-06-18 DIAGNOSIS — M54.50 ACUTE EXACERBATION OF CHRONIC LOW BACK PAIN: ICD-10-CM

## 2025-06-18 DIAGNOSIS — G89.29 ACUTE EXACERBATION OF CHRONIC LOW BACK PAIN: ICD-10-CM

## 2025-06-18 DIAGNOSIS — M54.41 ACUTE BILATERAL LOW BACK PAIN WITH RIGHT-SIDED SCIATICA: Primary | ICD-10-CM

## 2025-06-18 PROCEDURE — 97161 PT EVAL LOW COMPLEX 20 MIN: CPT | Performed by: PHYSICAL THERAPIST

## 2025-06-18 PROCEDURE — 97112 NEUROMUSCULAR REEDUCATION: CPT | Performed by: PHYSICAL THERAPIST

## 2025-06-18 PROCEDURE — 97110 THERAPEUTIC EXERCISES: CPT | Performed by: PHYSICAL THERAPIST

## 2025-06-18 NOTE — PROGRESS NOTES
PT Evaluation     Today's date: 2025  Patient name: Elly Cohng  : 1965  MRN: [id number]  Referring provider: Josh Medley PT  Dx:   Encounter Diagnosis     ICD-10-CM    1. Acute bilateral low back pain with right-sided sciatica  M54.41 Ambulatory referral to PT spine      2. Acute exacerbation of chronic low back pain  M54.50 Ambulatory referral to PT spine    G89.29                      Assessment  Impairments: abnormal or restricted ROM, abnormal movement, activity intolerance, impaired physical strength, lacks appropriate home exercise program and pain with function  Symptom irritability: moderate    Assessment details: Elly Chong is a pleasant 60 y.o. male who presents with acute low back pain with associated RLE symptoms. Patient has high symptom irritability level which limits physical exam. Mechanical assessment of the lumbar spine is mechanically inconclusive, but suggestive of unloaded lumbar extension directional preference. This is indicated by significant limitation of extension and increase in radicular symptom with lumbar flexion. I advised him in a mid range lumbar extension loading program for HEP. I also discussed sitting posture with lumbar support. Patient was receptive to this discussion and verbalized understanding. Primary movement impairment diagnosis of high symptom irritability, lumbar spine hypomobility. I plan to continue to mechanically evaluate the lumbar spine in future visits to establish a more positive response to repeated movements. No further referral appears necessary at this time based upon examination results. Pt. will benefit from skilled PT services to help return patient to status at PLOF.             Understanding of Dx/Px/POC: good     Prognosis: good    Goals  Impairment based goals  Patient will achieve full lumbar flexion AROM.   Patient will achieve full lumbar extension AROM.   Patient will report 50% reduction in VAS at worst.     Function  "based goals  Patient will be independent in comprehensive HEP upon discharge.  Patient will achieve goal FOTO score upon discharge.  Patient will tolerate work related tasks without limitation.     Plan  Patient would benefit from: skilled physical therapy    Planned therapy interventions: activity modification, manual therapy, motor coordination training, neuromuscular re-education, patient education, self care, therapeutic activities, therapeutic exercise, graded activity, home exercise program, graded exercise, functional ROM exercises and strengthening    Frequency: 2x week  Duration in weeks: 8  Plan of Care expiration date: 8/13/2025  Treatment plan discussed with: patient        Subjective    HPI: Patient reports acute exacerbation of chronic low back pain and RLE radicular symptoms. Current episode of symptoms has been 3 months: R sided low back pain, R anterior thigh \"tightness and pulling\". He was in the hospital from 5/7/25 to 5/12/25 for cellulitis. He did have a fall about 2 weeks ago; fell onto the R side and feels this made his pain worse.    Pain: 5/10 current; 10/10 worst   Aggravating Factors: R sidelying, walking, forward bending   Alleviating Factors: sitting   Goals: decrease pain, increase independence, tolerate job related tasks without limitation      Objective    Lower Quarter Screen:  Myotomes  Strength WNL bilaterally.    Dermatomes  R Medial Knee (L3): decreased      Reflexes  R Patellar Reflex: 2+  L Patellar Reflex: 2+  R Achilles Reflex: 2+  L Achilles Reflex: 2+    Lumbar Active Range of Motion  Movement Loss Symptoms Nikhil Mod Min Nil Symptoms   Flexion  x   Produce  Worse; R lumbar and R thigh   Extension x    Produce  No Worse; R lumbar   R SG  x   Produce  No Worse   L SG  x   Produce  No Worse     Other          Rodney Assessment:  DE holds (approx 10 sec):   RFIStanding: increase; worse (R lumbar and RLE)  LOC: increase; worse (R lumbar and RLE)    Comparable sign:   Flexion " AROM  Extension AROM  Decreased R L3 dermatome              Insurance Billing Rule ReEval POC expires Auth Status PT/OT + Visit Limit?   [employer]   8/13/25 N/a 60 visit PCY                                       Visit/Unit Tracking  AUTH Status: n/a Date 6/18                    Visits  60 Used 1                     Remaining  59                      Pertinent Findings:      POC End Date: 8/13/25                                                                                          Test / Measure  6/18/2025     FOTO (Predicted 55) 32   Lumbar flexion AROM Mod limitation; R lumbar, RLE   Lumbar extension AROM Nikhil limitation; R lumbar   VAS at worst 10/10       Precautions: HTN, DMII, CKD      Manuals 6/18            Lumbar CPA TB; gr. IV                                                   Neuro Re-Ed             Hooklying Pball crush             Bridge                                                                 Education HEP and POC            Ther Ex             Prone prop 5 min; HEP            Rep EXT in sitting 20x; HEP            LOC             REIL             Prone table extension                                                    Ther Activity                                       Gait Training                                       Modalities

## 2025-06-20 ENCOUNTER — OFFICE VISIT (OUTPATIENT)
Dept: FAMILY MEDICINE CLINIC | Facility: CLINIC | Age: 60
End: 2025-06-20
Payer: COMMERCIAL

## 2025-06-20 VITALS
BODY MASS INDEX: 37.29 KG/M2 | WEIGHT: 251.8 LBS | HEART RATE: 98 BPM | OXYGEN SATURATION: 96 % | RESPIRATION RATE: 20 BRPM | TEMPERATURE: 98.5 F | DIASTOLIC BLOOD PRESSURE: 70 MMHG | SYSTOLIC BLOOD PRESSURE: 128 MMHG | HEIGHT: 69 IN

## 2025-06-20 DIAGNOSIS — M25.50 ARTHRALGIA, UNSPECIFIED JOINT: Primary | ICD-10-CM

## 2025-06-20 DIAGNOSIS — M48.10 DISH (DIFFUSE IDIOPATHIC SKELETAL HYPEROSTOSIS): ICD-10-CM

## 2025-06-20 PROCEDURE — 99213 OFFICE O/P EST LOW 20 MIN: CPT | Performed by: FAMILY MEDICINE

## 2025-06-20 RX ORDER — METHOCARBAMOL 500 MG/1
500 TABLET, FILM COATED ORAL 3 TIMES DAILY PRN
Qty: 30 TABLET | Refills: 1 | Status: SHIPPED | OUTPATIENT
Start: 2025-06-20

## 2025-06-20 RX ORDER — PREDNISONE 20 MG/1
TABLET ORAL
Qty: 11 TABLET | Refills: 0 | Status: SHIPPED | OUTPATIENT
Start: 2025-06-20

## 2025-06-20 NOTE — PROGRESS NOTES
FAMILY PRACTICE OFFICE VISIT       NAME: Elly Chong  AGE: 60 y.o. SEX: male       : 1965        MRN: [id number]    DATE: 2025  TIME: 2:55 PM    Assessment and Plan     Problem List Items Addressed This Visit       Arthralgia - Primary    Arthralgia.  I suspect symptoms are multifactorial.  He does have a history of osteoarthritis.  He was given prescription for prednisone tapering dose of 40 mg x 3 days, 20 x 3, 10 x 4 days.  He was also given methocarbamol 500 mg to take 3 times daily as needed.  He was given prescriptions to check x-rays of hips and lumbar spine.  We will make further recommendations pending results of test.         Relevant Medications    predniSONE 20 mg tablet    DISH (diffuse idiopathic skeletal hyperostosis)    Relevant Medications    methocarbamol (ROBAXIN) 500 mg tablet    predniSONE 20 mg tablet           Chief Complaint     Chief Complaint   Patient presents with    Back Pain     Lower back & leg pain       History of Present Illness     Patient in the office with a couple weeks history of increasing arthralgias more so on his low back and neck area.  Patient has tried physical therapy on 2 visits but felt therapist could not do much with him due to his continued discomfort.  Patient was seen in early  in the emergency room after sustaining a fall.  Patient had not gone for x-rays as requested previously.    Back Pain        Review of Systems   Review of Systems   Constitutional: Negative.    Musculoskeletal:  Positive for back pain, neck pain and neck stiffness.       Active Problem List   Problem List[1]    Past Medical History:  Past Medical History[2]    Past Surgical History:  Past Surgical History[3]    Family History:  Family History[4]    Social History:  Social History     Socioeconomic History    Marital status: /Civil Union     Spouse name: Not on file    Number of children: Not on file    Years of education: Not on file    Highest education level:  Not on file   Occupational History    Occupation: [employer]    Tobacco Use    Smoking status: Former     Types: Cigars    Smokeless tobacco: Never    Tobacco comments:     current every day smoker, per [employer]   Vaping Use    Vaping status: Never Used   Substance and Sexual Activity    Alcohol use: Yes     Alcohol/week: 35.0 standard drinks of alcohol     Types: 21 Cans of beer, 14 Shots of liquor per week     Comment: 3 beers daily and 2 shots daily (as of 04/2025)    Drug use: No    Sexual activity: Yes   Other Topics Concern    Not on file   Social History Narrative    Alcohol dependence    Nicotine dependence    Denied, alcohol Use    Marital problems     Social Drivers of Health     Financial Resource Strain: Not on file   Food Insecurity: No Food Insecurity (5/7/2025)    Nursing - Inadequate Food Risk Classification     Worried About Running Out of Food in the Last Year: Never true     Ran Out of Food in the Last Year: Never true     Ran Out of Food in the Last Year: Never true   Transportation Needs: No Transportation Needs (5/7/2025)    Nursing - Transportation Risk Classification     Lack of Transportation: Not on file     Lack of Transportation: No   Physical Activity: Not on file   Stress: Not on file   Social Connections: Not on file   Intimate Partner Violence: Unknown (5/7/2025)    Nursing IPS     Feels Physically and Emotionally Safe: Not on file     Physically Hurt by Someone: Not on file     Humiliated or Emotionally Abused by Someone: Not on file     Physically Hurt by Someone: No     Hurt or Threatened by Someone: No   Housing Stability: Unknown (5/7/2025)    Nursing: Inadequate Housing Risk Classification     Has Housing: Not on file     Worried About Losing Housing: Not on file     Unable to Get Utilities: Not on file     Unable to Pay for Housing in the Last Year: No     Has Housing: No       Objective     Vitals:    06/20/25 1400   BP: 128/70   Pulse: 98   Resp: 20   Temp: 98.5  °F (36.9 °C)   SpO2: 96%     Wt Readings from Last 3 Encounters:   06/20/25 114 kg (251 lb 12.8 oz)   05/21/25 114 kg (252 lb 4.8 oz)   05/16/25 115 kg (252 lb 9.6 oz)       Physical Exam  Constitutional:       General: He is not in acute distress.     Appearance: Normal appearance. He is not ill-appearing.     Musculoskeletal:      Comments: Patient with decreased range of motion of cervical spine.  Negative spinal tenderness to palpation.  Negative straight leg raising bilateral lower extremities.  Patient ambulates gingerly with a walking cane.     Neurological:      Mental Status: He is alert.         Pertinent Laboratory/Diagnostic Studies:  Lab Results   Component Value Date    GLUCOSE 179 (H) 09/04/2020    BUN 36 (H) 05/11/2025    CREATININE 1.28 05/11/2025    CALCIUM 8.6 05/11/2025     12/21/2017    K 3.5 05/11/2025    CO2 34 (H) 05/11/2025    CL 97 05/11/2025     Lab Results   Component Value Date    ALT 29 05/10/2025    AST 53 (H) 05/10/2025    ALKPHOS 108 (H) 05/10/2025    BILITOT 1.3 (H) 12/21/2017       Lab Results   Component Value Date    WBC 12.97 (H) 05/11/2025    HGB 10.9 (L) 05/11/2025    HCT 33.1 (L) 05/11/2025    MCV 96 05/11/2025     05/11/2025       Lab Results   Component Value Date    TSH 3.58 03/14/2022       Lab Results   Component Value Date    CHOL 153 12/21/2017     Lab Results   Component Value Date    TRIG 69 11/21/2023     Lab Results   Component Value Date    HDL 36 (L) 11/21/2023     Lab Results   Component Value Date    LDLCALC 70 11/21/2023     Lab Results   Component Value Date    HGBA1C 6.4 03/13/2025       Results for orders placed or performed during the hospital encounter of 05/07/25   FLU/COVID Rapid Antigen (30 min. TAT) - Preferred screening test in ED    Specimen: Nose; Nares   Result Value Ref Range    SARS COV Rapid Antigen Negative Negative    Influenza A Rapid Antigen Negative Negative    Influenza B Rapid Antigen Negative Negative   Blood culture #1     "Specimen: Arm, Right; Blood   Result Value Ref Range    Blood Culture No Growth After 5 Days.    Blood culture #2    Specimen: Arm, Left; Blood   Result Value Ref Range    Blood Culture No Growth After 5 Days.    CBC and differential   Result Value Ref Range    WBC 13.09 (H) 4.31 - 10.16 Thousand/uL    RBC 3.94 3.88 - 5.62 Million/uL    Hemoglobin 12.5 12.0 - 17.0 g/dL    Hematocrit 37.6 36.5 - 49.3 %    MCV 95 82 - 98 fL    MCH 31.7 26.8 - 34.3 pg    MCHC 33.2 31.4 - 37.4 g/dL    RDW 13.0 11.6 - 15.1 %    MPV 10.1 8.9 - 12.7 fL    Platelets 288 149 - 390 Thousands/uL    nRBC 0 /100 WBCs    Segmented % 67 43 - 75 %    Immature Grans % 1 0 - 2 %    Lymphocytes % 18 14 - 44 %    Monocytes % 13 (H) 4 - 12 %    Eosinophils Relative 1 0 - 6 %    Basophils Relative 0 0 - 1 %    Absolute Neutrophils 8.83 (H) 1.85 - 7.62 Thousands/µL    Absolute Immature Grans 0.06 0.00 - 0.20 Thousand/uL    Absolute Lymphocytes 2.30 0.60 - 4.47 Thousands/µL    Absolute Monocytes 1.71 (H) 0.17 - 1.22 Thousand/µL    Eosinophils Absolute 0.14 0.00 - 0.61 Thousand/µL    Basophils Absolute 0.05 0.00 - 0.10 Thousands/µL   Comprehensive metabolic panel   Result Value Ref Range    Sodium 133 (L) 135 - 147 mmol/L    Potassium 3.7 3.5 - 5.3 mmol/L    Chloride 89 (L) 96 - 108 mmol/L    CO2 34 (H) 21 - 32 mmol/L    ANION GAP 10 4 - 13 mmol/L    BUN 25 5 - 25 mg/dL    Creatinine 1.65 (H) 0.60 - 1.30 mg/dL    Glucose 183 (H) 65 - 140 mg/dL    Calcium 9.1 8.4 - 10.2 mg/dL    AST 31 13 - 39 U/L    ALT 20 7 - 52 U/L    Alkaline Phosphatase 122 (H) 34 - 104 U/L    Total Protein 7.8 6.4 - 8.4 g/dL    Albumin 3.6 3.5 - 5.0 g/dL    Total Bilirubin 2.46 (H) 0.20 - 1.00 mg/dL    eGFR 44 ml/min/1.73sq m   HS Troponin 0hr (reflex protocol)   Result Value Ref Range    hs TnI 0hr 10 \"Refer to ACS Flowchart\"- see link ng/L   TSH, 3rd generation with Free T4 reflex   Result Value Ref Range    TSH 3RD GENERATION 9.598 (H) 0.450 - 4.500 uIU/mL   Lactic acid, plasma " "(w/reflex if result > 2.0)   Result Value Ref Range    LACTIC ACID 1.4 0.5 - 2.0 mmol/L   Protime-INR   Result Value Ref Range    Protime 16.1 (H) 12.3 - 15.0 seconds    INR 1.22 (H) 0.85 - 1.19   APTT   Result Value Ref Range    PTT 35 (H) 23 - 34 seconds   Magnesium   Result Value Ref Range    Magnesium 1.3 (L) 1.9 - 2.7 mg/dL   UA w Reflex to Microscopic w Reflex to Culture    Specimen: Urine, Clean Catch   Result Value Ref Range    Color, UA Yellow     Clarity, UA Clear     Specific Gravity, UA 1.018 1.003 - 1.030    pH, UA 5.5 4.5, 5.0, 5.5, 6.0, 6.5, 7.0, 7.5, 8.0    Leukocytes, UA Negative Negative    Nitrite, UA Negative Negative    Protein, UA Negative Negative mg/dl    Glucose,  (1/10%) (A) Negative mg/dl    Ketones, UA 10 (1+) (A) Negative mg/dl    Urobilinogen, UA 2.0 (A) <2.0 mg/dl mg/dl    Bilirubin, UA Small (A) Negative    Occult Blood, UA Negative Negative   B-Type Natriuretic Peptide(BNP)   Result Value Ref Range     (H) 0 - 100 pg/mL   HS Troponin I 2hr   Result Value Ref Range    hs TnI 2hr 7 \"Refer to ACS Flowchart\"- see link ng/L    Delta 2hr hsTnI -3 <20 ng/L   T4, free   Result Value Ref Range    Free T4 1.54 (H) 0.61 - 1.12 ng/dL   Uric acid   Result Value Ref Range    Uric Acid 10.0 (H) 3.5 - 8.5 mg/dL   Hepatic function panel   Result Value Ref Range    Total Bilirubin 1.61 (H) 0.20 - 1.00 mg/dL    Bilirubin, Direct 0.82 (H) 0.00 - 0.20 mg/dL    Alkaline Phosphatase 103 34 - 104 U/L    AST 25 13 - 39 U/L    ALT 16 7 - 52 U/L    Total Protein 6.8 6.4 - 8.4 g/dL    Albumin 3.4 (L) 3.5 - 5.0 g/dL   CBC and differential   Result Value Ref Range    WBC 12.39 (H) 4.31 - 10.16 Thousand/uL    RBC 3.72 (L) 3.88 - 5.62 Million/uL    Hemoglobin 11.9 (L) 12.0 - 17.0 g/dL    Hematocrit 35.4 (L) 36.5 - 49.3 %    MCV 95 82 - 98 fL    MCH 32.0 26.8 - 34.3 pg    MCHC 33.6 31.4 - 37.4 g/dL    RDW 12.8 11.6 - 15.1 %    MPV 10.4 8.9 - 12.7 fL    Platelets 253 149 - 390 Thousands/uL    nRBC 0 /100 " WBCs    Segmented % 71 43 - 75 %    Immature Grans % 0 0 - 2 %    Lymphocytes % 16 14 - 44 %    Monocytes % 11 4 - 12 %    Eosinophils Relative 2 0 - 6 %    Basophils Relative 0 0 - 1 %    Absolute Neutrophils 8.73 (H) 1.85 - 7.62 Thousands/µL    Absolute Immature Grans 0.04 0.00 - 0.20 Thousand/uL    Absolute Lymphocytes 1.96 0.60 - 4.47 Thousands/µL    Absolute Monocytes 1.35 (H) 0.17 - 1.22 Thousand/µL    Eosinophils Absolute 0.27 0.00 - 0.61 Thousand/µL    Basophils Absolute 0.04 0.00 - 0.10 Thousands/µL   Basic metabolic panel   Result Value Ref Range    Sodium 132 (L) 135 - 147 mmol/L    Potassium 3.5 3.5 - 5.3 mmol/L    Chloride 93 (L) 96 - 108 mmol/L    CO2 28 21 - 32 mmol/L    ANION GAP 11 4 - 13 mmol/L    BUN 22 5 - 25 mg/dL    Creatinine 1.06 0.60 - 1.30 mg/dL    Glucose 138 65 - 140 mg/dL    Glucose, Fasting 138 (H) 65 - 99 mg/dL    Calcium 8.7 8.4 - 10.2 mg/dL    eGFR 75 ml/min/1.73sq m   Magnesium   Result Value Ref Range    Magnesium 2.6 1.9 - 2.7 mg/dL   CBC and differential   Result Value Ref Range    WBC 14.44 (H) 4.31 - 10.16 Thousand/uL    RBC 3.70 (L) 3.88 - 5.62 Million/uL    Hemoglobin 11.7 (L) 12.0 - 17.0 g/dL    Hematocrit 35.3 (L) 36.5 - 49.3 %    MCV 95 82 - 98 fL    MCH 31.6 26.8 - 34.3 pg    MCHC 33.1 31.4 - 37.4 g/dL    RDW 12.7 11.6 - 15.1 %    MPV 10.5 8.9 - 12.7 fL    Platelets 287 149 - 390 Thousands/uL    nRBC 0 /100 WBCs    Segmented % 82 (H) 43 - 75 %    Immature Grans % 0 0 - 2 %    Lymphocytes % 11 (L) 14 - 44 %    Monocytes % 7 4 - 12 %    Eosinophils Relative 0 0 - 6 %    Basophils Relative 0 0 - 1 %    Absolute Neutrophils 11.75 (H) 1.85 - 7.62 Thousands/µL    Absolute Immature Grans 0.06 0.00 - 0.20 Thousand/uL    Absolute Lymphocytes 1.61 0.60 - 4.47 Thousands/µL    Absolute Monocytes 0.99 0.17 - 1.22 Thousand/µL    Eosinophils Absolute 0.01 0.00 - 0.61 Thousand/µL    Basophils Absolute 0.02 0.00 - 0.10 Thousands/µL   Comprehensive metabolic panel   Result Value Ref Range     Sodium 134 (L) 135 - 147 mmol/L    Potassium 3.7 3.5 - 5.3 mmol/L    Chloride 95 (L) 96 - 108 mmol/L    CO2 31 21 - 32 mmol/L    ANION GAP 8 4 - 13 mmol/L    BUN 25 5 - 25 mg/dL    Creatinine 1.18 0.60 - 1.30 mg/dL    Glucose 215 (H) 65 - 140 mg/dL    Glucose, Fasting 215 (H) 65 - 99 mg/dL    Calcium 9.1 8.4 - 10.2 mg/dL    Corrected Calcium 9.7 8.3 - 10.1 mg/dL    AST 24 13 - 39 U/L    ALT 22 7 - 52 U/L    Alkaline Phosphatase 102 34 - 104 U/L    Total Protein 7.0 6.4 - 8.4 g/dL    Albumin 3.2 (L) 3.5 - 5.0 g/dL    Total Bilirubin 0.70 0.20 - 1.00 mg/dL    eGFR 66 ml/min/1.73sq m   T3, reverse   Result Value Ref Range    T3, Reverse 102.3 (H) 9.2 - 24.1 ng/dL   T3   Result Value Ref Range    T3, Total 0.8 (L) 0.9-1.8 ng/mL ng/mL   Anti-microsomal antibody   Result Value Ref Range    THYROID MICROSOMAL ANTIBODY 18 0 - 34 IU/mL   Anti-thyroglobulin antibody   Result Value Ref Range    Thyroglobulin Ab <1.0 0.0 - 0.9 IU/mL   Comprehensive metabolic panel   Result Value Ref Range    Sodium 134 (L) 135 - 147 mmol/L    Potassium 3.6 3.5 - 5.3 mmol/L    Chloride 96 96 - 108 mmol/L    CO2 31 21 - 32 mmol/L    ANION GAP 7 4 - 13 mmol/L    BUN 31 (H) 5 - 25 mg/dL    Creatinine 1.17 0.60 - 1.30 mg/dL    Glucose 192 (H) 65 - 140 mg/dL    Glucose, Fasting 192 (H) 65 - 99 mg/dL    Calcium 8.8 8.4 - 10.2 mg/dL    Corrected Calcium 9.4 8.3 - 10.1 mg/dL    AST 53 (H) 13 - 39 U/L    ALT 29 7 - 52 U/L    Alkaline Phosphatase 108 (H) 34 - 104 U/L    Total Protein 6.8 6.4 - 8.4 g/dL    Albumin 3.3 (L) 3.5 - 5.0 g/dL    Total Bilirubin 0.43 0.20 - 1.00 mg/dL    eGFR 66 ml/min/1.73sq m   CBC   Result Value Ref Range    WBC 14.74 (H) 4.31 - 10.16 Thousand/uL    RBC 3.41 (L) 3.88 - 5.62 Million/uL    Hemoglobin 10.9 (L) 12.0 - 17.0 g/dL    Hematocrit 32.8 (L) 36.5 - 49.3 %    MCV 96 82 - 98 fL    MCH 32.0 26.8 - 34.3 pg    MCHC 33.2 31.4 - 37.4 g/dL    RDW 12.7 11.6 - 15.1 %    Platelets 310 149 - 390 Thousands/uL    MPV 10.5 8.9 - 12.7  fL   CBC   Result Value Ref Range    WBC 12.97 (H) 4.31 - 10.16 Thousand/uL    RBC 3.45 (L) 3.88 - 5.62 Million/uL    Hemoglobin 10.9 (L) 12.0 - 17.0 g/dL    Hematocrit 33.1 (L) 36.5 - 49.3 %    MCV 96 82 - 98 fL    MCH 31.6 26.8 - 34.3 pg    MCHC 32.9 31.4 - 37.4 g/dL    RDW 12.8 11.6 - 15.1 %    Platelets 302 149 - 390 Thousands/uL    MPV 10.5 8.9 - 12.7 fL   Basic metabolic panel   Result Value Ref Range    Sodium 137 135 - 147 mmol/L    Potassium 3.5 3.5 - 5.3 mmol/L    Chloride 97 96 - 108 mmol/L    CO2 34 (H) 21 - 32 mmol/L    ANION GAP 6 4 - 13 mmol/L    BUN 36 (H) 5 - 25 mg/dL    Creatinine 1.28 0.60 - 1.30 mg/dL    Glucose 163 (H) 65 - 140 mg/dL    Calcium 8.6 8.4 - 10.2 mg/dL    eGFR 60 ml/min/1.73sq m   ECG 12 lead   Result Value Ref Range    Ventricular Rate 84 BPM    Atrial Rate 84 BPM    NM Interval 144 ms    QRSD Interval 80 ms    QT Interval 404 ms    QTC Interval 477 ms    P Axis 55 degrees    QRS Axis 63 degrees    T Wave Axis 20 degrees   Fingerstick Glucose (POCT)   Result Value Ref Range    POC Glucose 134 65 - 140 mg/dl   Fingerstick Glucose (POCT)   Result Value Ref Range    POC Glucose 183 (H) 65 - 140 mg/dl   Fingerstick Glucose (POCT)   Result Value Ref Range    POC Glucose 342 (H) 65 - 140 mg/dl   Fingerstick Glucose (POCT)   Result Value Ref Range    POC Glucose 248 (H) 65 - 140 mg/dl   Fingerstick Glucose (POCT)   Result Value Ref Range    POC Glucose 236 (H) 65 - 140 mg/dl   Fingerstick Glucose (POCT)   Result Value Ref Range    POC Glucose 264 (H) 65 - 140 mg/dl   Fingerstick Glucose (POCT)   Result Value Ref Range    POC Glucose 302 (H) 65 - 140 mg/dl   Fingerstick Glucose (POCT)   Result Value Ref Range    POC Glucose 309 (H) 65 - 140 mg/dl   Fingerstick Glucose (POCT)   Result Value Ref Range    POC Glucose 173 (H) 65 - 140 mg/dl   Fingerstick Glucose (POCT)   Result Value Ref Range    POC Glucose 258 (H) 65 - 140 mg/dl   Fingerstick Glucose (POCT)   Result Value Ref Range    POC  Glucose 332 (H) 65 - 140 mg/dl   Fingerstick Glucose (POCT)   Result Value Ref Range    POC Glucose 323 (H) 65 - 140 mg/dl   Fingerstick Glucose (POCT)   Result Value Ref Range    POC Glucose 155 (H) 65 - 140 mg/dl   Fingerstick Glucose (POCT)   Result Value Ref Range    POC Glucose 222 (H) 65 - 140 mg/dl       No orders of the defined types were placed in this encounter.      ALLERGIES:  Allergies[5]    Current Medications   Current Medications[6]      Health Maintenance     Health Maintenance   Topic Date Due    Pneumococcal Vaccine: 50+ Years (1 of 2 - PCV) Never done    Zoster Vaccine (1 of 2) Never done    Annual Physical  12/22/2023    RSV Vaccine for Pregnant Patients and Patients Age 60+ Years (1 - Risk 60-74 years 1-dose series) Never done    COVID-19 Vaccine (4 - 2024-25 season) 09/01/2024    HEMOGLOBIN A1C  09/13/2025    PT PLAN OF CARE  07/18/2025    Kidney Health Evaluation: Albumin/Creatinine Ratio  09/27/2025    Diabetic Foot Exam  09/27/2025    Diabetic Eye Exam  10/09/2025    Depression Screening  05/01/2026    Kidney Health Evaluation: GFR  05/11/2026    DTaP,Tdap,and Td Vaccines (3 - Td or Tdap) 09/05/2030    Colorectal Cancer Screening  01/17/2031    HIV Screening  Completed    Hepatitis C Screening  Completed    Influenza Vaccine  Completed    Meningococcal B Vaccine  Aged Out    RSV Vaccine age 0-20 Months  Aged Out    HIB Vaccine  Aged Out    IPV Vaccine  Aged Out    Hepatitis A Vaccine  Aged Out    Meningococcal ACWY Vaccine  Aged Out    HPV Vaccine  Aged Out     Immunization History   Administered Date(s) Administered    COVID-19 PFIZER VACCINE 0.3 ML IM 04/12/2021, 05/03/2021, 12/15/2021    INFLUENZA 09/23/2015, 10/21/2019, 12/22/2022, 10/31/2023    Influenza Recombinant Preservative Free Im 11/27/2024    Influenza, injectable, quadrivalent, preservative free 0.5 mL 10/27/2020, 10/31/2023    Influenza, recombinant, quadrivalent,injectable, preservative free 10/21/2019, 12/22/2022     Influenza, seasonal, injectable 09/23/2015    Tdap 10/21/2019, 09/05/2020       Linden Roque MD             [1]   Patient Active Problem List  Diagnosis    Essential hypertension    Type 2 diabetes mellitus with hyperglycemia (HCC)    Elevated lipoprotein(a)    Microalbuminuria    Morbid obesity due to excess calories (HCC)    Medical non-compliance    Alcohol dependence in remission (HCC)    Onychomycosis    Arthritis of right wrist    Impingement syndrome of left shoulder    Diabetes mellitus (HCC)    Acute midline low back pain without sciatica    Reactive airway disease without complication    Chronic heart failure with preserved ejection fraction (HCC)    AMANDO (obstructive sleep apnea)    Arthralgia    Intermittent asthma    Mild asthma with acute exacerbation    Current smoker    Chronic gout without tophus    Encounter for immunization    Stage 3 chronic kidney disease (HCC)    B12 deficiency    Abnormal CT of the abdomen    Abnormal TSH    Acute on chronic low back pain    DISH (diffuse idiopathic skeletal hyperostosis)    Anemia    Chronic bilateral low back pain with right-sided sciatica    Cellulitis   [2]   Past Medical History:  Diagnosis Date    Acute gout of right hand 10/17/2022    Acute kidney injury (HCC) 09/05/2020    Acute on chronic diastolic congestive heart failure ([facility]) 08/31/2021    DELFINO (acute kidney injury) (HCC) 09/05/2020    Asthma     Cellulitis 09/27/2024    CHF (congestive heart failure) ([facility])     Chronic heart failure with preserved ejection fraction (HCC) 07/06/2020    CKD (chronic kidney disease) 04/15/2025    Colon polyp     GERD (gastroesophageal reflux disease)     Hypertension     Inguinal hernia     3/25/15    Onychomycosis     5/24/17    Sleep apnea     Type 2 diabetes mellitus (HCC) 05/01/2012   [3]   Past Surgical History:  Procedure Laterality Date    ARTHROSCOPY KNEE      with Medial and Lateral Meniscus Repair    HERNIA REPAIR      umbilical and inguinal hernia repairs  (left)   [4]   Family History  Problem Relation Name Age of Onset    Hypertension Mother          essential    Chronic bronchitis Father      Prostate cancer Father      Colon cancer Father      Breast cancer Sister     [5] No Known Allergies  [6]   Current Outpatient Medications   Medication Sig Dispense Refill    albuterol (PROVENTIL HFA,VENTOLIN HFA) 90 mcg/act inhaler Inhale 1 puff in the morning 3 inhalers per refill 54 g 1    allopurinol (ZYLOPRIM) 100 mg tablet Take 1 tablet (100 mg total) by mouth daily 90 tablet 1    Blood Pressure KIT Twice a day periodically 1 each 0    bumetanide (BUMEX) 2 mg tablet Take 1 tablet (2 mg total) by mouth 2 (two) times a day Do not start before April 24, 2025. 60 tablet 0    carvedilol (COREG) 12.5 mg tablet Take 1 tablet (12.5 mg total) by mouth 2 (two) times a day with meals 60 tablet 0    cyanocobalamin (VITAMIN B-12) 1000 MCG tablet Take 1 tablet (1,000 mcg total) by mouth daily 30 tablet 0    famotidine (PEPCID) 20 mg tablet Take 1 tablet (20 mg total) by mouth daily at bedtime 90 tablet 1    Fluticasone-Salmeterol (Advair) 500-50 mcg/dose inhaler Inhale 1 puff 2 (two) times a day Rinse mouth after use. 60 blister 5    glucose blood test strip 1 each by Other route daily 180 each 5    Lancets (ONETOUCH ULTRASOFT) lancets Use      levalbuterol (XOPENEX) 1.25 mg/3 mL nebulizer solution Take 3 mL (1.25 mg total) by nebulization 3 (three) times a day 270 mL 0    lidocaine (LIDODERM) 5 % Apply 1 patch topically over 12 hours daily Remove & Discard patch within 12 hours or as directed by MD 10 patch 0    methocarbamol (ROBAXIN) 500 mg tablet Take 1 tablet (500 mg total) by mouth 3 (three) times a day as needed for muscle spasms 30 tablet 1    pantoprazole (PROTONIX) 40 mg tablet Take 1 tablet (40 mg total) by mouth daily before breakfast 90 tablet 3    polyethylene glycol (MIRALAX) 17 g packet Take 17 g by mouth daily as needed (Constipation) 10 each 0    predniSONE 20 mg  tablet 2 tabs X 3 days, 1 tab X 3 days, 1/2 tab X 4 days 11 tablet 0    traMADol-acetaminophen (ULTRACET) 37.5-325 mg per tablet Take 1 tablet by mouth 2 (two) times a day as needed for moderate pain 40 tablet 0    glimepiride (AMARYL) 1 mg tablet TAKE 1 TABLET BY MOUTH TWICE A DAY (Patient not taking: Reported on 5/21/2025) 180 tablet 1    ipratropium (ATROVENT) 0.02 % nebulizer solution Take 2.5 mL (0.5 mg total) by nebulization 3 (three) times a day 225 mL 5    magnesium Oxide (MAG-OX) 400 mg TABS Take 2 tablets (800 mg total) by mouth 2 (two) times a day for 7 days 28 tablet 0    spironolactone (ALDACTONE) 25 mg tablet Take 1 tablet (25 mg total) by mouth daily 90 tablet 1     No current facility-administered medications for this visit.

## 2025-06-20 NOTE — ASSESSMENT & PLAN NOTE
Arthralgia.  I suspect symptoms are multifactorial.  He does have a history of osteoarthritis.  He was given prescription for prednisone tapering dose of 40 mg x 3 days, 20 x 3, 10 x 4 days.  He was also given methocarbamol 500 mg to take 3 times daily as needed.  He was given prescriptions to check x-rays of hips and lumbar spine.  We will make further recommendations pending results of test.

## 2025-06-23 ENCOUNTER — HOSPITAL ENCOUNTER (OUTPATIENT)
Dept: RADIOLOGY | Facility: HOSPITAL | Age: 60
Discharge: HOME | End: 2025-06-23
Payer: COMMERCIAL

## 2025-06-23 DIAGNOSIS — M54.41 CHRONIC BILATERAL LOW BACK PAIN WITH RIGHT-SIDED SCIATICA: ICD-10-CM

## 2025-06-23 DIAGNOSIS — G89.29 CHRONIC BILATERAL LOW BACK PAIN WITH RIGHT-SIDED SCIATICA: ICD-10-CM

## 2025-06-23 PROCEDURE — 72110 X-RAY EXAM L-2 SPINE 4/>VWS: CPT

## 2025-06-23 PROCEDURE — 73502 X-RAY EXAM HIP UNI 2-3 VIEWS: CPT

## 2025-06-24 DIAGNOSIS — M25.50 ARTHRALGIA, UNSPECIFIED JOINT: Primary | ICD-10-CM

## 2025-07-01 ENCOUNTER — TELEPHONE (OUTPATIENT)
Dept: CARDIOLOGY CLINIC | Facility: CLINIC | Age: 60
End: 2025-07-01

## 2025-07-07 ENCOUNTER — TELEPHONE (OUTPATIENT)
Dept: FAMILY MEDICINE CLINIC | Facility: CLINIC | Age: 60
End: 2025-07-07

## 2025-07-07 VITALS — BODY MASS INDEX: 38.98 KG/M2 | HEIGHT: 69 IN | WEIGHT: 263.2 LBS

## 2025-07-07 DIAGNOSIS — M70.61 GREATER TROCHANTERIC BURSITIS, RIGHT: Primary | ICD-10-CM

## 2025-07-07 DIAGNOSIS — M54.16 RADICULOPATHY, LUMBAR REGION: ICD-10-CM

## 2025-07-07 PROCEDURE — 20610 DRAIN/INJ JOINT/BURSA W/O US: CPT | Performed by: ORTHOPAEDIC SURGERY

## 2025-07-07 PROCEDURE — 99214 OFFICE O/P EST MOD 30 MIN: CPT | Performed by: ORTHOPAEDIC SURGERY

## 2025-07-07 RX ORDER — BETAMETHASONE SODIUM PHOSPHATE AND BETAMETHASONE ACETATE 3; 3 MG/ML; MG/ML
12 INJECTION, SUSPENSION INTRA-ARTICULAR; INTRALESIONAL; INTRAMUSCULAR; SOFT TISSUE
Status: COMPLETED | OUTPATIENT
Start: 2025-07-07 | End: 2025-07-07

## 2025-07-07 RX ORDER — BUPIVACAINE HYDROCHLORIDE 2.5 MG/ML
2 INJECTION, SOLUTION INFILTRATION; PERINEURAL
Status: COMPLETED | OUTPATIENT
Start: 2025-07-07 | End: 2025-07-07

## 2025-07-07 RX ADMIN — BUPIVACAINE HYDROCHLORIDE 2 ML: 2.5 INJECTION, SOLUTION INFILTRATION; PERINEURAL at 14:45

## 2025-07-07 RX ADMIN — BETAMETHASONE SODIUM PHOSPHATE AND BETAMETHASONE ACETATE 12 MG: 3; 3 INJECTION, SUSPENSION INTRA-ARTICULAR; INTRALESIONAL; INTRAMUSCULAR; SOFT TISSUE at 14:45

## 2025-07-07 NOTE — ASSESSMENT & PLAN NOTE
Diagnostics reviewed and physical exam performed.  Diagnosis, treatment options and associated risks were discussed with the patient including no treatment, nonsurgical treatment and potential for surgical intervention.  The patient was given the opportunity to ask questions regarding each.   X-ray obtained today and reviewed with the patient demonstrating no acute osseous abnormalities, mild right hip ostoearthritis  Discussed that the majority of his pain and symptoms are most consistent with lumbar pathology, for which a referral was placed today for Elly to follow up with [facility] for further evaluation and treatment  Patient was offered, accepted, and received a cortisone injection of the right greater trochanteric bursa today. Patient tolerated procedure well with no immediate complications. Post-injection protocols and expectations were discussed with the patient.   Order placed today for patient to initiate outpatient Physical Therapy  Continue Physical Therapy until discharged from their care, transition to home exercise program  Apply Voltaren gel to painful area up to 4 times a day  May use ice or heat as needed for pain relief  May take NSAIDs/Tylenol as needed for pain control  Follow up on an as needed basis    Orders:    Ambulatory Referral to Orthopedic Surgery    Large joint arthrocentesis: R greater trochanteric bursa    Ambulatory referral to Physical Therapy; Future    Ambulatory referral to [facility]; Future

## 2025-07-07 NOTE — PROGRESS NOTES
Name: Elly Chong      : 1965      MRN: [id number]  Encounter Provider: David Chun DO  Encounter Date: 2025   Encounter department: [facility]  :  Assessment & Plan  Greater trochanteric bursitis, right  Radiculopathy, lumbar region  Diagnostics reviewed and physical exam performed.  Diagnosis, treatment options and associated risks were discussed with the patient including no treatment, nonsurgical treatment and potential for surgical intervention.  The patient was given the opportunity to ask questions regarding each.   X-ray obtained today and reviewed with the patient demonstrating no acute osseous abnormalities, mild right hip ostoearthritis  Discussed that the majority of his pain and symptoms are most consistent with lumbar pathology, for which a referral was placed today for Elly to follow up with [facility] for further evaluation and treatment  Patient was offered, accepted, and received a cortisone injection of the right greater trochanteric bursa today. Patient tolerated procedure well with no immediate complications. Post-injection protocols and expectations were discussed with the patient.   Order placed today for patient to initiate outpatient Physical Therapy  Continue Physical Therapy until discharged from their care, transition to home exercise program  Apply Voltaren gel to painful area up to 4 times a day  May use ice or heat as needed for pain relief  May take NSAIDs/Tylenol as needed for pain control  Follow up on an as needed basis    Orders:    Ambulatory Referral to Orthopedic Surgery    Large joint arthrocentesis: R greater trochanteric bursa    Ambulatory referral to Physical Therapy; Future    Ambulatory referral to [facility]; Future          Subjective:  Elly Chong is a 60 y.o. male who presents today for evaluation and treatment of 6 months lumbar pain, radiating around the lateral thigh to the knee  level, with associated numbness of the lateral lower leg. He feels that his balance is thrown off at times, especially when walking, due to leg weakness and paraesthesias. Leg feels weak on the stairs. Pain increases with prolonged walking. He takes Advil and Tylenol for pain with very mild relief, as well as IcyHot or Tigerbalm. He denies any trauma or previous injury to the lumbar spine and right hip, however does note a fall on mothers day that did aggravate his pains. He did attend one day of PT. Denies groin pain.       The following portions of the patient's history were reviewed and updated as appropriate: allergies, current medications, past family history, past social history, past surgical history and problem list.      Social History     Socioeconomic History    Marital status: /Civil Union     Spouse name: Not on file    Number of children: Not on file    Years of education: Not on file    Highest education level: Not on file   Occupational History    Occupation: [employer]    Tobacco Use    Smoking status: Former     Types: Cigars    Smokeless tobacco: Never    Tobacco comments:     current every day smoker, per [employer]   Vaping Use    Vaping status: Never Used   Substance and Sexual Activity    Alcohol use: Yes     Alcohol/week: 35.0 standard drinks of alcohol     Types: 21 Cans of beer, 14 Shots of liquor per week     Comment: 3 beers daily and 2 shots daily (as of 04/2025)    Drug use: No    Sexual activity: Yes   Other Topics Concern    Not on file   Social History Narrative    Alcohol dependence    Nicotine dependence    Denied, alcohol Use    Marital problems     Social Drivers of Health     Financial Resource Strain: Not on file   Food Insecurity: No Food Insecurity (5/7/2025)    Nursing - Inadequate Food Risk Classification     Worried About Running Out of Food in the Last Year: Never true     Ran Out of Food in the Last Year: Never true     Ran Out of Food in the Last Year:  "Never true   Transportation Needs: No Transportation Needs (5/7/2025)    Nursing - Transportation Risk Classification     Lack of Transportation: Not on file     Lack of Transportation: No   Physical Activity: Not on file   Stress: Not on file   Social Connections: Not on file   Intimate Partner Violence: Unknown (5/7/2025)    Nursing IPS     Feels Physically and Emotionally Safe: Not on file     Physically Hurt by Someone: Not on file     Humiliated or Emotionally Abused by Someone: Not on file     Physically Hurt by Someone: No     Hurt or Threatened by Someone: No   Housing Stability: Unknown (5/7/2025)    Nursing: Inadequate Housing Risk Classification     Has Housing: Not on file     Worried About Losing Housing: Not on file     Unable to Get Utilities: Not on file     Unable to Pay for Housing in the Last Year: No     Has Housing: No     Past Medical History[1]  Past Surgical History[2]  Allergies[3]  Medications Ordered Prior to Encounter[4]         Objective:    Review of Systems  Pertinent items are noted in HPI.  All other systems were reviewed and are negative.    Physical Exam  Cons: Appears well.  No apparent distress.  Psych: Alert. Oriented x3.  Mood and affect normal.  Eyes: PERRLA, EOMI  Resp: Normal effort.  No audible wheezing or stridor.  CV: Palpable pulse.  No discernable arrhythmia.  No LE edema.  Lymph:  No palpable cervical, axillary, or inguinal lymphadenopathy.  Skin: Warm.  No palpable masses.  No visible lesions.  Neuro: Normal muscle tone.  Normal and symmetric DTR's.    BMI:   Estimated body mass index is 38.87 kg/m² as calculated from the following:    Height as of this encounter: 5' 9\" (1.753 m).    Weight as of this encounter: 119 kg (263 lb 3.2 oz).  Lab Results   Component Value Date    HGBA1C 6.4 03/13/2025         Right Hip Exam     Tenderness   The patient is experiencing tenderness in the greater trochanter.    Range of Motion   External rotation:  50   Internal rotation:  30 " "(pain at end range)     Muscle Strength   Abduction: 5/5   Adduction: 5/5   Flexion: 5/5     Other   Erythema: absent  Scars: absent  Sensation: normal  Pulse: present    Comments:    TTP greater trochanter, sciatic notch  5/5 IR with pain  5/5 ER  3+ pitting edema bilaterally  + Slump test              Procedures  Large joint arthrocentesis: R greater trochanteric bursa    Performed by: David Chun DO  Authorized by: David Chun DO    Universal Protocol:  Consent: Verbal consent obtained  Risks and benefits: risks, benefits and alternatives were discussed  Consent given by: patient  Time out: Immediately prior to procedure a \"time out\" was called to verify the correct patient, procedure, equipment, support staff and site/side marked as required.  Patient understanding: patient states understanding of the procedure being performed  Site marked: the operative site was marked  Supporting Documentation  Indications: pain and diagnostic evaluation     Is this a Visco injection? NoProcedure Details  Location: hip - R greater trochanteric bursa  Preparation: Patient was prepped and draped in the usual sterile fashion  Needle size: 22 G  Approach: lateral  Medications administered: 2 mL bupivacaine 0.25 %; 12 mg betamethasone acetate-betamethasone sodium phosphate 6 (3-3) mg/mL    Patient tolerance: patient tolerated the procedure well with no immediate complications  Dressing:  Sterile dressing applied            Diagnostics, reviewed and taken today if performed as documented:  I have personally reviewed pertinent films in PACS and my interpretation is x-ray of right hip obtained today reviewed demonstrating no acute osseous abnormalities, mild osteoarthritis .      Scribe Attestation      I,:  Tamiko Alston am acting as a scribe while in the presence of the attending physician.:       I,:  David Chun DO personally performed the services described in this documentation    as scribed in my presence.:       " "          Portions of the record may have been created with voice recognition software.  Occasional wrong word or \"sound a like\" substitutions may have occurred due to the inherent limitations of voice recognition software.  Read the chart carefully and recognize, using context, where substitutions have occurred.         [1]   Past Medical History:  Diagnosis Date    Acute gout of right hand 10/17/2022    Acute kidney injury (HCC) 09/05/2020    Acute on chronic diastolic congestive heart failure ([facility]) 08/31/2021    DELFINO (acute kidney injury) ([facility]) 09/05/2020    Asthma     Cellulitis 09/27/2024    CHF (congestive heart failure) ([facility])     Chronic heart failure with preserved ejection fraction ([facility]) 07/06/2020    CKD (chronic kidney disease) 04/15/2025    Colon polyp     GERD (gastroesophageal reflux disease)     Hypertension     Inguinal hernia     3/25/15    Onychomycosis     5/24/17    Sleep apnea     Type 2 diabetes mellitus ([facility]) 05/01/2012   [2]   Past Surgical History:  Procedure Laterality Date    ARTHROSCOPY KNEE      with Medial and Lateral Meniscus Repair    HERNIA REPAIR      umbilical and inguinal hernia repairs (left)   [3] No Known Allergies  [4]   Current Outpatient Medications on File Prior to Visit   Medication Sig Dispense Refill    albuterol (PROVENTIL HFA,VENTOLIN HFA) 90 mcg/act inhaler Inhale 1 puff in the morning 3 inhalers per refill 54 g 1    allopurinol (ZYLOPRIM) 100 mg tablet Take 1 tablet (100 mg total) by mouth daily 90 tablet 1    Blood Pressure KIT Twice a day periodically 1 each 0    bumetanide (BUMEX) 2 mg tablet Take 1 tablet (2 mg total) by mouth 2 (two) times a day Do not start before April 24, 2025. 60 tablet 0    carvedilol (COREG) 12.5 mg tablet Take 1 tablet (12.5 mg total) by mouth 2 (two) times a day with meals 60 tablet 0    cyanocobalamin (VITAMIN B-12) 1000 MCG tablet Take 1 tablet (1,000 mcg total) by mouth daily 30 tablet 0    famotidine (PEPCID) 20 mg tablet Take 1 " tablet (20 mg total) by mouth daily at bedtime 90 tablet 1    Fluticasone-Salmeterol (Advair) 500-50 mcg/dose inhaler Inhale 1 puff 2 (two) times a day Rinse mouth after use. 60 blister 5    glucose blood test strip 1 each by Other route daily 180 each 5    Lancets (ONETOUCH ULTRASOFT) lancets Use      levalbuterol (XOPENEX) 1.25 mg/3 mL nebulizer solution Take 3 mL (1.25 mg total) by nebulization 3 (three) times a day 270 mL 0    lidocaine (LIDODERM) 5 % Apply 1 patch topically over 12 hours daily Remove & Discard patch within 12 hours or as directed by MD 10 patch 0    methocarbamol (ROBAXIN) 500 mg tablet Take 1 tablet (500 mg total) by mouth 3 (three) times a day as needed for muscle spasms 30 tablet 1    pantoprazole (PROTONIX) 40 mg tablet Take 1 tablet (40 mg total) by mouth daily before breakfast 90 tablet 3    polyethylene glycol (MIRALAX) 17 g packet Take 17 g by mouth daily as needed (Constipation) 10 each 0    traMADol-acetaminophen (ULTRACET) 37.5-325 mg per tablet Take 1 tablet by mouth 2 (two) times a day as needed for moderate pain 40 tablet 0    glimepiride (AMARYL) 1 mg tablet TAKE 1 TABLET BY MOUTH TWICE A DAY (Patient not taking: Reported on 5/21/2025) 180 tablet 1    ipratropium (ATROVENT) 0.02 % nebulizer solution Take 2.5 mL (0.5 mg total) by nebulization 3 (three) times a day 225 mL 5    magnesium Oxide (MAG-OX) 400 mg TABS Take 2 tablets (800 mg total) by mouth 2 (two) times a day for 7 days 28 tablet 0    predniSONE 20 mg tablet 2 tabs X 3 days, 1 tab X 3 days, 1/2 tab X 4 days (Patient not taking: Reported on 7/7/2025) 11 tablet 0    spironolactone (ALDACTONE) 25 mg tablet Take 1 tablet (25 mg total) by mouth daily 90 tablet 1     No current facility-administered medications on file prior to visit.

## 2025-07-09 ENCOUNTER — TELEPHONE (OUTPATIENT)
Dept: FAMILY MEDICINE CLINIC | Facility: CLINIC | Age: 60
End: 2025-07-09

## 2025-07-09 NOTE — TELEPHONE ENCOUNTER
Wife called and stated that the patient had gone to Ortho and was given a shot in the knee. He is feeling a lot better and would like to go back to work on 7/14/2025.  She stated that you have all the forms to fill out.  He needs the RTW and the other forms. Please call when done.

## 2025-07-11 ENCOUNTER — TELEPHONE (OUTPATIENT)
Age: 60
End: 2025-07-11

## 2025-07-20 DIAGNOSIS — J44.1 COPD EXACERBATION (HCC): ICD-10-CM

## 2025-08-06 DIAGNOSIS — J45.901 MILD ASTHMA WITH ACUTE EXACERBATION, UNSPECIFIED WHETHER PERSISTENT: ICD-10-CM

## 2025-08-07 RX ORDER — FLUTICASONE PROPIONATE AND SALMETEROL 500; 50 UG/1; UG/1
POWDER RESPIRATORY (INHALATION)
Qty: 60 BLISTER | Refills: 1 | Status: SHIPPED | OUTPATIENT
Start: 2025-08-07

## 2025-08-22 ENCOUNTER — OFFICE VISIT (OUTPATIENT)
Dept: FAMILY MEDICINE CLINIC | Facility: CLINIC | Age: 60
End: 2025-08-22
Payer: COMMERCIAL

## 2025-08-22 VITALS
WEIGHT: 233.8 LBS | HEART RATE: 85 BPM | OXYGEN SATURATION: 99 % | DIASTOLIC BLOOD PRESSURE: 86 MMHG | SYSTOLIC BLOOD PRESSURE: 130 MMHG | RESPIRATION RATE: 16 BRPM | HEIGHT: 69 IN | TEMPERATURE: 98.2 F | BODY MASS INDEX: 34.63 KG/M2

## 2025-08-22 DIAGNOSIS — I50.32 CHRONIC HEART FAILURE WITH PRESERVED EJECTION FRACTION (HCC): Chronic | ICD-10-CM

## 2025-08-22 DIAGNOSIS — K21.9 GERD (GASTROESOPHAGEAL REFLUX DISEASE): ICD-10-CM

## 2025-08-22 DIAGNOSIS — M1A.9XX0 CHRONIC GOUT WITHOUT TOPHUS, UNSPECIFIED CAUSE, UNSPECIFIED SITE: ICD-10-CM

## 2025-08-22 DIAGNOSIS — R13.10 DIFFICULTY SWALLOWING: ICD-10-CM

## 2025-08-22 DIAGNOSIS — M54.2 NECK PAIN: ICD-10-CM

## 2025-08-22 DIAGNOSIS — G89.29 CHRONIC BILATERAL LOW BACK PAIN WITH RIGHT-SIDED SCIATICA: ICD-10-CM

## 2025-08-22 DIAGNOSIS — L97.929 ULCER OF LEFT LOWER EXTREMITY, UNSPECIFIED ULCER STAGE (HCC): ICD-10-CM

## 2025-08-22 DIAGNOSIS — E11.65 UNCONTROLLED TYPE 2 DIABETES MELLITUS WITH HYPERGLYCEMIA (HCC): Primary | ICD-10-CM

## 2025-08-22 DIAGNOSIS — Z13.220 LIPID SCREENING: ICD-10-CM

## 2025-08-22 DIAGNOSIS — M54.41 CHRONIC BILATERAL LOW BACK PAIN WITH RIGHT-SIDED SCIATICA: ICD-10-CM

## 2025-08-22 DIAGNOSIS — M54.9 UPPER BACK PAIN: ICD-10-CM

## 2025-08-22 LAB — SL AMB POCT HEMOGLOBIN AIC: 10.5 (ref ?–6.5)

## 2025-08-22 PROCEDURE — 99214 OFFICE O/P EST MOD 30 MIN: CPT | Performed by: NURSE PRACTITIONER

## 2025-08-22 PROCEDURE — 83036 HEMOGLOBIN GLYCOSYLATED A1C: CPT | Performed by: NURSE PRACTITIONER

## 2025-08-22 RX ORDER — FAMOTIDINE 20 MG/1
20 TABLET, FILM COATED ORAL
Qty: 90 TABLET | Refills: 1 | Status: SHIPPED | OUTPATIENT
Start: 2025-08-22

## 2025-08-22 RX ORDER — PANTOPRAZOLE SODIUM 40 MG/1
40 TABLET, DELAYED RELEASE ORAL
Qty: 90 TABLET | Refills: 0 | Status: SHIPPED | OUTPATIENT
Start: 2025-08-22

## 2025-08-22 RX ORDER — METFORMIN HYDROCHLORIDE 500 MG/1
1000 TABLET, EXTENDED RELEASE ORAL 2 TIMES DAILY WITH MEALS
Qty: 360 TABLET | Refills: 1 | Status: SHIPPED | OUTPATIENT
Start: 2025-08-22

## 2025-08-22 RX ORDER — ALLOPURINOL 100 MG/1
100 TABLET ORAL DAILY
Qty: 90 TABLET | Refills: 1 | Status: SHIPPED | OUTPATIENT
Start: 2025-08-22

## 2025-08-22 RX ORDER — METHOCARBAMOL 500 MG/1
500 TABLET, FILM COATED ORAL 3 TIMES DAILY PRN
Qty: 30 TABLET | Refills: 1 | Status: SHIPPED | OUTPATIENT
Start: 2025-08-22